# Patient Record
Sex: MALE | Race: ASIAN | NOT HISPANIC OR LATINO
[De-identification: names, ages, dates, MRNs, and addresses within clinical notes are randomized per-mention and may not be internally consistent; named-entity substitution may affect disease eponyms.]

---

## 2022-07-26 PROBLEM — Z00.00 ENCOUNTER FOR PREVENTIVE HEALTH EXAMINATION: Status: ACTIVE | Noted: 2022-07-26

## 2022-08-11 DIAGNOSIS — M76.62 ACHILLES TENDINITIS, LEFT LEG: ICD-10-CM

## 2022-08-11 DIAGNOSIS — R26.2 DIFFICULTY IN WALKING, NOT ELSEWHERE CLASSIFIED: ICD-10-CM

## 2022-08-11 DIAGNOSIS — Z86.39 PERSONAL HISTORY OF OTHER ENDOCRINE, NUTRITIONAL AND METABOLIC DISEASE: ICD-10-CM

## 2022-08-11 DIAGNOSIS — Z78.9 OTHER SPECIFIED HEALTH STATUS: ICD-10-CM

## 2022-08-11 DIAGNOSIS — M77.52 OTHER ENTHESOPATHY OF LT FOOT AND ANKLE: ICD-10-CM

## 2022-08-11 RX ORDER — LEVOTHYROXINE SODIUM 0.05 MG/1
50 TABLET ORAL
Refills: 0 | Status: ACTIVE | COMMUNITY

## 2022-08-11 RX ORDER — CLOTRIMAZOLE 10 MG/G
1 CREAM TOPICAL
Refills: 0 | Status: ACTIVE | COMMUNITY

## 2022-08-16 ENCOUNTER — APPOINTMENT (OUTPATIENT)
Dept: PODIATRY | Facility: CLINIC | Age: 87
End: 2022-08-16

## 2022-08-16 VITALS — HEIGHT: 66 IN | BODY MASS INDEX: 23.46 KG/M2 | WEIGHT: 146 LBS

## 2022-08-16 PROCEDURE — 99212 OFFICE O/P EST SF 10 MIN: CPT

## 2022-08-18 NOTE — HISTORY OF PRESENT ILLNESS
[Sneakers] : jessica [FreeTextEntry1] : Patient presents today for retrocalcaneal bursitis, left.  He is 90% improved.  Inflammation and pain are resolved.  The insertional pain is resolved.  Patient started physical therapy yesterday and noted significant improvement.\par

## 2022-08-18 NOTE — PHYSICAL EXAM
[Ankle Swelling (On Exam)] : present [Ankle Swelling Bilaterally] : bilaterally  [Varicose Veins Of Lower Extremities] : bilaterally [Ankle Swelling On The Left] : moderate [] : not present [1+] : left foot posterior tibialis 1+ [2+] : left foot dorsalis pedis 2+ [FreeTextEntry1] : Achilles tendon reflex: 3/5, bilateral.

## 2022-08-18 NOTE — PROCEDURE
[FreeTextEntry1] : Impression: Insertional Achilles tendonitis, left, resolving\par \par Treatment: Continue with physical therapy.  Continue eccentric muscle strengthening.  Patient is discharged unless there a problem.\par Any questions or problems, patient is to contact the office.\par

## 2022-10-31 ENCOUNTER — APPOINTMENT (OUTPATIENT)
Dept: OTOLARYNGOLOGY | Facility: CLINIC | Age: 87
End: 2022-10-31

## 2022-10-31 VITALS
DIASTOLIC BLOOD PRESSURE: 92 MMHG | HEART RATE: 86 BPM | SYSTOLIC BLOOD PRESSURE: 146 MMHG | HEIGHT: 65 IN | WEIGHT: 148 LBS | BODY MASS INDEX: 24.66 KG/M2

## 2022-10-31 DIAGNOSIS — H90.5 UNSPECIFIED SENSORINEURAL HEARING LOSS: ICD-10-CM

## 2022-10-31 PROCEDURE — 92567 TYMPANOMETRY: CPT

## 2022-10-31 PROCEDURE — 99203 OFFICE O/P NEW LOW 30 MIN: CPT

## 2022-10-31 PROCEDURE — 92557 COMPREHENSIVE HEARING TEST: CPT

## 2022-10-31 RX ORDER — TRIAMCINOLONE ACETONIDE 1 MG/G
0.1 OINTMENT TOPICAL
Qty: 80 | Refills: 0 | Status: ACTIVE | COMMUNITY
Start: 2022-10-17

## 2022-10-31 RX ORDER — MUPIROCIN 20 MG/G
2 OINTMENT TOPICAL
Qty: 22 | Refills: 0 | Status: ACTIVE | COMMUNITY
Start: 2022-10-17

## 2022-10-31 RX ORDER — MELOXICAM 15 MG/1
15 TABLET ORAL
Refills: 0 | Status: DISCONTINUED | COMMUNITY
End: 2022-10-31

## 2022-10-31 RX ORDER — HYDROCORTISONE 25 MG/G
2.5 CREAM TOPICAL
Qty: 28 | Refills: 0 | Status: ACTIVE | COMMUNITY
Start: 2022-06-16

## 2022-10-31 RX ORDER — COVID-19 ANTIGEN TEST
KIT MISCELLANEOUS
Qty: 8 | Refills: 0 | Status: DISCONTINUED | COMMUNITY
Start: 2022-06-16

## 2022-11-20 PROBLEM — H90.5 HEARING DISORDER, COCHLEAR: Status: ACTIVE | Noted: 2022-11-20

## 2022-11-20 NOTE — HISTORY OF PRESENT ILLNESS
[de-identified] : 85 yo M with history of gradual hearing loss for past three years - started wearing hearing aids bilaterally for over a year - wears regularly. Last hearing test a couple of months ago.  No tinnitus, otalgia, otorrhea, dizziness or headaches. No history of head trauma or exposure to loud noises. No family history of hearing loss  - feels benefit with Hearing Aids - miracle ear cic

## 2023-05-12 ENCOUNTER — APPOINTMENT (OUTPATIENT)
Dept: OPHTHALMOLOGY | Facility: CLINIC | Age: 88
End: 2023-05-12
Payer: MEDICARE

## 2023-05-12 ENCOUNTER — NON-APPOINTMENT (OUTPATIENT)
Age: 88
End: 2023-05-12

## 2023-05-12 PROCEDURE — 92004 COMPRE OPH EXAM NEW PT 1/>: CPT

## 2023-05-12 PROCEDURE — 92136 OPHTHALMIC BIOMETRY: CPT

## 2023-05-31 VITALS
RESPIRATION RATE: 15 BRPM | TEMPERATURE: 97 F | HEIGHT: 65 IN | SYSTOLIC BLOOD PRESSURE: 125 MMHG | DIASTOLIC BLOOD PRESSURE: 78 MMHG | OXYGEN SATURATION: 95 % | HEART RATE: 45 BPM | WEIGHT: 147.93 LBS

## 2023-05-31 RX ORDER — CHLORHEXIDINE GLUCONATE 213 G/1000ML
1 SOLUTION TOPICAL ONCE
Refills: 0 | Status: DISCONTINUED | OUTPATIENT
Start: 2023-06-05 | End: 2023-06-05

## 2023-05-31 NOTE — H&P ADULT - ASSESSMENT
This is a 88 yo M, PMH of HLD, Hypothyroidism, BPH who presented to outpatient cardiologist Dr. Yanes with complaints of dyspnea on exertion of < 2 blocks, sometimes even at rest,  worsening over the past few weeks, who, in light of risk factors, CCS anginal equivalent class IV sx and abnormal NST, patient is referred for cardiac catheterization with possible intervention if clinically indicated.      EK23 EKG NSR 70bpm; TW flattening AVL and V2		  ASA  	III  Mallampati class: II *however note poor dentition  Anginal Class: IV    -No Known Drug Allergies  shellfish (Swelling) 40 years ago-  no issues eating seafood currently.     -H/H = 15.8/52.9  . Pt denies BRBPR, hematuria, hematochezia, melena. Pt endorses compliance w/ home Asa81, stating last dose was today 6am. Pt loaded w/ Plavix 600mg x1  -BUN/Cr = 20/1.36  . EF 55-60% 3/27/23. Euvolemic on exam. IV NS @ 250cc bolus followed by 75cc/hr x 2hrs started pre procedure  - Creatinine 1.36 today is consistent with outpatient Creatinine 1.34    Sedation Plan:   Moderate  Patient Is Suitable Candidate For Sedation?     Yes    Risks & benefits of procedure and alternative therapy have been explained to the patient including but not limited to: allergic reaction, bleeding with possible need for blood transfusion, infection, renal and vascular compromise, limb damage, arrhythmia, stroke, vessel dissection/perforation, myocardial infarction, and emergent CABG. Informed consent obtained at bedside and included in chart. This is a 86 yo M, PMH of HLD, Hypothyroidism, BPH who presented to outpatient cardiologist Dr. Yanes with complaints of dyspnea on exertion of < 2 blocks, sometimes even at rest,  worsening over the past few weeks, who, in light of risk factors, CCS anginal equivalent class IV sx and abnormal NST, patient is referred for cardiac catheterization with possible intervention if clinically indicated.      EK23 EKG NSR 70bpm; TW flattening AVL and V2		  ASA  	III  Mallampati class: II *however note poor dentition  Anginal Class: IV    -No Known Drug Allergies  shellfish (Swelling) 40 years ago-  no issues eating seafood currently.     -H/H = 15.8/52.9  . Pt denies BRBPR, hematuria, hematochezia, melena. Pt endorses compliance w/ home Asa81, stating last dose was today 6am. Pt loaded w/ Plavix 600mg x1  -BUN/Cr = 20/1.36  . EF 55-60% 3/27/23. Euvolemic on exam. IV NS @ 250cc bolus followed by 75cc/hr x 2hrs started pre procedure  - Creatinine 1.36 today is consistent with outpatient Creatinine 1.34    Sedation Plan:   Moderate  Patient Is Suitable Candidate For Sedation?     Yes    Risks & benefits of procedure and alternative therapy have been explained to the patient including but not limited to: allergic reaction, bleeding with possible need for blood transfusion, infection, renal and vascular compromise, limb damage, arrhythmia, stroke, vessel dissection/perforation, myocardial infarction, and emergent CABG. Informed consent obtained at bedside and included in chart.

## 2023-05-31 NOTE — H&P ADULT - NSCORESITESY/N_GEN_A_CORE_RD
Spoke with father of patient. Verified patient identifiers.  Relayed negative result. Father states patient improved. No questions or concerns verbalized at this time.  
No

## 2023-05-31 NOTE — H&P ADULT - NSICDXPASTMEDICALHX_GEN_ALL_CORE_FT
PAST MEDICAL HISTORY:  History of BPH     HLD (hyperlipidemia)     HTN (hypertension)     Hypothyroid      PAST MEDICAL HISTORY:  History of BPH     HLD (hyperlipidemia)     Hypothyroid

## 2023-05-31 NOTE — H&P ADULT - HISTORY OF PRESENT ILLNESS
Cardiologist: Dr. Yanes  Escort:  Pharmacy: [place], [phone]    *Verify meds*  *Clarify "seafood allergy"    88 yo M, with a PMH of HTN, HLD, Hypothyroidism, BPH who presented to outpatient cardiologist Dr. Yanes with complaints of a substernal intermittent non-radiating chest tightness on exertion of < 2 blocks, relieved w/ rest, worsening over the past few weeks. Additionally endorsing dizziness and b/l leg heaviness. Denies  SOB, diaphoresis, palpitations, orthopnea/PND, abdominal pain, N/V/D, urinary sx, BRBPR, hematuria, melena, LE swelling. TTE 3/27/23: LVEF normal 55-60%, no WMA, no valvular disease. NST 3/27/23: small-medium sized, moderate to severe, predominant reversible mid to basal inferolateral perfusion defect. In light of patient's risk factors, CCS angina class III sx and abnormal NST, patient is referred for cardiac catheterization with possible intervention if clinically indicated.       Cardiologist: Dr. Yanes  Escort:  Pharmacy: [place], [phone]    *Verify meds*  *Clarify "seafood allergy"    86 yo M, with a PMH of HTN, HLD, Hypothyroidism, BPH who presented to outpatient cardiologist Dr. Yanes with complaints of a substernal intermittent non-radiating chest tightness on exertion of < 2 blocks, relieved w/ rest, worsening over the past few weeks. Additionally endorsing dizziness and b/l leg heaviness. Denies  SOB, diaphoresis, palpitations, orthopnea/PND, abdominal pain, N/V/D, urinary sx, BRBPR, hematuria, melena, LE swelling. TTE 3/27/23: LVEF normal 55-60%, no WMA, no valvular disease. NST 3/27/23: small-medium sized, moderate to severe, predominant reversible mid to basal inferolateral perfusion defect. In light of patient's risk factors, CCS angina class III sx and abnormal NST, patient is referred for cardiac catheterization with possible intervention if clinically indicated.       Cardiologist: Dr. Yanes  Escort:  Pharmacy: [place], [phone]    *Verify meds*  *Clarify "seafood allergy"    88 yo M, with a PMH of HTN, HLD, Hypothyroidism, BPH who presented to outpatient cardiologist Dr. Yanes with complaints of a substernal intermittent non-radiating chest tightness on exertion of < 2 blocks, relieved w/ rest, worsening over the past few weeks. Additionally endorsing dizziness and b/l leg heaviness. Denies  SOB, diaphoresis, palpitations, orthopnea/PND, abdominal pain, N/V/D, urinary sx, BRBPR, hematuria, melena, LE swelling. TTE 3/27/23: LVEF normal 55-60%, no WMA, no valvular disease. NST 3/27/23: small-medium sized, moderate to severe, predominant reversible mid to basal inferolateral perfusion defect. In light of patient's risk factors, CCS angina class III sx and abnormal NST, patient is referred for cardiac catheterization with possible intervention if clinically indicated.       Cardiologist: Dr. Yanes  Escort: family at bedside  Pharmacy: [place], [phone]    88 yo M, with a PMH of HLD, Hypothyroidism, BPH who presented to outpatient cardiologist Dr. Yanes with complaints of dyspnea on exertion of < 2 blocks, sometimes even at rest, but usually relieved w/ rest, worsening over the past few weeks. Pt denies  SOB, dizziness, diaphoresis, palpitations, orthopnea/PND, abdominal pain, N/V/D, fever/chills, urinary sx, BRBPR, hematuria, melena, LE swelling. TTE 3/27/23: LVEF normal 55-60%, no WMA, mild AR/MR/TR/TN. NST 3/27/23: small-medium sized, moderate to severe, predominant reversible mid to basal inferolateral perfusion defect. In light of patient's risk factors, CCS angina class III sx and abnormal NST, patient is referred for cardiac catheterization with possible intervention if clinically indicated.       Cardiologist: Dr. Yanes  Escort: family at bedside  Pharmacy: [place], [phone]    86 yo M, with a PMH of HLD, Hypothyroidism, BPH who presented to outpatient cardiologist Dr. Yanes with complaints of dyspnea on exertion of < 2 blocks, sometimes even at rest, but usually relieved w/ rest, worsening over the past few weeks. Pt denies  SOB, dizziness, diaphoresis, palpitations, orthopnea/PND, abdominal pain, N/V/D, fever/chills, urinary sx, BRBPR, hematuria, melena, LE swelling. TTE 3/27/23: LVEF normal 55-60%, no WMA, mild AR/MR/TR/PA. NST 3/27/23: small-medium sized, moderate to severe, predominant reversible mid to basal inferolateral perfusion defect. In light of patient's risk factors, CCS angina class III sx and abnormal NST, patient is referred for cardiac catheterization with possible intervention if clinically indicated.       Cardiologist: Dr. Yanes  Escort: family at bedside  Pharmacy: [place], [phone]    88 yo M, with a PMH of HLD, Hypothyroidism, BPH who presented to outpatient cardiologist Dr. Yanes with complaints of dyspnea on exertion of < 2 blocks, sometimes even at rest, but usually relieved w/ rest, worsening over the past few weeks. Pt denies  SOB, dizziness, diaphoresis, palpitations, orthopnea/PND, abdominal pain, N/V/D, fever/chills, urinary sx, BRBPR, hematuria, melena, LE swelling. TTE 3/27/23: LVEF normal 55-60%, no WMA, mild AR/MR/TR/ID. NST 3/27/23: small-medium sized, moderate to severe, predominant reversible mid to basal inferolateral perfusion defect. In light of patient's risk factors, CCS angina class III sx and abnormal NST, patient is referred for cardiac catheterization with possible intervention if clinically indicated.       Cardiologist: Dr. Yanes  Escort: family at bedside  Pharmacy: [place], [phone]    88 yo M, with a PMH of HLD, Hypothyroidism, BPH who presented to outpatient cardiologist Dr. Yanes with complaints of dyspnea on exertion of < 2 blocks, sometimes even at rest, worsening over the past few weeks. Pt denies chest pain, dizziness, diaphoresis, palpitations, orthopnea/PND, abdominal pain, N/V/D, fever/chills, urinary sx, BRBPR, hematuria, melena, LE swelling. Pt underwent TTE 3/27/23: LVEF normal 55-60%, no WMA, mild AR/MR/TR/MA. Pt underwent NST 3/27/23: small-medium sized, moderate to severe, predominant reversible mid to basal inferolateral perfusion defect. In light of patient's risk factors, CCS anginal equivalent class IV sx and abnormal NST, patient is referred for cardiac catheterization with possible intervention if clinically indicated.       Cardiologist: Dr. Yanes  Escort: family at bedside  Pharmacy: [place], [phone]    88 yo M, with a PMH of HLD, Hypothyroidism, BPH who presented to outpatient cardiologist Dr. Yanes with complaints of dyspnea on exertion of < 2 blocks, sometimes even at rest, worsening over the past few weeks. Pt denies chest pain, dizziness, diaphoresis, palpitations, orthopnea/PND, abdominal pain, N/V/D, fever/chills, urinary sx, BRBPR, hematuria, melena, LE swelling. Pt underwent TTE 3/27/23: LVEF normal 55-60%, no WMA, mild AR/MR/TR/WI. Pt underwent NST 3/27/23: small-medium sized, moderate to severe, predominant reversible mid to basal inferolateral perfusion defect. In light of patient's risk factors, CCS anginal equivalent class IV sx and abnormal NST, patient is referred for cardiac catheterization with possible intervention if clinically indicated.       Cardiologist: Dr. Yanes  Escort: family at bedside  Pharmacy: [place], [phone]    86 yo M, with a PMH of HLD, Hypothyroidism, BPH who presented to outpatient cardiologist Dr. Yanes with complaints of dyspnea on exertion of < 2 blocks, sometimes even at rest, worsening over the past few weeks. Pt denies chest pain, dizziness, diaphoresis, palpitations, orthopnea/PND, abdominal pain, N/V/D, fever/chills, urinary sx, BRBPR, hematuria, melena, LE swelling. Pt underwent TTE 3/27/23: LVEF normal 55-60%, no WMA, mild AR/MR/TR/AZ. Pt underwent NST 3/27/23: small-medium sized, moderate to severe, predominant reversible mid to basal inferolateral perfusion defect. In light of patient's risk factors, CCS anginal equivalent class IV sx and abnormal NST, patient is referred for cardiac catheterization with possible intervention if clinically indicated.

## 2023-05-31 NOTE — H&P ADULT - NSHPLABSRESULTS_GEN_ALL_CORE
15.8   7.99  )-----------( 190      ( 05 Jun 2023 14:07 )             52.9       06-05    140  |  102  |  20  ----------------------------<  124<H>  4.6   |  28  |  1.36<H>    Ca    9.5      05 Jun 2023 14:07  Mg     2.2     06-05    TPro  7.8  /  Alb  4.1  /  TBili  0.5  /  DBili  x   /  AST  26  /  ALT  33  /  AlkPhos  72  06-05      PT/INR - ( 05 Jun 2023 14:07 )   PT: 11.8 sec;   INR: 0.99          PTT - ( 05 Jun 2023 14:07 )  PTT:39.4 sec    CARDIAC MARKERS ( 05 Jun 2023 14:07 )  x     / x     / 103 U/L / x     / 2.8 ng/mL

## 2023-06-02 ENCOUNTER — NON-APPOINTMENT (OUTPATIENT)
Age: 88
End: 2023-06-02

## 2023-06-02 ENCOUNTER — APPOINTMENT (OUTPATIENT)
Dept: OPHTHALMOLOGY | Facility: CLINIC | Age: 88
End: 2023-06-02
Payer: MEDICARE

## 2023-06-02 PROCEDURE — 92012 INTRM OPH EXAM EST PATIENT: CPT

## 2023-06-05 ENCOUNTER — OUTPATIENT (OUTPATIENT)
Dept: OUTPATIENT SERVICES | Facility: HOSPITAL | Age: 88
LOS: 1 days | Discharge: HOME | End: 2023-06-05
Payer: MEDICARE

## 2023-06-05 DIAGNOSIS — Z98.1 ARTHRODESIS STATUS: Chronic | ICD-10-CM

## 2023-06-05 LAB
ALBUMIN SERPL ELPH-MCNC: 4.1 G/DL — SIGNIFICANT CHANGE UP (ref 3.3–5)
ALP SERPL-CCNC: 72 U/L — SIGNIFICANT CHANGE UP (ref 40–120)
ALT FLD-CCNC: 33 U/L — SIGNIFICANT CHANGE UP (ref 10–45)
ANION GAP SERPL CALC-SCNC: 10 MMOL/L — SIGNIFICANT CHANGE UP (ref 5–17)
ANION GAP SERPL CALC-SCNC: 8 MMOL/L — SIGNIFICANT CHANGE UP (ref 5–17)
APTT BLD: 39.4 SEC — HIGH (ref 27.5–35.5)
AST SERPL-CCNC: 26 U/L — SIGNIFICANT CHANGE UP (ref 10–40)
BASOPHILS # BLD AUTO: 0.07 K/UL — SIGNIFICANT CHANGE UP (ref 0–0.2)
BASOPHILS NFR BLD AUTO: 0.9 % — SIGNIFICANT CHANGE UP (ref 0–2)
BILIRUB SERPL-MCNC: 0.5 MG/DL — SIGNIFICANT CHANGE UP (ref 0.2–1.2)
BUN SERPL-MCNC: 19 MG/DL — SIGNIFICANT CHANGE UP (ref 7–23)
BUN SERPL-MCNC: 20 MG/DL — SIGNIFICANT CHANGE UP (ref 7–23)
CALCIUM SERPL-MCNC: 8.2 MG/DL — LOW (ref 8.4–10.5)
CALCIUM SERPL-MCNC: 9.5 MG/DL — SIGNIFICANT CHANGE UP (ref 8.4–10.5)
CHLORIDE SERPL-SCNC: 102 MMOL/L — SIGNIFICANT CHANGE UP (ref 96–108)
CHLORIDE SERPL-SCNC: 103 MMOL/L — SIGNIFICANT CHANGE UP (ref 96–108)
CHOLEST SERPL-MCNC: 104 MG/DL — SIGNIFICANT CHANGE UP
CK MB CFR SERPL CALC: 2.8 NG/ML — SIGNIFICANT CHANGE UP (ref 0–6.7)
CK SERPL-CCNC: 103 U/L — SIGNIFICANT CHANGE UP (ref 30–200)
CO2 SERPL-SCNC: 26 MMOL/L — SIGNIFICANT CHANGE UP (ref 22–31)
CO2 SERPL-SCNC: 28 MMOL/L — SIGNIFICANT CHANGE UP (ref 22–31)
CREAT SERPL-MCNC: 1.22 MG/DL — SIGNIFICANT CHANGE UP (ref 0.5–1.3)
CREAT SERPL-MCNC: 1.36 MG/DL — HIGH (ref 0.5–1.3)
EGFR: 51 ML/MIN/1.73M2 — LOW
EGFR: 58 ML/MIN/1.73M2 — LOW
EOSINOPHIL # BLD AUTO: 0.28 K/UL — SIGNIFICANT CHANGE UP (ref 0–0.5)
EOSINOPHIL NFR BLD AUTO: 3.5 % — SIGNIFICANT CHANGE UP (ref 0–6)
GIANT PLATELETS BLD QL SMEAR: PRESENT — SIGNIFICANT CHANGE UP
GLUCOSE BLDC GLUCOMTR-MCNC: 93 MG/DL — SIGNIFICANT CHANGE UP (ref 70–99)
GLUCOSE SERPL-MCNC: 124 MG/DL — HIGH (ref 70–99)
GLUCOSE SERPL-MCNC: 154 MG/DL — HIGH (ref 70–99)
HCT VFR BLD CALC: 45.6 % — SIGNIFICANT CHANGE UP (ref 39–50)
HCT VFR BLD CALC: 52.9 % — HIGH (ref 39–50)
HDLC SERPL-MCNC: 53 MG/DL — SIGNIFICANT CHANGE UP
HGB BLD-MCNC: 13.8 G/DL — SIGNIFICANT CHANGE UP (ref 13–17)
HGB BLD-MCNC: 15.8 G/DL — SIGNIFICANT CHANGE UP (ref 13–17)
INR BLD: 0.99 — SIGNIFICANT CHANGE UP (ref 0.88–1.16)
LIPID PNL WITH DIRECT LDL SERPL: 30 MG/DL — SIGNIFICANT CHANGE UP
LYMPHOCYTES # BLD AUTO: 3.54 K/UL — HIGH (ref 1–3.3)
LYMPHOCYTES # BLD AUTO: 44.3 % — HIGH (ref 13–44)
MAGNESIUM SERPL-MCNC: 2 MG/DL — SIGNIFICANT CHANGE UP (ref 1.6–2.6)
MAGNESIUM SERPL-MCNC: 2.2 MG/DL — SIGNIFICANT CHANGE UP (ref 1.6–2.6)
MANUAL SMEAR VERIFICATION: SIGNIFICANT CHANGE UP
MCHC RBC-ENTMCNC: 20.6 PG — LOW (ref 27–34)
MCHC RBC-ENTMCNC: 20.7 PG — LOW (ref 27–34)
MCHC RBC-ENTMCNC: 29.9 GM/DL — LOW (ref 32–36)
MCHC RBC-ENTMCNC: 30.3 GM/DL — LOW (ref 32–36)
MCV RBC AUTO: 68.2 FL — LOW (ref 80–100)
MCV RBC AUTO: 69.2 FL — LOW (ref 80–100)
MICROCYTES BLD QL: SLIGHT — SIGNIFICANT CHANGE UP
MONOCYTES # BLD AUTO: 0.49 K/UL — SIGNIFICANT CHANGE UP (ref 0–0.9)
MONOCYTES NFR BLD AUTO: 6.1 % — SIGNIFICANT CHANGE UP (ref 2–14)
NEUTROPHILS # BLD AUTO: 3.61 K/UL — SIGNIFICANT CHANGE UP (ref 1.8–7.4)
NEUTROPHILS NFR BLD AUTO: 45.2 % — SIGNIFICANT CHANGE UP (ref 43–77)
NON HDL CHOLESTEROL: 51 MG/DL — SIGNIFICANT CHANGE UP
NRBC # BLD: 0 /100 WBCS — SIGNIFICANT CHANGE UP (ref 0–0)
OVALOCYTES BLD QL SMEAR: SLIGHT — SIGNIFICANT CHANGE UP
PLAT MORPH BLD: ABNORMAL
PLATELET # BLD AUTO: 162 K/UL — SIGNIFICANT CHANGE UP (ref 150–400)
PLATELET # BLD AUTO: 190 K/UL — SIGNIFICANT CHANGE UP (ref 150–400)
POIKILOCYTOSIS BLD QL AUTO: SLIGHT — SIGNIFICANT CHANGE UP
POLYCHROMASIA BLD QL SMEAR: SLIGHT — SIGNIFICANT CHANGE UP
POTASSIUM SERPL-MCNC: 4.1 MMOL/L — SIGNIFICANT CHANGE UP (ref 3.5–5.3)
POTASSIUM SERPL-MCNC: 4.6 MMOL/L — SIGNIFICANT CHANGE UP (ref 3.5–5.3)
POTASSIUM SERPL-SCNC: 4.1 MMOL/L — SIGNIFICANT CHANGE UP (ref 3.5–5.3)
POTASSIUM SERPL-SCNC: 4.6 MMOL/L — SIGNIFICANT CHANGE UP (ref 3.5–5.3)
PROT SERPL-MCNC: 7.8 G/DL — SIGNIFICANT CHANGE UP (ref 6–8.3)
PROTHROM AB SERPL-ACNC: 11.8 SEC — SIGNIFICANT CHANGE UP (ref 10.5–13.4)
RBC # BLD: 6.69 M/UL — HIGH (ref 4.2–5.8)
RBC # BLD: 7.64 M/UL — HIGH (ref 4.2–5.8)
RBC # FLD: 17.1 % — HIGH (ref 10.3–14.5)
RBC # FLD: 17.8 % — HIGH (ref 10.3–14.5)
RBC BLD AUTO: ABNORMAL
SODIUM SERPL-SCNC: 137 MMOL/L — SIGNIFICANT CHANGE UP (ref 135–145)
SODIUM SERPL-SCNC: 140 MMOL/L — SIGNIFICANT CHANGE UP (ref 135–145)
TRIGL SERPL-MCNC: 106 MG/DL — SIGNIFICANT CHANGE UP
WBC # BLD: 7.96 K/UL — SIGNIFICANT CHANGE UP (ref 3.8–10.5)
WBC # BLD: 7.99 K/UL — SIGNIFICANT CHANGE UP (ref 3.8–10.5)
WBC # FLD AUTO: 7.96 K/UL — SIGNIFICANT CHANGE UP (ref 3.8–10.5)
WBC # FLD AUTO: 7.99 K/UL — SIGNIFICANT CHANGE UP (ref 3.8–10.5)

## 2023-06-05 PROCEDURE — 93458 L HRT ARTERY/VENTRICLE ANGIO: CPT | Mod: 26,59

## 2023-06-05 PROCEDURE — C1894: CPT

## 2023-06-05 PROCEDURE — 82550 ASSAY OF CK (CPK): CPT

## 2023-06-05 PROCEDURE — 82962 GLUCOSE BLOOD TEST: CPT

## 2023-06-05 PROCEDURE — 99152 MOD SED SAME PHYS/QHP 5/>YRS: CPT

## 2023-06-05 PROCEDURE — 82553 CREATINE MB FRACTION: CPT

## 2023-06-05 PROCEDURE — C1887: CPT

## 2023-06-05 PROCEDURE — C1725: CPT

## 2023-06-05 PROCEDURE — 85027 COMPLETE CBC AUTOMATED: CPT

## 2023-06-05 PROCEDURE — 85610 PROTHROMBIN TIME: CPT

## 2023-06-05 PROCEDURE — 85730 THROMBOPLASTIN TIME PARTIAL: CPT

## 2023-06-05 PROCEDURE — 85347 COAGULATION TIME ACTIVATED: CPT

## 2023-06-05 PROCEDURE — 85025 COMPLETE CBC W/AUTO DIFF WBC: CPT

## 2023-06-05 PROCEDURE — 92978 ENDOLUMINL IVUS OCT C 1ST: CPT | Mod: LD

## 2023-06-05 PROCEDURE — 93005 ELECTROCARDIOGRAM TRACING: CPT

## 2023-06-05 PROCEDURE — 80061 LIPID PANEL: CPT

## 2023-06-05 PROCEDURE — C9600: CPT | Mod: LD

## 2023-06-05 PROCEDURE — 80053 COMPREHEN METABOLIC PANEL: CPT

## 2023-06-05 PROCEDURE — C1874: CPT

## 2023-06-05 PROCEDURE — 92978 ENDOLUMINL IVUS OCT C 1ST: CPT | Mod: 26,LD

## 2023-06-05 PROCEDURE — 93010 ELECTROCARDIOGRAM REPORT: CPT

## 2023-06-05 PROCEDURE — 93458 L HRT ARTERY/VENTRICLE ANGIO: CPT | Mod: 59

## 2023-06-05 PROCEDURE — C1753: CPT

## 2023-06-05 PROCEDURE — 92928 PRQ TCAT PLMT NTRAC ST 1 LES: CPT | Mod: LD

## 2023-06-05 PROCEDURE — 80048 BASIC METABOLIC PNL TOTAL CA: CPT

## 2023-06-05 PROCEDURE — 83735 ASSAY OF MAGNESIUM: CPT

## 2023-06-05 PROCEDURE — C1769: CPT

## 2023-06-05 RX ORDER — SODIUM CHLORIDE 9 MG/ML
250 INJECTION INTRAMUSCULAR; INTRAVENOUS; SUBCUTANEOUS ONCE
Refills: 0 | Status: COMPLETED | OUTPATIENT
Start: 2023-06-05 | End: 2023-06-05

## 2023-06-05 RX ORDER — SODIUM CHLORIDE 9 MG/ML
1000 INJECTION INTRAMUSCULAR; INTRAVENOUS; SUBCUTANEOUS
Refills: 0 | Status: DISCONTINUED | OUTPATIENT
Start: 2023-06-05 | End: 2023-06-20

## 2023-06-05 RX ORDER — CLOPIDOGREL BISULFATE 75 MG/1
1 TABLET, FILM COATED ORAL
Qty: 30 | Refills: 11
Start: 2023-06-05 | End: 2024-05-29

## 2023-06-05 RX ORDER — CLOPIDOGREL BISULFATE 75 MG/1
600 TABLET, FILM COATED ORAL ONCE
Refills: 0 | Status: COMPLETED | OUTPATIENT
Start: 2023-06-05 | End: 2023-06-05

## 2023-06-05 RX ORDER — ASPIRIN/CALCIUM CARB/MAGNESIUM 324 MG
81 TABLET ORAL ONCE
Refills: 0 | Status: DISCONTINUED | OUTPATIENT
Start: 2023-06-05 | End: 2023-06-05

## 2023-06-05 RX ORDER — ASPIRIN/CALCIUM CARB/MAGNESIUM 324 MG
1 TABLET ORAL
Qty: 30 | Refills: 11
Start: 2023-06-05 | End: 2024-05-29

## 2023-06-05 RX ORDER — SODIUM CHLORIDE 9 MG/ML
500 INJECTION INTRAMUSCULAR; INTRAVENOUS; SUBCUTANEOUS
Refills: 0 | Status: DISCONTINUED | OUTPATIENT
Start: 2023-06-05 | End: 2023-06-05

## 2023-06-05 RX ADMIN — SODIUM CHLORIDE 75 MILLILITER(S): 9 INJECTION INTRAMUSCULAR; INTRAVENOUS; SUBCUTANEOUS at 15:40

## 2023-06-05 RX ADMIN — SODIUM CHLORIDE 500 MILLILITER(S): 9 INJECTION INTRAMUSCULAR; INTRAVENOUS; SUBCUTANEOUS at 15:40

## 2023-06-05 RX ADMIN — SODIUM CHLORIDE 200 MILLILITER(S): 9 INJECTION INTRAMUSCULAR; INTRAVENOUS; SUBCUTANEOUS at 19:03

## 2023-06-05 RX ADMIN — CLOPIDOGREL BISULFATE 600 MILLIGRAM(S): 75 TABLET, FILM COATED ORAL at 15:39

## 2023-06-05 NOTE — PROGRESS NOTE ADULT - SUBJECTIVE AND OBJECTIVE BOX
Interventional Cardiology PA Post PCI SDA Discharge Note      Patient without complaints. Ambulated and voided without difficulties    Ext: Right/Left Radial: no hematoma, no bleeding, dressing; C/D/I        Pulses: intact RAD/DP/PT to baseline     A/P: 87M w/ PMH of HLD, Hypothyroidism, BPH, who presents to [facility] for recommended cardiac cath w/ possible intervention, in light of risk factors, CCS anginal equivalent class IV sx and abnormal NST. Pt now s/p cardiac cath 6/5/23: JOSÉ/scoreflex to mLAD 95%; pRCA 50%, pLCx 50%, dLCx ectatic w/ slow flow, OM2  unable to cross, access R radial.    1. Follow-up with PMD/Cardiologist Joaquín in 72 hours.  2. Post procedure labs/EKG reviewed and stable.    3. Pt given instructions on importance of taking antiplatelet medication.    4. Stable for discharge as per attending Dr. Jarvis after bed rest, pt voids, wrist stable and 30 minutes of ambulation.  5. Prescriptions for Aspirin and Plavix e-prescribed and submitted to patient's pharmacy.  6. Patient will continue Crestor 20mg QD, Toprol 25mg QD, Levothyroxine 50mcg QD and FLomax 0.4mg QD. Interventional Cardiology PA Post PCI SDA Discharge Note      Patient without complaints. Ambulated and voided without difficulties    Ext: Right/Left Radial: no hematoma, no bleeding, dressing; C/D/I        Pulses: intact RAD/DP/PT to baseline     A/P: 87M w/ PMH of HLD, Hypothyroidism, BPH, who presents to [facility] for recommended cardiac cath w/ possible intervention, in light of risk factors, CCS anginal equivalent class IV sx and abnormal NST. Pt now s/p cardiac cath 6/5/23: JOSÉ/scoreflex to mLAD 95%; pRCA 50%, pLCx 50%, dLCx ectatic w/ slow flow, OM2  unable to cross, access R radial.    1. Follow-up with PMD/Cardiologist Joaquín in 72 hours.  2. Post procedure labs/EKG reviewed and stable.    3. Pt given instructions on importance of taking antiplatelet medication.    4. Stable for discharge as per attending Dr. Jarvis after bed rest, pt voids, wrist stable and 30 minutes of ambulation.  5. Prescriptions for Aspirin and Plavix e-prescribed and submitted to patient's pharmacy.  6. Patient will continue Crestor 20mg QD, Toprol 25mg QD, Levothyroxine 50mcg QD and FLomax 0.4mg QD. Interventional Cardiology PA Post PCI SDA Discharge Note      Patient without complaints. Ambulated and voided without difficulties    Ext: Right/Left Radial: no hematoma, no bleeding, dressing; C/D/I        Pulses: intact RAD/DP/PT to baseline     A/P: 87M w/ PMH of HLD, Hypothyroidism, BPH, who presents to [facility] for recommended cardiac cath w/ possible intervention, in light of risk factors, CCS anginal equivalent class IV sx and abnormal NST. Pt now s/p cardiac cath 6/5/23: JOSÉ/scoreflex to mLAD 95%; pRCA 50%, pLCx 50%, dLCx ectatic w/ slow flow, OM2  unable to cross, access R radial.    1. Follow-up with PMD/Cardiologist Joaquín in 72 hours.  2. Post procedure labs/EKG reviewed and stable.    3. Pt given instructions on importance of taking antiplatelet medication.    4. Stable for discharge as per attending Dr. Jarvis after bed rest, pt voids, wrist stable and 30 minutes of ambulation.  5. Prescriptions for Aspirin and Plavix e-prescribed and submitted to patient's pharmacy.  6. Patient will continue Crestor 20mg QD, Toprol 25mg QD, Levothyroxine 50mcg QD and FLomax 0.4mg QD. Interventional Cardiology PA Post PCI SDA Discharge Note      Patient without complaints. Ambulated and voided without difficulties    Ext: Right/Left Radial: no hematoma, no bleeding, dressing; C/D/I        Pulses: intact RAD/DP/PT to baseline     A/P: 87M w/ PMH of HLD, Hypothyroidism, BPH, who presents to [facility] for recommended cardiac cath w/ possible intervention, in light of risk factors, CCS anginal equivalent class IV sx and abnormal NST. Pt now s/p cardiac cath 6/5/23: IVUS guided JOSÉ/scoreflex to mLAD 95%, OM2  unable to cross, pRCA 50%, pLCx 50%, dLCx ectatic w/ slow [place], EDP 5, access R radial.    1. Follow-up with PMD/Cardiologist Joaquín in 72 hours.  2. Post procedure labs/EKG reviewed and stable.    3. Pt given instructions on importance of taking antiplatelet medication.    4. Stable for discharge as per attending Dr. Jarvis after bed rest, pt voids, wrist stable and 30 minutes of ambulation.  5. Prescriptions for Aspirin and Plavix e-prescribed and submitted to patient's pharmacy.  6. Patient will continue Crestor 20mg QD, Toprol 25mg QD, Levothyroxine 50mcg QD and FLomax 0.4mg QD.

## 2023-06-07 LAB
ISTAT ACTK (ACTIVATED CLOTTING TIME KAOLIN): 287 SEC — HIGH (ref 74–137)
ISTAT ACTK (ACTIVATED CLOTTING TIME KAOLIN): 354 SEC — HIGH (ref 74–137)

## 2023-06-13 DIAGNOSIS — I25.84 CORONARY ATHEROSCLEROSIS DUE TO CALCIFIED CORONARY LESION: ICD-10-CM

## 2023-06-13 DIAGNOSIS — R94.39 ABNORMAL RESULT OF OTHER CARDIOVASCULAR FUNCTION STUDY: ICD-10-CM

## 2023-06-13 DIAGNOSIS — I25.110 ATHEROSCLEROTIC HEART DISEASE OF NATIVE CORONARY ARTERY WITH UNSTABLE ANGINA PECTORIS: ICD-10-CM

## 2023-06-13 DIAGNOSIS — I25.82 CHRONIC TOTAL OCCLUSION OF CORONARY ARTERY: ICD-10-CM

## 2023-06-16 ENCOUNTER — NON-APPOINTMENT (OUTPATIENT)
Age: 88
End: 2023-06-16

## 2023-06-16 ENCOUNTER — APPOINTMENT (OUTPATIENT)
Dept: OPHTHALMOLOGY | Facility: CLINIC | Age: 88
End: 2023-06-16
Payer: MEDICARE

## 2023-06-16 PROCEDURE — 92012 INTRM OPH EXAM EST PATIENT: CPT

## 2023-06-27 ENCOUNTER — APPOINTMENT (OUTPATIENT)
Dept: OPHTHALMOLOGY | Facility: AMBULATORY SURGERY CENTER | Age: 88
End: 2023-06-27

## 2023-06-28 ENCOUNTER — APPOINTMENT (OUTPATIENT)
Dept: OPHTHALMOLOGY | Facility: CLINIC | Age: 88
End: 2023-06-28

## 2023-07-14 ENCOUNTER — NON-APPOINTMENT (OUTPATIENT)
Age: 88
End: 2023-07-14

## 2023-07-14 ENCOUNTER — APPOINTMENT (OUTPATIENT)
Dept: OPHTHALMOLOGY | Facility: CLINIC | Age: 88
End: 2023-07-14
Payer: MEDICARE

## 2023-07-14 PROCEDURE — 92012 INTRM OPH EXAM EST PATIENT: CPT

## 2023-08-11 ENCOUNTER — NON-APPOINTMENT (OUTPATIENT)
Age: 88
End: 2023-08-11

## 2023-08-11 ENCOUNTER — APPOINTMENT (OUTPATIENT)
Dept: OPHTHALMOLOGY | Facility: CLINIC | Age: 88
End: 2023-08-11
Payer: MEDICARE

## 2023-08-11 PROCEDURE — 92012 INTRM OPH EXAM EST PATIENT: CPT

## 2023-09-13 ENCOUNTER — INPATIENT (INPATIENT)
Facility: HOSPITAL | Age: 88
LOS: 1 days | Discharge: HOME | DRG: 552 | End: 2023-09-15
Attending: PEDIATRICS | Admitting: STUDENT IN AN ORGANIZED HEALTH CARE EDUCATION/TRAINING PROGRAM
Payer: MEDICARE

## 2023-09-13 VITALS
RESPIRATION RATE: 20 BRPM | SYSTOLIC BLOOD PRESSURE: 155 MMHG | OXYGEN SATURATION: 96 % | HEIGHT: 65 IN | HEART RATE: 63 BPM | DIASTOLIC BLOOD PRESSURE: 85 MMHG | TEMPERATURE: 98 F | WEIGHT: 149.91 LBS

## 2023-09-13 DIAGNOSIS — E78.5 HYPERLIPIDEMIA, UNSPECIFIED: ICD-10-CM

## 2023-09-13 DIAGNOSIS — Z29.9 ENCOUNTER FOR PROPHYLACTIC MEASURES, UNSPECIFIED: ICD-10-CM

## 2023-09-13 DIAGNOSIS — Z98.1 ARTHRODESIS STATUS: Chronic | ICD-10-CM

## 2023-09-13 DIAGNOSIS — I25.10 ATHEROSCLEROTIC HEART DISEASE OF NATIVE CORONARY ARTERY WITHOUT ANGINA PECTORIS: ICD-10-CM

## 2023-09-13 DIAGNOSIS — E03.9 HYPOTHYROIDISM, UNSPECIFIED: ICD-10-CM

## 2023-09-13 DIAGNOSIS — M48.061 SPINAL STENOSIS, LUMBAR REGION WITHOUT NEUROGENIC CLAUDICATION: ICD-10-CM

## 2023-09-13 DIAGNOSIS — N40.0 BENIGN PROSTATIC HYPERPLASIA WITHOUT LOWER URINARY TRACT SYMPTOMS: ICD-10-CM

## 2023-09-13 DIAGNOSIS — I10 ESSENTIAL (PRIMARY) HYPERTENSION: ICD-10-CM

## 2023-09-13 LAB
ALBUMIN SERPL ELPH-MCNC: 3.8 G/DL — SIGNIFICANT CHANGE UP (ref 3.3–5)
ALP SERPL-CCNC: 62 U/L — SIGNIFICANT CHANGE UP (ref 40–120)
ALT FLD-CCNC: 17 U/L — SIGNIFICANT CHANGE UP (ref 10–45)
ANION GAP SERPL CALC-SCNC: 9 MMOL/L — SIGNIFICANT CHANGE UP (ref 5–17)
APTT BLD: 33.2 SEC — SIGNIFICANT CHANGE UP (ref 24.5–35.6)
AST SERPL-CCNC: 21 U/L — SIGNIFICANT CHANGE UP (ref 10–40)
BASOPHILS # BLD AUTO: 0.16 K/UL — SIGNIFICANT CHANGE UP (ref 0–0.2)
BASOPHILS NFR BLD AUTO: 1.8 % — SIGNIFICANT CHANGE UP (ref 0–2)
BILIRUB SERPL-MCNC: 0.7 MG/DL — SIGNIFICANT CHANGE UP (ref 0.2–1.2)
BUN SERPL-MCNC: 17 MG/DL — SIGNIFICANT CHANGE UP (ref 7–23)
CALCIUM SERPL-MCNC: 8.9 MG/DL — SIGNIFICANT CHANGE UP (ref 8.4–10.5)
CHLORIDE SERPL-SCNC: 101 MMOL/L — SIGNIFICANT CHANGE UP (ref 96–108)
CO2 SERPL-SCNC: 27 MMOL/L — SIGNIFICANT CHANGE UP (ref 22–31)
CREAT SERPL-MCNC: 1.23 MG/DL — SIGNIFICANT CHANGE UP (ref 0.5–1.3)
DACRYOCYTES BLD QL SMEAR: SLIGHT — SIGNIFICANT CHANGE UP
EGFR: 58 ML/MIN/1.73M2 — LOW
EOSINOPHIL # BLD AUTO: 0.15 K/UL — SIGNIFICANT CHANGE UP (ref 0–0.5)
EOSINOPHIL NFR BLD AUTO: 1.7 % — SIGNIFICANT CHANGE UP (ref 0–6)
GIANT PLATELETS BLD QL SMEAR: PRESENT — SIGNIFICANT CHANGE UP
GLUCOSE SERPL-MCNC: 118 MG/DL — HIGH (ref 70–99)
HCT VFR BLD CALC: 48.5 % — SIGNIFICANT CHANGE UP (ref 39–50)
HGB BLD-MCNC: 15 G/DL — SIGNIFICANT CHANGE UP (ref 13–17)
INR BLD: 0.98 — SIGNIFICANT CHANGE UP (ref 0.85–1.18)
LYMPHOCYTES # BLD AUTO: 2.58 K/UL — SIGNIFICANT CHANGE UP (ref 1–3.3)
LYMPHOCYTES # BLD AUTO: 29.2 % — SIGNIFICANT CHANGE UP (ref 13–44)
MANUAL SMEAR VERIFICATION: SIGNIFICANT CHANGE UP
MCHC RBC-ENTMCNC: 20.8 PG — LOW (ref 27–34)
MCHC RBC-ENTMCNC: 30.9 GM/DL — LOW (ref 32–36)
MCV RBC AUTO: 67.4 FL — LOW (ref 80–100)
MONOCYTES # BLD AUTO: 0.63 K/UL — SIGNIFICANT CHANGE UP (ref 0–0.9)
MONOCYTES NFR BLD AUTO: 7.1 % — SIGNIFICANT CHANGE UP (ref 2–14)
NEUTROPHILS # BLD AUTO: 5.23 K/UL — SIGNIFICANT CHANGE UP (ref 1.8–7.4)
NEUTROPHILS NFR BLD AUTO: 59.3 % — SIGNIFICANT CHANGE UP (ref 43–77)
OVALOCYTES BLD QL SMEAR: SLIGHT — SIGNIFICANT CHANGE UP
PLAT MORPH BLD: ABNORMAL
PLATELET # BLD AUTO: 183 K/UL — SIGNIFICANT CHANGE UP (ref 150–400)
POIKILOCYTOSIS BLD QL AUTO: SLIGHT — SIGNIFICANT CHANGE UP
POTASSIUM SERPL-MCNC: 4.4 MMOL/L — SIGNIFICANT CHANGE UP (ref 3.5–5.3)
POTASSIUM SERPL-SCNC: 4.4 MMOL/L — SIGNIFICANT CHANGE UP (ref 3.5–5.3)
PROT SERPL-MCNC: 7.1 G/DL — SIGNIFICANT CHANGE UP (ref 6–8.3)
PROTHROM AB SERPL-ACNC: 11.2 SEC — SIGNIFICANT CHANGE UP (ref 9.5–13)
RBC # BLD: 7.2 M/UL — HIGH (ref 4.2–5.8)
RBC # FLD: 18.1 % — HIGH (ref 10.3–14.5)
RBC BLD AUTO: ABNORMAL
SMUDGE CELLS # BLD: PRESENT — SIGNIFICANT CHANGE UP
SODIUM SERPL-SCNC: 137 MMOL/L — SIGNIFICANT CHANGE UP (ref 135–145)
TSH SERPL-MCNC: 7.05 UIU/ML — HIGH (ref 0.27–4.2)
VARIANT LYMPHS # BLD: 0.9 % — SIGNIFICANT CHANGE UP (ref 0–6)
WBC # BLD: 8.82 K/UL — SIGNIFICANT CHANGE UP (ref 3.8–10.5)
WBC # FLD AUTO: 8.82 K/UL — SIGNIFICANT CHANGE UP (ref 3.8–10.5)

## 2023-09-13 PROCEDURE — 93010 ELECTROCARDIOGRAM REPORT: CPT

## 2023-09-13 PROCEDURE — 72131 CT LUMBAR SPINE W/O DYE: CPT | Mod: 26,MA

## 2023-09-13 PROCEDURE — 99285 EMERGENCY DEPT VISIT HI MDM: CPT

## 2023-09-13 PROCEDURE — 72148 MRI LUMBAR SPINE W/O DYE: CPT | Mod: 26

## 2023-09-13 PROCEDURE — 99223 1ST HOSP IP/OBS HIGH 75: CPT | Mod: GC

## 2023-09-13 PROCEDURE — 72192 CT PELVIS W/O DYE: CPT | Mod: 26,MA

## 2023-09-13 RX ORDER — CLOPIDOGREL BISULFATE 75 MG/1
75 TABLET, FILM COATED ORAL ONCE
Refills: 0 | Status: COMPLETED | OUTPATIENT
Start: 2023-09-13 | End: 2023-09-13

## 2023-09-13 RX ORDER — OXYCODONE HYDROCHLORIDE 5 MG/1
5 TABLET ORAL ONCE
Refills: 0 | Status: DISCONTINUED | OUTPATIENT
Start: 2023-09-13 | End: 2023-09-13

## 2023-09-13 RX ORDER — OXYCODONE HYDROCHLORIDE 5 MG/1
5 TABLET ORAL EVERY 6 HOURS
Refills: 0 | Status: DISCONTINUED | OUTPATIENT
Start: 2023-09-13 | End: 2023-09-15

## 2023-09-13 RX ORDER — TAMSULOSIN HYDROCHLORIDE 0.4 MG/1
0.4 CAPSULE ORAL AT BEDTIME
Refills: 0 | Status: DISCONTINUED | OUTPATIENT
Start: 2023-09-13 | End: 2023-09-15

## 2023-09-13 RX ORDER — INFLUENZA VIRUS VACCINE 15; 15; 15; 15 UG/.5ML; UG/.5ML; UG/.5ML; UG/.5ML
0.7 SUSPENSION INTRAMUSCULAR ONCE
Refills: 0 | Status: DISCONTINUED | OUTPATIENT
Start: 2023-09-13 | End: 2023-09-15

## 2023-09-13 RX ORDER — ACETAMINOPHEN 500 MG
650 TABLET ORAL EVERY 6 HOURS
Refills: 0 | Status: DISCONTINUED | OUTPATIENT
Start: 2023-09-13 | End: 2023-09-14

## 2023-09-13 RX ORDER — ACETAMINOPHEN 500 MG
1000 TABLET ORAL ONCE
Refills: 0 | Status: COMPLETED | OUTPATIENT
Start: 2023-09-13 | End: 2023-09-13

## 2023-09-13 RX ORDER — ATORVASTATIN CALCIUM 80 MG/1
80 TABLET, FILM COATED ORAL AT BEDTIME
Refills: 0 | Status: DISCONTINUED | OUTPATIENT
Start: 2023-09-13 | End: 2023-09-15

## 2023-09-13 RX ORDER — ASPIRIN/CALCIUM CARB/MAGNESIUM 324 MG
81 TABLET ORAL DAILY
Refills: 0 | Status: DISCONTINUED | OUTPATIENT
Start: 2023-09-14 | End: 2023-09-15

## 2023-09-13 RX ORDER — CYCLOBENZAPRINE HYDROCHLORIDE 10 MG/1
5 TABLET, FILM COATED ORAL THREE TIMES A DAY
Refills: 0 | Status: DISCONTINUED | OUTPATIENT
Start: 2023-09-13 | End: 2023-09-14

## 2023-09-13 RX ORDER — LEVOTHYROXINE SODIUM 125 MCG
50 TABLET ORAL DAILY
Refills: 0 | Status: DISCONTINUED | OUTPATIENT
Start: 2023-09-13 | End: 2023-09-15

## 2023-09-13 RX ORDER — CLOPIDOGREL BISULFATE 75 MG/1
75 TABLET, FILM COATED ORAL DAILY
Refills: 0 | Status: DISCONTINUED | OUTPATIENT
Start: 2023-09-14 | End: 2023-09-15

## 2023-09-13 RX ADMIN — OXYCODONE HYDROCHLORIDE 5 MILLIGRAM(S): 5 TABLET ORAL at 20:22

## 2023-09-13 RX ADMIN — OXYCODONE HYDROCHLORIDE 5 MILLIGRAM(S): 5 TABLET ORAL at 18:15

## 2023-09-13 RX ADMIN — Medication 1000 MILLIGRAM(S): at 15:55

## 2023-09-13 RX ADMIN — Medication 400 MILLIGRAM(S): at 15:55

## 2023-09-13 RX ADMIN — CLOPIDOGREL BISULFATE 75 MILLIGRAM(S): 75 TABLET, FILM COATED ORAL at 15:55

## 2023-09-13 RX ADMIN — Medication 1000 MILLIGRAM(S): at 16:25

## 2023-09-13 RX ADMIN — Medication 50 MICROGRAM(S): at 15:55

## 2023-09-13 NOTE — H&P ADULT - PROBLEM SELECTOR PLAN 1
Pt presenting with L posterior leg pain x 10 days.   MR lumbar spine; moderate to severe L2-L3, severe spinal canal stenosis at L3-4 and L4-5. L5-S1 left subarticular disc extrusion with mass effect and descending left S1 nerve root. Severe bilateral neural foraminal narrowing L4-5 and moderate to severe right neural foraminal narrowing at L3-4.  Distant hx of ulcers  s/p ofirmev x1, oxycodone     - continue on standing tylenol 650 q6  - start on ibuprofen 400 qd Pt presenting with L posterior leg pain x 10 days denies trauma   MR lumbar spine; moderate to severe L2-L3, severe spinal canal stenosis at L3-4 and L4-5. L5-S1 left subarticular disc extrusion with mass effect and descending left S1 nerve root. Severe bilateral neural foraminal narrowing L4-5 and moderate to severe right neural foraminal narrowing at L3-4.  >10 year history of ulcers likely secondary to H pylori  s/p ofirmev x1, oxycodone   = Currently neurologically intact, would pursue conservative therapy     - pain control: start on standing tylenol 650 q6 and ibuprofen 400 q6    - oxycodone 5 IR PRN for severe pain   - P/T consult  - outpatient f/u with ortho Pt presenting with L posterior leg pain x 10 days denies trauma   MR lumbar spine; moderate to severe L2-L3, severe spinal canal stenosis at L3-4 and L4-5. L5-S1 left subarticular disc extrusion with mass effect and descending left S1 nerve root. Severe bilateral neural foraminal narrowing L4-5 and moderate to severe right neural foraminal narrowing at L3-4.  >10 year history of ulcers likely secondary to H pylori  s/p ofirmev x1, oxycodone   = Currently neurologically intact, would pursue conservative therapy     - pain control: start on standing ibuprofen 400 q6    - oxycodone 5 IR PRN for severe pain   - flexeril 5 q8 PRN for muscle spasm  - P/T consult  - outpatient f/u with ortho Pt presenting with L posterior leg pain x 10 days denies trauma   MR lumbar spine; moderate to severe L2-L3, severe spinal canal stenosis at L3-4 and L4-5. L5-S1 left subarticular disc extrusion with mass effect and descending left S1 nerve root. Severe bilateral neural foraminal narrowing L4-5 and moderate to severe right neural foraminal narrowing at L3-4.  >10 year history of ulcers likely secondary to H pylori  s/p ofirmev x1, oxycodone   = Currently neurologically intact, would pursue conservative therapy     - pain control: tylenol 650 q6 PRN for mild pain  - oxycodone 5 IR PRN for severe pain   - flexeril 5 q8 PRN for muscle spasm  - f/u spine consult recs   - P/T consult  - outpatient f/u with ortho Pt presenting with L posterior leg pain x 10 days denies trauma   MR lumbar spine; moderate to severe L2-L3, severe spinal canal stenosis at L3-4 and L4-5. L5-S1 left subarticular disc extrusion with mass effect and descending left S1 nerve root. Severe bilateral neural foraminal narrowing L4-5 and moderate to severe right neural foraminal narrowing at L3-4.  >10 year history of ulcers likely secondary to H pylori  s/p ofirmev x1, oxycodone. Currently neurologically intact, proceed with P/T consult,     - pain control: tylenol 650 q6 PRN for mild pain  - oxycodone 5 IR PRN for severe pain   - flexeril 5 q8 PRN for muscle spasm  - f/u spine consult recs   - P/T consult  - outpatient f/u with ortho

## 2023-09-13 NOTE — H&P ADULT - NSHPLABSRESULTS_GEN_ALL_CORE
.  LABS:                         15.0   8.82  )-----------( 183      ( 13 Sep 2023 15:02 )             48.5     09-13    137  |  101  |  17  ----------------------------<  118<H>  4.4   |  27  |  1.23    Ca    8.9      13 Sep 2023 15:02    TPro  7.1  /  Alb  3.8  /  TBili  0.7  /  DBili  x   /  AST  21  /  ALT  17  /  AlkPhos  62  09-13    PT/INR - ( 13 Sep 2023 15:02 )   PT: 11.2 sec;   INR: 0.98          PTT - ( 13 Sep 2023 15:02 )  PTT:33.2 sec  Urinalysis Basic - ( 13 Sep 2023 15:02 )    Color: x / Appearance: x / SG: x / pH: x  Gluc: 118 mg/dL / Ketone: x  / Bili: x / Urobili: x   Blood: x / Protein: x / Nitrite: x   Leuk Esterase: x / RBC: x / WBC x   Sq Epi: x / Non Sq Epi: x / Bacteria: x                RADIOLOGY, EKG & ADDITIONAL TESTS: Reviewed.

## 2023-09-13 NOTE — ED ADULT NURSE NOTE - NSFALLHARMRISKINTERV_ED_ALL_ED
Assistance OOB with selected safe patient handling equipment if applicable/Assistance with ambulation/Communicate risk of Fall with Harm to all staff, patient, and family/Monitor gait and stability/Provide patient with walking aids/Provide visual cue: red socks, yellow wristband, yellow gown, etc/Reinforce activity limits and safety measures with patient and family/Bed in lowest position, wheels locked, appropriate side rails in place/Call bell, personal items and telephone in reach/Instruct patient to call for assistance before getting out of bed/chair/stretcher/Non-slip footwear applied when patient is off stretcher/[place] to call system/Physically safe environment - no spills, clutter or unnecessary equipment/Purposeful Proactive Rounding/Room/bathroom lighting operational, light cord in reach Assistance OOB with selected safe patient handling equipment if applicable/Assistance with ambulation/Communicate risk of Fall with Harm to all staff, patient, and family/Monitor gait and stability/Provide patient with walking aids/Provide visual cue: red socks, yellow wristband, yellow gown, etc/Reinforce activity limits and safety measures with patient and family/Bed in lowest position, wheels locked, appropriate side rails in place/Call bell, personal items and telephone in reach/Instruct patient to call for assistance before getting out of bed/chair/stretcher/Non-slip footwear applied when patient is off stretcher/[place] to call system/Physically safe environment - no spills, clutter or unnecessary equipment/Purposeful Proactive Rounding/Room/bathroom lighting operational, light cord in reach Assistance OOB with selected safe patient handling equipment if applicable/Assistance with ambulation/Communicate risk of Fall with Harm to all staff, patient, and family/Monitor gait and stability/Provide patient with walking aids/Provide visual cue: red socks, yellow wristband, yellow gown, etc/Reinforce activity limits and safety measures with patient and family/Bed in lowest position, wheels locked, appropriate side rails in place/Call bell, personal items and telephone in reach/Instruct patient to call for assistance before getting out of bed/chair/stretcher/Non-slip footwear applied when patient is off stretcher/[place] to call system/Physically safe environment - no spills, clutter or unnecessary equipment/Purposeful Proactive Rounding/Room/bathroom lighting operational, light cord in reach

## 2023-09-13 NOTE — H&P ADULT - HISTORY OF PRESENT ILLNESS
Patient is a 87y old  Male who presents with a chief complaint of   HPI:      Patient is a  who presented to the hospital with     PAST MEDICAL & SURGICAL HISTORY:  HLD (hyperlipidemia)      Hypothyroid      History of BPH      S/P laminectomy with spinal fusion          PAST MEDICAL & SURGICAL HISTORY:  HTN (hypertension)    HLD (hyperlipidemia)    Hypothyroid    History of BPH    S/P laminectomy with spinal fusion          ED vitals: T   HR   RR  BP  SpO2  Labs signficant:  Imaging/EKG:   Interventions:    Allergies    shellfish (Swelling)  No Known Drug Allergies    Intolerances      Home Medications:  levothyroxine 50 mcg (0.05 mg) oral tablet: 1 tab(s) orally once a day (05 Jun 2023 15:40)  metoprolol succinate 25 mg oral capsule, extended release: 1 orally once a day (05 Jun 2023 15:47)  rosuvastatin 20 mg oral tablet: 1 orally once a day (at bedtime) (05 Jun 2023 15:47)  tamsulosin 0.4 mg oral capsule: 1 orally once a day (05 Jun 2023 15:47)  tobramycin-dexamethasone 0.3%-0.05% ophthalmic suspension: 1 in each eye once a day (05 Jun 2023 15:47)      SOCIAL HX:     Smoking          ETOH/Illicit drugs          Occupation    PAST MEDICAL & SURGICAL HISTORY:  HLD (hyperlipidemia)      Hypothyroid      History of BPH      S/P laminectomy with spinal fusion          FAMILY HISTORY:  :   Patient is a 87M w/ PMH CAD s/p JOSÉ stent (6/2023), HTN, HLD, hypothyroidism who presents with progressive L leg pain x 10 days.  He reports a "twisting burning sensation" in his L posterior thigh 10/10 at its worst, which remits to 7/10 and radiates down his leg causing intermittent numbness.  He has tried acetaminophen 1500 which did not help, advil 200 and gabapentin 100 qd prescribed by his PCP which all did not help his pain. Reports the only time he gets relief is when he lays down flat and the pain remits to a 7/10. He denies trauma, heavy lifting, strain to the area and previous back injuries/surgeries. He reports he had a similar "twisting" pain 8 year ago in his L arm prior to his L cervical laminectomy. He usually is completely independent of his ADLs, lives with his wife and does not use any assist devices. He required using a cane.     ED vitals: T   HR   RR  BP  SpO2  Labs signficant:  Imaging/EKG:   Interventions: ofirmev 1g, oxycodone 5 IR, plavix 75, levothyroxine 50    Allergies    shellfish (Swelling)  No Known Drug Allergies    Intolerances      Home Medications:  levothyroxine 50 mcg (0.05 mg) oral tablet: 1 tab(s) orally once a day (05 Jun 2023 15:40)  metoprolol succinate 25 mg oral capsule, extended release: 1 orally once a day (05 Jun 2023 15:47)  rosuvastatin 20 mg oral tablet: 1 orally once a day (at bedtime) (05 Jun 2023 15:47)  tamsulosin 0.4 mg oral capsule: 1 orally once a day (05 Jun 2023 15:47)  tobramycin-dexamethasone 0.3%-0.05% ophthalmic suspension: 1 in each eye once a day (05 Jun 2023 15:47)      SOCIAL HX:     Denies smoking, drinking, illicit drug use    PAST MEDICAL & SURGICAL HISTORY:  HLD (hyperlipidemia)      Hypothyroid      History of BPH      S/P laminectomy with spinal fusion          FAMILY HISTORY:  :   Patient is a 87M w/ PMH CAD s/p JOSÉ stent (6/2023), HTN, HLD, hypothyroidism who presents with progressive L leg pain x 10 days.  He reports a "twisting burning sensation" in his L posterior thigh 10/10 at its worst, which remits to 7/10 and radiates down his leg causing intermittent numbness.  He has tried acetaminophen 1500 which did not help, advil 200 and gabapentin 100 qd prescribed by his PCP which all did not help his pain. Reports the only time he gets relief is when he lays down flat and the pain remits to a 7/10. He denies trauma, heavy lifting, strain to the area and previous back injuries/surgeries. He reports he had a similar "twisting" pain 8 year ago in his L arm prior to his L cervical laminectomy. He usually is completely independent of his ADLs, lives with his wife and does not use any assist devices. He required using a cane.     ED   T 97.5 /76 HR 58 RR 20 O2 sat 96%  Labs Cr 1.23  Imaging CT L spine multilevel moderate to severe canal stenosis and bialateral neural foraminal stenosis impinging exiting nerve roots CT pelvis L4-L5 moderate to severe spinal canal stenosis   Interventions: ofirmev 1g, oxycodone 5 IR, plavix 75, levothyroxine 50    Allergies    shellfish (Swelling)  No Known Drug Allergies    Intolerances      Home Medications:  levothyroxine 50 mcg (0.05 mg) oral tablet: 1 tab(s) orally once a day (05 Jun 2023 15:40)  metoprolol succinate 25 mg oral capsule, extended release: 1 orally once a day (05 Jun 2023 15:47)  rosuvastatin 20 mg oral tablet: 1 orally once a day (at bedtime) (05 Jun 2023 15:47)  tamsulosin 0.4 mg oral capsule: 1 orally once a day (05 Jun 2023 15:47)  tobramycin-dexamethasone 0.3%-0.05% ophthalmic suspension: 1 in each eye once a day (05 Jun 2023 15:47)      SOCIAL HX:     Denies smoking, drinking, illicit drug use    PAST MEDICAL & SURGICAL HISTORY:  HLD (hyperlipidemia)      Hypothyroid      History of BPH      S/P laminectomy with spinal fusion          FAMILY HISTORY:  :   Patient is a 87M w/ PMH CAD s/p JOSÉ stent (6/2023),cervical spondylosis s/p laminectomy, gastric ulcers, hypothyroidism, HTN, HLD who presents with progressive L leg pain x 10 days.  He reports a "twisting burning sensation" in his L posterior thigh 10/10 at its worst, which remits to 7/10 and radiates down his leg causing intermittent numbness. He reports the pain is mostly constant with and remits only when he lays flat. He has tried acetaminophen 1500 which did not help, advil 200 and gabapentin 100 qd prescribed by his PCP which all did not help his pain. The pain has progressed gradually leading him to start using a cane to help with his walking and today when coming to the ED he collapsed due to pain. He denies dizziness, LOC,  trauma, heavy lifting, strain to the area and previous back injuries/surgeries. He reports he had a similar "twisting" pain 8 year ago in his L arm prior to his L cervical laminectomy. He usually is completely independent of his ADLs, lives with his wife and does not use any assist devices aside from recently. He denies fever, chills, weight loss/gain. He reports a distant history of gastric ulcers >10 years ago for which he "completed an antibiotic course"    ED   T 97.5 /76 HR 58 RR 20 O2 sat 96%  Labs Cr 1.23 (baseline 1.2)   EKG NSR 60  Imaging CT L spine multilevel moderate to severe canal stenosis and bialateral neural foraminal stenosis impinging exiting nerve roots CT pelvis L4-L5 moderate to severe spinal canal stenosis   Interventions: ofirmev 1g, oxycodone 5 IR, plavix 75, levothyroxine 50    Allergies    shellfish (Swelling)  No Known Drug Allergies    Intolerances    SOCIAL HX:     Denies smoking, drinking, illicit drug use    PAST MEDICAL & SURGICAL HISTORY:  HLD (hyperlipidemia)      Hypothyroid      History of BPH      S/P laminectomy with spinal fusion      FAMILY HISTORY:  :   Patient is a 87M w/ PMH CAD s/p JOSÉ stent (6/2023),cervical spondylosis s/p laminectomy, gastric ulcers, hypothyroidism, HTN, HLD who presents with progressive L leg pain x 10 days.  He reports a "twisting burning sensation" in his L posterior thigh 10/10 at its worst, which remits to 7/10 and radiates down his leg causing intermittent numbness. He also has episodes where he flexes his leg because of the pain. He reports the pain is mostly constant with and remits only when he lays flat. He has tried acetaminophen 1500 which did not help, advil 200 and gabapentin 100 qd prescribed by his PCP which all did not help his pain. The pain has progressed gradually leading him to start using a cane to help with his walking and today when coming to the ED he collapsed due to pain. He denies dizziness, LOC,  trauma, heavy lifting, strain to the area and previous back injuries/surgeries. He reports he had a similar "twisting" pain 8 year ago in his L arm prior to his L cervical laminectomy. He usually is completely independent of his ADLs, lives with his wife and does not use any assist devices aside from recently. He denies fever, chills, weight loss/gain. He reports a distant history of gastric ulcers >10 years ago for which he "completed an antibiotic course"    ED   T 97.5 /76 HR 58 RR 20 O2 sat 96%  Labs Cr 1.23 (baseline 1.2)   EKG NSR 60  Imaging CT L spine multilevel moderate to severe canal stenosis and bialateral neural foraminal stenosis impinging exiting nerve roots CT pelvis L4-L5 moderate to severe spinal canal stenosis   Interventions: ofirmev 1g, oxycodone 5 IR, plavix 75, levothyroxine 50    Allergies    shellfish (Swelling)  No Known Drug Allergies    Intolerances    SOCIAL HX:     Denies smoking, drinking, illicit drug use    PAST MEDICAL & SURGICAL HISTORY:  HLD (hyperlipidemia)      Hypothyroid      History of BPH      S/P laminectomy with spinal fusion      FAMILY HISTORY:  :   Patient is a 87M w/ PMH CAD s/p JOSÉ stent (6/2023),cervical spondylosis s/p laminectomy, gastric ulcers, hypothyroidism, HTN, HLD who presents with progressive L leg pain x 10 days.  He reports a "twisting burning sensation" in his L posterior thigh 10/10 at its worst, which remits to 7/10 and radiates down his leg causing intermittent numbness. He denies trauma, heavy lifting, strain to the area and previous back injuries/surgeries. He also has episodes where he flexes his leg because of the pain. He reports the pain is mostly constant and only remits when he lays flat. He has tried acetaminophen 1500 which did not help, advil 200 and gabapentin 100 qd prescribed by his PCP which all did not help his pain. The pain has progressed gradually leading him to start using a cane to help with his walking and today when coming to the ED he collapsed due to pain. He denies dizziness, LOC, bowel, urinary incontinence, . He reports he had a similar "twisting" pain 8 year ago in his L arm prior to his L cervical laminectomy. He usually is completely independent of his ADLs, lives with his wife and does not use any assist devices aside from recently. He denies fever, chills, weight loss/gain. He reports a distant history of gastric ulcers >10 years ago for which he "completed an antibiotic course"    ED   T 97.5 /76 HR 58 RR 20 O2 sat 96%  Labs Cr 1.23 (baseline 1.2)   EKG NSR 60  Imaging CT L spine multilevel moderate to severe canal stenosis and bialateral neural foraminal stenosis impinging exiting nerve roots CT pelvis L4-L5 moderate to severe spinal canal stenosis   Interventions: ofirmev 1g, oxycodone 5 IR, plavix 75, levothyroxine 50    Allergies    shellfish (Swelling)  No Known Drug Allergies    Intolerances    SOCIAL HX:     Denies smoking, drinking, illicit drug use    PAST MEDICAL & SURGICAL HISTORY:  HLD (hyperlipidemia)      Hypothyroid      History of BPH      S/P laminectomy with spinal fusion      FAMILY HISTORY:  :

## 2023-09-13 NOTE — ED PROVIDER NOTE - OBJECTIVE STATEMENT
87-year-old with history of cervical spine surgery in 2000 [id number] for some cervical spondylosis, coronary artery disease with 2 stents, hypothyroidism, on Plavix Synthroid aspirin and a statin,  Patient developed new leg pain radiating to foot from buttock 10 days ago, pain has been worse with attempt to stand, feels better laying flat although still feels it, no numbness, no bowel or bladder dysfunction, with the exception of difficulty standing to urinate due to pain, no trauma, no falls, minimal low back pain, was placed on gabapentin 100 mg which he took twice with no pain relief, has not taken Tylenol or ibuprofen and is generally resistant to trying new medications.  Patient with difficulty ambulating due to pain. 87-year-old with history of cervical spine surgery in 2000 [id number] for some cervical spondylosis, coronary artery disease with 2 stents, hypothyroidism, on Plavix Synthroid aspirin and a statin,  Patient developed new leg pain radiating to foot from buttock 10 days ago, pain has been worse with attempt to stand, feels better laying flat although still feels it, no numbness, no bowel or bladder dysfunction, with the exception of difficulty standing to urinate due to pain, no trauma, no falls, minimal low back pain, was placed on gabapentin 100 mg which he took twice with no pain relief, has not taken Tylenol or ibuprofen and is generally resistant to trying new medications.  Patient with difficulty ambulating due to pain. 87-year-old with history of cervical spine surgery in 2000 [id number] for some cervical spondylosis, coronary artery disease with 2 stents, hypothyroidism, on Plavix Synthroid aspirin and a statin,  Patient developed new leg pain radiating to foot from buttock 10 days ago, pain has been worse with attempt to stand, feels better laying flat although still feels it, no numbness, no bowel or bladder dysfunction, with the exception of difficulty standing to urinate due to pain, no trauma, no falls, minimal low back pain, was placed on gabapentin 100 mg which he took twice with no pain relief, has not taken Tylenol or ibuprofen and is generally resistant to trying new medications.  Patient with difficulty ambulating due to pain. 87-year-old with history of cervical spine surgery in 2000 [id number] for some cervical spondylosis, coronary artery disease with 2 stents, hypothyroidism, on Plavix Synthroid aspirin and a statin,  Patient developed new leg pain radiating to foot from buttock 10 days ago, pain has been worse with attempt to stand, feels better laying flat although still feels it, no numbness, no bowel or bladder dysfunction, with the exception of difficulty standing to urinate due to pain, no trauma, no falls, minimal low back pain, was placed on gabapentin 100 mg which he took twice with no pain relief, has not taken Tylenol or ibuprofen and is generally resistant to trying new medications.  Patient with difficulty ambulating due to pain, prior to onset of pain was walking on his own without difficulty 87-year-old with history of cervical spine surgery in 2000 [id number] for some cervical spondylosis, coronary artery disease with 2 stents, hypothyroidism, on Plavix Synthroid aspirin and a statin,  Patient developed new leg pain radiating to foot from buttock 10 days ago, pain has been worse with attempt to stand, feels better laying flat although still feels it, no numbness, no bowel or bladder dysfunction, with the exception of difficulty standing to urinate due to pain, no trauma, no falls, minimal low back pain, was placed on gabapentin 100 mg which he took twice with no pain relief, has not taken Tylenol or ibuprofen and is generally resistant to trying new medications.  Patient with difficulty ambulating due to pain, prior to onset of pain was walking on his own without difficulty 87-year-old with history of cervical spine surgery in 2000 [id number] for some cervical spondylosis, coronary artery disease with 2 stents, hypothyroidism, on Plavix Synthroid aspirin and a statin,  Patient developed new leg pain radiating to foot from buttock 10 days ago, pain has been worse with attempt to stand, feels better laying flat although still feels it, no numbness, no bowel or bladder dysfunction, with the exception of difficulty standing to urinate due to pain, no trauma, no falls, minimal low back pain, was placed on gabapentin 100 mg which he took twice with no pain relief, has not taken Tylenol or ibuprofen and is generally resistant to trying new medications.  Patient with difficulty ambulating due to pain, prior to onset of pain was walking on his own without difficulty

## 2023-09-13 NOTE — H&P ADULT - ATTENDING COMMENTS
#lumbar radiculopathy: p/w worsening back pain/sciatica. MS 5/5 b.l LE. No bowel/.bladder incontinence. MRI L spine w/o cord compression, + severe lumbar stenosis w/ disc protusion on exiting nerve roots L5-S1. Spine consulted, c/w pain control, fall precautions. PT eval.

## 2023-09-13 NOTE — ED PROVIDER NOTE - MUSCULOSKELETAL, MLM
no C T or L spine tenderness , Spine appears normal, range of motion is not limited, + straight leg raise L side, no deformity, no edema, tender at greater trochanter on L hip .

## 2023-09-13 NOTE — ED ADULT NURSE REASSESSMENT NOTE - NS ED NURSE REASSESS COMMENT FT1
Patient care and endorsement received from previous RN.  Patient c/o of left leg extremity pins and needles X 10 days from buttocks to leg to foot.  PMHx cervical spine surgery, CAD, hypothyroidism, on plavix and ASA.  Patient also c/o of pain on standing and ambulting.  PIV #18 to right arm.  Patient currently in MRI.

## 2023-09-13 NOTE — H&P ADULT - ASSESSMENT
87M w/ PMH CAD s/p JOSÉ stent (6/2023), HTN, HLD, hypothyroidism who presents with progressive L leg pain x 10 days found to have spinal stenosis admitted for pain control and physical therapy.

## 2023-09-13 NOTE — ED ADULT NURSE NOTE - OBJECTIVE STATEMENT
pt received into spot 3 A&Ox4 ambulatory with cane appears comfortable arrives via walk in triage for eval of left upper buttock and left leg pain x 10 days. Denies hx sciatica. Reports intermittent pins and needle/ numbness sensation to LLE x 10 days as well. Denies loss of bowel or bladder continence though reports chronic issues urinating. 5/5 equal strength to b/l LE's sensation intact. No drift to legs or arms on exam. no facial droop or slurred speech noted. Has b/l equal hand grasps on exam. Denies CP SOB abd pain n/v/d constipation fever chills cough. Respirations unlabored abd nondistended no visible injuries noted. Reports a semi recent mechanical fall on the bus did not seek medical attention at that time. Went to [facility] had imaging and told to take Gabapentin and tylenols. took Gabapentin the last 2 days without relief, did not take today, and did not take Tylenol at all. placed in gown pending ed md tabor

## 2023-09-13 NOTE — ED ADULT NURSE NOTE - NSHOSCREENINGQ1_ED_ALL_ED
Outreach TC to patient for Cm assessment  Patient is currently in Carolinas ContinueCARE Hospital at Pineville 
No

## 2023-09-13 NOTE — PATIENT PROFILE ADULT - FALL HARM RISK - HARM RISK INTERVENTIONS
Assistance with ambulation/Assistance OOB with selected safe patient handling equipment/Communicate Risk of Fall with Harm to all staff/Discuss with provider need for PT consult/Monitor gait and stability/Reinforce activity limits and safety measures with patient and family/Tailored Fall Risk Interventions/Visual Cue: Yellow wristband and red socks/Bed in lowest position, wheels locked, appropriate side rails in place/Call bell, personal items and telephone in reach/Instruct patient to call for assistance before getting out of bed or chair/Non-slip footwear when patient is out of bed/[place] to call system/Physically safe environment - no spills, clutter or unnecessary equipment/Purposeful Proactive Rounding/Room/bathroom lighting operational, light cord in reach Assistance with ambulation/Assistance OOB with selected safe patient handling equipment/Communicate Risk of Fall with Harm to all staff/Discuss with provider need for PT consult/Monitor gait and stability/Reinforce activity limits and safety measures with patient and family/Tailored Fall Risk Interventions/Visual Cue: Yellow wristband and red socks/Bed in lowest position, wheels locked, appropriate side rails in place/Call bell, personal items and telephone in reach/Instruct patient to call for assistance before getting out of bed or chair/Non-slip footwear when patient is out of bed/[place] to call system/Physically safe environment - no spills, clutter or unnecessary equipment/Purposeful Proactive Rounding/Room/bathroom lighting operational, light cord in reach Assistance with ambulation/Assistance OOB with selected safe patient handling equipment/Communicate Risk of Fall with Harm to all staff/Discuss with provider need for PT consult/Monitor gait and stability/Reinforce activity limits and safety measures with patient and family/Tailored Fall Risk Interventions/Visual Cue: Yellow wristband and red socks/Bed in lowest position, wheels locked, appropriate side rails in place/Call bell, personal items and telephone in reach/Instruct patient to call for assistance before getting out of bed or chair/Non-slip footwear when patient is out of bed/[place] to call system/Physically safe environment - no spills, clutter or unnecessary equipment/Purposeful Proactive Rounding/Room/bathroom lighting operational, light cord in reach

## 2023-09-13 NOTE — ED PROVIDER NOTE - GASTROINTESTINAL, MLM
Abdomen soft, non-tender, no guarding. rectal , brown stool, nl rectal tone, no perineal numbness / no saddle anesthesia.

## 2023-09-13 NOTE — H&P ADULT - NSHPPHYSICALEXAM_GEN_ALL_CORE
VITAL SIGNS:  T(C): 36.4 (09-13-23 @ 21:56), Max: 36.5 (09-13-23 @ 20:49)  T(F): 97.6 (09-13-23 @ 21:56), Max: 97.7 (09-13-23 @ 20:49)  HR: 75 (09-13-23 @ 21:56) (58 - 75)  BP: 160/84 (09-13-23 @ 21:56) (124/76 - 160/84)  BP(mean): --  RR: 18 (09-13-23 @ 21:56) (18 - 20)  SpO2: 96% (09-13-23 @ 21:56) (96% - 97%)  Wt(kg): --    PHYSICAL EXAM:    Constitutional: Resting comfortably in bed; NAD  Head: NC/AT  Eyes: PERRL, EOMI, clear conjunctiva  ENT: no nasal discharge; uvula midline, no oropharyngeal erythema or exudates; MMM  Neck: supple; no JVD or thyromegaly  Respiratory: CTA B/L; no W/R/R, no retractions  Cardiac: +S1/S2; RRR; no M/R/G;  Gastrointestinal: soft, NT/ND; no rebound or guarding; +BSx4  Extremities: WWP, no clubbing or cyanosis; no peripheral edema  Musculoskeletal: NROM x4; no joint swelling, tenderness or erythema  Vascular: 2+ radial, femoral, DP/PT pulses B/L  Dermatologic: skin warm, dry and intact; no rashes, wounds, or scars  Neurologic: AAOx3; CNII-XII grossly intact; no focal deficits  Psychiatric: affect and characteristics of appearance, verbalizations, behaviors are appropriate VITAL SIGNS:  T(C): 36.4 (09-13-23 @ 21:56), Max: 36.5 (09-13-23 @ 20:49)  T(F): 97.6 (09-13-23 @ 21:56), Max: 97.7 (09-13-23 @ 20:49)  HR: 75 (09-13-23 @ 21:56) (58 - 75)  BP: 160/84 (09-13-23 @ 21:56) (124/76 - 160/84)  BP(mean): --  RR: 18 (09-13-23 @ 21:56) (18 - 20)  SpO2: 96% (09-13-23 @ 21:56) (96% - 97%)  Wt(kg): --    PHYSICAL EXAM:    Constitutional: Resting comfortably in bed; NAD, euvolemic   Head: NC/AT  Eyes: PERRL, EOMI, clear conjunctiva  ENT: no nasal discharge; uvula midline, no oropharyngeal erythema or exudates; MMM  Neck: supple; no JVD or thyromegaly  Respiratory: CTA B/L; no W/R/R, no retractions  Cardiac: +S1/S2; RRR; no M/R/G;  Gastrointestinal: soft, NT/ND; no rebound or guarding; +BSx4  Extremities: WWP, no clubbing or cyanosis; no peripheral edema, 5/5 strength and sensation intact in all extremities   Musculoskeletal: NROM x4; no joint swelling, tenderness or erythema  Vascular: 2+ radial, femoral, DP/PT pulses B/L  Dermatologic: skin warm, dry and intact; no rashes, wounds, or scars  Neurologic: AAOx3; CNII-XII grossly intact; no focal deficits  Psychiatric: affect and characteristics of appearance, verbalizations, behaviors are appropriate

## 2023-09-13 NOTE — H&P ADULT - PROBLEM SELECTOR PLAN 3
Home meds rosuvastatin 20    - c/w atorvastatin Recent JOSÉ stent to mLAD placed 6/2023   Follows with cardioloigist Dr Yanes    - c/w ASA 81, Plavix qd

## 2023-09-13 NOTE — ED PROVIDER NOTE - NEUROLOGICAL, MLM
Alert and oriented, no focal deficits, no motor or sensory deficits. no sensation or motor deficits.

## 2023-09-13 NOTE — ED ADULT TRIAGE NOTE - CHIEF COMPLAINT QUOTE
Pt presents to ED came in via wheelchair for worsening L back pain going down the ankle for 10 days. Pt was seen by PMD and was prescribed gabapentin without relief. Pt has hx of CAD had stents 06/23, cervical surgery 2015. Pt A&Ox4. Pt is conversive in full sentences. Pt jayde to walk at baseline however due to pain now has been wheelchair bound.

## 2023-09-13 NOTE — H&P ADULT - PROBLEM SELECTOR PLAN 5
Recent JOSÉ stent placed 6/2023     - c/w ASA 81, Plavix qd Home meds flomax 0.4mg    - c/w home meds

## 2023-09-13 NOTE — H&P ADULT - PROBLEM SELECTOR PLAN 7
F:   E: Replete PRN  N:   Lines:     DVT pphx:  GI pphx:    Bowel regimen:     CODE STATUS:   DISPO: CCU F: PO  E: Replete PRN  N: Regular   DVT pphx: lovenox 40    CODE STATUS: Full  DISPO: [facility] F: PO  E: Replete PRN  N: Regular   DVT pphx: lovenox 40    CODE STATUS: Full  DISPO: [facility] F: PO  E: Replete PRN  N: Regular   DVT pphx: lovenox 40    CODE STATUS: Full  DISPO: [facility]

## 2023-09-13 NOTE — ED PROVIDER NOTE - PRO INTERPRETER NEED 2
Called patient about 12 month follow up. Left message for patient to call [phone].      English

## 2023-09-13 NOTE — H&P ADULT - PROBLEM SELECTOR PLAN 2
Takes synthroid 0.5 qd    - f/u TSH  - c/w synthroid 0.5 Home meds toprol 25 qd    - c/w toprol 25 qd

## 2023-09-13 NOTE — ED ADULT NURSE REASSESSMENT NOTE - NS ED NURSE REASSESS COMMENT FT1
pt resting on stretcher appears comfortable unlabored respirations noted CT done and resulted pending MD review and dispo

## 2023-09-14 LAB
ANION GAP SERPL CALC-SCNC: 11 MMOL/L — SIGNIFICANT CHANGE UP (ref 5–17)
BASOPHILS # BLD AUTO: 0.11 K/UL — SIGNIFICANT CHANGE UP (ref 0–0.2)
BASOPHILS NFR BLD AUTO: 1.4 % — SIGNIFICANT CHANGE UP (ref 0–2)
BLD GP AB SCN SERPL QL: NEGATIVE — SIGNIFICANT CHANGE UP
BUN SERPL-MCNC: 17 MG/DL — SIGNIFICANT CHANGE UP (ref 7–23)
CALCIUM SERPL-MCNC: 9.1 MG/DL — SIGNIFICANT CHANGE UP (ref 8.4–10.5)
CHLORIDE SERPL-SCNC: 101 MMOL/L — SIGNIFICANT CHANGE UP (ref 96–108)
CO2 SERPL-SCNC: 23 MMOL/L — SIGNIFICANT CHANGE UP (ref 22–31)
CREAT SERPL-MCNC: 1.05 MG/DL — SIGNIFICANT CHANGE UP (ref 0.5–1.3)
EGFR: 70 ML/MIN/1.73M2 — SIGNIFICANT CHANGE UP
EOSINOPHIL # BLD AUTO: 0.6 K/UL — HIGH (ref 0–0.5)
EOSINOPHIL NFR BLD AUTO: 7.9 % — HIGH (ref 0–6)
GLUCOSE SERPL-MCNC: 112 MG/DL — HIGH (ref 70–99)
HCT VFR BLD CALC: 50 % — SIGNIFICANT CHANGE UP (ref 39–50)
HGB BLD-MCNC: 15.4 G/DL — SIGNIFICANT CHANGE UP (ref 13–17)
IMM GRANULOCYTES NFR BLD AUTO: 0.3 % — SIGNIFICANT CHANGE UP (ref 0–0.9)
LYMPHOCYTES # BLD AUTO: 2.36 K/UL — SIGNIFICANT CHANGE UP (ref 1–3.3)
LYMPHOCYTES # BLD AUTO: 31 % — SIGNIFICANT CHANGE UP (ref 13–44)
MAGNESIUM SERPL-MCNC: 2.4 MG/DL — SIGNIFICANT CHANGE UP (ref 1.6–2.6)
MCHC RBC-ENTMCNC: 20.9 PG — LOW (ref 27–34)
MCHC RBC-ENTMCNC: 30.8 GM/DL — LOW (ref 32–36)
MCV RBC AUTO: 67.8 FL — LOW (ref 80–100)
MONOCYTES # BLD AUTO: 0.79 K/UL — SIGNIFICANT CHANGE UP (ref 0–0.9)
MONOCYTES NFR BLD AUTO: 10.4 % — SIGNIFICANT CHANGE UP (ref 2–14)
NEUTROPHILS # BLD AUTO: 3.74 K/UL — SIGNIFICANT CHANGE UP (ref 1.8–7.4)
NEUTROPHILS NFR BLD AUTO: 49 % — SIGNIFICANT CHANGE UP (ref 43–77)
NRBC # BLD: 0 /100 WBCS — SIGNIFICANT CHANGE UP (ref 0–0)
PHOSPHATE SERPL-MCNC: 3.6 MG/DL — SIGNIFICANT CHANGE UP (ref 2.5–4.5)
PLATELET # BLD AUTO: 187 K/UL — SIGNIFICANT CHANGE UP (ref 150–400)
POTASSIUM SERPL-MCNC: 4.3 MMOL/L — SIGNIFICANT CHANGE UP (ref 3.5–5.3)
POTASSIUM SERPL-SCNC: 4.3 MMOL/L — SIGNIFICANT CHANGE UP (ref 3.5–5.3)
RBC # BLD: 7.37 M/UL — HIGH (ref 4.2–5.8)
RBC # FLD: 18.6 % — HIGH (ref 10.3–14.5)
RH IG SCN BLD-IMP: POSITIVE — SIGNIFICANT CHANGE UP
SODIUM SERPL-SCNC: 135 MMOL/L — SIGNIFICANT CHANGE UP (ref 135–145)
T4 FREE SERPL-MCNC: 1.42 NG/DL — SIGNIFICANT CHANGE UP (ref 0.93–1.7)
WBC # BLD: 7.62 K/UL — SIGNIFICANT CHANGE UP (ref 3.8–10.5)
WBC # FLD AUTO: 7.62 K/UL — SIGNIFICANT CHANGE UP (ref 3.8–10.5)

## 2023-09-14 PROCEDURE — 99233 SBSQ HOSP IP/OBS HIGH 50: CPT

## 2023-09-14 RX ORDER — LIDOCAINE 4 G/100G
1 CREAM TOPICAL DAILY
Refills: 0 | Status: DISCONTINUED | OUTPATIENT
Start: 2023-09-14 | End: 2023-09-15

## 2023-09-14 RX ORDER — POLYETHYLENE GLYCOL 3350 17 G/17G
17 POWDER, FOR SOLUTION ORAL EVERY 12 HOURS
Refills: 0 | Status: DISCONTINUED | OUTPATIENT
Start: 2023-09-14 | End: 2023-09-15

## 2023-09-14 RX ORDER — GABAPENTIN 400 MG/1
100 CAPSULE ORAL AT BEDTIME
Refills: 0 | Status: DISCONTINUED | OUTPATIENT
Start: 2023-09-14 | End: 2023-09-15

## 2023-09-14 RX ORDER — PANTOPRAZOLE SODIUM 20 MG/1
40 TABLET, DELAYED RELEASE ORAL EVERY 12 HOURS
Refills: 0 | Status: DISCONTINUED | OUTPATIENT
Start: 2023-09-14 | End: 2023-09-15

## 2023-09-14 RX ORDER — METOPROLOL TARTRATE 50 MG
25 TABLET ORAL DAILY
Refills: 0 | Status: DISCONTINUED | OUTPATIENT
Start: 2023-09-15 | End: 2023-09-15

## 2023-09-14 RX ORDER — ENOXAPARIN SODIUM 100 MG/ML
40 INJECTION SUBCUTANEOUS EVERY 24 HOURS
Refills: 0 | Status: DISCONTINUED | OUTPATIENT
Start: 2023-09-14 | End: 2023-09-15

## 2023-09-14 RX ORDER — ACETAMINOPHEN 500 MG
650 TABLET ORAL EVERY 6 HOURS
Refills: 0 | Status: DISCONTINUED | OUTPATIENT
Start: 2023-09-14 | End: 2023-09-15

## 2023-09-14 RX ORDER — CYCLOBENZAPRINE HYDROCHLORIDE 10 MG/1
5 TABLET, FILM COATED ORAL EVERY 8 HOURS
Refills: 0 | Status: DISCONTINUED | OUTPATIENT
Start: 2023-09-14 | End: 2023-09-15

## 2023-09-14 RX ORDER — METOPROLOL TARTRATE 50 MG
12.5 TABLET ORAL ONCE
Refills: 0 | Status: COMPLETED | OUTPATIENT
Start: 2023-09-14 | End: 2023-09-14

## 2023-09-14 RX ADMIN — LIDOCAINE 1 PATCH: 4 CREAM TOPICAL at 11:59

## 2023-09-14 RX ADMIN — ATORVASTATIN CALCIUM 80 MILLIGRAM(S): 80 TABLET, FILM COATED ORAL at 21:56

## 2023-09-14 RX ADMIN — PANTOPRAZOLE SODIUM 40 MILLIGRAM(S): 20 TABLET, DELAYED RELEASE ORAL at 11:55

## 2023-09-14 RX ADMIN — CYCLOBENZAPRINE HYDROCHLORIDE 5 MILLIGRAM(S): 10 TABLET, FILM COATED ORAL at 21:56

## 2023-09-14 RX ADMIN — PANTOPRAZOLE SODIUM 40 MILLIGRAM(S): 20 TABLET, DELAYED RELEASE ORAL at 17:02

## 2023-09-14 RX ADMIN — LIDOCAINE 1 PATCH: 4 CREAM TOPICAL at 19:37

## 2023-09-14 RX ADMIN — CLOPIDOGREL BISULFATE 75 MILLIGRAM(S): 75 TABLET, FILM COATED ORAL at 10:23

## 2023-09-14 RX ADMIN — Medication 24 MILLIGRAM(S): at 11:55

## 2023-09-14 RX ADMIN — Medication 12.5 MILLIGRAM(S): at 16:45

## 2023-09-14 RX ADMIN — GABAPENTIN 100 MILLIGRAM(S): 400 CAPSULE ORAL at 21:56

## 2023-09-14 RX ADMIN — OXYCODONE HYDROCHLORIDE 5 MILLIGRAM(S): 5 TABLET ORAL at 00:28

## 2023-09-14 RX ADMIN — CYCLOBENZAPRINE HYDROCHLORIDE 5 MILLIGRAM(S): 10 TABLET, FILM COATED ORAL at 00:28

## 2023-09-14 RX ADMIN — POLYETHYLENE GLYCOL 3350 17 GRAM(S): 17 POWDER, FOR SOLUTION ORAL at 11:55

## 2023-09-14 RX ADMIN — OXYCODONE HYDROCHLORIDE 5 MILLIGRAM(S): 5 TABLET ORAL at 01:00

## 2023-09-14 RX ADMIN — Medication 50 MICROGRAM(S): at 07:04

## 2023-09-14 RX ADMIN — Medication 81 MILLIGRAM(S): at 10:23

## 2023-09-14 NOTE — CONSULT NOTE ADULT - SUBJECTIVE AND OBJECTIVE BOX
Orthopaedic Surgery Consult Note    For Surgeon: Malik      HPI:  Patient is a 87M w/ PMH CAD s/p JOSÉ stent (6/2023),cervical spondylosis s/p laminectomy, gastric ulcers, hypothyroidism, HTN, HLD who presents with progressive L leg pain x 10 days.  He reports a "twisting burning sensation" in his L posterior thigh 10/10 at its worst, which remits to 7/10 and radiates down his leg causing intermittent numbness. He denies trauma, heavy lifting, strain to the area and previous back injuries/surgeries. He also has episodes where he flexes his leg because of the pain. He reports the pain is mostly constant and only remits when he lays flat. He has tried acetaminophen 1500 which did not help, advil 200 and gabapentin 100 qd prescribed by his PCP which all did not help his pain. The pain has progressed gradually leading him to start using a cane to help with his walking and today when coming to the ED he collapsed due to pain. He denies dizziness, LOC, bowel, urinary incontinence, . He reports he had a similar "twisting" pain 8 year ago in his L arm prior to his L cervical laminectomy. He usually is completely independent of his ADLs, lives with his wife and does not use any assist devices aside from recently. He denies fever, chills, weight loss/gain. He reports a distant history of gastric ulcers >10 years ago for which he "completed an antibiotic course"    ED   T 97.5 /76 HR 58 RR 20 O2 sat 96%  Labs Cr 1.23 (baseline 1.2)   EKG NSR 60  Imaging CT L spine multilevel moderate to severe canal stenosis and bialateral neural foraminal stenosis impinging exiting nerve roots CT pelvis L4-L5 moderate to severe spinal canal stenosis   Interventions: ofirmev 1g, oxycodone 5 IR, plavix 75, levothyroxine 50    Ortho addendum:  10 day LLE numbness/pain/weakness, now using cane, difficult ambulating. Denies bowel.bladder incontinence, denies saddle anesthesia. Denies recent travel, fevers, chills    Allergies    shellfish (Swelling)  No Known Drug Allergies    Intolerances    SOCIAL HX:     Denies smoking, drinking, illicit drug use    PAST MEDICAL & SURGICAL HISTORY:  HLD (hyperlipidemia)      Hypothyroid      History of BPH      S/P laminectomy with spinal fusion      FAMILY HISTORY:  :   (13 Sep 2023 20:23)      Allergies    shellfish (Swelling; Hives)  No Known Drug Allergies    Intolerances        MEDICATIONS  (STANDING):  aspirin  chewable 81 milliGRAM(s) Oral daily  atorvastatin 80 milliGRAM(s) Oral at bedtime  clopidogrel Tablet 75 milliGRAM(s) Oral daily  influenza  Vaccine (HIGH DOSE) 0.7 milliLiter(s) IntraMuscular once  levothyroxine 50 MICROGram(s) Oral daily  tamsulosin 0.4 milliGRAM(s) Oral at bedtime    MEDICATIONS  (PRN):  acetaminophen     Tablet .. 650 milliGRAM(s) Oral every 6 hours PRN Mild Pain (1 - 3)  cyclobenzaprine 5 milliGRAM(s) Oral three times a day PRN Muscle Spasm  oxyCODONE    IR 5 milliGRAM(s) Oral every 6 hours PRN Severe Pain (7 - 10)      PAST MEDICAL & SURGICAL HISTORY:  HLD (hyperlipidemia)      Hypothyroid      History of BPH            Social History:  Lives with wife at home independent ADLs    Denies smoking, ecigarette, alcohol, illicit drug use (13 Sep 2023 20:23)      FAMILY HISTORY:        Vital Signs Last 24 Hrs  T(C): 36.4 (13 Sep 2023 21:56), Max: 36.5 (13 Sep 2023 20:49)  T(F): 97.6 (13 Sep 2023 21:56), Max: 97.7 (13 Sep 2023 20:49)  HR: 75 (13 Sep 2023 21:56) (58 - 75)  BP: 160/84 (13 Sep 2023 21:56) (124/76 - 160/84)  BP(mean): --  RR: 18 (13 Sep 2023 21:56) (18 - 20)  SpO2: 96% (13 Sep 2023 21:56) (96% - 97%)    Parameters below as of 13 Sep 2023 21:56  Patient On (Oxygen Delivery Method): room air        PE:  General: Well developed, well nourished, in no acute distress, comfortable  Neuro: Alert, oriented x 3  Psych: Normal mood & affect   Skin: Warm, dry, extremities well perfused, no obvious rash  MSK:   +SLR L side  rectal tone intact  no TTP SP lumbar spine    RLE   Sensation: SILT L2  Motor: L3-L5 no sensation  L2 (hip flexion)  3/5   L3 (knee extension)  5/5   L4 (ankle dorsiflexion)  5/5   L5 (long toe extension) 5/5   S1 (ankle plantar flexion) 5/5    LLE  Sensation: SILT L2-S1 reduced diffusely compared to R  Motor:   L2 (hip flexion)  3/5   L3 (knee extension)  5/5   L4 (ankle dorsiflexion)  5/5   L5 (long toe extension) 5/5   S1 (ankle plantar flexion) 5/5  Reflexes: Normal and symmetric  Negative clonus. Down-going Babinski                            15.0   8.82  )-----------( 183      ( 13 Sep 2023 15:02 )             48.5     09-13    137  |  101  |  17  ----------------------------<  118<H>  4.4   |  27  |  1.23    Ca    8.9      13 Sep 2023 15:02    TPro  7.1  /  Alb  3.8  /  TBili  0.7  /  DBili  x   /  AST  21  /  ALT  17  /  AlkPhos  62  09-13      Imaging: (wet read)  CT:  Spondy l3/l4, l4/l5  grade 1  Severe disc space narrowing l3/l4, l4/l5  Facet space widening l3/l4 R side w/ hypertrophy  Sclerosis endplates l4/l5, l5/s1 middle columns    MR:  Loss of lordotic alignment (muscle spasm vs degenerative changes)  Severe narrowing bilateral foramina l3/l4  Severe narrowing bilateral foramina l4/l5 L>R  Thecal sac compression mod l3-l5, most severe l3/l4 w/ ligaments flavvum hypertrophy and mottle changes inferior endplate l4 and L3. Central disc protrusion   Loose body disc fragment l5/s1 subarticular paracentral        A/P: 87yMale prior cervical laminectomy (6/23) presents w/ 10 days new onset LLE radiculopathy w/ moderate-severe thecal sac compression l3-l5 noted on imaging  -Nonop trial: pain meds, PT eval, wbat, +/- steroids  - Patient history, exam, imaging and plan discussed with Dr. Gan    Ortho Pager [phone]   Orthopaedic Surgery Consult Note    For Surgeon: Malik      HPI:  Patient is a 87M w/ PMH CAD s/p JOSÉ stent (6/2023),cervical spondylosis s/p laminectomy, gastric ulcers, hypothyroidism, HTN, HLD who presents with progressive L leg pain x 10 days.  He reports a "twisting burning sensation" in his L posterior thigh 10/10 at its worst, which remits to 7/10 and radiates down his leg causing intermittent numbness. He denies trauma, heavy lifting, strain to the area and previous back injuries/surgeries. He also has episodes where he flexes his leg because of the pain. He reports the pain is mostly constant and only remits when he lays flat. He has tried acetaminophen 1500 which did not help, advil 200 and gabapentin 100 qd prescribed by his PCP which all did not help his pain. The pain has progressed gradually leading him to start using a cane to help with his walking and today when coming to the ED he collapsed due to pain. He denies dizziness, LOC, bowel, urinary incontinence, . He reports he had a similar "twisting" pain 8 year ago in his L arm prior to his L cervical laminectomy. He usually is completely independent of his ADLs, lives with his wife and does not use any assist devices aside from recently. He denies fever, chills, weight loss/gain. He reports a distant history of gastric ulcers >10 years ago for which he "completed an antibiotic course"    ED   T 97.5 /76 HR 58 RR 20 O2 sat 96%  Labs Cr 1.23 (baseline 1.2)   EKG NSR 60  Imaging CT L spine multilevel moderate to severe canal stenosis and bialateral neural foraminal stenosis impinging exiting nerve roots CT pelvis L4-L5 moderate to severe spinal canal stenosis   Interventions: ofirmev 1g, oxycodone 5 IR, plavix 75, levothyroxine 50    Ortho addendum:  10 day LLE numbness/pain/weakness, now using cane, difficult ambulating. Denies bowel.bladder incontinence, denies saddle anesthesia. Denies recent travel, fevers, chills    Allergies    shellfish (Swelling)  No Known Drug Allergies    Intolerances    SOCIAL HX:     Denies smoking, drinking, illicit drug use    PAST MEDICAL & SURGICAL HISTORY:  HLD (hyperlipidemia)      Hypothyroid      History of BPH      S/P laminectomy with spinal fusion      FAMILY HISTORY:  :   (13 Sep 2023 20:23)      Allergies    shellfish (Swelling; Hives)  No Known Drug Allergies    Intolerances        MEDICATIONS  (STANDING):  aspirin  chewable 81 milliGRAM(s) Oral daily  atorvastatin 80 milliGRAM(s) Oral at bedtime  clopidogrel Tablet 75 milliGRAM(s) Oral daily  influenza  Vaccine (HIGH DOSE) 0.7 milliLiter(s) IntraMuscular once  levothyroxine 50 MICROGram(s) Oral daily  tamsulosin 0.4 milliGRAM(s) Oral at bedtime    MEDICATIONS  (PRN):  acetaminophen     Tablet .. 650 milliGRAM(s) Oral every 6 hours PRN Mild Pain (1 - 3)  cyclobenzaprine 5 milliGRAM(s) Oral three times a day PRN Muscle Spasm  oxyCODONE    IR 5 milliGRAM(s) Oral every 6 hours PRN Severe Pain (7 - 10)      PAST MEDICAL & SURGICAL HISTORY:  HLD (hyperlipidemia)      Hypothyroid      History of BPH            Social History:  Lives with wife at home independent ADLs    Denies smoking, ecigarette, alcohol, illicit drug use (13 Sep 2023 20:23)      FAMILY HISTORY:        Vital Signs Last 24 Hrs  T(C): 36.4 (13 Sep 2023 21:56), Max: 36.5 (13 Sep 2023 20:49)  T(F): 97.6 (13 Sep 2023 21:56), Max: 97.7 (13 Sep 2023 20:49)  HR: 75 (13 Sep 2023 21:56) (58 - 75)  BP: 160/84 (13 Sep 2023 21:56) (124/76 - 160/84)  BP(mean): --  RR: 18 (13 Sep 2023 21:56) (18 - 20)  SpO2: 96% (13 Sep 2023 21:56) (96% - 97%)    Parameters below as of 13 Sep 2023 21:56  Patient On (Oxygen Delivery Method): room air        PE:  General: Well developed, well nourished, in no acute distress, comfortable  Neuro: Alert, oriented x 3  Psych: Normal mood & affect   Skin: Warm, dry, extremities well perfused, no obvious rash  MSK:   +SLR L side  rectal tone intact  no TTP SP lumbar spine    RLE   Sensation: SILT L2  Motor: L3-L5 no sensation  L2 (hip flexion)  3/5   L3 (knee extension)  5/5   L4 (ankle dorsiflexion)  5/5   L5 (long toe extension) 5/5   S1 (ankle plantar flexion) 5/5    LLE  Sensation: SILT L2-S1 reduced diffusely compared to R  Motor:   L2 (hip flexion)  3/5   L3 (knee extension)  5/5   L4 (ankle dorsiflexion)  5/5   L5 (long toe extension) 5/5   S1 (ankle plantar flexion) 5/5  Reflexes: Normal and symmetric  Negative clonus. Down-going Babinski                            15.0   8.82  )-----------( 183      ( 13 Sep 2023 15:02 )             48.5     09-13    137  |  101  |  17  ----------------------------<  118<H>  4.4   |  27  |  1.23    Ca    8.9      13 Sep 2023 15:02    TPro  7.1  /  Alb  3.8  /  TBili  0.7  /  DBili  x   /  AST  21  /  ALT  17  /  AlkPhos  62  09-13      Imaging: (wet read)  CT:  Spondy l3/l4, l4/l5  grade 1  Severe disc space narrowing l3/l4, l4/l5  Facet space widening l3/l4 R side w/ hypertrophy  Sclerosis endplates l4/l5, l5/s1 middle columns    MR:  Loss of lordotic alignment (muscle spasm vs degenerative changes)  Severe narrowing bilateral foramina l3/l4  Severe narrowing bilateral foramina l4/l5 L>R  Thecal sac compression mod l3-l5, most severe l3/l4 w/ ligaments flavvum hypertrophy and mottle changes inferior endplate l4 and L3. Central disc protrusion   Loose body disc fragment l5/s1 subarticular paracentral        A/P: 87yMale prior cervical laminectomy (6/23) presents w/ 10 days new onset LLE radiculopathy w/ moderate-severe thecal sac compression l3-l5 noted on imaging  -Nonop trial: pain meds, PT eval, wbat, +/- steroids  - Patient history, exam, imaging and plan discussed with Dr. Gan    Ortho Pager [phone]   Orthopaedic Surgery Consult Note    For Surgeon: Malik      HPI:  Patient is a 87M w/ PMH CAD s/p JOSÉ stent (6/2023),cervical spondylosis s/p laminectomy, gastric ulcers, hypothyroidism, HTN, HLD who presents with progressive L leg pain x 10 days.  He reports a "twisting burning sensation" in his L posterior thigh 10/10 at its worst, which remits to 7/10 and radiates down his leg causing intermittent numbness. He denies trauma, heavy lifting, strain to the area and previous back injuries/surgeries. He also has episodes where he flexes his leg because of the pain. He reports the pain is mostly constant and only remits when he lays flat. He has tried acetaminophen 1500 which did not help, advil 200 and gabapentin 100 qd prescribed by his PCP which all did not help his pain. The pain has progressed gradually leading him to start using a cane to help with his walking and today when coming to the ED he collapsed due to pain. He denies dizziness, LOC, bowel, urinary incontinence, . He reports he had a similar "twisting" pain 8 year ago in his L arm prior to his L cervical laminectomy. He usually is completely independent of his ADLs, lives with his wife and does not use any assist devices aside from recently. He denies fever, chills, weight loss/gain. He reports a distant history of gastric ulcers >10 years ago for which he "completed an antibiotic course"    ED   T 97.5 /76 HR 58 RR 20 O2 sat 96%  Labs Cr 1.23 (baseline 1.2)   EKG NSR 60  Imaging CT L spine multilevel moderate to severe canal stenosis and bialateral neural foraminal stenosis impinging exiting nerve roots CT pelvis L4-L5 moderate to severe spinal canal stenosis   Interventions: ofirmev 1g, oxycodone 5 IR, plavix 75, levothyroxine 50    Ortho addendum:  10 day LLE numbness/pain/weakness, now using cane, difficult ambulating. Denies bowel.bladder incontinence, denies saddle anesthesia. Denies recent travel, fevers, chills    Allergies    shellfish (Swelling)  No Known Drug Allergies    Intolerances    SOCIAL HX:     Denies smoking, drinking, illicit drug use    PAST MEDICAL & SURGICAL HISTORY:  HLD (hyperlipidemia)      Hypothyroid      History of BPH      S/P laminectomy with spinal fusion      FAMILY HISTORY:  :   (13 Sep 2023 20:23)      Allergies    shellfish (Swelling; Hives)  No Known Drug Allergies    Intolerances        MEDICATIONS  (STANDING):  aspirin  chewable 81 milliGRAM(s) Oral daily  atorvastatin 80 milliGRAM(s) Oral at bedtime  clopidogrel Tablet 75 milliGRAM(s) Oral daily  influenza  Vaccine (HIGH DOSE) 0.7 milliLiter(s) IntraMuscular once  levothyroxine 50 MICROGram(s) Oral daily  tamsulosin 0.4 milliGRAM(s) Oral at bedtime    MEDICATIONS  (PRN):  acetaminophen     Tablet .. 650 milliGRAM(s) Oral every 6 hours PRN Mild Pain (1 - 3)  cyclobenzaprine 5 milliGRAM(s) Oral three times a day PRN Muscle Spasm  oxyCODONE    IR 5 milliGRAM(s) Oral every 6 hours PRN Severe Pain (7 - 10)      PAST MEDICAL & SURGICAL HISTORY:  HLD (hyperlipidemia)      Hypothyroid      History of BPH            Social History:  Lives with wife at home independent ADLs    Denies smoking, ecigarette, alcohol, illicit drug use (13 Sep 2023 20:23)      FAMILY HISTORY:        Vital Signs Last 24 Hrs  T(C): 36.4 (13 Sep 2023 21:56), Max: 36.5 (13 Sep 2023 20:49)  T(F): 97.6 (13 Sep 2023 21:56), Max: 97.7 (13 Sep 2023 20:49)  HR: 75 (13 Sep 2023 21:56) (58 - 75)  BP: 160/84 (13 Sep 2023 21:56) (124/76 - 160/84)  BP(mean): --  RR: 18 (13 Sep 2023 21:56) (18 - 20)  SpO2: 96% (13 Sep 2023 21:56) (96% - 97%)    Parameters below as of 13 Sep 2023 21:56  Patient On (Oxygen Delivery Method): room air        PE:  General: Well developed, well nourished, in no acute distress, comfortable  Neuro: Alert, oriented x 3  Psych: Normal mood & affect   Skin: Warm, dry, extremities well perfused, no obvious rash  MSK:   +SLR L side  rectal tone intact  no TTP SP lumbar spine    RLE   Sensation: SILT L2  Motor: L3-L5 no sensation  L2 (hip flexion)  3/5   L3 (knee extension)  5/5   L4 (ankle dorsiflexion)  5/5   L5 (long toe extension) 5/5   S1 (ankle plantar flexion) 5/5    LLE  Sensation: SILT L2-S1 reduced diffusely compared to R  Motor:   L2 (hip flexion)  3/5   L3 (knee extension)  5/5   L4 (ankle dorsiflexion)  5/5   L5 (long toe extension) 5/5   S1 (ankle plantar flexion) 5/5  Reflexes: Normal and symmetric  Negative clonus. Down-going Babinski                            15.0   8.82  )-----------( 183      ( 13 Sep 2023 15:02 )             48.5     09-13    137  |  101  |  17  ----------------------------<  118<H>  4.4   |  27  |  1.23    Ca    8.9      13 Sep 2023 15:02    TPro  7.1  /  Alb  3.8  /  TBili  0.7  /  DBili  x   /  AST  21  /  ALT  17  /  AlkPhos  62  09-13      Imaging: (wet read)  CT:  Spondy l3/l4, l4/l5  grade 1  Severe disc space narrowing l3/l4, l4/l5  Facet space widening l3/l4 R side w/ hypertrophy  Sclerosis endplates l4/l5, l5/s1 middle columns    MR:  Loss of lordotic alignment (muscle spasm vs degenerative changes)  Severe narrowing bilateral foramina l3/l4  Severe narrowing bilateral foramina l4/l5 L>R  Thecal sac compression mod l3-l5, most severe l3/l4 w/ ligaments flavvum hypertrophy and mottle changes inferior endplate l4 and L3. Central disc protrusion   Loose body disc fragment l5/s1 subarticular paracentral        A/P: 87yMale prior cervical laminectomy (6/23) presents w/ 10 days new onset LLE radiculopathy w/ moderate-severe thecal sac compression l3-l5 noted on imaging  -Nonop trial: pain meds, PT eval, wbat, +/- steroids  - Patient history, exam, imaging and plan discussed with Dr. Gan    Ortho Pager [phone]   Orthopaedic Surgery Consult Note    For Surgeon: Malik      HPI:  Patient is a 87M w/ PMH CAD s/p JOSÉ stent (6/2023),cervical spondylosis s/p laminectomy, gastric ulcers, hypothyroidism, HTN, HLD who presents with progressive L leg pain x 10 days.  He reports a "twisting burning sensation" in his L posterior thigh 10/10 at its worst, which remits to 7/10 and radiates down his leg causing intermittent numbness. He denies trauma, heavy lifting, strain to the area and previous back injuries/surgeries. He also has episodes where he flexes his leg because of the pain. He reports the pain is mostly constant and only remits when he lays flat. He has tried acetaminophen 1500 which did not help, advil 200 and gabapentin 100 qd prescribed by his PCP which all did not help his pain. The pain has progressed gradually leading him to start using a cane to help with his walking and today when coming to the ED he collapsed due to pain. He denies dizziness, LOC, bowel, urinary incontinence, . He reports he had a similar "twisting" pain 8 year ago in his L arm prior to his L cervical laminectomy. He usually is completely independent of his ADLs, lives with his wife and does not use any assist devices aside from recently. He denies fever, chills, weight loss/gain. He reports a distant history of gastric ulcers >10 years ago for which he "completed an antibiotic course"    ED   T 97.5 /76 HR 58 RR 20 O2 sat 96%  Labs Cr 1.23 (baseline 1.2)   EKG NSR 60  Imaging CT L spine multilevel moderate to severe canal stenosis and bialateral neural foraminal stenosis impinging exiting nerve roots CT pelvis L4-L5 moderate to severe spinal canal stenosis   Interventions: ofirmev 1g, oxycodone 5 IR, plavix 75, levothyroxine 50    Ortho addendum:  10 day LLE numbness/pain/weakness, now using cane, difficult ambulating. Denies bowel.bladder incontinence, denies saddle anesthesia. Denies recent travel, fevers, chills    Allergies    shellfish (Swelling)  No Known Drug Allergies    Intolerances    SOCIAL HX:     Denies smoking, drinking, illicit drug use    PAST MEDICAL & SURGICAL HISTORY:  HLD (hyperlipidemia)      Hypothyroid      History of BPH      S/P laminectomy with spinal fusion      FAMILY HISTORY:  :   (13 Sep 2023 20:23)      Allergies    shellfish (Swelling; Hives)  No Known Drug Allergies    Intolerances        MEDICATIONS  (STANDING):  aspirin  chewable 81 milliGRAM(s) Oral daily  atorvastatin 80 milliGRAM(s) Oral at bedtime  clopidogrel Tablet 75 milliGRAM(s) Oral daily  influenza  Vaccine (HIGH DOSE) 0.7 milliLiter(s) IntraMuscular once  levothyroxine 50 MICROGram(s) Oral daily  tamsulosin 0.4 milliGRAM(s) Oral at bedtime    MEDICATIONS  (PRN):  acetaminophen     Tablet .. 650 milliGRAM(s) Oral every 6 hours PRN Mild Pain (1 - 3)  cyclobenzaprine 5 milliGRAM(s) Oral three times a day PRN Muscle Spasm  oxyCODONE    IR 5 milliGRAM(s) Oral every 6 hours PRN Severe Pain (7 - 10)      PAST MEDICAL & SURGICAL HISTORY:  HLD (hyperlipidemia)      Hypothyroid      History of BPH            Social History:  Lives with wife at home independent ADLs    Denies smoking, ecigarette, alcohol, illicit drug use (13 Sep 2023 20:23)      FAMILY HISTORY:        Vital Signs Last 24 Hrs  T(C): 36.4 (13 Sep 2023 21:56), Max: 36.5 (13 Sep 2023 20:49)  T(F): 97.6 (13 Sep 2023 21:56), Max: 97.7 (13 Sep 2023 20:49)  HR: 75 (13 Sep 2023 21:56) (58 - 75)  BP: 160/84 (13 Sep 2023 21:56) (124/76 - 160/84)  BP(mean): --  RR: 18 (13 Sep 2023 21:56) (18 - 20)  SpO2: 96% (13 Sep 2023 21:56) (96% - 97%)    Parameters below as of 13 Sep 2023 21:56  Patient On (Oxygen Delivery Method): room air        PE:  General: Well developed, well nourished, in no acute distress, comfortable  Neuro: Alert, oriented x 3  Psych: Normal mood & affect   Skin: Warm, dry, extremities well perfused, no obvious rash  MSK:   +SLR L side  rectal tone intact  no TTP SP lumbar spine    RLE   Sensation: SILT L2  Motor: L3-L5 no sensation  L2 (hip flexion)  3/5   L3 (knee extension)  5/5   L4 (ankle dorsiflexion)  5/5   L5 (long toe extension) 5/5   S1 (ankle plantar flexion) 5/5    LLE  Sensation: SILT L2-S1   Motor:   L2 (hip flexion)  3/5   L3 (knee extension)  5/5   L4 (ankle dorsiflexion)  5/5   L5 (long toe extension) 5/5   S1 (ankle plantar flexion) 5/5  Reflexes: Normal and symmetric  Negative clonus. Down-going Babinski                            15.0   8.82  )-----------( 183      ( 13 Sep 2023 15:02 )             48.5     09-13    137  |  101  |  17  ----------------------------<  118<H>  4.4   |  27  |  1.23    Ca    8.9      13 Sep 2023 15:02    TPro  7.1  /  Alb  3.8  /  TBili  0.7  /  DBili  x   /  AST  21  /  ALT  17  /  AlkPhos  62  09-13      Imaging: (wet read)  CT:  Spondy l3/l4, l4/l5  grade 1  Severe disc space narrowing l3/l4, l4/l5  Facet space widening l3/l4 R side w/ hypertrophy  Sclerosis endplates l4/l5, l5/s1 middle columns    MR:  Loss of lordotic alignment (muscle spasm vs degenerative changes)  Severe narrowing bilateral foramina l3/l4  Severe narrowing bilateral foramina l4/l5 L>R  Thecal sac compression mod l3-l5, most severe l3/l4 w/ ligaments flavvum hypertrophy and mottle changes inferior endplate l4 and L3. Central disc protrusion   Loose body disc fragment l5/s1 subarticular paracentral        A/P: 87yMale prior cervical laminectomy (6/23) presents w/ 10 days new onset LLE radiculopathy w/ moderate-severe thecal sac compression l3-l5 noted on imaging  -Nonop trial: pain meds, PT eval, wbat, +/- steroids  - Patient history, exam, imaging and plan discussed with Dr. Gan    Ortho Pager [phone]   Orthopaedic Surgery Consult Note    For Surgeon: Malik      HPI:  Patient is a 87M w/ PMH CAD s/p JOSÉ stent (6/2023),cervical spondylosis s/p laminectomy, gastric ulcers, hypothyroidism, HTN, HLD who presents with progressive L leg pain x 10 days.  He reports a "twisting burning sensation" in his L posterior thigh 10/10 at its worst, which remits to 7/10 and radiates down his leg causing intermittent numbness. He denies trauma, heavy lifting, strain to the area and previous back injuries/surgeries. He also has episodes where he flexes his leg because of the pain. He reports the pain is mostly constant and only remits when he lays flat. He has tried acetaminophen 1500 which did not help, advil 200 and gabapentin 100 qd prescribed by his PCP which all did not help his pain. The pain has progressed gradually leading him to start using a cane to help with his walking and today when coming to the ED he collapsed due to pain. He denies dizziness, LOC, bowel, urinary incontinence, . He reports he had a similar "twisting" pain 8 year ago in his L arm prior to his L cervical laminectomy. He usually is completely independent of his ADLs, lives with his wife and does not use any assist devices aside from recently. He denies fever, chills, weight loss/gain. He reports a distant history of gastric ulcers >10 years ago for which he "completed an antibiotic course"    ED   T 97.5 /76 HR 58 RR 20 O2 sat 96%  Labs Cr 1.23 (baseline 1.2)   EKG NSR 60  Imaging CT L spine multilevel moderate to severe canal stenosis and bialateral neural foraminal stenosis impinging exiting nerve roots CT pelvis L4-L5 moderate to severe spinal canal stenosis   Interventions: ofirmev 1g, oxycodone 5 IR, plavix 75, levothyroxine 50    Ortho addendum:  10 day LLE numbness/pain/weakness, now using cane, difficult ambulating. Denies bowel.bladder incontinence, denies saddle anesthesia. Denies recent travel, fevers, chills    Allergies    shellfish (Swelling)  No Known Drug Allergies    Intolerances    SOCIAL HX:     Denies smoking, drinking, illicit drug use    PAST MEDICAL & SURGICAL HISTORY:  HLD (hyperlipidemia)      Hypothyroid      History of BPH      S/P laminectomy with spinal fusion      FAMILY HISTORY:  :   (13 Sep 2023 20:23)      Allergies    shellfish (Swelling; Hives)  No Known Drug Allergies    Intolerances        MEDICATIONS  (STANDING):  aspirin  chewable 81 milliGRAM(s) Oral daily  atorvastatin 80 milliGRAM(s) Oral at bedtime  clopidogrel Tablet 75 milliGRAM(s) Oral daily  influenza  Vaccine (HIGH DOSE) 0.7 milliLiter(s) IntraMuscular once  levothyroxine 50 MICROGram(s) Oral daily  tamsulosin 0.4 milliGRAM(s) Oral at bedtime    MEDICATIONS  (PRN):  acetaminophen     Tablet .. 650 milliGRAM(s) Oral every 6 hours PRN Mild Pain (1 - 3)  cyclobenzaprine 5 milliGRAM(s) Oral three times a day PRN Muscle Spasm  oxyCODONE    IR 5 milliGRAM(s) Oral every 6 hours PRN Severe Pain (7 - 10)      PAST MEDICAL & SURGICAL HISTORY:  HLD (hyperlipidemia)      Hypothyroid      History of BPH            Social History:  Lives with wife at home independent ADLs    Denies smoking, ecigarette, alcohol, illicit drug use (13 Sep 2023 20:23)      FAMILY HISTORY:        Vital Signs Last 24 Hrs  T(C): 36.4 (13 Sep 2023 21:56), Max: 36.5 (13 Sep 2023 20:49)  T(F): 97.6 (13 Sep 2023 21:56), Max: 97.7 (13 Sep 2023 20:49)  HR: 75 (13 Sep 2023 21:56) (58 - 75)  BP: 160/84 (13 Sep 2023 21:56) (124/76 - 160/84)  BP(mean): --  RR: 18 (13 Sep 2023 21:56) (18 - 20)  SpO2: 96% (13 Sep 2023 21:56) (96% - 97%)    Parameters below as of 13 Sep 2023 21:56  Patient On (Oxygen Delivery Method): room air        PE:  General: Well developed, well nourished, in no acute distress, comfortable  Neuro: Alert, oriented x 3  Psych: Normal mood & affect   Skin: Warm, dry, extremities well perfused, no obvious rash  MSK:   +SLR L side  rectal tone intact  no TTP SP lumbar spine    RLE   Sensation: SILT L2  Motor: L3-L5 no sensation  L2 (hip flexion)  3/5   L3 (knee extension)  5/5   L4 (ankle dorsiflexion)  5/5   L5 (long toe extension) 5/5   S1 (ankle plantar flexion) 5/5    LLE  Sensation: SILT L2-S1   Motor:   L2 (hip flexion)  3/5   L3 (knee extension)  5/5   L4 (ankle dorsiflexion)  5/5   L5 (long toe extension) 5/5   S1 (ankle plantar flexion) 5/5  Reflexes: Normal and symmetric  Negative clonus. Down-going Babinski                            15.0   8.82  )-----------( 183      ( 13 Sep 2023 15:02 )             48.5     09-13    137  |  101  |  17  ----------------------------<  118<H>  4.4   |  27  |  1.23    Ca    8.9      13 Sep 2023 15:02    TPro  7.1  /  Alb  3.8  /  TBili  0.7  /  DBili  x   /  AST  21  /  ALT  17  /  AlkPhos  62  09-13      Imaging: (wet read)  CT:  Spondy l3/l4, l4/l5  grade 1  Severe disc space narrowing l3/l4, l4/l5  Facet space widening l3/l4 R side w/ hypertrophy  Sclerosis endplates l4/l5, l5/s1 middle columns    MR:  Loss of lordotic alignment (muscle spasm vs degenerative changes)  Severe narrowing bilateral foramina l3/l4  Severe narrowing bilateral foramina l4/l5 L>R  Thecal sac compression mod l3-l5, most severe l3/l4 w/ ligaments flavvum hypertrophy and mottle changes inferior endplate l4 and L3. Central disc protrusion   Loose body disc fragment l5/s1 subarticular paracentral        A/P: 87yMale prior cervical laminectomy (6/23) presents w/ 10 days new onset LLE radiculopathy w/ moderate-severe thecal sac compression l3-l5 noted on imaging  -Nonop trial: pain meds, PT eval, wbat, +/- steroids  - Patient history, exam, imaging and plan discussed with Dr. Gan    Ortho Pager [phone]   Orthopaedic Surgery Consult Note    For Surgeon: Malik      HPI:  Patient is a 87M w/ PMH CAD s/p JOSÉ stent (6/2023),cervical spondylosis s/p laminectomy, gastric ulcers, hypothyroidism, HTN, HLD who presents with progressive L leg pain x 10 days.  He reports a "twisting burning sensation" in his L posterior thigh 10/10 at its worst, which remits to 7/10 and radiates down his leg causing intermittent numbness. He denies trauma, heavy lifting, strain to the area and previous back injuries/surgeries. He also has episodes where he flexes his leg because of the pain. He reports the pain is mostly constant and only remits when he lays flat. He has tried acetaminophen 1500 which did not help, advil 200 and gabapentin 100 qd prescribed by his PCP which all did not help his pain. The pain has progressed gradually leading him to start using a cane to help with his walking and today when coming to the ED he collapsed due to pain. He denies dizziness, LOC, bowel, urinary incontinence, . He reports he had a similar "twisting" pain 8 year ago in his L arm prior to his L cervical laminectomy. He usually is completely independent of his ADLs, lives with his wife and does not use any assist devices aside from recently. He denies fever, chills, weight loss/gain. He reports a distant history of gastric ulcers >10 years ago for which he "completed an antibiotic course"    ED   T 97.5 /76 HR 58 RR 20 O2 sat 96%  Labs Cr 1.23 (baseline 1.2)   EKG NSR 60  Imaging CT L spine multilevel moderate to severe canal stenosis and bialateral neural foraminal stenosis impinging exiting nerve roots CT pelvis L4-L5 moderate to severe spinal canal stenosis   Interventions: ofirmev 1g, oxycodone 5 IR, plavix 75, levothyroxine 50    Ortho addendum:  10 day LLE numbness/pain/weakness, now using cane, difficult ambulating. Denies bowel.bladder incontinence, denies saddle anesthesia. Denies recent travel, fevers, chills    Allergies    shellfish (Swelling)  No Known Drug Allergies    Intolerances    SOCIAL HX:     Denies smoking, drinking, illicit drug use    PAST MEDICAL & SURGICAL HISTORY:  HLD (hyperlipidemia)      Hypothyroid      History of BPH      S/P laminectomy with spinal fusion      FAMILY HISTORY:  :   (13 Sep 2023 20:23)      Allergies    shellfish (Swelling; Hives)  No Known Drug Allergies    Intolerances        MEDICATIONS  (STANDING):  aspirin  chewable 81 milliGRAM(s) Oral daily  atorvastatin 80 milliGRAM(s) Oral at bedtime  clopidogrel Tablet 75 milliGRAM(s) Oral daily  influenza  Vaccine (HIGH DOSE) 0.7 milliLiter(s) IntraMuscular once  levothyroxine 50 MICROGram(s) Oral daily  tamsulosin 0.4 milliGRAM(s) Oral at bedtime    MEDICATIONS  (PRN):  acetaminophen     Tablet .. 650 milliGRAM(s) Oral every 6 hours PRN Mild Pain (1 - 3)  cyclobenzaprine 5 milliGRAM(s) Oral three times a day PRN Muscle Spasm  oxyCODONE    IR 5 milliGRAM(s) Oral every 6 hours PRN Severe Pain (7 - 10)      PAST MEDICAL & SURGICAL HISTORY:  HLD (hyperlipidemia)      Hypothyroid      History of BPH            Social History:  Lives with wife at home independent ADLs    Denies smoking, ecigarette, alcohol, illicit drug use (13 Sep 2023 20:23)      FAMILY HISTORY:        Vital Signs Last 24 Hrs  T(C): 36.4 (13 Sep 2023 21:56), Max: 36.5 (13 Sep 2023 20:49)  T(F): 97.6 (13 Sep 2023 21:56), Max: 97.7 (13 Sep 2023 20:49)  HR: 75 (13 Sep 2023 21:56) (58 - 75)  BP: 160/84 (13 Sep 2023 21:56) (124/76 - 160/84)  BP(mean): --  RR: 18 (13 Sep 2023 21:56) (18 - 20)  SpO2: 96% (13 Sep 2023 21:56) (96% - 97%)    Parameters below as of 13 Sep 2023 21:56  Patient On (Oxygen Delivery Method): room air        PE:  General: Well developed, well nourished, in no acute distress, comfortable  Neuro: Alert, oriented x 3  Psych: Normal mood & affect   Skin: Warm, dry, extremities well perfused, no obvious rash  MSK:   +SLR L side  rectal tone intact  no TTP SP lumbar spine    RLE   Sensation: SILT L2  Motor: L3-L5 no sensation  L2 (hip flexion)  3/5   L3 (knee extension)  5/5   L4 (ankle dorsiflexion)  5/5   L5 (long toe extension) 5/5   S1 (ankle plantar flexion) 5/5    LLE  Sensation: SILT L2-S1   Motor:   L2 (hip flexion)  3/5   L3 (knee extension)  5/5   L4 (ankle dorsiflexion)  5/5   L5 (long toe extension) 5/5   S1 (ankle plantar flexion) 5/5  Reflexes: Normal and symmetric  Negative clonus. Down-going Babinski                            15.0   8.82  )-----------( 183      ( 13 Sep 2023 15:02 )             48.5     09-13    137  |  101  |  17  ----------------------------<  118<H>  4.4   |  27  |  1.23    Ca    8.9      13 Sep 2023 15:02    TPro  7.1  /  Alb  3.8  /  TBili  0.7  /  DBili  x   /  AST  21  /  ALT  17  /  AlkPhos  62  09-13      Imaging: (wet read)  CT:  Spondy l3/l4, l4/l5  grade 1  Severe disc space narrowing l3/l4, l4/l5  Facet space widening l3/l4 R side w/ hypertrophy  Sclerosis endplates l4/l5, l5/s1 middle columns    MR:  Loss of lordotic alignment (muscle spasm vs degenerative changes)  Severe narrowing bilateral foramina l3/l4  Severe narrowing bilateral foramina l4/l5 L>R  Thecal sac compression mod l3-l5, most severe l3/l4 w/ ligaments flavvum hypertrophy and mottle changes inferior endplate l4 and L3. Central disc protrusion   Loose body disc fragment l5/s1 subarticular paracentral        A/P: 87yMale prior cervical laminectomy (6/23) presents w/ 10 days new onset LLE radiculopathy w/ moderate-severe thecal sac compression l3-l5 noted on imaging  -Nonop trial: pain meds, PT eval, wbat, +/- steroids  - Patient history, exam, imaging and plan discussed with Dr. Gan    Ortho Pager [phone]

## 2023-09-14 NOTE — PROGRESS NOTE ADULT - PROBLEM SELECTOR PLAN 1
Pt presenting with L posterior leg pain x 10 days denies trauma   MR lumbar spine; moderate to severe L2-L3, severe spinal canal stenosis at L3-4 and L4-5. L5-S1 left subarticular disc extrusion with mass effect and descending left S1 nerve root. Severe bilateral neural foraminal narrowing L4-5 and moderate to severe right neural foraminal narrowing at L3-4.  >10 year history of ulcers likely secondary to H pylori  s/p ofirmev x1, oxycodone. Currently neurologically intact, proceed with P/T consult,     - pain control: tylenol 650 q6 PRN for mild pain  - oxycodone 5 IR PRN for severe pain   - flexeril 5 q8 PRN for muscle spasm  - f/u spine consult recs   - P/T consult  - outpatient f/u with ortho Pt presenting with L posterior leg pain x 10 days denies trauma MR lumbar spine; moderate to severe L2-L3, severe spinal canal stenosis at L3-4 and L4-5. L5-S1 left subarticular disc extrusion with mass effect and descending left S1 nerve root. Severe bilateral neural foraminal narrowing L4-5 and moderate to severe right neural foraminal narrowing at L3-4. 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 Currently neurologically intact  - c/w Steroid taper dose pack  - c/w lidocaine patch 12 hr on 12hr off   - pain control: Tylenol 650 q6 PRN for mild pain  - oxycodone 5 IR PRN for severe pain (Miralax BID)   - flexeril 5 q8 PRN for muscle spasm  - f/u P/T recs  - outpatient f/u with ortho Pt presenting with L posterior leg pain x 10 days denies trauma MR lumbar spine; moderate to severe L2-L3, severe spinal canal stenosis at L3-4 and L4-5. L5-S1 left subarticular disc extrusion with mass effect and descending left S1 nerve root. Severe bilateral neural foraminal narrowing L4-5 and moderate to severe right neural foraminal narrowing at L3-4. 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 Currently neurologically intact  - c/w Steroid taper dose pack  - c/w lidocaine patch 12 hr on 12hr off   - pain control: Tylenol 650 q6 for mild pain  - oxycodone 5 IR PRN for severe pain (Miralax BID)   - flexeril 5 q8 for muscle spasm  - gabapentin 100mg at bedtime   - f/u P/T recs  - outpatient f/u with ortho

## 2023-09-14 NOTE — PROGRESS NOTE ADULT - PROBLEM SELECTOR PLAN 7
F: PO  E: Replete PRN  N: Regular   DVT pphx: lovenox 40    CODE STATUS: Full  DISPO: [facility] F: PO  E: Replete PRN  N: Regular   DVT pphx: lovenox 40    CODE STATUS: Full  DISPO: [facility] F: PO  E: Replete PRN  N: Regular   DVT pphx: lovenox 40    CODE STATUS: Full  DISPO: [facility] F: PO  E: Replete PRN  N: Regular   DVT pphx: lovenox 40, protonix 40mg BID     CODE STATUS: Full  DISPO: [email or web address]

## 2023-09-14 NOTE — PROGRESS NOTE ADULT - PROBLEM SELECTOR PLAN 2
Home meds toprol 25 qd    - c/w toprol 25 qd Home meds toprol 25 qd, Lopressor on 9/15.     - c/w toprol 25 qd Home meds toprol 25 qd  - c/w toprol 25 qd

## 2023-09-14 NOTE — CONSULT NOTE ADULT - ASSESSMENT
{\rtf1\lslbkd33421\ansi\gockwbq5676\ftnbj\uc1\deff0  {\fonttbl{\f0 \fnil Segoe UI;}{\f1 \fnil \fcharset0 Segoe UI;}{\f2 \fnil Times New Dl;}}  {\colortbl ;\dmx637\hgkvm887\rszy462 ;\red0\green0\blue0 ;\red0\green0\ryap272 ;\red0\green0\blue0 ;}  {\stylesheet{\f0\fs20 Normal;}{\cs1 Default Paragraph Font;}{\cs2\f0\fs16 Line Number;}{\cs3\f2\fs24\ul\cf3 Hyperlink;}}  {\*\revtbl{Unknown;}}  \ocwklq89743\byegld08994\boddp1364\dddti8304\uzwjq8113\iebwe2108\trclohh553\mwhpyou934\nogrowautofit\slmnja204\formshade\nofeaturethrottle1\dntblnsbdb\fet4\aendnotes\aftnnrlc\pgbrdrhead\pgbrdrfoot  \sectd\pwhqoa39666\rxzonr54542\guttersxn0\emkofenq6457\tegarxcs1462\mimianku0048\xyobhkuh9253\siuhlic985\loahzdq002\sbkpage\pgncont\pgndec  \plain\plain\f0\fs24\ql\plain\f0\fs24\plain\f0\fs20\mkhs6197\hich\f0\dbch\f0\loch\f0\fs20\par  I M\par  \par  87 y o M w/ PMH CAD s/p JOSÉ stent (6/2023), HTN, HLD, hypothyroidism who presents with progressive L leg pain x 10 days found to have spinal stenosis admitted for pain control and physical therapy.\par  \par  \plain\f1\fs20\kfvt4667\hich\f1\dbch\f1\loch\f1\cf2\fs20\ul{\field{\*\fldinst HYPERLINK [id number] }{\fldrslt Problem/Plan - 1:}}\plain\f0\fs20\nsur1507\hich\f0\dbch\f0\loch\f0\fs20\ql\par  \'b7  {\*\bkmkstart cf25268030225}{\*\bkmkend er49393832044}Problem: {\*\bkmkstart ja25273916388}{\*\bkmkend jd55755135703}Lumbar spinal stenosis. \par  \'b7  {\*\bkmkstart ro40762620936}{\*\bkmkend wy80553135454}Plan: {\*\bkmkstart km64010194638}{\*\bkmkend rk77282288039}Pt presenting with L posterior leg pain x 10 days denies trauma \par  MR lumbar spine; moderate to severe L2-L3, severe spinal canal stenosis at L3-4 and L4-5. L5-S1 left subarticular disc extrusion with mass effect and descending left S1 nerve root. Severe bilateral neural foraminal narrowing L4-5 and moderate to severe   right neural foraminal narrowing at L3-4.\par  >10 year history of ulcers likely secondary to H pylori\par  s/p ofirmev x1, oxycodone. Currently neurologically intact, proceed with P/T consult, \par  \par  - pain control: tylenol 650 q6 PRN for mild pain\par  - oxycodone 5 IR PRN for severe pain \par  - flexeril 5 q8 PRN for muscle spasm\par  - f/u spine consult recs \par  - P/T consult\par  - outpatient f/u with ortho.\plain\f1\fs20\uyal7241\hich\f1\dbch\f1\loch\f1\cf2\fs20\strike\plain\f0\fs20\iedn5462\hich\f0\dbch\f0\loch\f0\fs20\par  \par  \plain\f1\fs20\rzeh3745\hich\f1\dbch\f1\loch\f1\cf2\fs20\ul{\field{\*\fldinst HYPERLINK [id number] }{\fldrslt Problem/Plan - 2:}}\plain\f0\fs20\anwh7348\hich\f0\dbch\f0\loch\f0\fs20\ql\par  \'b7  {\*\bkmkstart xm14503353006}{\*\bkmkend iq83255314532}Problem: {\*\bkmkstart nt32976979639}{\*\bkmkend lm36126518500}Hypertension. \par  \'b7  {\*\bkmkstart xr69303932301}{\*\bkmkend kp27953232169}Plan: {\*\bkmkstart sf16202920551}{\*\bkmkend co36867310282}Home meds toprol 25 qd\par  \par  - c/w toprol 25 qd.\par  \par  \plain\f1\fs20\mepo8762\hich\f1\dbch\f1\loch\f1\cf2\fs20\ul{\field{\*\fldinst HYPERLINK [id number] }{\fldrslt Problem/Plan - 3:}}\plain\f0\fs20\zrwl7075\hich\f0\dbch\f0\loch\f0\fs20\ql\par  \'b7  {\*\bkmkstart cj32230604894}{\*\bkmkend uo00020953464}Problem: {\*\bkmkstart wb93658710720}{\*\bkmkend ex64924928911}CAD S/P percutaneous coronary angioplasty. \par  \'b7  {\*\bkmkstart xr93754542009}{\*\bkmkend lu89178990969}Plan: {\*\bkmkstart aw73892072649}{\*\bkmkend pm00055820479}Recent JOSÉ stent to mLAD placed 6/2023 \par  Follows with cardioloigist Dr Yanes\par  \par  - c/w ASA 81, Plavix qd.\par  \par  \plain\f1\fs20\vfik1449\hich\f1\dbch\f1\loch\f1\cf2\fs20\ul{\field{\*\fldinst HYPERLINK [id number] }{\fldrslt Problem/Plan - 4:}}\plain\f0\fs20\nvyi3464\hich\f0\dbch\f0\loch\f0\fs20\ql\par  \'b7  {\*\bkmkstart sz53448473048}{\*\bkmkend hc23779918196}Problem: {\*\bkmkstart wr23792606094}{\*\bkmkend ew30060187289}Hypothyroid. \par  \'b7  {\*\bkmkstart oa55968660208}{\*\bkmkend fm66061554145}Plan: {\*\bkmkstart uf92658541294}{\*\bkmkend uh87536947905}Takes synthroid 0.5 qd\par  \par  - f/u TSH\par  - c/w synthroid 0.5.\par  \par  \plain\f1\fs20\akxu7819\hich\f1\dbch\f1\loch\f1\cf2\fs20\ul{\field{\*\fldinst HYPERLINK [id number] }{\fldrslt Problem/Plan - 5:}}\plain\f0\fs20\niuq4085\hich\f0\dbch\f0\loch\f0\fs20\ql\par  \'b7  {\*\bkmkstart tx31651003662}{\*\bkmkend ga27091574328}Problem: {\*\bkmkstart dm32572294466}{\*\bkmkend rd96197291987}BPH (benign prostatic hyperplasia). \par  \'b7  {\*\bkmkstart zw77203768270}{\*\bkmkend aa58465237757}Plan: {\*\bkmkstart vk18706325108}{\*\bkmkend re40004621027}Home meds flomax 0.4mg\par  \par  - c/w home meds.\par  \par  \plain\f1\fs20\cuyw1164\hich\f1\dbch\f1\loch\f1\cf2\fs20\ul{\field{\*\fldinst HYPERLINK [id number] }{\fldrslt Problem/Plan - 6:}}\plain\f0\fs20\ygta0878\hich\f0\dbch\f0\loch\f0\fs20\ql\par  \'b7  {\*\bkmkstart df63464905414}{\*\bkmkend ca38770588988}Problem: {\*\bkmkstart df80247269757}{\*\bkmkend we81439678800}HLD (hyperlipidemia). \par  \'b7  {\*\bkmkstart hi92998732905}{\*\bkmkend lv27787610055}Plan: {\*\bkmkstart ka69298589030}{\*\bkmkend pl38449547491}Home meds rosuvastatin 20\par  \par  - c/w atorvastatin.\par  \par  \plain\f1\fs20\pgmu0808\hich\f1\dbch\f1\loch\f1\cf2\fs20\ul{\field{\*\fldinst HYPERLINK [id number] }{\fldrslt Problem/Plan - 7:}}\plain\f0\fs20\lura1904\hich\f0\dbch\f0\loch\f0\fs20\ql\par  \'b7  {\*\bkmkstart gf93613941175}{\*\bkmkend up83681159272}Problem: {\*\bkmkstart cc19355199611}{\*\bkmkend fr60568874064}Need for prophylactic measure. \par  \'b7  {\*\bkmkstart my74968259795}{\*\bkmkend uh00511112934}Plan: {\*\bkmkstart bs67934866089}{\*\bkmkend cn71694952489}F: PO\par  E: Replete PRN\par  N: Regular \par  DVT pphx: lovenox 40\par  \par  CODE STATUS: Full\par  DISPO: RMF.\par  \par  }   {\rtf1\xcfjwz66766\ansi\wwhdgmt6656\ftnbj\uc1\deff0  {\fonttbl{\f0 \fnil Segoe UI;}{\f1 \fnil \fcharset0 Segoe UI;}{\f2 \fnil Times New Dl;}}  {\colortbl ;\otq781\gdfoc340\akqv972 ;\red0\green0\blue0 ;\red0\green0\iqzj088 ;\red0\green0\blue0 ;}  {\stylesheet{\f0\fs20 Normal;}{\cs1 Default Paragraph Font;}{\cs2\f0\fs16 Line Number;}{\cs3\f2\fs24\ul\cf3 Hyperlink;}}  {\*\revtbl{Unknown;}}  \jnkqav87327\lsprqc18864\azqrr9642\ndqpp1170\gqegc6371\doioc4930\hpnashx341\vzeorrx040\nogrowautofit\mtsuwf585\formshade\nofeaturethrottle1\dntblnsbdb\fet4\aendnotes\aftnnrlc\pgbrdrhead\pgbrdrfoot  \sectd\palclf43574\icyfus36037\guttersxn0\yeipxwtt5989\ypswdokn5644\hfysxcon9654\eykjwbqd5838\bktuxll584\rxilkzw785\sbkpage\pgncont\pgndec  \plain\plain\f0\fs24\ql\plain\f0\fs24\plain\f0\fs20\fcby9398\hich\f0\dbch\f0\loch\f0\fs20\par  I M\par  \par  87 y o M w/ PMH CAD s/p JOSÉ stent (6/2023), HTN, HLD, hypothyroidism who presents with progressive L leg pain x 10 days found to have spinal stenosis admitted for pain control and physical therapy.\par  \par  \plain\f1\fs20\kehn4106\hich\f1\dbch\f1\loch\f1\cf2\fs20\ul{\field{\*\fldinst HYPERLINK [id number] }{\fldrslt Problem/Plan - 1:}}\plain\f0\fs20\jgei4659\hich\f0\dbch\f0\loch\f0\fs20\ql\par  \'b7  {\*\bkmkstart px20798532068}{\*\bkmkend lb97271966614}Problem: {\*\bkmkstart ya52726012512}{\*\bkmkend cy77491651811}Lumbar spinal stenosis. \par  \'b7  {\*\bkmkstart nk64325438651}{\*\bkmkend cd65514760590}Plan: {\*\bkmkstart as65519726444}{\*\bkmkend zd09423081348}Pt presenting with L posterior leg pain x 10 days denies trauma \par  MR lumbar spine; moderate to severe L2-L3, severe spinal canal stenosis at L3-4 and L4-5. L5-S1 left subarticular disc extrusion with mass effect and descending left S1 nerve root. Severe bilateral neural foraminal narrowing L4-5 and moderate to severe   right neural foraminal narrowing at L3-4.\par  >10 year history of ulcers likely secondary to H pylori\par  s/p ofirmev x1, oxycodone. Currently neurologically intact, proceed with P/T consult, \par  \par  - pain control: tylenol 650 q6 PRN for mild pain\par  - oxycodone 5 IR PRN for severe pain \par  - flexeril 5 q8 PRN for muscle spasm\par  - f/u spine consult recs \par  - P/T consult\par  - outpatient f/u with ortho.\plain\f1\fs20\ugvw3001\hich\f1\dbch\f1\loch\f1\cf2\fs20\strike\plain\f0\fs20\buta0503\hich\f0\dbch\f0\loch\f0\fs20\par  \par  \plain\f1\fs20\amyq2172\hich\f1\dbch\f1\loch\f1\cf2\fs20\ul{\field{\*\fldinst HYPERLINK [id number] }{\fldrslt Problem/Plan - 2:}}\plain\f0\fs20\zaas4603\hich\f0\dbch\f0\loch\f0\fs20\ql\par  \'b7  {\*\bkmkstart sy24002948887}{\*\bkmkend we47310871209}Problem: {\*\bkmkstart av42690766553}{\*\bkmkend uq46029955658}Hypertension. \par  \'b7  {\*\bkmkstart gg40026608689}{\*\bkmkend qo49440533116}Plan: {\*\bkmkstart tg10413236175}{\*\bkmkend bp25871992994}Home meds toprol 25 qd\par  \par  - c/w toprol 25 qd.\par  \par  \plain\f1\fs20\fmxi5115\hich\f1\dbch\f1\loch\f1\cf2\fs20\ul{\field{\*\fldinst HYPERLINK [id number],69710439777 }{\fldrslt Problem/Plan - 3:}}\plain\f0\fs20\uodn0142\hich\f0\dbch\f0\loch\f0\fs20\ql\par  \'b7  {\*\bkmkstart ns48698612730}{\*\bkmkend ay47649655050}Problem: {\*\bkmkstart br46147405764}{\*\bkmkend vc57343012736}CAD S/P percutaneous coronary angioplasty. \par  \'b7  {\*\bkmkstart cw23761424407}{\*\bkmkend eu00048059697}Plan: {\*\bkmkstart jw45641613731}{\*\bkmkend tf86617943216}Recent JOSÉ stent to mLAD placed 6/2023 \par  Follows with cardioloigist Dr Yanes\par  \par  - c/w ASA 81, Plavix qd.\par  \par  \plain\f1\fs20\qzqa1628\hich\f1\dbch\f1\loch\f1\cf2\fs20\ul{\field{\*\fldinst HYPERLINK [id number],13920738871,[id number] }{\fldrslt Problem/Plan - 4:}}\plain\f0\fs20\pxux8251\hich\f0\dbch\f0\loch\f0\fs20\ql\par  \'b7  {\*\bkmkstart kw78970958017}{\*\bkmkend sb48571417255}Problem: {\*\bkmkstart vd77421455416}{\*\bkmkend je09890710447}Hypothyroid. \par  \'b7  {\*\bkmkstart md26622548410}{\*\bkmkend zh78206049652}Plan: {\*\bkmkstart rq39649867575}{\*\bkmkend vf31891189859}Takes synthroid 0.5 qd\par  \par  - f/u TSH\par  - c/w synthroid 0.5.\par  \par  \plain\f1\fs20\vcnn6746\hich\f1\dbch\f1\loch\f1\cf2\fs20\ul{\field{\*\fldinst HYPERLINK [id number] }{\fldrslt Problem/Plan - 5:}}\plain\f0\fs20\axzj9311\hich\f0\dbch\f0\loch\f0\fs20\ql\par  \'b7  {\*\bkmkstart ki16123167165}{\*\bkmkend xr68855528425}Problem: {\*\bkmkstart ut67006333730}{\*\bkmkend hx68879963687}BPH (benign prostatic hyperplasia). \par  \'b7  {\*\bkmkstart gf04815706460}{\*\bkmkend rv45857894680}Plan: {\*\bkmkstart yu72564167480}{\*\bkmkend we91137849223}Home meds flomax 0.4mg\par  \par  - c/w home meds.\par  \par  \plain\f1\fs20\yhec1487\hich\f1\dbch\f1\loch\f1\cf2\fs20\ul{\field{\*\fldinst HYPERLINK [id number] }{\fldrslt Problem/Plan - 6:}}\plain\f0\fs20\ckjw7559\hich\f0\dbch\f0\loch\f0\fs20\ql\par  \'b7  {\*\bkmkstart cj18211702497}{\*\bkmkend ve73158594503}Problem: {\*\bkmkstart hi51659829268}{\*\bkmkend hf18878412634}HLD (hyperlipidemia). \par  \'b7  {\*\bkmkstart fe39696182890}{\*\bkmkend ud59678602760}Plan: {\*\bkmkstart po69644111423}{\*\bkmkend vj04076830190}Home meds rosuvastatin 20\par  \par  - c/w atorvastatin.\par  \par  \plain\f1\fs20\oepf1398\hich\f1\dbch\f1\loch\f1\cf2\fs20\ul{\field{\*\fldinst HYPERLINK [id number],[id number] }{\fldrslt Problem/Plan - 7:}}\plain\f0\fs20\iois5693\hich\f0\dbch\f0\loch\f0\fs20\ql\par  \'b7  {\*\bkmkstart qt93645090336}{\*\bkmkend dp46990490125}Problem: {\*\bkmkstart kf50657516228}{\*\bkmkend ca32487873279}Need for prophylactic measure. \par  \'b7  {\*\bkmkstart kq60754665165}{\*\bkmkend mw29326098706}Plan: {\*\bkmkstart ud28316576361}{\*\bkmkend mf09238965845}F: PO\par  E: Replete PRN\par  N: Regular \par  DVT pphx: lovenox 40\par  \par  CODE STATUS: Full\par  DISPO: RMF.\par  \par  }   {\rtf1\vxmunb73493\ansi\wogkefe7051\ftnbj\uc1\deff0  {\fonttbl{\f0 \fnil Segoe UI;}{\f1 \fnil \fcharset0 Segoe UI;}{\f2 \fnil Times New Dl;}}  {\colortbl ;\tyb628\swghc162\nknp356 ;\red0\green0\blue0 ;\red0\green0\lbfx818 ;\red0\green0\blue0 ;}  {\stylesheet{\f0\fs20 Normal;}{\cs1 Default Paragraph Font;}{\cs2\f0\fs16 Line Number;}{\cs3\f2\fs24\ul\cf3 Hyperlink;}}  {\*\revtbl{Unknown;}}  \hbuhhc51060\ccfdcs10752\celtb6108\adwlk9660\pjoxp9284\ievlk6418\ocwfwob300\lgbquin612\nogrowautofit\ummzrr688\formshade\nofeaturethrottle1\dntblnsbdb\fet4\aendnotes\aftnnrlc\pgbrdrhead\pgbrdrfoot  \sectd\tpzdfq03782\lmoefu24913\guttersxn0\mfaciumi4109\prvgbzsk8012\jarhcqdq4760\faaxmjjj4257\bsudtjo089\hevuems513\sbkpage\pgncont\pgndec  \plain\plain\f0\fs24\ql\plain\f0\fs24\plain\f0\fs20\jskj4224\hich\f0\dbch\f0\loch\f0\fs20\par  I M\par  \par  87 y o M w/ PMH CAD s/p JOSÉ stent (6/2023), HTN, HLD, hypothyroidism who presents with progressive L leg pain x 10 days found to have spinal stenosis admitted for pain control and physical therapy.\par  \par  \plain\f1\fs20\khax3725\hich\f1\dbch\f1\loch\f1\cf2\fs20\ul{\field{\*\fldinst HYPERLINK [id number] }{\fldrslt Problem/Plan - 1:}}\plain\f0\fs20\nqfb5853\hich\f0\dbch\f0\loch\f0\fs20\ql\par  \'b7  {\*\bkmkstart gx70873159821}{\*\bkmkend tj51892332879}Problem: {\*\bkmkstart ox99801669378}{\*\bkmkend ie85179667498}Lumbar spinal stenosis. \par  \'b7  {\*\bkmkstart uh12674480577}{\*\bkmkend cg74498117381}Plan: {\*\bkmkstart dd96129669902}{\*\bkmkend he40713988453}Pt presenting with L posterior leg pain x 10 days denies trauma \par  MR lumbar spine; moderate to severe L2-L3, severe spinal canal stenosis at L3-4 and L4-5. L5-S1 left subarticular disc extrusion with mass effect and descending left S1 nerve root. Severe bilateral neural foraminal narrowing L4-5 and moderate to severe   right neural foraminal narrowing at L3-4.\par  >10 year history of ulcers likely secondary to H pylori\par  s/p ofirmev x1, oxycodone. Currently neurologically intact, proceed with P/T consult, \par  \par  - pain control: tylenol 650 q6 PRN for mild pain\par  - oxycodone 5 IR PRN for severe pain \par  - flexeril 5 q8 PRN for muscle spasm\par  - f/u spine consult recs \par  - P/T consult\par  - outpatient f/u with ortho.\plain\f1\fs20\ysak3578\hich\f1\dbch\f1\loch\f1\cf2\fs20\strike\plain\f0\fs20\ofoe5568\hich\f0\dbch\f0\loch\f0\fs20\par  \par  \plain\f1\fs20\ggum1106\hich\f1\dbch\f1\loch\f1\cf2\fs20\ul{\field{\*\fldinst HYPERLINK [id number] }{\fldrslt Problem/Plan - 2:}}\plain\f0\fs20\kxep1889\hich\f0\dbch\f0\loch\f0\fs20\ql\par  \'b7  {\*\bkmkstart ve31708795869}{\*\bkmkend az03859421032}Problem: {\*\bkmkstart ui99637284349}{\*\bkmkend ib72011276278}Hypertension. \par  \'b7  {\*\bkmkstart zp65452732011}{\*\bkmkend hs17554380728}Plan: {\*\bkmkstart uj47725803107}{\*\bkmkend xy57773728244}Home meds toprol 25 qd\par  \par  - c/w toprol 25 qd.\par  \par  \plain\f1\fs20\ffsp8380\hich\f1\dbch\f1\loch\f1\cf2\fs20\ul{\field{\*\fldinst HYPERLINK [id number],[id number] }{\fldrslt Problem/Plan - 3:}}\plain\f0\fs20\tzhm7784\hich\f0\dbch\f0\loch\f0\fs20\ql\par  \'b7  {\*\bkmkstart ph74141910424}{\*\bkmkend bx74204882198}Problem: {\*\bkmkstart lf89902503737}{\*\bkmkend kz41557151850}CAD S/P percutaneous coronary angioplasty. \par  \'b7  {\*\bkmkstart lq97347042531}{\*\bkmkend iw52035276793}Plan: {\*\bkmkstart ij73430473053}{\*\bkmkend hl72663573384}Recent JOSÉ stent to mLAD placed 6/2023 \par  Follows with cardioloigist Dr Yanes\par  \par  - c/w ASA 81, Plavix qd.\par  \par  \plain\f1\fs20\fsgd7599\hich\f1\dbch\f1\loch\f1\cf2\fs20\ul{\field{\*\fldinst HYPERLINK [id number],68068001798,34876391260 }{\fldrslt Problem/Plan - 4:}}\plain\f0\fs20\mwby4039\hich\f0\dbch\f0\loch\f0\fs20\ql\par  \'b7  {\*\bkmkstart uk07831176050}{\*\bkmkend ln12578712906}Problem: {\*\bkmkstart gr58984655251}{\*\bkmkend qe64530798300}Hypothyroid. \par  \'b7  {\*\bkmkstart oi78957120147}{\*\bkmkend rs83373658277}Plan: {\*\bkmkstart uo89243837431}{\*\bkmkend jx70987959263}Takes synthroid 0.5 qd\par  \par  - f/u TSH\par  - c/w synthroid 0.5.\par  \par  \plain\f1\fs20\pvfj1030\hich\f1\dbch\f1\loch\f1\cf2\fs20\ul{\field{\*\fldinst HYPERLINK [id number] }{\fldrslt Problem/Plan - 5:}}\plain\f0\fs20\ibad3716\hich\f0\dbch\f0\loch\f0\fs20\ql\par  \'b7  {\*\bkmkstart lg95593662317}{\*\bkmkend hu63395599865}Problem: {\*\bkmkstart up01662435049}{\*\bkmkend cx93242511676}BPH (benign prostatic hyperplasia). \par  \'b7  {\*\bkmkstart ju62591664817}{\*\bkmkend fl95253019896}Plan: {\*\bkmkstart af62285804711}{\*\bkmkend ey61939130306}Home meds flomax 0.4mg\par  \par  - c/w home meds.\par  \par  \plain\f1\fs20\qxtg2660\hich\f1\dbch\f1\loch\f1\cf2\fs20\ul{\field{\*\fldinst HYPERLINK [id number] }{\fldrslt Problem/Plan - 6:}}\plain\f0\fs20\zzeh8866\hich\f0\dbch\f0\loch\f0\fs20\ql\par  \'b7  {\*\bkmkstart nq28647479215}{\*\bkmkend vq76959714770}Problem: {\*\bkmkstart qd60077905559}{\*\bkmkend vv68026368042}HLD (hyperlipidemia). \par  \'b7  {\*\bkmkstart jb29383301060}{\*\bkmkend mk54577597414}Plan: {\*\bkmkstart gd60226908822}{\*\bkmkend cl88197865428}Home meds rosuvastatin 20\par  \par  - c/w atorvastatin.\par  \par  \plain\f1\fs20\vqin8373\hich\f1\dbch\f1\loch\f1\cf2\fs20\ul{\field{\*\fldinst HYPERLINK [id number],[id number] }{\fldrslt Problem/Plan - 7:}}\plain\f0\fs20\saor1046\hich\f0\dbch\f0\loch\f0\fs20\ql\par  \'b7  {\*\bkmkstart rz77074508721}{\*\bkmkend ft09221120123}Problem: {\*\bkmkstart yt85098543690}{\*\bkmkend gq87702088463}Need for prophylactic measure. \par  \'b7  {\*\bkmkstart uo70712616673}{\*\bkmkend uu24037680811}Plan: {\*\bkmkstart er20277268755}{\*\bkmkend [id number]}F: PO\par  E: Replete PRN\par  N: Regular \par  DVT pphx: lovenox 40\par  \par  CODE STATUS: Full\par  DISPO: RMF.\par  \par  }

## 2023-09-14 NOTE — PROGRESS NOTE ADULT - SUBJECTIVE AND OBJECTIVE BOX
**INCOMPLETE NOTE    OVERNIGHT EVENTS:    SUBJECTIVE:  Patient seen and examined at bedside.    Vital Signs Last 12 Hrs  T(F): 97.8 (09-14-23 @ 05:42), Max: 97.8 (09-14-23 @ 05:42)  HR: 68 (09-14-23 @ 05:42) (58 - 75)  BP: 113/83 (09-14-23 @ 05:42) (113/83 - 160/84)  BP(mean): --  RR: 18 (09-14-23 @ 05:42) (18 - 18)  SpO2: 93% (09-14-23 @ 05:42) (93% - 97%)  I&O's Summary      PHYSICAL EXAM:  Constitutional: NAD, comfortable in bed.  HEENT: NC/AT, PERRLA, EOMI, no conjunctival pallor or scleral icterus, MMM  Neck: Supple, no JVD  Respiratory: CTA B/L. No w/r/r.   Cardiovascular: RRR, normal S1 and S2, no m/r/g.   Gastrointestinal: +BS, soft NTND, no guarding or rebound tenderness, no palpable masses   Extremities: wwp; no cyanosis, clubbing or edema.   Vascular: Pulses equal and strong throughout.   Neurological: AAOx3, no CN deficits, strength and sensation intact throughout.   Skin: No gross skin abnormalities or rashes        LABS:                        15.0   8.82  )-----------( 183      ( 13 Sep 2023 15:02 )             48.5     09-13    137  |  101  |  17  ----------------------------<  118<H>  4.4   |  27  |  1.23    Ca    8.9      13 Sep 2023 15:02    TPro  7.1  /  Alb  3.8  /  TBili  0.7  /  DBili  x   /  AST  21  /  ALT  17  /  AlkPhos  62  09-13    PT/INR - ( 13 Sep 2023 15:02 )   PT: 11.2 sec;   INR: 0.98          PTT - ( 13 Sep 2023 15:02 )  PTT:33.2 sec  Urinalysis Basic - ( 13 Sep 2023 15:02 )    Color: x / Appearance: x / SG: x / pH: x  Gluc: 118 mg/dL / Ketone: x  / Bili: x / Urobili: x   Blood: x / Protein: x / Nitrite: x   Leuk Esterase: x / RBC: x / WBC x   Sq Epi: x / Non Sq Epi: x / Bacteria: x          RADIOLOGY & ADDITIONAL TESTS:    MEDICATIONS  (STANDING):  aspirin  chewable 81 milliGRAM(s) Oral daily  atorvastatin 80 milliGRAM(s) Oral at bedtime  clopidogrel Tablet 75 milliGRAM(s) Oral daily  influenza  Vaccine (HIGH DOSE) 0.7 milliLiter(s) IntraMuscular once  levothyroxine 50 MICROGram(s) Oral daily  tamsulosin 0.4 milliGRAM(s) Oral at bedtime    MEDICATIONS  (PRN):  acetaminophen     Tablet .. 650 milliGRAM(s) Oral every 6 hours PRN Mild Pain (1 - 3)  cyclobenzaprine 5 milliGRAM(s) Oral three times a day PRN Muscle Spasm  oxyCODONE    IR 5 milliGRAM(s) Oral every 6 hours PRN Severe Pain (7 - 10)     OVERNIGHT EVENTS: MARK    SUBJECTIVE:  Patient seen and examined at bedside, pt states that he is having a twisting sharp pain in his left leg that radiates down his leg, pt denies cp, sob, urinary incontinence .    Vital Signs Last 12 Hrs  T(F): 97.8 (09-14-23 @ 05:42), Max: 97.8 (09-14-23 @ 05:42)  HR: 68 (09-14-23 @ 05:42) (58 - 75)  BP: 113/83 (09-14-23 @ 05:42) (113/83 - 160/84)  BP(mean): --  RR: 18 (09-14-23 @ 05:42) (18 - 18)  SpO2: 93% (09-14-23 @ 05:42) (93% - 97%)  I&O's Summary      PHYSICAL EXAM:  Constitutional: NAD, comfortable in bed.  HEENT: NC/AT, no scleral icterus, MMM  Neck: Supple, no JVD  Respiratory: CTA B/L. No w/r/r.   Cardiovascular: RRR, normal S1 and S2, no m/r/g.   Gastrointestinal: +BS, soft NTND, no guarding or rebound tenderness, no palpable masses   Extremities: wwp; no edema.   Vascular: Pulses equal and strong throughout.   Neurological: AAOx3, no CN deficits, strength and sensation intact throughout.           LABS:                        15.0   8.82  )-----------( 183      ( 13 Sep 2023 15:02 )             48.5     09-13    137  |  101  |  17  ----------------------------<  118<H>  4.4   |  27  |  1.23    Ca    8.9      13 Sep 2023 15:02    TPro  7.1  /  Alb  3.8  /  TBili  0.7  /  DBili  x   /  AST  21  /  ALT  17  /  AlkPhos  62  09-13    PT/INR - ( 13 Sep 2023 15:02 )   PT: 11.2 sec;   INR: 0.98          PTT - ( 13 Sep 2023 15:02 )  PTT:33.2 sec  Urinalysis Basic - ( 13 Sep 2023 15:02 )    Color: x / Appearance: x / SG: x / pH: x  Gluc: 118 mg/dL / Ketone: x  / Bili: x / Urobili: x   Blood: x / Protein: x / Nitrite: x   Leuk Esterase: x / RBC: x / WBC x   Sq Epi: x / Non Sq Epi: x / Bacteria: x          RADIOLOGY & ADDITIONAL TESTS:    MEDICATIONS  (STANDING):  aspirin  chewable 81 milliGRAM(s) Oral daily  atorvastatin 80 milliGRAM(s) Oral at bedtime  clopidogrel Tablet 75 milliGRAM(s) Oral daily  influenza  Vaccine (HIGH DOSE) 0.7 milliLiter(s) IntraMuscular once  levothyroxine 50 MICROGram(s) Oral daily  tamsulosin 0.4 milliGRAM(s) Oral at bedtime    MEDICATIONS  (PRN):  acetaminophen     Tablet .. 650 milliGRAM(s) Oral every 6 hours PRN Mild Pain (1 - 3)  cyclobenzaprine 5 milliGRAM(s) Oral three times a day PRN Muscle Spasm  oxyCODONE    IR 5 milliGRAM(s) Oral every 6 hours PRN Severe Pain (7 - 10)

## 2023-09-14 NOTE — PHYSICAL THERAPY INITIAL EVALUATION ADULT - NEUROVASCULAR ASSESSMENT LLE
**reports impaired sensation on posterior aspect of thigh (over hamstring), from hip to ankle./warm/no tingling/no discoloration

## 2023-09-14 NOTE — CONSULT NOTE ADULT - SUBJECTIVE AND OBJECTIVE BOX
Patient is a 87y old  Male who presents with a chief complaint of     HPI:  Patient is a 87M w/ PMH CAD s/p JOSÉ stent (6/2023),cervical spondylosis s/p laminectomy, gastric ulcers, hypothyroidism, HTN, HLD who presents with progressive L leg pain x 10 days.  He reports a "twisting burning sensation" in his L posterior thigh 10/10 at its worst, which remits to 7/10 and radiates down his leg causing intermittent numbness. He denies trauma, heavy lifting, strain to the area and previous back injuries/surgeries. He also has episodes where he flexes his leg because of the pain. He reports the pain is mostly constant and only remits when he lays flat. He has tried acetaminophen 1500 which did not help, advil 200 and gabapentin 100 qd prescribed by his PCP which all did not help his pain. The pain has progressed gradually leading him to start using a cane to help with his walking and today when coming to the ED he collapsed due to pain. He denies dizziness, LOC, bowel, urinary incontinence, . He reports he had a similar "twisting" pain 8 year ago in his L arm prior to his L cervical laminectomy. He usually is completely independent of his ADLs, lives with his wife and does not use any assist devices aside from recently. He denies fever, chills, weight loss/gain. He reports a distant history of gastric ulcers >10 years ago for which he "completed an antibiotic course"    ED   T 97.5 /76 HR 58 RR 20 O2 sat 96%  Labs Cr 1.23 (baseline 1.2)   EKG NSR 60  Imaging CT L spine multilevel moderate to severe canal stenosis and bialateral neural foraminal stenosis impinging exiting nerve roots CT pelvis L4-L5 moderate to severe spinal canal stenosis   Interventions: ofirmev 1g, oxycodone 5 IR, plavix 75, levothyroxine 50    Allergies    shellfish (Swelling)  No Known Drug Allergies    Intolerances    SOCIAL HX:     Denies smoking, drinking, illicit drug use    PAST MEDICAL & SURGICAL HISTORY:  HLD (hyperlipidemia)      Hypothyroid      History of BPH      S/P laminectomy with spinal fusion      FAMILY HISTORY:  :   (13 Sep 2023 20:23)    PAST MEDICAL & SURGICAL HISTORY:  HLD (hyperlipidemia)      Hypothyroid      History of BPH      S/P laminectomy with spinal fusion        MEDICATIONS  (STANDING):  aspirin  chewable 81 milliGRAM(s) Oral daily  atorvastatin 80 milliGRAM(s) Oral at bedtime  clopidogrel Tablet 75 milliGRAM(s) Oral daily  influenza  Vaccine (HIGH DOSE) 0.7 milliLiter(s) IntraMuscular once  levothyroxine 50 MICROGram(s) Oral daily  tamsulosin 0.4 milliGRAM(s) Oral at bedtime    MEDICATIONS  (PRN):  acetaminophen     Tablet .. 650 milliGRAM(s) Oral every 6 hours PRN Mild Pain (1 - 3)  cyclobenzaprine 5 milliGRAM(s) Oral three times a day PRN Muscle Spasm  oxyCODONE    IR 5 milliGRAM(s) Oral every 6 hours PRN Severe Pain (7 - 10)            FAMILY HISTORY:      CBC Full  -  ( 13 Sep 2023 15:02 )  WBC Count : 8.82 K/uL  RBC Count : 7.20 M/uL  Hemoglobin : 15.0 g/dL  Hematocrit : 48.5 %  Platelet Count - Automated : 183 K/uL  Mean Cell Volume : 67.4 fl  Mean Cell Hemoglobin : 20.8 pg  Mean Cell Hemoglobin Concentration : 30.9 gm/dL  Auto Neutrophil # : 5.23 K/uL  Auto Lymphocyte # : 2.58 K/uL  Auto Monocyte # : 0.63 K/uL  Auto Eosinophil # : 0.15 K/uL  Auto Basophil # : 0.16 K/uL  Auto Neutrophil % : 59.3 %  Auto Lymphocyte % : 29.2 %  Auto Monocyte % : 7.1 %  Auto Eosinophil % : 1.7 %  Auto Basophil % : 1.8 %      09-13    137  |  101  |  17  ----------------------------<  118<H>  4.4   |  27  |  1.23    Ca    8.9      13 Sep 2023 15:02    TPro  7.1  /  Alb  3.8  /  TBili  0.7  /  DBili  x   /  AST  21  /  ALT  17  /  AlkPhos  62  09-13      Urinalysis Basic - ( 13 Sep 2023 15:02 )    Color: x / Appearance: x / SG: x / pH: x  Gluc: 118 mg/dL / Ketone: x  / Bili: x / Urobili: x   Blood: x / Protein: x / Nitrite: x   Leuk Esterase: x / RBC: x / WBC x   Sq Epi: x / Non Sq Epi: x / Bacteria: x        Radiology :     < from: CT Lumbar Spine No Cont (09.13.23 @ 16:32) >  ACC: [id number] EXAM:  CT LUMBAR SPINE   ORDERED BY: ARGENIS VALENTINE     PROCEDURE DATE:  09/13/2023          INTERPRETATION:  CT LUMBAR SPINE    INDICATIONS: back pain    back pain / leg pain, 87-year-old with history   of cervical spine surgery in 2000 [id number] for some cervical spondylosis,   coronary artery disease with 2 stents, hypothyroidism, on Plavix   Synthroid aspirin and a statin, Patient developed new leg pain radiating   to foot from buttock 10 days ago, pain has been worse with attempt to   stand, feels better laying flat although still feels it, no numbness, no   bowel or bladder dysfunction, with the exception of difficulty standing   to urinate due to pain, no trauma, no falls, minimal low back pain, was   placed on [id number] mg which he took twice with no pain relief, has   not taken Tylenol or ibuprofen and is generally resistant to trying new   medications. Patient with difficulty ambulating due to pain, prior to   onset of pain was walking on his own without difficulty    TECHNIQUE:  Serial axial images were obtained using multislice helical   technique. Sagittal and coronal reformatted images were performed.    COMPARISON EXAMINATION: None available at this time.    FINDINGS:    Multilevel degenerative osteoarthritis is present. Findings include   marginal osteophytes anteriorly, facet joint hypertrophy and    osteophytes. Multilevel degenerative disc disease is present. Findings   include loss of disc space height and endplate sclerosis.    ALIGNMENT: Straightening of the normal lordotic curvature. Degenerative   retrolisthesis at L3-4 and L4-5.    L1-L2:  Normal disc height. Broad-based disc bulge with mild canal   stenosis. Mild bilateral neural foraminal stenosis. Mild bilateral facet   hypertrophy.    L2-L3:  Moderate disc space narrowing. Broad-based disc bulge with   moderate to severe AP and transverse canal stenosis. Moderate bilateral   neural foraminal stenosis. Mild bilateral facet hypertrophy.    L3-L4:  Severe degenerative disc spacenarrowing with sclerosis and   osteophytosis and vacuum disc phenomenon. Degenerative retrolisthesis.   Broad-based disc osteophyte complex with severe canal stenosis. Mild   bilateral facet hypertrophy. Moderate to severe bilateral neural   foraminal stenosis.    L4-L5:  Severe degenerative disc space narrowing with sclerosis and   osteophytosis and vacuum disc phenomenon. Degenerative retrolisthesis.   Broad-based disc osteophyte complex with severe canal stenosis. Mild   bilateral facet hypertrophy. Moderate to severe bilateral neural   foraminal stenosis.    L5-S1:  Moderate loss of mostly posterior disc height. Vacuum disc   phenomenon. Broad-based disc bulge. Mild canal stenosis. Moderate to   severe bilateral neural foraminal stenosis.Mild bilateral facet   hypertrophy. Developing Schmorl's node in the left superior endplate of   S1.    MISCELLANEOUS:  None.    IMPRESSION:    No acute fracture.    Multilevel moderate to severe canal stenosis and bilateral neural   foraminal stenosis likely impinging exiting nerve roots.    MRI may provide helpful additional evaluation clinically indicated.           Review of Systems : per HPI         Vital Signs Last 24 Hrs  T(C): 36.6 (14 Sep 2023 05:42), Max: 36.6 (14 Sep 2023 05:42)  T(F): 97.8 (14 Sep 2023 05:42), Max: 97.8 (14 Sep 2023 05:42)  HR: 68 (14 Sep 2023 05:42) (58 - 75)  BP: 113/83 (14 Sep 2023 05:42) (113/83 - 160/84)  BP(mean): --  RR: 18 (14 Sep 2023 05:42) (18 - 20)  SpO2: 93% (14 Sep 2023 05:42) (93% - 97%)    Parameters below as of 14 Sep 2023 05:42  Patient On (Oxygen Delivery Method): room air            Physical Exam :  87 y o man lying comfortably in semi Roberts's position , awake , alert , no acute complaints      Head : normocephalic , atraumatic    Eyes : PERRLA , EOMI , no nystagmus , sclera anicteric    ENT / FACE : neg nasal discharge , uvula midline , no oropharyngeal erythema / exudate    Neck : supple , negative JVD , negative carotid bruits , no thyromegaly    Chest : CTA bilaterally , neg wheeze / rhonchi / rales / crackles / egophany    Cardiovascular : regular rate and rhythm , neg murmurs / rubs / gallops    Abdomen : soft , non distended , non tender to palpation in all 4 quadrants ,  normal bowel sounds     Extremities : WWP , neg cyanosis /clubbing / edema      Musculoskeletal :   natalya L3-5 ttp, + L SLR to thigh       Skin :        :     Neurologic Exam :     Alert and oriented  x 3     Motor Exam:          Right UE:                5/5 :     5/5 :   wrist extensors/ flexors  5/5 :   biceps  5/5 :   triceps  5/5 :   deltoid     Left UE:                  5/5 :     5/5 :   wrist extensors/ flexors  5/5 :   biceps  5/5 :   triceps  5/5 :   deltoid         Right LE:     5/5 : dorsiflexors   5/5 : plantar flexors  5/5 : quadriceps  5/5 : hamstrings  5/5 : hip flexors      Left LE:     5/5 : dorsiflexors   5/5 : plantar flexors  5/5 : quadriceps  5/5 : hamstrings  5/5 : hip flexors      Sensation :         intact to light touch x 4 extremities                                   DTR :     biceps/brachioradialis : equal                      patella/ankle : equal      neg clonus       Gait :  not tested            PM&R Impression :     admitted for c/o LBP radiating to the L thigh    - L spinal stenosis with L lumbar radiculopathy     - no focal neurologic deficits         Recommendations / Plan :       1) Physical / Occupational therapy focusing on therapeutic exercises , equipment evaluation , bed mobility/transfer out of bed evaluation , progressive ambulation with assistive devices prn .    2) Current disposition plan recommendation  :    pending functional progress          Patient is a 87y old  Male who presents with a chief complaint of     HPI:  Patient is a 87M w/ PMH CAD s/p JOSÉ stent (6/2023),cervical spondylosis s/p laminectomy, gastric ulcers, hypothyroidism, HTN, HLD who presents with progressive L leg pain x 10 days.  He reports a "twisting burning sensation" in his L posterior thigh 10/10 at its worst, which remits to 7/10 and radiates down his leg causing intermittent numbness. He denies trauma, heavy lifting, strain to the area and previous back injuries/surgeries. He also has episodes where he flexes his leg because of the pain. He reports the pain is mostly constant and only remits when he lays flat. He has tried acetaminophen 1500 which did not help, advil 200 and gabapentin 100 qd prescribed by his PCP which all did not help his pain. The pain has progressed gradually leading him to start using a cane to help with his walking and today when coming to the ED he collapsed due to pain. He denies dizziness, LOC, bowel, urinary incontinence, . He reports he had a similar "twisting" pain 8 year ago in his L arm prior to his L cervical laminectomy. He usually is completely independent of his ADLs, lives with his wife and does not use any assist devices aside from recently. He denies fever, chills, weight loss/gain. He reports a distant history of gastric ulcers >10 years ago for which he "completed an antibiotic course"    ED   T 97.5 /76 HR 58 RR 20 O2 sat 96%  Labs Cr 1.23 (baseline 1.2)   EKG NSR 60  Imaging CT L spine multilevel moderate to severe canal stenosis and bialateral neural foraminal stenosis impinging exiting nerve roots CT pelvis L4-L5 moderate to severe spinal canal stenosis   Interventions: ofirmev 1g, oxycodone 5 IR, plavix 75, levothyroxine 50    Allergies    shellfish (Swelling)  No Known Drug Allergies    Intolerances    SOCIAL HX:     Denies smoking, drinking, illicit drug use    PAST MEDICAL & SURGICAL HISTORY:  HLD (hyperlipidemia)      Hypothyroid      History of BPH      S/P laminectomy with spinal fusion      FAMILY HISTORY:  :   (13 Sep 2023 20:23)    PAST MEDICAL & SURGICAL HISTORY:  HLD (hyperlipidemia)      Hypothyroid      History of BPH      S/P laminectomy with spinal fusion        MEDICATIONS  (STANDING):  aspirin  chewable 81 milliGRAM(s) Oral daily  atorvastatin 80 milliGRAM(s) Oral at bedtime  clopidogrel Tablet 75 milliGRAM(s) Oral daily  influenza  Vaccine (HIGH DOSE) 0.7 milliLiter(s) IntraMuscular once  levothyroxine 50 MICROGram(s) Oral daily  tamsulosin 0.4 milliGRAM(s) Oral at bedtime    MEDICATIONS  (PRN):  acetaminophen     Tablet .. 650 milliGRAM(s) Oral every 6 hours PRN Mild Pain (1 - 3)  cyclobenzaprine 5 milliGRAM(s) Oral three times a day PRN Muscle Spasm  oxyCODONE    IR 5 milliGRAM(s) Oral every 6 hours PRN Severe Pain (7 - 10)            FAMILY HISTORY:      CBC Full  -  ( 13 Sep 2023 15:02 )  WBC Count : 8.82 K/uL  RBC Count : 7.20 M/uL  Hemoglobin : 15.0 g/dL  Hematocrit : 48.5 %  Platelet Count - Automated : 183 K/uL  Mean Cell Volume : 67.4 fl  Mean Cell Hemoglobin : 20.8 pg  Mean Cell Hemoglobin Concentration : 30.9 gm/dL  Auto Neutrophil # : 5.23 K/uL  Auto Lymphocyte # : 2.58 K/uL  Auto Monocyte # : 0.63 K/uL  Auto Eosinophil # : 0.15 K/uL  Auto Basophil # : 0.16 K/uL  Auto Neutrophil % : 59.3 %  Auto Lymphocyte % : 29.2 %  Auto Monocyte % : 7.1 %  Auto Eosinophil % : 1.7 %  Auto Basophil % : 1.8 %      09-13    137  |  101  |  17  ----------------------------<  118<H>  4.4   |  27  |  1.23    Ca    8.9      13 Sep 2023 15:02    TPro  7.1  /  Alb  3.8  /  TBili  0.7  /  DBili  x   /  AST  21  /  ALT  17  /  AlkPhos  62  09-13      Urinalysis Basic - ( 13 Sep 2023 15:02 )    Color: x / Appearance: x / SG: x / pH: x  Gluc: 118 mg/dL / Ketone: x  / Bili: x / Urobili: x   Blood: x / Protein: x / Nitrite: x   Leuk Esterase: x / RBC: x / WBC x   Sq Epi: x / Non Sq Epi: x / Bacteria: x        Radiology :     < from: CT Lumbar Spine No Cont (09.13.23 @ 16:32) >  ACC: [id number] EXAM:  CT LUMBAR SPINE   ORDERED BY: ARGENIS VALENTINE     PROCEDURE DATE:  09/13/2023          INTERPRETATION:  CT LUMBAR SPINE    INDICATIONS: back pain    back pain / leg pain, 87-year-old with history   of cervical spine surgery in 2000 [id number] for some cervical spondylosis,   coronary artery disease with 2 stents, hypothyroidism, on Plavix   Synthroid aspirin and a statin, Patient developed new leg pain radiating   to foot from buttock 10 days ago, pain has been worse with attempt to   stand, feels better laying flat although still feels it, no numbness, no   bowel or bladder dysfunction, with the exception of difficulty standing   to urinate due to pain, no trauma, no falls, minimal low back pain, was   placed on [id number] mg which he took twice with no pain relief, has   not taken Tylenol or ibuprofen and is generally resistant to trying new   medications. Patient with difficulty ambulating due to pain, prior to   onset of pain was walking on his own without difficulty    TECHNIQUE:  Serial axial images were obtained using multislice helical   technique. Sagittal and coronal reformatted images were performed.    COMPARISON EXAMINATION: None available at this time.    FINDINGS:    Multilevel degenerative osteoarthritis is present. Findings include   marginal osteophytes anteriorly, facet joint hypertrophy and    osteophytes. Multilevel degenerative disc disease is present. Findings   include loss of disc space height and endplate sclerosis.    ALIGNMENT: Straightening of the normal lordotic curvature. Degenerative   retrolisthesis at L3-4 and L4-5.    L1-L2:  Normal disc height. Broad-based disc bulge with mild canal   stenosis. Mild bilateral neural foraminal stenosis. Mild bilateral facet   hypertrophy.    L2-L3:  Moderate disc space narrowing. Broad-based disc bulge with   moderate to severe AP and transverse canal stenosis. Moderate bilateral   neural foraminal stenosis. Mild bilateral facet hypertrophy.    L3-L4:  Severe degenerative disc spacenarrowing with sclerosis and   osteophytosis and vacuum disc phenomenon. Degenerative retrolisthesis.   Broad-based disc osteophyte complex with severe canal stenosis. Mild   bilateral facet hypertrophy. Moderate to severe bilateral neural   foraminal stenosis.    L4-L5:  Severe degenerative disc space narrowing with sclerosis and   osteophytosis and vacuum disc phenomenon. Degenerative retrolisthesis.   Broad-based disc osteophyte complex with severe canal stenosis. Mild   bilateral facet hypertrophy. Moderate to severe bilateral neural   foraminal stenosis.    L5-S1:  Moderate loss of mostly posterior disc height. Vacuum disc   phenomenon. Broad-based disc bulge. Mild canal stenosis. Moderate to   severe bilateral neural foraminal stenosis.Mild bilateral facet   hypertrophy. Developing Schmorl's node in the left superior endplate of   S1.    MISCELLANEOUS:  None.    IMPRESSION:    No acute fracture.    Multilevel moderate to severe canal stenosis and bilateral neural   foraminal stenosis likely impinging exiting nerve roots.    MRI may provide helpful additional evaluation clinically indicated.           Review of Systems : per HPI         Vital Signs Last 24 Hrs  T(C): 36.6 (14 Sep 2023 05:42), Max: 36.6 (14 Sep 2023 05:42)  T(F): 97.8 (14 Sep 2023 05:42), Max: 97.8 (14 Sep 2023 05:42)  HR: 68 (14 Sep 2023 05:42) (58 - 75)  BP: 113/83 (14 Sep 2023 05:42) (113/83 - 160/84)  BP(mean): --  RR: 18 (14 Sep 2023 05:42) (18 - 20)  SpO2: 93% (14 Sep 2023 05:42) (93% - 97%)    Parameters below as of 14 Sep 2023 05:42  Patient On (Oxygen Delivery Method): room air            Physical Exam :  87 y o man lying comfortably in semi Roberts's position , awake , alert , no acute complaints      Head : normocephalic , atraumatic    Eyes : PERRLA , EOMI , no nystagmus , sclera anicteric    ENT / FACE : neg nasal discharge , uvula midline , no oropharyngeal erythema / exudate    Neck : supple , negative JVD , negative carotid bruits , no thyromegaly    Chest : CTA bilaterally , neg wheeze / rhonchi / rales / crackles / egophany    Cardiovascular : regular rate and rhythm , neg murmurs / rubs / gallops    Abdomen : soft , non distended , non tender to palpation in all 4 quadrants ,  normal bowel sounds     Extremities : WWP , neg cyanosis /clubbing / edema      Musculoskeletal :   natalya L3-5 ttp, + L SLR to thigh       Skin :        :     Neurologic Exam :     Alert and oriented  x 3     Motor Exam:          Right UE:                5/5 :     5/5 :   wrist extensors/ flexors  5/5 :   biceps  5/5 :   triceps  5/5 :   deltoid     Left UE:                  5/5 :     5/5 :   wrist extensors/ flexors  5/5 :   biceps  5/5 :   triceps  5/5 :   deltoid         Right LE:     5/5 : dorsiflexors   5/5 : plantar flexors  5/5 : quadriceps  5/5 : hamstrings  5/5 : hip flexors      Left LE:     5/5 : dorsiflexors   5/5 : plantar flexors  5/5 : quadriceps  5/5 : hamstrings  5/5 : hip flexors      Sensation :         intact to light touch x 4 extremities                                   DTR :     biceps/brachioradialis : equal                      patella/ankle : equal      neg clonus       Gait :  not tested            PM&R Impression :     admitted for c/o LBP radiating to the L thigh    - L spinal stenosis with L lumbar radiculopathy     - no focal neurologic deficits         Recommendations / Plan :       1) Physical / Occupational therapy focusing on therapeutic exercises , equipment evaluation , bed mobility/transfer out of bed evaluation , progressive ambulation with assistive devices prn .    2) Current disposition plan recommendation  :    pending functional progress          Patient is a 87y old  Male who presents with a chief complaint of     HPI:  Patient is a 87M w/ PMH CAD s/p JOSÉ stent (6/2023),cervical spondylosis s/p laminectomy, gastric ulcers, hypothyroidism, HTN, HLD who presents with progressive L leg pain x 10 days.  He reports a "twisting burning sensation" in his L posterior thigh 10/10 at its worst, which remits to 7/10 and radiates down his leg causing intermittent numbness. He denies trauma, heavy lifting, strain to the area and previous back injuries/surgeries. He also has episodes where he flexes his leg because of the pain. He reports the pain is mostly constant and only remits when he lays flat. He has tried acetaminophen 1500 which did not help, advil 200 and gabapentin 100 qd prescribed by his PCP which all did not help his pain. The pain has progressed gradually leading him to start using a cane to help with his walking and today when coming to the ED he collapsed due to pain. He denies dizziness, LOC, bowel, urinary incontinence, . He reports he had a similar "twisting" pain 8 year ago in his L arm prior to his L cervical laminectomy. He usually is completely independent of his ADLs, lives with his wife and does not use any assist devices aside from recently. He denies fever, chills, weight loss/gain. He reports a distant history of gastric ulcers >10 years ago for which he "completed an antibiotic course"    ED   T 97.5 /76 HR 58 RR 20 O2 sat 96%  Labs Cr 1.23 (baseline 1.2)   EKG NSR 60  Imaging CT L spine multilevel moderate to severe canal stenosis and bialateral neural foraminal stenosis impinging exiting nerve roots CT pelvis L4-L5 moderate to severe spinal canal stenosis   Interventions: ofirmev 1g, oxycodone 5 IR, plavix 75, levothyroxine 50    Allergies    shellfish (Swelling)  No Known Drug Allergies    Intolerances    SOCIAL HX:     Denies smoking, drinking, illicit drug use    PAST MEDICAL & SURGICAL HISTORY:  HLD (hyperlipidemia)      Hypothyroid      History of BPH      S/P laminectomy with spinal fusion      FAMILY HISTORY:  :   (13 Sep 2023 20:23)    PAST MEDICAL & SURGICAL HISTORY:  HLD (hyperlipidemia)      Hypothyroid      History of BPH      S/P laminectomy with spinal fusion        MEDICATIONS  (STANDING):  aspirin  chewable 81 milliGRAM(s) Oral daily  atorvastatin 80 milliGRAM(s) Oral at bedtime  clopidogrel Tablet 75 milliGRAM(s) Oral daily  influenza  Vaccine (HIGH DOSE) 0.7 milliLiter(s) IntraMuscular once  levothyroxine 50 MICROGram(s) Oral daily  tamsulosin 0.4 milliGRAM(s) Oral at bedtime    MEDICATIONS  (PRN):  acetaminophen     Tablet .. 650 milliGRAM(s) Oral every 6 hours PRN Mild Pain (1 - 3)  cyclobenzaprine 5 milliGRAM(s) Oral three times a day PRN Muscle Spasm  oxyCODONE    IR 5 milliGRAM(s) Oral every 6 hours PRN Severe Pain (7 - 10)            FAMILY HISTORY:      CBC Full  -  ( 13 Sep 2023 15:02 )  WBC Count : 8.82 K/uL  RBC Count : 7.20 M/uL  Hemoglobin : 15.0 g/dL  Hematocrit : 48.5 %  Platelet Count - Automated : 183 K/uL  Mean Cell Volume : 67.4 fl  Mean Cell Hemoglobin : 20.8 pg  Mean Cell Hemoglobin Concentration : 30.9 gm/dL  Auto Neutrophil # : 5.23 K/uL  Auto Lymphocyte # : 2.58 K/uL  Auto Monocyte # : 0.63 K/uL  Auto Eosinophil # : 0.15 K/uL  Auto Basophil # : 0.16 K/uL  Auto Neutrophil % : 59.3 %  Auto Lymphocyte % : 29.2 %  Auto Monocyte % : 7.1 %  Auto Eosinophil % : 1.7 %  Auto Basophil % : 1.8 %      09-13    137  |  101  |  17  ----------------------------<  118<H>  4.4   |  27  |  1.23    Ca    8.9      13 Sep 2023 15:02    TPro  7.1  /  Alb  3.8  /  TBili  0.7  /  DBili  x   /  AST  21  /  ALT  17  /  AlkPhos  62  09-13      Urinalysis Basic - ( 13 Sep 2023 15:02 )    Color: x / Appearance: x / SG: x / pH: x  Gluc: 118 mg/dL / Ketone: x  / Bili: x / Urobili: x   Blood: x / Protein: x / Nitrite: x   Leuk Esterase: x / RBC: x / WBC x   Sq Epi: x / Non Sq Epi: x / Bacteria: x        Radiology :     < from: CT Lumbar Spine No Cont (09.13.23 @ 16:32) >  ACC: [id number] EXAM:  CT LUMBAR SPINE   ORDERED BY: ARGENIS VALENTINE     PROCEDURE DATE:  09/13/2023          INTERPRETATION:  CT LUMBAR SPINE    INDICATIONS: back pain    back pain / leg pain, 87-year-old with history   of cervical spine surgery in 2000 [id number] for some cervical spondylosis,   coronary artery disease with 2 stents, hypothyroidism, on Plavix   Synthroid aspirin and a statin, Patient developed new leg pain radiating   to foot from buttock 10 days ago, pain has been worse with attempt to   stand, feels better laying flat although still feels it, no numbness, no   bowel or bladder dysfunction, with the exception of difficulty standing   to urinate due to pain, no trauma, no falls, minimal low back pain, was   placed on [id number] mg which he took twice with no pain relief, has   not taken Tylenol or ibuprofen and is generally resistant to trying new   medications. Patient with difficulty ambulating due to pain, prior to   onset of pain was walking on his own without difficulty    TECHNIQUE:  Serial axial images were obtained using multislice helical   technique. Sagittal and coronal reformatted images were performed.    COMPARISON EXAMINATION: None available at this time.    FINDINGS:    Multilevel degenerative osteoarthritis is present. Findings include   marginal osteophytes anteriorly, facet joint hypertrophy and    osteophytes. Multilevel degenerative disc disease is present. Findings   include loss of disc space height and endplate sclerosis.    ALIGNMENT: Straightening of the normal lordotic curvature. Degenerative   retrolisthesis at L3-4 and L4-5.    L1-L2:  Normal disc height. Broad-based disc bulge with mild canal   stenosis. Mild bilateral neural foraminal stenosis. Mild bilateral facet   hypertrophy.    L2-L3:  Moderate disc space narrowing. Broad-based disc bulge with   moderate to severe AP and transverse canal stenosis. Moderate bilateral   neural foraminal stenosis. Mild bilateral facet hypertrophy.    L3-L4:  Severe degenerative disc spacenarrowing with sclerosis and   osteophytosis and vacuum disc phenomenon. Degenerative retrolisthesis.   Broad-based disc osteophyte complex with severe canal stenosis. Mild   bilateral facet hypertrophy. Moderate to severe bilateral neural   foraminal stenosis.    L4-L5:  Severe degenerative disc space narrowing with sclerosis and   osteophytosis and vacuum disc phenomenon. Degenerative retrolisthesis.   Broad-based disc osteophyte complex with severe canal stenosis. Mild   bilateral facet hypertrophy. Moderate to severe bilateral neural   foraminal stenosis.    L5-S1:  Moderate loss of mostly posterior disc height. Vacuum disc   phenomenon. Broad-based disc bulge. Mild canal stenosis. Moderate to   severe bilateral neural foraminal stenosis.Mild bilateral facet   hypertrophy. Developing Schmorl's node in the left superior endplate of   S1.    MISCELLANEOUS:  None.    IMPRESSION:    No acute fracture.    Multilevel moderate to severe canal stenosis and bilateral neural   foraminal stenosis likely impinging exiting nerve roots.    MRI may provide helpful additional evaluation clinically indicated.           Review of Systems : per HPI         Vital Signs Last 24 Hrs  T(C): 36.6 (14 Sep 2023 05:42), Max: 36.6 (14 Sep 2023 05:42)  T(F): 97.8 (14 Sep 2023 05:42), Max: 97.8 (14 Sep 2023 05:42)  HR: 68 (14 Sep 2023 05:42) (58 - 75)  BP: 113/83 (14 Sep 2023 05:42) (113/83 - 160/84)  BP(mean): --  RR: 18 (14 Sep 2023 05:42) (18 - 20)  SpO2: 93% (14 Sep 2023 05:42) (93% - 97%)    Parameters below as of 14 Sep 2023 05:42  Patient On (Oxygen Delivery Method): room air            Physical Exam :  87 y o man lying comfortably in semi Roberts's position , awake , alert , no acute complaints      Head : normocephalic , atraumatic    Eyes : PERRLA , EOMI , no nystagmus , sclera anicteric    ENT / FACE : neg nasal discharge , uvula midline , no oropharyngeal erythema / exudate    Neck : supple , negative JVD , negative carotid bruits , no thyromegaly    Chest : CTA bilaterally , neg wheeze / rhonchi / rales / crackles / egophany    Cardiovascular : regular rate and rhythm , neg murmurs / rubs / gallops    Abdomen : soft , non distended , non tender to palpation in all 4 quadrants ,  normal bowel sounds     Extremities : WWP , neg cyanosis /clubbing / edema      Musculoskeletal :   natalya L3-5 ttp, + L SLR to thigh       Skin :        :     Neurologic Exam :     Alert and oriented  x 3     Motor Exam:          Right UE:                5/5 :     5/5 :   wrist extensors/ flexors  5/5 :   biceps  5/5 :   triceps  5/5 :   deltoid     Left UE:                  5/5 :     5/5 :   wrist extensors/ flexors  5/5 :   biceps  5/5 :   triceps  5/5 :   deltoid         Right LE:     5/5 : dorsiflexors   5/5 : plantar flexors  5/5 : quadriceps  5/5 : hamstrings  5/5 : hip flexors      Left LE:     5/5 : dorsiflexors   5/5 : plantar flexors  5/5 : quadriceps  5/5 : hamstrings  5/5 : hip flexors      Sensation :         intact to light touch x 4 extremities                                   DTR :     biceps/brachioradialis : equal                      patella/ankle : equal      neg clonus       Gait :  not tested            PM&R Impression :     admitted for c/o LBP radiating to the L thigh    - L spinal stenosis with L lumbar radiculopathy     - no focal neurologic deficits         Recommendations / Plan :       1) Physical / Occupational therapy focusing on therapeutic exercises , equipment evaluation , bed mobility/transfer out of bed evaluation , progressive ambulation with assistive devices prn .    2) Current disposition plan recommendation  :    pending functional progress

## 2023-09-14 NOTE — PHYSICAL THERAPY INITIAL EVALUATION ADULT - GAIT DEVIATIONS NOTED, PT EVAL
decreased bennie/increased time in double stance/decreased velocity of limb motion/decreased step length/decreased stride length/decreased weight-shifting ability

## 2023-09-14 NOTE — PHYSICAL THERAPY INITIAL EVALUATION ADULT - GENERAL OBSERVATIONS, REHAB EVAL
PT IE completed. Patient received supine in bed +heplock IV, +Wife present at bedside, patient NAD, willing to work with PT.

## 2023-09-14 NOTE — PHYSICAL THERAPY INITIAL EVALUATION ADULT - ADDITIONAL COMMENTS
"Doing well.   Just returned from her BPP and growth US.  Reports pelvic pressure and occasional contractions.   Admits to not monitoring her blood glucose levels for the past 3 days, but has been normal level prior to that on glyburide.   Desires membranes to be swept.   Denies VB, LOF. +FM  /72 (BP Location: Right arm, Patient Position: Chair, Cuff Size: Adult Large)  Pulse 93  Ht 5' 1.5\" (1.562 m)  Wt 210 lb (95.3 kg)  LMP  (LMP Unknown)  Breastfeeding? No  BMI 39.04 kg/m2  General Appearance: NAD  Abdomen: Gravid, NT   Refer to flow sheet above.   A/P: 30 year old  at 37w3d  -- reviewed growth US; EFW 78%ile with 11 cm YASMEEN; BPP 8/8  -- membranes swept; Discussed with Mora Reardon, the following; indications; the agents and methods of labor augmentation, including risks, benefits, and alternative approaches; and the possible need for  birth. EFW is LGA. The Labor Induction:what you need to know information sheet was made available to her. Questions and concerns were addressed and patient agrees to above if necessary during the course of her labor.  -- GDMA2: continue weekly BPPs; will induce labor at 39 weeks  -- labor precautions reviewed  RTC in 1 week    Star Mancilla MD  [facility]            "
Active community ambulator who lives with his wife in private house, +10 LUIS E, +10 steps to bedroom. Independent with all ADLs prior and denies use of AD when ambulating. However, patient has been using SC for past few days 2/2 increasing back/leg pain.

## 2023-09-14 NOTE — PROGRESS NOTE ADULT - ATTENDING COMMENTS
87M w/ PMH CAD s/p JOSÉ stent (6/2023), HTN, HLD, hypothyroidism- hx cerviacl laminectomy 7 yrs ago, presented with Left leg radicular pain - found to have Multilevel lumbar spondylosis severe bilateral neural forminal narrowing  L4-5 and moderate to severe stenosis spinal canal stenosis at L2-3.    pt seen and examined with wife at bedside,  awake, alert, relating story well  RRR, good air entry bilaterally  abdomen -bs present, soft, nt, nd  ext- no edema, no tenderness,   neuro - aao x 3 non focal.   did not check leg raising ( MRI /CT spine reviewed)_    -acute left leg pain from left lumbar radiculopathy -   L5-S1 left subarticular disc extrusion with mass effect and descending -left S1 nerve root.- likely causing pain  - evaluted by ortho/spine- no surgery ( pt has recent Stent -now on DAPT )- no neuro deficit, shooting pain from left buttock to left leg- no weakness, no urinary/bladder symptoms.  medical management with Physical therapy as tolerated.  - dw daughter , hx of peptic ulcer 10 yrs ago -was hesistant to use steroids initially -now agreeable with Protonix 40 mg po bid  - medrol dose pack - to reduce inflammation ( to start today after lunch - try to avoid giving too late in the day, to give with meals) and add protonix 40 mg po bid to give one dose this morning dw resident at 10 am.   - continue with oxycodone 5 mg po prn q 6 hourly  - flexeril 500 mg po q 8 hourly prn  - tylenol 650 mg po q 6 hourly prn  - lidocaine 4 % patch 12 hours on 12 hours off to apply to left lower back area       -CAD sp PCI two months ago-  cw DAPT   cardiac status stable.   HTN- cw home meds    pt was very functional at baseline, now with acute pain, PT to access mobility and for education  dw patient, wife and daughter Olga ( works at [employer] )  SW to assist with needs     would need follow up with ortho/spine as outpatient.  to follow up with cardiology DR. Jose Yanes  pmd is Dr. Shook in [place].  poc dw medical team, patient wife and daughter, questions answered.    Dr. Sam Zuñiga covering 9/15-9/17/23 87M w/ PMH CAD s/p JOSÉ stent (6/2023), HTN, HLD, hypothyroidism- hx cerviacl laminectomy 7 yrs ago, presented with Left leg radicular pain - found to have Multilevel lumbar spondylosis severe bilateral neural forminal narrowing  L4-5 and moderate to severe stenosis spinal canal stenosis at L2-3.    pt seen and examined with wife at bedside,  awake, alert, relating story well  RRR, good air entry bilaterally  abdomen -bs present, soft, nt, nd  ext- no edema, no tenderness,   neuro - aao x 3 non focal.   did not check leg raising ( MRI /CT spine reviewed)_    -acute left leg pain from left lumbar radiculopathy -   L5-S1 left subarticular disc extrusion with mass effect and descending -left S1 nerve root.- likely causing pain  - evaluted by ortho/spine- no surgery ( pt has recent Stent -now on DAPT )- no neuro deficit, shooting pain from left buttock to left leg- no weakness, no urinary/bladder symptoms.  medical management with Physical therapy as tolerated.  - dw daughter , hx of peptic ulcer 10 yrs ago -was hesistant to use steroids initially -now agreeable with Protonix 40 mg po bid  - medrol dose pack - to reduce inflammation ( to start today after lunch - try to avoid giving too late in the day, to give with meals) and add protonix 40 mg po bid to give one dose this morning dw resident at 10 am.   - continue with oxycodone 5 mg po prn q 6 hourly  - flexeril 500 mg po q 8 hourly prn  - tylenol 650 mg po q 6 hourly prn  - lidocaine 4 % patch 12 hours on 12 hours off to apply to left lower back area       -CAD sp PCI two months ago-  cw DAPT   cardiac status stable.   HTN- cw home meds    pt was very functional at baseline, now with acute pain, PT to access mobility and for education  dw patient, wife and daughter Olga ( works at [employer] )  SW to assist with needs     would need follow up with ortho/spine as outpatient.  to follow up with cardiology DR. Jose aYnes  pmd is Dr. Shook in [place].  poc dw medical team, patient wife and daughter, questions answered.    Dr. Sam Zuñiga covering 9/15-9/17/23 87M w/ PMH CAD s/p JOSÉ stent (6/2023), HTN, HLD, hypothyroidism- hx cerviacl laminectomy 7 yrs ago, presented with Left leg radicular pain - found to have Multilevel lumbar spondylosis severe bilateral neural forminal narrowing  L4-5 and moderate to severe stenosis spinal canal stenosis at L2-3.    pt seen and examined with wife at bedside,  awake, alert, relating story well  RRR, good air entry bilaterally  abdomen -bs present, soft, nt, nd  ext- no edema, no tenderness,   neuro - aao x 3 non focal.   did not check leg raising ( MRI /CT spine reviewed)_    -acute left leg pain from left lumbar radiculopathy -   L5-S1 left subarticular disc extrusion with mass effect and descending -left S1 nerve root.- likely causing pain  - evaluted by ortho/spine- no surgery ( pt has recent Stent -now on DAPT )- no neuro deficit, shooting pain from left buttock to left leg- no weakness, no urinary/bladder symptoms.  medical management with Physical therapy as tolerated.  - dw daughter , hx of peptic ulcer 10 yrs ago -was hesistant to use steroids initially -now agreeable with Protonix 40 mg po bid  - medrol dose pack - to reduce inflammation ( to start today after lunch - try to avoid giving too late in the day, to give with meals) and add protonix 40 mg po bid to give one dose this morning dw resident at 10 am.   - continue with oxycodone 5 mg po prn q 6 hourly  - flexeril 500 mg po q 8 hourly prn  - tylenol 650 mg po q 6 hourly prn  - lidocaine 4 % patch 12 hours on 12 hours off to apply to left lower back area       -CAD sp PCI two months ago-  cw DAPT   cardiac status stable.   HTN- cw home meds    pt was very functional at baseline, now with acute pain, PT to access mobility and for education  dw patient, wife and daughter Olga ( works at [employer] )  SW to assist with needs     would need follow up with ortho/spine as outpatient.  to follow up with cardiology DR. Jose Yanes  pmd is Dr. Shook in [place].  poc dw medical team, patient wife and daughter, questions answered.    Dr. Sam Zuñiga covering 9/15-9/17/23 87M w/ PMH CAD s/p JOSÉ stent (6/2023), HTN, HLD, hypothyroidism- hx cerviacl laminectomy 7 yrs ago, presented with Left leg radicular pain - found to have Multilevel lumbar spondylosis severe bilateral neural forminal narrowing  L4-5 and moderate to severe stenosis spinal canal stenosis at L2-3.    pt seen and examined with wife at bedside,  awake, alert, relating story well  RRR, good air entry bilaterally  abdomen -bs present, soft, nt, nd  ext- no edema, no tenderness,   neuro - aao x 3 non focal.   did not check leg raising ( MRI /CT spine reviewed)_    -acute left leg pain from left lumbar radiculopathy -   L5-S1 left subarticular disc extrusion with mass effect and descending -left S1 nerve root.- likely causing pain  - evaluted by ortho/spine- no surgery ( pt has recent Stent -now on DAPT )- no neuro deficit, shooting pain from left buttock to left leg- no weakness, no urinary/bladder symptoms.  medical management with Physical therapy as tolerated.  - dw daughter , hx of peptic ulcer 10 yrs ago -was hesistant to use steroids initially -now agreeable with Protonix 40 mg po bid  - medrol dose pack - to reduce inflammation ( to start today after lunch - try to avoid giving too late in the day, to give with meals) and add protonix 40 mg po bid to give one dose this morning dw resident at 10 am.   - continue with oxycodone 5 mg po prn q 6 hourly  - flexeril 500 mg po q 8 hourly prn  - tylenol 650 mg po q 6 hourly prn  - lidocaine 4 % patch 12 hours on 12 hours off to apply to left lower back area   - miralax po bid.       -CAD sp PCI two months ago-  cw DAPT   cardiac status stable.   HTN- cw home meds    pt was very functional at baseline, now with acute pain, PT to access mobility and for education  dw patient, wife and daughter Olga ( works at [employer] )  SW to assist with needs     would need follow up with ortho/spine as outpatient.  to follow up with cardiology DR. Jose Yanes  pmd is Dr. Shook in [place].  poc dw medical team, patient wife and daughter, questions answered.    Dr. Sam Zuñiga covering 9/15-9/17/23 87M w/ PMH CAD s/p JOSÉ stent (6/2023), HTN, HLD, hypothyroidism- hx cerviacl laminectomy 7 yrs ago, presented with Left leg radicular pain - found to have Multilevel lumbar spondylosis severe bilateral neural forminal narrowing  L4-5 and moderate to severe stenosis spinal canal stenosis at L2-3.    pt seen and examined with wife at bedside,  awake, alert, relating story well  RRR, good air entry bilaterally  abdomen -bs present, soft, nt, nd  ext- no edema, no tenderness,   neuro - aao x 3 non focal.   did not check leg raising ( MRI /CT spine reviewed)_    -acute left leg pain from left lumbar radiculopathy -   L5-S1 left subarticular disc extrusion with mass effect and descending -left S1 nerve root.- likely causing pain  - evaluted by ortho/spine- no surgery ( pt has recent Stent -now on DAPT )- no neuro deficit, shooting pain from left buttock to left leg- no weakness, no urinary/bladder symptoms.  medical management with Physical therapy as tolerated.  - dw daughter , hx of peptic ulcer 10 yrs ago -was hesistant to use steroids initially -now agreeable with Protonix 40 mg po bid  - medrol dose pack - to reduce inflammation ( to start today after lunch - try to avoid giving too late in the day, to give with meals) and add protonix 40 mg po bid to give one dose this morning dw resident at 10 am.   - continue with oxycodone 5 mg po prn q 6 hourly  - flexeril 500 mg po q 8 hourly prn  - tylenol 650 mg po q 6 hourly prn  - lidocaine 4 % patch 12 hours on 12 hours off to apply to left lower back area   - miralax po bid.       -CAD sp PCI two months ago-  cw DAPT   cardiac status stable.   HTN- cw home meds    pt was very functional at baseline, now with acute pain, PT to access mobility and for education  dw patient, wife and daughter Olga ( works at [employer] )  SW to assist with needs     would need follow up with ortho/spine as outpatient.  to follow up with cardiology DR. Jose Yanes  pmd is Dr. Shook in [place].  poc dw medical team, patient wife and daughter, questions answered.    Dr. Sam Zuñiga covering 9/15-9/17/23 87M w/ PMH CAD s/p JOSÉ stent (6/2023), HTN, HLD, hypothyroidism- hx cerviacl laminectomy 7 yrs ago, presented with Left leg radicular pain - found to have Multilevel lumbar spondylosis severe bilateral neural forminal narrowing  L4-5 and moderate to severe stenosis spinal canal stenosis at L2-3.    pt seen and examined with wife at bedside,  awake, alert, relating story well  RRR, good air entry bilaterally  abdomen -bs present, soft, nt, nd  ext- no edema, no tenderness,   neuro - aao x 3 non focal.   did not check leg raising ( MRI /CT spine reviewed)_    -acute left leg pain from left lumbar radiculopathy -   L5-S1 left subarticular disc extrusion with mass effect and descending -left S1 nerve root.- likely causing pain  - evaluted by ortho/spine- no surgery ( pt has recent Stent -now on DAPT )- no neuro deficit, shooting pain from left buttock to left leg- no weakness, no urinary/bladder symptoms.  medical management with Physical therapy as tolerated.  - dw daughter , hx of peptic ulcer 10 yrs ago -was hesistant to use steroids initially -now agreeable with Protonix 40 mg po bid  - medrol dose pack - to reduce inflammation ( to start today after lunch - try to avoid giving too late in the day, to give with meals) and add protonix 40 mg po bid to give one dose this morning dw resident at 10 am.   - continue with oxycodone 5 mg po prn q 6 hourly  - flexeril 500 mg po q 8 hourly prn  - tylenol 650 mg po q 6 hourly prn  - lidocaine 4 % patch 12 hours on 12 hours off to apply to left lower back area   - miralax po bid.       -CAD sp PCI two months ago-  cw DAPT   cardiac status stable.   HTN- cw home meds    pt was very functional at baseline, now with acute pain, PT to access mobility and for education  dw patient, wife and daughter Olga ( works at [employer] )  SW to assist with needs     would need follow up with ortho/spine as outpatient.  to follow up with cardiology DR. Jose Yanes  pmd is Dr. Shook in [place].  poc dw medical team, patient wife and daughter, questions answered.    Dr. Sam Zuñiga covering 9/15-9/17/23 87M w/ PMH CAD s/p JOSÉ stent (6/2023), HTN, HLD, hypothyroidism- hx cerviacl laminectomy 7 yrs ago, presented with Left leg radicular pain - found to have Multilevel lumbar spondylosis severe bilateral neural forminal narrowing  L4-5 and moderate to severe stenosis spinal canal stenosis at L2-3.    pt seen and examined with wife at bedside,  awake, alert, relating story well  RRR, good air entry bilaterally  abdomen -bs present, soft, nt, nd  ext- no edema, no tenderness,   neuro - aao x 3 non focal.   did not check leg raising ( MRI /CT spine reviewed)_    -acute left leg pain from left lumbar radiculopathy -   L5-S1 left subarticular disc extrusion with mass effect and descending -left S1 nerve root.- likely causing pain  - evaluted by ortho/spine- no surgery ( pt has recent Stent -now on DAPT )- no neuro deficit, shooting pain from left buttock to left leg- no weakness, no urinary/bladder symptoms.  medical management with Physical therapy as tolerated.  - dw daughter , hx of peptic ulcer 10 yrs ago -was hesistant to use steroids initially -now agreeable with Protonix 40 mg po bid  - medrol dose pack - to reduce inflammation ( to start today after lunch - try to avoid giving too late in the day, to give with meals) and add protonix 40 mg po bid to give one dose this morning dw resident at 10 am.   - continue with oxycodone 5 mg po prn q 6 hourly  - flexeril 500 mg po q 8 hourly standing.  - tylenol 650 mg po q 6 hourly standing.  - add gabapentin 100 mg po q hs for neuropathic pain.   - lidocaine 4 % patch 12 hours on 12 hours off to apply to left lower back area   - miralax po bid.       -CAD sp PCI two months ago-  cw DAPT   cardiac status stable.   HTN- cw home meds    pt was very functional at baseline, now with acute pain, PT to access mobility and for education  dw patient, wife and daughter Olga ( works at [employer] )  SW to assist with needs     would need follow up with ortho/spine as outpatient.  to follow up with cardiology DR. Jose Yanes  pmd is Dr. Shook in [place].  poc dw medical team, patient wife and daughter, questions answered.    Dr. Sam Zuñiga covering 9/15-9/17/23 87M w/ PMH CAD s/p JOSÉ stent (6/2023), HTN, HLD, hypothyroidism- hx cerviacl laminectomy 7 yrs ago, presented with Left leg radicular pain - found to have Multilevel lumbar spondylosis severe bilateral neural forminal narrowing  L4-5 and moderate to severe stenosis spinal canal stenosis at L2-3.    pt seen and examined with wife at bedside,  awake, alert, relating story well  RRR, good air entry bilaterally  abdomen -bs present, soft, nt, nd  ext- no edema, no tenderness,   neuro - aao x 3 non focal.   did not check leg raising ( MRI /CT spine reviewed)_    -acute left leg pain from left lumbar radiculopathy -   L5-S1 left subarticular disc extrusion with mass effect and descending -left S1 nerve root.- likely causing pain  - evaluted by ortho/spine- no surgery ( pt has recent Stent -now on DAPT )- no neuro deficit, shooting pain from left buttock to left leg- no weakness, no urinary/bladder symptoms.  medical management with Physical therapy as tolerated.  - dw daughter , hx of peptic ulcer 10 yrs ago -was hesistant to use steroids initially -now agreeable with Protonix 40 mg po bid  - medrol dose pack - to reduce inflammation ( to start today after lunch - try to avoid giving too late in the day, to give with meals) and add protonix 40 mg po bid to give one dose this morning dw resident at 10 am.   - continue with oxycodone 5 mg po prn q 6 hourly  - flexeril 500 mg po q 8 hourly standing.  - tylenol 650 mg po q 6 hourly standing.  - add gabapentin 100 mg po q hs for neuropathic pain.   - lidocaine 4 % patch 12 hours on 12 hours off to apply to left lower back area   - miralax po bid.       -CAD sp PCI two months ago-  cw DAPT   cardiac status stable.   HTN- cw home meds    pt was very functional at baseline, now with acute pain, PT to access mobility and for education  dw patient, wife and daughter Olga ( works at [employer] )  SW to assist with needs     would need follow up with ortho/spine as outpatient.  to follow up with cardiology DR. Jose Yanes  pmd is Dr. Shook in [place].  poc dw medical team, patient wife and daughter, questions answered.    Dr. Sam Zuñiga covering 9/15-9/17/23 87M w/ PMH CAD s/p JOSÉ stent (6/2023), HTN, HLD, hypothyroidism- hx cerviacl laminectomy 7 yrs ago, presented with Left leg radicular pain - found to have Multilevel lumbar spondylosis severe bilateral neural forminal narrowing  L4-5 and moderate to severe stenosis spinal canal stenosis at L2-3.    pt seen and examined with wife at bedside,  awake, alert, relating story well  RRR, good air entry bilaterally  abdomen -bs present, soft, nt, nd  ext- no edema, no tenderness,   neuro - aao x 3 non focal.   did not check leg raising ( MRI /CT spine reviewed)_    -acute left leg pain from left lumbar radiculopathy -   L5-S1 left subarticular disc extrusion with mass effect and descending -left S1 nerve root.- likely causing pain  - evaluted by ortho/spine- no surgery ( pt has recent Stent -now on DAPT )- no neuro deficit, shooting pain from left buttock to left leg- no weakness, no urinary/bladder symptoms.  medical management with Physical therapy as tolerated.  - dw daughter , hx of peptic ulcer 10 yrs ago -was hesistant to use steroids initially -now agreeable with Protonix 40 mg po bid  - medrol dose pack - to reduce inflammation ( to start today after lunch - try to avoid giving too late in the day, to give with meals) and add protonix 40 mg po bid to give one dose this morning dw resident at 10 am.   - continue with oxycodone 5 mg po prn q 6 hourly  - flexeril 500 mg po q 8 hourly standing.  - tylenol 650 mg po q 6 hourly standing.  - add gabapentin 100 mg po q hs for neuropathic pain.   - lidocaine 4 % patch 12 hours on 12 hours off to apply to left lower back area   - miralax po bid.       -CAD sp PCI two months ago-  cw DAPT   cardiac status stable.   HTN- cw home meds    pt was very functional at baseline, now with acute pain, PT to access mobility and for education  dw patient, wife and daughter Olga ( works at [employer] )  SW to assist with needs     would need follow up with ortho/spine as outpatient.  to follow up with cardiology DR. Jose Yanes  pmd is Dr. Shook in [place].  poc dw medical team, patient wife and daughter, questions answered.    Dr. Sam Zuñiga covering 9/15-9/17/23

## 2023-09-15 ENCOUNTER — TRANSCRIPTION ENCOUNTER (OUTPATIENT)
Age: 88
End: 2023-09-15

## 2023-09-15 VITALS
RESPIRATION RATE: 18 BRPM | DIASTOLIC BLOOD PRESSURE: 73 MMHG | SYSTOLIC BLOOD PRESSURE: 112 MMHG | TEMPERATURE: 98 F | OXYGEN SATURATION: 93 % | HEART RATE: 86 BPM

## 2023-09-15 LAB
ALBUMIN SERPL ELPH-MCNC: 4.1 G/DL — SIGNIFICANT CHANGE UP (ref 3.3–5)
ALP SERPL-CCNC: 72 U/L — SIGNIFICANT CHANGE UP (ref 40–120)
ALT FLD-CCNC: 21 U/L — SIGNIFICANT CHANGE UP (ref 10–45)
ANION GAP SERPL CALC-SCNC: 13 MMOL/L — SIGNIFICANT CHANGE UP (ref 5–17)
ANISOCYTOSIS BLD QL: SLIGHT — SIGNIFICANT CHANGE UP
AST SERPL-CCNC: 22 U/L — SIGNIFICANT CHANGE UP (ref 10–40)
BASOPHILS # BLD AUTO: 0 K/UL — SIGNIFICANT CHANGE UP (ref 0–0.2)
BASOPHILS NFR BLD AUTO: 0 % — SIGNIFICANT CHANGE UP (ref 0–2)
BILIRUB SERPL-MCNC: 0.8 MG/DL — SIGNIFICANT CHANGE UP (ref 0.2–1.2)
BUN SERPL-MCNC: 27 MG/DL — HIGH (ref 7–23)
BURR CELLS BLD QL SMEAR: PRESENT — SIGNIFICANT CHANGE UP
CALCIUM SERPL-MCNC: 9.3 MG/DL — SIGNIFICANT CHANGE UP (ref 8.4–10.5)
CHLORIDE SERPL-SCNC: 100 MMOL/L — SIGNIFICANT CHANGE UP (ref 96–108)
CO2 SERPL-SCNC: 22 MMOL/L — SIGNIFICANT CHANGE UP (ref 22–31)
CREAT SERPL-MCNC: 1.12 MG/DL — SIGNIFICANT CHANGE UP (ref 0.5–1.3)
EGFR: 64 ML/MIN/1.73M2 — SIGNIFICANT CHANGE UP
EOSINOPHIL # BLD AUTO: 0 K/UL — SIGNIFICANT CHANGE UP (ref 0–0.5)
EOSINOPHIL NFR BLD AUTO: 0 % — SIGNIFICANT CHANGE UP (ref 0–6)
GIANT PLATELETS BLD QL SMEAR: PRESENT — SIGNIFICANT CHANGE UP
GLUCOSE SERPL-MCNC: 184 MG/DL — HIGH (ref 70–99)
HCT VFR BLD CALC: 52.1 % — HIGH (ref 39–50)
HGB BLD-MCNC: 16 G/DL — SIGNIFICANT CHANGE UP (ref 13–17)
HYPOCHROMIA BLD QL: SLIGHT — SIGNIFICANT CHANGE UP
LYMPHOCYTES # BLD AUTO: 12.5 % — LOW (ref 13–44)
LYMPHOCYTES # BLD AUTO: 2.24 K/UL — SIGNIFICANT CHANGE UP (ref 1–3.3)
MACROCYTES BLD QL: SLIGHT — SIGNIFICANT CHANGE UP
MAGNESIUM SERPL-MCNC: 2.4 MG/DL — SIGNIFICANT CHANGE UP (ref 1.6–2.6)
MANUAL SMEAR VERIFICATION: SIGNIFICANT CHANGE UP
MCHC RBC-ENTMCNC: 20.8 PG — LOW (ref 27–34)
MCHC RBC-ENTMCNC: 30.7 GM/DL — LOW (ref 32–36)
MCV RBC AUTO: 67.8 FL — LOW (ref 80–100)
MONOCYTES # BLD AUTO: 0 K/UL — SIGNIFICANT CHANGE UP (ref 0–0.9)
MONOCYTES NFR BLD AUTO: 0 % — LOW (ref 2–14)
NEUTROPHILS # BLD AUTO: 15.69 K/UL — HIGH (ref 1.8–7.4)
NEUTROPHILS NFR BLD AUTO: 87.5 % — HIGH (ref 43–77)
OVALOCYTES BLD QL SMEAR: SLIGHT — SIGNIFICANT CHANGE UP
PHOSPHATE SERPL-MCNC: 3.6 MG/DL — SIGNIFICANT CHANGE UP (ref 2.5–4.5)
PLAT MORPH BLD: ABNORMAL
PLATELET # BLD AUTO: 210 K/UL — SIGNIFICANT CHANGE UP (ref 150–400)
POIKILOCYTOSIS BLD QL AUTO: SIGNIFICANT CHANGE UP
POLYCHROMASIA BLD QL SMEAR: SLIGHT — SIGNIFICANT CHANGE UP
POTASSIUM SERPL-MCNC: 4.4 MMOL/L — SIGNIFICANT CHANGE UP (ref 3.5–5.3)
POTASSIUM SERPL-SCNC: 4.4 MMOL/L — SIGNIFICANT CHANGE UP (ref 3.5–5.3)
PROT SERPL-MCNC: 7.8 G/DL — SIGNIFICANT CHANGE UP (ref 6–8.3)
RBC # BLD: 7.69 M/UL — HIGH (ref 4.2–5.8)
RBC # FLD: 18.7 % — HIGH (ref 10.3–14.5)
RBC BLD AUTO: ABNORMAL
SCHISTOCYTES BLD QL AUTO: SLIGHT — SIGNIFICANT CHANGE UP
SODIUM SERPL-SCNC: 135 MMOL/L — SIGNIFICANT CHANGE UP (ref 135–145)
WBC # BLD: 17.93 K/UL — HIGH (ref 3.8–10.5)
WBC # FLD AUTO: 17.93 K/UL — HIGH (ref 3.8–10.5)

## 2023-09-15 PROCEDURE — 85025 COMPLETE CBC W/AUTO DIFF WBC: CPT

## 2023-09-15 PROCEDURE — 84439 ASSAY OF FREE THYROXINE: CPT

## 2023-09-15 PROCEDURE — 72192 CT PELVIS W/O DYE: CPT | Mod: MA

## 2023-09-15 PROCEDURE — 86900 BLOOD TYPING SEROLOGIC ABO: CPT

## 2023-09-15 PROCEDURE — 85610 PROTHROMBIN TIME: CPT

## 2023-09-15 PROCEDURE — 85730 THROMBOPLASTIN TIME PARTIAL: CPT

## 2023-09-15 PROCEDURE — 80048 BASIC METABOLIC PNL TOTAL CA: CPT

## 2023-09-15 PROCEDURE — 86850 RBC ANTIBODY SCREEN: CPT

## 2023-09-15 PROCEDURE — 97161 PT EVAL LOW COMPLEX 20 MIN: CPT

## 2023-09-15 PROCEDURE — 86901 BLOOD TYPING SEROLOGIC RH(D): CPT

## 2023-09-15 PROCEDURE — 80053 COMPREHEN METABOLIC PANEL: CPT

## 2023-09-15 PROCEDURE — 99233 SBSQ HOSP IP/OBS HIGH 50: CPT | Mod: GC

## 2023-09-15 PROCEDURE — 84443 ASSAY THYROID STIM HORMONE: CPT

## 2023-09-15 PROCEDURE — 72148 MRI LUMBAR SPINE W/O DYE: CPT | Mod: MA

## 2023-09-15 PROCEDURE — 83735 ASSAY OF MAGNESIUM: CPT

## 2023-09-15 PROCEDURE — 97116 GAIT TRAINING THERAPY: CPT

## 2023-09-15 PROCEDURE — 96374 THER/PROPH/DIAG INJ IV PUSH: CPT

## 2023-09-15 PROCEDURE — 93005 ELECTROCARDIOGRAM TRACING: CPT

## 2023-09-15 PROCEDURE — 36415 COLL VENOUS BLD VENIPUNCTURE: CPT

## 2023-09-15 PROCEDURE — 99285 EMERGENCY DEPT VISIT HI MDM: CPT

## 2023-09-15 PROCEDURE — 84100 ASSAY OF PHOSPHORUS: CPT

## 2023-09-15 PROCEDURE — 72131 CT LUMBAR SPINE W/O DYE: CPT | Mod: MA

## 2023-09-15 RX ORDER — CYCLOBENZAPRINE HYDROCHLORIDE 10 MG/1
1 TABLET, FILM COATED ORAL
Qty: 9 | Refills: 0
Start: 2023-09-15 | End: 2023-09-17

## 2023-09-15 RX ORDER — GABAPENTIN 400 MG/1
1 CAPSULE ORAL
Qty: 0 | Refills: 0 | DISCHARGE
Start: 2023-09-15

## 2023-09-15 RX ORDER — OXYCODONE HYDROCHLORIDE 5 MG/1
1 TABLET ORAL
Qty: 20 | Refills: 0
Start: 2023-09-15 | End: 2023-09-19

## 2023-09-15 RX ORDER — PANTOPRAZOLE SODIUM 20 MG/1
1 TABLET, DELAYED RELEASE ORAL
Qty: 30 | Refills: 2
Start: 2023-09-15 | End: 2023-12-13

## 2023-09-15 RX ORDER — CYCLOBENZAPRINE HYDROCHLORIDE 10 MG/1
1 TABLET, FILM COATED ORAL
Qty: 0 | Refills: 0 | DISCHARGE
Start: 2023-09-15

## 2023-09-15 RX ORDER — GABAPENTIN 400 MG/1
1 CAPSULE ORAL
Qty: 14 | Refills: 0
Start: 2023-09-15 | End: 2023-09-28

## 2023-09-15 RX ORDER — PANTOPRAZOLE SODIUM 20 MG/1
1 TABLET, DELAYED RELEASE ORAL
Qty: 30 | Refills: 0
Start: 2023-09-15 | End: 2023-10-14

## 2023-09-15 RX ORDER — PANTOPRAZOLE SODIUM 20 MG/1
1 TABLET, DELAYED RELEASE ORAL
Refills: 0
Start: 2023-09-15

## 2023-09-15 RX ORDER — CYCLOBENZAPRINE HYDROCHLORIDE 10 MG/1
1 TABLET, FILM COATED ORAL
Qty: 9 | Refills: 0 | DISCHARGE
Start: 2023-09-15 | End: 2023-09-17

## 2023-09-15 RX ADMIN — Medication 50 MICROGRAM(S): at 06:28

## 2023-09-15 RX ADMIN — OXYCODONE HYDROCHLORIDE 5 MILLIGRAM(S): 5 TABLET ORAL at 09:58

## 2023-09-15 RX ADMIN — OXYCODONE HYDROCHLORIDE 5 MILLIGRAM(S): 5 TABLET ORAL at 16:26

## 2023-09-15 RX ADMIN — Medication 650 MILLIGRAM(S): at 12:47

## 2023-09-15 RX ADMIN — CYCLOBENZAPRINE HYDROCHLORIDE 5 MILLIGRAM(S): 10 TABLET, FILM COATED ORAL at 14:21

## 2023-09-15 RX ADMIN — OXYCODONE HYDROCHLORIDE 5 MILLIGRAM(S): 5 TABLET ORAL at 09:28

## 2023-09-15 RX ADMIN — Medication 650 MILLIGRAM(S): at 06:27

## 2023-09-15 RX ADMIN — Medication 650 MILLIGRAM(S): at 12:17

## 2023-09-15 RX ADMIN — Medication 4 MILLIGRAM(S): at 06:28

## 2023-09-15 RX ADMIN — Medication 81 MILLIGRAM(S): at 12:18

## 2023-09-15 RX ADMIN — LIDOCAINE 1 PATCH: 4 CREAM TOPICAL at 12:18

## 2023-09-15 RX ADMIN — Medication 4 MILLIGRAM(S): at 15:56

## 2023-09-15 RX ADMIN — PANTOPRAZOLE SODIUM 40 MILLIGRAM(S): 20 TABLET, DELAYED RELEASE ORAL at 06:28

## 2023-09-15 RX ADMIN — POLYETHYLENE GLYCOL 3350 17 GRAM(S): 17 POWDER, FOR SOLUTION ORAL at 06:27

## 2023-09-15 RX ADMIN — CLOPIDOGREL BISULFATE 75 MILLIGRAM(S): 75 TABLET, FILM COATED ORAL at 12:18

## 2023-09-15 RX ADMIN — CYCLOBENZAPRINE HYDROCHLORIDE 5 MILLIGRAM(S): 10 TABLET, FILM COATED ORAL at 06:27

## 2023-09-15 RX ADMIN — Medication 25 MILLIGRAM(S): at 06:28

## 2023-09-15 RX ADMIN — OXYCODONE HYDROCHLORIDE 5 MILLIGRAM(S): 5 TABLET ORAL at 15:56

## 2023-09-15 NOTE — PROGRESS NOTE ADULT - SUBJECTIVE AND OBJECTIVE BOX
OVERNIGHT EVENTS:    SUBJECTIVE:  Patient seen and examined at bedside.    Vital Signs Last 12 Hrs  T(F): 97.4 (09-15-23 @ 05:49), Max: 97.6 (09-14-23 @ 20:38)  HR: 61 (09-15-23 @ 05:49) (61 - 73)  BP: 118/79 (09-15-23 @ 05:49) (118/79 - 144/82)  BP(mean): --  RR: 18 (09-15-23 @ 05:49) (17 - 18)  SpO2: 95% (09-15-23 @ 05:49) (93% - 95%)  I&O's Summary      PHYSICAL EXAM:  Constitutional: NAD, comfortable in bed.  HEENT: NC/AT, PERRLA, EOMI, no conjunctival pallor or scleral icterus, MMM  Neck: Supple, no JVD  Respiratory: CTA B/L. No w/r/r.   Cardiovascular: RRR, normal S1 and S2, no m/r/g.   Gastrointestinal: +BS, soft NTND, no guarding or rebound tenderness, no palpable masses   Extremities: wwp; no cyanosis, clubbing or edema.   Vascular: Pulses equal and strong throughout.   Neurological: AAOx3, no CN deficits, strength and sensation intact throughout.   Skin: No gross skin abnormalities or rashes        LABS:                        15.4   7.62  )-----------( 187      ( 14 Sep 2023 08:47 )             50.0     09-14    135  |  101  |  17  ----------------------------<  112<H>  4.3   |  23  |  1.05    Ca    9.1      14 Sep 2023 08:47  Phos  3.6     09-14  Mg     2.4     09-14    TPro  7.1  /  Alb  3.8  /  TBili  0.7  /  DBili  x   /  AST  21  /  ALT  17  /  AlkPhos  62  09-13    PT/INR - ( 13 Sep 2023 15:02 )   PT: 11.2 sec;   INR: 0.98          PTT - ( 13 Sep 2023 15:02 )  PTT:33.2 sec  Urinalysis Basic - ( 14 Sep 2023 08:47 )    Color: x / Appearance: x / SG: x / pH: x  Gluc: 112 mg/dL / Ketone: x  / Bili: x / Urobili: x   Blood: x / Protein: x / Nitrite: x   Leuk Esterase: x / RBC: x / WBC x   Sq Epi: x / Non Sq Epi: x / Bacteria: x          RADIOLOGY & ADDITIONAL TESTS:    MEDICATIONS  (STANDING):  acetaminophen     Tablet .. 650 milliGRAM(s) Oral every 6 hours  aspirin  chewable 81 milliGRAM(s) Oral daily  atorvastatin 80 milliGRAM(s) Oral at bedtime  clopidogrel Tablet 75 milliGRAM(s) Oral daily  cyclobenzaprine 5 milliGRAM(s) Oral every 8 hours  enoxaparin Injectable 40 milliGRAM(s) SubCutaneous every 24 hours  gabapentin 100 milliGRAM(s) Oral at bedtime  influenza  Vaccine (HIGH DOSE) 0.7 milliLiter(s) IntraMuscular once  levothyroxine 50 MICROGram(s) Oral daily  lidocaine   4% Patch 1 Patch Transdermal daily  methylPREDNISolone   Oral   methylPREDNISolone 4 milliGRAM(s) Oral before breakfast  methylPREDNISolone 8 milliGRAM(s) Oral at bedtime  methylPREDNISolone 4 milliGRAM(s) Oral after dinner  methylPREDNISolone 4 milliGRAM(s) Oral after lunch  metoprolol succinate ER 25 milliGRAM(s) Oral daily  pantoprazole    Tablet 40 milliGRAM(s) Oral every 12 hours  polyethylene glycol 3350 17 Gram(s) Oral every 12 hours  tamsulosin 0.4 milliGRAM(s) Oral at bedtime    MEDICATIONS  (PRN):  oxyCODONE    IR 5 milliGRAM(s) Oral every 6 hours PRN Severe Pain (7 - 10)   SUBJECTIVE:  Patient seen and examined at bedside, pt reports that the pain is not better, he states that he did not take the oxycodone yet because his afraid he is taking too many medication. Discussion of the risk benefits of these medication was had and pt will try today and plans to work with PT.      Vital Signs Last 12 Hrs  T(F): 97.4 (09-15-23 @ 05:49), Max: 97.6 (09-14-23 @ 20:38)  HR: 61 (09-15-23 @ 05:49) (61 - 73)  BP: 118/79 (09-15-23 @ 05:49) (118/79 - 144/82)  BP(mean): --  RR: 18 (09-15-23 @ 05:49) (17 - 18)  SpO2: 95% (09-15-23 @ 05:49) (93% - 95%)  I&O's Summary      PHYSICAL EXAM:  PHYSICAL EXAM:  Constitutional: NAD, comfortable in bed.  HEENT: NC/AT, no scleral icterus, MMM  Neck: Supple, no JVD  Respiratory: CTA B/L. No w/r/r.   Cardiovascular: RRR, normal S1 and S2, no m/r/g.   Gastrointestinal: +BS, soft NTND, no guarding or rebound tenderness, no palpable masses   Extremities: wwp; no edema.   Vascular: Pulses equal and strong throughout.   Neurological: AAOx3        LABS:                        15.4   7.62  )-----------( 187      ( 14 Sep 2023 08:47 )             50.0     09-14    135  |  101  |  17  ----------------------------<  112<H>  4.3   |  23  |  1.05    Ca    9.1      14 Sep 2023 08:47  Phos  3.6     09-14  Mg     2.4     09-14    TPro  7.1  /  Alb  3.8  /  TBili  0.7  /  DBili  x   /  AST  21  /  ALT  17  /  AlkPhos  62  09-13    PT/INR - ( 13 Sep 2023 15:02 )   PT: 11.2 sec;   INR: 0.98          PTT - ( 13 Sep 2023 15:02 )  PTT:33.2 sec  Urinalysis Basic - ( 14 Sep 2023 08:47 )    Color: x / Appearance: x / SG: x / pH: x  Gluc: 112 mg/dL / Ketone: x  / Bili: x / Urobili: x   Blood: x / Protein: x / Nitrite: x   Leuk Esterase: x / RBC: x / WBC x   Sq Epi: x / Non Sq Epi: x / Bacteria: x          RADIOLOGY & ADDITIONAL TESTS:    MEDICATIONS  (STANDING):  acetaminophen     Tablet .. 650 milliGRAM(s) Oral every 6 hours  aspirin  chewable 81 milliGRAM(s) Oral daily  atorvastatin 80 milliGRAM(s) Oral at bedtime  clopidogrel Tablet 75 milliGRAM(s) Oral daily  cyclobenzaprine 5 milliGRAM(s) Oral every 8 hours  enoxaparin Injectable 40 milliGRAM(s) SubCutaneous every 24 hours  gabapentin 100 milliGRAM(s) Oral at bedtime  influenza  Vaccine (HIGH DOSE) 0.7 milliLiter(s) IntraMuscular once  levothyroxine 50 MICROGram(s) Oral daily  lidocaine   4% Patch 1 Patch Transdermal daily  methylPREDNISolone   Oral   methylPREDNISolone 4 milliGRAM(s) Oral before breakfast  methylPREDNISolone 8 milliGRAM(s) Oral at bedtime  methylPREDNISolone 4 milliGRAM(s) Oral after dinner  methylPREDNISolone 4 milliGRAM(s) Oral after lunch  metoprolol succinate ER 25 milliGRAM(s) Oral daily  pantoprazole    Tablet 40 milliGRAM(s) Oral every 12 hours  polyethylene glycol 3350 17 Gram(s) Oral every 12 hours  tamsulosin 0.4 milliGRAM(s) Oral at bedtime    MEDICATIONS  (PRN):  oxyCODONE    IR 5 milliGRAM(s) Oral every 6 hours PRN Severe Pain (7 - 10)

## 2023-09-15 NOTE — DISCHARGE NOTE PROVIDER - HOSPITAL COURSE
HON SCOTT is a 87y Male with a past medical history of  CAD s/p JOSÉ stent (6/2023), HTN, HLD, hypothyroidism.     Presented with _____, found to have _____    Problem List/Main Diagnoses (system-based):   #     #     #    Patient was discharged to: (home/[facility]/acute rehab/hospice, etc. and w/ home health/home PT/RN/home O2)    New medications:   Changes to old medications:  Medications that were stopped:    Items to follow up as outpatient:    Physical exam at the time of discharge:       LABS & STUDIES:   HON SCOTT is a 87y Male with a past medical history of  CAD s/p JOSÉ stent (6/2023), HTN, HLD, hypothyroidism.     Presented with progressive L leg pain x 10 days found to have spinal stenosis.    Problem List  #Lumbar spinal stenosis.   Pt presented with L posterior leg pain for the past 10 days pt denies trauma. MR lumbar spine showed moderate to severe L2-L3, severe spinal canal stenosis at L3-4 and L4-5. L5-S1 left subarticular disc extrusion with mass effect and descending left S1 nerve root. Severe bilateral neural foraminal narrowing L4-5 and moderate to severe right neural foraminal narrowing at L3-4. pt with. Currently neurologically intact. Ortho/Spine consulted and pt is not candidate for surgery at this time and recommends medical therapy. Pt was started on steroid taper dose pack, lidocaine patch, flexeril, gabapentin an oxycodone. Pt now able to tolerate pain and work with PT.   - c/w Steroid taper dose pack  - oxycodone 5 IR PRN for severe pain (Miralax BID)   - flexeril 5 q8 for muscle spasm  - gabapentin 100mg at bedtime   - f/u with home physical therapy    #  Hypertension.   ·Pt on home HTN meds, toprol 25 qd  - c/w toprol 25 qd.    #  CAD S/P percutaneous coronary angioplasty.   pt with recent JOSÉ stent to mLAD placed 6/2023. Pt follows with cardioloigist Dr Yanes  - c/w ASA 81, Plavix qd.    #  Hypothyroid.   Pt with hypothyroidism, on home meds.   - c/w synthroid 0.5 qd      #  BPH (benign prostatic hyperplasia).   Pt on home meds flomax 0.4mg  - c/w home meds.    #  HLD (hyperlipidemia).   Home meds rosuvastatin 20  - c/w atorvastatin.      Patient was discharged to: home    New medications:   Changes to old medications: No  Medications that were stopped: NO    Items to follow up as outpatient:   - f/u with PCP  - c/w home PT     Physical exam at the time of discharge:   PHYSICAL EXAM:  PHYSICAL EXAM:  Constitutional: NAD, comfortable in bed.  HEENT: NC/AT, no scleral icterus, MMM  Neck: Supple, no JVD  Respiratory: CTA B/L. No w/r/r.   Cardiovascular: RRR, normal S1 and S2, no m/r/g.   Gastrointestinal: +BS, soft NTND, no guarding or rebound tenderness, no palpable masses   Extremities: wwp; no edema.   Vascular: Pulses equal and strong throughout.   Neurological: AAOx3    LABS & STUDIES:

## 2023-09-15 NOTE — PROGRESS NOTE ADULT - ASSESSMENT
87M w/ PMH CAD s/p JOSÉ stent (6/2023), HTN, HLD, hypothyroidism who presents with progressive L leg pain x 10 days found to have spinal stenosis admitted for pain control and physical therapy.
87M w/ PMH CAD s/p JOSÉ stent (6/2023), HTN, HLD, hypothyroidism who presents with progressive L leg pain x 10 days found to have spinal stenosis admitted for pain control and physical therapy.

## 2023-09-15 NOTE — DISCHARGE NOTE NURSING/CASE MANAGEMENT/SOCIAL WORK - NSDCPEFALRISK_GEN_ALL_CORE
For information on Fall & Injury Prevention, visit: [email or web address]-[email or web address]-mobility OR  [email or web address]/[email or web address]-injury OR  https://www.cdc.gov/steadi/patient.html For information on Fall & Injury Prevention, visit: [email or web address]://www.[facility]/news/fall-prevention-protects-[email or web address]-maintains-health-[email or web address]-mobility OR  https://www.[facility]/news/fall-prevention-tips-to-avoid-injury OR  https://www.cdc.gov/steadi/patient.html For information on Fall & Injury Prevention, visit: [email or web address]://www.[facility]/news/fall-prevention-protects-and-maintains-health-and-mobility OR  https://www.[facility]/news/fall-prevention-tips-to-avoid-injury OR  https://www.cdc.gov/steadi/patient.html

## 2023-09-15 NOTE — DISCHARGE NOTE NURSING/CASE MANAGEMENT/SOCIAL WORK - PATIENT PORTAL LINK FT
You can access the FollowMyHealth Patient Portal offered by [facility] by registering at the following website: [email or web address]. By joining [employer]’s FollowMyHealth portal, you will also be able to view your health information using other applications (apps) compatible with our system. You can access the FollowMyHealth Patient Portal offered by [facility] by registering at the following website: [email or web address]. By joining [employer]’s FollowMyHealth portal, you will also be able to view your health information using other applications (apps) compatible with our system. You can access the FollowMyHealth Patient Portal offered by [facility] by registering at the following website: [email or web address]. By joining [employer]’s FollowMyHealth portal, you will also be able to view your health information using other applications (apps) compatible with our system.

## 2023-09-15 NOTE — DISCHARGE NOTE PROVIDER - NSDCFUADDAPPT_GEN_ALL_CORE_FT
Please make an appointment with your PCP Dr Shook within 1 week of leaving the hospital.    Continue to work with home physical therapy.

## 2023-09-15 NOTE — DISCHARGE NOTE PROVIDER - NSDCMRMEDTOKEN_GEN_ALL_CORE_FT
aspirin 81 mg oral tablet: 1 tab(s) orally once a day  levothyroxine 50 mcg (0.05 mg) oral tablet: 1 tab(s) orally once a day  metoprolol succinate 25 mg oral capsule, extended release: 1 orally once a day  Plavix 75 mg oral tablet: 1 tab(s) orally once a day  rosuvastatin 20 mg oral tablet: 1 orally once a day (at bedtime)  tamsulosin 0.4 mg oral capsule: 1 orally once a day   aspirin 81 mg oral tablet: 1 tab(s) orally once a day  cyclobenzaprine 5 mg oral tablet: 1 tab(s) orally every 8 hours  gabapentin 100 mg oral capsule: 1 cap(s) orally once a day (at bedtime)  levothyroxine 50 mcg (0.05 mg) oral tablet: 1 tab(s) orally once a day  metoprolol succinate 25 mg oral capsule, extended release: 1 orally once a day  Plavix 75 mg oral tablet: 1 tab(s) orally once a day  rosuvastatin 20 mg oral tablet: 1 orally once a day (at bedtime)  tamsulosin 0.4 mg oral capsule: 1 orally once a day   aspirin 81 mg oral tablet: 1 tab(s) orally once a day  cyclobenzaprine 5 mg oral tablet: 1 tab(s) orally every 8 hours  cyclobenzaprine 5 mg oral tablet: 1 tab(s) orally every 8 hours  gabapentin 100 mg oral capsule: 1 cap(s) orally once a day (at bedtime)  gabapentin 100 mg oral capsule: 1 cap(s) orally once a day (at bedtime)  levothyroxine 50 mcg (0.05 mg) oral tablet: 1 tab(s) orally once a day  methylPREDNISolone 4 mg oral tablet: 1 tab(s) orally every 4 hours take 3 pills on 9/15  take 4 pills on 9/16   take 3 pills on 9/17   take 2 pills on 9/18   take 1 pill on 9/19     Please allow 4 hours between taking each pill.  metoprolol succinate 25 mg oral capsule, extended release: 1 orally once a day  Plavix 75 mg oral tablet: 1 tab(s) orally once a day  rosuvastatin 20 mg oral tablet: 1 orally once a day (at bedtime)  tamsulosin 0.4 mg oral capsule: 1 orally once a day

## 2023-09-15 NOTE — DISCHARGE NOTE PROVIDER - CARE PROVIDER_API CALL
GARY REY DO, LISA  [address] [address]  Phone: [phone]  Fax: [phone]  Follow Up Time:    GARY REY DO, LISA  [address] [address]  Phone: [phone]  Fax: ()-  Follow Up Time:    GARY REY DO, LISA  [address], [place] [address]  Phone: [phone]  Fax: ()-  Follow Up Time:

## 2023-09-15 NOTE — PROGRESS NOTE ADULT - PROBLEM SELECTOR PLAN 4
Takes synthroid 0.5 qd    - f/u TSH  - c/w synthroid 0.5
Takes synthroid 0.5 qd    - f/u TSH  - c/w synthroid 0.5

## 2023-09-15 NOTE — DISCHARGE NOTE PROVIDER - NSDCCPCAREPLAN_GEN_ALL_CORE_FT
PRINCIPAL DISCHARGE DIAGNOSIS  Diagnosis: Lumbar spinal stenosis  Assessment and Plan of Treatment: You came to the hospital for pain in your leg. Imaging was done that showed you have spinal stenosis Spinal stenosis happens when the space inside the backbone is too small. This can put pressure on the spinal cord and nerves that travel through the spine. Spinal stenosis occurs most often in the lower back and the neck. The Orthopedic spine doctors recommended medical management and physical therapy. When you leave the hospital medication will be sent to your pharmacy and you should continue to work with physical therapy. If your pain is not controlled or if it gets worse, please call your doctor or come back to the hospital.

## 2023-09-15 NOTE — PROGRESS NOTE ADULT - ATTENDING COMMENTS
87M w/ PMH CAD s/p JOSÉ stent (6/2023), HTN, HLD, hypothyroidism- hx cervical laminectomy 7 yrs ago, presented with Left leg radicular pain - found to have Multilevel lumbar spondylosis severe bilateral neural forminal narrowing  L4-5 and moderate to severe stenosis spinal canal stenosis at L2-3.admitted for pain control and gait abnormality    CC: Pt reports LBP 6/10    -acute left leg pain from left lumbar radiculopathy -   L5-S1 left subarticular disc extrusion with mass effect and descending -left S1 nerve root.- likely causing pain  - evaluted by ortho/spine- no surgery ( pt has recent Stent -now on DAPT )- no neuro deficit, shooting pain from left buttock to left leg- no weakness, no urinary/bladder symptoms.  medical management with Physical therapy as tolerated.  - PM&R consult and PT eval appreciated   - dw daughter , hx of peptic ulcer 10 yrs ago -was hesitant to use steroids initially -now agreeable with Protonix 40 mg po bid  - medrol dose pack - to reduce inflammation ( to start today after lunch - try to avoid giving too late in the day, to give with meals) and add protonix 40 mg po bid to give one dose this morning dw resident at 10 am.   - continue with oxycodone 5 mg po prn q 6 hourly prn pain   - flexeril 500 mg po q 8 hourly standing.  - tylenol 650 mg po q 6 hourly standing.  - added gabapentin 100 mg po q hs for neuropathic pain. may titrate dose up to 300mg qHS and gradually BID then TID   - lidocaine 4 % patch 12 hours on 12 hours off to apply to left lower back area   - miralax po bid.       -CAD sp PCI two months ago-  cw DAPT   cardiac status stable.   HTN- cw home meds    pt was very functional at baseline, now with acute pain, PT to access mobility and for education  dw patient, wife and daughter Olga ( works at [employer] )  SW to assist with needs     would need follow up with ortho/spine as outpatient.  to follow up with cardiology DR. Cosmo Yanes  [facility]  poc dw medical team, patient wife and daughter, questions answered.    Dr. Sam Zuñiga covering 9/15-9/17/23. 87M w/ PMH CAD s/p JOSÉ stent (6/2023), HTN, HLD, hypothyroidism- hx cervical laminectomy 7 yrs ago, presented with Left leg radicular pain - found to have Multilevel lumbar spondylosis severe bilateral neural forminal narrowing  L4-5 and moderate to severe stenosis spinal canal stenosis at L2-3.admitted for pain control and gait abnormality    CC: Pt reports LBP 6/10    -acute left leg pain from left lumbar radiculopathy -   L5-S1 left subarticular disc extrusion with mass effect and descending -left S1 nerve root.- likely causing pain  - evaluted by ortho/spine- no surgery ( pt has recent Stent -now on DAPT )- no neuro deficit, shooting pain from left buttock to left leg- no weakness, no urinary/bladder symptoms.  medical management with Physical therapy as tolerated.  - PM&R consult and PT eval appreciated   - dw daughter , hx of peptic ulcer 10 yrs ago -was hesitant to use steroids initially -now agreeable with Protonix 40 mg po bid  - medrol dose pack - to reduce inflammation ( to start today after lunch - try to avoid giving too late in the day, to give with meals) and add protonix 40 mg po bid to give one dose this morning dw resident at 10 am.   - continue with oxycodone 5 mg po prn q 6 hourly prn pain   - flexeril 500 mg po q 8 hourly standing.  - tylenol 650 mg po q 6 hourly standing.  - added gabapentin 100 mg po q hs for neuropathic pain. may titrate dose up to 300mg qHS and gradually BID then TID   - lidocaine 4 % patch 12 hours on 12 hours off to apply to left lower back area   - miralax po bid.       -CAD sp PCI two months ago-  cw DAPT   cardiac status stable.   HTN- cw home meds    pt was very functional at baseline, now with acute pain, PT to access mobility and for education  dw patient, wife and daughter Olga ( works at [employer] )  SW to assist with needs     would need follow up with ortho/spine as outpatient.  to follow up with cardiology DR. Cosmo Yanes  [facility]  poc dw medical team, patient wife and daughter, questions answered.    Dr. Sam Zuñiga covering 9/15-9/17/23. 87M w/ PMH CAD s/p JOSÉ stent (6/2023), HTN, HLD, hypothyroidism- hx cervical laminectomy 7 yrs ago, presented with Left leg radicular pain - found to have Multilevel lumbar spondylosis severe bilateral neural forminal narrowing  L4-5 and moderate to severe stenosis spinal canal stenosis at L2-3.admitted for pain control and gait abnormality    CC: Pt reports LBP 6/10    -acute left leg pain from left lumbar radiculopathy -   L5-S1 left subarticular disc extrusion with mass effect and descending -left S1 nerve root.- likely causing pain  - evaluted by ortho/spine- no surgery ( pt has recent Stent -now on DAPT )- no neuro deficit, shooting pain from left buttock to left leg- no weakness, no urinary/bladder symptoms.  medical management with Physical therapy as tolerated.  - PM&R consult and PT eval appreciated   - dw daughter , hx of peptic ulcer 10 yrs ago -was hesitant to use steroids initially -now agreeable with Protonix 40 mg po bid  - medrol dose pack - to reduce inflammation ( to start today after lunch - try to avoid giving too late in the day, to give with meals) and add protonix 40 mg po bid to give one dose this morning dw resident at 10 am.   - continue with oxycodone 5 mg po prn q 6 hourly prn pain   - flexeril 500 mg po q 8 hourly standing.  - tylenol 650 mg po q 6 hourly standing.  - added gabapentin 100 mg po q hs for neuropathic pain. may titrate dose up to 300mg qHS and gradually BID then TID   - lidocaine 4 % patch 12 hours on 12 hours off to apply to left lower back area   - miralax po bid.       -CAD sp PCI two months ago-  cw DAPT   cardiac status stable.   HTN- cw home meds    pt was very functional at baseline, now with acute pain, PT to access mobility and for education  dw patient, wife and daughter Olga ( works at [employer] )  SW to assist with needs     would need follow up with ortho/spine as outpatient.  to follow up with cardiology DR. Cosmo Yanes  [facility]  poc dw medical team, patient wife and daughter, questions answered.    Dr. Sam Zuñiga covering 9/15-9/17/23.

## 2023-09-15 NOTE — PROGRESS NOTE ADULT - PROBLEM SELECTOR PLAN 3
Recent JOSÉ stent to mLAD placed 6/2023   Follows with cardioloigist Dr Yanes    - c/w ASA 81, Plavix qd
Recent JOSÉ stent to mLAD placed 6/2023   Follows with cardioloigist Dr Yanes    - c/w ASA 81, Plavix qd

## 2023-09-15 NOTE — PROGRESS NOTE ADULT - PROBLEM SELECTOR PLAN 1
Pt presenting with L posterior leg pain x 10 days denies trauma MR lumbar spine; moderate to severe L2-L3, severe spinal canal stenosis at L3-4 and L4-5. L5-S1 left subarticular disc extrusion with mass effect and descending left S1 nerve root. Severe bilateral neural foraminal narrowing L4-5 and moderate to severe right neural foraminal narrowing at L3-4. 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 Currently neurologically intact  - c/w Steroid taper dose pack  - c/w lidocaine patch 12 hr on 12hr off   - pain control: Tylenol 650 q6 for mild pain  - oxycodone 5 IR PRN for severe pain (Miralax BID)   - flexeril 5 q8 for muscle spasm  - gabapentin 100mg at bedtime   - f/u P/T recs  - outpatient f/u with ortho

## 2023-09-15 NOTE — DISCHARGE NOTE PROVIDER - PROVIDER TOKENS
PROVIDER:[TOKEN:[[id number]]] PROVIDER:[TOKEN:[[id number]]] PROVIDER:[TOKEN:[[id number]]]

## 2023-09-15 NOTE — PROGRESS NOTE ADULT - PROBLEM SELECTOR PLAN 7
F: PO  E: Replete PRN  N: Regular   DVT pphx: lovenox 40, protonix 40mg BID     CODE STATUS: Full  DISPO: [id number]

## 2023-09-20 DIAGNOSIS — Z87.11 PERSONAL HISTORY OF PEPTIC ULCER DISEASE: ICD-10-CM

## 2023-09-20 DIAGNOSIS — I10 ESSENTIAL (PRIMARY) HYPERTENSION: ICD-10-CM

## 2023-09-20 DIAGNOSIS — Z79.899 OTHER LONG TERM (CURRENT) DRUG THERAPY: ICD-10-CM

## 2023-09-20 DIAGNOSIS — M54.16 RADICULOPATHY, LUMBAR REGION: ICD-10-CM

## 2023-09-20 DIAGNOSIS — M48.061 SPINAL STENOSIS, LUMBAR REGION WITHOUT NEUROGENIC CLAUDICATION: ICD-10-CM

## 2023-09-20 DIAGNOSIS — I25.10 ATHEROSCLEROTIC HEART DISEASE OF NATIVE CORONARY ARTERY WITHOUT ANGINA PECTORIS: ICD-10-CM

## 2023-09-20 DIAGNOSIS — Z79.82 LONG TERM (CURRENT) USE OF ASPIRIN: ICD-10-CM

## 2023-09-20 DIAGNOSIS — Z91.013 ALLERGY TO SEAFOOD: ICD-10-CM

## 2023-09-20 DIAGNOSIS — Z79.02 LONG TERM (CURRENT) USE OF ANTITHROMBOTICS/ANTIPLATELETS: ICD-10-CM

## 2023-09-20 DIAGNOSIS — Z95.5 PRESENCE OF CORONARY ANGIOPLASTY IMPLANT AND GRAFT: ICD-10-CM

## 2023-09-20 DIAGNOSIS — N40.0 BENIGN PROSTATIC HYPERPLASIA WITHOUT LOWER URINARY TRACT SYMPTOMS: ICD-10-CM

## 2023-09-20 DIAGNOSIS — M47.16 OTHER SPONDYLOSIS WITH MYELOPATHY, LUMBAR REGION: ICD-10-CM

## 2023-09-20 DIAGNOSIS — M51.27 OTHER INTERVERTEBRAL DISC DISPLACEMENT, LUMBOSACRAL REGION: ICD-10-CM

## 2023-09-20 DIAGNOSIS — Z98.1 ARTHRODESIS STATUS: ICD-10-CM

## 2023-09-20 DIAGNOSIS — Z79.890 HORMONE REPLACEMENT THERAPY: ICD-10-CM

## 2023-09-20 DIAGNOSIS — E03.9 HYPOTHYROIDISM, UNSPECIFIED: ICD-10-CM

## 2023-09-20 DIAGNOSIS — E78.5 HYPERLIPIDEMIA, UNSPECIFIED: ICD-10-CM

## 2023-09-20 DIAGNOSIS — M48.062 SPINAL STENOSIS, LUMBAR REGION WITH NEUROGENIC CLAUDICATION: ICD-10-CM

## 2023-10-06 ENCOUNTER — INPATIENT (INPATIENT)
Facility: HOSPITAL | Age: 88
LOS: 7 days | Discharge: HOME HEALTH | DRG: 444 | End: 2023-10-14
Attending: INTERNAL MEDICINE | Admitting: INTERNAL MEDICINE
Payer: MEDICARE

## 2023-10-06 VITALS
RESPIRATION RATE: 20 BRPM | OXYGEN SATURATION: 92 % | DIASTOLIC BLOOD PRESSURE: 95 MMHG | HEIGHT: 65 IN | TEMPERATURE: 98 F | HEART RATE: 89 BPM | WEIGHT: 139.99 LBS | SYSTOLIC BLOOD PRESSURE: 152 MMHG

## 2023-10-06 DIAGNOSIS — Z87.438 PERSONAL HISTORY OF OTHER DISEASES OF MALE GENITAL ORGANS: ICD-10-CM

## 2023-10-06 DIAGNOSIS — E87.1 HYPO-OSMOLALITY AND HYPONATREMIA: ICD-10-CM

## 2023-10-06 DIAGNOSIS — R06.00 DYSPNEA, UNSPECIFIED: ICD-10-CM

## 2023-10-06 DIAGNOSIS — E03.9 HYPOTHYROIDISM, UNSPECIFIED: ICD-10-CM

## 2023-10-06 DIAGNOSIS — R10.9 UNSPECIFIED ABDOMINAL PAIN: ICD-10-CM

## 2023-10-06 DIAGNOSIS — D72.829 ELEVATED WHITE BLOOD CELL COUNT, UNSPECIFIED: ICD-10-CM

## 2023-10-06 DIAGNOSIS — Z98.1 ARTHRODESIS STATUS: Chronic | ICD-10-CM

## 2023-10-06 DIAGNOSIS — K59.00 CONSTIPATION, UNSPECIFIED: ICD-10-CM

## 2023-10-06 DIAGNOSIS — I10 ESSENTIAL (PRIMARY) HYPERTENSION: ICD-10-CM

## 2023-10-06 DIAGNOSIS — E78.5 HYPERLIPIDEMIA, UNSPECIFIED: ICD-10-CM

## 2023-10-06 DIAGNOSIS — R07.9 CHEST PAIN, UNSPECIFIED: ICD-10-CM

## 2023-10-06 LAB
ALBUMIN SERPL ELPH-MCNC: 3.3 G/DL — SIGNIFICANT CHANGE UP (ref 3.3–5)
ALP SERPL-CCNC: 89 U/L — SIGNIFICANT CHANGE UP (ref 40–120)
ALT FLD-CCNC: 15 U/L — SIGNIFICANT CHANGE UP (ref 10–45)
ANION GAP SERPL CALC-SCNC: 10 MMOL/L — SIGNIFICANT CHANGE UP (ref 5–17)
ANISOCYTOSIS BLD QL: SLIGHT — SIGNIFICANT CHANGE UP
APPEARANCE UR: CLEAR — SIGNIFICANT CHANGE UP
APTT BLD: 35.8 SEC — HIGH (ref 24.5–35.6)
AST SERPL-CCNC: 17 U/L — SIGNIFICANT CHANGE UP (ref 10–40)
BACTERIA # UR AUTO: PRESENT /HPF
BASOPHILS # BLD AUTO: 0.15 K/UL — SIGNIFICANT CHANGE UP (ref 0–0.2)
BASOPHILS NFR BLD AUTO: 0.9 % — SIGNIFICANT CHANGE UP (ref 0–2)
BILIRUB SERPL-MCNC: 0.6 MG/DL — SIGNIFICANT CHANGE UP (ref 0.2–1.2)
BILIRUB UR-MCNC: NEGATIVE — SIGNIFICANT CHANGE UP
BUN SERPL-MCNC: 14 MG/DL — SIGNIFICANT CHANGE UP (ref 7–23)
BURR CELLS BLD QL SMEAR: SIGNIFICANT CHANGE UP
CALCIUM SERPL-MCNC: 9.1 MG/DL — SIGNIFICANT CHANGE UP (ref 8.4–10.5)
CHLORIDE SERPL-SCNC: 93 MMOL/L — LOW (ref 96–108)
CK MB CFR SERPL CALC: 1.4 NG/ML — SIGNIFICANT CHANGE UP (ref 0–6.7)
CK SERPL-CCNC: 95 U/L — SIGNIFICANT CHANGE UP (ref 30–200)
CO2 SERPL-SCNC: 23 MMOL/L — SIGNIFICANT CHANGE UP (ref 22–31)
COLOR SPEC: YELLOW — SIGNIFICANT CHANGE UP
CREAT ?TM UR-MCNC: 68 MG/DL — SIGNIFICANT CHANGE UP
CREAT SERPL-MCNC: 1.1 MG/DL — SIGNIFICANT CHANGE UP (ref 0.5–1.3)
CRP SERPL-MCNC: 214.9 MG/L — HIGH (ref 0–4)
DACRYOCYTES BLD QL SMEAR: SLIGHT — SIGNIFICANT CHANGE UP
DIFF PNL FLD: ABNORMAL
EGFR: 66 ML/MIN/1.73M2 — SIGNIFICANT CHANGE UP
ELLIPTOCYTES BLD QL SMEAR: SLIGHT — SIGNIFICANT CHANGE UP
EOSINOPHIL # BLD AUTO: 0.42 K/UL — SIGNIFICANT CHANGE UP (ref 0–0.5)
EOSINOPHIL NFR BLD AUTO: 2.6 % — SIGNIFICANT CHANGE UP (ref 0–6)
EPI CELLS # UR: SIGNIFICANT CHANGE UP /HPF (ref 0–5)
ERYTHROCYTE [SEDIMENTATION RATE] IN BLOOD: 28 MM/HR — HIGH
GIANT PLATELETS BLD QL SMEAR: PRESENT — SIGNIFICANT CHANGE UP
GLUCOSE SERPL-MCNC: 143 MG/DL — HIGH (ref 70–99)
GLUCOSE UR QL: NEGATIVE — SIGNIFICANT CHANGE UP
HCT VFR BLD CALC: 46.2 % — SIGNIFICANT CHANGE UP (ref 39–50)
HGB BLD-MCNC: 14.9 G/DL — SIGNIFICANT CHANGE UP (ref 13–17)
INR BLD: 1.13 — SIGNIFICANT CHANGE UP (ref 0.85–1.18)
KETONES UR-MCNC: NEGATIVE — SIGNIFICANT CHANGE UP
LACTATE SERPL-SCNC: 1.2 MMOL/L — SIGNIFICANT CHANGE UP (ref 0.5–2)
LEUKOCYTE ESTERASE UR-ACNC: ABNORMAL
LIDOCAIN IGE QN: 16 U/L — SIGNIFICANT CHANGE UP (ref 7–60)
LYMPHOCYTES # BLD AUTO: 1.12 K/UL — SIGNIFICANT CHANGE UP (ref 1–3.3)
LYMPHOCYTES # BLD AUTO: 6.9 % — LOW (ref 13–44)
MAGNESIUM SERPL-MCNC: 2.3 MG/DL — SIGNIFICANT CHANGE UP (ref 1.6–2.6)
MANUAL SMEAR VERIFICATION: SIGNIFICANT CHANGE UP
MCHC RBC-ENTMCNC: 20.9 PG — LOW (ref 27–34)
MCHC RBC-ENTMCNC: 32.3 GM/DL — SIGNIFICANT CHANGE UP (ref 32–36)
MCV RBC AUTO: 64.8 FL — LOW (ref 80–100)
MICROCYTES BLD QL: SLIGHT — SIGNIFICANT CHANGE UP
MONOCYTES # BLD AUTO: 0.57 K/UL — SIGNIFICANT CHANGE UP (ref 0–0.9)
MONOCYTES NFR BLD AUTO: 3.5 % — SIGNIFICANT CHANGE UP (ref 2–14)
NEUTROPHILS # BLD AUTO: 12.86 K/UL — HIGH (ref 1.8–7.4)
NEUTROPHILS NFR BLD AUTO: 79.1 % — HIGH (ref 43–77)
NITRITE UR-MCNC: NEGATIVE — SIGNIFICANT CHANGE UP
NT-PROBNP SERPL-SCNC: 1279 PG/ML — HIGH (ref 0–300)
OSMOLALITY UR: 357 MOSM/KG — SIGNIFICANT CHANGE UP (ref 300–900)
OVALOCYTES BLD QL SMEAR: SLIGHT — SIGNIFICANT CHANGE UP
PH UR: 6.5 — SIGNIFICANT CHANGE UP (ref 5–8)
PLAT MORPH BLD: ABNORMAL
PLATELET # BLD AUTO: 193 K/UL — SIGNIFICANT CHANGE UP (ref 150–400)
POIKILOCYTOSIS BLD QL AUTO: SIGNIFICANT CHANGE UP
POTASSIUM SERPL-MCNC: 4.3 MMOL/L — SIGNIFICANT CHANGE UP (ref 3.5–5.3)
POTASSIUM SERPL-SCNC: 4.3 MMOL/L — SIGNIFICANT CHANGE UP (ref 3.5–5.3)
PROCALCITONIN SERPL-MCNC: 0.25 NG/ML — HIGH (ref 0.02–0.1)
PROT SERPL-MCNC: 7.7 G/DL — SIGNIFICANT CHANGE UP (ref 6–8.3)
PROT UR-MCNC: ABNORMAL MG/DL
PROTHROM AB SERPL-ACNC: 12.8 SEC — SIGNIFICANT CHANGE UP (ref 9.5–13)
RAPID RVP RESULT: SIGNIFICANT CHANGE UP
RBC # BLD: 7.13 M/UL — HIGH (ref 4.2–5.8)
RBC # FLD: 17.5 % — HIGH (ref 10.3–14.5)
RBC BLD AUTO: ABNORMAL
RBC CASTS # UR COMP ASSIST: < 5 /HPF — SIGNIFICANT CHANGE UP
SARS-COV-2 RNA SPEC QL NAA+PROBE: SIGNIFICANT CHANGE UP
SODIUM SERPL-SCNC: 126 MMOL/L — LOW (ref 135–145)
SODIUM UR-SCNC: 70 MMOL/L — SIGNIFICANT CHANGE UP
SP GR SPEC: <=1.005 — SIGNIFICANT CHANGE UP (ref 1–1.03)
TROPONIN T, HIGH SENSITIVITY RESULT: 17 NG/L — SIGNIFICANT CHANGE UP (ref 0–51)
TROPONIN T, HIGH SENSITIVITY RESULT: 18 NG/L — SIGNIFICANT CHANGE UP (ref 0–51)
UROBILINOGEN FLD QL: 0.2 E.U./DL — SIGNIFICANT CHANGE UP
VARIANT LYMPHS # BLD: 7 % — HIGH (ref 0–6)
WBC # BLD: 16.26 K/UL — HIGH (ref 3.8–10.5)
WBC # FLD AUTO: 16.26 K/UL — HIGH (ref 3.8–10.5)
WBC UR QL: > 10 /HPF

## 2023-10-06 PROCEDURE — 99223 1ST HOSP IP/OBS HIGH 75: CPT

## 2023-10-06 PROCEDURE — 74019 RADEX ABDOMEN 2 VIEWS: CPT | Mod: 26

## 2023-10-06 PROCEDURE — 71045 X-RAY EXAM CHEST 1 VIEW: CPT | Mod: 26

## 2023-10-06 PROCEDURE — 99285 EMERGENCY DEPT VISIT HI MDM: CPT | Mod: FS

## 2023-10-06 RX ORDER — GABAPENTIN 400 MG/1
100 CAPSULE ORAL ONCE
Refills: 0 | Status: COMPLETED | OUTPATIENT
Start: 2023-10-06 | End: 2023-10-06

## 2023-10-06 RX ORDER — METOPROLOL TARTRATE 50 MG
12.5 TABLET ORAL DAILY
Refills: 0 | Status: DISCONTINUED | OUTPATIENT
Start: 2023-10-07 | End: 2023-10-14

## 2023-10-06 RX ORDER — ATORVASTATIN CALCIUM 80 MG/1
40 TABLET, FILM COATED ORAL AT BEDTIME
Refills: 0 | Status: DISCONTINUED | OUTPATIENT
Start: 2023-10-06 | End: 2023-10-14

## 2023-10-06 RX ORDER — CEFTRIAXONE 500 MG/1
1000 INJECTION, POWDER, FOR SOLUTION INTRAMUSCULAR; INTRAVENOUS EVERY 24 HOURS
Refills: 0 | Status: DISCONTINUED | OUTPATIENT
Start: 2023-10-06 | End: 2023-10-08

## 2023-10-06 RX ORDER — CLOPIDOGREL BISULFATE 75 MG/1
75 TABLET, FILM COATED ORAL DAILY
Refills: 0 | Status: DISCONTINUED | OUTPATIENT
Start: 2023-10-07 | End: 2023-10-09

## 2023-10-06 RX ORDER — AZITHROMYCIN 500 MG/1
500 TABLET, FILM COATED ORAL EVERY 24 HOURS
Refills: 0 | Status: DISCONTINUED | OUTPATIENT
Start: 2023-10-07 | End: 2023-10-08

## 2023-10-06 RX ORDER — LEVOTHYROXINE SODIUM 125 MCG
50 TABLET ORAL DAILY
Refills: 0 | Status: DISCONTINUED | OUTPATIENT
Start: 2023-10-07 | End: 2023-10-09

## 2023-10-06 RX ORDER — PANTOPRAZOLE SODIUM 20 MG/1
40 TABLET, DELAYED RELEASE ORAL AT BEDTIME
Refills: 0 | Status: DISCONTINUED | OUTPATIENT
Start: 2023-10-06 | End: 2023-10-14

## 2023-10-06 RX ORDER — FUROSEMIDE 40 MG
20 TABLET ORAL ONCE
Refills: 0 | Status: COMPLETED | OUTPATIENT
Start: 2023-10-06 | End: 2023-10-06

## 2023-10-06 RX ORDER — ASPIRIN/CALCIUM CARB/MAGNESIUM 324 MG
81 TABLET ORAL DAILY
Refills: 0 | Status: DISCONTINUED | OUTPATIENT
Start: 2023-10-07 | End: 2023-10-14

## 2023-10-06 RX ORDER — OXYCODONE HYDROCHLORIDE 5 MG/1
5 TABLET ORAL EVERY 6 HOURS
Refills: 0 | Status: DISCONTINUED | OUTPATIENT
Start: 2023-10-06 | End: 2023-10-06

## 2023-10-06 RX ORDER — CYCLOBENZAPRINE HYDROCHLORIDE 10 MG/1
5 TABLET, FILM COATED ORAL
Refills: 0 | Status: DISCONTINUED | OUTPATIENT
Start: 2023-10-06 | End: 2023-10-14

## 2023-10-06 RX ORDER — AZITHROMYCIN 500 MG/1
500 TABLET, FILM COATED ORAL ONCE
Refills: 0 | Status: COMPLETED | OUTPATIENT
Start: 2023-10-06 | End: 2023-10-06

## 2023-10-06 RX ORDER — GABAPENTIN 400 MG/1
100 CAPSULE ORAL AT BEDTIME
Refills: 0 | Status: DISCONTINUED | OUTPATIENT
Start: 2023-10-07 | End: 2023-10-14

## 2023-10-06 RX ADMIN — ATORVASTATIN CALCIUM 40 MILLIGRAM(S): 80 TABLET, FILM COATED ORAL at 23:22

## 2023-10-06 RX ADMIN — AZITHROMYCIN 255 MILLIGRAM(S): 500 TABLET, FILM COATED ORAL at 19:03

## 2023-10-06 RX ADMIN — PANTOPRAZOLE SODIUM 40 MILLIGRAM(S): 20 TABLET, DELAYED RELEASE ORAL at 23:22

## 2023-10-06 RX ADMIN — GABAPENTIN 100 MILLIGRAM(S): 400 CAPSULE ORAL at 23:22

## 2023-10-06 RX ADMIN — Medication 10 MILLIGRAM(S): at 23:31

## 2023-10-06 RX ADMIN — Medication 20 MILLIGRAM(S): at 18:11

## 2023-10-06 RX ADMIN — CEFTRIAXONE 100 MILLIGRAM(S): 500 INJECTION, POWDER, FOR SOLUTION INTRAMUSCULAR; INTRAVENOUS at 20:16

## 2023-10-06 NOTE — H&P ADULT - HISTORY OF PRESENT ILLNESS
Patient is a 87M, (shellfish rxn w/ Swelling 40 years ago- no issues eating seafood currently),  w/ PMHx CAD s/p JOSÉ to mLAD (6/2023), HTN, HLD, cervical spondylosis s/p laminectomy, gastric ulcers, hypothyroidism,  who presents to [facility] ED 10/6/23 for SOB, chest pressure x 3 days, BELLO, orthopnea. Also reports a fever of 101.8 yesterday but resolved. Reported constipation at home with response after suppository at home. Of note, pt was admitted to [facility] in Sept 2023 for L leg discomfort. MR imaging with finding of moderate-severe spinal stenosis and was considered candidate for medical therapy instead of surgery. Patient is now admitted to cardiac/tele for suspicion of CHF and further ischemic evaluation pending optimization including Leukocytosis workup.     -Cardiac cath 6/5/23: IVUS guided JOÉS/scoreflex to mLAD 95%, OM2  unable to cross, pRCA 50%, pLCx 50%, dLCx ectatic w/ slow [place], EDP 5, access R radial.  -TTE 3/27/23: LVEF normal 55-60%, no WMA, mild AR/MR/TR/NJ.    -VS: /95, HR 89bpm, RR 20, T 97.7, O2 92%.  -Meds in ED: Lasix 20mg IV x 1,   -Labs in ED: WBC 16.26, Na 126, proBNP 1279, Trop T 18.  -EKG: NSR, TWI V2  -RVP: pending  -CXR: per ED MD wet read ?pleural effusion, ?patchy opacities.      Patient is a 87M, (shellfish rxn w/ Swelling 40 years ago- no issues eating seafood currently),  w/ PMHx CAD s/p JOSÉ to mLAD (6/2023), HTN, HLD, cervical spondylosis s/p laminectomy, gastric ulcers, hypothyroidism,  who presents to [facility] ED 10/6/23 for SOB, chest pressure x 3 days, BELLO, orthopnea. Also reports a fever of 101.8 yesterday but resolved. Reported constipation at home with response after suppository at home. Of note, pt was admitted to [facility] in Sept 2023 for L leg discomfort. MR imaging with finding of moderate-severe spinal stenosis and was considered candidate for medical therapy instead of surgery. Patient is now admitted to cardiac/tele for suspicion of CHF and further ischemic evaluation pending optimization including Leukocytosis workup.     -Cardiac cath 6/5/23: IVUS guided JOSÉ/scoreflex to mLAD 95%, OM2  unable to cross, pRCA 50%, pLCx 50%, dLCx ectatic w/ slow [place], EDP 5, access R radial.  -TTE 3/27/23: LVEF normal 55-60%, no WMA, mild AR/MR/TR/LA.    -VS: /95, HR 89bpm, RR 20, T 97.7, O2 92%.  -Meds in ED: Lasix 20mg IV x 1,   -Labs in ED: WBC 16.26, Na 126, proBNP 1279, Trop T 18.  -EKG: NSR, TWI V2  -RVP: pending  -CXR: per ED MD wet read ?pleural effusion, ?patchy opacities.      Patient is a 87M, (shellfish rxn w/ Swelling 40 years ago- no issues eating seafood currently),  w/ PMHx CAD s/p JOSÉ to mLAD (6/2023), HTN, HLD, cervical spondylosis s/p laminectomy, gastric ulcers, hypothyroidism,  who presents to [facility] ED 10/6/23 for SOB, chest pressure x 3 days, BELLO, orthopnea. Also reports a fever of 101.8 yesterday but resolved. Reported constipation at home with response after suppository at home. Of note, pt was admitted to [facility] in Sept 2023 for L leg discomfort. MR imaging with finding of moderate-severe spinal stenosis and was considered candidate for medical therapy instead of surgery. Patient is now admitted to cardiac/tele for suspicion of CHF and further ischemic evaluation pending optimization including Leukocytosis workup.     -Cardiac cath 6/5/23: IVUS guided JOSÉ/scoreflex to mLAD 95%, OM2  unable to cross, pRCA 50%, pLCx 50%, dLCx ectatic w/ slow [place], EDP 5, access R radial.  -TTE 3/27/23: LVEF normal 55-60%, no WMA, mild AR/MR/TR/NJ.    -VS: /95, HR 89bpm, RR 20, T 97.7, O2 92%.  -Meds in ED: Lasix 20mg IV x 1,   -Labs in ED: WBC 16.26, Na 126, proBNP 1279, Trop T 18.  -EKG: NSR, TWI V2  -RVP: pending  -CXR: per ED MD wet read ?pleural effusion, ?patchy opacities.      Patient is a 87M, (shellfish rxn w/ Lip Swelling ~ 20 yrs ago),  w/ PMHx CAD s/p JOSÉ to mLAD (6/2023), HTN, HLD, cervical spondylosis s/p laminectomy, gastric ulcers, hypothyroidism,  who presents to [facility] ED 10/6/23 for BELLO with minimal exertion, chest pressure, and orthopnea x 3 days. Also reports a fever of 101.8 yesterday but resolved with Tylenol with no recurrence since. Reported constipation at home with response after suppository at home on Tuesday (10/3), however, no BM since then. Does report pinpoint RUQ abdominal discomfort worse with palpation. 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 Denies any palpitations, dizziness, syncope, diaphoresis, LE edema, N/V/D, cough, congestion, fever, chills or recent sick contact.    Of note, pt was admitted to [facility] in Sept 2023 for L leg discomfort. MR imaging with finding of moderate-severe spinal stenosis and was considered candidate for medical therapy instead of surgery. Patient is now admitted to cardiac/tele for suspicion of CHF and further ischemic evaluation pending optimization including Leukocytosis workup.     -Cardiac cath 6/5/23: IVUS guided JOSÉ/scoreflex to mLAD 95%, OM2  unable to cross, pRCA 50%, pLCx 50%, dLCx ectatic w/ slow [place], EDP 5, access R radial.  -TTE 3/27/23: LVEF normal 55-60%, no WMA, mild AR/MR/TR/CO.    -VS: /95, HR 89bpm, RR 20, T 97.7, O2 92%.  -Meds in ED: Lasix 20mg IV x 1,   -Labs in ED: WBC 16.26, Na 126, proBNP 1279, Trop T 18.  -EKG: NSR, TWI V2  -RVP: pending  -CXR: per ED MD wet read ?pleural effusion, ?patchy opacities.      Patient is a 87M, (shellfish rxn w/ Lip Swelling ~ 20 yrs ago),  w/ PMHx CAD s/p JOSÉ to mLAD (6/2023), HTN, HLD, cervical spondylosis s/p laminectomy, gastric ulcers, hypothyroidism,  who presents to [facility] ED 10/6/23 for BELLO with minimal exertion, chest pressure, and orthopnea x 3 days. Also reports a fever of 101.8 yesterday but resolved with Tylenol with no recurrence since. Reported constipation at home with response after suppository at home on Tuesday (10/3), however, no BM since then. Does report pinpoint RUQ abdominal discomfort worse with palpation. 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 Denies any palpitations, dizziness, syncope, diaphoresis, LE edema, N/V/D, cough, congestion, fever, chills or recent sick contact.    Of note, pt was admitted to [facility] in Sept 2023 for L leg discomfort. MR imaging with finding of moderate-severe spinal stenosis and was considered candidate for medical therapy instead of surgery. Patient is now admitted to cardiac/tele for suspicion of CHF and further ischemic evaluation pending optimization including Leukocytosis workup.     -Cardiac cath 6/5/23: IVUS guided JOSÉ/scoreflex to mLAD 95%, OM2  unable to cross, pRCA 50%, pLCx 50%, dLCx ectatic w/ slow [place], EDP 5, access R radial.  -TTE 3/27/23: LVEF normal 55-60%, no WMA, mild AR/MR/TR/ID.    -VS: /95, HR 89bpm, RR 20, T 97.7, O2 92%.  -Meds in ED: Lasix 20mg IV x 1,   -Labs in ED: WBC 16.26, Na 126, proBNP 1279, Trop T 18.  -EKG: NSR, TWI V2  -RVP: pending  -CXR: per ED MD wet read ?pleural effusion, ?patchy opacities.      Patient is a 87M, (shellfish rxn w/ Lip Swelling ~ 20 yrs ago),  w/ PMHx CAD s/p JOSÉ to mLAD (6/2023), HTN, HLD, cervical spondylosis s/p laminectomy, gastric ulcers, hypothyroidism,  who presents to [facility] ED 10/6/23 for BELLO with minimal exertion, chest pressure, and orthopnea x 3 days. Also reports a fever of 101.8 yesterday but resolved with Tylenol with no recurrence since. Reported constipation at home with response after suppository at home on Tuesday (10/3), however, no BM since then. Does report pinpoint RUQ abdominal discomfort worse with palpation. 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 Denies any palpitations, dizziness, syncope, diaphoresis, LE edema, N/V/D, cough, congestion, fever, chills or recent sick contact.    Of note, pt was admitted to [facility] in Sept 2023 for L leg discomfort. MR imaging with finding of moderate-severe spinal stenosis and was considered candidate for medical therapy instead of surgery. Patient is now admitted to cardiac/tele for suspicion of CHF and further ischemic evaluation pending optimization including Leukocytosis workup.     -Cardiac cath 6/5/23: IVUS guided JOSÉ/scoreflex to mLAD 95%, OM2  unable to cross, pRCA 50%, pLCx 50%, dLCx ectatic w/ slow [place], EDP 5, access R radial.  -TTE 3/27/23: LVEF normal 55-60%, no WMA, mild AR/MR/TR/FL.    -VS: /95, HR 89bpm, RR 20, T 97.7, O2 92%.  -Meds in ED: Lasix 20mg IV x 1,   -Labs in ED: WBC 16.26, Na 126, proBNP 1279, Trop T 18.  -EKG: NSR, TWI V2  -RVP: pending  -CXR: per ED MD wet read ?pleural effusion, ?patchy opacities.      Patient is a 87M, (shellfish rxn w/ Lip Swelling ~ 20 yrs ago),  w/ PMHx CAD s/p JOSÉ to mLAD (6/2023), HTN, HLD, cervical spondylosis s/p laminectomy, gastric ulcers, hypothyroidism,  who presents to [facility] ED 10/6/23 for BELLO with minimal exertion, chest pressure, and orthopnea x 3 days. Also reports a fever of 101.8 yesterday but resolved with Tylenol with no recurrence since. Reported constipation at home with response after suppository at home on Tuesday (10/3), however, no BM since then. Does report pinpoint RUQ abdominal discomfort worse with palpation. 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 Denies any palpitations, dizziness, syncope, diaphoresis, LE edema, N/V/D, cough, congestion, fever, chills or recent sick contact.    Of note, pt was admitted to [facility] in Sept 2023 for L leg discomfort. MR imaging with finding of moderate-severe spinal stenosis and was considered candidate for medical therapy instead of surgery. Patient is now admitted to cardiac/tele for suspicion of CHF and further ischemic evaluation pending optimization including Leukocytosis workup.     -Cardiac cath 6/5/23: IVUS guided JOSÉ/scoreflex to mLAD 95%, OM2  unable to cross, pRCA 50%, pLCx 50%, dLCx ectatic w/ slow [place], EDP 5, access R radial.  -TTE 3/27/23: LVEF normal 55-60%, no WMA, mild AR/MR/TR/LA.    -VS: /95, HR 89bpm, RR 20, T 97.7, O2 92%.  -Meds in ED: Lasix 20mg IV x 1, Ceftriaxone 1000mg IVPB X 1, Azithromycin 500mg IVP x 1.   -Labs in ED: WBC 16.26, Na 126, proBNP 1279, Trop T 18, Lactate 1.2  -EKG: NSR, TWI [id number]  -RVP: pending  -CXR: per ED MD wet read ?pleural effusion, ?patchy opacities.      Patient is a 87M, (shellfish rxn w/ Lip Swelling ~ 20 yrs ago),  w/ PMHx CAD s/p JOSÉ to mLAD (6/2023), HTN, HLD, cervical spondylosis s/p laminectomy, gastric ulcers, hypothyroidism,  who presents to [facility] ED 10/6/23 for BELLO with minimal exertion, chest pressure, and orthopnea x 3 days. Also reports a fever of 101.8 yesterday but resolved with Tylenol with no recurrence since. Reported constipation at home with response after suppository at home on Tuesday (10/3), however, no BM since then. Does report pinpoint RUQ abdominal discomfort worse with palpation. 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 Denies any palpitations, dizziness, syncope, diaphoresis, LE edema, N/V/D, cough, congestion, fever, chills or recent sick contact.    Of note, pt was admitted to [facility] in Sept 2023 for L leg discomfort. MR imaging with finding of moderate-severe spinal stenosis and was considered candidate for medical therapy instead of surgery. Patient is now admitted to cardiac/tele for suspicion of CHF and further ischemic evaluation pending optimization including Leukocytosis workup.     -Cardiac cath 6/5/23: IVUS guided JOSÉ/scoreflex to mLAD 95%, OM2  unable to cross, pRCA 50%, pLCx 50%, dLCx ectatic w/ slow [place], EDP 5, access R radial.  -TTE 3/27/23: LVEF normal 55-60%, no WMA, mild AR/MR/TR/IL.    -VS: /95, HR 89bpm, RR 20, T 97.7, O2 92%.  -Meds in ED: Lasix 20mg IV x 1, Ceftriaxone 1000mg IVPB X 1, Azithromycin 500mg IVP x 1.   -Labs in ED: WBC 16.26, Na 126, proBNP 1279, Trop T 18, Lactate 1.2  -EKG: NSR, TWI [id number]  -RVP: pending  -CXR: per ED MD wet read ?pleural effusion, ?patchy opacities.      Patient is a 87M, (shellfish rxn w/ Lip Swelling ~ 20 yrs ago),  w/ PMHx CAD s/p JOSÉ to mLAD (6/2023), HTN, HLD, cervical spondylosis s/p laminectomy, gastric ulcers, hypothyroidism,  who presents to [facility] ED 10/6/23 for BELLO with minimal exertion, chest pressure, and orthopnea x 3 days. Also reports a fever of 101.8 yesterday but resolved with Tylenol with no recurrence since. Reported constipation at home with response after suppository at home on Tuesday (10/3), however, no BM since then. Does report pinpoint RUQ abdominal discomfort worse with palpation. 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 Denies any palpitations, dizziness, syncope, diaphoresis, LE edema, N/V/D, cough, congestion, fever, chills or recent sick contact.    Of note, pt was admitted to [facility] in Sept 2023 for L leg discomfort. MR imaging with finding of moderate-severe spinal stenosis and was considered candidate for medical therapy instead of surgery. Patient is now admitted to cardiac/tele for suspicion of CHF and further ischemic evaluation pending optimization including Leukocytosis workup.     -Cardiac cath 6/5/23: IVUS guided JOSÉ/scoreflex to mLAD 95%, OM2  unable to cross, pRCA 50%, pLCx 50%, dLCx ectatic w/ slow [place], EDP 5, access R radial.  -TTE 3/27/23: LVEF normal 55-60%, no WMA, mild AR/MR/TR/MO.    -VS: /95, HR 89bpm, RR 20, T 97.7, O2 92%.  -Meds in ED: Lasix 20mg IV x 1, Ceftriaxone 1000mg IVPB X 1, Azithromycin 500mg IVP x 1.   -Labs in ED: WBC 16.26, Na 126, proBNP 1279, Trop T 18, Lactate 1.2  -EKG: NSR, TWI [id number]  -RVP: pending  -CXR: per ED MD wet read ?pleural effusion, ?patchy opacities.      Patient is a 87M, (shellfish rxn w/ Lip Swelling ~ 20 yrs ago),  w/ PMHx CAD s/p JOSÉ to mLAD (6/2023), HTN, HLD, cervical spondylosis s/p laminectomy, gastric ulcers, hypothyroidism,  who presents to [facility] ED 10/6/23 for BELLO with minimal exertion, chest pressure, and orthopnea x 3 days. Also reports a fever of 101.8 yesterday but resolved with Tylenol with no recurrence since. Reported constipation at home with response after suppository at home on Tuesday (10/3), however, no BM since then. Does report pinpoint RUQ abdominal discomfort worse with palpation. 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 His appetite and fluid intake has decreased as well. Denies any palpitations, dizziness, syncope, diaphoresis, LE edema, N/V/D, cough, congestion, fever, chills or recent sick contact.    Of note, pt was admitted to [facility] in Sept 2023 for L leg discomfort. MR imaging with finding of moderate-severe spinal stenosis and was considered candidate for medical therapy instead of surgery. Patient is now admitted to cardiac/tele for suspicion of CHF and further ischemic evaluation pending optimization including Leukocytosis workup.     -Cardiac cath 6/5/23: IVUS guided JOSÉ/scoreflex to mLAD 95%, OM2  unable to cross, pRCA 50%, pLCx 50%, dLCx ectatic w/ slow [place], EDP 5, access R radial.  -TTE 3/27/23: LVEF normal 55-60%, no WMA, mild AR/MR/TR/MI.    -VS: /95, HR 89bpm, RR 20, T 97.7, O2 92%.  -Meds in ED: Lasix 20mg IV x 1, Ceftriaxone 1000mg IVPB X 1, Azithromycin 500mg IVP x 1.   -Labs in ED: WBC 16.26, Na 126, proBNP 1279, Trop T 18, Lactate 1.2  -EKG: NSR, TWI [id number]  -RVP: pending  -CXR: per ED MD wet read ?pleural effusion, ?patchy opacities.      Patient is a 87M, (shellfish rxn w/ Lip Swelling ~ 20 yrs ago),  w/ PMHx CAD s/p JOSÉ to mLAD (6/2023), HTN, HLD, cervical spondylosis s/p laminectomy, gastric ulcers, hypothyroidism,  who presents to [facility] ED 10/6/23 for BELLO with minimal exertion, chest pressure, and orthopnea x 3 days. Also reports a fever of 101.8 yesterday but resolved with Tylenol with no recurrence since. Reported constipation at home with response after suppository at home on Tuesday (10/3), however, no BM since then. Does report pinpoint RUQ abdominal discomfort worse with palpation. 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 His appetite and fluid intake has decreased as well. Denies any palpitations, dizziness, syncope, diaphoresis, LE edema, N/V/D, cough, congestion, fever, chills or recent sick contact.    Of note, pt was admitted to [facility] in Sept 2023 for L leg discomfort. MR imaging with finding of moderate-severe spinal stenosis and was considered candidate for medical therapy instead of surgery. Patient is now admitted to cardiac/tele for suspicion of CHF and further ischemic evaluation pending optimization including Leukocytosis workup.     -Cardiac cath 6/5/23: IVUS guided JOSÉ/scoreflex to mLAD 95%, OM2  unable to cross, pRCA 50%, pLCx 50%, dLCx ectatic w/ slow [place], EDP 5, access R radial.  -TTE 3/27/23: LVEF normal 55-60%, no WMA, mild AR/MR/TR/IL.    -VS: /95, HR 89bpm, RR 20, T 97.7, O2 92%.  -Meds in ED: Lasix 20mg IV x 1, Ceftriaxone 1000mg IVPB X 1, Azithromycin 500mg IVP x 1.   -Labs in ED: WBC 16.26, Na 126, proBNP 1279, Trop T 18, Lactate 1.2  -EKG: NSR, TWI [id number]  -RVP: pending  -CXR: per ED MD wet read ?pleural effusion, ?patchy opacities.      Patient is a 87M, (shellfish rxn w/ Lip Swelling ~ 20 yrs ago),  w/ PMHx CAD s/p JOSÉ to mLAD (6/2023), HTN, HLD, cervical spondylosis s/p laminectomy, gastric ulcers, hypothyroidism,  who presents to [facility] ED 10/6/23 for BELLO with minimal exertion, chest pressure, and orthopnea x 3 days. Also reports a fever of 101.8 yesterday but resolved with Tylenol with no recurrence since. Reported constipation at home with response after suppository at home on Tuesday (10/3), however, no BM since then. Does report pinpoint RUQ abdominal discomfort worse with palpation. 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 His appetite and fluid intake has decreased as well. Denies any palpitations, dizziness, syncope, diaphoresis, LE edema, N/V/D, cough, congestion, fever, chills or recent sick contact.    Of note, pt was admitted to [facility] in Sept 2023 for L leg discomfort. MR imaging with finding of moderate-severe spinal stenosis and was considered candidate for medical therapy instead of surgery. Patient is now admitted to cardiac/tele for suspicion of CHF and further ischemic evaluation pending optimization including Leukocytosis workup.     -Cardiac cath 6/5/23: IVUS guided JOSÉ/scoreflex to mLAD 95%, OM2  unable to cross, pRCA 50%, pLCx 50%, dLCx ectatic w/ slow [place], EDP 5, access R radial.  -TTE 3/27/23: LVEF normal 55-60%, no WMA, mild AR/MR/TR/ID.    -VS: /95, HR 89bpm, RR 20, T 97.7, O2 92%.  -Meds in ED: Lasix 20mg IV x 1, Ceftriaxone 1000mg IVPB X 1, Azithromycin 500mg IVP x 1.   -Labs in ED: WBC 16.26, Na 126, proBNP 1279, Trop T 18, Lactate 1.2  -EKG: NSR, TWI [id number]  -RVP: pending  -CXR: per ED MD wet read ?pleural effusion, ?patchy opacities.

## 2023-10-06 NOTE — H&P ADULT - PROBLEM SELECTOR PLAN 2
Continue home metoprolol 12.5mg qd Reports sharp pinpoint RUQ discomfort worsened with palpation x 1-2 days. Last BM 10/3 w/ suppository at home.   -Has had issues with constipation   -CXR abd for evaluation of constipation  -RUQ abdominal u/s for further pain evaluation  -Consider repeat suppository as indicated Reports sharp pinpoint RUQ discomfort worsened with palpation x 1-2 days. Last BM 10/3 w/ suppository at home. Daughter reports decreased eating and fluid intake at home lately.  -Has had issues with constipation   -CXR abd for evaluation of constipation  -RUQ abdominal u/s for further pain evaluation  -Consider repeat suppository as indicated Reports sharp pinpoint RUQ discomfort worsened with palpation x 1-2 days. Last BM 10/3 w/ suppository at home. Daughter reports decreased eating and fluid intake at home lately.  -Has had issues with constipation. Has been taking Dulcolax x 2 weeks at home.   -CXR abd for evaluation of constipation  -RUQ abdominal u/s for further pain evaluation  -Consider repeat suppository as indicated pending above testing

## 2023-10-06 NOTE — H&P ADULT - NSICDXPASTMEDICALHX_GEN_ALL_CORE_FT
PAST MEDICAL HISTORY:  CAD (coronary artery disease)     History of BPH     HLD (hyperlipidemia)     Hypothyroid

## 2023-10-06 NOTE — ED PROVIDER NOTE - OBJECTIVE STATEMENT
87M w/ PMH of HLD, Hypothyroidism, BPH, CAD s/p PCI to JOSÉ 6/2023 with OM2  unable to cross (on asa and plavix) c/o intermittent midsternal chest pressure x 3 days. Associated with sob and BELLO (with only a few steps). Pt reports SOB is worse when lying flat. Also reports a fever of 101.8 yesterday but resolved. Tested for covid at home which was negative. Denies chills, cough, URI sx, n/v/d, abd pain, dysuria, hematuria, LE swelling. Reports constipation, BM 3 days ago after suppository, passing gas normally, no hx of previous abd surgeries.

## 2023-10-06 NOTE — H&P ADULT - PROBLEM SELECTOR PLAN 8
per Daughter, pt's home Flomax d/c recently as was not beneficial. Pt has been voiding on his own.  -monitor for any urinary retention.      F: none  E: Replace if K < 4, Mag < 2  N: Dash, diet  Dispo: pending clinical course.

## 2023-10-06 NOTE — H&P ADULT - NS ATTEND AMEND GEN_ALL_CORE FT
87M, (shellfish rxn w/ Lip Swelling ~ 20 yrs ago),  w/ PMHx CAD s/p JOSÉ to mLAD (6/2023), HTN, HLD, cervical spondylosis s/p laminectomy, gastric ulcers, hypothyroidism,  who presents to [facility] ED 10/6/23 for BELLO with minimal exertion, chest pressure, and orthopnea x 3 days. Also reports a fever of 101.8 yesterday.    1. CAD  Hx of CAD with now exertional angina. Continue DAPT, lipitor, metoprolol. [facility] on Monday.    2. Fever leukocytosis.  Unclear etiology. On examination, RLQ pain, no evidence of peritonitis on examination but pain worse with coughing. CT abd for further evaluation, medicine co-management. 87M, (shellfish rxn w/ Lip Swelling ~ 20 yrs ago),  w/ PMHx CAD s/p JOSÉ to mLAD (6/2023), HTN, HLD, cervical spondylosis s/p laminectomy, gastric ulcers, hypothyroidism,  who presents to [facility] ED 10/6/23 for BELLO with minimal exertion, chest pressure, and orthopnea x 3 days. Also reports a fever of 101.8 yesterday.    1. CAD  Hx of CAD with now exertional angina. Continue DAPT, lipitor, metoprolol. [facility] on Monday.    2. Fever leukocytosis.  Unclear etiology. On examination, RLQ pain, no evidence of peritonitis on examination but pain worse with coughing. CT abd for further evaluation, medicine co-management. 87M, (shellfish rxn w/ Lip Swelling ~ 20 yrs ago),  w/ PMHx CAD s/p JOSÉ to mLAD (6/2023), HTN, HLD, cervical spondylosis s/p laminectomy, gastric ulcers, hypothyroidism,  who presents to [facility] ED 10/6/23 for BELLO with minimal exertion, chest pressure, and orthopnea x 3 days. Also reports a fever of 101.8 yesterday.    1. CAD  Hx of CAD with now exertional angina. Continue DAPT, lipitor, metoprolol. [facility] on Monday.    2. Fever leukocytosis.  Unclear etiology. On examination, RLQ pain, no evidence of peritonitis on examination but pain worse with coughing. CT abd for further evaluation, medicine co-management.

## 2023-10-06 NOTE — ED ADULT TRIAGE NOTE - CHIEF COMPLAINT QUOTE
86 y/o male brought by family for evaluation of SOB, chest pressure and intermittent fevers for the past week, hx of cardiac stent in June 2023. Pt with recent admission for spinal stenosis intractable pain. 88 y/o male brought by family for evaluation of SOB, chest pressure and intermittent fevers for the past week, hx of cardiac stent in June 2023. Pt with recent admission for spinal stenosis intractable pain.

## 2023-10-06 NOTE — ED ADULT NURSE NOTE - BEFORE THE ILLNESS OR INJURY
Pt had severe nausea with low dose effexor. Discontinued. Discussed possibility of trying remeron 7.5 mg tonight but pt may hold off from antidepressants at the moment. saw psychology intern, Lali Ty, for therapy. Pt also agreed to see Dr. Beyer for outpatient psychiatric follow-up. Got several rounds of zofran today. No

## 2023-10-06 NOTE — H&P ADULT - PROBLEM SELECTOR PLAN 3
continue Lipitor 40mg qd (alternate for home Crestor 20mg qd)  -f/u Lipid panel WBC 16.26 elevated on admission. Reported fever at home yesterday improved w/ Tylenol.   -Afebrile today  -CXR with ?PNA vs CHF- f/u official read.  -s/p abx IV Ceftriaxone and Azithromycin in ED  -Lactate 1.2 wnl  -f/u blood cultures  -f/u RVP  -f/u Procal  -GI PCR ordered  -UA ordered WBC 16.26 elevated on admission. Reported fever at home yesterday improved w/ Tylenol.   -Afebrile today  -CXR with ?PNA vs CHF- f/u official read.  -s/p abx IV Ceftriaxone and Azithromycin in ED  -Lactate 1.2 wnl  -RVP negative  -procal 0.25 elevated  -UA negative  -f/u blood cultures  -GI PCR ordered WBC 16.26 elevated on admission. Reported fever at home yesterday improved w/ Tylenol.   -Afebrile today  -CXR with ?PNA vs CHF- f/u official read.  -s/p abx IV Ceftriaxone x 5 days and Azithromycin x 1 in ED. Continued both abx for now.   -Lactate 1.2 wnl  -RVP negative  -procal 0.25 elevated  -UA positive (however asxs)  -f/u urine culture   -f/u blood cultures  -consider ID consult for ?PNA and UTI in AM WBC 16.26 elevated on admission. Reported fever at home yesterday improved w/ Tylenol.   -Afebrile today  -CXR with ?PNA vs CHF- f/u official read.  -s/p abx IV Ceftriaxone x 5 days and Azithromycin x 1 in ED. Continued both abx for now.   -Lactate 1.2 wnl  -RVP negative  -procal 0.25 elevated  -UA positive (however asxs)  -f/u urine culture   -f/u add-on Lipase lab  -f/u Legionella urine  -f/u ESR, CRP.   -f/u blood cultures  -consider ID consult for ?PNA and UTI in AM

## 2023-10-06 NOTE — H&P ADULT - NSHPLABSRESULTS_GEN_ALL_CORE
14.9   16.26 )-----------( 193      ( 06 Oct 2023 17:40 )             46.2       10-06    126<L>  |  93<L>  |  14  ----------------------------<  143<H>  4.3   |  23  |  1.10    Ca    9.1      06 Oct 2023 17:40    TPro  7.7  /  Alb  3.3  /  TBili  0.6  /  DBili  x   /  AST  17  /  ALT  15  /  AlkPhos  89  10-06      PT/INR - ( 06 Oct 2023 17:40 )   PT: 12.8 sec;   INR: 1.13          PTT - ( 06 Oct 2023 17:40 )  PTT:35.8 sec    CARDIAC MARKERS ( 06 Oct 2023 17:40 )  x     / x     / 95 U/L / x     / 1.4 ng/mL        Urinalysis Basic - ( 06 Oct 2023 17:40 )    Color: x / Appearance: x / SG: x / pH: x  Gluc: 143 mg/dL / Ketone: x  / Bili: x / Urobili: x   Blood: x / Protein: x / Nitrite: x   Leuk Esterase: x / RBC: x / WBC x   Sq Epi: x / Non Sq Epi: x / Bacteria: x        EKG: NSR, TWI V2

## 2023-10-06 NOTE — H&P ADULT - PROBLEM SELECTOR PLAN 1
Presents w/ BELLO w/ minimal exertion, chest pain, orthopnea (2 pillow use)  x 3 days  -Prior hx CAD: JOSÉ mLAD in 6/2023. Compliant w/ DAPT aspirin/Plavix.  -EKG nonischemic.  -Troponin T x 1 negative  -f/u Trop trend  -f/u EKG am  -Echo ordered  -Plan: Initially plan for cath at the time of his ER arrival, however, delayed given his clinical presentation and negative Trop. Now possible Cath Mon (10/9/23) w/ Dr. Jarvis pending clinical course. Consent in chart. Presents w/ BELLO w/ minimal exertion, chest pain, orthopnea (2 pillow use)  x 3 days. proBNP 1279  -Prior hx CAD: JOSÉ mLAD in 6/2023. Compliant w/ DAPT aspirin/Plavix.  -EKG nonischemic.  -Troponin T x 1 negative  -f/u Trop trend  -f/u EKG am  -Echo ordered  -Received Lasix 20mg IV x 1 in ED.   -Appears clinically euvolemic currently, reassess need for lasix in AM.   -Plan: Initially plan for cath at the time of his ER arrival, however, delayed given his clinical presentation and negative Trop. Now possible Cath Mon (10/9/23) w/ Dr. Jarvis pending clinical course. Consent in chart.

## 2023-10-06 NOTE — PATIENT PROFILE ADULT - FALL HARM RISK - FALL HARM RISK
Mercedes Flap Text: The defect edges were debeveled with a #15 scalpel blade.  Given the location of the defect, shape of the defect and the proximity to free margins a Mercedes flap was deemed most appropriate.  Using a sterile surgical marker, an appropriate advancement flap was drawn incorporating the defect and placing the expected incisions within the relaxed skin tension lines where possible. The area thus outlined was incised deep to adipose tissue with a #15 scalpel blade.  The skin margins were undermined to an appropriate distance in all directions utilizing iris scissors. Age/Bone Condition

## 2023-10-06 NOTE — ED PROVIDER NOTE - ATTENDING APP SHARED VISIT CONTRIBUTION OF CARE
87M w/ PMH of HLD, Hypothyroidism, BPH, CAD s/p PCI to JOSÉ 6/2023 with OM2  unable to cross (on asa and plavix) c/o intermittent midsternal chest pressure x 3 days with palacio. will check labs, ekg, cxr, reassess.

## 2023-10-06 NOTE — PATIENT PROFILE ADULT - FALL HARM RISK - HARM RISK INTERVENTIONS
Assistance with ambulation/Assistance OOB with selected safe patient handling equipment/Communicate Risk of Fall with Harm to all staff/Discuss with provider need for PT consult/Monitor gait and stability/Reinforce activity limits and safety measures with patient and family/Tailored Fall Risk Interventions/Visual Cue: Yellow wristband and red socks/Bed in lowest position, wheels locked, appropriate side rails in place/Call bell, personal items and telephone in reach/Instruct patient to call for assistance before getting out of bed or chair/Non-slip footwear when patient is out of bed/[place] to call system/Physically safe environment - no spills, clutter or unnecessary equipment/Purposeful Proactive Rounding/Room/bathroom lighting operational, light cord in reach Assistance with ambulation/Assistance OOB with selected safe patient handling equipment/Communicate Risk of Fall with Harm to all staff/Discuss with provider need for PT consult/Monitor gait and stability/Reinforce activity limits and safety measures with patient and family/Tailored Fall Risk Interventions/Visual Cue: Yellow wristband and red socks/Bed in lowest position, wheels locked, appropriate side rails in place/Call bell, personal items and telephone in reach/Instruct patient to call for assistance before getting out of bed or chair/Non-slip footwear when patient is out of bed/[place] to call system/Physically safe environment - no spills, clutter or unnecessary equipment/Purposeful Proactive Rounding/Room/bathroom lighting operational, light cord in reach Assistance with ambulation/Assistance OOB with selected safe patient handling equipment/Communicate Risk of Fall with Harm to all staff/Discuss with provider need for PT consult/Monitor gait and stability/Reinforce activity limits and safety measures with patient and family/Tailored Fall Risk Interventions/Visual Cue: Yellow wristband and red socks/Bed in lowest position, wheels locked, appropriate side rails in place/Call bell, personal items and telephone in reach/Instruct patient to call for assistance before getting out of bed or chair/Non-slip footwear when patient is out of bed/[place] to call system/Physically safe environment - no spills, clutter or unnecessary equipment/Purposeful Proactive Rounding/Room/bathroom lighting operational, light cord in reach

## 2023-10-06 NOTE — ED PROVIDER NOTE - PHYSICAL EXAMINATION
CONSTITUTIONAL: Well-appearing;  in no apparent distress.   HEAD: Normocephalic; atraumatic.   EYES: PERRL; EOM intact; conjunctiva and sclera clear  ENT: normal nose; no rhinorrhea; normal pharynx with no erythema or lesions.   NECK: Supple; non-tender; no LAD; +JVD  CARDIOVASCULAR: Normal S1, S2; No audible murmurs. Regular rate and rhythm.   RESPIRATORY: Breathing easily; breath sounds clear and equal bilaterally; no wheezes, rhonchi, or rales.  GI: Soft; non-distended; non-tender; no palpable organomegaly.   MSK: FROM at all extremities, normal tone   EXT: No cyanosis or edema; N/V intact  SKIN: Normal for age and race; warm; dry; good turgor; no apparent lesions or rash.   NEURO: A & O x 3; face symmetric; grossly unremarkable.   PSYCHOLOGICAL: The patient’s mood and manner are appropriate.

## 2023-10-06 NOTE — ED ADULT NURSE NOTE - NSFALLHARMRISKINTERV_ED_ALL_ED
Communicate risk of Fall with Harm to all staff, patient, and family/Provide visual cue: red socks, yellow wristband, yellow gown, etc/Reinforce activity limits and safety measures with patient and family/Bed in lowest position, wheels locked, appropriate side rails in place/Call bell, personal items and telephone in reach/Instruct patient to call for assistance before getting out of bed/chair/stretcher/Non-slip footwear applied when patient is off stretcher/[place] to call system/Physically safe environment - no spills, clutter or unnecessary equipment/Purposeful Proactive Rounding/Room/bathroom lighting operational, light cord in reach Communicate risk of Fall with Harm to all staff, patient, and family/Provide visual cue: red socks, yellow wristband, yellow gown, etc/Reinforce activity limits and safety measures with patient and family/Bed in lowest position, wheels locked, appropriate side rails in place/Call bell, personal items and telephone in reach/Instruct patient to call for assistance before getting out of bed/chair/stretcher/Non-slip footwear applied when patient is off stretcher/[place] to call system/Physically safe environment - no spills, clutter or unnecessary equipment/Purposeful Proactive Rounding/Room/bathroom lighting operational, light cord in reach Communicate risk of Fall with Harm to all staff, patient, and family/Provide visual cue: red socks, yellow wristband, yellow gown, etc/Reinforce activity limits and safety measures with patient and family/Bed in lowest position, wheels locked, appropriate side rails in place/Call bell, personal items and telephone in reach/Instruct patient to call for assistance before getting out of bed/chair/stretcher/Non-slip footwear applied when patient is off stretcher/[place] to call system/Physically safe environment - no spills, clutter or unnecessary equipment/Purposeful Proactive Rounding/Room/bathroom lighting operational, light cord in reach

## 2023-10-06 NOTE — H&P ADULT - BP NONINVASIVE MEAN (MM HG)
[NL] : warm, clear [FreeTextEntry5] : slight conjunctival injection bilaterally, no crusting or discharge 114

## 2023-10-06 NOTE — ED PROVIDER NOTE - INTERPRETATION
PPD#1  Doing well. Pain well controlled on oral pain medication. Tolerating regular diet. Voiding well. Ambulating without difficulty. Lochia is scant. Breast feeding.   Vitals:    18 1611 18 1715 18 2035 18 0050   BP: 116/58 117/66 103/54 104/62   Pulse:    80   Resp:  16 16 16   Temp:    97.6  F (36.4  C)   TempSrc:    Oral     General Appearance: NAD  Abdomen: Soft, NT, ND. Fundus firm at U-2  Extremities: NT, trace edema    No results found for: HGB]    A/P: 34 year old  PPD#1 s/p . Hemodynamically stable.   -- routine PP cares  -- outpatient precautions, expectations reviewed  -- anticipate discharge home today or tomorrow    Delia Rehman MD  [facility]              
Patient arrived at 1401 via a wheelchair in active labor.  Attended by .  Patient having difficulty focusing during contractions due to pain and initially refused to be examined or placed on monitor.  At 1405 patient did get into bed due to extreme pelvic pressure and agreed to brief fetal monitoring and vaginal exam.  Found to be 9 cm with bulging bag.  Additional staff prepared for delivery and on call OB notified.  FHR rate 120's with no decelerations noted over 6 interrupted minutes of monitoring.  Lander not placed due to patient request. Patient found hands and knees position to be most comfortable.  SROM occurred at 1413 and delivery at 1414 per Dr. Pabon.    S:Delivery  B:Spontaneous Labor,41w1d    A: Patient delivered , intact at 1414 with Dr. COOPER Pabon in attendance and baby placed on mother's abdomen for delayed cord clamping. Baby dried and stimulated. Baby placed skin to skin. Apgars 9/9.  Placenta removal spontaneous. See Flowsheet for VS and PP checks. Labor care plan goals met, transition now to postpartum care.  R: Expect routine postpartum care. Anticipate first feeding within the hour or whenever infant displays feeding cues. Continue skin to skin. Prior discussion with mother indicates that feeding plan is Breast feeding . Educated mother on importance of exclusive breastfeeding, expected feeding readiness cues and encouraged her to observe for these cues while rooming in. Informed her that breastfeeding assistance would be provided.  
Pt dc'd to home stable.  Verbalized understanding of discontinue instructions.  Enc pt to call with concerns.   
Pt feels good and pain is controlled with ibuprofen and tylenol.  Pt would like to go home this afternoon- after 24 hours.  Pt still needs RHogam, waiting for the lab to finish processing.  
normal

## 2023-10-06 NOTE — H&P ADULT - PROBLEM SELECTOR PLAN 4
Reports pinpoint RUQ discomfory Na 126 on admission  -discussed w/ fellow, possible hypervolemic hyponatremia  -monitor trend Na 126 on admission  -discussed w/ fellow, possible hypervolemic hyponatremia  -no neurological sxs currently.   -monitor trend

## 2023-10-06 NOTE — ED ADULT NURSE NOTE - CHIEF COMPLAINT QUOTE
88 y/o male brought by family for evaluation of SOB, chest pressure and intermittent fevers for the past week, hx of cardiac stent in June 2023. Pt with recent admission for spinal stenosis intractable pain. 86 y/o male brought by family for evaluation of SOB, chest pressure and intermittent fevers for the past week, hx of cardiac stent in June 2023. Pt with recent admission for spinal stenosis intractable pain.

## 2023-10-06 NOTE — H&P ADULT - ASSESSMENT
Patient is a 87M, (shellfish rxn w/ Lip Swelling ~ 20 yrs ago, does not eat shellfish since then),  w/ PMHx CAD s/p JOSÉ to mLAD (6/2023), HTN, HLD, cervical spondylosis s/p laminectomy, gastric ulcers, hypothyroidism,  who presents to [facility] ED 10/6/23 for BELLO with minimal exertion, chest pressure, and orthopnea x 3 days. Also reports a fever of 101.8 yesterday but resolved with Tylenol with no recurrence since. Reported constipation at home with response after suppository at home on Tuesday (10/3), however, no BM since then. Does report pinpoint RUQ abdominal discomfort worse with palpation x 1-2 days. 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 CXR with concern for CHF vs PNA. Negative troponin and EKG nonischemic. Patient is now admitted to cardiac/tele for suspicion of CHF and further ischemic evaluation pending optimization including Leukocytosis workup.      Of note, pt was admitted to [facility] in Sept 2023 for L leg discomfort. MR imaging with finding of moderate-severe spinal stenosis and was considered candidate for medical therapy instead of surgery.    -Cardiac cath 6/5/23: IVUS guided JOSÉ/scoreflex to mLAD 95%, OM2  unable to cross, pRCA 50%, pLCx 50%, dLCx ectatic w/ slow [place], EDP 5, access R radial.  -TTE 3/27/23: LVEF normal 55-60%, no WMA, mild AR/MR/TR/[place].    -VS: /95, HR 89bpm, RR 20, T 97.7, O2 92%.  -Meds in ED: Lasix 20mg IV x 1,   -Labs in ED: WBC 16.26, Na 126, proBNP 1279, Trop T 18.  -EKG: NSR, TWI V2  -RVP: pending  -CXR: per ED MD wet read ?pleural effusion, ?patchy opacities.        Patient is a 87M, (shellfish rxn w/ Lip Swelling ~ 20 yrs ago, does not eat shellfish since then),  w/ PMHx CAD s/p JOSÉ to mLAD (6/2023), HTN, HLD, cervical spondylosis s/p laminectomy, gastric ulcers, hypothyroidism,  who presents to [facility] ED 10/6/23 for BELLO with minimal exertion, chest pressure, and orthopnea x 3 days. Also reports a fever of 101.8 yesterday but resolved with Tylenol with no recurrence since. Reported constipation at home with response after suppository at home on Tuesday (10/3), however, no BM since then. Does report pinpoint RUQ abdominal discomfort worse with palpation x 1-2 days. 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 CXR with concern for CHF vs PNA. Negative troponin and EKG nonischemic. Patient is now admitted to cardiac/tele for suspicion of CHF and further ischemic evaluation pending optimization including Leukocytosis workup.      Of note, pt was admitted to [facility] in Sept 2023 for L leg discomfort. MR imaging with finding of moderate-severe spinal stenosis and was considered candidate for medical therapy instead of surgery.    -Cardiac cath 6/5/23: IVUS guided JOSÉ/scoreflex to mLAD 95%, OM2  unable to cross, pRCA 50%, pLCx 50%, dLCx ectatic w/ slow [place], EDP 5, access R radial.  -TTE 3/27/23: LVEF normal 55-60%, no WMA, mild AR/MR/TR/TN.    -VS: /95, HR 89bpm, RR 20, T 97.7, O2 92%.  -Meds in ED: Lasix 20mg IV x 1,   -Labs in ED: WBC 16.26, Na 126, proBNP 1279, Trop T 18.  -EKG: NSR, TWI V2  -RVP: pending  -CXR: per ED MD wet read ?pleural effusion, ?patchy opacities.        Patient is a 87M, (shellfish rxn w/ Lip Swelling ~ 20 yrs ago, does not eat shellfish since then),  w/ PMHx CAD s/p JOSÉ to mLAD (6/2023), HTN, HLD, cervical spondylosis s/p laminectomy, gastric ulcers, hypothyroidism,  who presents to [facility] ED 10/6/23 for BELLO with minimal exertion, chest pressure, and orthopnea x 3 days. Also reports a fever of 101.8 yesterday but resolved with Tylenol with no recurrence since. Reported constipation at home with response after suppository at home on Tuesday (10/3), however, no BM since then. Does report pinpoint RUQ abdominal discomfort worse with palpation x 1-2 days. 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 CXR with concern for CHF vs PNA. Negative troponin and EKG nonischemic. Patient is now admitted to cardiac/tele for suspicion of CHF and further ischemic evaluation pending optimization including Leukocytosis workup.      Of note, pt was admitted to [facility] in Sept 2023 for L leg discomfort. MR imaging with finding of moderate-severe spinal stenosis and was considered candidate for medical therapy instead of surgery.    -Cardiac cath 6/5/23: IVUS guided JOSÉ/scoreflex to mLAD 95%, OM2  unable to cross, pRCA 50%, pLCx 50%, dLCx ectatic w/ slow [place], EDP 5, access R radial.  -TTE 3/27/23: LVEF normal 55-60%, no WMA, mild AR/MR/TR/[place].    -VS: /95, HR 89bpm, RR 20, T 97.7, O2 92%.  -Meds in ED: Lasix 20mg IV x 1,   -Labs in ED: WBC 16.26, Na 126, proBNP 1279, Trop T 18.  -EKG: NSR, TWI V2  -RVP: pending  -CXR: per ED MD wet read ?pleural effusion, ?patchy opacities.        Patient is a 87M, (shellfish rxn w/ Lip Swelling ~ 20 yrs ago, does not eat shellfish since then),  w/ PMHx CAD s/p JOSÉ to mLAD (6/2023), HTN, HLD, cervical spondylosis s/p laminectomy, gastric ulcers, hypothyroidism,  who presents to [facility] ED 10/6/23 for BELLO with minimal exertion, chest pressure, and orthopnea x 3 days. Also reports a fever of 101.8 yesterday but resolved with Tylenol with no recurrence since. Reported constipation at home with response after suppository at home on Tuesday (10/3), however, no BM since then. Does report pinpoint RUQ abdominal discomfort worse with palpation x 1-2 days. 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 His appetite and fluid intake has decreased as well. CXR with concern for CHF vs PNA. Negative troponin and EKG nonischemic. Patient is now admitted to cardiac/tele for suspicion of CHF and further ischemic evaluation pending optimization including Leukocytosis workup.      Of note, pt was admitted to [facility] in Sept 2023 for L leg discomfort. MR imaging with finding of moderate-severe spinal stenosis and was considered candidate for medical therapy instead of surgery.    -Cardiac cath 6/5/23: IVUS guided JOSÉ/scoreflex to mLAD 95%, OM2  unable to cross, pRCA 50%, pLCx 50%, dLCx ectatic w/ slow [place], EDP 5, access R radial.  -TTE 3/27/23: LVEF normal 55-60%, no WMA, mild AR/MR/TR/PA.    -VS: /95, HR 89bpm, RR 20, T 97.7, O2 92%.  -Meds in ED: Lasix 20mg IV x 1,   -Labs in ED: WBC 16.26, Na 126, proBNP 1279, Trop T 18.  -EKG: NSR, TWI V2  -RVP: pending  -CXR: per ED MD wet read ?pleural effusion, ?patchy opacities.        Patient is a 87M, (shellfish rxn w/ Lip Swelling ~ 20 yrs ago, does not eat shellfish since then),  w/ PMHx CAD s/p JOSÉ to mLAD (6/2023), HTN, HLD, cervical spondylosis s/p laminectomy, gastric ulcers, hypothyroidism,  who presents to [facility] ED 10/6/23 for BELLO with minimal exertion, chest pressure, and orthopnea x 3 days. Also reports a fever of 101.8 yesterday but resolved with Tylenol with no recurrence since. Reported constipation at home with response after suppository at home on Tuesday (10/3), however, no BM since then. Does report pinpoint RUQ abdominal discomfort worse with palpation x 1-2 days. 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 His appetite and fluid intake has decreased as well. CXR with concern for CHF vs PNA. Negative troponin and EKG nonischemic. Patient is now admitted to cardiac/tele for suspicion of CHF and further ischemic evaluation pending optimization including Leukocytosis workup.      Of note, pt was admitted to [facility] in Sept 2023 for L leg discomfort. MR imaging with finding of moderate-severe spinal stenosis and was considered candidate for medical therapy instead of surgery.    -Cardiac cath 6/5/23: IVUS guided JOSÉ/scoreflex to mLAD 95%, OM2  unable to cross, pRCA 50%, pLCx 50%, dLCx ectatic w/ slow [place], EDP 5, access R radial.  -TTE 3/27/23: LVEF normal 55-60%, no WMA, mild AR/MR/TR/MD.    -VS: /95, HR 89bpm, RR 20, T 97.7, O2 92%.  -Meds in ED: Lasix 20mg IV x 1,   -Labs in ED: WBC 16.26, Na 126, proBNP 1279, Trop T 18.  -EKG: NSR, TWI V2  -RVP: pending  -CXR: per ED MD wet read ?pleural effusion, ?patchy opacities.        Patient is a 87M, (shellfish rxn w/ Lip Swelling ~ 20 yrs ago, does not eat shellfish since then),  w/ PMHx CAD s/p JOSÉ to mLAD (6/2023), HTN, HLD, cervical spondylosis s/p laminectomy, gastric ulcers, hypothyroidism,  who presents to [facility] ED 10/6/23 for BELLO with minimal exertion, chest pressure, and orthopnea x 3 days. Also reports a fever of 101.8 yesterday but resolved with Tylenol with no recurrence since. Reported constipation at home with response after suppository at home on Tuesday (10/3), however, no BM since then. Does report pinpoint RUQ abdominal discomfort worse with palpation x 1-2 days. 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 His appetite and fluid intake has decreased as well. CXR with concern for CHF vs PNA. Negative troponin and EKG nonischemic. Patient is now admitted to cardiac/tele for suspicion of CHF and further ischemic evaluation pending optimization including Leukocytosis workup.      Of note, pt was admitted to [facility] in Sept 2023 for L leg discomfort. MR imaging with finding of moderate-severe spinal stenosis and was considered candidate for medical therapy instead of surgery.    -Cardiac cath 6/5/23: IVUS guided JOSÉ/scoreflex to mLAD 95%, OM2  unable to cross, pRCA 50%, pLCx 50%, dLCx ectatic w/ slow [place], EDP 5, access R radial.  -TTE 3/27/23: LVEF normal 55-60%, no WMA, mild AR/MR/TR/MA.    -VS: /95, HR 89bpm, RR 20, T 97.7, O2 92%.  -Meds in ED: Lasix 20mg IV x 1,   -Labs in ED: WBC 16.26, Na 126, proBNP 1279, Trop T 18.  -EKG: NSR, TWI V2  -RVP: pending  -CXR: per ED MD wet read ?pleural effusion, ?patchy opacities.        Patient is a 87M, (shellfish rxn w/ Lip Swelling ~ 20 yrs ago, does not eat shellfish since then),  w/ PMHx CAD s/p JOSÉ to mLAD (6/2023), HTN, HLD, cervical spondylosis s/p laminectomy, gastric ulcers, hypothyroidism,  who presents to [facility] ED 10/6/23 for BELLO with minimal exertion, chest pressure, and orthopnea x 3 days. Also reports a fever of 101.8 yesterday but resolved with Tylenol with no recurrence since. Reported constipation at home with response after suppository at home on Tuesday (10/3), however, no BM since then. Does report pinpoint RUQ abdominal discomfort worse with palpation x 1-2 days. 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 His appetite and fluid intake has decreased as well. CXR with concern for CHF vs PNA. Negative troponin and EKG nonischemic. Patient is now admitted to cardiac/tele for suspicion of CHF and further ischemic evaluation pending optimization including Leukocytosis workup.      Of note, pt was admitted to [facility] in Sept 2023 for L leg discomfort. MR imaging with finding of moderate-severe spinal stenosis and was considered candidate for medical therapy instead of surgery.    -Cardiac cath 6/5/23: IVUS guided JOSÉ/scoreflex to mLAD 95%, OM2  unable to cross, pRCA 50%, pLCx 50%, dLCx ectatic w/ slow [place], EDP 5, access R radial.  -TTE 3/27/23: LVEF normal 55-60%, no WMA, mild AR/MR/TR/IL.    -VS: /95, HR 89bpm, RR 20, T 97.7, O2 92%.  -Meds in ED: Lasix 20mg IV x 1,   -Labs in ED: WBC 16.26, Na 126, proBNP 1279, Trop T 18, elevated procal 0.25,  -EKG: NSR, TWI V2  -RVP: pending  -CXR: per ED MD wet read ?pleural effusion, ?patchy opacities.        Patient is a 87M, (shellfish rxn w/ Lip Swelling ~ 20 yrs ago, does not eat shellfish since then),  w/ PMHx CAD s/p JOSÉ to mLAD (6/2023), HTN, HLD, cervical spondylosis s/p laminectomy, gastric ulcers, hypothyroidism,  who presents to [facility] ED 10/6/23 for BELLO with minimal exertion, chest pressure, and orthopnea x 3 days. Also reports a fever of 101.8 yesterday but resolved with Tylenol with no recurrence since. Reported constipation at home with response after suppository at home on Tuesday (10/3), however, no BM since then. Does report pinpoint RUQ abdominal discomfort worse with palpation x 1-2 days. 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 His appetite and fluid intake has decreased as well. CXR with concern for CHF vs PNA. Negative troponin and EKG nonischemic. Patient is now admitted to cardiac/tele for suspicion of CHF and further ischemic evaluation pending optimization including Leukocytosis workup.      Of note, pt was admitted to [facility] in Sept 2023 for L leg discomfort. MR imaging with finding of moderate-severe spinal stenosis and was considered candidate for medical therapy instead of surgery.    -Cardiac cath 6/5/23: IVUS guided JOSÉ/scoreflex to mLAD 95%, OM2  unable to cross, pRCA 50%, pLCx 50%, dLCx ectatic w/ slow [place], EDP 5, access R radial.  -TTE 3/27/23: LVEF normal 55-60%, no WMA, mild AR/MR/TR/VA.    -VS: /95, HR 89bpm, RR 20, T 97.7, O2 92%.  -Meds in ED: Lasix 20mg IV x 1,   -Labs in ED: WBC 16.26, Na 126, proBNP 1279, Trop T 18, elevated procal 0.25,  -EKG: NSR, TWI V2  -RVP: pending  -CXR: per ED MD wet read ?pleural effusion, ?patchy opacities.        Patient is a 87M, (shellfish rxn w/ Lip Swelling ~ 20 yrs ago, does not eat shellfish since then),  w/ PMHx CAD s/p JOSÉ to mLAD (6/2023), HTN, HLD, cervical spondylosis s/p laminectomy, gastric ulcers, hypothyroidism,  who presents to [facility] ED 10/6/23 for BELLO with minimal exertion, chest pressure, and orthopnea x 3 days. Also reports a fever of 101.8 yesterday but resolved with Tylenol with no recurrence since. Reported constipation at home with response after suppository at home on Tuesday (10/3), however, no BM since then. Does report pinpoint RUQ abdominal discomfort worse with palpation x 1-2 days. 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 His appetite and fluid intake has decreased as well. CXR with concern for CHF vs PNA. Negative troponin and EKG nonischemic. Patient is now admitted to cardiac/tele for suspicion of CHF and further ischemic evaluation pending optimization including Leukocytosis workup.      Of note, pt was admitted to [facility] in Sept 2023 for L leg discomfort. MR imaging with finding of moderate-severe spinal stenosis and was considered candidate for medical therapy instead of surgery.    -Cardiac cath 6/5/23: IVUS guided JOSÉ/scoreflex to mLAD 95%, OM2  unable to cross, pRCA 50%, pLCx 50%, dLCx ectatic w/ slow [place], EDP 5, access R radial.  -TTE 3/27/23: LVEF normal 55-60%, no WMA, mild AR/MR/TR/DC.    -VS: /95, HR 89bpm, RR 20, T 97.7, O2 92%.  -Meds in ED: Lasix 20mg IV x 1,   -Labs in ED: WBC 16.26, Na 126, proBNP 1279, Trop T 18, elevated procal 0.25,  -EKG: NSR, TWI V2  -RVP: pending  -CXR: per ED MD wet read ?pleural effusion, ?patchy opacities.

## 2023-10-06 NOTE — ED PROVIDER NOTE - PROGRESS NOTE DETAILS
CXR ?pleural effusion, ?patchy opacities  - will give 20IV lasix. wbc elevated, lactate and cultures sent, RVP pending. spoke with Kelly pope fellow still wants admission to [facility] feels there is also a CHF component and may be cathed tomorrow. Advised holding abx at this time CXR ?pleural effusion, ?patchy opacities  - will give 20IV lasix. wbc elevated, lactate and cultures sent, RVP pending. spoke with Kelly pope fellow still wants admission to [facility] feels there is also a CHF component and may be cathed tomorrow. Advised holding abx at this time CXR ?pleural effusion, ?patchy opacities  - will give 20IV lasix. wbc elevated, lactate and cultures sent, RVP pending. spoke with Kelly pope fellow still wants admission to [facility] feels there is also a CHF component and may be cathed tomorrow. Advised holding abx at this time CXR ?pleural effusion, ?patchy opacities  - will give 20IV lasix. wbc elevated, lactate and cultures sent, RVP pending. will cover with dose of abx. spoke with Kelly pope fellow still wants admission to [facility] feels there is also a CHF component and may be cathed tomorrow. CXR ?pleural effusion, ?patchy opacities  - will give 20IV lasix. wbc elevated, lactate and cultures sent, RVP pending. will cover with dose of abx. spoke with Kelly pope fellow still wants admission to [facility] feels there is also a CHF component and may be cathed tomorrow. CXR ?pleural effusion, ?patchy opacities  - will give 20IV lasix. wbc elevated, lactate and cultures sent, RVP pending. will cover with dose of abx. spoke with Kelly pope fellow still wants admission to [facility] feels there is also a CHF component and may be cathed tomorrow.

## 2023-10-06 NOTE — ED ADULT NURSE NOTE - OBJECTIVE STATEMENT
Pt A&Ox4 presents to ED with complaints of shortness of breath x 3 days. Pt endorses chest pain and dyspnea on exertion. Had fever at home yesterday that has resolved. Pt has stent placed in June 2023. SpO2 91% on room air. Placed on 2L nasal canula. SpO2 now 96%. EKG obtained. Placed on continuous cardiac monitor. Denies nausea/vomiting, diarrhea, urinary symptoms.

## 2023-10-06 NOTE — ED PROVIDER NOTE - CLINICAL SUMMARY MEDICAL DECISION MAKING FREE TEXT BOX
87M w/ PMH of HLD, Hypothyroidism, BPH, CAD s/p PCI to JOSÉ 6/2023 with OM2  unable to cross c/o intermittent midsternal chest pressure x 3 days. Associated with sob and BELLO (with only a few steps). Pt reports SOB is worse when lying flat. Also reports a fever of 101.8 yesterday but resolved. Denies chills, cough, URI sx, n/v/d, abd pain, dysuria, hematuria. afebrile. O2 sat 91-92% on RA, 95% on 2L NC. +JVD. Lungs cta b/l. No LE swelling. EKG NSR TWI V2. Labs, cxr r/o acs r/o chf 87M w/ PMH of HLD, Hypothyroidism, BPH, CAD s/p PCI to JOSÉ 6/2023 with OM2  unable to cross c/o intermittent midsternal chest pressure x 3 days. Associated with sob and BELLO (with only a few steps). Pt reports SOB is worse when lying flat. Also reports a fever of 101.8 yesterday but resolved. Denies chills, cough, URI sx, n/v/d, abd pain, dysuria, hematuria. afebrile. O2 sat 91-92% on RA, 95% on 2L NC. +JVD. Lungs cta b/l. No LE swelling. EKG NSR TWI V2. Labs, cxr r/o acs r/o chf r/o pna

## 2023-10-07 DIAGNOSIS — E11.9 TYPE 2 DIABETES MELLITUS WITHOUT COMPLICATIONS: ICD-10-CM

## 2023-10-07 LAB
A1C WITH ESTIMATED AVERAGE GLUCOSE RESULT: 7.7 % — HIGH (ref 4–5.6)
ALBUMIN SERPL ELPH-MCNC: 3.4 G/DL — SIGNIFICANT CHANGE UP (ref 3.3–5)
ALP SERPL-CCNC: 101 U/L — SIGNIFICANT CHANGE UP (ref 40–120)
ALT FLD-CCNC: 16 U/L — SIGNIFICANT CHANGE UP (ref 10–45)
ANION GAP SERPL CALC-SCNC: 11 MMOL/L — SIGNIFICANT CHANGE UP (ref 5–17)
AST SERPL-CCNC: 18 U/L — SIGNIFICANT CHANGE UP (ref 10–40)
BASOPHILS # BLD AUTO: 0.08 K/UL — SIGNIFICANT CHANGE UP (ref 0–0.2)
BASOPHILS NFR BLD AUTO: 0.5 % — SIGNIFICANT CHANGE UP (ref 0–2)
BILIRUB SERPL-MCNC: 0.5 MG/DL — SIGNIFICANT CHANGE UP (ref 0.2–1.2)
BUN SERPL-MCNC: 14 MG/DL — SIGNIFICANT CHANGE UP (ref 7–23)
CALCIUM SERPL-MCNC: 9.2 MG/DL — SIGNIFICANT CHANGE UP (ref 8.4–10.5)
CHLORIDE SERPL-SCNC: 90 MMOL/L — LOW (ref 96–108)
CHOLEST SERPL-MCNC: 97 MG/DL — SIGNIFICANT CHANGE UP
CO2 SERPL-SCNC: 26 MMOL/L — SIGNIFICANT CHANGE UP (ref 22–31)
CREAT SERPL-MCNC: 1.16 MG/DL — SIGNIFICANT CHANGE UP (ref 0.5–1.3)
EGFR: 62 ML/MIN/1.73M2 — SIGNIFICANT CHANGE UP
EOSINOPHIL # BLD AUTO: 0.42 K/UL — SIGNIFICANT CHANGE UP (ref 0–0.5)
EOSINOPHIL NFR BLD AUTO: 2.8 % — SIGNIFICANT CHANGE UP (ref 0–6)
ESTIMATED AVERAGE GLUCOSE: 174 MG/DL — HIGH (ref 68–114)
GLUCOSE BLDC GLUCOMTR-MCNC: 132 MG/DL — HIGH (ref 70–99)
GLUCOSE BLDC GLUCOMTR-MCNC: 148 MG/DL — HIGH (ref 70–99)
GLUCOSE BLDC GLUCOMTR-MCNC: 157 MG/DL — HIGH (ref 70–99)
GLUCOSE SERPL-MCNC: 159 MG/DL — HIGH (ref 70–99)
HCT VFR BLD CALC: 48.8 % — SIGNIFICANT CHANGE UP (ref 39–50)
HDLC SERPL-MCNC: 48 MG/DL — SIGNIFICANT CHANGE UP
HGB BLD-MCNC: 15.8 G/DL — SIGNIFICANT CHANGE UP (ref 13–17)
IMM GRANULOCYTES NFR BLD AUTO: 0.6 % — SIGNIFICANT CHANGE UP (ref 0–0.9)
LACTATE SERPL-SCNC: 1.8 MMOL/L — SIGNIFICANT CHANGE UP (ref 0.5–2)
LIPID PNL WITH DIRECT LDL SERPL: 28 MG/DL — SIGNIFICANT CHANGE UP
LYMPHOCYTES # BLD AUTO: 13.4 % — SIGNIFICANT CHANGE UP (ref 13–44)
LYMPHOCYTES # BLD AUTO: 2.03 K/UL — SIGNIFICANT CHANGE UP (ref 1–3.3)
MAGNESIUM SERPL-MCNC: 2.2 MG/DL — SIGNIFICANT CHANGE UP (ref 1.6–2.6)
MCHC RBC-ENTMCNC: 21 PG — LOW (ref 27–34)
MCHC RBC-ENTMCNC: 32.4 GM/DL — SIGNIFICANT CHANGE UP (ref 32–36)
MCV RBC AUTO: 64.7 FL — LOW (ref 80–100)
MONOCYTES # BLD AUTO: 1.59 K/UL — HIGH (ref 0–0.9)
MONOCYTES NFR BLD AUTO: 10.5 % — SIGNIFICANT CHANGE UP (ref 2–14)
NEUTROPHILS # BLD AUTO: 10.97 K/UL — HIGH (ref 1.8–7.4)
NEUTROPHILS NFR BLD AUTO: 72.2 % — SIGNIFICANT CHANGE UP (ref 43–77)
NON HDL CHOLESTEROL: 49 MG/DL — SIGNIFICANT CHANGE UP
NRBC # BLD: 0 /100 WBCS — SIGNIFICANT CHANGE UP (ref 0–0)
PLATELET # BLD AUTO: 206 K/UL — SIGNIFICANT CHANGE UP (ref 150–400)
POTASSIUM SERPL-MCNC: 3.9 MMOL/L — SIGNIFICANT CHANGE UP (ref 3.5–5.3)
POTASSIUM SERPL-SCNC: 3.9 MMOL/L — SIGNIFICANT CHANGE UP (ref 3.5–5.3)
PROCALCITONIN SERPL-MCNC: 0.24 NG/ML — HIGH (ref 0.02–0.1)
PROT SERPL-MCNC: 7.8 G/DL — SIGNIFICANT CHANGE UP (ref 6–8.3)
RBC # BLD: 7.54 M/UL — HIGH (ref 4.2–5.8)
RBC # FLD: 17.9 % — HIGH (ref 10.3–14.5)
SODIUM SERPL-SCNC: 127 MMOL/L — LOW (ref 135–145)
T4 AB SER-ACNC: 7.18 UG/DL — SIGNIFICANT CHANGE UP (ref 4.5–11.7)
TRIGL SERPL-MCNC: 104 MG/DL — SIGNIFICANT CHANGE UP
TROPONIN T, HIGH SENSITIVITY RESULT: 19 NG/L — SIGNIFICANT CHANGE UP (ref 0–51)
TSH SERPL-MCNC: 10.51 UIU/ML — HIGH (ref 0.27–4.2)
WBC # BLD: 15.18 K/UL — HIGH (ref 3.8–10.5)
WBC # FLD AUTO: 15.18 K/UL — HIGH (ref 3.8–10.5)

## 2023-10-07 PROCEDURE — 74177 CT ABD & PELVIS W/CONTRAST: CPT | Mod: 26

## 2023-10-07 PROCEDURE — 99232 SBSQ HOSP IP/OBS MODERATE 35: CPT

## 2023-10-07 PROCEDURE — 76705 ECHO EXAM OF ABDOMEN: CPT | Mod: 26

## 2023-10-07 PROCEDURE — 99233 SBSQ HOSP IP/OBS HIGH 50: CPT

## 2023-10-07 RX ORDER — DEXTROSE 50 % IN WATER 50 %
25 SYRINGE (ML) INTRAVENOUS ONCE
Refills: 0 | Status: DISCONTINUED | OUTPATIENT
Start: 2023-10-07 | End: 2023-10-14

## 2023-10-07 RX ORDER — SODIUM CHLORIDE 9 MG/ML
1000 INJECTION, SOLUTION INTRAVENOUS
Refills: 0 | Status: DISCONTINUED | OUTPATIENT
Start: 2023-10-07 | End: 2023-10-14

## 2023-10-07 RX ORDER — INSULIN LISPRO 100/ML
VIAL (ML) SUBCUTANEOUS
Refills: 0 | Status: DISCONTINUED | OUTPATIENT
Start: 2023-10-07 | End: 2023-10-14

## 2023-10-07 RX ORDER — POLYETHYLENE GLYCOL 3350 17 G/17G
17 POWDER, FOR SOLUTION ORAL ONCE
Refills: 0 | Status: COMPLETED | OUTPATIENT
Start: 2023-10-07 | End: 2023-10-07

## 2023-10-07 RX ORDER — IOHEXOL 300 MG/ML
30 INJECTION, SOLUTION INTRAVENOUS ONCE
Refills: 0 | Status: COMPLETED | OUTPATIENT
Start: 2023-10-07 | End: 2023-10-07

## 2023-10-07 RX ORDER — DEXTROSE 50 % IN WATER 50 %
12.5 SYRINGE (ML) INTRAVENOUS ONCE
Refills: 0 | Status: DISCONTINUED | OUTPATIENT
Start: 2023-10-07 | End: 2023-10-14

## 2023-10-07 RX ORDER — FUROSEMIDE 40 MG
40 TABLET ORAL DAILY
Refills: 0 | Status: DISCONTINUED | OUTPATIENT
Start: 2023-10-07 | End: 2023-10-09

## 2023-10-07 RX ORDER — SENNA PLUS 8.6 MG/1
2 TABLET ORAL DAILY
Refills: 0 | Status: DISCONTINUED | OUTPATIENT
Start: 2023-10-07 | End: 2023-10-14

## 2023-10-07 RX ORDER — GLUCAGON INJECTION, SOLUTION 0.5 MG/.1ML
1 INJECTION, SOLUTION SUBCUTANEOUS ONCE
Refills: 0 | Status: DISCONTINUED | OUTPATIENT
Start: 2023-10-07 | End: 2023-10-14

## 2023-10-07 RX ORDER — INSULIN LISPRO 100/ML
VIAL (ML) SUBCUTANEOUS AT BEDTIME
Refills: 0 | Status: DISCONTINUED | OUTPATIENT
Start: 2023-10-07 | End: 2023-10-14

## 2023-10-07 RX ORDER — DEXTROSE 50 % IN WATER 50 %
15 SYRINGE (ML) INTRAVENOUS ONCE
Refills: 0 | Status: DISCONTINUED | OUTPATIENT
Start: 2023-10-07 | End: 2023-10-14

## 2023-10-07 RX ADMIN — PANTOPRAZOLE SODIUM 40 MILLIGRAM(S): 20 TABLET, DELAYED RELEASE ORAL at 21:43

## 2023-10-07 RX ADMIN — CLOPIDOGREL BISULFATE 75 MILLIGRAM(S): 75 TABLET, FILM COATED ORAL at 11:11

## 2023-10-07 RX ADMIN — POLYETHYLENE GLYCOL 3350 17 GRAM(S): 17 POWDER, FOR SOLUTION ORAL at 14:03

## 2023-10-07 RX ADMIN — Medication 2: at 16:49

## 2023-10-07 RX ADMIN — GABAPENTIN 100 MILLIGRAM(S): 400 CAPSULE ORAL at 21:43

## 2023-10-07 RX ADMIN — AZITHROMYCIN 255 MILLIGRAM(S): 500 TABLET, FILM COATED ORAL at 18:44

## 2023-10-07 RX ADMIN — SENNA PLUS 2 TABLET(S): 8.6 TABLET ORAL at 14:02

## 2023-10-07 RX ADMIN — Medication 12.5 MILLIGRAM(S): at 09:36

## 2023-10-07 RX ADMIN — Medication 50 MICROGRAM(S): at 06:41

## 2023-10-07 RX ADMIN — CEFTRIAXONE 100 MILLIGRAM(S): 500 INJECTION, POWDER, FOR SOLUTION INTRAMUSCULAR; INTRAVENOUS at 21:43

## 2023-10-07 RX ADMIN — Medication 40 MILLIGRAM(S): at 11:12

## 2023-10-07 RX ADMIN — Medication 81 MILLIGRAM(S): at 11:11

## 2023-10-07 RX ADMIN — ATORVASTATIN CALCIUM 40 MILLIGRAM(S): 80 TABLET, FILM COATED ORAL at 21:43

## 2023-10-07 RX ADMIN — IOHEXOL 30 MILLILITER(S): 300 INJECTION, SOLUTION INTRAVENOUS at 14:03

## 2023-10-07 NOTE — PROGRESS NOTE ADULT - PROBLEM SELECTOR PLAN 4
found to have elevated glucose on BMP  A1c found to be 7.7. no hx of diabetes    - start insulin sliding scale  - monitor finger sticks and switch diet to carb consistent if unable to control  - endocrine consult

## 2023-10-07 NOTE — PROGRESS NOTE ADULT - PROBLEM SELECTOR PLAN 3
WBC 16.26 elevated on admission. Reported fever at home yesterday improved w/ Tylenol. Afebrile on admission  CXR with ?PNA vs CHF- f/u official read. RVP negative. procal 0.25 elevated  -s/p abx IV Ceftriaxone x 5 days and Azithromycin x 1 in ED. Continued both abx for now.   -UA positive (however asxs)  -f/u urine culture   -f/u Legionella urine  -f/u ESR, CRP.   -f/u blood cultures

## 2023-10-07 NOTE — PROGRESS NOTE ADULT - PROBLEM SELECTOR PLAN 5
Na 126 on admission  -discussed w/ fellow, possible hypervolemic hyponatremia  -no neurological sxs currently.   -monitor trend

## 2023-10-07 NOTE — PROGRESS NOTE ADULT - ASSESSMENT
Patient is a 87M, (shellfish rxn w/ Lip Swelling ~ 20 yrs ago, does not eat shellfish since then),  w/ PMHx CAD s/p JOSÉ to mLAD (6/2023), HTN, HLD, cervical spondylosis s/p laminectomy, gastric ulcers, hypothyroidism,  who presents to [facility] ED 10/6/23 for BELLO with minimal exertion, chest pressure, and orthopnea x 3 days. Also reports a fever of 101.8 yesterday but resolved with Tylenol with no recurrence since. Reported constipation at home with response after suppository at home on Tuesday (10/3), however, no BM since then. Does report pinpoint RUQ abdominal discomfort worse with palpation x 1-2 days. 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 His appetite and fluid intake has decreased as well. CXR with concern for CHF vs PNA. Negative troponin and EKG nonischemic. Patient is now admitted to cardiac/tele for suspicion of CHF and further ischemic evaluation pending optimization including Leukocytosis workup.             Patient is a 87M, (shellfish rxn w/ Lip Swelling ~ 20 yrs ago, does not eat shellfish since then),  w/ PMHx CAD s/p JOSÉ to mLAD (6/2023), HTN, HLD, cervical spondylosis s/p laminectomy, gastric ulcers, hypothyroidism,  who presents to [facility] ED 10/6/23 for BELLO with minimal exertion, chest pressure, and orthopnea x 3 days. Also reports a fever of 101.8 yesterday but resolved with Tylenol with no recurrence since. Reported constipation at home with response after suppository at home on Tuesday (10/3), however, no BM since then. Does report pinpoint RUQ abdominal discomfort worse with palpation x 1-2 days. 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 His appetite and fluid intake has decreased as well. CXR with concern for CHF vs PNA. Negative troponin and EKG nonischemic. Patient is now admitted to cardiac/tele for suspicion of CHF and further ischemic evaluation pending optimization including Leukocytosis workup.             Patient is a 87M, (shellfish rxn w/ Lip Swelling ~ 20 yrs ago, does not eat shellfish since then),  w/ PMHx CAD s/p JOSÉ to mLAD (6/2023), HTN, HLD, cervical spondylosis s/p laminectomy, gastric ulcers, hypothyroidism,  who presents to [facility] ED 10/6/23 for BELLO with minimal exertion, chest pressure, and orthopnea x 3 days. Also reports a fever of 101.8 yesterday but resolved with Tylenol with no recurrence since. Reported constipation at home with response after suppository at home on Tuesday (10/3), however, no BM since then. Does report pinpoint RUQ abdominal discomfort worse with palpation x 1-2 days. 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 His appetite and fluid intake has decreased as well. CXR with concern for CHF vs PNA. Negative troponin and EKG nonischemic. Patient is now admitted to cardiac/tele for suspicion of CHF and further ischemic evaluation pending optimization including Leukocytosis workup.

## 2023-10-07 NOTE — PROGRESS NOTE ADULT - ATTENDING COMMENTS
e made amendments to the documentation where necessary. Additional comments: 87M, (shellfish rxn w/ Lip Swelling ~ 20 yrs ago),  w/ PMHx CAD s/p JOSÉ to mLAD (6/2023), HTN, HLD, cervical spondylosis s/p laminectomy, gastric ulcers, hypothyroidism,  who presents to [facility] ED 10/6/23 for BELLO with minimal exertion, chest pressure, and orthopnea x 3 days. Also reports a fever of 101.8 yesterday.    1. CAD  Hx of CAD with now exertional angina. Continue DAPT, lipitor, metoprolol. C on Monday.    2. Fever leukocytosis.  Unclear etiology. On examination, RLQ pain, no evidence of peritonitis on examination but pain worse with coughing. CT abd for further evaluation, medicine co-management.  Continue ABX for ? UTI.    3. Microcytic anemia  Iron studies, electrophoresis. ? gi bleeding e made amendments to the documentation where necessary. Additional comments: 87M, (shellfish rxn w/ Lip Swelling ~ 20 yrs ago),  w/ PMHx CAD s/p JOSÉ to mLAD (6/2023), HTN, HLD, cervical spondylosis s/p laminectomy, gastric ulcers, hypothyroidism,  who presents to [facility] ED 10/6/23 for BELLO with minimal exertion, chest pressure, and orthopnea x 3 days. Also reports a fever of 101.8 yesterday.    1. CAD  Hx of CAD with now exertional angina. Continue DAPT, lipitor, metoprolol. C on Monday.    2. Fever leukocytosis.  Unclear etiology. On examination, RLQ pain, no evidence of peritonitis on examination but pain worse with coughing. CT abd for further evaluation, medicine co-management.  Continue ABX for ? UTI.    3. Microcytic anemia  Iron studies, electrophoresis. ? gi bleeding e made amendments to the documentation where necessary. Additional comments: 87M, (shellfish rxn w/ Lip Swelling ~ 20 yrs ago),  w/ PMHx CAD s/p JOSÉ to mLAD (6/2023), HTN, HLD, cervical spondylosis s/p laminectomy, gastric ulcers, hypothyroidism,  who presents to [facility] ED 10/6/23 for BELLO with minimal exertion, chest pressure, and orthopnea x 3 days. Also reports a fever of 101.8 yesterday.    1. CAD  Hx of CAD with now exertional angina. Continue DAPT, lipitor, metoprolol. C on Monday.    2. Fever leukocytosis.  Unclear etiology. On examination, RLQ pain, no evidence of peritonitis on examination but pain worse with coughing. CT abd for further evaluation, medicine co-management.  Continue ABX for ? UTI.    3. Microcytic anemia  Iron studies, electrophoresis. ? gi bleeding

## 2023-10-07 NOTE — PROGRESS NOTE ADULT - PROBLEM SELECTOR PLAN 2
Reports sharp pinpoint RUQ discomfort worsened with palpation x 1-2 days. Last BM 10/3 w/ suppository at home. Daughter reports decreased eating and fluid intake at home lately.  Has had issues with constipation. Has been taking Dulcolax x 2 weeks at home. CXR abd for evaluation of constipation  - RUQ abdominal u/s for further pain evaluation and CT AP w/ oral and iv contrast iso leukocytosis  - bowel regimen

## 2023-10-07 NOTE — PROGRESS NOTE ADULT - PROBLEM SELECTOR PLAN 1
Presents w/ BELLO w/ minimal exertion, chest pain, orthopnea (2 pillow use)  x 3 days. proBNP 1279. Prior hx CAD: JOSÉ mLAD in 6/2023. Compliant w/ DAPT aspirin/Plavix. Received Lasix 20mg IV x 1 in ED.   EKG nonischemic. Troponin T x 1 negative  -f/u Trop trend  -Echo ordered  -Lasix 40mg IVP x1, reassess lasix need on 10/8 am  -Plan:  possible Cath Mon (10/9/23) w/ Dr. Jarvis pending clinical course. Consent in chart.

## 2023-10-07 NOTE — CONSULT NOTE ADULT - SUBJECTIVE AND OBJECTIVE BOX
HPI:  Patient is a 87M with PMHx CAD s/p JOSÉ to mLAD (6/2023), HTN, HLD, cervical spondylosis s/p laminectomy, gastric ulcers, hypothyroidism,  constipation who presents to [facility] ED 10/6/23 for BELLO with minimal exertion, chest pressure, and orthopnea x 3 days. Also reports a fever of 101.8*F yesterday but resolved with Tylenol with no recurrence since. Reported constipation at home with response after suppository at home on Tuesday (10/3), however, no BM since then. Does report pinpoint RLQ abdominal discomfort worse with palpation. 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 His appetite and fluid intake has decreased as well. Denies any palpitations, dizziness, syncope, diaphoresis, LE edema, N/V/D, cough, congestion, fever, chills or recent sick contact.    Of note, pt was admitted to [facility] in Sept 2023 for L leg discomfort. MR imaging with finding of moderate-severe spinal stenosis and was considered candidate for medical therapy instead of surgery. Patient is now admitted to cardiac/tele for suspicion of CHF and further ischemic evaluation pending optimization including Leukocytosis workup.     -Cardiac cath 6/5/23: IVUS guided JOSÉ/scoreflex to mLAD 95%, OM2  unable to cross, pRCA 50%, pLCx 50%, dLCx ectatic w/ slow [place], EDP 5, access R radial.  -TTE 3/27/23: LVEF normal 55-60%, no WMA, mild AR/MR/TR/WI.    -VS: /95, HR 89bpm, RR 20, T 97.7, O2 92%.  -Meds in ED: Lasix 20mg IV x 1, Ceftriaxone 1000mg IVPB X 1, Azithromycin 500mg IVP x 1.   -Labs in ED: WBC 16.26, Na 126, proBNP 1279, Trop T 18, Lactate 1.2  -EKG: NSR, TWI [id number]  -RVP: pending  -CXR: per ED MD wet read ?pleural effusion, ?patchy opacities.      (06 Oct 2023 18:23)      PAST MEDICAL & SURGICAL HISTORY:  HLD (hyperlipidemia)      Hypothyroid      History of BPH      CAD (coronary artery disease)      S/P laminectomy with spinal fusion        Home Medications:  cyclobenzaprine 5 mg oral tablet: 1 tab(s) orally 2 times a day (06 Oct 2023 19:43)  Dulcolax Laxative 5 mg oral delayed release tablet: 1 tab(s) orally once a day (06 Oct 2023 19:46)  levothyroxine 50 mcg (0.05 mg) oral tablet: 1 tab(s) orally once a day (06 Oct 2023 19:45)  metoprolol succinate 25 mg oral capsule, extended release: 0.5 tab(s) orally once a day (06 Oct 2023 19:44)  rosuvastatin 20 mg oral tablet: 1 orally once a day (at bedtime) (06 Oct 2023 19:45)    Allergies    No Known Drug Allergies  shellfish (Swelling; Hives)    Intolerances      FAMILY HISTORY:    Social History:      REVIEW OF SYSTEMS:  CONSTITUTIONAL: No fever, weight loss  EYES: No eye pain, or discharge  ENMT:  No tinnitus, vertigo  NECK: No pain or stiffness  RESPIRATORY: No cough, No dyspnea  CARDIOVASCULAR: No chest pain, or leg swelling  GASTROINTESTINAL: No abdominal pain. No diarrhea ;No melena or hematochezia.  GENITOURINARY: No dysuria, frequency, or hematuria  NEUROLOGICAL: No numbness, or tremors  SKIN: No itching, burning, rashes, or lesions   ENDOCRINE: No heat or cold intolerance;  MUSCULOSKELETAL: No joint pain or swelling;   PSYCHIATRIC: No mood swings, or difficulty sleeping  HEME/LYMPH: No easy bruising, or bleeding gums  ALLERGY AND IMMUNOLOGIC: No hives or eczema    Diet, DASH/TLC:   Sodium & Cholesterol Restricted (10-06-23 @ 20:44) [Active]      CURRENT MEDICATIONS:   aspirin enteric coated 81 milliGRAM(s) Oral daily  atorvastatin 40 milliGRAM(s) Oral at bedtime  azithromycin  IVPB 500 milliGRAM(s) IV Intermittent every 24 hours  cefTRIAXone   IVPB 1000 milliGRAM(s) IV Intermittent every 24 hours  clopidogrel Tablet 75 milliGRAM(s) Oral daily  cyclobenzaprine 5 milliGRAM(s) Oral two times a day PRN  dextrose 5%. 1000 milliLiter(s) IV Continuous <Continuous>  dextrose 5%. 1000 milliLiter(s) IV Continuous <Continuous>  dextrose 50% Injectable 25 Gram(s) IV Push once  dextrose 50% Injectable 12.5 Gram(s) IV Push once  dextrose 50% Injectable 25 Gram(s) IV Push once  dextrose Oral Gel 15 Gram(s) Oral once PRN  gabapentin 100 milliGRAM(s) Oral at bedtime  glucagon  Injectable 1 milliGRAM(s) IntraMuscular once  insulin lispro (ADMELOG) corrective regimen sliding scale   SubCutaneous three times a day before meals  insulin lispro (ADMELOG) corrective regimen sliding scale   SubCutaneous at bedtime  levothyroxine 50 MICROGram(s) Oral daily  metoprolol succinate ER 12.5 milliGRAM(s) Oral daily  oxyCODONE    IR 5 milliGRAM(s) Oral every 6 hours PRN  pantoprazole    Tablet 40 milliGRAM(s) Oral at bedtime      VITAL SIGNS, INS/OUTS (last 24 hours):  Vital Signs Last 24 Hrs  T(C): 36.5 (07 Oct 2023 09:12), Max: 36.8 (06 Oct 2023 21:59)  T(F): 97.7 (07 Oct 2023 09:12), Max: 98.3 (06 Oct 2023 21:59)  HR: 76 (07 Oct 2023 09:12) (71 - 89)  BP: 140/74 (07 Oct 2023 09:12) (103/72 - 152/95)  BP(mean): 84 (07 Oct 2023 05:50) (79 - 114)  RR: 17 (07 Oct 2023 09:12) (17 - 20)  SpO2: 94% (07 Oct 2023 09:12) (92% - 97%)    Parameters below as of 07 Oct 2023 09:12  Patient On (Oxygen Delivery Method): nasal cannula  O2 Flow (L/min): 2    I&O's Summary    06 Oct 2023 07:01  -  07 Oct 2023 07:00  --------------------------------------------------------  IN: 0 mL / OUT: 300 mL / NET: -300 mL    07 Oct 2023 07:01  -  07 Oct 2023 10:44  --------------------------------------------------------  IN: 180 mL / OUT: 125 mL / NET: 55 mL        PHYSICAL EXAM:  Gen: Reclining in bed at time of exam, appears stated age, on NC2L with SpO2 96%  HEENT: NCAT, MMM, clear OP  Neck: supple, trachea at midline  CV: RRR, +S1/S2  Pulm: adequate respiratory effort, no increase in work of breathing, decreased BS lung base b/l  Abd: mildly distended, normoactive BS, soft NT   Skin: warm and dry,  Ext: WWP, no LE edema  Neuro: AOx3, no gross focal neurological deficits  Psych: affect and behavior appropriate, pleasant at time of interview    BASIC LABS:                        15.8   15.18 )-----------( 206      ( 07 Oct 2023 06:16 )             48.8     10-07    127<L>  |  90<L>  |  14  ----------------------------<  159<H>  3.9   |  26  |  1.16    Ca    9.2      07 Oct 2023 06:16  Mg     2.2     10-07    TPro  7.8  /  Alb  3.4  /  TBili  0.5  /  DBili  x   /  AST  18  /  ALT  16  /  AlkPhos  101  10-07    PT/INR - ( 06 Oct 2023 17:40 )   PT: 12.8 sec;   INR: 1.13          PTT - ( 06 Oct 2023 17:40 )  PTT:35.8 sec  Urinalysis Basic - ( 07 Oct 2023 06:16 )    Color: x / Appearance: x / SG: x / pH: x  Gluc: 159 mg/dL / Ketone: x  / Bili: x / Urobili: x   Blood: x / Protein: x / Nitrite: x   Leuk Esterase: x / RBC: x / WBC x   Sq Epi: x / Non Sq Epi: x / Bacteria: x      CAPILLARY BLOOD GLUCOSE          OTHER LABS:  CARDIAC MARKERS ( 06 Oct 2023 17:40 )  x     / x     / 95 U/L / x     / 1.4 ng/mL        MICRODATA:    Culture - Blood (collected 06 Oct 2023 18:55)  Source: .Blood Blood-Peripheral  Preliminary Report (07 Oct 2023 08:01):    No growth at 12 hours    Culture - Blood (collected 06 Oct 2023 18:20)  Source: .Blood Blood-Peripheral  Preliminary Report (07 Oct 2023 08:01):    No growth at 12 hours    Urinalysis with Rflx Culture (collected 06 Oct 2023 18:11)    Culture - Urine (collected 06 Oct 2023 18:11)  Source: Clean Catch None  Preliminary Report (07 Oct 2023 08:14):    Culture in progress        IMAGING:    EKG:    #Diet - Diet, DASH/TLC:   Sodium & Cholesterol Restricted (10-06-23 @ 20:44) [Active]        #DVT PPx -  HPI:  Patient is a 87M with PMHx CAD s/p JOSÉ to mLAD (6/2023), HTN, HLD, cervical spondylosis s/p laminectomy, gastric ulcers, hypothyroidism,  constipation who presents to [facility] ED 10/6/23 for BELLO with minimal exertion, chest pressure, and orthopnea x 3 days. Also reports a fever of 101.8*F yesterday but resolved with Tylenol with no recurrence since. Reported constipation at home with response after suppository at home on Tuesday (10/3), however, no BM since then. Does report pinpoint RLQ abdominal discomfort worse with palpation. 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 His appetite and fluid intake has decreased as well. Denies any palpitations, dizziness, syncope, diaphoresis, LE edema, N/V/D, cough, congestion, fever, chills or recent sick contact.    Of note, pt was admitted to [facility] in Sept 2023 for L leg discomfort. MR imaging with finding of moderate-severe spinal stenosis and was considered candidate for medical therapy instead of surgery. Patient is now admitted to cardiac/tele for suspicion of CHF and further ischemic evaluation pending optimization including Leukocytosis workup.     -Cardiac cath 6/5/23: IVUS guided JOSÉ/scoreflex to mLAD 95%, OM2  unable to cross, pRCA 50%, pLCx 50%, dLCx ectatic w/ slow [place], EDP 5, access R radial.  -TTE 3/27/23: LVEF normal 55-60%, no WMA, mild AR/MR/TR/SC.    -VS: /95, HR 89bpm, RR 20, T 97.7, O2 92%.  -Meds in ED: Lasix 20mg IV x 1, Ceftriaxone 1000mg IVPB X 1, Azithromycin 500mg IVP x 1.   -Labs in ED: WBC 16.26, Na 126, proBNP 1279, Trop T 18, Lactate 1.2  -EKG: NSR, TWI [id number]  -RVP: pending  -CXR: per ED MD wet read ?pleural effusion, ?patchy opacities.      (06 Oct 2023 18:23)      PAST MEDICAL & SURGICAL HISTORY:  HLD (hyperlipidemia)      Hypothyroid      History of BPH      CAD (coronary artery disease)      S/P laminectomy with spinal fusion        Home Medications:  cyclobenzaprine 5 mg oral tablet: 1 tab(s) orally 2 times a day (06 Oct 2023 19:43)  Dulcolax Laxative 5 mg oral delayed release tablet: 1 tab(s) orally once a day (06 Oct 2023 19:46)  levothyroxine 50 mcg (0.05 mg) oral tablet: 1 tab(s) orally once a day (06 Oct 2023 19:45)  metoprolol succinate 25 mg oral capsule, extended release: 0.5 tab(s) orally once a day (06 Oct 2023 19:44)  rosuvastatin 20 mg oral tablet: 1 orally once a day (at bedtime) (06 Oct 2023 19:45)    Allergies    No Known Drug Allergies  shellfish (Swelling; Hives)    Intolerances      FAMILY HISTORY:    Social History:      REVIEW OF SYSTEMS:  CONSTITUTIONAL: No fever, weight loss  EYES: No eye pain, or discharge  ENMT:  No tinnitus, vertigo  NECK: No pain or stiffness  RESPIRATORY: No cough, No dyspnea  CARDIOVASCULAR: No chest pain, or leg swelling  GASTROINTESTINAL: No abdominal pain. No diarrhea ;No melena or hematochezia.  GENITOURINARY: No dysuria, frequency, or hematuria  NEUROLOGICAL: No numbness, or tremors  SKIN: No itching, burning, rashes, or lesions   ENDOCRINE: No heat or cold intolerance;  MUSCULOSKELETAL: No joint pain or swelling;   PSYCHIATRIC: No mood swings, or difficulty sleeping  HEME/LYMPH: No easy bruising, or bleeding gums  ALLERGY AND IMMUNOLOGIC: No hives or eczema    Diet, DASH/TLC:   Sodium & Cholesterol Restricted (10-06-23 @ 20:44) [Active]      CURRENT MEDICATIONS:   aspirin enteric coated 81 milliGRAM(s) Oral daily  atorvastatin 40 milliGRAM(s) Oral at bedtime  azithromycin  IVPB 500 milliGRAM(s) IV Intermittent every 24 hours  cefTRIAXone   IVPB 1000 milliGRAM(s) IV Intermittent every 24 hours  clopidogrel Tablet 75 milliGRAM(s) Oral daily  cyclobenzaprine 5 milliGRAM(s) Oral two times a day PRN  dextrose 5%. 1000 milliLiter(s) IV Continuous <Continuous>  dextrose 5%. 1000 milliLiter(s) IV Continuous <Continuous>  dextrose 50% Injectable 25 Gram(s) IV Push once  dextrose 50% Injectable 12.5 Gram(s) IV Push once  dextrose 50% Injectable 25 Gram(s) IV Push once  dextrose Oral Gel 15 Gram(s) Oral once PRN  gabapentin 100 milliGRAM(s) Oral at bedtime  glucagon  Injectable 1 milliGRAM(s) IntraMuscular once  insulin lispro (ADMELOG) corrective regimen sliding scale   SubCutaneous three times a day before meals  insulin lispro (ADMELOG) corrective regimen sliding scale   SubCutaneous at bedtime  levothyroxine 50 MICROGram(s) Oral daily  metoprolol succinate ER 12.5 milliGRAM(s) Oral daily  oxyCODONE    IR 5 milliGRAM(s) Oral every 6 hours PRN  pantoprazole    Tablet 40 milliGRAM(s) Oral at bedtime      VITAL SIGNS, INS/OUTS (last 24 hours):  Vital Signs Last 24 Hrs  T(C): 36.5 (07 Oct 2023 09:12), Max: 36.8 (06 Oct 2023 21:59)  T(F): 97.7 (07 Oct 2023 09:12), Max: 98.3 (06 Oct 2023 21:59)  HR: 76 (07 Oct 2023 09:12) (71 - 89)  BP: 140/74 (07 Oct 2023 09:12) (103/72 - 152/95)  BP(mean): 84 (07 Oct 2023 05:50) (79 - 114)  RR: 17 (07 Oct 2023 09:12) (17 - 20)  SpO2: 94% (07 Oct 2023 09:12) (92% - 97%)    Parameters below as of 07 Oct 2023 09:12  Patient On (Oxygen Delivery Method): nasal cannula  O2 Flow (L/min): 2    I&O's Summary    06 Oct 2023 07:01  -  07 Oct 2023 07:00  --------------------------------------------------------  IN: 0 mL / OUT: 300 mL / NET: -300 mL    07 Oct 2023 07:01  -  07 Oct 2023 10:44  --------------------------------------------------------  IN: 180 mL / OUT: 125 mL / NET: 55 mL        PHYSICAL EXAM:  Gen: Reclining in bed at time of exam, appears stated age, on NC2L with SpO2 96%  HEENT: NCAT, MMM, clear OP  Neck: supple, trachea at midline  CV: RRR, +S1/S2  Pulm: adequate respiratory effort, no increase in work of breathing, decreased BS lung base b/l  Abd: mildly distended, normoactive BS, soft NT   Skin: warm and dry,  Ext: WWP, no LE edema  Neuro: AOx3, no gross focal neurological deficits  Psych: affect and behavior appropriate, pleasant at time of interview    BASIC LABS:                        15.8   15.18 )-----------( 206      ( 07 Oct 2023 06:16 )             48.8     10-07    127<L>  |  90<L>  |  14  ----------------------------<  159<H>  3.9   |  26  |  1.16    Ca    9.2      07 Oct 2023 06:16  Mg     2.2     10-07    TPro  7.8  /  Alb  3.4  /  TBili  0.5  /  DBili  x   /  AST  18  /  ALT  16  /  AlkPhos  101  10-07    PT/INR - ( 06 Oct 2023 17:40 )   PT: 12.8 sec;   INR: 1.13          PTT - ( 06 Oct 2023 17:40 )  PTT:35.8 sec  Urinalysis Basic - ( 07 Oct 2023 06:16 )    Color: x / Appearance: x / SG: x / pH: x  Gluc: 159 mg/dL / Ketone: x  / Bili: x / Urobili: x   Blood: x / Protein: x / Nitrite: x   Leuk Esterase: x / RBC: x / WBC x   Sq Epi: x / Non Sq Epi: x / Bacteria: x      CAPILLARY BLOOD GLUCOSE          OTHER LABS:  CARDIAC MARKERS ( 06 Oct 2023 17:40 )  x     / x     / 95 U/L / x     / 1.4 ng/mL        MICRODATA:    Culture - Blood (collected 06 Oct 2023 18:55)  Source: .Blood Blood-Peripheral  Preliminary Report (07 Oct 2023 08:01):    No growth at 12 hours    Culture - Blood (collected 06 Oct 2023 18:20)  Source: .Blood Blood-Peripheral  Preliminary Report (07 Oct 2023 08:01):    No growth at 12 hours    Urinalysis with Rflx Culture (collected 06 Oct 2023 18:11)    Culture - Urine (collected 06 Oct 2023 18:11)  Source: Clean Catch None  Preliminary Report (07 Oct 2023 08:14):    Culture in progress        IMAGING:    EKG:    #Diet - Diet, DASH/TLC:   Sodium & Cholesterol Restricted (10-06-23 @ 20:44) [Active]        #DVT PPx -  HPI:  Patient is a 87M with PMHx CAD s/p JOSÉ to mLAD (6/2023), HTN, HLD, cervical spondylosis s/p laminectomy, gastric ulcers, hypothyroidism,  constipation who presents to [facility] ED 10/6/23 for BELLO with minimal exertion, chest pressure, and orthopnea x 3 days. Also reports a fever of 101.8*F yesterday but resolved with Tylenol with no recurrence since. Reported constipation at home with response after suppository at home on Tuesday (10/3), however, no BM since then. Does report pinpoint RLQ abdominal discomfort worse with palpation. 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 His appetite and fluid intake has decreased as well. Denies any palpitations, dizziness, syncope, diaphoresis, LE edema, N/V/D, cough, congestion, fever, chills or recent sick contact.    Of note, pt was admitted to [facility] in Sept 2023 for L leg discomfort. MR imaging with finding of moderate-severe spinal stenosis and was considered candidate for medical therapy instead of surgery. Patient is now admitted to cardiac/tele for suspicion of CHF and further ischemic evaluation pending optimization including Leukocytosis workup.     -Cardiac cath 6/5/23: IVUS guided JOSÉ/scoreflex to mLAD 95%, OM2  unable to cross, pRCA 50%, pLCx 50%, dLCx ectatic w/ slow [place], EDP 5, access R radial.  -TTE 3/27/23: LVEF normal 55-60%, no WMA, mild AR/MR/TR/CT.    -VS: /95, HR 89bpm, RR 20, T 97.7, O2 92%.  -Meds in ED: Lasix 20mg IV x 1, Ceftriaxone 1000mg IVPB X 1, Azithromycin 500mg IVP x 1.   -Labs in ED: WBC 16.26, Na 126, proBNP 1279, Trop T 18, Lactate 1.2  -EKG: NSR, TWI [id number]  -RVP: pending  -CXR: per ED MD wet read ?pleural effusion, ?patchy opacities.      (06 Oct 2023 18:23)      PAST MEDICAL & SURGICAL HISTORY:  HLD (hyperlipidemia)      Hypothyroid      History of BPH      CAD (coronary artery disease)      S/P laminectomy with spinal fusion        Home Medications:  cyclobenzaprine 5 mg oral tablet: 1 tab(s) orally 2 times a day (06 Oct 2023 19:43)  Dulcolax Laxative 5 mg oral delayed release tablet: 1 tab(s) orally once a day (06 Oct 2023 19:46)  levothyroxine 50 mcg (0.05 mg) oral tablet: 1 tab(s) orally once a day (06 Oct 2023 19:45)  metoprolol succinate 25 mg oral capsule, extended release: 0.5 tab(s) orally once a day (06 Oct 2023 19:44)  rosuvastatin 20 mg oral tablet: 1 orally once a day (at bedtime) (06 Oct 2023 19:45)    Allergies    No Known Drug Allergies  shellfish (Swelling; Hives)    Intolerances      FAMILY HISTORY:    Social History:      REVIEW OF SYSTEMS:  CONSTITUTIONAL: No fever, weight loss  EYES: No eye pain, or discharge  ENMT:  No tinnitus, vertigo  NECK: No pain or stiffness  RESPIRATORY: No cough, No dyspnea  CARDIOVASCULAR: No chest pain, or leg swelling  GASTROINTESTINAL: No abdominal pain. No diarrhea ;No melena or hematochezia.  GENITOURINARY: No dysuria, frequency, or hematuria  NEUROLOGICAL: No numbness, or tremors  SKIN: No itching, burning, rashes, or lesions   ENDOCRINE: No heat or cold intolerance;  MUSCULOSKELETAL: No joint pain or swelling;   PSYCHIATRIC: No mood swings, or difficulty sleeping  HEME/LYMPH: No easy bruising, or bleeding gums  ALLERGY AND IMMUNOLOGIC: No hives or eczema    Diet, DASH/TLC:   Sodium & Cholesterol Restricted (10-06-23 @ 20:44) [Active]      CURRENT MEDICATIONS:   aspirin enteric coated 81 milliGRAM(s) Oral daily  atorvastatin 40 milliGRAM(s) Oral at bedtime  azithromycin  IVPB 500 milliGRAM(s) IV Intermittent every 24 hours  cefTRIAXone   IVPB 1000 milliGRAM(s) IV Intermittent every 24 hours  clopidogrel Tablet 75 milliGRAM(s) Oral daily  cyclobenzaprine 5 milliGRAM(s) Oral two times a day PRN  dextrose 5%. 1000 milliLiter(s) IV Continuous <Continuous>  dextrose 5%. 1000 milliLiter(s) IV Continuous <Continuous>  dextrose 50% Injectable 25 Gram(s) IV Push once  dextrose 50% Injectable 12.5 Gram(s) IV Push once  dextrose 50% Injectable 25 Gram(s) IV Push once  dextrose Oral Gel 15 Gram(s) Oral once PRN  gabapentin 100 milliGRAM(s) Oral at bedtime  glucagon  Injectable 1 milliGRAM(s) IntraMuscular once  insulin lispro (ADMELOG) corrective regimen sliding scale   SubCutaneous three times a day before meals  insulin lispro (ADMELOG) corrective regimen sliding scale   SubCutaneous at bedtime  levothyroxine 50 MICROGram(s) Oral daily  metoprolol succinate ER 12.5 milliGRAM(s) Oral daily  oxyCODONE    IR 5 milliGRAM(s) Oral every 6 hours PRN  pantoprazole    Tablet 40 milliGRAM(s) Oral at bedtime      VITAL SIGNS, INS/OUTS (last 24 hours):  Vital Signs Last 24 Hrs  T(C): 36.5 (07 Oct 2023 09:12), Max: 36.8 (06 Oct 2023 21:59)  T(F): 97.7 (07 Oct 2023 09:12), Max: 98.3 (06 Oct 2023 21:59)  HR: 76 (07 Oct 2023 09:12) (71 - 89)  BP: 140/74 (07 Oct 2023 09:12) (103/72 - 152/95)  BP(mean): 84 (07 Oct 2023 05:50) (79 - 114)  RR: 17 (07 Oct 2023 09:12) (17 - 20)  SpO2: 94% (07 Oct 2023 09:12) (92% - 97%)    Parameters below as of 07 Oct 2023 09:12  Patient On (Oxygen Delivery Method): nasal cannula  O2 Flow (L/min): 2    I&O's Summary    06 Oct 2023 07:01  -  07 Oct 2023 07:00  --------------------------------------------------------  IN: 0 mL / OUT: 300 mL / NET: -300 mL    07 Oct 2023 07:01  -  07 Oct 2023 10:44  --------------------------------------------------------  IN: 180 mL / OUT: 125 mL / NET: 55 mL        PHYSICAL EXAM:  Gen: Reclining in bed at time of exam, appears stated age, on NC2L with SpO2 96%  HEENT: NCAT, MMM, clear OP  Neck: supple, trachea at midline  CV: RRR, +S1/S2  Pulm: adequate respiratory effort, no increase in work of breathing, decreased BS lung base b/l  Abd: mildly distended, normoactive BS, soft NT   Skin: warm and dry,  Ext: WWP, no LE edema  Neuro: AOx3, no gross focal neurological deficits  Psych: affect and behavior appropriate, pleasant at time of interview    BASIC LABS:                        15.8   15.18 )-----------( 206      ( 07 Oct 2023 06:16 )             48.8     10-07    127<L>  |  90<L>  |  14  ----------------------------<  159<H>  3.9   |  26  |  1.16    Ca    9.2      07 Oct 2023 06:16  Mg     2.2     10-07    TPro  7.8  /  Alb  3.4  /  TBili  0.5  /  DBili  x   /  AST  18  /  ALT  16  /  AlkPhos  101  10-07    PT/INR - ( 06 Oct 2023 17:40 )   PT: 12.8 sec;   INR: 1.13          PTT - ( 06 Oct 2023 17:40 )  PTT:35.8 sec  Urinalysis Basic - ( 07 Oct 2023 06:16 )    Color: x / Appearance: x / SG: x / pH: x  Gluc: 159 mg/dL / Ketone: x  / Bili: x / Urobili: x   Blood: x / Protein: x / Nitrite: x   Leuk Esterase: x / RBC: x / WBC x   Sq Epi: x / Non Sq Epi: x / Bacteria: x      CAPILLARY BLOOD GLUCOSE          OTHER LABS:  CARDIAC MARKERS ( 06 Oct 2023 17:40 )  x     / x     / 95 U/L / x     / 1.4 ng/mL        MICRODATA:    Culture - Blood (collected 06 Oct 2023 18:55)  Source: .Blood Blood-Peripheral  Preliminary Report (07 Oct 2023 08:01):    No growth at 12 hours    Culture - Blood (collected 06 Oct 2023 18:20)  Source: .Blood Blood-Peripheral  Preliminary Report (07 Oct 2023 08:01):    No growth at 12 hours    Urinalysis with Rflx Culture (collected 06 Oct 2023 18:11)    Culture - Urine (collected 06 Oct 2023 18:11)  Source: Clean Catch None  Preliminary Report (07 Oct 2023 08:14):    Culture in progress        IMAGING:    EKG:    #Diet - Diet, DASH/TLC:   Sodium & Cholesterol Restricted (10-06-23 @ 20:44) [Active]        #DVT PPx -

## 2023-10-07 NOTE — CONSULT NOTE ADULT - ASSESSMENT
87M with PMHx CAD s/p JOSÉ to mLAD (6/2023), HTN, HLD, cervical spondylosis s/p laminectomy, gastric ulcers, hypothyroidism,  constipation prior [facility] admission for L leg pain, presented to [facility] ED 10/6/23 for BELLO with minimal exertion, chest pressure, and orthopnea x 3 days and fever of 101.8*F yesterday but resolved with Tylenol with no recurrence since. Reported constipation at home with response after suppository at home on Tuesday (10/3), however, no BM since then. Does report pinpoint RLQ abdominal discomfort worse with palpation. 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 His appetite and fluid intake has decreased as well. Pt was admitted to Cardiology service/[facility] for acute hypoxic respiratory failure 2/2 acute HFpEF (BNP 1279) s/p lasix 20mg iv x1 at ED and fever with possible PNA ( although no cough/sputum) and RLQ pain/constipation to rule out appendicitis/diverticulitis    -O2 supplement via NC2L with SpO2 96%   - Ceftriaxone 1gm iv q24hr (10/6-) Day 2/5  - Azithromycin 500mg iv q24hr ( 10/6-) Day 2/3   - trend WBC 16.26K(10/6)-> 15.18K(10/7)  - pro-calcitonin 0.24  - BCx: ngtd x 12hrs   - Obtain CTAP to rule out appendicitis/diverticulitis (  has increased prevalence of Rt sided diverticulitis)  - Bowel regimen for constipation   - Elevated TSH/hypothyrodism: 10.5%, check free T4 please, c/w levothyroxine 50mcg po daily   - CADs/p PCI LAD 6/2023. c/w DAPT: ASA/Clopidogrel, metoprolol succinate 12.5mg daily, Atorvastatin 40mg qHS , possible CAD/PCI on Monday 10/9/23  - LBP//spondylosis: c/w gabapentin 100mg qHS ( can be adjusted),  cyclobenzaprine 5mg po bid, oxycodone IR 5mg po q6hr prn   - consider Lidocaine patch 4% AA, and adjusting gabapentin   - GI ppx: PPI po daily   - DMII: FS/NISS, check A1C%, goal -180  -  87M with PMHx CAD s/p JOSÉ to mLAD (6/2023), HTN, HLD, cervical spondylosis s/p laminectomy, gastric ulcers, hypothyroidism,  constipation prior [facility] admission for L leg pain, presented to [facility] ED 10/6/23 for BELLO with minimal exertion, chest pressure, and orthopnea x 3 days and fever of 101.8*F yesterday but resolved with Tylenol with no recurrence since. Reported constipation at home with response after suppository at home on Tuesday (10/3), however, no BM since then. Does report pinpoint RLQ abdominal discomfort worse with palpation. 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 His appetite and fluid intake has decreased as well. Pt was admitted to Cardiology service/[facility] for acute hypoxic respiratory failure 2/2 acute HFpEF (BNP 1279) s/p lasix 20mg iv x1 at ED and fever with possible PNA ( although no cough/sputum) and RLQ pain/constipation to rule out appendicitis/diverticulitis    -O2 supplement via NC2L with SpO2 96%   - Ceftriaxone 1gm iv q24hr (10/6-) Day 2/5  - Azithromycin 500mg iv q24hr ( 10/6-) Day 2/3   - trend WBC 16.26K(10/6)-> 15.18K(10/7)  - pro-calcitonin 0.24  - BCx: ngtd x 12hrs   - Obtain CTAP to rule out appendicitis/diverticulitis (  has increased prevalence of Rt sided diverticulitis)  - Bowel regimen for constipation   - Elevated TSH/hypothyrodism: 10.5%, check free T4 please, c/w levothyroxine 50mcg po daily   - CADs/p PCI LAD 6/2023. c/w DAPT: ASA/Clopidogrel, metoprolol succinate 12.5mg daily, Atorvastatin 40mg qHS , possible CAD/PCI on Monday 10/9/23  - LBP//spondylosis: c/w gabapentin 100mg qHS ( can be adjusted),  cyclobenzaprine 5mg po bid, oxycodone IR 5mg po q6hr prn   - consider Lidocaine patch 4% AA, and adjusting gabapentin   - GI ppx: PPI po daily   - DMII: FS/NISS, check A1C%, goal -180  -  87M with PMHx CAD s/p JOSÉ to mLAD (6/2023), HTN, HLD, cervical spondylosis s/p laminectomy, gastric ulcers, hypothyroidism,  constipation prior [facility] admission for L leg pain, presented to [facility] ED 10/6/23 for BELLO with minimal exertion, chest pressure, and orthopnea x 3 days and fever of 101.8*F yesterday but resolved with Tylenol with no recurrence since. Reported constipation at home with response after suppository at home on Tuesday (10/3), however, no BM since then. Does report pinpoint RLQ abdominal discomfort worse with palpation. 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 His appetite and fluid intake has decreased as well. Pt was admitted to Cardiology service/[facility] for acute hypoxic respiratory failure 2/2 acute HFpEF (BNP 1279) s/p lasix 20mg iv x1 at ED and fever with possible PNA ( although no cough/sputum) and RLQ pain/constipation to rule out appendicitis/diverticulitis    -O2 supplement via NC2L with SpO2 96%   - Ceftriaxone 1gm iv q24hr (10/6-) Day 2/5  - Azithromycin 500mg iv q24hr ( 10/6-) Day 2/3   - trend WBC 16.26K(10/6)-> 15.18K(10/7)  - pro-calcitonin 0.24  - BCx: ngtd x 12hrs   - Obtain CTAP to rule out appendicitis/diverticulitis (  has increased prevalence of Rt sided diverticulitis)  - Bowel regimen for constipation   - Elevated TSH/hypothyrodism: 10.5%, check free T4 please, c/w levothyroxine 50mcg po daily   - CADs/p PCI LAD 6/2023. c/w DAPT: ASA/Clopidogrel, metoprolol succinate 12.5mg daily, Atorvastatin 40mg qHS , possible CAD/PCI on Monday 10/9/23  - LBP//spondylosis: c/w gabapentin 100mg qHS ( can be adjusted),  cyclobenzaprine 5mg po bid, oxycodone IR 5mg po q6hr prn   - consider Lidocaine patch 4% AA, and adjusting gabapentin   - GI ppx: PPI po daily   - DMII: FS/NISS, check A1C%, goal -180  -

## 2023-10-07 NOTE — PROGRESS NOTE ADULT - SUBJECTIVE AND OBJECTIVE BOX
O/N Events: no acute events     Subjective/ROS: Patient seen and examined at bedside. patient complaining about abdominal pain and     Denies Fever/Chills, HA, CP, SOB, n/v, changes in bowel/urinary habits.  12pt ROS otherwise negative.    VITALS  Vital Signs Last 24 Hrs  T(C): 36.5 (07 Oct 2023 11:09), Max: 36.8 (06 Oct 2023 21:59)  T(F): 97.7 (07 Oct 2023 11:09), Max: 98.3 (06 Oct 2023 21:59)  HR: 73 (07 Oct 2023 11:09) (71 - 89)  BP: 124/75 (07 Oct 2023 11:09) (103/72 - 152/95)  BP(mean): 84 (07 Oct 2023 05:50) (79 - 114)  RR: 17 (07 Oct 2023 11:09) (17 - 20)  SpO2: 93% (07 Oct 2023 11:09) (92% - 97%)    Parameters below as of 07 Oct 2023 11:09  Patient On (Oxygen Delivery Method): nasal cannula  O2 Flow (L/min): 2      CAPILLARY BLOOD GLUCOSE          PHYSICAL EXAM  General: NAD  Head: NC/AT; MMM; PERRL; EOMI;  Neck: Supple; no JVD  Respiratory: bilateral bibasilar rales worse on right  Cardiovascular: Regular rhythm/rate; S1/S2+, no murmurs, rubs gallops   Gastrointestinal: Soft; NTND; bowel sounds normal and present  Extremities: WWP; no edema/cyanosis  Neurological: A&Ox3, CNII-XII grossly intact; no obvious focal deficits    MEDICATIONS  (STANDING):  aspirin enteric coated 81 milliGRAM(s) Oral daily  atorvastatin 40 milliGRAM(s) Oral at bedtime  azithromycin  IVPB 500 milliGRAM(s) IV Intermittent every 24 hours  cefTRIAXone   IVPB 1000 milliGRAM(s) IV Intermittent every 24 hours  clopidogrel Tablet 75 milliGRAM(s) Oral daily  dextrose 5%. 1000 milliLiter(s) (50 mL/Hr) IV Continuous <Continuous>  dextrose 5%. 1000 milliLiter(s) (100 mL/Hr) IV Continuous <Continuous>  dextrose 50% Injectable 25 Gram(s) IV Push once  dextrose 50% Injectable 25 Gram(s) IV Push once  dextrose 50% Injectable 12.5 Gram(s) IV Push once  furosemide   Injectable 40 milliGRAM(s) IV Push daily  gabapentin 100 milliGRAM(s) Oral at bedtime  glucagon  Injectable 1 milliGRAM(s) IntraMuscular once  insulin lispro (ADMELOG) corrective regimen sliding scale   SubCutaneous three times a day before meals  insulin lispro (ADMELOG) corrective regimen sliding scale   SubCutaneous at bedtime  levothyroxine 50 MICROGram(s) Oral daily  metoprolol succinate ER 12.5 milliGRAM(s) Oral daily  pantoprazole    Tablet 40 milliGRAM(s) Oral at bedtime  polyethylene glycol 3350 17 Gram(s) Oral once  senna 2 Tablet(s) Oral daily    MEDICATIONS  (PRN):  cyclobenzaprine 5 milliGRAM(s) Oral two times a day PRN Muscle Spasm  dextrose Oral Gel 15 Gram(s) Oral once PRN Blood Glucose LESS THAN 70 milliGRAM(s)/deciliter  oxyCODONE    IR 5 milliGRAM(s) Oral every 6 hours PRN Severe Pain (7 - 10)      No Known Drug Allergies  shellfish (Swelling; Hives)      LABS                        15.8   15.18 )-----------( 206      ( 07 Oct 2023 06:16 )             48.8     10-07    127<L>  |  90<L>  |  14  ----------------------------<  159<H>  3.9   |  26  |  1.16    Ca    9.2      07 Oct 2023 06:16  Mg     2.2     10-07    TPro  7.8  /  Alb  3.4  /  TBili  0.5  /  DBili  x   /  AST  18  /  ALT  16  /  AlkPhos  101  10-07    PT/INR - ( 06 Oct 2023 17:40 )   PT: 12.8 sec;   INR: 1.13          PTT - ( 06 Oct 2023 17:40 )  PTT:35.8 sec  Urinalysis Basic - ( 07 Oct 2023 06:16 )    Color: x / Appearance: x / SG: x / pH: x  Gluc: 159 mg/dL / Ketone: x  / Bili: x / Urobili: x   Blood: x / Protein: x / Nitrite: x   Leuk Esterase: x / RBC: x / WBC x   Sq Epi: x / Non Sq Epi: x / Bacteria: x      CARDIAC MARKERS ( 06 Oct 2023 17:40 )  x     / x     / 95 U/L / x     / 1.4 ng/mL        Culture - Blood (collected 10-06-23 @ 18:55)  Source: .Blood Blood-Peripheral  Preliminary Report (10-07-23 @ 08:01):    No growth at 12 hours    Culture - Blood (collected 10-06-23 @ 18:20)  Source: .Blood Blood-Peripheral  Preliminary Report (10-07-23 @ 08:01):    No growth at 12 hours    Urinalysis with Rflx Culture (collected 10-06-23 @ 18:11)    Culture - Urine (collected 10-06-23 @ 18:11)  Source: Clean Catch None  Preliminary Report (10-07-23 @ 08:14):    Culture in progress        IMAGING/EKG/ETC

## 2023-10-08 DIAGNOSIS — Z29.9 ENCOUNTER FOR PROPHYLACTIC MEASURES, UNSPECIFIED: ICD-10-CM

## 2023-10-08 DIAGNOSIS — Z01.818 ENCOUNTER FOR OTHER PREPROCEDURAL EXAMINATION: ICD-10-CM

## 2023-10-08 LAB
ANION GAP SERPL CALC-SCNC: 12 MMOL/L — SIGNIFICANT CHANGE UP (ref 5–17)
ANION GAP SERPL CALC-SCNC: 13 MMOL/L — SIGNIFICANT CHANGE UP (ref 5–17)
APPEARANCE UR: CLEAR — SIGNIFICANT CHANGE UP
BASOPHILS # BLD AUTO: 0.07 K/UL — SIGNIFICANT CHANGE UP (ref 0–0.2)
BASOPHILS NFR BLD AUTO: 0.5 % — SIGNIFICANT CHANGE UP (ref 0–2)
BILIRUB UR-MCNC: NEGATIVE — SIGNIFICANT CHANGE UP
BUN SERPL-MCNC: 18 MG/DL — SIGNIFICANT CHANGE UP (ref 7–23)
BUN SERPL-MCNC: 21 MG/DL — SIGNIFICANT CHANGE UP (ref 7–23)
CALCIUM SERPL-MCNC: 9 MG/DL — SIGNIFICANT CHANGE UP (ref 8.4–10.5)
CALCIUM SERPL-MCNC: 9.1 MG/DL — SIGNIFICANT CHANGE UP (ref 8.4–10.5)
CHLORIDE SERPL-SCNC: 89 MMOL/L — LOW (ref 96–108)
CO2 SERPL-SCNC: 23 MMOL/L — SIGNIFICANT CHANGE UP (ref 22–31)
CO2 SERPL-SCNC: 26 MMOL/L — SIGNIFICANT CHANGE UP (ref 22–31)
COLOR SPEC: YELLOW — SIGNIFICANT CHANGE UP
CREAT ?TM UR-MCNC: 23 MG/DL — SIGNIFICANT CHANGE UP
CREAT SERPL-MCNC: 0.97 MG/DL — SIGNIFICANT CHANGE UP (ref 0.5–1.3)
CREAT SERPL-MCNC: 1.13 MG/DL — SIGNIFICANT CHANGE UP (ref 0.5–1.3)
CULTURE RESULTS: SIGNIFICANT CHANGE UP
DIFF PNL FLD: NEGATIVE — SIGNIFICANT CHANGE UP
EGFR: 64 ML/MIN/1.73M2 — SIGNIFICANT CHANGE UP
EGFR: 76 ML/MIN/1.73M2 — SIGNIFICANT CHANGE UP
EOSINOPHIL # BLD AUTO: 0.62 K/UL — HIGH (ref 0–0.5)
EOSINOPHIL NFR BLD AUTO: 4.9 % — SIGNIFICANT CHANGE UP (ref 0–6)
FERRITIN SERPL-MCNC: 622 NG/ML — HIGH (ref 30–400)
GLUCOSE BLDC GLUCOMTR-MCNC: 134 MG/DL — HIGH (ref 70–99)
GLUCOSE BLDC GLUCOMTR-MCNC: 162 MG/DL — HIGH (ref 70–99)
GLUCOSE BLDC GLUCOMTR-MCNC: 165 MG/DL — HIGH (ref 70–99)
GLUCOSE SERPL-MCNC: 156 MG/DL — HIGH (ref 70–99)
GLUCOSE UR QL: NEGATIVE — SIGNIFICANT CHANGE UP
HCT VFR BLD CALC: 48.1 % — SIGNIFICANT CHANGE UP (ref 39–50)
HGB BLD-MCNC: 15.2 G/DL — SIGNIFICANT CHANGE UP (ref 13–17)
IMM GRANULOCYTES NFR BLD AUTO: 0.9 % — SIGNIFICANT CHANGE UP (ref 0–0.9)
IRON SATN MFR SERPL: 13 % — LOW (ref 16–55)
IRON SATN MFR SERPL: 28 UG/DL — LOW (ref 45–165)
KETONES UR-MCNC: NEGATIVE — SIGNIFICANT CHANGE UP
LEUKOCYTE ESTERASE UR-ACNC: NEGATIVE — SIGNIFICANT CHANGE UP
LYMPHOCYTES # BLD AUTO: 17.2 % — SIGNIFICANT CHANGE UP (ref 13–44)
LYMPHOCYTES # BLD AUTO: 2.2 K/UL — SIGNIFICANT CHANGE UP (ref 1–3.3)
MAGNESIUM SERPL-MCNC: 2.2 MG/DL — SIGNIFICANT CHANGE UP (ref 1.6–2.6)
MCHC RBC-ENTMCNC: 20.6 PG — LOW (ref 27–34)
MCHC RBC-ENTMCNC: 31.6 GM/DL — LOW (ref 32–36)
MCV RBC AUTO: 65.3 FL — LOW (ref 80–100)
MONOCYTES # BLD AUTO: 1.18 K/UL — HIGH (ref 0–0.9)
MONOCYTES NFR BLD AUTO: 9.2 % — SIGNIFICANT CHANGE UP (ref 2–14)
NEUTROPHILS # BLD AUTO: 8.58 K/UL — HIGH (ref 1.8–7.4)
NEUTROPHILS NFR BLD AUTO: 67.3 % — SIGNIFICANT CHANGE UP (ref 43–77)
NITRITE UR-MCNC: NEGATIVE — SIGNIFICANT CHANGE UP
NRBC # BLD: 0 /100 WBCS — SIGNIFICANT CHANGE UP (ref 0–0)
OSMOLALITY UR: 274 MOSM/KG — LOW (ref 300–900)
PH UR: 6 — SIGNIFICANT CHANGE UP (ref 5–8)
PHOSPHATE SERPL-MCNC: 3.2 MG/DL — SIGNIFICANT CHANGE UP (ref 2.5–4.5)
PLATELET # BLD AUTO: 250 K/UL — SIGNIFICANT CHANGE UP (ref 150–400)
POTASSIUM SERPL-MCNC: 3.8 MMOL/L — SIGNIFICANT CHANGE UP (ref 3.5–5.3)
POTASSIUM SERPL-MCNC: 4.3 MMOL/L — SIGNIFICANT CHANGE UP (ref 3.5–5.3)
POTASSIUM SERPL-SCNC: 3.8 MMOL/L — SIGNIFICANT CHANGE UP (ref 3.5–5.3)
POTASSIUM SERPL-SCNC: 4.3 MMOL/L — SIGNIFICANT CHANGE UP (ref 3.5–5.3)
PROT UR-MCNC: NEGATIVE MG/DL — SIGNIFICANT CHANGE UP
RBC # BLD: 7.37 M/UL — HIGH (ref 4.2–5.8)
RBC # FLD: 17.7 % — HIGH (ref 10.3–14.5)
SODIUM SERPL-SCNC: 124 MMOL/L — LOW (ref 135–145)
SODIUM SERPL-SCNC: 128 MMOL/L — LOW (ref 135–145)
SODIUM UR-SCNC: 59 MMOL/L — SIGNIFICANT CHANGE UP
SP GR SPEC: <=1.005 — SIGNIFICANT CHANGE UP (ref 1–1.03)
SPECIMEN SOURCE: SIGNIFICANT CHANGE UP
T4 FREE SERPL-MCNC: 1.47 NG/DL — SIGNIFICANT CHANGE UP (ref 0.93–1.7)
TIBC SERPL-MCNC: 208 UG/DL — LOW (ref 220–430)
TRANSFERRIN SERPL-MCNC: 161 MG/DL — LOW (ref 200–360)
TSH SERPL-MCNC: 10.16 UIU/ML — HIGH (ref 0.27–4.2)
UIBC SERPL-MCNC: 180 UG/DL — SIGNIFICANT CHANGE UP (ref 110–370)
UROBILINOGEN FLD QL: 1 E.U./DL — SIGNIFICANT CHANGE UP
UUN UR-MCNC: 271 MG/DL — SIGNIFICANT CHANGE UP
WBC # BLD: 12.76 K/UL — HIGH (ref 3.8–10.5)
WBC # FLD AUTO: 12.76 K/UL — HIGH (ref 3.8–10.5)

## 2023-10-08 PROCEDURE — 99222 1ST HOSP IP/OBS MODERATE 55: CPT | Mod: GC

## 2023-10-08 PROCEDURE — 99233 SBSQ HOSP IP/OBS HIGH 50: CPT

## 2023-10-08 RX ORDER — CEFTRIAXONE 500 MG/1
2000 INJECTION, POWDER, FOR SOLUTION INTRAMUSCULAR; INTRAVENOUS EVERY 24 HOURS
Refills: 0 | Status: DISCONTINUED | OUTPATIENT
Start: 2023-10-08 | End: 2023-10-14

## 2023-10-08 RX ORDER — METRONIDAZOLE 500 MG
500 TABLET ORAL DAILY
Refills: 0 | Status: DISCONTINUED | OUTPATIENT
Start: 2023-10-08 | End: 2023-10-08

## 2023-10-08 RX ORDER — METRONIDAZOLE 500 MG
500 TABLET ORAL EVERY 8 HOURS
Refills: 0 | Status: DISCONTINUED | OUTPATIENT
Start: 2023-10-08 | End: 2023-10-14

## 2023-10-08 RX ADMIN — Medication 2: at 08:19

## 2023-10-08 RX ADMIN — CLOPIDOGREL BISULFATE 75 MILLIGRAM(S): 75 TABLET, FILM COATED ORAL at 11:55

## 2023-10-08 RX ADMIN — Medication 500 MILLIGRAM(S): at 19:06

## 2023-10-08 RX ADMIN — Medication 50 MICROGRAM(S): at 06:30

## 2023-10-08 RX ADMIN — ATORVASTATIN CALCIUM 40 MILLIGRAM(S): 80 TABLET, FILM COATED ORAL at 21:24

## 2023-10-08 RX ADMIN — CEFTRIAXONE 100 MILLIGRAM(S): 500 INJECTION, POWDER, FOR SOLUTION INTRAMUSCULAR; INTRAVENOUS at 19:06

## 2023-10-08 RX ADMIN — GABAPENTIN 100 MILLIGRAM(S): 400 CAPSULE ORAL at 21:25

## 2023-10-08 RX ADMIN — Medication 500 MILLIGRAM(S): at 11:55

## 2023-10-08 RX ADMIN — Medication 2: at 17:27

## 2023-10-08 RX ADMIN — Medication 81 MILLIGRAM(S): at 11:55

## 2023-10-08 RX ADMIN — Medication 40 MILLIGRAM(S): at 06:30

## 2023-10-08 RX ADMIN — Medication 12.5 MILLIGRAM(S): at 06:30

## 2023-10-08 RX ADMIN — Medication 10 MILLIGRAM(S): at 09:36

## 2023-10-08 RX ADMIN — PANTOPRAZOLE SODIUM 40 MILLIGRAM(S): 20 TABLET, DELAYED RELEASE ORAL at 21:24

## 2023-10-08 NOTE — PROGRESS NOTE ADULT - PROBLEM SELECTOR PLAN 8
continue home Synthroid 50mcg qd  - f/u TSH/T4 in am - tsh >10  - endocrine consult continue home Synthroid 50mcg qd  - f/u TSH/T4 in am - tsh >10

## 2023-10-08 NOTE — PROGRESS NOTE ADULT - PROBLEM SELECTOR PLAN 7
- continue Lipitor 40mg qd (alternate for home Crestor 20mg qd)  - f/u Lipid panel - continue Lipitor 40mg qd (alternate for home Crestor 20mg qd)

## 2023-10-08 NOTE — CONSULT NOTE ADULT - SUBJECTIVE AND OBJECTIVE BOX
SURGERY CONSULT  ==============================================================================================================  HPI: 87y Male  HPI:  Patient is a 87M, (shellfish rxn w/ Lip Swelling ~ 20 yrs ago),  w/ PMHx CAD s/p JOSÉ to mLAD (6/2023), HTN, HLD, cervical spondylosis s/p laminectomy, gastric ulcers, hypothyroidism,  who presents to [facility] ED 10/6/23 for BELLO with minimal exertion, chest pressure, and orthopnea x 3 days. Also reports a fever of 101.8 yesterday but resolved with Tylenol with no recurrence since. Reported constipation at home with response after suppository at home on Tuesday (10/3), however, no BM since then. Does report pinpoint RUQ abdominal discomfort worse with palpation. 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 His appetite and fluid intake has decreased as well. Denies any palpitations, dizziness, syncope, diaphoresis, LE edema, N/V/D, cough, congestion, fever, chills or recent sick contact.    Of note, pt was admitted to [facility] in Sept 2023 for L leg discomfort. MR imaging with finding of moderate-severe spinal stenosis and was considered candidate for medical therapy instead of surgery. Patient is now admitted to cardiac/tele for suspicion of CHF and further ischemic evaluation pending optimization including Leukocytosis workup.     -Cardiac cath 6/5/23: IVUS guided JOSÉ/scoreflex to mLAD 95%, OM2  unable to cross, pRCA 50%, pLCx 50%, dLCx ectatic w/ slow [place], EDP 5, access R radial.  -TTE 3/27/23: LVEF normal 55-60%, no WMA, mild AR/MR/TR/AZ.    -VS: /95, HR 89bpm, RR 20, T 97.7, O2 92%.  -Meds in ED: Lasix 20mg IV x 1, Ceftriaxone 1000mg IVPB X 1, Azithromycin 500mg IVP x 1.   -Labs in ED: WBC 16.26, Na 126, proBNP 1279, Trop T 18, Lactate 1.2  -EKG: NSR, TWI [id number]  -RVP: pending  -CXR: per ED MD wet read ?pleural effusion, ?patchy opacities.      (06 Oct 2023 18:23)    Surgery Addendum:    87M w/ PMHx CAD s/p JOSÉ (6/23), HTN, HLD, PUD, hypothyroidism, PSHx C spine laminectomy presented to the ED for R sided abdominal pain w/ pain on deep inspiration. Pt states his pain in mid to upper right side that is constant and worse with palpation. Not related to food, and he has never had pain like this before. Denies n/v. Endorses fever to 101 at home, but has been afebrile here. Denies c/d. Denies urinary symptoms. Tolerating PO at this time.      PAST MEDICAL & SURGICAL HISTORY:  HLD (hyperlipidemia)      Hypothyroid      History of BPH      CAD (coronary artery disease)      S/P laminectomy with spinal fusion        Home Meds: Home Medications:  cyclobenzaprine 5 mg oral tablet: 1 tab(s) orally 2 times a day (06 Oct 2023 19:43)  Dulcolax Laxative 5 mg oral delayed release tablet: 1 tab(s) orally once a day (06 Oct 2023 19:46)  levothyroxine 50 mcg (0.05 mg) oral tablet: 1 tab(s) orally once a day (06 Oct 2023 19:45)  metoprolol succinate 25 mg oral capsule, extended release: 0.5 tab(s) orally once a day (06 Oct 2023 19:44)  rosuvastatin 20 mg oral tablet: 1 orally once a day (at bedtime) (06 Oct 2023 19:45)    Allergies: Allergies    No Known Drug Allergies  shellfish (Swelling; Hives)    Intolerances      Soc:   Advanced Directives: Presumed Full Code     CURRENT MEDICATIONS:   --------------------------------------------------------------------------------------  Neurologic Medications  cyclobenzaprine 5 milliGRAM(s) Oral two times a day PRN Muscle Spasm  gabapentin 100 milliGRAM(s) Oral at bedtime  oxyCODONE    IR 5 milliGRAM(s) Oral every 6 hours PRN Severe Pain (7 - 10)    Respiratory Medications    Cardiovascular Medications  furosemide   Injectable 40 milliGRAM(s) IV Push daily  metoprolol succinate ER 12.5 milliGRAM(s) Oral daily    Gastrointestinal Medications  bisacodyl Suppository 10 milliGRAM(s) Rectal daily PRN Constipation  dextrose 5%. 1000 milliLiter(s) IV Continuous <Continuous>  dextrose 5%. 1000 milliLiter(s) IV Continuous <Continuous>  pantoprazole    Tablet 40 milliGRAM(s) Oral at bedtime  senna 2 Tablet(s) Oral daily    Genitourinary Medications    Hematologic/Oncologic Medications  aspirin enteric coated 81 milliGRAM(s) Oral daily  clopidogrel Tablet 75 milliGRAM(s) Oral daily    Antimicrobial/Immunologic Medications  cefTRIAXone   IVPB 2000 milliGRAM(s) IV Intermittent every 24 hours  metroNIDAZOLE    Tablet 500 milliGRAM(s) Oral every 8 hours    Endocrine/Metabolic Medications  atorvastatin 40 milliGRAM(s) Oral at bedtime  dextrose 50% Injectable 25 Gram(s) IV Push once  dextrose 50% Injectable 12.5 Gram(s) IV Push once  dextrose 50% Injectable 25 Gram(s) IV Push once  dextrose Oral Gel 15 Gram(s) Oral once PRN Blood Glucose LESS THAN 70 milliGRAM(s)/deciliter  glucagon  Injectable 1 milliGRAM(s) IntraMuscular once  insulin lispro (ADMELOG) corrective regimen sliding scale   SubCutaneous three times a day before meals  insulin lispro (ADMELOG) corrective regimen sliding scale   SubCutaneous at bedtime  levothyroxine 50 MICROGram(s) Oral daily    Topical/Other Medications    --------------------------------------------------------------------------------------    VITAL SIGNS, INS/OUTS (last 24 hours):  --------------------------------------------------------------------------------------  ICU Vital Signs Last 24 Hrs  T(C): 36.6 (08 Oct 2023 08:55), Max: 36.6 (08 Oct 2023 08:55)  T(F): 97.8 (08 Oct 2023 08:55), Max: 97.8 (08 Oct 2023 08:55)  HR: 78 (08 Oct 2023 08:55) (78 - 80)  BP: 128/79 (08 Oct 2023 08:55) (122/74 - 134/81)  BP(mean): 98 (08 Oct 2023 08:55) (98 - 98)  RR: 18 (08 Oct 2023 08:55) (18 - 18)  SpO2: 96% (08 Oct 2023 08:55) (94% - 97%)    O2 Parameters below as of 08 Oct 2023 08:55  Patient On (Oxygen Delivery Method): nasal cannula  O2 Flow (L/min): 2        I&O's Summary    07 Oct 2023 07:01  -  08 Oct 2023 07:00  --------------------------------------------------------  IN: 360 mL / OUT: 1100 mL / NET: -740 mL    08 Oct 2023 07:01  -  08 Oct 2023 11:51  --------------------------------------------------------  IN: 120 mL / OUT: 500 mL / NET: -380 mL      --------------------------------------------------------------------------------------    EXAM:  GEN: NAD, resting comfortably in bed  HEENT: MMM  CV: RRR  Resp: nonlabored breathing, no respiratory distress, mild end expiratory wheezes  Abd: soft, nontender, mildly distended  Ext: WWP, no edema, no calf tenderness  Neuro: no focal deficits  Psych: pleasant    LABS  --------------------------------------------------------------------------------------  Labs:  CAPILLARY BLOOD GLUCOSE      POCT Blood Glucose.: 162 mg/dL (08 Oct 2023 08:08)  POCT Blood Glucose.: 132 mg/dL (07 Oct 2023 21:44)  POCT Blood Glucose.: 157 mg/dL (07 Oct 2023 16:44)  POCT Blood Glucose.: 148 mg/dL (07 Oct 2023 14:01)                          15.2   12.76 )-----------( 250      ( 08 Oct 2023 06:13 )             48.1       Auto Neutrophil %: 67.3 % (10-08-23 @ 06:13)  Auto Immature Granulocyte %: 0.9 % (10-08-23 @ 06:13)    10-08    124<L>  |  89<L>  |  18  ----------------------------<  156<H>  3.8   |  23  |  0.97      Calcium: 9.0 mg/dL (10-08-23 @ 06:13)      LFTs:             7.8  | 0.5  | 18       ------------------[101     ( 07 Oct 2023 06:16 )  3.4  | x    | 16          Lipase:x      Amylase:x         Lactate, Blood: 1.8 mmol/L (10-07-23 @ 06:16)  Lactate, Blood: 1.2 mmol/L (10-06-23 @ 18:33)      Coags:     12.8   ----< 1.13    ( 06 Oct 2023 17:40 )     35.8        CARDIAC MARKERS ( 06 Oct 2023 17:40 )  x     / x     / 95 U/L / x     / 1.4 ng/mL          Urinalysis Basic - ( 08 Oct 2023 06:13 )    Color: x / Appearance: x / SG: x / pH: x  Gluc: 156 mg/dL / Ketone: x  / Bili: x / Urobili: x   Blood: x / Protein: x / Nitrite: x   Leuk Esterase: x / RBC: x / WBC x   Sq Epi: x / Non Sq Epi: x / Bacteria: x        Culture - Blood (collected 06 Oct 2023 18:55)  Source: .Blood Blood-Peripheral  Preliminary Report (07 Oct 2023 20:00):    No growth at 1 day.    Culture - Blood (collected 06 Oct 2023 18:20)  Source: .Blood Blood-Peripheral  Preliminary Report (07 Oct 2023 20:00):    No growth at 1 day.    Urinalysis with Rflx Culture (collected 06 Oct 2023 18:11)    Culture - Urine (collected 06 Oct 2023 18:11)  Source: Clean Catch None  Final Report (08 Oct 2023 08:14):    Specimen appears CONTAMINATED. Lab suggests repeat clean catch specimen.        --------------------------------------------------------------------------------------    OTHER LABS    IMAGING RESULTS  RUQ US:    < from: US Abdomen Upper Quadrant Right (10.07.23 @ 12:41) >  FINDINGS:  Liver: Within normal limits.  Bile ducts: Normal caliber. Common bile duct measures 5 mm.  Gallbladder: Cholelithiasis. Markedly thickened gallbladder wall   measuring up to 9 mm. No focal pain.  Pancreas: Visualized portions are within normal limits.  Right kidney: 8.6 cm. No hydronephrosis.  Ascites: None.  IVC: Visualized portions are within normal limits.    IMPRESSION:  Cholelithiasis with significant gallbladder wall thickening. Findings can   be secondary to acute cholecystitis, in the right clinical setting. A   HIDA scan can be performed for definitive evaluation for acute   cholecystitis.    < end of copied text >      CT AP:    < from: CT Abdomen and Pelvis w/ Oral Cont and w/ IV Cont (10.07.23 @ 18:12) >  FINDINGS:  LOWER CHEST: Moderate right diaphragmatic eventration with mild   compressive right basilar atelectasis. Small right pleural effusion.   Coronary artery calcifications.    LIVER: Within normal limits.  BILE DUCTS: Normal caliber.  GALLBLADDER: 1.8 cm gallstone in the gallbladder neck. Significant   concentric wall thickening with mild adjacent fatty stranding.  SPLEEN: Within normal limits.  PANCREAS: Fatty atrophy.  ADRENALS: Within normal limits.  KIDNEYS/URETERS: 2 mm nonobstructing stone in the right lower pole.    BLADDER: Within normal limits.  REPRODUCTIVE ORGANS: Prostate is enlarged with protrusion into the   bladder neck.    BOWEL: No bowel obstruction. Colonic diverticulosis.. Appendix is normal.  PERITONEUM:No ascites.  VESSELS: Small noncalcified atheromatous plaque in the distal abdominal   aorta (series 5; image 91). Additional atherosclerotic calcifications.  RETROPERITONEUM/LYMPH NODES: No lymphadenopathy.  ABDOMINAL WALL: Bilateral fat-containing inguinal hernias.  BONES: Multilevel degenerative changes of the spine including grade 1   retrolisthesis of L3 on L4 and L4 on L5.    IMPRESSION:  Findings in the gallbladder can be seen with acute cholecystitis, in the   right clinical setting. A HIDA scan can be performed for definitive   evaluation.  Markedly enlarged prostate gland.    < end of copied text >     SURGERY CONSULT  ==============================================================================================================  HPI: 87y Male  HPI:  Patient is a 87M, (shellfish rxn w/ Lip Swelling ~ 20 yrs ago),  w/ PMHx CAD s/p JOSÉ to mLAD (6/2023), HTN, HLD, cervical spondylosis s/p laminectomy, gastric ulcers, hypothyroidism,  who presents to [facility] ED 10/6/23 for BELLO with minimal exertion, chest pressure, and orthopnea x 3 days. Also reports a fever of 101.8 yesterday but resolved with Tylenol with no recurrence since. Reported constipation at home with response after suppository at home on Tuesday (10/3), however, no BM since then. Does report pinpoint RUQ abdominal discomfort worse with palpation. 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 His appetite and fluid intake has decreased as well. Denies any palpitations, dizziness, syncope, diaphoresis, LE edema, N/V/D, cough, congestion, fever, chills or recent sick contact.    Of note, pt was admitted to [facility] in Sept 2023 for L leg discomfort. MR imaging with finding of moderate-severe spinal stenosis and was considered candidate for medical therapy instead of surgery. Patient is now admitted to cardiac/tele for suspicion of CHF and further ischemic evaluation pending optimization including Leukocytosis workup.     -Cardiac cath 6/5/23: IVUS guided JOSÉ/scoreflex to mLAD 95%, OM2  unable to cross, pRCA 50%, pLCx 50%, dLCx ectatic w/ slow [place], EDP 5, access R radial.  -TTE 3/27/23: LVEF normal 55-60%, no WMA, mild AR/MR/TR/ME.    -VS: /95, HR 89bpm, RR 20, T 97.7, O2 92%.  -Meds in ED: Lasix 20mg IV x 1, Ceftriaxone 1000mg IVPB X 1, Azithromycin 500mg IVP x 1.   -Labs in ED: WBC 16.26, Na 126, proBNP 1279, Trop T 18, Lactate 1.2  -EKG: NSR, TWI [id number]  -RVP: pending  -CXR: per ED MD wet read ?pleural effusion, ?patchy opacities.      (06 Oct 2023 18:23)    Surgery Addendum:    87M w/ PMHx CAD s/p JOSÉ (6/23), HTN, HLD, PUD, hypothyroidism, PSHx C spine laminectomy presented to the ED for R sided abdominal pain w/ pain on deep inspiration. Pt states his pain in mid to upper right side that is constant and worse with palpation. Not related to food, and he has never had pain like this before. Denies n/v. Endorses fever to 101 at home, but has been afebrile here. Denies c/d. Denies urinary symptoms. Tolerating PO at this time.      PAST MEDICAL & SURGICAL HISTORY:  HLD (hyperlipidemia)      Hypothyroid      History of BPH      CAD (coronary artery disease)      S/P laminectomy with spinal fusion        Home Meds: Home Medications:  cyclobenzaprine 5 mg oral tablet: 1 tab(s) orally 2 times a day (06 Oct 2023 19:43)  Dulcolax Laxative 5 mg oral delayed release tablet: 1 tab(s) orally once a day (06 Oct 2023 19:46)  levothyroxine 50 mcg (0.05 mg) oral tablet: 1 tab(s) orally once a day (06 Oct 2023 19:45)  metoprolol succinate 25 mg oral capsule, extended release: 0.5 tab(s) orally once a day (06 Oct 2023 19:44)  rosuvastatin 20 mg oral tablet: 1 orally once a day (at bedtime) (06 Oct 2023 19:45)    Allergies: Allergies    No Known Drug Allergies  shellfish (Swelling; Hives)    Intolerances      Soc:   Advanced Directives: Presumed Full Code     CURRENT MEDICATIONS:   --------------------------------------------------------------------------------------  Neurologic Medications  cyclobenzaprine 5 milliGRAM(s) Oral two times a day PRN Muscle Spasm  gabapentin 100 milliGRAM(s) Oral at bedtime  oxyCODONE    IR 5 milliGRAM(s) Oral every 6 hours PRN Severe Pain (7 - 10)    Respiratory Medications    Cardiovascular Medications  furosemide   Injectable 40 milliGRAM(s) IV Push daily  metoprolol succinate ER 12.5 milliGRAM(s) Oral daily    Gastrointestinal Medications  bisacodyl Suppository 10 milliGRAM(s) Rectal daily PRN Constipation  dextrose 5%. 1000 milliLiter(s) IV Continuous <Continuous>  dextrose 5%. 1000 milliLiter(s) IV Continuous <Continuous>  pantoprazole    Tablet 40 milliGRAM(s) Oral at bedtime  senna 2 Tablet(s) Oral daily    Genitourinary Medications    Hematologic/Oncologic Medications  aspirin enteric coated 81 milliGRAM(s) Oral daily  clopidogrel Tablet 75 milliGRAM(s) Oral daily    Antimicrobial/Immunologic Medications  cefTRIAXone   IVPB 2000 milliGRAM(s) IV Intermittent every 24 hours  metroNIDAZOLE    Tablet 500 milliGRAM(s) Oral every 8 hours    Endocrine/Metabolic Medications  atorvastatin 40 milliGRAM(s) Oral at bedtime  dextrose 50% Injectable 25 Gram(s) IV Push once  dextrose 50% Injectable 12.5 Gram(s) IV Push once  dextrose 50% Injectable 25 Gram(s) IV Push once  dextrose Oral Gel 15 Gram(s) Oral once PRN Blood Glucose LESS THAN 70 milliGRAM(s)/deciliter  glucagon  Injectable 1 milliGRAM(s) IntraMuscular once  insulin lispro (ADMELOG) corrective regimen sliding scale   SubCutaneous three times a day before meals  insulin lispro (ADMELOG) corrective regimen sliding scale   SubCutaneous at bedtime  levothyroxine 50 MICROGram(s) Oral daily    Topical/Other Medications    --------------------------------------------------------------------------------------    VITAL SIGNS, INS/OUTS (last 24 hours):  --------------------------------------------------------------------------------------  ICU Vital Signs Last 24 Hrs  T(C): 36.6 (08 Oct 2023 08:55), Max: 36.6 (08 Oct 2023 08:55)  T(F): 97.8 (08 Oct 2023 08:55), Max: 97.8 (08 Oct 2023 08:55)  HR: 78 (08 Oct 2023 08:55) (78 - 80)  BP: 128/79 (08 Oct 2023 08:55) (122/74 - 134/81)  BP(mean): 98 (08 Oct 2023 08:55) (98 - 98)  RR: 18 (08 Oct 2023 08:55) (18 - 18)  SpO2: 96% (08 Oct 2023 08:55) (94% - 97%)    O2 Parameters below as of 08 Oct 2023 08:55  Patient On (Oxygen Delivery Method): nasal cannula  O2 Flow (L/min): 2        I&O's Summary    07 Oct 2023 07:01  -  08 Oct 2023 07:00  --------------------------------------------------------  IN: 360 mL / OUT: 1100 mL / NET: -740 mL    08 Oct 2023 07:01  -  08 Oct 2023 11:51  --------------------------------------------------------  IN: 120 mL / OUT: 500 mL / NET: -380 mL      --------------------------------------------------------------------------------------    EXAM:  GEN: NAD, resting comfortably in bed  HEENT: MMM  CV: RRR  Resp: nonlabored breathing, no respiratory distress, mild end expiratory wheezes  Abd: soft, nontender, mildly distended  Ext: WWP, no edema, no calf tenderness  Neuro: no focal deficits  Psych: pleasant    LABS  --------------------------------------------------------------------------------------  Labs:  CAPILLARY BLOOD GLUCOSE      POCT Blood Glucose.: 162 mg/dL (08 Oct 2023 08:08)  POCT Blood Glucose.: 132 mg/dL (07 Oct 2023 21:44)  POCT Blood Glucose.: 157 mg/dL (07 Oct 2023 16:44)  POCT Blood Glucose.: 148 mg/dL (07 Oct 2023 14:01)                          15.2   12.76 )-----------( 250      ( 08 Oct 2023 06:13 )             48.1       Auto Neutrophil %: 67.3 % (10-08-23 @ 06:13)  Auto Immature Granulocyte %: 0.9 % (10-08-23 @ 06:13)    10-08    124<L>  |  89<L>  |  18  ----------------------------<  156<H>  3.8   |  23  |  0.97      Calcium: 9.0 mg/dL (10-08-23 @ 06:13)      LFTs:             7.8  | 0.5  | 18       ------------------[101     ( 07 Oct 2023 06:16 )  3.4  | x    | 16          Lipase:x      Amylase:x         Lactate, Blood: 1.8 mmol/L (10-07-23 @ 06:16)  Lactate, Blood: 1.2 mmol/L (10-06-23 @ 18:33)      Coags:     12.8   ----< 1.13    ( 06 Oct 2023 17:40 )     35.8        CARDIAC MARKERS ( 06 Oct 2023 17:40 )  x     / x     / 95 U/L / x     / 1.4 ng/mL          Urinalysis Basic - ( 08 Oct 2023 06:13 )    Color: x / Appearance: x / SG: x / pH: x  Gluc: 156 mg/dL / Ketone: x  / Bili: x / Urobili: x   Blood: x / Protein: x / Nitrite: x   Leuk Esterase: x / RBC: x / WBC x   Sq Epi: x / Non Sq Epi: x / Bacteria: x        Culture - Blood (collected 06 Oct 2023 18:55)  Source: .Blood Blood-Peripheral  Preliminary Report (07 Oct 2023 20:00):    No growth at 1 day.    Culture - Blood (collected 06 Oct 2023 18:20)  Source: .Blood Blood-Peripheral  Preliminary Report (07 Oct 2023 20:00):    No growth at 1 day.    Urinalysis with Rflx Culture (collected 06 Oct 2023 18:11)    Culture - Urine (collected 06 Oct 2023 18:11)  Source: Clean Catch None  Final Report (08 Oct 2023 08:14):    Specimen appears CONTAMINATED. Lab suggests repeat clean catch specimen.        --------------------------------------------------------------------------------------    OTHER LABS    IMAGING RESULTS  RUQ US:    < from: US Abdomen Upper Quadrant Right (10.07.23 @ 12:41) >  FINDINGS:  Liver: Within normal limits.  Bile ducts: Normal caliber. Common bile duct measures 5 mm.  Gallbladder: Cholelithiasis. Markedly thickened gallbladder wall   measuring up to 9 mm. No focal pain.  Pancreas: Visualized portions are within normal limits.  Right kidney: 8.6 cm. No hydronephrosis.  Ascites: None.  IVC: Visualized portions are within normal limits.    IMPRESSION:  Cholelithiasis with significant gallbladder wall thickening. Findings can   be secondary to acute cholecystitis, in the right clinical setting. A   HIDA scan can be performed for definitive evaluation for acute   cholecystitis.    < end of copied text >      CT AP:    < from: CT Abdomen and Pelvis w/ Oral Cont and w/ IV Cont (10.07.23 @ 18:12) >  FINDINGS:  LOWER CHEST: Moderate right diaphragmatic eventration with mild   compressive right basilar atelectasis. Small right pleural effusion.   Coronary artery calcifications.    LIVER: Within normal limits.  BILE DUCTS: Normal caliber.  GALLBLADDER: 1.8 cm gallstone in the gallbladder neck. Significant   concentric wall thickening with mild adjacent fatty stranding.  SPLEEN: Within normal limits.  PANCREAS: Fatty atrophy.  ADRENALS: Within normal limits.  KIDNEYS/URETERS: 2 mm nonobstructing stone in the right lower pole.    BLADDER: Within normal limits.  REPRODUCTIVE ORGANS: Prostate is enlarged with protrusion into the   bladder neck.    BOWEL: No bowel obstruction. Colonic diverticulosis.. Appendix is normal.  PERITONEUM:No ascites.  VESSELS: Small noncalcified atheromatous plaque in the distal abdominal   aorta (series 5; image 91). Additional atherosclerotic calcifications.  RETROPERITONEUM/LYMPH NODES: No lymphadenopathy.  ABDOMINAL WALL: Bilateral fat-containing inguinal hernias.  BONES: Multilevel degenerative changes of the spine including grade 1   retrolisthesis of L3 on L4 and L4 on L5.    IMPRESSION:  Findings in the gallbladder can be seen with acute cholecystitis, in the   right clinical setting. A HIDA scan can be performed for definitive   evaluation.  Markedly enlarged prostate gland.    < end of copied text >     SURGERY CONSULT  ==============================================================================================================  HPI: 87y Male  HPI:  Patient is a 87M, (shellfish rxn w/ Lip Swelling ~ 20 yrs ago),  w/ PMHx CAD s/p JOSÉ to mLAD (6/2023), HTN, HLD, cervical spondylosis s/p laminectomy, gastric ulcers, hypothyroidism,  who presents to [facility] ED 10/6/23 for BELLO with minimal exertion, chest pressure, and orthopnea x 3 days. Also reports a fever of 101.8 yesterday but resolved with Tylenol with no recurrence since. Reported constipation at home with response after suppository at home on Tuesday (10/3), however, no BM since then. Does report pinpoint RUQ abdominal discomfort worse with palpation. 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 His appetite and fluid intake has decreased as well. Denies any palpitations, dizziness, syncope, diaphoresis, LE edema, N/V/D, cough, congestion, fever, chills or recent sick contact.    Of note, pt was admitted to [facility] in Sept 2023 for L leg discomfort. MR imaging with finding of moderate-severe spinal stenosis and was considered candidate for medical therapy instead of surgery. Patient is now admitted to cardiac/tele for suspicion of CHF and further ischemic evaluation pending optimization including Leukocytosis workup.     -Cardiac cath 6/5/23: IVUS guided JOSÉ/scoreflex to mLAD 95%, OM2  unable to cross, pRCA 50%, pLCx 50%, dLCx ectatic w/ slow [place], EDP 5, access R radial.  -TTE 3/27/23: LVEF normal 55-60%, no WMA, mild AR/MR/TR/DC.    -VS: /95, HR 89bpm, RR 20, T 97.7, O2 92%.  -Meds in ED: Lasix 20mg IV x 1, Ceftriaxone 1000mg IVPB X 1, Azithromycin 500mg IVP x 1.   -Labs in ED: WBC 16.26, Na 126, proBNP 1279, Trop T 18, Lactate 1.2  -EKG: NSR, TWI [id number]  -RVP: pending  -CXR: per ED MD wet read ?pleural effusion, ?patchy opacities.      (06 Oct 2023 18:23)    Surgery Addendum:    87M w/ PMHx CAD s/p JOSÉ (6/23), HTN, HLD, PUD, hypothyroidism, PSHx C spine laminectomy presented to the ED for R sided abdominal pain w/ pain on deep inspiration. Pt states his pain in mid to upper right side that is constant and worse with palpation. Not related to food, and he has never had pain like this before. Denies n/v. Endorses fever to 101 at home, but has been afebrile here. Denies c/d. Denies urinary symptoms. Tolerating PO at this time.      PAST MEDICAL & SURGICAL HISTORY:  HLD (hyperlipidemia)      Hypothyroid      History of BPH      CAD (coronary artery disease)      S/P laminectomy with spinal fusion        Home Meds: Home Medications:  cyclobenzaprine 5 mg oral tablet: 1 tab(s) orally 2 times a day (06 Oct 2023 19:43)  Dulcolax Laxative 5 mg oral delayed release tablet: 1 tab(s) orally once a day (06 Oct 2023 19:46)  levothyroxine 50 mcg (0.05 mg) oral tablet: 1 tab(s) orally once a day (06 Oct 2023 19:45)  metoprolol succinate 25 mg oral capsule, extended release: 0.5 tab(s) orally once a day (06 Oct 2023 19:44)  rosuvastatin 20 mg oral tablet: 1 orally once a day (at bedtime) (06 Oct 2023 19:45)    Allergies: Allergies    No Known Drug Allergies  shellfish (Swelling; Hives)    Intolerances      Soc:   Advanced Directives: Presumed Full Code     CURRENT MEDICATIONS:   --------------------------------------------------------------------------------------  Neurologic Medications  cyclobenzaprine 5 milliGRAM(s) Oral two times a day PRN Muscle Spasm  gabapentin 100 milliGRAM(s) Oral at bedtime  oxyCODONE    IR 5 milliGRAM(s) Oral every 6 hours PRN Severe Pain (7 - 10)    Respiratory Medications    Cardiovascular Medications  furosemide   Injectable 40 milliGRAM(s) IV Push daily  metoprolol succinate ER 12.5 milliGRAM(s) Oral daily    Gastrointestinal Medications  bisacodyl Suppository 10 milliGRAM(s) Rectal daily PRN Constipation  dextrose 5%. 1000 milliLiter(s) IV Continuous <Continuous>  dextrose 5%. 1000 milliLiter(s) IV Continuous <Continuous>  pantoprazole    Tablet 40 milliGRAM(s) Oral at bedtime  senna 2 Tablet(s) Oral daily    Genitourinary Medications    Hematologic/Oncologic Medications  aspirin enteric coated 81 milliGRAM(s) Oral daily  clopidogrel Tablet 75 milliGRAM(s) Oral daily    Antimicrobial/Immunologic Medications  cefTRIAXone   IVPB 2000 milliGRAM(s) IV Intermittent every 24 hours  metroNIDAZOLE    Tablet 500 milliGRAM(s) Oral every 8 hours    Endocrine/Metabolic Medications  atorvastatin 40 milliGRAM(s) Oral at bedtime  dextrose 50% Injectable 25 Gram(s) IV Push once  dextrose 50% Injectable 12.5 Gram(s) IV Push once  dextrose 50% Injectable 25 Gram(s) IV Push once  dextrose Oral Gel 15 Gram(s) Oral once PRN Blood Glucose LESS THAN 70 milliGRAM(s)/deciliter  glucagon  Injectable 1 milliGRAM(s) IntraMuscular once  insulin lispro (ADMELOG) corrective regimen sliding scale   SubCutaneous three times a day before meals  insulin lispro (ADMELOG) corrective regimen sliding scale   SubCutaneous at bedtime  levothyroxine 50 MICROGram(s) Oral daily    Topical/Other Medications    --------------------------------------------------------------------------------------    VITAL SIGNS, INS/OUTS (last 24 hours):  --------------------------------------------------------------------------------------  ICU Vital Signs Last 24 Hrs  T(C): 36.6 (08 Oct 2023 08:55), Max: 36.6 (08 Oct 2023 08:55)  T(F): 97.8 (08 Oct 2023 08:55), Max: 97.8 (08 Oct 2023 08:55)  HR: 78 (08 Oct 2023 08:55) (78 - 80)  BP: 128/79 (08 Oct 2023 08:55) (122/74 - 134/81)  BP(mean): 98 (08 Oct 2023 08:55) (98 - 98)  RR: 18 (08 Oct 2023 08:55) (18 - 18)  SpO2: 96% (08 Oct 2023 08:55) (94% - 97%)    O2 Parameters below as of 08 Oct 2023 08:55  Patient On (Oxygen Delivery Method): nasal cannula  O2 Flow (L/min): 2        I&O's Summary    07 Oct 2023 07:01  -  08 Oct 2023 07:00  --------------------------------------------------------  IN: 360 mL / OUT: 1100 mL / NET: -740 mL    08 Oct 2023 07:01  -  08 Oct 2023 11:51  --------------------------------------------------------  IN: 120 mL / OUT: 500 mL / NET: -380 mL      --------------------------------------------------------------------------------------    EXAM:  GEN: NAD, resting comfortably in bed  HEENT: MMM  CV: RRR  Resp: nonlabored breathing, no respiratory distress, mild end expiratory wheezes  Abd: soft, nontender, mildly distended  Ext: WWP, no edema, no calf tenderness  Neuro: no focal deficits  Psych: pleasant    LABS  --------------------------------------------------------------------------------------  Labs:  CAPILLARY BLOOD GLUCOSE      POCT Blood Glucose.: 162 mg/dL (08 Oct 2023 08:08)  POCT Blood Glucose.: 132 mg/dL (07 Oct 2023 21:44)  POCT Blood Glucose.: 157 mg/dL (07 Oct 2023 16:44)  POCT Blood Glucose.: 148 mg/dL (07 Oct 2023 14:01)                          15.2   12.76 )-----------( 250      ( 08 Oct 2023 06:13 )             48.1       Auto Neutrophil %: 67.3 % (10-08-23 @ 06:13)  Auto Immature Granulocyte %: 0.9 % (10-08-23 @ 06:13)    10-08    124<L>  |  89<L>  |  18  ----------------------------<  156<H>  3.8   |  23  |  0.97      Calcium: 9.0 mg/dL (10-08-23 @ 06:13)      LFTs:             7.8  | 0.5  | 18       ------------------[101     ( 07 Oct 2023 06:16 )  3.4  | x    | 16          Lipase:x      Amylase:x         Lactate, Blood: 1.8 mmol/L (10-07-23 @ 06:16)  Lactate, Blood: 1.2 mmol/L (10-06-23 @ 18:33)      Coags:     12.8   ----< 1.13    ( 06 Oct 2023 17:40 )     35.8        CARDIAC MARKERS ( 06 Oct 2023 17:40 )  x     / x     / 95 U/L / x     / 1.4 ng/mL          Urinalysis Basic - ( 08 Oct 2023 06:13 )    Color: x / Appearance: x / SG: x / pH: x  Gluc: 156 mg/dL / Ketone: x  / Bili: x / Urobili: x   Blood: x / Protein: x / Nitrite: x   Leuk Esterase: x / RBC: x / WBC x   Sq Epi: x / Non Sq Epi: x / Bacteria: x        Culture - Blood (collected 06 Oct 2023 18:55)  Source: .Blood Blood-Peripheral  Preliminary Report (07 Oct 2023 20:00):    No growth at 1 day.    Culture - Blood (collected 06 Oct 2023 18:20)  Source: .Blood Blood-Peripheral  Preliminary Report (07 Oct 2023 20:00):    No growth at 1 day.    Urinalysis with Rflx Culture (collected 06 Oct 2023 18:11)    Culture - Urine (collected 06 Oct 2023 18:11)  Source: Clean Catch None  Final Report (08 Oct 2023 08:14):    Specimen appears CONTAMINATED. Lab suggests repeat clean catch specimen.        --------------------------------------------------------------------------------------    OTHER LABS    IMAGING RESULTS  RUQ US:    < from: US Abdomen Upper Quadrant Right (10.07.23 @ 12:41) >  FINDINGS:  Liver: Within normal limits.  Bile ducts: Normal caliber. Common bile duct measures 5 mm.  Gallbladder: Cholelithiasis. Markedly thickened gallbladder wall   measuring up to 9 mm. No focal pain.  Pancreas: Visualized portions are within normal limits.  Right kidney: 8.6 cm. No hydronephrosis.  Ascites: None.  IVC: Visualized portions are within normal limits.    IMPRESSION:  Cholelithiasis with significant gallbladder wall thickening. Findings can   be secondary to acute cholecystitis, in the right clinical setting. A   HIDA scan can be performed for definitive evaluation for acute   cholecystitis.    < end of copied text >      CT AP:    < from: CT Abdomen and Pelvis w/ Oral Cont and w/ IV Cont (10.07.23 @ 18:12) >  FINDINGS:  LOWER CHEST: Moderate right diaphragmatic eventration with mild   compressive right basilar atelectasis. Small right pleural effusion.   Coronary artery calcifications.    LIVER: Within normal limits.  BILE DUCTS: Normal caliber.  GALLBLADDER: 1.8 cm gallstone in the gallbladder neck. Significant   concentric wall thickening with mild adjacent fatty stranding.  SPLEEN: Within normal limits.  PANCREAS: Fatty atrophy.  ADRENALS: Within normal limits.  KIDNEYS/URETERS: 2 mm nonobstructing stone in the right lower pole.    BLADDER: Within normal limits.  REPRODUCTIVE ORGANS: Prostate is enlarged with protrusion into the   bladder neck.    BOWEL: No bowel obstruction. Colonic diverticulosis.. Appendix is normal.  PERITONEUM:No ascites.  VESSELS: Small noncalcified atheromatous plaque in the distal abdominal   aorta (series 5; image 91). Additional atherosclerotic calcifications.  RETROPERITONEUM/LYMPH NODES: No lymphadenopathy.  ABDOMINAL WALL: Bilateral fat-containing inguinal hernias.  BONES: Multilevel degenerative changes of the spine including grade 1   retrolisthesis of L3 on L4 and L4 on L5.    IMPRESSION:  Findings in the gallbladder can be seen with acute cholecystitis, in the   right clinical setting. A HIDA scan can be performed for definitive   evaluation.  Markedly enlarged prostate gland.    < end of copied text >

## 2023-10-08 NOTE — PROGRESS NOTE ADULT - ASSESSMENT
Patient is a 87M, (shellfish rxn w/ Lip Swelling ~ 20 yrs ago, does not eat shellfish since then),  w/ PMHx CAD s/p JOSÉ to mLAD (6/2023), HTN, HLD, cervical spondylosis s/p laminectomy, gastric ulcers, hypothyroidism,  who presents to [facility] ED 10/6/23 for BELLO with minimal exertion, chest pressure, and orthopnea x 3 days. Also reports a fever of 101.8 yesterday but resolved with Tylenol with no recurrence since. Reported constipation at home with response after suppository at home on Tuesday (10/3), however, no BM since then. Does report pinpoint RUQ abdominal discomfort worse with palpation x 1-2 days. 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 His appetite and fluid intake has decreased as well. CXR with concern for CHF vs PNA. Negative troponin and EKG nonischemic. Patient is now admitted to cardiac/tele for suspicion of CHF and further ischemic evaluation pending optimization including Leukocytosis workup.             Patient is a 87M, (shellfish rxn w/ Lip Swelling ~ 20 yrs ago, does not eat shellfish since then),  w/ PMHx CAD s/p JOSÉ to mLAD (6/2023), HTN, HLD, cervical spondylosis s/p laminectomy, gastric ulcers, hypothyroidism,  who presents to [facility] ED 10/6/23 for BELLO with minimal exertion, chest pressure, and orthopnea x 3 days. Also reports a fever of 101.8 yesterday but resolved with Tylenol with no recurrence since. Reported constipation at home with response after suppository at home on Tuesday (10/3), however, no BM since then. Does report pinpoint RUQ abdominal discomfort worse with palpation x 1-2 days. 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 His appetite and fluid intake has decreased as well. CXR with concern for CHF vs PNA. Negative troponin and EKG nonischemic. Patient is now admitted to cardiac/tele for suspicion of CHF and further ischemic evaluation pending optimization including Leukocytosis workup.             Patient is a 87M, (shellfish rxn w/ Lip Swelling ~ 20 yrs ago, does not eat shellfish since then),  w/ PMHx CAD s/p JOSÉ to mLAD (6/2023), HTN, HLD, cervical spondylosis s/p laminectomy, gastric ulcers, hypothyroidism,  who presents to [facility] ED 10/6/23 for BELLO with minimal exertion, chest pressure, and orthopnea x 3 days. Also reports a fever of 101.8 yesterday but resolved with Tylenol with no recurrence since. Reported constipation at home with response after suppository at home on Tuesday (10/3), however, no BM since then. Does report pinpoint RUQ abdominal discomfort worse with palpation x 1-2 days. 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 His appetite and fluid intake has decreased as well. CXR with concern for CHF vs PNA. Negative troponin and EKG nonischemic. Patient is now admitted to cardiac/tele for suspicion of CHF and further ischemic evaluation pending optimization including Leukocytosis workup.

## 2023-10-08 NOTE — PROGRESS NOTE ADULT - PROBLEM SELECTOR PLAN 9
per Daughter, pt's home Flomax d/c recently as was not beneficial. Pt has been voiding on his own.  -monitor for any urinary retention.      F: none  E: Replace if K < 4, Mag < 2  N: Dash, diet  Dispo: pending clinical course. per Daughter, pt's home Flomax d/c recently as was not beneficial. Pt has been voiding on his own.  -monitor for any urinary retention

## 2023-10-08 NOTE — PROGRESS NOTE ADULT - PROBLEM SELECTOR PLAN 2
Reports sharp pinpoint RUQ discomfort worsened with palpation x 1-2 days. Last BM 10/3 w/ suppository at home. Daughter reports decreased eating and fluid intake at home lately.  Has had issues with constipation. Has been taking Dulcolax x 2 weeks at home. CXR abd for evaluation of constipation  - RUQ abdominal u/s for further pain evaluation and CT AP w/ oral and iv contrast iso leukocytosis  - bowel regimen Reports sharp pinpoint RUQ discomfort worsened with palpation x 1-2 days. Last BM 10/3 w/ suppository at home. Daughter reports decreased eating and fluid intake at home lately.  Has had issues with constipation. Has been taking Dulcolax x 2 weeks at home. CXR abd for evaluation of constipation  RUQUS: cholelithiasis  CTAP: acute cholecystitis with fat stranding    - surgery consulted, f/u recs

## 2023-10-08 NOTE — PROGRESS NOTE ADULT - ATTENDING COMMENTS
made amendments to the documentation where necessary. Additional comments: 87M, (shellfish rxn w/ Lip Swelling ~ 20 yrs ago),  w/ PMHx CAD s/p JOSÉ to mLAD (6/2023), HTN, HLD, cervical spondylosis s/p laminectomy, gastric ulcers, hypothyroidism,  who presents to [facility] ED 10/6/23 for BELLO with minimal exertion, chest pressure, and orthopnea x 3 days. Also reports a fever of 101.8 yesterday.    1. CAD  Hx of CAD with now exertional angina.   Continue aspirin 81 mg lipitor, metoprolol. [facility] now post-poned due to acute cholecystitis.     2. Acute cholecystitis  Clinical presentation, physical examination, labs and CT consistent with acute cholecystitis. Consult general surgery, defer abx management to medicine.  Intermediate risk procedure, fair exercise capacity, recent JOSÉ stent placement at mid LAD 6.2023, reasonable to hold plavix for 5 days prior to procedure, continue aspirin. > 3 months post-JOSÉ, no need for cangrelor. Optimizing volume status prior to surgery    3. Microcytic anemia  Iron studies, electrophoresis. ? gi bleeding    4. DM/hypothyroidism  Consult endocrine    5. Hyponatremia  Likely from HF, lasix 40 mg IV, continue metoprolol, echo in AM.  5. made amendments to the documentation where necessary. Additional comments: 87M, (shellfish rxn w/ Lip Swelling ~ 20 yrs ago),  w/ PMHx CAD s/p JOSÉ to mLAD (6/2023), HTN, HLD, cervical spondylosis s/p laminectomy, gastric ulcers, hypothyroidism,  who presents to [facility] ED 10/6/23 for BELLO with minimal exertion, chest pressure, and orthopnea x 3 days. Also reports a fever of 101.8 yesterday.    1. CAD  Hx of CAD with now exertional angina.   Continue aspirin 81 mg lipitor, metoprolol. [facility] now post-poned due to acute cholecystitis.     2. Acute cholecystitis  Clinical presentation, physical examination, labs and CT consistent with acute cholecystitis. Consult general surgery, defer abx management to medicine.  Intermediate risk procedure, fair exercise capacity, recent JOSÉ stent placement at mid LAD 6.2023, reasonable to hold plavix for 5 days prior to procedure, continue aspirin. > 3 months post-JOSÉ, no need for cangrelor. Optimizing volume status prior to surgery    3. Microcytic anemia  Iron studies, electrophoresis. ? gi bleeding    4. DM/hypothyroidism  Consult endocrine    5. Hyponatremia  Likely from HF, lasix 40 mg IV, continue metoprolol, echo in AM.  5. made amendments to the documentation where necessary. Additional comments: 87M, (shellfish rxn w/ Lip Swelling ~ 20 yrs ago),  w/ PMHx CAD s/p JOSÉ to mLAD (6/2023), HTN, HLD, cervical spondylosis s/p laminectomy, gastric ulcers, hypothyroidism,  who presents to [facility] ED 10/6/23 for BELLO with minimal exertion, chest pressure, and orthopnea x 3 days. Also reports a fever of 101.8 yesterday.    1. CAD  Hx of CAD with now exertional angina.   Continue aspirin 81 mg lipitor, metoprolol. [facility] now post-poned due to acute cholecystitis.     2. Acute cholecystitis  Clinical presentation, physical examination, labs and CT consistent with acute cholecystitis. Consult general surgery, defer abx management to medicine.  Intermediate risk procedure, fair exercise capacity, recent JOSÉ stent placement at mid LAD 6.2023, reasonable to hold plavix for 5 days prior to procedure, continue aspirin. > 3 months post-JOSÉ, no need for cangrelor. Optimizing volume status prior to surgery    3. Microcytic anemia  Iron studies, electrophoresis. ? gi bleeding    4. DM/hypothyroidism  Consult endocrine    5. Hyponatremia  Likely from HF, lasix 40 mg IV, continue metoprolol, echo in AM.  5.

## 2023-10-08 NOTE — PROGRESS NOTE ADULT - SUBJECTIVE AND OBJECTIVE BOX
*****INCOMPLETE NOTE*****    INTERVAL HPI/OVERNIGHT EVENTS:    SUBJECTIVE: Patient seen and examined at bedside, resting comfortably in bed, and does not appear to be in any acute distress. Patient states  Patient denies any recent fevers, chills, nausea, vomiting, headache, acute sob, chest pain, abdominal pain, genitourinary sx, extremity pain or swelling.     ANTIBIOTICS/RELEVANT:    MEDICATIONS  (STANDING):  aspirin enteric coated 81 milliGRAM(s) Oral daily  atorvastatin 40 milliGRAM(s) Oral at bedtime  azithromycin  IVPB 500 milliGRAM(s) IV Intermittent every 24 hours  cefTRIAXone   IVPB 1000 milliGRAM(s) IV Intermittent every 24 hours  clopidogrel Tablet 75 milliGRAM(s) Oral daily  dextrose 5%. 1000 milliLiter(s) (50 mL/Hr) IV Continuous <Continuous>  dextrose 5%. 1000 milliLiter(s) (100 mL/Hr) IV Continuous <Continuous>  dextrose 50% Injectable 25 Gram(s) IV Push once  dextrose 50% Injectable 25 Gram(s) IV Push once  dextrose 50% Injectable 12.5 Gram(s) IV Push once  furosemide   Injectable 40 milliGRAM(s) IV Push daily  gabapentin 100 milliGRAM(s) Oral at bedtime  glucagon  Injectable 1 milliGRAM(s) IntraMuscular once  insulin lispro (ADMELOG) corrective regimen sliding scale   SubCutaneous three times a day before meals  insulin lispro (ADMELOG) corrective regimen sliding scale   SubCutaneous at bedtime  levothyroxine 50 MICROGram(s) Oral daily  metoprolol succinate ER 12.5 milliGRAM(s) Oral daily  pantoprazole    Tablet 40 milliGRAM(s) Oral at bedtime  senna 2 Tablet(s) Oral daily    MEDICATIONS  (PRN):  cyclobenzaprine 5 milliGRAM(s) Oral two times a day PRN Muscle Spasm  dextrose Oral Gel 15 Gram(s) Oral once PRN Blood Glucose LESS THAN 70 milliGRAM(s)/deciliter  oxyCODONE    IR 5 milliGRAM(s) Oral every 6 hours PRN Severe Pain (7 - 10)      Vital Signs Last 24 Hrs  T(C): 36.4 (07 Oct 2023 21:00), Max: 36.5 (07 Oct 2023 09:12)  T(F): 97.6 (07 Oct 2023 21:00), Max: 97.7 (07 Oct 2023 09:12)  HR: 80 (08 Oct 2023 00:00) (73 - 80)  BP: 122/74 (08 Oct 2023 00:00) (122/74 - 140/74)  BP(mean): --  RR: 18 (08 Oct 2023 00:00) (17 - 18)  SpO2: 94% (08 Oct 2023 00:00) (93% - 97%)    Parameters below as of 08 Oct 2023 00:00  Patient On (Oxygen Delivery Method): nasal cannula  O2 Flow (L/min): 2      PHYSICAL EXAM:  General: in no acute distress  Eyes: EOMI intact bilaterally. Anicteric sclerae, moist conjunctivae  HENT: Moist mucous membranes  Neck: Trachea midline, supple  Lungs: CTA B/L. No wheezes, rales, or rhonchi  Cardiovascular: RRR. No murmurs, rubs, or gallops  Abdomen: Soft, non-tender non-distended; No rebound or guarding  Extremities: WWP, No clubbing, cyanosis or edema  Neurological: Alert and oriented  Skin: Warm and dry. No obvious rash     LABS:                        15.2   12.76 )-----------( 250      ( 08 Oct 2023 06:13 )             48.1     10-08    124<L>  |  89<L>  |  18  ----------------------------<  156<H>  3.8   |  23  |  0.97    Ca    9.0      08 Oct 2023 06:13  Phos  3.2     10-08  Mg     2.2     10-08    TPro  7.8  /  Alb  3.4  /  TBili  0.5  /  DBili  x   /  AST  18  /  ALT  16  /  AlkPhos  101  10-07    PT/INR - ( 06 Oct 2023 17:40 )   PT: 12.8 sec;   INR: 1.13          PTT - ( 06 Oct 2023 17:40 )  PTT:35.8 sec  Urinalysis Basic - ( 08 Oct 2023 06:13 )    Color: x / Appearance: x / SG: x / pH: x  Gluc: 156 mg/dL / Ketone: x  / Bili: x / Urobili: x   Blood: x / Protein: x / Nitrite: x   Leuk Esterase: x / RBC: x / WBC x   Sq Epi: x / Non Sq Epi: x / Bacteria: x        MICROBIOLOGY:    RADIOLOGY & ADDITIONAL STUDIES: INTERVAL HPI/OVERNIGHT EVENTS: adam    SUBJECTIVE: Patient seen and examined at bedside, resting comfortably in bed, and does not appear to be in any acute distress. Patient states that he does not have any abdominal pain, but he states he has constipation. Denies any chest pain, shortness of breath, nausea vomiting or diarrhea.      MEDICATIONS  (STANDING):  aspirin enteric coated 81 milliGRAM(s) Oral daily  atorvastatin 40 milliGRAM(s) Oral at bedtime  azithromycin  IVPB 500 milliGRAM(s) IV Intermittent every 24 hours  cefTRIAXone   IVPB 1000 milliGRAM(s) IV Intermittent every 24 hours  clopidogrel Tablet 75 milliGRAM(s) Oral daily  dextrose 5%. 1000 milliLiter(s) (50 mL/Hr) IV Continuous <Continuous>  dextrose 5%. 1000 milliLiter(s) (100 mL/Hr) IV Continuous <Continuous>  dextrose 50% Injectable 25 Gram(s) IV Push once  dextrose 50% Injectable 25 Gram(s) IV Push once  dextrose 50% Injectable 12.5 Gram(s) IV Push once  furosemide   Injectable 40 milliGRAM(s) IV Push daily  gabapentin 100 milliGRAM(s) Oral at bedtime  glucagon  Injectable 1 milliGRAM(s) IntraMuscular once  insulin lispro (ADMELOG) corrective regimen sliding scale   SubCutaneous three times a day before meals  insulin lispro (ADMELOG) corrective regimen sliding scale   SubCutaneous at bedtime  levothyroxine 50 MICROGram(s) Oral daily  metoprolol succinate ER 12.5 milliGRAM(s) Oral daily  pantoprazole    Tablet 40 milliGRAM(s) Oral at bedtime  senna 2 Tablet(s) Oral daily    MEDICATIONS  (PRN):  cyclobenzaprine 5 milliGRAM(s) Oral two times a day PRN Muscle Spasm  dextrose Oral Gel 15 Gram(s) Oral once PRN Blood Glucose LESS THAN 70 milliGRAM(s)/deciliter  oxyCODONE    IR 5 milliGRAM(s) Oral every 6 hours PRN Severe Pain (7 - 10)      Vital Signs Last 24 Hrs  T(C): 36.4 (07 Oct 2023 21:00), Max: 36.5 (07 Oct 2023 09:12)  T(F): 97.6 (07 Oct 2023 21:00), Max: 97.7 (07 Oct 2023 09:12)  HR: 80 (08 Oct 2023 00:00) (73 - 80)  BP: 122/74 (08 Oct 2023 00:00) (122/74 - 140/74)  BP(mean): --  RR: 18 (08 Oct 2023 00:00) (17 - 18)  SpO2: 94% (08 Oct 2023 00:00) (93% - 97%)    Parameters below as of 08 Oct 2023 00:00  Patient On (Oxygen Delivery Method): nasal cannula  O2 Flow (L/min): 2      PHYSICAL EXAM:  General: in no acute distress  Eyes: EOMI intact bilaterally.  HENT: Moist mucous membranes  Neck: Trachea midline, supple  Lungs: CTA B/L. No wheezes, rales, or rhonchi  Cardiovascular: RRR. No murmurs, rubs, or gallops  Abdomen: No tenderness to soft or deep palpation. Negative murphys sign, negative mcburneys point  Extremities: WWP  Neurological: Alert and oriented  Skin: Warm and dry    LABS:                        15.2   12.76 )-----------( 250      ( 08 Oct 2023 06:13 )             48.1     10-08    124<L>  |  89<L>  |  18  ----------------------------<  156<H>  3.8   |  23  |  0.97    Ca    9.0      08 Oct 2023 06:13  Phos  3.2     10-08  Mg     2.2     10-08    TPro  7.8  /  Alb  3.4  /  TBili  0.5  /  DBili  x   /  AST  18  /  ALT  16  /  AlkPhos  101  10-07    PT/INR - ( 06 Oct 2023 17:40 )   PT: 12.8 sec;   INR: 1.13          PTT - ( 06 Oct 2023 17:40 )  PTT:35.8 sec  Urinalysis Basic - ( 08 Oct 2023 06:13 )    Color: x / Appearance: x / SG: x / pH: x  Gluc: 156 mg/dL / Ketone: x  / Bili: x / Urobili: x   Blood: x / Protein: x / Nitrite: x   Leuk Esterase: x / RBC: x / WBC x   Sq Epi: x / Non Sq Epi: x / Bacteria: x        MICROBIOLOGY:    RADIOLOGY & ADDITIONAL STUDIES:

## 2023-10-08 NOTE — PROGRESS NOTE ADULT - SUBJECTIVE AND OBJECTIVE BOX
Patient is a 87y old  Male who presents with a chief complaint of chest pain, SOB (08 Oct 2023 10:34)    INTERVAL EVENTS: NAEON    SUBJECTIVE:  Patient was seen and examined at bedside. on NC2L with SpO2 96-97%. Pt denies cough or sputum, reports breathing is a bit heavy although not appearing tachypneic. Pt reports intermittent RLQ pain ~ 6/10, denies fever,chills, N/V,etc. tolerating diet.    Review of systems: No fever, chills, dizziness, HA, Changes in vision, CP, dyspnea, nausea or vomiting, dysuria, changes in bowel movements, LE edema. Rest of 12 point Review of systems negative unless otherwise documented elsewhere in note.     Diet, DASH/TLC:   Sodium & Cholesterol Restricted (10-06-23 @ 20:44) [Active]      MEDICATIONS:  MEDICATIONS  (STANDING):  aspirin enteric coated 81 milliGRAM(s) Oral daily  atorvastatin 40 milliGRAM(s) Oral at bedtime  cefTRIAXone   IVPB 2000 milliGRAM(s) IV Intermittent every 24 hours  clopidogrel Tablet 75 milliGRAM(s) Oral daily  dextrose 5%. 1000 milliLiter(s) (50 mL/Hr) IV Continuous <Continuous>  dextrose 5%. 1000 milliLiter(s) (100 mL/Hr) IV Continuous <Continuous>  dextrose 50% Injectable 25 Gram(s) IV Push once  dextrose 50% Injectable 25 Gram(s) IV Push once  dextrose 50% Injectable 12.5 Gram(s) IV Push once  furosemide   Injectable 40 milliGRAM(s) IV Push daily  gabapentin 100 milliGRAM(s) Oral at bedtime  glucagon  Injectable 1 milliGRAM(s) IntraMuscular once  insulin lispro (ADMELOG) corrective regimen sliding scale   SubCutaneous at bedtime  insulin lispro (ADMELOG) corrective regimen sliding scale   SubCutaneous three times a day before meals  levothyroxine 50 MICROGram(s) Oral daily  metoprolol succinate ER 12.5 milliGRAM(s) Oral daily  metroNIDAZOLE    Tablet 500 milliGRAM(s) Oral every 8 hours  pantoprazole    Tablet 40 milliGRAM(s) Oral at bedtime  senna 2 Tablet(s) Oral daily    MEDICATIONS  (PRN):  bisacodyl Suppository 10 milliGRAM(s) Rectal daily PRN Constipation  cyclobenzaprine 5 milliGRAM(s) Oral two times a day PRN Muscle Spasm  dextrose Oral Gel 15 Gram(s) Oral once PRN Blood Glucose LESS THAN 70 milliGRAM(s)/deciliter  oxyCODONE    IR 5 milliGRAM(s) Oral every 6 hours PRN Severe Pain (7 - 10)      Allergies    No Known Drug Allergies  shellfish (Swelling; Hives)    Intolerances        OBJECTIVE:  Vital Signs Last 24 Hrs  T(C): 36.6 (08 Oct 2023 08:55), Max: 36.6 (08 Oct 2023 08:55)  T(F): 97.8 (08 Oct 2023 08:55), Max: 97.8 (08 Oct 2023 08:55)  HR: 78 (08 Oct 2023 08:55) (78 - 80)  BP: 128/79 (08 Oct 2023 08:55) (122/74 - 134/81)  BP(mean): 98 (08 Oct 2023 08:55) (98 - 98)  RR: 18 (08 Oct 2023 08:55) (18 - 18)  SpO2: 96% (08 Oct 2023 08:55) (94% - 97%)    Parameters below as of 08 Oct 2023 08:55  Patient On (Oxygen Delivery Method): nasal cannula  O2 Flow (L/min): 2    I&O's Summary    07 Oct 2023 07:01  -  08 Oct 2023 07:00  --------------------------------------------------------  IN: 360 mL / OUT: 1100 mL / NET: -740 mL    08 Oct 2023 07:01  -  08 Oct 2023 11:18  --------------------------------------------------------  IN: 120 mL / OUT: 500 mL / NET: -380 mL        PHYSICAL EXAM:  Gen: Reclining in bed at time of exam, appears stated age  HEENT: NCAT, MMM, clear OP,anicteric sclerae  Neck: supple, trachea at midline  CV: RRR, +S1/S2  Pulm: adequate respiratory effort, no increase in work of breathing  Abd: soft, NTND, no lafleur's sign  Skin: warm and dry,   Ext: WWP, no LE edema  Neuro: AOx3, no gross focal neurological deficits  Psych: affect and behavior appropriate, pleasant at time of interview    LABS:                        15.2   12.76 )-----------( 250      ( 08 Oct 2023 06:13 )             48.1     10-08    124<L>  |  89<L>  |  18  ----------------------------<  156<H>  3.8   |  23  |  0.97    Ca    9.0      08 Oct 2023 06:13  Phos  3.2     10-08  Mg     2.2     10-08    TPro  7.8  /  Alb  3.4  /  TBili  0.5  /  DBili  x   /  AST  18  /  ALT  16  /  AlkPhos  101  10-07    LIVER FUNCTIONS - ( 07 Oct 2023 06:16 )  Alb: 3.4 g/dL / Pro: 7.8 g/dL / ALK PHOS: 101 U/L / ALT: 16 U/L / AST: 18 U/L / GGT: x           PT/INR - ( 06 Oct 2023 17:40 )   PT: 12.8 sec;   INR: 1.13          PTT - ( 06 Oct 2023 17:40 )  PTT:35.8 sec  CAPILLARY BLOOD GLUCOSE      POCT Blood Glucose.: 162 mg/dL (08 Oct 2023 08:08)  POCT Blood Glucose.: 132 mg/dL (07 Oct 2023 21:44)  POCT Blood Glucose.: 157 mg/dL (07 Oct 2023 16:44)  POCT Blood Glucose.: 148 mg/dL (07 Oct 2023 14:01)    Urinalysis Basic - ( 08 Oct 2023 06:13 )    Color: x / Appearance: x / SG: x / pH: x  Gluc: 156 mg/dL / Ketone: x  / Bili: x / Urobili: x   Blood: x / Protein: x / Nitrite: x   Leuk Esterase: x / RBC: x / WBC x   Sq Epi: x / Non Sq Epi: x / Bacteria: x        MICRODATA:    Culture - Blood (collected 06 Oct 2023 18:55)  Source: .Blood Blood-Peripheral  Preliminary Report (07 Oct 2023 20:00):    No growth at 1 day.    Culture - Blood (collected 06 Oct 2023 18:20)  Source: .Blood Blood-Peripheral  Preliminary Report (07 Oct 2023 20:00):    No growth at 1 day.    Urinalysis with Rflx Culture (collected 06 Oct 2023 18:11)    Culture - Urine (collected 06 Oct 2023 18:11)  Source: Clean Catch None  Final Report (08 Oct 2023 08:14):    Specimen appears CONTAMINATED. Lab suggests repeat clean catch specimen.        RADIOLOGY/OTHER STUDIES:    PCP  Pharmacy:   Emergency contact:

## 2023-10-08 NOTE — PROGRESS NOTE ADULT - ASSESSMENT
87M with PMHx CAD s/p JOSÉ to [facility] (6/2023), HTN, HLD, cervical spondylosis s/p laminectomy, gastric ulcers, hypothyroidism,  constipation prior [facility] admission for L leg pain, presented to [facility] ED 10/6/23 for BELLO with minimal exertion, chest pressure, and orthopnea x 3 days and fever of 101.8*F yesterday but resolved with Tylenol with no recurrence since. Reported constipation at home with response after suppository at home on Tuesday (10/3), however, no BM since then. Does report pinpoint RLQ abdominal discomfort worse with palpation. 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 His appetite and fluid intake has decreased as well. Pt was admitted to Cardiology service/[facility] for acute hypoxic respiratory failure 2/2 acute HFpEF (BNP 1279) s/p lasix 20mg iv x1 at ED(10/6) and fever without clinical evidence of PNA (no cough/sputum) but RLQ pain and constipation with CT (10/7) findings of a gallstone ~1.8cm at the gallbladder neck and associated wall thickening and fat stranding.     -O2 supplement via NC2L with SpO2 96-97%  - Lasix 20mg iv (10/6)-> 40mg iv daily (10/7-)   - f/u UO -1040ml  - Surgery consult to evaluate surgical intervention for CT findings of gallstone ~ 1.8cm at the gallbladder neck and associated wall thickening and fat stranding ( Pt on Clopidogrel which needs to be kept off for at least 5 days and will need Cardiology clearance , s/p PCI to AD 6/2023)   - consider HIDA scan   - Ceftriaxone 1gm iv q24hr (10/6-10/7), increased to 2gm iv q24hr  Day 3, may change to Cefpodoxime 200mg po q12hr and metronidazole 500mg po q8hr total duration of 7 days upon discharge  - add Metronidazole 500mg po q8hr ( 10/8-) Day 1   - discontinued Azithromycin 500mg iv q24hr ( 10/6-10/7)   - trend WBC 16.26K(10/6)-> 15.18K(10/7)-> 12.76K (10/8)   - pro-calcitonin 0.24  - BCx: ngtd x 24hr   - CTAP reviewed   - Bowel regimen for constipation   - Hyponatremia: Na 126(10/6)-> 127(10/7)-> 124( 10/8), s/p lasix 40mg iv this am, check BMP q6hr , check urine lytes, do not correct na more than 6-8mmol/dl  - Endocrine consult appreciated re: Elevated TSH/hypothyroidism: 10.5%, c/w levothyroxine 50mcg po daily, repeat TSH/FT4   - CADs/p PCI mLAD 6/2023. c/w DAPT: ASA/Clopidogrel, metoprolol succinate 12.5mg daily, Atorvastatin 40mg qHS , Pt not going for LHC tomorrow as d/w Cardiologist Dr. Daryn Jarvis   - LBP//spondylosis: c/w gabapentin 100mg qHS ( can be adjusted),  cyclobenzaprine 5mg po bid, oxycodone IR 5mg po q6hr prn   - consider Lidocaine patch 4% AA, and adjusting gabapentin   - GI ppx: PPI po daily   - DMII: FS/NISS, check A1C%, goal -180  - SCDs for DVT ppx     POC as d/w covering resident Dr. Scotty Chambers, and Cards attending Dr. Andrea Julio  87M with PMHx CAD s/p JOSÉ to [facility] (6/2023), HTN, HLD, cervical spondylosis s/p laminectomy, gastric ulcers, hypothyroidism,  constipation prior [facility] admission for L leg pain, presented to [facility] ED 10/6/23 for BELLO with minimal exertion, chest pressure, and orthopnea x 3 days and fever of 101.8*F yesterday but resolved with Tylenol with no recurrence since. Reported constipation at home with response after suppository at home on Tuesday (10/3), however, no BM since then. Does report pinpoint RLQ abdominal discomfort worse with palpation. 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 His appetite and fluid intake has decreased as well. Pt was admitted to Cardiology service/[facility] for acute hypoxic respiratory failure 2/2 acute HFpEF (BNP 1279) s/p lasix 20mg iv x1 at ED(10/6) and fever without clinical evidence of PNA (no cough/sputum) but RLQ pain and constipation with CT (10/7) findings of a gallstone ~1.8cm at the gallbladder neck and associated wall thickening and fat stranding.     -O2 supplement via NC2L with SpO2 96-97%  - Lasix 20mg iv (10/6)-> 40mg iv daily (10/7-)   - f/u UO -1040ml  - Surgery consult to evaluate surgical intervention for CT findings of gallstone ~ 1.8cm at the gallbladder neck and associated wall thickening and fat stranding ( Pt on Clopidogrel which needs to be kept off for at least 5 days and will need Cardiology clearance , s/p PCI to AD 6/2023)   - consider HIDA scan   - Ceftriaxone 1gm iv q24hr (10/6-10/7), increased to 2gm iv q24hr  Day 3, may change to Cefpodoxime 200mg po q12hr and metronidazole 500mg po q8hr total duration of 7 days upon discharge  - add Metronidazole 500mg po q8hr ( 10/8-) Day 1   - discontinued Azithromycin 500mg iv q24hr ( 10/6-10/7)   - trend WBC 16.26K(10/6)-> 15.18K(10/7)-> 12.76K (10/8)   - pro-calcitonin 0.24  - BCx: ngtd x 24hr   - CTAP reviewed   - Bowel regimen for constipation   - Hyponatremia: Na 126(10/6)-> 127(10/7)-> 124( 10/8), s/p lasix 40mg iv this am, check BMP q6hr , check urine lytes, do not correct na more than 6-8mmol/dl  - Endocrine consult appreciated re: Elevated TSH/hypothyroidism: 10.5%, c/w levothyroxine 50mcg po daily, repeat TSH/FT4   - CADs/p PCI mLAD 6/2023. c/w DAPT: ASA/Clopidogrel, metoprolol succinate 12.5mg daily, Atorvastatin 40mg qHS , Pt not going for LHC tomorrow as d/w Cardiologist Dr. Daryn Jarvis   - LBP//spondylosis: c/w gabapentin 100mg qHS ( can be adjusted),  cyclobenzaprine 5mg po bid, oxycodone IR 5mg po q6hr prn   - consider Lidocaine patch 4% AA, and adjusting gabapentin   - GI ppx: PPI po daily   - DMII: FS/NISS, check A1C%, goal -180  - SCDs for DVT ppx     POC as d/w covering resident Dr. Scotty Chambers, and Cards attending Dr. Andrea Julio  87M with PMHx CAD s/p JOSÉ to [facility] (6/2023), HTN, HLD, cervical spondylosis s/p laminectomy, gastric ulcers, hypothyroidism,  constipation prior [facility] admission for L leg pain, presented to [facility] ED 10/6/23 for BELLO with minimal exertion, chest pressure, and orthopnea x 3 days and fever of 101.8*F yesterday but resolved with Tylenol with no recurrence since. Reported constipation at home with response after suppository at home on Tuesday (10/3), however, no BM since then. Does report pinpoint RLQ abdominal discomfort worse with palpation. 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 His appetite and fluid intake has decreased as well. Pt was admitted to Cardiology service/[facility] for acute hypoxic respiratory failure 2/2 acute HFpEF (BNP 1279) s/p lasix 20mg iv x1 at ED(10/6) and fever without clinical evidence of PNA (no cough/sputum) but RLQ pain and constipation with CT (10/7) findings of a gallstone ~1.8cm at the gallbladder neck and associated wall thickening and fat stranding.     -O2 supplement via NC2L with SpO2 96-97%  - Lasix 20mg iv (10/6)-> 40mg iv daily (10/7-)   - f/u UO -1040ml  - Surgery consult to evaluate surgical intervention for CT findings of gallstone ~ 1.8cm at the gallbladder neck and associated wall thickening and fat stranding ( Pt on Clopidogrel which needs to be kept off for at least 5 days and will need Cardiology clearance , s/p PCI to [facility] 6/2023)   - consider HIDA scan   - Ceftriaxone 1gm iv q24hr (10/6-10/7), increased to 2gm iv q24hr  Day 3, may change to Cefpodoxime 200mg po q12hr and metronidazole 500mg po q8hr total duration of 7 days upon discharge  - add Metronidazole 500mg po q8hr ( 10/8-) Day 1   - discontinued Azithromycin 500mg iv q24hr ( 10/6-10/7)   - trend WBC 16.26K(10/6)-> 15.18K(10/7)-> 12.76K (10/8)   - pro-calcitonin 0.24  - BCx: ngtd x 24hr   - CTAP reviewed   - Bowel regimen for constipation   - Hyponatremia: Na 126(10/6)-> 127(10/7)-> 124( 10/8), s/p lasix 40mg iv this am, check BMP q6hr , check urine lytes, do not correct na more than 6-8mmol/dl  - Endocrine consult appreciated re: Elevated TSH/hypothyroidism: 10.5%, c/w levothyroxine 50mcg po daily, repeat TSH/FT4   - CADs/p PCI mLAD 6/2023. c/w DAPT: ASA/Clopidogrel, metoprolol succinate 12.5mg daily, Atorvastatin 40mg qHS , Pt not going for LHC tomorrow as d/w Cardiologist Dr. Daryn Jarvis   - LBP//spondylosis: c/w gabapentin 100mg qHS ( can be adjusted),  cyclobenzaprine 5mg po bid, oxycodone IR 5mg po q6hr prn   - consider Lidocaine patch 4% AA, and adjusting gabapentin   - GI ppx: PPI po daily   - DMII: FS/NISS, check A1C%, goal -180  - SCDs for DVT ppx     POC as d/w covering resident Dr. Scotty Chambers, and Cards attending Dr. Andrea Julio

## 2023-10-08 NOTE — PROGRESS NOTE ADULT - PROBLEM SELECTOR PLAN 5
Na 126 on admission  -discussed w/ fellow, possible hypervolemic hyponatremia  -no neurological sxs currently.   -monitor trend Na 126 on admission. s/p lasix 40mg IV    - f/u urine lytes  - continue to monitor.

## 2023-10-08 NOTE — PROGRESS NOTE ADULT - PROBLEM SELECTOR PLAN 3
WBC 16.26 elevated on admission. Reported fever at home yesterday improved w/ Tylenol. Afebrile on admission  CXR with ?PNA vs CHF- f/u official read. RVP negative. procal 0.25 elevated  -s/p abx IV Ceftriaxone x 5 days and Azithromycin x 1 in ED. Continued both abx for now.   -UA positive (however asxs)  -f/u urine culture   -f/u Legionella urine  -f/u ESR, CRP.   -f/u blood cultures WBC 16.26 elevated on admission. Reported fever at home yesterday improved w/ Tylenol. Afebrile on admission  CXR with ?PNA vs CHF- f/u official read. RVP negative. procal 0.25 elevated s/p abx IV Ceftriaxone x 5 days and Azithromycin x 1 in ED. Continued both abx for now.    - increased ctx to 2gm, started on flagyl 500mg q8  -f/u Legionella urine  -f/u blood cultures

## 2023-10-08 NOTE — PROGRESS NOTE ADULT - PROBLEM SELECTOR PLAN 4
found to have elevated glucose on BMP  A1c found to be 7.7. no hx of diabetes    - start insulin sliding scale  - monitor finger sticks and switch diet to carb consistent if unable to control  - endocrine consult found to have elevated glucose on BMP  A1c found to be 7.7. no hx of diabetes    - c/w insulin sliding scale  - monitor finger sticks and switch diet to carb consistent if unable to control  - endocrinology consulted, f/u recs

## 2023-10-08 NOTE — CONSULT NOTE ADULT - ASSESSMENT
87M w/ recent cardiac stent 4 months ago here for abdominal pain and dyspnea. Pt was admitted to cardiology for pneumonia vs cardiac cause vs UTI. However further work up of RUQ pain yielded RUQ US and CT results showing 1.8cm gallstone w/ wall thickening. Per conversation with cardiology team, planned cath lab has been cancelled as presenting cardiac symptoms likely secondary to abdominal pathology. Leukocytosis improving to 12 from presentation of 16. Pt is currently afebrile and has no abdominal tenderness, but has been managed with ceftriaxone since admission. Pt presentation is consistent with acute cholecystitis, however given recent cardiac history and stenting is not an optimal surgical candidate at this time.    No acute surgical intervention at this time  Recommend IR consult for perc mich  Recommend continuing ceftriaxone and adding flagyl  Surgery Team 4C will continue to follow. Please page Team 4 with questions/clinical changes. [phone] 87M w/ recent cardiac stent 4 months ago here for abdominal pain and dyspnea. Pt was admitted to cardiology for pneumonia vs cardiac cause vs UTI. However further work up of RUQ pain yielded RUQ US and CT results showing 1.8cm gallstone w/ wall thickening. Per conversation with cardiology team, planned cath lab has been cancelled as presenting cardiac symptoms likely secondary to abdominal pathology. Leukocytosis improving to 12 from presentation of 16. Pt is currently afebrile and has no abdominal tenderness, but has been managed with ceftriaxone since admission. Pt presentation is consistent with acute cholecystitis, however given recent cardiac history and stenting is not an optimal surgical candidate at this time.    No acute surgical intervention at this time  Recommend IR consult for perc mich  Recommend continuing ceftriaxone and adding flagyl  Surgery Team 4C will continue to follow. Please page Team 4 with questions/clinical changes. [phone] 87M w/ recent cardiac stent 4 months ago here for abdominal pain and dyspnea. Pt was admitted to cardiology for pneumonia vs cardiac cause vs UTI. However further work up of RUQ pain yielded RUQ US and CT results showing 1.8cm gallstone w/ wall thickening. Per conversation with cardiology team, planned cath lab has been cancelled as presenting cardiac symptoms likely secondary to abdominal pathology. Leukocytosis improving to 12 from presentation of 16. Pt is currently afebrile and has no abdominal tenderness, but has been managed with ceftriaxone since admission. Pt presentation is consistent with acute cholecystitis, however given recent cardiac history and stenting is not an optimal surgical candidate at this time.    No acute surgical intervention at this time  Recommend IR consult for perc mich  Recommend continuing ceftriaxone and adding flagyl  Surgery Team 4C will continue to follow. Please page Team 4 with questions/clinical changes. [phone]

## 2023-10-08 NOTE — CONSULT NOTE ADULT - SUBJECTIVE AND OBJECTIVE BOX
HISTORY OF PRESENT ILLNESS  Hon Yanez is a 87-year-old male with a past medical history of hypertension, hyperlipidemia, coronary artery disease (s/p JOSÉ to mLAD on 6/2023), cervical spondylosis (s/p laminectomy), gastric ulcers, hypothyroidism and constipation who presented to the emergency department (10/6/23) for dyspnea on exertion, chest pressure, and orthopnea for 3 days. He was admitted to cardiology for acute hypoxic respiratory failure secondary to acute heart failure with preserved ejection fraction and fever, possibly secondary to pneumonia. His labs were remarkable for a hemoglobin A1C of 7.7% and a TSH of 10.5. Endocrinology was consulted for recommendations regarding management of hypothyroidism and ?newly diagnosed diabetes.     Upon evaluation, ****.    CAPILLARY BLOOD GLUCOSE & INSULIN RECEIVED  148 mg/dL (10-07 @ 14:01)  157 mg/dL (10-07 @ 16:44)  132 mg/dL (10-07 @ 21:44)  162 mg/dL (10-08 @ 08:08)    DIABETES HISTORY  - Age at diagnosis:   - Symptoms at time of diagnosis:   - Current Therapy:  - History of other regimens:   - History of hypoglycemia:   - History of DKA/HHS:   - Complications:   - Home FSG:        > Fasting: *** mg/dL.        > Before meals: *** mg/dL.        > Bedtime: *** mg/dL.  - Diet:          > Breakfast:         > Lunch:        > Dinner:        > Snacks:  - Physical activity:    - Outpatient follow-up:     PAST MEDICAL & SURGICAL HISTORY  As per history of present illness.     FAMILY HISTORY  - Diabetes:  - Thyroid:  - Autoimmune:  - Other:    SOCIAL HISTORY  - Work:  - Alcohol:  - Smoking:  - Recreational Drugs:    ALLERGIES  No Known Drug Allergies  shellfish (Swelling; Hives)    CURRENT MEDICATIONS  aspirin enteric coated 81 milliGRAM(s) Oral daily  atorvastatin 40 milliGRAM(s) Oral at bedtime  bisacodyl Suppository 10 milliGRAM(s) Rectal daily PRN  cefTRIAXone   IVPB 1000 milliGRAM(s) IV Intermittent every 24 hours  clopidogrel Tablet 75 milliGRAM(s) Oral daily  cyclobenzaprine 5 milliGRAM(s) Oral two times a day PRN  dextrose 5%. 1000 milliLiter(s) IV Continuous <Continuous>  dextrose 5%. 1000 milliLiter(s) IV Continuous <Continuous>  dextrose 50% Injectable 25 Gram(s) IV Push once  dextrose 50% Injectable 25 Gram(s) IV Push once  dextrose 50% Injectable 12.5 Gram(s) IV Push once  dextrose Oral Gel 15 Gram(s) Oral once PRN  furosemide   Injectable 40 milliGRAM(s) IV Push daily  gabapentin 100 milliGRAM(s) Oral at bedtime  glucagon  Injectable 1 milliGRAM(s) IntraMuscular once  insulin lispro (ADMELOG) corrective regimen sliding scale   SubCutaneous at bedtime  insulin lispro (ADMELOG) corrective regimen sliding scale   SubCutaneous three times a day before meals  levothyroxine 50 MICROGram(s) Oral daily  metoprolol succinate ER 12.5 milliGRAM(s) Oral daily  oxyCODONE    IR 5 milliGRAM(s) Oral every 6 hours PRN  pantoprazole    Tablet 40 milliGRAM(s) Oral at bedtime  senna 2 Tablet(s) Oral daily    REVIEW OF SYSTEMS  Constitutional:  Negative fever, chills or loss of appetite.  Eyes:  Negative blurry vision or double vision.  Cardiovascular:  Negative for chest pain or palpitations.  Respiratory:  Negative for cough, wheezing, or shortness of breath.   Gastrointestinal:  Negative for nausea, vomiting, diarrhea, constipation, or abdominal pain.  Genitourinary:  Negative frequency, urgency or dysuria.  Neurologic:  No headache, confusion, dizziness, lightheadedness.    PHYSICAL EXAM  Vital Signs Last 24 Hrs  T(C): 36.6 (08 Oct 2023 08:55), Max: 36.6 (08 Oct 2023 08:55)  T(F): 97.8 (08 Oct 2023 08:55), Max: 97.8 (08 Oct 2023 08:55)  HR: 78 (08 Oct 2023 08:55) (73 - 80)  BP: 128/79 (08 Oct 2023 08:55) (122/74 - 134/81)  BP(mean): 98 (08 Oct 2023 08:55) (98 - 98)  RR: 18 (08 Oct 2023 08:55) (17 - 18)  SpO2: 96% (08 Oct 2023 08:55) (93% - 97%)    Parameters below as of 08 Oct 2023 08:55  Patient On (Oxygen Delivery Method): nasal cannula  O2 Flow (L/min): 2  Constitutional: Awake, alert, in no acute distress.   HEENT: Normocephalic, atraumatic, PAMELA, no proptosis or lid retraction.   Neck: supple, no acanthosis, no thyromegaly or palpable thyroid nodules.  Respiratory: Lungs clear to ausculation bilaterally.   Cardiovascular: regular rhythm, normal S1 and S2, no audible murmurs.   GI: soft, non-tender, non-distended, bowel sounds present, no masses appreciated.  Extremities: No lower extremity edema, peripheral pulses present.   Skin: no rashes.   Psychiatric: AAO x 3. Normal affect/mood.     LABS  CBC - WBC/HGB/HTC/PLT: 12.76/15.2/48.1/250 (10-08-23)  BMP: Na/K/Cl/Bicarb/BUN/Cr/Gluc: 124/3.8/89/23/18/0.97/156 (10-08-23)  Anion Gap: 12 (10-08-23)  eGFR: 76 (10-08-23)  Calcium: 9.0 (10-08-23)  Phosphorus: 3.2 (10-08-23)  Magnesium: 2.2 (10-08-23)  LFT - Alb/Tprot/Tbili/Dbili/AlkPhos/ALT/AST: 3.4/--/0.5/--/101/16/18 (10-07-23)  PT/aPTT/INR: 12.8/35.8/1.13 (10-06-23)  Thyroid Stimulating Hormone, Serum: 10.510 (10-07-23)  Total T4/Free T4: 7.18/-- (10-07-23)    ASSESSMENT / RECOMMENDATIONS  Mr. Yanez is a 87-year-old male with a past medical history of hypertension, hyperlipidemia, coronary artery disease (s/p JOSÉ to mLAD on 6/2023), cervical spondylosis (s/p laminectomy), gastric ulcers, hypothyroidism and constipation who presented to the emergency department (10/6/23) for dyspnea on exertion, chest pressure, and orthopnea for 3 days, along with one day of fever. He was admitted to cardiology for acute hypoxic respiratory failure secondary to acute heart failure with preserved ejection fraction and fever, possibly secondary to pneumonia. His labs were remarkable for a hemoglobin A1C of 7.7% and a TSH of 10.5. Endocrinology was consulted for recommendations regarding management of hypothyroidism and ?newly diagnosed diabetes.     A1C: 7.7 %  BUN: 18  Creatinine: 0.97  GFR: 76  Weight: 63.5  BMI: 23.3  EF: 55-60%    # Hypothyroidism  - Continue with levothyroxine 50 mcg oral daily.   - Recheck TSH and Free T4.   - ***.    # Type 2 diabetes mellitus with hyperglycemia  - Please continue with lispro moderate dose sliding scale before meals and at bedtime.  - Patient's fingerstick glucose goal is 100-180 mg/dL.    - Discharge recommendations to be discussed.   - Patient can follow up at discharge with [facility] by calling [phone] to make an appointment.      Case discussed with Dr. Hobson. Primary team updated.       Sandro Gray    Endocrinology Fellow    Service Pager: [phone]  HISTORY OF PRESENT ILLNESS  Hon Yanez is a 87-year-old male with a past medical history of hypertension, hyperlipidemia, coronary artery disease (s/p JOSÉ to mLAD on 6/2023), cervical spondylosis (s/p laminectomy), gastric ulcers, hypothyroidism and constipation who presented to the emergency department (10/6/23) for dyspnea on exertion, chest pressure, and orthopnea for 3 days. He was admitted to cardiology for acute hypoxic respiratory failure secondary to acute heart failure with preserved ejection fraction and fever, possibly secondary to pneumonia. His labs were remarkable for a hemoglobin A1C of 7.7% and a TSH of 10.5. Endocrinology was consulted for recommendations regarding management of hypothyroidism and ?newly diagnosed diabetes.     Upon evaluation, ****.    CAPILLARY BLOOD GLUCOSE & INSULIN RECEIVED  148 mg/dL (10-07 @ 14:01)  157 mg/dL (10-07 @ 16:44)  132 mg/dL (10-07 @ 21:44)  162 mg/dL (10-08 @ 08:08)    DIABETES HISTORY  - Age at diagnosis:   - Symptoms at time of diagnosis:   - Current Therapy:  - History of other regimens:   - History of hypoglycemia:   - History of DKA/HHS:   - Complications:   - Home FSG:        > Fasting: *** mg/dL.        > Before meals: *** mg/dL.        > Bedtime: *** mg/dL.  - Diet:          > Breakfast:         > Lunch:        > Dinner:        > Snacks:  - Physical activity:    - Outpatient follow-up:     PAST MEDICAL & SURGICAL HISTORY  As per history of present illness.     FAMILY HISTORY  - Diabetes:  - Thyroid:  - Autoimmune:  - Other:    SOCIAL HISTORY  - Work:  - Alcohol:  - Smoking:  - Recreational Drugs:    ALLERGIES  No Known Drug Allergies  shellfish (Swelling; Hives)    CURRENT MEDICATIONS  aspirin enteric coated 81 milliGRAM(s) Oral daily  atorvastatin 40 milliGRAM(s) Oral at bedtime  bisacodyl Suppository 10 milliGRAM(s) Rectal daily PRN  cefTRIAXone   IVPB 1000 milliGRAM(s) IV Intermittent every 24 hours  clopidogrel Tablet 75 milliGRAM(s) Oral daily  cyclobenzaprine 5 milliGRAM(s) Oral two times a day PRN  dextrose 5%. 1000 milliLiter(s) IV Continuous <Continuous>  dextrose 5%. 1000 milliLiter(s) IV Continuous <Continuous>  dextrose 50% Injectable 25 Gram(s) IV Push once  dextrose 50% Injectable 25 Gram(s) IV Push once  dextrose 50% Injectable 12.5 Gram(s) IV Push once  dextrose Oral Gel 15 Gram(s) Oral once PRN  furosemide   Injectable 40 milliGRAM(s) IV Push daily  gabapentin 100 milliGRAM(s) Oral at bedtime  glucagon  Injectable 1 milliGRAM(s) IntraMuscular once  insulin lispro (ADMELOG) corrective regimen sliding scale   SubCutaneous at bedtime  insulin lispro (ADMELOG) corrective regimen sliding scale   SubCutaneous three times a day before meals  levothyroxine 50 MICROGram(s) Oral daily  metoprolol succinate ER 12.5 milliGRAM(s) Oral daily  oxyCODONE    IR 5 milliGRAM(s) Oral every 6 hours PRN  pantoprazole    Tablet 40 milliGRAM(s) Oral at bedtime  senna 2 Tablet(s) Oral daily    REVIEW OF SYSTEMS  Constitutional:  Negative fever, chills or loss of appetite.  Eyes:  Negative blurry vision or double vision.  Cardiovascular:  Negative for chest pain or palpitations.  Respiratory:  Negative for cough, wheezing, or shortness of breath.   Gastrointestinal:  Negative for nausea, vomiting, diarrhea, constipation, or abdominal pain.  Genitourinary:  Negative frequency, urgency or dysuria.  Neurologic:  No headache, confusion, dizziness, lightheadedness.    PHYSICAL EXAM  Vital Signs Last 24 Hrs  T(C): 36.6 (08 Oct 2023 08:55), Max: 36.6 (08 Oct 2023 08:55)  T(F): 97.8 (08 Oct 2023 08:55), Max: 97.8 (08 Oct 2023 08:55)  HR: 78 (08 Oct 2023 08:55) (73 - 80)  BP: 128/79 (08 Oct 2023 08:55) (122/74 - 134/81)  BP(mean): 98 (08 Oct 2023 08:55) (98 - 98)  RR: 18 (08 Oct 2023 08:55) (17 - 18)  SpO2: 96% (08 Oct 2023 08:55) (93% - 97%)    Parameters below as of 08 Oct 2023 08:55  Patient On (Oxygen Delivery Method): nasal cannula  O2 Flow (L/min): 2  Constitutional: Awake, alert, in no acute distress.   HEENT: Normocephalic, atraumatic, PAMELA, no proptosis or lid retraction.   Neck: supple, no acanthosis, no thyromegaly or palpable thyroid nodules.  Respiratory: Lungs clear to ausculation bilaterally.   Cardiovascular: regular rhythm, normal S1 and S2, no audible murmurs.   GI: soft, non-tender, non-distended, bowel sounds present, no masses appreciated.  Extremities: No lower extremity edema, peripheral pulses present.   Skin: no rashes.   Psychiatric: AAO x 3. Normal affect/mood.     LABS  CBC - WBC/HGB/HTC/PLT: 12.76/15.2/48.1/250 (10-08-23)  BMP: Na/K/Cl/Bicarb/BUN/Cr/Gluc: 124/3.8/89/23/18/0.97/156 (10-08-23)  Anion Gap: 12 (10-08-23)  eGFR: 76 (10-08-23)  Calcium: 9.0 (10-08-23)  Phosphorus: 3.2 (10-08-23)  Magnesium: 2.2 (10-08-23)  LFT - Alb/Tprot/Tbili/Dbili/AlkPhos/ALT/AST: 3.4/--/0.5/--/101/16/18 (10-07-23)  PT/aPTT/INR: 12.8/35.8/1.13 (10-06-23)  Thyroid Stimulating Hormone, Serum: 10.510 (10-07-23)  Total T4/Free T4: 7.18/-- (10-07-23)    ASSESSMENT / RECOMMENDATIONS  Mr. Yanez is a 87-year-old male with a past medical history of hypertension, hyperlipidemia, coronary artery disease (s/p JOSÉ to mLAD on 6/2023), cervical spondylosis (s/p laminectomy), gastric ulcers, hypothyroidism and constipation who presented to the emergency department (10/6/23) for dyspnea on exertion, chest pressure, and orthopnea for 3 days, along with one day of fever. He was admitted to cardiology for acute hypoxic respiratory failure secondary to acute heart failure with preserved ejection fraction and fever, possibly secondary to pneumonia. His labs were remarkable for a hemoglobin A1C of 7.7% and a TSH of 10.5. Endocrinology was consulted for recommendations regarding management of hypothyroidism and ?newly diagnosed diabetes.     A1C: 7.7 %  BUN: 18  Creatinine: 0.97  GFR: 76  Weight: 63.5  BMI: 23.3  EF: 55-60%    # Hypothyroidism  - Continue with levothyroxine 50 mcg oral daily.   - Recheck TSH and Free T4.   - ***.    # Type 2 diabetes mellitus with hyperglycemia  - Please continue with lispro moderate dose sliding scale before meals and at bedtime.  - Patient's fingerstick glucose goal is 100-180 mg/dL.    - Discharge recommendations to be discussed.   - Patient can follow up at discharge with [facility] by calling [phone] to make an appointment.      Case discussed with Dr. Hobson. Primary team updated.       Sandro Gray    Endocrinology Fellow    Service Pager: [phone]  HISTORY OF PRESENT ILLNESS  Hon Yanez is a 87-year-old male with a past medical history of hypertension, hyperlipidemia, coronary artery disease (s/p JOSÉ to mLAD on 6/2023), cervical spondylosis (s/p laminectomy), gastric ulcers, hypothyroidism and constipation who presented to the emergency department (10/6/23) for dyspnea on exertion, chest pressure, and orthopnea for 3 days. He was admitted to cardiology for acute hypoxic respiratory failure secondary to acute heart failure with preserved ejection fraction and fever, possibly secondary to pneumonia. His labs were remarkable for a hemoglobin A1C of 7.7% and a TSH of 10.5. Endocrinology was consulted for recommendations regarding management of hypothyroidism and ?newly diagnosed diabetes.     Upon evaluation, ****.    CAPILLARY BLOOD GLUCOSE & INSULIN RECEIVED  148 mg/dL (10-07 @ 14:01)  157 mg/dL (10-07 @ 16:44)  132 mg/dL (10-07 @ 21:44)  162 mg/dL (10-08 @ 08:08)    DIABETES HISTORY  - Age at diagnosis:   - Symptoms at time of diagnosis:   - Current Therapy:  - History of other regimens:   - History of hypoglycemia:   - History of DKA/HHS:   - Complications:   - Home FSG:        > Fasting: *** mg/dL.        > Before meals: *** mg/dL.        > Bedtime: *** mg/dL.  - Diet:          > Breakfast:         > Lunch:        > Dinner:        > Snacks:  - Physical activity:    - Outpatient follow-up:     PAST MEDICAL & SURGICAL HISTORY  As per history of present illness.     FAMILY HISTORY  - Diabetes:  - Thyroid:  - Autoimmune:  - Other:    SOCIAL HISTORY  - Work:  - Alcohol:  - Smoking:  - Recreational Drugs:    ALLERGIES  No Known Drug Allergies  shellfish (Swelling; Hives)    CURRENT MEDICATIONS  aspirin enteric coated 81 milliGRAM(s) Oral daily  atorvastatin 40 milliGRAM(s) Oral at bedtime  bisacodyl Suppository 10 milliGRAM(s) Rectal daily PRN  cefTRIAXone   IVPB 1000 milliGRAM(s) IV Intermittent every 24 hours  clopidogrel Tablet 75 milliGRAM(s) Oral daily  cyclobenzaprine 5 milliGRAM(s) Oral two times a day PRN  dextrose 5%. 1000 milliLiter(s) IV Continuous <Continuous>  dextrose 5%. 1000 milliLiter(s) IV Continuous <Continuous>  dextrose 50% Injectable 25 Gram(s) IV Push once  dextrose 50% Injectable 25 Gram(s) IV Push once  dextrose 50% Injectable 12.5 Gram(s) IV Push once  dextrose Oral Gel 15 Gram(s) Oral once PRN  furosemide   Injectable 40 milliGRAM(s) IV Push daily  gabapentin 100 milliGRAM(s) Oral at bedtime  glucagon  Injectable 1 milliGRAM(s) IntraMuscular once  insulin lispro (ADMELOG) corrective regimen sliding scale   SubCutaneous at bedtime  insulin lispro (ADMELOG) corrective regimen sliding scale   SubCutaneous three times a day before meals  levothyroxine 50 MICROGram(s) Oral daily  metoprolol succinate ER 12.5 milliGRAM(s) Oral daily  oxyCODONE    IR 5 milliGRAM(s) Oral every 6 hours PRN  pantoprazole    Tablet 40 milliGRAM(s) Oral at bedtime  senna 2 Tablet(s) Oral daily    REVIEW OF SYSTEMS  Constitutional:  Negative fever, chills or loss of appetite.  Eyes:  Negative blurry vision or double vision.  Cardiovascular:  Negative for chest pain or palpitations.  Respiratory:  Negative for cough, wheezing, or shortness of breath.   Gastrointestinal:  Negative for nausea, vomiting, diarrhea, constipation, or abdominal pain.  Genitourinary:  Negative frequency, urgency or dysuria.  Neurologic:  No headache, confusion, dizziness, lightheadedness.    PHYSICAL EXAM  Vital Signs Last 24 Hrs  T(C): 36.6 (08 Oct 2023 08:55), Max: 36.6 (08 Oct 2023 08:55)  T(F): 97.8 (08 Oct 2023 08:55), Max: 97.8 (08 Oct 2023 08:55)  HR: 78 (08 Oct 2023 08:55) (73 - 80)  BP: 128/79 (08 Oct 2023 08:55) (122/74 - 134/81)  BP(mean): 98 (08 Oct 2023 08:55) (98 - 98)  RR: 18 (08 Oct 2023 08:55) (17 - 18)  SpO2: 96% (08 Oct 2023 08:55) (93% - 97%)    Parameters below as of 08 Oct 2023 08:55  Patient On (Oxygen Delivery Method): nasal cannula  O2 Flow (L/min): 2  Constitutional: Awake, alert, in no acute distress.   HEENT: Normocephalic, atraumatic, PAMELA, no proptosis or lid retraction.   Neck: supple, no acanthosis, no thyromegaly or palpable thyroid nodules.  Respiratory: Lungs clear to ausculation bilaterally.   Cardiovascular: regular rhythm, normal S1 and S2, no audible murmurs.   GI: soft, non-tender, non-distended, bowel sounds present, no masses appreciated.  Extremities: No lower extremity edema, peripheral pulses present.   Skin: no rashes.   Psychiatric: AAO x 3. Normal affect/mood.     LABS  CBC - WBC/HGB/HTC/PLT: 12.76/15.2/48.1/250 (10-08-23)  BMP: Na/K/Cl/Bicarb/BUN/Cr/Gluc: 124/3.8/89/23/18/0.97/156 (10-08-23)  Anion Gap: 12 (10-08-23)  eGFR: 76 (10-08-23)  Calcium: 9.0 (10-08-23)  Phosphorus: 3.2 (10-08-23)  Magnesium: 2.2 (10-08-23)  LFT - Alb/Tprot/Tbili/Dbili/AlkPhos/ALT/AST: 3.4/--/0.5/--/101/16/18 (10-07-23)  PT/aPTT/INR: 12.8/35.8/1.13 (10-06-23)  Thyroid Stimulating Hormone, Serum: 10.510 (10-07-23)  Total T4/Free T4: 7.18/-- (10-07-23)    ASSESSMENT / RECOMMENDATIONS  Mr. Yanez is a 87-year-old male with a past medical history of hypertension, hyperlipidemia, coronary artery disease (s/p JOSÉ to mLAD on 6/2023), cervical spondylosis (s/p laminectomy), gastric ulcers, hypothyroidism and constipation who presented to the emergency department (10/6/23) for dyspnea on exertion, chest pressure, and orthopnea for 3 days, along with one day of fever. He was admitted to cardiology for acute hypoxic respiratory failure secondary to acute heart failure with preserved ejection fraction and fever, possibly secondary to pneumonia. His labs were remarkable for a hemoglobin A1C of 7.7% and a TSH of 10.5. Endocrinology was consulted for recommendations regarding management of hypothyroidism and ?newly diagnosed diabetes.     A1C: 7.7 %  BUN: 18  Creatinine: 0.97  GFR: 76  Weight: 63.5  BMI: 23.3  EF: 55-60%    # Hypothyroidism  - Continue with levothyroxine 50 mcg oral daily.   - Recheck TSH and Free T4.   - ***.    # Type 2 diabetes mellitus with hyperglycemia  - Please continue with lispro moderate dose sliding scale before meals and at bedtime.  - Patient's fingerstick glucose goal is 100-180 mg/dL.    - Discharge recommendations to be discussed.   - Patient can follow up at discharge with [facility] by calling [phone] to make an appointment.      Case discussed with Dr. Hobson. Primary team updated.       Sandro Gray    Endocrinology Fellow    Service Pager: [phone]  HISTORY OF PRESENT ILLNESS  Hon Yanez is a 87-year-old male with a past medical history of type 2 diabetes mellitus, hypertension, hyperlipidemia, coronary artery disease (s/p JOSÉ to mLAD on 6/2023), cervical spondylosis (s/p laminectomy), gastric ulcers, hypothyroidism and constipation who presented to the emergency department (10/6/23) for dyspnea on exertion, chest pressure, and orthopnea for 3 days. He was admitted to cardiology for acute hypoxic respiratory failure secondary to acute heart failure with preserved ejection fraction and fever, possibly secondary to pneumonia. His labs were remarkable for a hemoglobin A1C of 7.7% and a TSH of 10.5. Endocrinology was consulted for recommendations regarding management of hypothyroidism and diabetes.     CAPILLARY BLOOD GLUCOSE & INSULIN RECEIVED  148 mg/dL (10-07 @ 14:01)  157 mg/dL (10-07 @ 16:44)  132 mg/dL (10-07 @ 21:44)  162 mg/dL (10-08 @ 08:08)    DIABETES HISTORY  - Age at diagnosis: ~10 years ago  - Current Therapy: None.   - History of DKA/HHS: Denied.   - Complications: Denied. Last eye exam around 6 weeks ago.   - Home FSG: Doesn't check fingersticks at home.   - Diet:          > Breakfast: Toast (2) + Coffee        > Lunch: Skips OR might have a roll or a sandwich or leftovers from dinner.         > Dinner: Chicken or steak with rice (>1/2 plate) and vegetables.         > Snacks: crackers. Reports eating sweets at least 3 times per week.   - Outpatient follow-up: He follows with an endocrinologist.     HYPOTHYROIDISM  He was diagnosed with hypothyroidism around 20 years ago. His dose was adjusted around 1 year ago. A few months ago his TFT were rechecked, and reportedly were within normal limits. He currently takes levothyroxine 50 mcg 5 days a week and 100 mcg 2 days a week. He reports taking the levothyroxine 100 mcg randomly (doesn't have an specific day to take the medication). Possibly might be forgetting to take the increased dose in certain occasions.     PAST MEDICAL & SURGICAL HISTORY  As per history of present illness.     ALLERGIES  No Known Drug Allergies  shellfish (Swelling; Hives)    CURRENT MEDICATIONS  aspirin enteric coated 81 milliGRAM(s) Oral daily  atorvastatin 40 milliGRAM(s) Oral at bedtime  bisacodyl Suppository 10 milliGRAM(s) Rectal daily PRN  cefTRIAXone   IVPB 1000 milliGRAM(s) IV Intermittent every 24 hours  clopidogrel Tablet 75 milliGRAM(s) Oral daily  cyclobenzaprine 5 milliGRAM(s) Oral two times a day PRN  dextrose 5%. 1000 milliLiter(s) IV Continuous <Continuous>  dextrose 5%. 1000 milliLiter(s) IV Continuous <Continuous>  dextrose 50% Injectable 25 Gram(s) IV Push once  dextrose 50% Injectable 25 Gram(s) IV Push once  dextrose 50% Injectable 12.5 Gram(s) IV Push once  dextrose Oral Gel 15 Gram(s) Oral once PRN  furosemide   Injectable 40 milliGRAM(s) IV Push daily  gabapentin 100 milliGRAM(s) Oral at bedtime  glucagon  Injectable 1 milliGRAM(s) IntraMuscular once  insulin lispro (ADMELOG) corrective regimen sliding scale   SubCutaneous at bedtime  insulin lispro (ADMELOG) corrective regimen sliding scale   SubCutaneous three times a day before meals  levothyroxine 50 MICROGram(s) Oral daily  metoprolol succinate ER 12.5 milliGRAM(s) Oral daily  oxyCODONE    IR 5 milliGRAM(s) Oral every 6 hours PRN  pantoprazole    Tablet 40 milliGRAM(s) Oral at bedtime  senna 2 Tablet(s) Oral daily    REVIEW OF SYSTEMS  Constitutional:  Negative fever, chills or loss of appetite.  Cardiovascular:  Negative for chest pain or palpitations.  Respiratory:  Negative for cough, wheezing, or shortness of breath.   Gastrointestinal:  Negative for nausea, vomiting, diarrhea, constipation, or abdominal pain.  Neurologic:  No headache, confusion, dizziness, lightheadedness.    PHYSICAL EXAM  Vital Signs Last 24 Hrs  T(C): 36.6 (08 Oct 2023 08:55), Max: 36.6 (08 Oct 2023 08:55)  T(F): 97.8 (08 Oct 2023 08:55), Max: 97.8 (08 Oct 2023 08:55)  HR: 78 (08 Oct 2023 08:55) (73 - 80)  BP: 128/79 (08 Oct 2023 08:55) (122/74 - 134/81)  BP(mean): 98 (08 Oct 2023 08:55) (98 - 98)  RR: 18 (08 Oct 2023 08:55) (17 - 18)  SpO2: 96% (08 Oct 2023 08:55) (93% - 97%)    Parameters below as of 08 Oct 2023 08:55  Patient On (Oxygen Delivery Method): nasal cannula  O2 Flow (L/min): 2    Constitutional: Awake, alert, elderly male, in no acute distress.   HEENT: Normocephalic, atraumatic, PAMELA, no proptosis or lid retraction.   Neck: supple, no thyromegaly or palpable thyroid nodules.  Respiratory: Lungs clear to ausculation bilaterally.   Cardiovascular: regular rhythm, normal S1 and S2, no audible murmurs.   GI: soft, non-tender, non-distended, bowel sounds present,   Extremities: No lower extremity edema.   Psychiatric: AAO x 3.     LABS  CBC - WBC/HGB/HTC/PLT: 12.76/15.2/48.1/250 (10-08-23)  BMP: Na/K/Cl/Bicarb/BUN/Cr/Gluc: 124/3.8/89/23/18/0.97/156 (10-08-23)  Anion Gap: 12 (10-08-23)  eGFR: 76 (10-08-23)  Calcium: 9.0 (10-08-23)  Phosphorus: 3.2 (10-08-23)  Magnesium: 2.2 (10-08-23)  LFT - Alb/Tprot/Tbili/Dbili/AlkPhos/ALT/AST: 3.4/--/0.5/--/101/16/18 (10-07-23)  PT/aPTT/INR: 12.8/35.8/1.13 (10-06-23)  Thyroid Stimulating Hormone, Serum: 10.510 (10-07-23)  Total T4/Free T4: 7.18/-- (10-07-23)    ASSESSMENT / RECOMMENDATIONS  Mr. Yanez is a 87-year-old male with a past medical history of hypertension, hyperlipidemia, coronary artery disease (s/p JOSÉ to mLAD on 6/2023), cervical spondylosis (s/p laminectomy), gastric ulcers, hypothyroidism and constipation who presented to the emergency department (10/6/23) for dyspnea on exertion, chest pressure, and orthopnea for 3 days, along with one day of fever. He was admitted to cardiology for acute hypoxic respiratory failure secondary to acute heart failure with preserved ejection fraction and fever, possibly secondary to pneumonia. His labs were remarkable for a hemoglobin A1C of 7.7% and a TSH of 10.5. Endocrinology was consulted for recommendations regarding management of hypothyroidism and ?newly diagnosed diabetes.     A1C: 7.7 %  BUN: 18  Creatinine: 0.97  GFR: 76  Weight: 63.5  BMI: 23.3  EF: 55-60%    # Hypothyroidism  - Continue with levothyroxine 50 mcg 5 days a week and 100 mcg 2 days a week. Discussed to select two specific days of the week (ie, mondays and thursdays) instead of taking it when he remembers as he might be missing some doses of 100 mcg.   - Recheck TSH and Free T4 in 4-6 weeks outpatient.    - He should continue to follow-up with his endocrinologist for further titration of his levothyroxine.     # Type 2 diabetes mellitus with hyperglycemia  - Please continue with lispro moderate dose sliding scale before meals and at bedtime.  - Patient's fingerstick glucose goal is 100-180 mg/dL.    - Discharge recommendations to be discussed.   - Patient can follow up at discharge with [facility] by calling [phone] to make an appointment.      Case discussed with Dr. Hobson. Primary team updated.       Sandro Gray    Endocrinology Fellow    Service Pager: [phone]  HISTORY OF PRESENT ILLNESS  Hon Yanez is a 87-year-old male with a past medical history of type 2 diabetes mellitus, hypertension, hyperlipidemia, coronary artery disease (s/p JOSÉ to mLAD on 6/2023), cervical spondylosis (s/p laminectomy), gastric ulcers, hypothyroidism and constipation who presented to the emergency department (10/6/23) for dyspnea on exertion, chest pressure, and orthopnea for 3 days. He was admitted to cardiology for acute hypoxic respiratory failure secondary to acute heart failure with preserved ejection fraction and fever, possibly secondary to pneumonia. His labs were remarkable for a hemoglobin A1C of 7.7% and a TSH of 10.5. Endocrinology was consulted for recommendations regarding management of hypothyroidism and diabetes.     CAPILLARY BLOOD GLUCOSE & INSULIN RECEIVED  148 mg/dL (10-07 @ 14:01)  157 mg/dL (10-07 @ 16:44)  132 mg/dL (10-07 @ 21:44)  162 mg/dL (10-08 @ 08:08)    DIABETES HISTORY  - Age at diagnosis: ~10 years ago  - Current Therapy: None.   - History of DKA/HHS: Denied.   - Complications: Denied. Last eye exam around 6 weeks ago.   - Home FSG: Doesn't check fingersticks at home.   - Diet:          > Breakfast: Toast (2) + Coffee        > Lunch: Skips OR might have a roll or a sandwich or leftovers from dinner.         > Dinner: Chicken or steak with rice (>1/2 plate) and vegetables.         > Snacks: crackers. Reports eating sweets at least 3 times per week.   - Outpatient follow-up: He follows with an endocrinologist.     HYPOTHYROIDISM  He was diagnosed with hypothyroidism around 20 years ago. His dose was adjusted around 1 year ago. A few months ago his TFT were rechecked, and reportedly were within normal limits. He currently takes levothyroxine 50 mcg 5 days a week and 100 mcg 2 days a week. He reports taking the levothyroxine 100 mcg randomly (doesn't have an specific day to take the medication). Possibly might be forgetting to take the increased dose in certain occasions.     PAST MEDICAL & SURGICAL HISTORY  As per history of present illness.     ALLERGIES  No Known Drug Allergies  shellfish (Swelling; Hives)    CURRENT MEDICATIONS  aspirin enteric coated 81 milliGRAM(s) Oral daily  atorvastatin 40 milliGRAM(s) Oral at bedtime  bisacodyl Suppository 10 milliGRAM(s) Rectal daily PRN  cefTRIAXone   IVPB 1000 milliGRAM(s) IV Intermittent every 24 hours  clopidogrel Tablet 75 milliGRAM(s) Oral daily  cyclobenzaprine 5 milliGRAM(s) Oral two times a day PRN  dextrose 5%. 1000 milliLiter(s) IV Continuous <Continuous>  dextrose 5%. 1000 milliLiter(s) IV Continuous <Continuous>  dextrose 50% Injectable 25 Gram(s) IV Push once  dextrose 50% Injectable 25 Gram(s) IV Push once  dextrose 50% Injectable 12.5 Gram(s) IV Push once  dextrose Oral Gel 15 Gram(s) Oral once PRN  furosemide   Injectable 40 milliGRAM(s) IV Push daily  gabapentin 100 milliGRAM(s) Oral at bedtime  glucagon  Injectable 1 milliGRAM(s) IntraMuscular once  insulin lispro (ADMELOG) corrective regimen sliding scale   SubCutaneous at bedtime  insulin lispro (ADMELOG) corrective regimen sliding scale   SubCutaneous three times a day before meals  levothyroxine 50 MICROGram(s) Oral daily  metoprolol succinate ER 12.5 milliGRAM(s) Oral daily  oxyCODONE    IR 5 milliGRAM(s) Oral every 6 hours PRN  pantoprazole    Tablet 40 milliGRAM(s) Oral at bedtime  senna 2 Tablet(s) Oral daily    REVIEW OF SYSTEMS  Constitutional:  Negative fever, chills or loss of appetite.  Cardiovascular:  Negative for chest pain or palpitations.  Respiratory:  Negative for cough, wheezing, or shortness of breath.   Gastrointestinal:  Negative for nausea, vomiting, diarrhea, constipation, or abdominal pain.  Neurologic:  No headache, confusion, dizziness, lightheadedness.    PHYSICAL EXAM  Vital Signs Last 24 Hrs  T(C): 36.6 (08 Oct 2023 08:55), Max: 36.6 (08 Oct 2023 08:55)  T(F): 97.8 (08 Oct 2023 08:55), Max: 97.8 (08 Oct 2023 08:55)  HR: 78 (08 Oct 2023 08:55) (73 - 80)  BP: 128/79 (08 Oct 2023 08:55) (122/74 - 134/81)  BP(mean): 98 (08 Oct 2023 08:55) (98 - 98)  RR: 18 (08 Oct 2023 08:55) (17 - 18)  SpO2: 96% (08 Oct 2023 08:55) (93% - 97%)    Parameters below as of 08 Oct 2023 08:55  Patient On (Oxygen Delivery Method): nasal cannula  O2 Flow (L/min): 2    Constitutional: Awake, alert, elderly male, in no acute distress.   HEENT: Normocephalic, atraumatic, PAMELA, no proptosis or lid retraction.   Neck: supple, no thyromegaly or palpable thyroid nodules.  Respiratory: Lungs clear to ausculation bilaterally.   Cardiovascular: regular rhythm, normal S1 and S2, no audible murmurs.   GI: soft, non-tender, non-distended, bowel sounds present,   Extremities: No lower extremity edema.   Psychiatric: AAO x 3.     LABS  CBC - WBC/HGB/HTC/PLT: 12.76/15.2/48.1/250 (10-08-23)  BMP: Na/K/Cl/Bicarb/BUN/Cr/Gluc: 124/3.8/89/23/18/0.97/156 (10-08-23)  Anion Gap: 12 (10-08-23)  eGFR: 76 (10-08-23)  Calcium: 9.0 (10-08-23)  Phosphorus: 3.2 (10-08-23)  Magnesium: 2.2 (10-08-23)  LFT - Alb/Tprot/Tbili/Dbili/AlkPhos/ALT/AST: 3.4/--/0.5/--/101/16/18 (10-07-23)  PT/aPTT/INR: 12.8/35.8/1.13 (10-06-23)  Thyroid Stimulating Hormone, Serum: 10.510 (10-07-23)  Total T4/Free T4: 7.18/-- (10-07-23)    ASSESSMENT / RECOMMENDATIONS  Mr. Yanez is a 87-year-old male with a past medical history of hypertension, hyperlipidemia, coronary artery disease (s/p JOSÉ to mLAD on 6/2023), cervical spondylosis (s/p laminectomy), gastric ulcers, hypothyroidism and constipation who presented to the emergency department (10/6/23) for dyspnea on exertion, chest pressure, and orthopnea for 3 days, along with one day of fever. He was admitted to cardiology for acute hypoxic respiratory failure secondary to acute heart failure with preserved ejection fraction and fever, possibly secondary to pneumonia. His labs were remarkable for a hemoglobin A1C of 7.7% and a TSH of 10.5. Endocrinology was consulted for recommendations regarding management of hypothyroidism and ?newly diagnosed diabetes.     A1C: 7.7 %  BUN: 18  Creatinine: 0.97  GFR: 76  Weight: 63.5  BMI: 23.3  EF: 55-60%    # Hypothyroidism  - Continue with levothyroxine 50 mcg 5 days a week and 100 mcg 2 days a week. Discussed to select two specific days of the week (ie, mondays and thursdays) instead of taking it when he remembers as he might be missing some doses of 100 mcg.   - Recheck TSH and Free T4 in 4-6 weeks outpatient.    - He should continue to follow-up with his endocrinologist for further titration of his levothyroxine.     # Type 2 diabetes mellitus with hyperglycemia  - Please continue with lispro moderate dose sliding scale before meals and at bedtime.  - Patient's fingerstick glucose goal is 100-180 mg/dL.    - Discharge recommendations to be discussed.   - Patient can follow up at discharge with [facility] by calling [phone] to make an appointment.      Case discussed with Dr. Hobson. Primary team updated.       Sandro Gray    Endocrinology Fellow    Service Pager: [phone]  HISTORY OF PRESENT ILLNESS  Hon Yanez is a 87-year-old male with a past medical history of type 2 diabetes mellitus, hypertension, hyperlipidemia, coronary artery disease (s/p JOSÉ to mLAD on 6/2023), cervical spondylosis (s/p laminectomy), gastric ulcers, hypothyroidism and constipation who presented to the emergency department (10/6/23) for dyspnea on exertion, chest pressure, and orthopnea for 3 days. He was admitted to cardiology for acute hypoxic respiratory failure secondary to acute heart failure with preserved ejection fraction and fever, possibly secondary to pneumonia. His labs were remarkable for a hemoglobin A1C of 7.7% and a TSH of 10.5. Endocrinology was consulted for recommendations regarding management of hypothyroidism and diabetes.     CAPILLARY BLOOD GLUCOSE & INSULIN RECEIVED  148 mg/dL (10-07 @ 14:01)  157 mg/dL (10-07 @ 16:44)  132 mg/dL (10-07 @ 21:44)  162 mg/dL (10-08 @ 08:08)    DIABETES HISTORY  - Age at diagnosis: ~10 years ago  - Current Therapy: None.   - History of DKA/HHS: Denied.   - Complications: Denied. Last eye exam around 6 weeks ago.   - Home FSG: Doesn't check fingersticks at home.   - Diet:          > Breakfast: Toast (2) + Coffee        > Lunch: Skips OR might have a roll or a sandwich or leftovers from dinner.         > Dinner: Chicken or steak with rice (>1/2 plate) and vegetables.         > Snacks: crackers. Reports eating sweets at least 3 times per week.   - Outpatient follow-up: He follows with an endocrinologist.     HYPOTHYROIDISM  He was diagnosed with hypothyroidism around 20 years ago. His dose was adjusted around 1 year ago. A few months ago his TFT were rechecked, and reportedly were within normal limits. He currently takes levothyroxine 50 mcg 5 days a week and 100 mcg 2 days a week. He reports taking the levothyroxine 100 mcg randomly (doesn't have an specific day to take the medication). Possibly might be forgetting to take the increased dose in certain occasions.     PAST MEDICAL & SURGICAL HISTORY  As per history of present illness.     ALLERGIES  No Known Drug Allergies  shellfish (Swelling; Hives)    CURRENT MEDICATIONS  aspirin enteric coated 81 milliGRAM(s) Oral daily  atorvastatin 40 milliGRAM(s) Oral at bedtime  bisacodyl Suppository 10 milliGRAM(s) Rectal daily PRN  cefTRIAXone   IVPB 1000 milliGRAM(s) IV Intermittent every 24 hours  clopidogrel Tablet 75 milliGRAM(s) Oral daily  cyclobenzaprine 5 milliGRAM(s) Oral two times a day PRN  dextrose 5%. 1000 milliLiter(s) IV Continuous <Continuous>  dextrose 5%. 1000 milliLiter(s) IV Continuous <Continuous>  dextrose 50% Injectable 25 Gram(s) IV Push once  dextrose 50% Injectable 25 Gram(s) IV Push once  dextrose 50% Injectable 12.5 Gram(s) IV Push once  dextrose Oral Gel 15 Gram(s) Oral once PRN  furosemide   Injectable 40 milliGRAM(s) IV Push daily  gabapentin 100 milliGRAM(s) Oral at bedtime  glucagon  Injectable 1 milliGRAM(s) IntraMuscular once  insulin lispro (ADMELOG) corrective regimen sliding scale   SubCutaneous at bedtime  insulin lispro (ADMELOG) corrective regimen sliding scale   SubCutaneous three times a day before meals  levothyroxine 50 MICROGram(s) Oral daily  metoprolol succinate ER 12.5 milliGRAM(s) Oral daily  oxyCODONE    IR 5 milliGRAM(s) Oral every 6 hours PRN  pantoprazole    Tablet 40 milliGRAM(s) Oral at bedtime  senna 2 Tablet(s) Oral daily    REVIEW OF SYSTEMS  Constitutional:  Negative fever, chills or loss of appetite.  Cardiovascular:  Negative for chest pain or palpitations.  Respiratory:  Negative for cough, wheezing, or shortness of breath.   Gastrointestinal:  Negative for nausea, vomiting, diarrhea, constipation, or abdominal pain.  Neurologic:  No headache, confusion, dizziness, lightheadedness.    PHYSICAL EXAM  Vital Signs Last 24 Hrs  T(C): 36.6 (08 Oct 2023 08:55), Max: 36.6 (08 Oct 2023 08:55)  T(F): 97.8 (08 Oct 2023 08:55), Max: 97.8 (08 Oct 2023 08:55)  HR: 78 (08 Oct 2023 08:55) (73 - 80)  BP: 128/79 (08 Oct 2023 08:55) (122/74 - 134/81)  BP(mean): 98 (08 Oct 2023 08:55) (98 - 98)  RR: 18 (08 Oct 2023 08:55) (17 - 18)  SpO2: 96% (08 Oct 2023 08:55) (93% - 97%)    Parameters below as of 08 Oct 2023 08:55  Patient On (Oxygen Delivery Method): nasal cannula  O2 Flow (L/min): 2    Constitutional: Awake, alert, elderly male, in no acute distress.   HEENT: Normocephalic, atraumatic, PAMELA, no proptosis or lid retraction.   Neck: supple, no thyromegaly or palpable thyroid nodules.  Respiratory: Lungs clear to ausculation bilaterally.   Cardiovascular: regular rhythm, normal S1 and S2, no audible murmurs.   GI: soft, non-tender, non-distended, bowel sounds present,   Extremities: No lower extremity edema.   Psychiatric: AAO x 3.     LABS  CBC - WBC/HGB/HTC/PLT: 12.76/15.2/48.1/250 (10-08-23)  BMP: Na/K/Cl/Bicarb/BUN/Cr/Gluc: 124/3.8/89/23/18/0.97/156 (10-08-23)  Anion Gap: 12 (10-08-23)  eGFR: 76 (10-08-23)  Calcium: 9.0 (10-08-23)  Phosphorus: 3.2 (10-08-23)  Magnesium: 2.2 (10-08-23)  LFT - Alb/Tprot/Tbili/Dbili/AlkPhos/ALT/AST: 3.4/--/0.5/--/101/16/18 (10-07-23)  PT/aPTT/INR: 12.8/35.8/1.13 (10-06-23)  Thyroid Stimulating Hormone, Serum: 10.510 (10-07-23)  Total T4/Free T4: 7.18/-- (10-07-23)    ASSESSMENT / RECOMMENDATIONS  Mr. Yanez is a 87-year-old male with a past medical history of hypertension, hyperlipidemia, coronary artery disease (s/p JOSÉ to mLAD on 6/2023), cervical spondylosis (s/p laminectomy), gastric ulcers, hypothyroidism and constipation who presented to the emergency department (10/6/23) for dyspnea on exertion, chest pressure, and orthopnea for 3 days, along with one day of fever. He was admitted to cardiology for acute hypoxic respiratory failure secondary to acute heart failure with preserved ejection fraction and fever, possibly secondary to pneumonia. His labs were remarkable for a hemoglobin A1C of 7.7% and a TSH of 10.5. Endocrinology was consulted for recommendations regarding management of hypothyroidism and ?newly diagnosed diabetes.     A1C: 7.7 %  BUN: 18  Creatinine: 0.97  GFR: 76  Weight: 63.5  BMI: 23.3  EF: 55-60%    # Hypothyroidism  - Continue with levothyroxine 50 mcg 5 days a week and 100 mcg 2 days a week. Discussed to select two specific days of the week (ie, mondays and thursdays) instead of taking it when he remembers as he might be missing some doses of 100 mcg.   - Recheck TSH and Free T4 in 4-6 weeks outpatient.    - He should continue to follow-up with his endocrinologist for further titration of his levothyroxine.     # Type 2 diabetes mellitus with hyperglycemia  - Please continue with lispro moderate dose sliding scale before meals and at bedtime.  - Patient's fingerstick glucose goal is 100-180 mg/dL.    - Discharge recommendations to be discussed.   - Patient can follow up at discharge with [facility] by calling [phone] to make an appointment.      Case discussed with Dr. Hobson. Primary team updated.       Sandro Gray    Endocrinology Fellow    Service Pager: [phone]

## 2023-10-08 NOTE — PROGRESS NOTE ADULT - PROBLEM SELECTOR PLAN 1
Presents w/ BELLO w/ minimal exertion, chest pain, orthopnea (2 pillow use)  x 3 days. proBNP 1279. Prior hx CAD: JOSÉ mLAD in 6/2023. Compliant w/ DAPT aspirin/Plavix. Received Lasix 20mg IV x 1 in ED.   EKG nonischemic. Troponin T x 1 negative  -f/u Trop trend  -Echo ordered  -Lasix 40mg IVP x1, reassess lasix need on 10/8 am  -Plan:  possible Cath Mon (10/9/23) w/ Dr. Jarvis pending clinical course. Consent in chart. Presents w/ BELLO w/ minimal exertion, chest pain, orthopnea (2 pillow use)  x 3 days. proBNP 1279. Prior hx CAD: JOSÉ mLAD in 6/2023. Compliant w/ DAPT aspirin/Plavix. Received Lasix 20mg IV x 1 in ED.   EKG nonischemic. Troponin T x 1 negative. s/p lasix 50mg IVP. Cath denied    - f/u urine lytes  - strict I&Os  - f/u echo

## 2023-10-09 ENCOUNTER — TRANSCRIPTION ENCOUNTER (OUTPATIENT)
Age: 88
End: 2023-10-09

## 2023-10-09 DIAGNOSIS — E03.9 HYPOTHYROIDISM, UNSPECIFIED: ICD-10-CM

## 2023-10-09 DIAGNOSIS — D50.9 IRON DEFICIENCY ANEMIA, UNSPECIFIED: ICD-10-CM

## 2023-10-09 LAB
ALBUMIN SERPL ELPH-MCNC: 3.2 G/DL — LOW (ref 3.3–5)
ALP SERPL-CCNC: 142 U/L — HIGH (ref 40–120)
ALT FLD-CCNC: 24 U/L — SIGNIFICANT CHANGE UP (ref 10–45)
ANION GAP SERPL CALC-SCNC: 15 MMOL/L — SIGNIFICANT CHANGE UP (ref 5–17)
APTT BLD: 32.8 SEC — SIGNIFICANT CHANGE UP (ref 24.5–35.6)
AST SERPL-CCNC: 21 U/L — SIGNIFICANT CHANGE UP (ref 10–40)
BASOPHILS # BLD AUTO: 0 K/UL — SIGNIFICANT CHANGE UP (ref 0–0.2)
BASOPHILS NFR BLD AUTO: 0 % — SIGNIFICANT CHANGE UP (ref 0–2)
BILIRUB SERPL-MCNC: 0.6 MG/DL — SIGNIFICANT CHANGE UP (ref 0.2–1.2)
BLD GP AB SCN SERPL QL: NEGATIVE — SIGNIFICANT CHANGE UP
BUN SERPL-MCNC: 18 MG/DL — SIGNIFICANT CHANGE UP (ref 7–23)
BURR CELLS BLD QL SMEAR: PRESENT — SIGNIFICANT CHANGE UP
CALCIUM SERPL-MCNC: 9 MG/DL — SIGNIFICANT CHANGE UP (ref 8.4–10.5)
CHLORIDE SERPL-SCNC: 89 MMOL/L — LOW (ref 96–108)
CO2 SERPL-SCNC: 23 MMOL/L — SIGNIFICANT CHANGE UP (ref 22–31)
CREAT SERPL-MCNC: 1.01 MG/DL — SIGNIFICANT CHANGE UP (ref 0.5–1.3)
EGFR: 73 ML/MIN/1.73M2 — SIGNIFICANT CHANGE UP
EOSINOPHIL # BLD AUTO: 0 K/UL — SIGNIFICANT CHANGE UP (ref 0–0.5)
EOSINOPHIL NFR BLD AUTO: 0 % — SIGNIFICANT CHANGE UP (ref 0–6)
GIANT PLATELETS BLD QL SMEAR: PRESENT — SIGNIFICANT CHANGE UP
GLUCOSE BLDC GLUCOMTR-MCNC: 149 MG/DL — HIGH (ref 70–99)
GLUCOSE BLDC GLUCOMTR-MCNC: 150 MG/DL — HIGH (ref 70–99)
GLUCOSE BLDC GLUCOMTR-MCNC: 176 MG/DL — HIGH (ref 70–99)
GLUCOSE SERPL-MCNC: 156 MG/DL — HIGH (ref 70–99)
HCT VFR BLD CALC: 49.5 % — SIGNIFICANT CHANGE UP (ref 39–50)
HGB BLD-MCNC: 15.9 G/DL — SIGNIFICANT CHANGE UP (ref 13–17)
INR BLD: 1.19 — HIGH (ref 0.85–1.18)
LYMPHOCYTES # BLD AUTO: 0.88 K/UL — LOW (ref 1–3.3)
LYMPHOCYTES # BLD AUTO: 6.2 % — LOW (ref 13–44)
MAGNESIUM SERPL-MCNC: 2.1 MG/DL — SIGNIFICANT CHANGE UP (ref 1.6–2.6)
MANUAL SMEAR VERIFICATION: SIGNIFICANT CHANGE UP
MCHC RBC-ENTMCNC: 20.7 PG — LOW (ref 27–34)
MCHC RBC-ENTMCNC: 32.1 GM/DL — SIGNIFICANT CHANGE UP (ref 32–36)
MCV RBC AUTO: 64.5 FL — LOW (ref 80–100)
MONOCYTES # BLD AUTO: 1.14 K/UL — HIGH (ref 0–0.9)
MONOCYTES NFR BLD AUTO: 8 % — SIGNIFICANT CHANGE UP (ref 2–14)
NEUTROPHILS # BLD AUTO: 12.18 K/UL — HIGH (ref 1.8–7.4)
NEUTROPHILS NFR BLD AUTO: 85.8 % — HIGH (ref 43–77)
OVALOCYTES BLD QL SMEAR: SIGNIFICANT CHANGE UP
PHOSPHATE SERPL-MCNC: 3.5 MG/DL — SIGNIFICANT CHANGE UP (ref 2.5–4.5)
PLAT MORPH BLD: ABNORMAL
PLATELET # BLD AUTO: 249 K/UL — SIGNIFICANT CHANGE UP (ref 150–400)
POIKILOCYTOSIS BLD QL AUTO: SIGNIFICANT CHANGE UP
POLYCHROMASIA BLD QL SMEAR: SLIGHT — SIGNIFICANT CHANGE UP
POTASSIUM SERPL-MCNC: 3.4 MMOL/L — LOW (ref 3.5–5.3)
POTASSIUM SERPL-SCNC: 3.4 MMOL/L — LOW (ref 3.5–5.3)
PROT SERPL-MCNC: 7.8 G/DL — SIGNIFICANT CHANGE UP (ref 6–8.3)
PROTHROM AB SERPL-ACNC: 13.5 SEC — HIGH (ref 9.5–13)
RBC # BLD: 7.68 M/UL — HIGH (ref 4.2–5.8)
RBC # FLD: 17.2 % — HIGH (ref 10.3–14.5)
RBC BLD AUTO: ABNORMAL
RH IG SCN BLD-IMP: POSITIVE — SIGNIFICANT CHANGE UP
SMUDGE CELLS # BLD: PRESENT — SIGNIFICANT CHANGE UP
SODIUM SERPL-SCNC: 127 MMOL/L — LOW (ref 135–145)
T4 FREE SERPL-MCNC: 1.58 NG/DL — SIGNIFICANT CHANGE UP (ref 0.93–1.7)
TSH SERPL-MCNC: 6.99 UIU/ML — HIGH (ref 0.27–4.2)
WBC # BLD: 14.2 K/UL — HIGH (ref 3.8–10.5)
WBC # FLD AUTO: 14.2 K/UL — HIGH (ref 3.8–10.5)

## 2023-10-09 PROCEDURE — 99232 SBSQ HOSP IP/OBS MODERATE 35: CPT

## 2023-10-09 PROCEDURE — 78226 HEPATOBILIARY SYSTEM IMAGING: CPT | Mod: 26

## 2023-10-09 PROCEDURE — 99232 SBSQ HOSP IP/OBS MODERATE 35: CPT | Mod: GC

## 2023-10-09 RX ORDER — POTASSIUM CHLORIDE 20 MEQ
20 PACKET (EA) ORAL ONCE
Refills: 0 | Status: DISCONTINUED | OUTPATIENT
Start: 2023-10-09 | End: 2023-10-09

## 2023-10-09 RX ORDER — LEVOTHYROXINE SODIUM 125 MCG
50 TABLET ORAL
Refills: 0 | Status: DISCONTINUED | OUTPATIENT
Start: 2023-10-09 | End: 2023-10-10

## 2023-10-09 RX ORDER — POTASSIUM CHLORIDE 20 MEQ
40 PACKET (EA) ORAL ONCE
Refills: 0 | Status: COMPLETED | OUTPATIENT
Start: 2023-10-09 | End: 2023-10-09

## 2023-10-09 RX ORDER — LEVOTHYROXINE SODIUM 125 MCG
100 TABLET ORAL
Refills: 0 | Status: DISCONTINUED | OUTPATIENT
Start: 2023-10-09 | End: 2023-10-10

## 2023-10-09 RX ADMIN — CEFTRIAXONE 100 MILLIGRAM(S): 500 INJECTION, POWDER, FOR SOLUTION INTRAMUSCULAR; INTRAVENOUS at 19:31

## 2023-10-09 RX ADMIN — Medication 40 MILLIEQUIVALENT(S): at 13:32

## 2023-10-09 RX ADMIN — Medication 50 MICROGRAM(S): at 09:05

## 2023-10-09 RX ADMIN — GABAPENTIN 100 MILLIGRAM(S): 400 CAPSULE ORAL at 22:23

## 2023-10-09 RX ADMIN — Medication 500 MILLIGRAM(S): at 13:32

## 2023-10-09 RX ADMIN — Medication 500 MILLIGRAM(S): at 03:25

## 2023-10-09 RX ADMIN — PANTOPRAZOLE SODIUM 40 MILLIGRAM(S): 20 TABLET, DELAYED RELEASE ORAL at 22:23

## 2023-10-09 RX ADMIN — Medication 50 MICROGRAM(S): at 05:53

## 2023-10-09 RX ADMIN — ATORVASTATIN CALCIUM 40 MILLIGRAM(S): 80 TABLET, FILM COATED ORAL at 22:23

## 2023-10-09 RX ADMIN — Medication 81 MILLIGRAM(S): at 13:31

## 2023-10-09 RX ADMIN — Medication 12.5 MILLIGRAM(S): at 05:53

## 2023-10-09 RX ADMIN — Medication 40 MILLIGRAM(S): at 05:53

## 2023-10-09 RX ADMIN — Medication 500 MILLIGRAM(S): at 22:26

## 2023-10-09 NOTE — DISCHARGE NOTE PROVIDER - NSDCMRMEDTOKEN_GEN_ALL_CORE_FT
aspirin 81 mg oral tablet: 1 tab(s) orally once a day  cyclobenzaprine 5 mg oral tablet: 1 tab(s) orally 2 times a day  Dulcolax Laxative 5 mg oral delayed release tablet: 1 tab(s) orally once a day  gabapentin 100 mg oral capsule: 1 cap(s) orally once a day (at bedtime)  levothyroxine 50 mcg (0.05 mg) oral tablet: 1 tab(s) orally once a day  metoprolol succinate 25 mg oral capsule, extended release: 0.5 tab(s) orally once a day  oxyCODONE 5 mg oral tablet: 1 tab(s) orally every 6 hours as needed for  severe pain MDD: 20  pantoprazole 40 mg oral delayed release tablet: 1 tab(s) orally once a day  Plavix 75 mg oral tablet: 1 tab(s) orally once a day  rosuvastatin 20 mg oral tablet: 1 orally once a day (at bedtime)   aspirin 81 mg oral tablet: 1 tab(s) orally once a day  cefpodoxime 200 mg oral tablet: 1 tab(s) orally every 12 hours  cyclobenzaprine 5 mg oral tablet: 1 tab(s) orally 2 times a day  Dulcolax Laxative 5 mg oral delayed release tablet: 1 tab(s) orally once a day  gabapentin 100 mg oral capsule: 1 cap(s) orally once a day (at bedtime)  linagliptin 5 mg oral tablet: 1 tab(s) orally once a day (in the morning)  metoprolol succinate 25 mg oral capsule, extended release: 0.5 tab(s) orally once a day  metroNIDAZOLE 500 mg oral tablet: 1 tab(s) orally every 8 hours  oxyCODONE 5 mg oral tablet: 1 tab(s) orally every 6 hours as needed for  severe pain MDD: 20  pantoprazole 40 mg oral delayed release tablet: 1 tab(s) orally once a day  Plavix 75 mg oral tablet: 1 tab(s) orally once a day  rosuvastatin 20 mg oral tablet: 1 orally once a day (at bedtime)  Synthroid 75 mcg (0.075 mg) oral tablet: 1 tab(s) orally once a day   aspirin 81 mg oral tablet: 1 tab(s) orally once a day  cefpodoxime 200 mg oral tablet: 1 tab(s) orally every 12 hours  cyclobenzaprine 5 mg oral tablet: 1 tab(s) orally 2 times a day  Dulcolax Laxative 5 mg oral delayed release tablet: 1 tab(s) orally once a day  gabapentin 100 mg oral capsule: 1 cap(s) orally once a day (at bedtime)  linagliptin 5 mg oral tablet: 1 tab(s) orally once a day (in the morning)  metoprolol succinate 25 mg oral capsule, extended release: 0.5 tab(s) orally once a day  metroNIDAZOLE 500 mg oral tablet: 1 tab(s) orally every 8 hours  oxyCODONE 5 mg oral tablet: 1 tab(s) orally every 6 hours as needed for  severe pain MDD: 20  pantoprazole 40 mg oral delayed release tablet: 1 tab(s) orally once a day  Plavix 75 mg oral tablet: 1 tab(s) orally once a day  rosuvastatin 20 mg oral tablet: 1 orally once a day (at bedtime)  Synthroid 75 mcg (0.075 mg) oral tablet: 1 tab(s) orally once a day  urea 15 g oral powder for reconstitution: 1 packet(s) orally once a day   aspirin 81 mg oral tablet: 1 tab(s) orally once a day  cefpodoxime 200 mg oral tablet: 1 tab(s) orally every 12 hours  cyclobenzaprine 5 mg oral tablet: 1 tab(s) orally 2 times a day  Dulcolax Laxative 5 mg oral delayed release tablet: 1 tab(s) orally once a day  gabapentin 100 mg oral capsule: 1 cap(s) orally once a day (at bedtime)  Januvia 100 mg oral tablet: 1 tab(s) orally once a day before breakfast  metoprolol succinate 25 mg oral capsule, extended release: 0.5 tab(s) orally once a day  metroNIDAZOLE 500 mg oral tablet: 1 tab(s) orally every 8 hours  oxyCODONE 5 mg oral tablet: 1 tab(s) orally every 6 hours as needed for  severe pain MDD: 20  pantoprazole 40 mg oral delayed release tablet: 1 tab(s) orally once a day  Plavix 75 mg oral tablet: 1 tab(s) orally once a day  rosuvastatin 20 mg oral tablet: 1 orally once a day (at bedtime)  Synthroid 75 mcg (0.075 mg) oral tablet: 1 tab(s) orally once a day  urea 15 g oral powder for reconstitution: 1 packet(s) orally once a day

## 2023-10-09 NOTE — DISCHARGE NOTE PROVIDER - PROVIDER TOKENS
FREE:[LAST:[Shook],FIRST:[Danielle],PHONE:[phone]],FAX:[(   )    -],ADDRESS:[[address] [place] [address]],FOLLOWUP:[1 week],ESTABLISHEDPATIENT:[T]] FREE:[LAST:[Shook],FIRST:[Danielle],PHONE:[phone]],FAX:[(   )    -],ADDRESS:[[address] [place] [address]],FOLLOWUP:[1 week],ESTABLISHEDPATIENT:[T]] FREE:[LAST:[Shook],FIRST:[Danielle],PHONE:[phone]],FAX:[(   )    -],ADDRESS:[[address] [place] [address]],FOLLOWUP:[1 week],ESTABLISHEDPATIENT:[T]] FREE:[LAST:[Shook],FIRST:[Danielle],PHONE:[phone]],FAX:[(   )    -],ADDRESS:[[address] [place] [address]],SCHEDULEDAPPT:[10/17/2023],SCHEDULEDAPPTTIME:[09:30 AM],ESTABLISHEDPATIENT:[T]] FREE:[LAST:[Shook],FIRST:[Danielle],PHONE:[phone]],FAX:[(   )    -],ADDRESS:[[address] [place] [address]],SCHEDULEDAPPT:[10/17/2023],SCHEDULEDAPPTTIME:[09:30 AM],ESTABLISHEDPATIENT:[T]] FREE:[LAST:[Shook],FIRST:[Danielle],PHONE:[phone]],FAX:[(   )    -],ADDRESS:[[address] [place] [address]],SCHEDULEDAPPT:[10/17/2023],SCHEDULEDAPPTTIME:[09:30 AM],ESTABLISHEDPATIENT:[T]] FREE:[LAST:[Shook],FIRST:[Danielle],PHONE:[phone]],FAX:[(   )    -],ADDRESS:[[address] [place] [address]],SCHEDULEDAPPT:[10/16/2023],SCHEDULEDAPPTTIME:[09:00 AM],ESTABLISHEDPATIENT:[T]] FREE:[LAST:[Shook],FIRST:[Danielle],PHONE:[phone]],FAX:[(   )    -],ADDRESS:[[address] [place] [address]],SCHEDULEDAPPT:[10/16/2023],SCHEDULEDAPPTTIME:[09:00 AM],ESTABLISHEDPATIENT:[T]] FREE:[LAST:[Shook],FIRST:[Danielle],PHONE:[phone]],FAX:[(   )    -],ADDRESS:[[address] [place] [address]],SCHEDULEDAPPT:[10/16/2023],SCHEDULEDAPPTTIME:[09:00 AM],ESTABLISHEDPATIENT:[T]] FREE:[LAST:[Shook],FIRST:[Danielle],PHONE:[phone]],FAX:[(   )    -],ADDRESS:[[address] [place] [address]],SCHEDULEDAPPT:[10/16/2023],SCHEDULEDAPPTTIME:[09:00 AM],ESTABLISHEDPATIENT:[T]],PROVIDER:[TOKEN:[[id number]],FOLLOWUP:[2 weeks]] FREE:[LAST:[Shook],FIRST:[Danielle],PHONE:[phone]],FAX:[(   )    -],ADDRESS:[[address] [place] [address]],SCHEDULEDAPPT:[10/16/2023],SCHEDULEDAPPTTIME:[09:00 AM],ESTABLISHEDPATIENT:[T]],PROVIDER:[TOKEN:[[id number]],FOLLOWUP:[2 weeks]] FREE:[LAST:[Shook],FIRST:[Danielle],PHONE:[phone]],FAX:[(   )    -],ADDRESS:[[address] [place] [address]],SCHEDULEDAPPT:[10/16/2023],SCHEDULEDAPPTTIME:[09:00 AM],ESTABLISHEDPATIENT:[T]],PROVIDER:[TOKEN:[[id number]],FOLLOWUP:[2 weeks]] PROVIDER:[TOKEN:[[id number]],SCHEDULEDAPPT:[10/25/2023],SCHEDULEDAPPTTIME:[11:30 AM]],FREE:[LAST:[Shook],FIRST:[Danielle],PHONE:[phone]],FAX:[(   )    -],ADDRESS:[[address] [facility] [place] [address]],SCHEDULEDAPPT:[10/16/2023],SCHEDULEDAPPTTIME:[09:00 AM],ESTABLISHEDPATIENT:[T]] PROVIDER:[TOKEN:[[id number]],SCHEDULEDAPPT:[10/25/2023],SCHEDULEDAPPTTIME:[11:30 AM]],FREE:[LAST:[Shook],FIRST:[Danielle],PHONE:[phone]],FAX:[(   )    -],ADDRESS:[[address] [facility] [address]],SCHEDULEDAPPT:[10/16/2023],SCHEDULEDAPPTTIME:[09:00 AM],ESTABLISHEDPATIENT:[T]] PROVIDER:[TOKEN:[[id number]],SCHEDULEDAPPT:[10/25/2023],SCHEDULEDAPPTTIME:[11:30 AM]],FREE:[LAST:[Shook],FIRST:[Danielle],PHONE:[phone]],FAX:[(   )    -],ADDRESS:[[address] [facility] [place] [address]],SCHEDULEDAPPT:[10/16/2023],SCHEDULEDAPPTTIME:[09:00 AM],ESTABLISHEDPATIENT:[T]] PROVIDER:[TOKEN:[[id number]:[id number]],SCHEDULEDAPPT:[10/25/2023],SCHEDULEDAPPTTIME:[11:30 AM]],FREE:[LAST:[Shook],FIRST:[Danielle],PHONE:[phone]],FAX:[(   )    -],ADDRESS:[[address] [place] [address]],SCHEDULEDAPPT:[10/16/2023],SCHEDULEDAPPTTIME:[09:00 AM],ESTABLISHEDPATIENT:[T]],FREE:[LAST:[Eriberto Roldan],FIRST:[Sofya],PHONE:[phone]],FAX:[phone]],ADDRESS:[[address] [place] [address], [address] [place] [address]],SCHEDULEDAPPT:[10/17/2023],SCHEDULEDAPPTTIME:[03:00 PM]] PROVIDER:[TOKEN:[[id number]],SCHEDULEDAPPT:[10/25/2023],SCHEDULEDAPPTTIME:[11:30 AM]],FREE:[LAST:[Shook],FIRST:[Danielle],PHONE:[phone]],FAX:[(   )    -],ADDRESS:[[address] [place] [address]],SCHEDULEDAPPT:[10/16/2023],SCHEDULEDAPPTTIME:[09:00 AM],ESTABLISHEDPATIENT:[T]],FREE:[LAST:[Eriberto Roldan],FIRST:[Sofya],PHONE:[phone]],FAX:[phone]],ADDRESS:[[address] [place] [address], [address] [place] [address]],SCHEDULEDAPPT:[10/17/2023],SCHEDULEDAPPTTIME:[03:00 PM]] PROVIDER:[TOKEN:[[id number]],SCHEDULEDAPPT:[10/25/2023],SCHEDULEDAPPTTIME:[11:30 AM]],FREE:[LAST:[Shook],FIRST:[Danielle],PHONE:[phone]],FAX:[(   )    -],ADDRESS:[[address] [place] [address]],SCHEDULEDAPPT:[10/16/2023],SCHEDULEDAPPTTIME:[09:00 AM],ESTABLISHEDPATIENT:[T]],FREE:[LAST:[Eriberto Roldan],FIRST:[Sofya],PHONE:[phone]],FAX:[phone]],ADDRESS:[[address] [place] [address], [address] [place] [address]],SCHEDULEDAPPT:[10/17/2023],SCHEDULEDAPPTTIME:[03:00 PM]] PROVIDER:[TOKEN:[[id number]],SCHEDULEDAPPT:[10/25/2023],SCHEDULEDAPPTTIME:[11:30 AM]],FREE:[LAST:[Shook],FIRST:[Danielle],PHONE:[phone]],FAX:[(   )    -],ADDRESS:[[address] [facility] [place] [address]],SCHEDULEDAPPT:[10/16/2023],SCHEDULEDAPPTTIME:[09:00 AM],ESTABLISHEDPATIENT:[T]],FREE:[LAST:[Eriberto Roldan],FIRST:[Sofya],PHONE:[phone]:[phone]],ADDRESS:[[address] [place] [address], [address] [place] [address]],SCHEDULEDAPPT:[10/17/2023],SCHEDULEDAPPTTIME:[03:00 PM],ESTABLISHEDPATIENT:[T]],FREE:[LAST:[[facility]],PHONE:[phone]],FAX:[(   )    -],ADDRESS:[[address] [place] [address]  Please call to make an appointment for your diabetes and hypothyroidism],FOLLOWUP:[1 week]] PROVIDER:[TOKEN:[[id number]],SCHEDULEDAPPT:[10/25/2023],SCHEDULEDAPPTTIME:[11:30 AM]],FREE:[LAST:[Shook],FIRST:[Danielle],PHONE:[phone]],FAX:[(   )    -],ADDRESS:[[address] [facility] [place] [address]],SCHEDULEDAPPT:[10/16/2023],SCHEDULEDAPPTTIME:[09:00 AM],ESTABLISHEDPATIENT:[T]],FREE:[LAST:[Eriberto Roldan],FIRST:[Sofya],PHONE:[phone]],FAX:[phone]],ADDRESS:[[address] [place] [address], [address] [place] [address]],SCHEDULEDAPPT:[10/17/2023],SCHEDULEDAPPTTIME:[03:00 PM],ESTABLISHEDPATIENT:[T]],FREE:[LAST:[[facility]],PHONE:[phone]],FAX:[(   )    -],ADDRESS:[[address] [place] [address]  Please call to make an appointment for your diabetes and hypothyroidism],FOLLOWUP:[1 week]] PROVIDER:[TOKEN:[[id number]],SCHEDULEDAPPT:[10/25/2023],SCHEDULEDAPPTTIME:[11:30 AM]],FREE:[LAST:[Shook],FIRST:[Danielle],PHONE:[phone]],FAX:[(   )    -],ADDRESS:[[address] [facility] [place] [address]],SCHEDULEDAPPT:[10/16/2023],SCHEDULEDAPPTTIME:[09:00 AM],ESTABLISHEDPATIENT:[T]],FREE:[LAST:[Eriberto Roldan],FIRST:[Sofya],PHONE:[phone]],FAX:[phone]],ADDRESS:[[address] [place] [address], [address] [place] [address]],SCHEDULEDAPPT:[10/17/2023],SCHEDULEDAPPTTIME:[03:00 PM],ESTABLISHEDPATIENT:[T]],FREE:[LAST:[[facility]],PHONE:[phone]],FAX:[(   )    -],ADDRESS:[[address] [place] [address]  Please call to make an appointment for your diabetes and hypothyroidism],FOLLOWUP:[1 week]]

## 2023-10-09 NOTE — PROGRESS NOTE ADULT - SUBJECTIVE AND OBJECTIVE BOX
*****INCOMPLETE NOTE*****    INTERVAL HPI/OVERNIGHT EVENTS: adam    SUBJECTIVE: Patient seen and examined at bedside, resting comfortably in bed, and does not appear to be in any acute distress. Patient states that he is abdominal pain has worsened and has moved towards the middle of his stomach. States that he was able to have a bowel movement.  Patient denies any chest pain, shortness of breath, headaches, dizziness or gu sxs.    MEDICATIONS  (STANDING):  aspirin enteric coated 81 milliGRAM(s) Oral daily  atorvastatin 40 milliGRAM(s) Oral at bedtime  cefTRIAXone   IVPB 2000 milliGRAM(s) IV Intermittent every 24 hours  clopidogrel Tablet 75 milliGRAM(s) Oral daily  dextrose 5%. 1000 milliLiter(s) (100 mL/Hr) IV Continuous <Continuous>  dextrose 5%. 1000 milliLiter(s) (50 mL/Hr) IV Continuous <Continuous>  dextrose 50% Injectable 25 Gram(s) IV Push once  dextrose 50% Injectable 25 Gram(s) IV Push once  dextrose 50% Injectable 12.5 Gram(s) IV Push once  gabapentin 100 milliGRAM(s) Oral at bedtime  glucagon  Injectable 1 milliGRAM(s) IntraMuscular once  insulin lispro (ADMELOG) corrective regimen sliding scale   SubCutaneous at bedtime  insulin lispro (ADMELOG) corrective regimen sliding scale   SubCutaneous three times a day before meals  levothyroxine 50 MICROGram(s) Oral <User Schedule>  levothyroxine 100 MICROGram(s) Oral <User Schedule>  metoprolol succinate ER 12.5 milliGRAM(s) Oral daily  metroNIDAZOLE    Tablet 500 milliGRAM(s) Oral every 8 hours  pantoprazole    Tablet 40 milliGRAM(s) Oral at bedtime  potassium chloride   Powder 40 milliEquivalent(s) Oral once  senna 2 Tablet(s) Oral daily    MEDICATIONS  (PRN):  bisacodyl Suppository 10 milliGRAM(s) Rectal daily PRN Constipation  cyclobenzaprine 5 milliGRAM(s) Oral two times a day PRN Muscle Spasm  dextrose Oral Gel 15 Gram(s) Oral once PRN Blood Glucose LESS THAN 70 milliGRAM(s)/deciliter  oxyCODONE    IR 5 milliGRAM(s) Oral every 6 hours PRN Severe Pain (7 - 10)      Vital Signs Last 24 Hrs  T(C): 36.3 (09 Oct 2023 08:38), Max: 36.8 (09 Oct 2023 05:20)  T(F): 97.4 (09 Oct 2023 08:38), Max: 98.2 (09 Oct 2023 05:20)  HR: 77 (09 Oct 2023 08:38) (68 - 92)  BP: 110/68 (09 Oct 2023 08:38) (110/68 - 135/81)  BP(mean): 83 (09 Oct 2023 08:38) (83 - 93)  RR: 18 (09 Oct 2023 08:44) (16 - 18)  SpO2: 95% (09 Oct 2023 08:45) (89% - 97%)    Parameters below as of 09 Oct 2023 08:45  Patient On (Oxygen Delivery Method): nasal cannula  O2 Flow (L/min): 1      PHYSICAL EXAM:  General: in no acute distress  Eyes: EOMI intact bilaterally. Anicteric sclerae, moist conjunctivae  HENT: Moist mucous membranes  Neck: Trachea midline, supple  Lungs: CTA B/L. No wheezes, rales, or rhonchi  Cardiovascular: RRR. No murmurs, rubs, or gallops  Abdomen: Soft, non-tender non-distended; No rebound or guarding  Extremities: WWP, No clubbing, cyanosis or edema  Neurological: Alert and oriented  Skin: Warm and dry. No obvious rash     LABS:                        15.9   14.20 )-----------( 249      ( 09 Oct 2023 05:30 )             49.5     10-09    127<L>  |  89<L>  |  18  ----------------------------<  156<H>  3.4<L>   |  23  |  1.01    Ca    9.0      09 Oct 2023 05:30  Phos  3.5     10-09  Mg     2.1     10-09    TPro  7.8  /  Alb  3.2<L>  /  TBili  0.6  /  DBili  x   /  AST  21  /  ALT  24  /  AlkPhos  142<H>  10-09    PT/INR - ( 09 Oct 2023 05:30 )   PT: 13.5 sec;   INR: 1.19          PTT - ( 09 Oct 2023 05:30 )  PTT:32.8 sec  Urinalysis Basic - ( 09 Oct 2023 05:30 )    Color: x / Appearance: x / SG: x / pH: x  Gluc: 156 mg/dL / Ketone: x  / Bili: x / Urobili: x   Blood: x / Protein: x / Nitrite: x   Leuk Esterase: x / RBC: x / WBC x   Sq Epi: x / Non Sq Epi: x / Bacteria: x        MICROBIOLOGY:    RADIOLOGY & ADDITIONAL STUDIES: INTERVAL HPI/OVERNIGHT EVENTS: adam    SUBJECTIVE: Patient seen and examined at bedside, resting comfortably in bed, and does not appear to be in any acute distress. Patient states that he is abdominal pain has worsened and has moved towards the middle of his stomach. States that he was able to have a bowel movement.  Patient denies any chest pain, shortness of breath, headaches, dizziness or gu sxs.    MEDICATIONS  (STANDING):  aspirin enteric coated 81 milliGRAM(s) Oral daily  atorvastatin 40 milliGRAM(s) Oral at bedtime  cefTRIAXone   IVPB 2000 milliGRAM(s) IV Intermittent every 24 hours  clopidogrel Tablet 75 milliGRAM(s) Oral daily  dextrose 5%. 1000 milliLiter(s) (100 mL/Hr) IV Continuous <Continuous>  dextrose 5%. 1000 milliLiter(s) (50 mL/Hr) IV Continuous <Continuous>  dextrose 50% Injectable 25 Gram(s) IV Push once  dextrose 50% Injectable 25 Gram(s) IV Push once  dextrose 50% Injectable 12.5 Gram(s) IV Push once  gabapentin 100 milliGRAM(s) Oral at bedtime  glucagon  Injectable 1 milliGRAM(s) IntraMuscular once  insulin lispro (ADMELOG) corrective regimen sliding scale   SubCutaneous at bedtime  insulin lispro (ADMELOG) corrective regimen sliding scale   SubCutaneous three times a day before meals  levothyroxine 50 MICROGram(s) Oral <User Schedule>  levothyroxine 100 MICROGram(s) Oral <User Schedule>  metoprolol succinate ER 12.5 milliGRAM(s) Oral daily  metroNIDAZOLE    Tablet 500 milliGRAM(s) Oral every 8 hours  pantoprazole    Tablet 40 milliGRAM(s) Oral at bedtime  potassium chloride   Powder 40 milliEquivalent(s) Oral once  senna 2 Tablet(s) Oral daily    MEDICATIONS  (PRN):  bisacodyl Suppository 10 milliGRAM(s) Rectal daily PRN Constipation  cyclobenzaprine 5 milliGRAM(s) Oral two times a day PRN Muscle Spasm  dextrose Oral Gel 15 Gram(s) Oral once PRN Blood Glucose LESS THAN 70 milliGRAM(s)/deciliter  oxyCODONE    IR 5 milliGRAM(s) Oral every 6 hours PRN Severe Pain (7 - 10)      Vital Signs Last 24 Hrs  T(C): 36.3 (09 Oct 2023 08:38), Max: 36.8 (09 Oct 2023 05:20)  T(F): 97.4 (09 Oct 2023 08:38), Max: 98.2 (09 Oct 2023 05:20)  HR: 77 (09 Oct 2023 08:38) (68 - 92)  BP: 110/68 (09 Oct 2023 08:38) (110/68 - 135/81)  BP(mean): 83 (09 Oct 2023 08:38) (83 - 93)  RR: 18 (09 Oct 2023 08:44) (16 - 18)  SpO2: 95% (09 Oct 2023 08:45) (89% - 97%)    Parameters below as of 09 Oct 2023 08:45  Patient On (Oxygen Delivery Method): nasal cannula  O2 Flow (L/min): 1      PHYSICAL EXAM:  General: in no acute distress  Eyes: EOMI intact bilaterally. Anicteric sclerae, moist conjunctivae  HENT: Moist mucous membranes  Neck: Trachea midline, supple  Lungs: CTA B/L. No wheezes, rales, or rhonchi  Cardiovascular: RRR. No murmurs, rubs, or gallops  Abdomen: Soft, non-tender non-distended; No rebound or guarding  Extremities: WWP, No clubbing, cyanosis or edema  Neurological: Alert and oriented  Skin: Warm and dry. No obvious rash     LABS:                        15.9   14.20 )-----------( 249      ( 09 Oct 2023 05:30 )             49.5     10-09    127<L>  |  89<L>  |  18  ----------------------------<  156<H>  3.4<L>   |  23  |  1.01    Ca    9.0      09 Oct 2023 05:30  Phos  3.5     10-09  Mg     2.1     10-09    TPro  7.8  /  Alb  3.2<L>  /  TBili  0.6  /  DBili  x   /  AST  21  /  ALT  24  /  AlkPhos  142<H>  10-09    PT/INR - ( 09 Oct 2023 05:30 )   PT: 13.5 sec;   INR: 1.19          PTT - ( 09 Oct 2023 05:30 )  PTT:32.8 sec  Urinalysis Basic - ( 09 Oct 2023 05:30 )    Color: x / Appearance: x / SG: x / pH: x  Gluc: 156 mg/dL / Ketone: x  / Bili: x / Urobili: x   Blood: x / Protein: x / Nitrite: x   Leuk Esterase: x / RBC: x / WBC x   Sq Epi: x / Non Sq Epi: x / Bacteria: x        MICROBIOLOGY:    RADIOLOGY & ADDITIONAL STUDIES:

## 2023-10-09 NOTE — PROGRESS NOTE ADULT - SUBJECTIVE AND OBJECTIVE BOX
SUBJECTIVE / INTERVAL HPI: Patient was seen and examined this morning.     CAPILLARY BLOOD GLUCOSE & INSULIN RECEIVED  134 mg/dL (10-08 @ 12:05)  165 mg/dL (10-08 @ 17:05)  162 mg/dL (10-08 @ 21:40)  149 mg/dL (10-09 @ 07:47)  149 mg/dL (10-09 @ 13:06)  150 mg/dL (10-09 @ 16:58)  176 mg/dL (10-09 @ 21:49)    REVIEW OF SYSTEMS  Constitutional:  Negative fever, chills or loss of appetite.  Eyes:  Negative blurry vision or double vision.  Cardiovascular:  Negative for chest pain or palpitations.  Respiratory:  Negative for cough, wheezing, or shortness of breath.    Gastrointestinal:  Negative for nausea, vomiting, diarrhea, constipation, or abdominal pain.  Genitourinary:  Negative frequency, urgency or dysuria.  Neurologic:  No headache, confusion, dizziness, lightheadedness.    PHYSICAL EXAM  Vital Signs Last 24 Hrs  T(C): 36.8 (09 Oct 2023 20:39), Max: 36.8 (09 Oct 2023 05:20)  T(F): 98.3 (09 Oct 2023 20:39), Max: 98.3 (09 Oct 2023 20:39)  HR: 78 (09 Oct 2023 20:39) (76 - 92)  BP: 107/62 (09 Oct 2023 20:39) (100/58 - 121/76)  BP(mean): 79 (09 Oct 2023 20:39) (77 - 93)  RR: 16 (09 Oct 2023 20:39) (16 - 18)  SpO2: 95% (09 Oct 2023 20:39) (91% - 96%)    Parameters below as of 09 Oct 2023 20:39  Patient On (Oxygen Delivery Method): nasal cannula  O2 Flow (L/min): 1    Constitutional: Awake, alert, in no acute distress.   HEENT: Normocephalic, atraumatic, PAMELA.  Respiratory: Lungs clear to ausculation bilaterally.   Cardiovascular: regular rhythm, normal S1 and S2, no audible murmurs.   GI: soft, non-tender, non-distended, bowel sounds present.  Extremities: No lower extremity edema.  Psychiatric: AAO x 3. Normal affect/mood.     LABS  CBC - WBC/HGB/HTC/PLT: 14.20/15.9/49.5/249 (10-09-23)  BMP - Na/K/Cl/Bicarb/BUN/Cr/Gluc/AG/eGFR: 127/3.4/89/23/18/1.01/156/15/73 (10-09-23)  Ca - 9.0 (10-09-23)  Phos - 3.5 (10-09-23)  Mg - 2.1 (10-09-23)  LFT - Alb/Tprot/Tbili/Dbili/AlkPhos/ALT/AST: 3.2/--/0.6/--/142/24/21 (10-09-23)  PT/aPTT/INR: 13.5/32.8/1.19 (10-09-23)   Thyroid Stimulating Hormone, Serum: 6.990 (10-09-23)  Total T4/Free T4: --/1.580 (10-09-23)    MEDICATIONS  MEDICATIONS  (STANDING):  aspirin enteric coated 81 milliGRAM(s) Oral daily  atorvastatin 40 milliGRAM(s) Oral at bedtime  cefTRIAXone   IVPB 2000 milliGRAM(s) IV Intermittent every 24 hours  dextrose 5%. 1000 milliLiter(s) (50 mL/Hr) IV Continuous <Continuous>  dextrose 5%. 1000 milliLiter(s) (100 mL/Hr) IV Continuous <Continuous>  dextrose 50% Injectable 25 Gram(s) IV Push once  dextrose 50% Injectable 25 Gram(s) IV Push once  dextrose 50% Injectable 12.5 Gram(s) IV Push once  gabapentin 100 milliGRAM(s) Oral at bedtime  glucagon  Injectable 1 milliGRAM(s) IntraMuscular once  insulin lispro (ADMELOG) corrective regimen sliding scale   SubCutaneous three times a day before meals  insulin lispro (ADMELOG) corrective regimen sliding scale   SubCutaneous at bedtime  levothyroxine 50 MICROGram(s) Oral <User Schedule>  levothyroxine 100 MICROGram(s) Oral <User Schedule>  metoprolol succinate ER 12.5 milliGRAM(s) Oral daily  metroNIDAZOLE    Tablet 500 milliGRAM(s) Oral every 8 hours  pantoprazole    Tablet 40 milliGRAM(s) Oral at bedtime  senna 2 Tablet(s) Oral daily    MEDICATIONS  (PRN):  bisacodyl Suppository 10 milliGRAM(s) Rectal daily PRN Constipation  cyclobenzaprine 5 milliGRAM(s) Oral two times a day PRN Muscle Spasm  dextrose Oral Gel 15 Gram(s) Oral once PRN Blood Glucose LESS THAN 70 milliGRAM(s)/deciliter  oxyCODONE    IR 5 milliGRAM(s) Oral every 6 hours PRN Severe Pain (7 - 10)    ASSESSMENT / RECOMMENDATIONS    A1C: 7.7 %  BUN: 18  Creatinine: 1.01  GFR: 73  Weight: 63.5  BMI: 23.3  EF:     # Type 2 diabetes mellitus with hyperglycemia  - Please continue lantus *** units at bedtime.   - Continue lispro *** units before each meal.  - Continue lispro moderate / low dose sliding scale before meals and at bedtime.  - Patient's fingerstick glucose goal is 100-180 mg/dL.    - Discharge recommendations to be discussed.   - Patient can follow up at discharge with [facility] by calling [phone] to make an appointment.      Case discussed with Dr. Ness. Primary team updated.       Sandro Gray    Endocrinology Fellow    Service Pager: [phone]    SUBJECTIVE / INTERVAL HPI: Patient was seen and examined this morning.     CAPILLARY BLOOD GLUCOSE & INSULIN RECEIVED  134 mg/dL (10-08 @ 12:05)  165 mg/dL (10-08 @ 17:05)  162 mg/dL (10-08 @ 21:40)  149 mg/dL (10-09 @ 07:47)  149 mg/dL (10-09 @ 13:06)  150 mg/dL (10-09 @ 16:58)  176 mg/dL (10-09 @ 21:49)    REVIEW OF SYSTEMS  Constitutional:  Negative fever, chills or loss of appetite.  Eyes:  Negative blurry vision or double vision.  Cardiovascular:  Negative for chest pain or palpitations.  Respiratory:  Negative for cough, wheezing, or shortness of breath.    Gastrointestinal:  Negative for nausea, vomiting, diarrhea, constipation, or abdominal pain.  Genitourinary:  Negative frequency, urgency or dysuria.  Neurologic:  No headache, confusion, dizziness, lightheadedness.    PHYSICAL EXAM  Vital Signs Last 24 Hrs  T(C): 36.8 (09 Oct 2023 20:39), Max: 36.8 (09 Oct 2023 05:20)  T(F): 98.3 (09 Oct 2023 20:39), Max: 98.3 (09 Oct 2023 20:39)  HR: 78 (09 Oct 2023 20:39) (76 - 92)  BP: 107/62 (09 Oct 2023 20:39) (100/58 - 121/76)  BP(mean): 79 (09 Oct 2023 20:39) (77 - 93)  RR: 16 (09 Oct 2023 20:39) (16 - 18)  SpO2: 95% (09 Oct 2023 20:39) (91% - 96%)    Parameters below as of 09 Oct 2023 20:39  Patient On (Oxygen Delivery Method): nasal cannula  O2 Flow (L/min): 1    Constitutional: Awake, alert, in no acute distress.   HEENT: Normocephalic, atraumatic, PAMELA.  Respiratory: Lungs clear to ausculation bilaterally.   Cardiovascular: regular rhythm, normal S1 and S2, no audible murmurs.   GI: soft, non-tender, non-distended, bowel sounds present.  Extremities: No lower extremity edema.  Psychiatric: AAO x 3. Normal affect/mood.     LABS  CBC - WBC/HGB/HTC/PLT: 14.20/15.9/49.5/249 (10-09-23)  BMP - Na/K/Cl/Bicarb/BUN/Cr/Gluc/AG/eGFR: 127/3.4/89/23/18/1.01/156/15/73 (10-09-23)  Ca - 9.0 (10-09-23)  Phos - 3.5 (10-09-23)  Mg - 2.1 (10-09-23)  LFT - Alb/Tprot/Tbili/Dbili/AlkPhos/ALT/AST: 3.2/--/0.6/--/142/24/21 (10-09-23)  PT/aPTT/INR: 13.5/32.8/1.19 (10-09-23)   Thyroid Stimulating Hormone, Serum: 6.990 (10-09-23)  Total T4/Free T4: --/1.580 (10-09-23)    MEDICATIONS  MEDICATIONS  (STANDING):  aspirin enteric coated 81 milliGRAM(s) Oral daily  atorvastatin 40 milliGRAM(s) Oral at bedtime  cefTRIAXone   IVPB 2000 milliGRAM(s) IV Intermittent every 24 hours  dextrose 5%. 1000 milliLiter(s) (50 mL/Hr) IV Continuous <Continuous>  dextrose 5%. 1000 milliLiter(s) (100 mL/Hr) IV Continuous <Continuous>  dextrose 50% Injectable 25 Gram(s) IV Push once  dextrose 50% Injectable 25 Gram(s) IV Push once  dextrose 50% Injectable 12.5 Gram(s) IV Push once  gabapentin 100 milliGRAM(s) Oral at bedtime  glucagon  Injectable 1 milliGRAM(s) IntraMuscular once  insulin lispro (ADMELOG) corrective regimen sliding scale   SubCutaneous three times a day before meals  insulin lispro (ADMELOG) corrective regimen sliding scale   SubCutaneous at bedtime  levothyroxine 50 MICROGram(s) Oral <User Schedule>  levothyroxine 100 MICROGram(s) Oral <User Schedule>  metoprolol succinate ER 12.5 milliGRAM(s) Oral daily  metroNIDAZOLE    Tablet 500 milliGRAM(s) Oral every 8 hours  pantoprazole    Tablet 40 milliGRAM(s) Oral at bedtime  senna 2 Tablet(s) Oral daily    MEDICATIONS  (PRN):  bisacodyl Suppository 10 milliGRAM(s) Rectal daily PRN Constipation  cyclobenzaprine 5 milliGRAM(s) Oral two times a day PRN Muscle Spasm  dextrose Oral Gel 15 Gram(s) Oral once PRN Blood Glucose LESS THAN 70 milliGRAM(s)/deciliter  oxyCODONE    IR 5 milliGRAM(s) Oral every 6 hours PRN Severe Pain (7 - 10)    ASSESSMENT / RECOMMENDATIONS    A1C: 7.7 %  BUN: 18  Creatinine: 1.01  GFR: 73  Weight: 63.5  BMI: 23.3  EF:     # Type 2 diabetes mellitus with hyperglycemia  - Please continue lantus *** units at bedtime.   - Continue lispro *** units before each meal.  - Continue lispro moderate / low dose sliding scale before meals and at bedtime.  - Patient's fingerstick glucose goal is 100-180 mg/dL.    - Discharge recommendations to be discussed.   - Patient can follow up at discharge with [facility] by calling [phone] to make an appointment.      Case discussed with Dr. Ness. Primary team updated.       Sandro Gray    Endocrinology Fellow    Service Pager: [phone]    SUBJECTIVE / INTERVAL HPI: Patient was seen and examined this morning.     CAPILLARY BLOOD GLUCOSE & INSULIN RECEIVED  134 mg/dL (10-08 @ 12:05)  165 mg/dL (10-08 @ 17:05)  162 mg/dL (10-08 @ 21:40)  149 mg/dL (10-09 @ 07:47)  149 mg/dL (10-09 @ 13:06)  150 mg/dL (10-09 @ 16:58)  176 mg/dL (10-09 @ 21:49)    REVIEW OF SYSTEMS  Constitutional:  Negative fever, chills or loss of appetite.  Eyes:  Negative blurry vision or double vision.  Cardiovascular:  Negative for chest pain or palpitations.  Respiratory:  Negative for cough, wheezing, or shortness of breath.    Gastrointestinal:  Negative for nausea, vomiting, diarrhea, constipation, or abdominal pain.  Genitourinary:  Negative frequency, urgency or dysuria.  Neurologic:  No headache, confusion, dizziness, lightheadedness.    PHYSICAL EXAM  Vital Signs Last 24 Hrs  T(C): 36.8 (09 Oct 2023 20:39), Max: 36.8 (09 Oct 2023 05:20)  T(F): 98.3 (09 Oct 2023 20:39), Max: 98.3 (09 Oct 2023 20:39)  HR: 78 (09 Oct 2023 20:39) (76 - 92)  BP: 107/62 (09 Oct 2023 20:39) (100/58 - 121/76)  BP(mean): 79 (09 Oct 2023 20:39) (77 - 93)  RR: 16 (09 Oct 2023 20:39) (16 - 18)  SpO2: 95% (09 Oct 2023 20:39) (91% - 96%)    Parameters below as of 09 Oct 2023 20:39  Patient On (Oxygen Delivery Method): nasal cannula  O2 Flow (L/min): 1    Constitutional: Awake, alert, in no acute distress.   HEENT: Normocephalic, atraumatic, PAMELA.  Respiratory: Lungs clear to ausculation bilaterally.   Cardiovascular: regular rhythm, normal S1 and S2, no audible murmurs.   GI: soft, non-tender, non-distended, bowel sounds present.  Extremities: No lower extremity edema.  Psychiatric: AAO x 3. Normal affect/mood.     LABS  CBC - WBC/HGB/HTC/PLT: 14.20/15.9/49.5/249 (10-09-23)  BMP - Na/K/Cl/Bicarb/BUN/Cr/Gluc/AG/eGFR: 127/3.4/89/23/18/1.01/156/15/73 (10-09-23)  Ca - 9.0 (10-09-23)  Phos - 3.5 (10-09-23)  Mg - 2.1 (10-09-23)  LFT - Alb/Tprot/Tbili/Dbili/AlkPhos/ALT/AST: 3.2/--/0.6/--/142/24/21 (10-09-23)  PT/aPTT/INR: 13.5/32.8/1.19 (10-09-23)   Thyroid Stimulating Hormone, Serum: 6.990 (10-09-23)  Total T4/Free T4: --/1.580 (10-09-23)    MEDICATIONS  MEDICATIONS  (STANDING):  aspirin enteric coated 81 milliGRAM(s) Oral daily  atorvastatin 40 milliGRAM(s) Oral at bedtime  cefTRIAXone   IVPB 2000 milliGRAM(s) IV Intermittent every 24 hours  dextrose 5%. 1000 milliLiter(s) (50 mL/Hr) IV Continuous <Continuous>  dextrose 5%. 1000 milliLiter(s) (100 mL/Hr) IV Continuous <Continuous>  dextrose 50% Injectable 25 Gram(s) IV Push once  dextrose 50% Injectable 25 Gram(s) IV Push once  dextrose 50% Injectable 12.5 Gram(s) IV Push once  gabapentin 100 milliGRAM(s) Oral at bedtime  glucagon  Injectable 1 milliGRAM(s) IntraMuscular once  insulin lispro (ADMELOG) corrective regimen sliding scale   SubCutaneous three times a day before meals  insulin lispro (ADMELOG) corrective regimen sliding scale   SubCutaneous at bedtime  levothyroxine 50 MICROGram(s) Oral <User Schedule>  levothyroxine 100 MICROGram(s) Oral <User Schedule>  metoprolol succinate ER 12.5 milliGRAM(s) Oral daily  metroNIDAZOLE    Tablet 500 milliGRAM(s) Oral every 8 hours  pantoprazole    Tablet 40 milliGRAM(s) Oral at bedtime  senna 2 Tablet(s) Oral daily    MEDICATIONS  (PRN):  bisacodyl Suppository 10 milliGRAM(s) Rectal daily PRN Constipation  cyclobenzaprine 5 milliGRAM(s) Oral two times a day PRN Muscle Spasm  dextrose Oral Gel 15 Gram(s) Oral once PRN Blood Glucose LESS THAN 70 milliGRAM(s)/deciliter  oxyCODONE    IR 5 milliGRAM(s) Oral every 6 hours PRN Severe Pain (7 - 10)    ASSESSMENT / RECOMMENDATIONS    A1C: 7.7 %  BUN: 18  Creatinine: 1.01  GFR: 73  Weight: 63.5  BMI: 23.3  EF:     # Type 2 diabetes mellitus with hyperglycemia  - Please continue lantus *** units at bedtime.   - Continue lispro *** units before each meal.  - Continue lispro moderate / low dose sliding scale before meals and at bedtime.  - Patient's fingerstick glucose goal is 100-180 mg/dL.    - Discharge recommendations to be discussed.   - Patient can follow up at discharge with [facility] by calling [phone] to make an appointment.      Case discussed with Dr. Ness. Primary team updated.       Sandro Gray    Endocrinology Fellow    Service Pager: [phone]    SUBJECTIVE / INTERVAL HPI: Patient was seen and examined this morning. The patient was noted to have gallstone with wall thickening in CT abdomen. Surgery was consulted and impression was that patient had acute cholecystitis; however, he was deemed to be a poor surgical candidate at this time and IR drainage was recommended. Glucose levels have been within target range; however, mildly elevated.     CAPILLARY BLOOD GLUCOSE & INSULIN RECEIVED  134 mg/dL (10-08 @ 12:05) -> Ø   165 mg/dL (10-08 @ 17:05) -> 2 units of lss.    162 mg/dL (10-08 @ 21:40) -> Ø   149 mg/dL (10-09 @ 07:47) -> Ø   149 mg/dL (10-09 @ 13:06)    PHYSICAL EXAM  Vital Signs Last 24 Hrs  T(C): 36.8 (09 Oct 2023 20:39), Max: 36.8 (09 Oct 2023 05:20)  T(F): 98.3 (09 Oct 2023 20:39), Max: 98.3 (09 Oct 2023 20:39)  HR: 78 (09 Oct 2023 20:39) (76 - 92)  BP: 107/62 (09 Oct 2023 20:39) (100/58 - 121/76)  BP(mean): 79 (09 Oct 2023 20:39) (77 - 93)  RR: 16 (09 Oct 2023 20:39) (16 - 18)  SpO2: 95% (09 Oct 2023 20:39) (91% - 96%)    Parameters below as of 09 Oct 2023 20:39  Patient On (Oxygen Delivery Method): nasal cannula  O2 Flow (L/min): 1    Constitutional: Awake, alert, elderly male, in no acute distress.   HEENT: Normocephalic, atraumatic, PAMELA, no proptosis or lid retraction.   Neck: supple, no thyromegaly or palpable thyroid nodules.  Respiratory: Lungs clear to ausculation bilaterally.   Cardiovascular: regular rhythm, normal S1 and S2, no audible murmurs.   GI: soft, non-tender, non-distended, bowel sounds present,   Extremities: No lower extremity edema.   Psychiatric: AAO x 3.     LABS  CBC - WBC/HGB/HTC/PLT: 14.20/15.9/49.5/249 (10-09-23)  BMP - Na/K/Cl/Bicarb/BUN/Cr/Gluc/AG/eGFR: 127/3.4/89/23/18/1.01/156/15/73 (10-09-23)  Ca - 9.0 (10-09-23)  Phos - 3.5 (10-09-23)  Mg - 2.1 (10-09-23)  LFT - Alb/Tprot/Tbili/Dbili/AlkPhos/ALT/AST: 3.2/--/0.6/--/142/24/21 (10-09-23)  PT/aPTT/INR: 13.5/32.8/1.19 (10-09-23)   Thyroid Stimulating Hormone, Serum: 6.990 (10-09-23)  Total T4/Free T4: --/1.580 (10-09-23)    MEDICATIONS  MEDICATIONS  (STANDING):  aspirin enteric coated 81 milliGRAM(s) Oral daily  atorvastatin 40 milliGRAM(s) Oral at bedtime  cefTRIAXone   IVPB 2000 milliGRAM(s) IV Intermittent every 24 hours  dextrose 5%. 1000 milliLiter(s) (50 mL/Hr) IV Continuous <Continuous>  dextrose 5%. 1000 milliLiter(s) (100 mL/Hr) IV Continuous <Continuous>  dextrose 50% Injectable 25 Gram(s) IV Push once  dextrose 50% Injectable 25 Gram(s) IV Push once  dextrose 50% Injectable 12.5 Gram(s) IV Push once  gabapentin 100 milliGRAM(s) Oral at bedtime  glucagon  Injectable 1 milliGRAM(s) IntraMuscular once  insulin lispro (ADMELOG) corrective regimen sliding scale   SubCutaneous three times a day before meals  insulin lispro (ADMELOG) corrective regimen sliding scale   SubCutaneous at bedtime  levothyroxine 50 MICROGram(s) Oral <User Schedule>  levothyroxine 100 MICROGram(s) Oral <User Schedule>  metoprolol succinate ER 12.5 milliGRAM(s) Oral daily  metroNIDAZOLE    Tablet 500 milliGRAM(s) Oral every 8 hours  pantoprazole    Tablet 40 milliGRAM(s) Oral at bedtime  senna 2 Tablet(s) Oral daily    MEDICATIONS  (PRN):  bisacodyl Suppository 10 milliGRAM(s) Rectal daily PRN Constipation  cyclobenzaprine 5 milliGRAM(s) Oral two times a day PRN Muscle Spasm  dextrose Oral Gel 15 Gram(s) Oral once PRN Blood Glucose LESS THAN 70 milliGRAM(s)/deciliter  oxyCODONE    IR 5 milliGRAM(s) Oral every 6 hours PRN Severe Pain (7 - 10)    ASSESSMENT / RECOMMENDATIONS  Mr. Yanez is a 87-year-old male with a past medical history of hypertension, hyperlipidemia, coronary artery disease (s/p JOSÉ to mLAD on 6/2023), cervical spondylosis (s/p laminectomy), gastric ulcers, hypothyroidism and constipation who presented to the emergency department (10/6/23) for dyspnea on exertion, chest pressure, and orthopnea for 3 days, along with one day of fever. He was admitted to cardiology for acute hypoxic respiratory failure secondary to acute heart failure with preserved ejection fraction and fever, possibly secondary to pneumonia. His labs were remarkable for a hemoglobin A1C of 7.7% and a TSH of 10.5. Endocrinology was consulted for recommendations regarding management of hypothyroidism and diabetes.     A1C: 7.7 %  BUN: 18  Creatinine: 1.01  GFR: 73  Weight: 63.5  BMI: 23.3    # Hypothyroidism  - INCREASE levothyroxine to 75 mcg oral daily to simplify regimen.   - Recheck TSH and Free T4 in 4-6 weeks outpatient.    - He should continue to follow-up with his endocrinologist for further titration of his levothyroxine.     # Type 2 diabetes mellitus with hyperglycemia  - Please CHANGE diet to consistent carbohydrate.   - Please continue with lispro moderate dose sliding scale before meals and at bedtime for now.   - Patient's fingerstick glucose goal is 100-180 mg/dL.    - Discharge recommendations to be discussed.   - Patient can follow up at discharge with [facility] by calling [phone] to make an appointment.      Case discussed with Dr. Ness. Primary team updated.       Sandro Gray    Endocrinology Fellow    Service Pager: [phone]    SUBJECTIVE / INTERVAL HPI: Patient was seen and examined this morning. The patient was noted to have gallstone with wall thickening in CT abdomen. Surgery was consulted and impression was that patient had acute cholecystitis; however, he was deemed to be a poor surgical candidate at this time and IR drainage was recommended. Glucose levels have been within target range; however, mildly elevated.     CAPILLARY BLOOD GLUCOSE & INSULIN RECEIVED  134 mg/dL (10-08 @ 12:05) -> Ø   165 mg/dL (10-08 @ 17:05) -> 2 units of lss.    162 mg/dL (10-08 @ 21:40) -> Ø   149 mg/dL (10-09 @ 07:47) -> Ø   149 mg/dL (10-09 @ 13:06)    PHYSICAL EXAM  Vital Signs Last 24 Hrs  T(C): 36.8 (09 Oct 2023 20:39), Max: 36.8 (09 Oct 2023 05:20)  T(F): 98.3 (09 Oct 2023 20:39), Max: 98.3 (09 Oct 2023 20:39)  HR: 78 (09 Oct 2023 20:39) (76 - 92)  BP: 107/62 (09 Oct 2023 20:39) (100/58 - 121/76)  BP(mean): 79 (09 Oct 2023 20:39) (77 - 93)  RR: 16 (09 Oct 2023 20:39) (16 - 18)  SpO2: 95% (09 Oct 2023 20:39) (91% - 96%)    Parameters below as of 09 Oct 2023 20:39  Patient On (Oxygen Delivery Method): nasal cannula  O2 Flow (L/min): 1    Constitutional: Awake, alert, elderly male, in no acute distress.   HEENT: Normocephalic, atraumatic, PAMELA, no proptosis or lid retraction.   Neck: supple, no thyromegaly or palpable thyroid nodules.  Respiratory: Lungs clear to ausculation bilaterally.   Cardiovascular: regular rhythm, normal S1 and S2, no audible murmurs.   GI: soft, non-tender, non-distended, bowel sounds present,   Extremities: No lower extremity edema.   Psychiatric: AAO x 3.     LABS  CBC - WBC/HGB/HTC/PLT: 14.20/15.9/49.5/249 (10-09-23)  BMP - Na/K/Cl/Bicarb/BUN/Cr/Gluc/AG/eGFR: 127/3.4/89/23/18/1.01/156/15/73 (10-09-23)  Ca - 9.0 (10-09-23)  Phos - 3.5 (10-09-23)  Mg - 2.1 (10-09-23)  LFT - Alb/Tprot/Tbili/Dbili/AlkPhos/ALT/AST: 3.2/--/0.6/--/142/24/21 (10-09-23)  PT/aPTT/INR: 13.5/32.8/1.19 (10-09-23)   Thyroid Stimulating Hormone, Serum: 6.990 (10-09-23)  Total T4/Free T4: --/1.580 (10-09-23)    MEDICATIONS  MEDICATIONS  (STANDING):  aspirin enteric coated 81 milliGRAM(s) Oral daily  atorvastatin 40 milliGRAM(s) Oral at bedtime  cefTRIAXone   IVPB 2000 milliGRAM(s) IV Intermittent every 24 hours  dextrose 5%. 1000 milliLiter(s) (50 mL/Hr) IV Continuous <Continuous>  dextrose 5%. 1000 milliLiter(s) (100 mL/Hr) IV Continuous <Continuous>  dextrose 50% Injectable 25 Gram(s) IV Push once  dextrose 50% Injectable 25 Gram(s) IV Push once  dextrose 50% Injectable 12.5 Gram(s) IV Push once  gabapentin 100 milliGRAM(s) Oral at bedtime  glucagon  Injectable 1 milliGRAM(s) IntraMuscular once  insulin lispro (ADMELOG) corrective regimen sliding scale   SubCutaneous three times a day before meals  insulin lispro (ADMELOG) corrective regimen sliding scale   SubCutaneous at bedtime  levothyroxine 50 MICROGram(s) Oral <User Schedule>  levothyroxine 100 MICROGram(s) Oral <User Schedule>  metoprolol succinate ER 12.5 milliGRAM(s) Oral daily  metroNIDAZOLE    Tablet 500 milliGRAM(s) Oral every 8 hours  pantoprazole    Tablet 40 milliGRAM(s) Oral at bedtime  senna 2 Tablet(s) Oral daily    MEDICATIONS  (PRN):  bisacodyl Suppository 10 milliGRAM(s) Rectal daily PRN Constipation  cyclobenzaprine 5 milliGRAM(s) Oral two times a day PRN Muscle Spasm  dextrose Oral Gel 15 Gram(s) Oral once PRN Blood Glucose LESS THAN 70 milliGRAM(s)/deciliter  oxyCODONE    IR 5 milliGRAM(s) Oral every 6 hours PRN Severe Pain (7 - 10)    ASSESSMENT / RECOMMENDATIONS  Mr. Yanez is a 87-year-old male with a past medical history of hypertension, hyperlipidemia, coronary artery disease (s/p JOSÉ to mLAD on 6/2023), cervical spondylosis (s/p laminectomy), gastric ulcers, hypothyroidism and constipation who presented to the emergency department (10/6/23) for dyspnea on exertion, chest pressure, and orthopnea for 3 days, along with one day of fever. He was admitted to cardiology for acute hypoxic respiratory failure secondary to acute heart failure with preserved ejection fraction and fever, possibly secondary to pneumonia. His labs were remarkable for a hemoglobin A1C of 7.7% and a TSH of 10.5. Endocrinology was consulted for recommendations regarding management of hypothyroidism and diabetes.     A1C: 7.7 %  BUN: 18  Creatinine: 1.01  GFR: 73  Weight: 63.5  BMI: 23.3    # Hypothyroidism  - INCREASE levothyroxine to 75 mcg oral daily to simplify regimen.   - Recheck TSH and Free T4 in 4-6 weeks outpatient.    - He should continue to follow-up with his endocrinologist for further titration of his levothyroxine.     # Type 2 diabetes mellitus with hyperglycemia  - Please CHANGE diet to consistent carbohydrate.   - Please continue with lispro moderate dose sliding scale before meals and at bedtime for now.   - Patient's fingerstick glucose goal is 100-180 mg/dL.    - Discharge recommendations to be discussed.   - Patient can follow up at discharge with [facility] by calling [phone] to make an appointment.      Case discussed with Dr. Ness. Primary team updated.       Sandro Gray    Endocrinology Fellow    Service Pager: [phone]    SUBJECTIVE / INTERVAL HPI: Patient was seen and examined this morning. The patient was noted to have gallstone with wall thickening in CT abdomen. Surgery was consulted and impression was that patient had acute cholecystitis; however, he was deemed to be a poor surgical candidate at this time and IR drainage was recommended. Glucose levels have been within target range; however, mildly elevated.     CAPILLARY BLOOD GLUCOSE & INSULIN RECEIVED  134 mg/dL (10-08 @ 12:05) -> Ø   165 mg/dL (10-08 @ 17:05) -> 2 units of lss.    162 mg/dL (10-08 @ 21:40) -> Ø   149 mg/dL (10-09 @ 07:47) -> Ø   149 mg/dL (10-09 @ 13:06)    PHYSICAL EXAM  Vital Signs Last 24 Hrs  T(C): 36.8 (09 Oct 2023 20:39), Max: 36.8 (09 Oct 2023 05:20)  T(F): 98.3 (09 Oct 2023 20:39), Max: 98.3 (09 Oct 2023 20:39)  HR: 78 (09 Oct 2023 20:39) (76 - 92)  BP: 107/62 (09 Oct 2023 20:39) (100/58 - 121/76)  BP(mean): 79 (09 Oct 2023 20:39) (77 - 93)  RR: 16 (09 Oct 2023 20:39) (16 - 18)  SpO2: 95% (09 Oct 2023 20:39) (91% - 96%)    Parameters below as of 09 Oct 2023 20:39  Patient On (Oxygen Delivery Method): nasal cannula  O2 Flow (L/min): 1    Constitutional: Awake, alert, elderly male, in no acute distress.   HEENT: Normocephalic, atraumatic, PAMELA, no proptosis or lid retraction.   Neck: supple, no thyromegaly or palpable thyroid nodules.  Respiratory: Lungs clear to ausculation bilaterally.   Cardiovascular: regular rhythm, normal S1 and S2, no audible murmurs.   GI: soft, non-tender, non-distended, bowel sounds present,   Extremities: No lower extremity edema.   Psychiatric: AAO x 3.     LABS  CBC - WBC/HGB/HTC/PLT: 14.20/15.9/49.5/249 (10-09-23)  BMP - Na/K/Cl/Bicarb/BUN/Cr/Gluc/AG/eGFR: 127/3.4/89/23/18/1.01/156/15/73 (10-09-23)  Ca - 9.0 (10-09-23)  Phos - 3.5 (10-09-23)  Mg - 2.1 (10-09-23)  LFT - Alb/Tprot/Tbili/Dbili/AlkPhos/ALT/AST: 3.2/--/0.6/--/142/24/21 (10-09-23)  PT/aPTT/INR: 13.5/32.8/1.19 (10-09-23)   Thyroid Stimulating Hormone, Serum: 6.990 (10-09-23)  Total T4/Free T4: --/1.580 (10-09-23)    MEDICATIONS  MEDICATIONS  (STANDING):  aspirin enteric coated 81 milliGRAM(s) Oral daily  atorvastatin 40 milliGRAM(s) Oral at bedtime  cefTRIAXone   IVPB 2000 milliGRAM(s) IV Intermittent every 24 hours  dextrose 5%. 1000 milliLiter(s) (50 mL/Hr) IV Continuous <Continuous>  dextrose 5%. 1000 milliLiter(s) (100 mL/Hr) IV Continuous <Continuous>  dextrose 50% Injectable 25 Gram(s) IV Push once  dextrose 50% Injectable 25 Gram(s) IV Push once  dextrose 50% Injectable 12.5 Gram(s) IV Push once  gabapentin 100 milliGRAM(s) Oral at bedtime  glucagon  Injectable 1 milliGRAM(s) IntraMuscular once  insulin lispro (ADMELOG) corrective regimen sliding scale   SubCutaneous three times a day before meals  insulin lispro (ADMELOG) corrective regimen sliding scale   SubCutaneous at bedtime  levothyroxine 50 MICROGram(s) Oral <User Schedule>  levothyroxine 100 MICROGram(s) Oral <User Schedule>  metoprolol succinate ER 12.5 milliGRAM(s) Oral daily  metroNIDAZOLE    Tablet 500 milliGRAM(s) Oral every 8 hours  pantoprazole    Tablet 40 milliGRAM(s) Oral at bedtime  senna 2 Tablet(s) Oral daily    MEDICATIONS  (PRN):  bisacodyl Suppository 10 milliGRAM(s) Rectal daily PRN Constipation  cyclobenzaprine 5 milliGRAM(s) Oral two times a day PRN Muscle Spasm  dextrose Oral Gel 15 Gram(s) Oral once PRN Blood Glucose LESS THAN 70 milliGRAM(s)/deciliter  oxyCODONE    IR 5 milliGRAM(s) Oral every 6 hours PRN Severe Pain (7 - 10)    ASSESSMENT / RECOMMENDATIONS  Mr. Yanez is a 87-year-old male with a past medical history of hypertension, hyperlipidemia, coronary artery disease (s/p JOSÉ to mLAD on 6/2023), cervical spondylosis (s/p laminectomy), gastric ulcers, hypothyroidism and constipation who presented to the emergency department (10/6/23) for dyspnea on exertion, chest pressure, and orthopnea for 3 days, along with one day of fever. He was admitted to cardiology for acute hypoxic respiratory failure secondary to acute heart failure with preserved ejection fraction and fever, possibly secondary to pneumonia. His labs were remarkable for a hemoglobin A1C of 7.7% and a TSH of 10.5. Endocrinology was consulted for recommendations regarding management of hypothyroidism and diabetes.     A1C: 7.7 %  BUN: 18  Creatinine: 1.01  GFR: 73  Weight: 63.5  BMI: 23.3    # Hypothyroidism  - INCREASE levothyroxine to 75 mcg oral daily to simplify regimen.   - Recheck TSH and Free T4 in 4-6 weeks outpatient.    - He should continue to follow-up with his endocrinologist for further titration of his levothyroxine.     # Type 2 diabetes mellitus with hyperglycemia  - Please CHANGE diet to consistent carbohydrate.   - Please continue with lispro moderate dose sliding scale before meals and at bedtime for now.   - Patient's fingerstick glucose goal is 100-180 mg/dL.    - Discharge recommendations to be discussed.   - Patient can follow up at discharge with [facility] by calling [phone] to make an appointment.      Case discussed with Dr. Ness. Primary team updated.       Sandro Gray    Endocrinology Fellow    Service Pager: [phone]    SUBJECTIVE / INTERVAL HPI: Patient was seen and examined this morning. The patient was noted to have gallstone with wall thickening in CT abdomen. Surgery was consulted and impression was t havehat patient had acute cholecystitis; however, he was deemed to be a poor surgical candidate at this time and IR drainage was recommended. Glucose levels have been within target range; however, mildly elevated.     CAPILLARY BLOOD GLUCOSE & INSULIN RECEIVED  134 mg/dL (10-08 @ 12:05) -> Ø   165 mg/dL (10-08 @ 17:05) -> 2 units of lss.    162 mg/dL (10-08 @ 21:40) -> Ø   149 mg/dL (10-09 @ 07:47) -> Ø   149 mg/dL (10-09 @ 13:06)    PHYSICAL EXAM  Vital Signs Last 24 Hrs  T(C): 36.8 (09 Oct 2023 20:39), Max: 36.8 (09 Oct 2023 05:20)  T(F): 98.3 (09 Oct 2023 20:39), Max: 98.3 (09 Oct 2023 20:39)  HR: 78 (09 Oct 2023 20:39) (76 - 92)  BP: 107/62 (09 Oct 2023 20:39) (100/58 - 121/76)  BP(mean): 79 (09 Oct 2023 20:39) (77 - 93)  RR: 16 (09 Oct 2023 20:39) (16 - 18)  SpO2: 95% (09 Oct 2023 20:39) (91% - 96%)    Parameters below as of 09 Oct 2023 20:39  Patient On (Oxygen Delivery Method): nasal cannula  O2 Flow (L/min): 1    Constitutional: Awake, alert, elderly male, in no acute distress.   HEENT: Normocephalic, atraumatic, PAMELA, no proptosis or lid retraction.   Neck: supple, no thyromegaly or palpable thyroid nodules.  Respiratory: Lungs clear to ausculation bilaterally.   Cardiovascular: regular rhythm, normal S1 and S2, no audible murmurs.   GI: soft, non-tender, non-distended, bowel sounds present,   Extremities: No lower extremity edema.   Psychiatric: AAO x 3.     LABS  CBC - WBC/HGB/HTC/PLT: 14.20/15.9/49.5/249 (10-09-23)  BMP - Na/K/Cl/Bicarb/BUN/Cr/Gluc/AG/eGFR: 127/3.4/89/23/18/1.01/156/15/73 (10-09-23)  Ca - 9.0 (10-09-23)  Phos - 3.5 (10-09-23)  Mg - 2.1 (10-09-23)  LFT - Alb/Tprot/Tbili/Dbili/AlkPhos/ALT/AST: 3.2/--/0.6/--/142/24/21 (10-09-23)  PT/aPTT/INR: 13.5/32.8/1.19 (10-09-23)   Thyroid Stimulating Hormone, Serum: 6.990 (10-09-23)  Total T4/Free T4: --/1.580 (10-09-23)    MEDICATIONS  MEDICATIONS  (STANDING):  aspirin enteric coated 81 milliGRAM(s) Oral daily  atorvastatin 40 milliGRAM(s) Oral at bedtime  cefTRIAXone   IVPB 2000 milliGRAM(s) IV Intermittent every 24 hours  dextrose 5%. 1000 milliLiter(s) (50 mL/Hr) IV Continuous <Continuous>  dextrose 5%. 1000 milliLiter(s) (100 mL/Hr) IV Continuous <Continuous>  dextrose 50% Injectable 25 Gram(s) IV Push once  dextrose 50% Injectable 25 Gram(s) IV Push once  dextrose 50% Injectable 12.5 Gram(s) IV Push once  gabapentin 100 milliGRAM(s) Oral at bedtime  glucagon  Injectable 1 milliGRAM(s) IntraMuscular once  insulin lispro (ADMELOG) corrective regimen sliding scale   SubCutaneous three times a day before meals  insulin lispro (ADMELOG) corrective regimen sliding scale   SubCutaneous at bedtime  levothyroxine 50 MICROGram(s) Oral <User Schedule>  levothyroxine 100 MICROGram(s) Oral <User Schedule>  metoprolol succinate ER 12.5 milliGRAM(s) Oral daily  metroNIDAZOLE    Tablet 500 milliGRAM(s) Oral every 8 hours  pantoprazole    Tablet 40 milliGRAM(s) Oral at bedtime  senna 2 Tablet(s) Oral daily    MEDICATIONS  (PRN):  bisacodyl Suppository 10 milliGRAM(s) Rectal daily PRN Constipation  cyclobenzaprine 5 milliGRAM(s) Oral two times a day PRN Muscle Spasm  dextrose Oral Gel 15 Gram(s) Oral once PRN Blood Glucose LESS THAN 70 milliGRAM(s)/deciliter  oxyCODONE    IR 5 milliGRAM(s) Oral every 6 hours PRN Severe Pain (7 - 10)    ASSESSMENT / RECOMMENDATIONS  Mr. Yanez is a 87-year-old male with a past medical history of hypertension, hyperlipidemia, coronary artery disease (s/p JOSÉ to mLAD on 6/2023), cervical spondylosis (s/p laminectomy), gastric ulcers, hypothyroidism and constipation who presented to the emergency department (10/6/23) for dyspnea on exertion, chest pressure, and orthopnea for 3 days, along with one day of fever. He was admitted to cardiology for acute hypoxic respiratory failure secondary to acute heart failure with preserved ejection fraction and fever, possibly secondary to pneumonia. His labs were remarkable for a hemoglobin A1C of 7.7% and a TSH of 10.5. Endocrinology was consulted for recommendations regarding management of hypothyroidism and diabetes.     A1C: 7.7 %  BUN: 18  Creatinine: 1.01  GFR: 73  Weight: 63.5  BMI: 23.3    # Hypothyroidism  - INCREASE levothyroxine to 75 mcg oral daily to simplify regimen.   - Recheck TSH and Free T4 in 4-6 weeks outpatient.    - He should continue to follow-up with his endocrinologist for further titration of his levothyroxine.     # Type 2 diabetes mellitus with hyperglycemia  - Please CHANGE diet to consistent carbohydrate.   - Please continue with lispro moderate dose sliding scale before meals and at bedtime for now.   - Patient's fingerstick glucose goal is 100-180 mg/dL.    - Discharge recommendations to be discussed.   - Patient can follow up at discharge with [facility] by calling [phone] to make an appointment.      Case discussed with Dr. Ness. Primary team updated.       Sandro Gray    Endocrinology Fellow    Service Pager: [phone]    SUBJECTIVE / INTERVAL HPI: Patient was seen and examined this morning. The patient was noted to have gallstone with wall thickening in CT abdomen. Surgery was consulted and impression was t havehat patient had acute cholecystitis; however, he was deemed to be a poor surgical candidate at this time and IR drainage was recommended. Glucose levels have been within target range; however, mildly elevated.     CAPILLARY BLOOD GLUCOSE & INSULIN RECEIVED  134 mg/dL (10-08 @ 12:05) -> Ø   165 mg/dL (10-08 @ 17:05) -> 2 units of lss.    162 mg/dL (10-08 @ 21:40) -> Ø   149 mg/dL (10-09 @ 07:47) -> Ø   149 mg/dL (10-09 @ 13:06)    PHYSICAL EXAM  Vital Signs Last 24 Hrs  T(C): 36.8 (09 Oct 2023 20:39), Max: 36.8 (09 Oct 2023 05:20)  T(F): 98.3 (09 Oct 2023 20:39), Max: 98.3 (09 Oct 2023 20:39)  HR: 78 (09 Oct 2023 20:39) (76 - 92)  BP: 107/62 (09 Oct 2023 20:39) (100/58 - 121/76)  BP(mean): 79 (09 Oct 2023 20:39) (77 - 93)  RR: 16 (09 Oct 2023 20:39) (16 - 18)  SpO2: 95% (09 Oct 2023 20:39) (91% - 96%)    Parameters below as of 09 Oct 2023 20:39  Patient On (Oxygen Delivery Method): nasal cannula  O2 Flow (L/min): 1    Constitutional: Awake, alert, elderly male, in no acute distress.   HEENT: Normocephalic, atraumatic, PAMELA, no proptosis or lid retraction.   Neck: supple, no thyromegaly or palpable thyroid nodules.  Respiratory: Lungs clear to ausculation bilaterally.   Cardiovascular: regular rhythm, normal S1 and S2, no audible murmurs.   GI: soft, non-tender, non-distended, bowel sounds present,   Extremities: No lower extremity edema.   Psychiatric: AAO x 3.     LABS  CBC - WBC/HGB/HTC/PLT: 14.20/15.9/49.5/249 (10-09-23)  BMP - Na/K/Cl/Bicarb/BUN/Cr/Gluc/AG/eGFR: 127/3.4/89/23/18/1.01/156/15/73 (10-09-23)  Ca - 9.0 (10-09-23)  Phos - 3.5 (10-09-23)  Mg - 2.1 (10-09-23)  LFT - Alb/Tprot/Tbili/Dbili/AlkPhos/ALT/AST: 3.2/--/0.6/--/142/24/21 (10-09-23)  PT/aPTT/INR: 13.5/32.8/1.19 (10-09-23)   Thyroid Stimulating Hormone, Serum: 6.990 (10-09-23)  Total T4/Free T4: --/1.580 (10-09-23)    MEDICATIONS  MEDICATIONS  (STANDING):  aspirin enteric coated 81 milliGRAM(s) Oral daily  atorvastatin 40 milliGRAM(s) Oral at bedtime  cefTRIAXone   IVPB 2000 milliGRAM(s) IV Intermittent every 24 hours  dextrose 5%. 1000 milliLiter(s) (50 mL/Hr) IV Continuous <Continuous>  dextrose 5%. 1000 milliLiter(s) (100 mL/Hr) IV Continuous <Continuous>  dextrose 50% Injectable 25 Gram(s) IV Push once  dextrose 50% Injectable 25 Gram(s) IV Push once  dextrose 50% Injectable 12.5 Gram(s) IV Push once  gabapentin 100 milliGRAM(s) Oral at bedtime  glucagon  Injectable 1 milliGRAM(s) IntraMuscular once  insulin lispro (ADMELOG) corrective regimen sliding scale   SubCutaneous three times a day before meals  insulin lispro (ADMELOG) corrective regimen sliding scale   SubCutaneous at bedtime  levothyroxine 50 MICROGram(s) Oral <User Schedule>  levothyroxine 100 MICROGram(s) Oral <User Schedule>  metoprolol succinate ER 12.5 milliGRAM(s) Oral daily  metroNIDAZOLE    Tablet 500 milliGRAM(s) Oral every 8 hours  pantoprazole    Tablet 40 milliGRAM(s) Oral at bedtime  senna 2 Tablet(s) Oral daily    MEDICATIONS  (PRN):  bisacodyl Suppository 10 milliGRAM(s) Rectal daily PRN Constipation  cyclobenzaprine 5 milliGRAM(s) Oral two times a day PRN Muscle Spasm  dextrose Oral Gel 15 Gram(s) Oral once PRN Blood Glucose LESS THAN 70 milliGRAM(s)/deciliter  oxyCODONE    IR 5 milliGRAM(s) Oral every 6 hours PRN Severe Pain (7 - 10)    ASSESSMENT / RECOMMENDATIONS  Mr. Yanez is a 87-year-old male with a past medical history of hypertension, hyperlipidemia, coronary artery disease (s/p JOSÉ to mLAD on 6/2023), cervical spondylosis (s/p laminectomy), gastric ulcers, hypothyroidism and constipation who presented to the emergency department (10/6/23) for dyspnea on exertion, chest pressure, and orthopnea for 3 days, along with one day of fever. He was admitted to cardiology for acute hypoxic respiratory failure secondary to acute heart failure with preserved ejection fraction and fever, possibly secondary to pneumonia. His labs were remarkable for a hemoglobin A1C of 7.7% and a TSH of 10.5. Endocrinology was consulted for recommendations regarding management of hypothyroidism and diabetes.     A1C: 7.7 %  BUN: 18  Creatinine: 1.01  GFR: 73  Weight: 63.5  BMI: 23.3    # Hypothyroidism  - INCREASE levothyroxine to 75 mcg oral daily to simplify regimen.   - Recheck TSH and Free T4 in 4-6 weeks outpatient.    - He should continue to follow-up with his endocrinologist for further titration of his levothyroxine.     # Type 2 diabetes mellitus with hyperglycemia  - Please CHANGE diet to consistent carbohydrate.   - Please continue with lispro moderate dose sliding scale before meals and at bedtime for now.   - Patient's fingerstick glucose goal is 100-180 mg/dL.    - Discharge recommendations to be discussed.   - Patient can follow up at discharge with [facility] by calling [phone] to make an appointment.      Case discussed with Dr. Ness. Primary team updated.       Sandro Gray    Endocrinology Fellow    Service Pager: [phone]    SUBJECTIVE / INTERVAL HPI: Patient was seen and examined this morning. The patient was noted to have gallstone with wall thickening in CT abdomen. Surgery was consulted and impression was t havehat patient had acute cholecystitis; however, he was deemed to be a poor surgical candidate at this time and IR drainage was recommended. Glucose levels have been within target range; however, mildly elevated.     CAPILLARY BLOOD GLUCOSE & INSULIN RECEIVED  134 mg/dL (10-08 @ 12:05) -> Ø   165 mg/dL (10-08 @ 17:05) -> 2 units of lss.    162 mg/dL (10-08 @ 21:40) -> Ø   149 mg/dL (10-09 @ 07:47) -> Ø   149 mg/dL (10-09 @ 13:06)    PHYSICAL EXAM  Vital Signs Last 24 Hrs  T(C): 36.8 (09 Oct 2023 20:39), Max: 36.8 (09 Oct 2023 05:20)  T(F): 98.3 (09 Oct 2023 20:39), Max: 98.3 (09 Oct 2023 20:39)  HR: 78 (09 Oct 2023 20:39) (76 - 92)  BP: 107/62 (09 Oct 2023 20:39) (100/58 - 121/76)  BP(mean): 79 (09 Oct 2023 20:39) (77 - 93)  RR: 16 (09 Oct 2023 20:39) (16 - 18)  SpO2: 95% (09 Oct 2023 20:39) (91% - 96%)    Parameters below as of 09 Oct 2023 20:39  Patient On (Oxygen Delivery Method): nasal cannula  O2 Flow (L/min): 1    Constitutional: Awake, alert, elderly male, in no acute distress.   HEENT: Normocephalic, atraumatic, PAMELA, no proptosis or lid retraction.   Neck: supple, no thyromegaly or palpable thyroid nodules.  Respiratory: Lungs clear to ausculation bilaterally.   Cardiovascular: regular rhythm, normal S1 and S2, no audible murmurs.   GI: soft, non-tender, non-distended, bowel sounds present,   Extremities: No lower extremity edema.   Psychiatric: AAO x 3.     LABS  CBC - WBC/HGB/HTC/PLT: 14.20/15.9/49.5/249 (10-09-23)  BMP - Na/K/Cl/Bicarb/BUN/Cr/Gluc/AG/eGFR: 127/3.4/89/23/18/1.01/156/15/73 (10-09-23)  Ca - 9.0 (10-09-23)  Phos - 3.5 (10-09-23)  Mg - 2.1 (10-09-23)  LFT - Alb/Tprot/Tbili/Dbili/AlkPhos/ALT/AST: 3.2/--/0.6/--/142/24/21 (10-09-23)  PT/aPTT/INR: 13.5/32.8/1.19 (10-09-23)   Thyroid Stimulating Hormone, Serum: 6.990 (10-09-23)  Total T4/Free T4: --/1.580 (10-09-23)    MEDICATIONS  MEDICATIONS  (STANDING):  aspirin enteric coated 81 milliGRAM(s) Oral daily  atorvastatin 40 milliGRAM(s) Oral at bedtime  cefTRIAXone   IVPB 2000 milliGRAM(s) IV Intermittent every 24 hours  dextrose 5%. 1000 milliLiter(s) (50 mL/Hr) IV Continuous <Continuous>  dextrose 5%. 1000 milliLiter(s) (100 mL/Hr) IV Continuous <Continuous>  dextrose 50% Injectable 25 Gram(s) IV Push once  dextrose 50% Injectable 25 Gram(s) IV Push once  dextrose 50% Injectable 12.5 Gram(s) IV Push once  gabapentin 100 milliGRAM(s) Oral at bedtime  glucagon  Injectable 1 milliGRAM(s) IntraMuscular once  insulin lispro (ADMELOG) corrective regimen sliding scale   SubCutaneous three times a day before meals  insulin lispro (ADMELOG) corrective regimen sliding scale   SubCutaneous at bedtime  levothyroxine 50 MICROGram(s) Oral <User Schedule>  levothyroxine 100 MICROGram(s) Oral <User Schedule>  metoprolol succinate ER 12.5 milliGRAM(s) Oral daily  metroNIDAZOLE    Tablet 500 milliGRAM(s) Oral every 8 hours  pantoprazole    Tablet 40 milliGRAM(s) Oral at bedtime  senna 2 Tablet(s) Oral daily    MEDICATIONS  (PRN):  bisacodyl Suppository 10 milliGRAM(s) Rectal daily PRN Constipation  cyclobenzaprine 5 milliGRAM(s) Oral two times a day PRN Muscle Spasm  dextrose Oral Gel 15 Gram(s) Oral once PRN Blood Glucose LESS THAN 70 milliGRAM(s)/deciliter  oxyCODONE    IR 5 milliGRAM(s) Oral every 6 hours PRN Severe Pain (7 - 10)    ASSESSMENT / RECOMMENDATIONS  Mr. Yanez is a 87-year-old male with a past medical history of hypertension, hyperlipidemia, coronary artery disease (s/p JOSÉ to mLAD on 6/2023), cervical spondylosis (s/p laminectomy), gastric ulcers, hypothyroidism and constipation who presented to the emergency department (10/6/23) for dyspnea on exertion, chest pressure, and orthopnea for 3 days, along with one day of fever. He was admitted to cardiology for acute hypoxic respiratory failure secondary to acute heart failure with preserved ejection fraction and fever, possibly secondary to pneumonia. His labs were remarkable for a hemoglobin A1C of 7.7% and a TSH of 10.5. Endocrinology was consulted for recommendations regarding management of hypothyroidism and diabetes.     A1C: 7.7 %  BUN: 18  Creatinine: 1.01  GFR: 73  Weight: 63.5  BMI: 23.3    # Hypothyroidism  - INCREASE levothyroxine to 75 mcg oral daily to simplify regimen.   - Recheck TSH and Free T4 in 4-6 weeks outpatient.    - He should continue to follow-up with his endocrinologist for further titration of his levothyroxine.     # Type 2 diabetes mellitus with hyperglycemia  - Please CHANGE diet to consistent carbohydrate.   - Please continue with lispro moderate dose sliding scale before meals and at bedtime for now.   - Patient's fingerstick glucose goal is 100-180 mg/dL.    - Discharge recommendations to be discussed.   - Patient can follow up at discharge with [facility] by calling [phone] to make an appointment.      Case discussed with Dr. Ness. Primary team updated.       Sandro Gray    Endocrinology Fellow    Service Pager: [phone]

## 2023-10-09 NOTE — PRE-OP CHECKLIST - ALLERGY BAND ON
----- Message from Shabnam Larsen NP sent at 8/1/2017  8:47 AM CDT -----  Please let patient know her thyroid labs are within good range. She should continue her current Synthroid dose.   Shellfish/done

## 2023-10-09 NOTE — PROGRESS NOTE ADULT - PROBLEM SELECTOR PLAN 3
WBC 16.26 elevated on admission. Reported fever at home yesterday improved w/ Tylenol. Likely in the setting of cholecystitis. procal 0.25 elevated s/p abx IV Ceftriaxone x 5 days and Azithromycin x 1 in ED. Continued both abx for now.    - increased ctx to 2gm, started on flagyl 500mg q8  - f/u blood cultures ngtd WBC 16.26 elevated on admission. Reported fever at home yesterday improved w/ Tylenol. Likely in the setting of cholecystitis. procal 0.25 elevated s/p abx IV Ceftriaxone x 5 days and Azithromycin x 1 in ED. Continued both abx for now.    - c/w ctx to 2gm, started on flagyl 500mg q8  - f/u blood cultures ngtd

## 2023-10-09 NOTE — PROGRESS NOTE ADULT - ATTENDING COMMENTS
Pt seen on rounds this afternoon and events of the weekend reviewed.  87-yo man with HTN, HLP and CAD (with previous PCI/stent) who was admitted for CHF after presenting with CP/SOB.  Endocrinology has been consulted for management of his hypothyroidism and type 2 DM.  --Was noted to have a moderately elevated TSH level of 10.5 uU/ml on admission.  Regimen at home is levothyroxine 50 mcg 5 days/week, 100 mcg Sat/Sun.  Seems to be taking the medication consistently and correctly, and will even take an extra tablet if he is constipated.  He would appear to need an increase in dose, and it would be better with a simpler regimen.  His PCP had apparently suggested that he simply increase to 75 mcg 7 days/week, and we agree.  --With regard to DM, A1c level was moderately elevated at 7.7%, fingersticks in the hospital have been mostly in the mid-100 range.  He does not require insulin in the hospital, but would likely do better as outpatient on an oral agent, but has been resistant to starting one.  Would ideally be an agent such as Januvia which would help control post-prandial hyperglycemia and carry little risk of hypoglycemia.  We asked him to consider this

## 2023-10-09 NOTE — DISCHARGE NOTE PROVIDER - NSDCFUADDAPPT_GEN_ALL_CORE_FT
Please bring your Insurance card, Photo ID and Discharge paperwork to the following appointment:    (1) Please follow up with your Primary Care Provider, Dr. Agata Gomez on behalf of Dr. Danielle Shook at 14-[address] [place] [address] on 10/17/2023 at 9:30am.    Appointment was scheduled by Ms. DANETTE Kathleen, Referral Coordinator.   Please bring your Insurance card, Photo ID and Discharge paperwork to the following appointment:    (1) Please follow up with your Primary Care Provider, Dr. Agata Gomez on behalf of Dr. Danielle Shook at 14-[address] [place] [address] on 10/17/2023 at 9:30am.    Appointment was scheduled by Ms. DANETTE Kathleen, Referral Coordinator.   Please bring your Insurance card, Photo ID and Discharge paperwork to the following appointment:    (1) Please follow up with your Primary Care Provider, Dr. Agata Gomez on behalf of Dr. Danielle Shook at 14-[address] [place] [address] on 10/17/2023 at 9:30am.    Appointment was scheduled by Ms. DANETTE Kathleen, Referral Coordinator.   Please bring your Insurance card, Photo ID and Discharge paperwork to the following appointment:    (1) Please follow up with your Primary Care Provider Dr. Danielle Shook at 14-[address] [place] [address] on 10/16/2023 at 9:00am.    Appointment was scheduled by Ms. DANETTE Kathleen, Referral Coordinator.   Please bring your Insurance card, Photo ID and Discharge paperwork to the following appointment:    (1) Please follow up with your Primary Care Provider Dr. Danielle Shook at 14-[address] [place] [address] on 10/16/2023 at 9:00am.    Appointment was scheduled by Ms. DANETTE Kathleen, Referral Coordinator.   Please bring your Insurance card, Photo ID and Discharge paperwork to the following appointment:    (1) Please follow up with your Primary Care Provider Dr. Danielle Shook at 14-[address] [place] [address] on 10/16/2023 at 9:00am.    Appointment was scheduled by Ms. DANETTE Kathleen, Referral Coordinator.   Please bring your Insurance card, Photo ID and Discharge paperwork to the following appointments:    (1) Please follow up with your Primary Care Provider Dr. Danielle Shook at 14-[address] [place] [address] on 10/16/2023 at 9:00am.    Appointment was scheduled by Ms. DANETTE Kathleen, Referral Coordinator.    (2) Please follow up with Surgery Provider, Dr. Dl Gomez at [address], [address] [place], [place] [address] on 10/25/2023 at 11:30am.    Appointment was scheduled by Ms. DANETTE Kathleen, Referral Coordinator. Please bring your Insurance card, Photo ID and Discharge paperwork to the following appointments:    (1) Please follow up with your Primary Care Provider Dr. Danielle Shook at 14-[address] [place] [address] on 10/16/2023 at 9:00am.    Appointment was scheduled by Ms. DANETTE Kathleen, Referral Coordinator.    (2) Please follow up with Surgery Provider, Dr. Dl Gomez at [address], [address] [place], [place] [address] on 10/25/2023 at 11:30am.    Appointment was scheduled by Ms. DANETTE Kathleen, Referral Coordinator. Please bring your Insurance card, Photo ID and Discharge paperwork to the following appointments:    (1) Please follow up with your Primary Care Provider Dr. Danielle Shook at 14-[address] [place] [address] on 10/16/2023 at 9:00am.    Appointment was scheduled by Ms. DANETTE Kathleen, Referral Coordinator.    (2) Please follow up with Surgery Provider, Dr. Dl Gomez at [address], [address] [place], [place] [address] on 10/25/2023 at 11:30am.    Appointment was scheduled by Ms. DANETTE Kathleen, Referral Coordinator.

## 2023-10-09 NOTE — DISCHARGE NOTE PROVIDER - CARE PROVIDERS DIRECT ADDRESSES
,DirectAddress_Unknown ,DirectAddress_Unknown,[email or web address] ,DirectAddress_Unknown,[email or web address] ,DirectAddress_Unknown,[email or web address] ,[email or web address],DirectAddress_Unknown ,[email or web address],DirectAddress_Unknown ,[email or web address],DirectAddress_Unknown ,[email or web address],DirectAddress_Unknown,DirectAddress_Unknown ,[email or web address],DirectAddress_Unknown,DirectAddress_Unknown ,[email or web address],DirectAddress_Unknown,DirectAddress_Unknown ,[email or web address],DirectAddress_Unknown,DirectAddress_Unknown,DirectAddress_Unknown ,[email or web address],DirectAddress_Unknown,DirectAddress_Unknown,DirectAddress_Unknown ,[email or web address],DirectAddress_Unknown,DirectAddress_Unknown,DirectAddress_Unknown

## 2023-10-09 NOTE — PROGRESS NOTE ADULT - ASSESSMENT
87M w/ PMHx CAD s/p JOSÉ (6/23), HTN, HLD, PUD, hypothyroidism, PSHx C spine laminectomy, general surgery consulted for further w/u of abdominal pain. Found to have acute cholecystitis. Poor surgical candidate at this time, recommend IR drainage.     Recommendations:  - Poor candidate for cholecystectomy at this time  - IR consult for percutaneous cholecystostomy tube  - c/w CTX/Flagyl   Team 4c will continue to follow, please call with any questions/concerns.   Plan discussed with chief resident and attending surgeon.

## 2023-10-09 NOTE — PROGRESS NOTE ADULT - ASSESSMENT
Patient is a 87M, (shellfish rxn w/ Lip Swelling ~ 20 yrs ago, does not eat shellfish since then),  w/ PMHx CAD s/p JOSÉ to mLAD (6/2023), HTN, HLD, cervical spondylosis s/p laminectomy, gastric ulcers, hypothyroidism,  who presents to [facility] ED 10/6/23 for BELLO with minimal exertion, chest pressure, and orthopnea x 3 days. Also reports a fever of 101.8 yesterday but resolved with Tylenol with no recurrence since. Reported constipation at home with response after suppository at home on Tuesday (10/3), however, no BM since then. Does report pinpoint RUQ abdominal discomfort worse with palpation x 1-2 days. 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 His appetite and fluid intake has decreased as well. CXR with concern for CHF vs PNA. Negative troponin and EKG nonischemic. Patient is now admitted to cardiac/tele for suspicion of CHF and further ischemic evaluation pending optimization including Leukocytosis workup.             Patient is a 87M, (shellfish rxn w/ Lip Swelling ~ 20 yrs ago, does not eat shellfish since then),  w/ PMHx CAD s/p JOSÉ to mLAD (6/2023), HTN, HLD, cervical spondylosis s/p laminectomy, gastric ulcers, hypothyroidism,  who presents to [facility] ED 10/6/23 for BELLO with minimal exertion, chest pressure, and orthopnea x 3 days. Also reports a fever of 101.8 yesterday but resolved with Tylenol with no recurrence since. Reported constipation at home with response after suppository at home on Tuesday (10/3), however, no BM since then. Does report pinpoint RUQ abdominal discomfort worse with palpation x 1-2 days. 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 His appetite and fluid intake has decreased as well. CXR with concern for CHF vs PNA. Negative troponin and EKG nonischemic. Patient is now admitted to cardiac/tele for suspicion of CHF and further ischemic evaluation pending optimization including Leukocytosis workup.             Patient is a 87M, (shellfish rxn w/ Lip Swelling ~ 20 yrs ago, does not eat shellfish since then),  w/ PMHx CAD s/p JOSÉ to mLAD (6/2023), HTN, HLD, cervical spondylosis s/p laminectomy, gastric ulcers, hypothyroidism,  who presents to [facility] ED 10/6/23 for BELLO with minimal exertion, chest pressure, and orthopnea x 3 days. Also reports a fever of 101.8 yesterday but resolved with Tylenol with no recurrence since. Reported constipation at home with response after suppository at home on Tuesday (10/3), however, no BM since then. Does report pinpoint RUQ abdominal discomfort worse with palpation x 1-2 days. 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 His appetite and fluid intake has decreased as well. CXR with concern for CHF vs PNA. Negative troponin and EKG nonischemic. Patient is now admitted to cardiac/tele for suspicion of CHF and further ischemic evaluation pending optimization including Leukocytosis workup.

## 2023-10-09 NOTE — DISCHARGE NOTE PROVIDER - NSDCCPCAREPLAN_GEN_ALL_CORE_FT
PRINCIPAL DISCHARGE DIAGNOSIS  Diagnosis: Acute cholecystitis  Assessment and Plan of Treatment: Cholecystitis is the inflammation of the gallbladder an organ in the GI tract that helps with digestion. Usually this is due to obstruction of gallbladder opening by a gallstone. People usually have stomach pain on the right side and fever are the common symptoms. The definitive treatment is to remove the gallbladder as it is not needed to live a normal life.   Your treatment course included the antibiotics ceftriaxone and metronidazole and we performed a procedure called ____________________  Please follow up with your PCP after discharge to make sure no complications are forming. If you feel lightheaded, have worseing abdominal pain, fevers/chills, please seek immediate care.     PRINCIPAL DISCHARGE DIAGNOSIS  Diagnosis: Acute cholecystitis  Assessment and Plan of Treatment: Cholecystitis is the inflammation of the gallbladder an organ in the GI tract that helps with digestion. Usually this is due to obstruction of gallbladder opening by a gallstone. People usually have stomach pain on the right side and fever are the common symptoms. The definitive treatment is to remove the gallbladder as it is not needed to live a normal life.   Your treatment course included the antibiotics ceftriaxone and metronidazole.   Please follow up with your PCP after discharge to make sure no complications are forming. If you feel lightheaded, have worseing abdominal pain, fevers/chills, please seek immediate care.     PRINCIPAL DISCHARGE DIAGNOSIS  Diagnosis: Acute cholecystitis  Assessment and Plan of Treatment: Cholecystitis is the inflammation of the gallbladder an organ in the GI tract that helps with digestion. Usually this is due to obstruction of gallbladder opening by a gallstone. People usually have stomach pain on the right side and fever are the common symptoms. The definitive treatment is to remove the gallbladder as it is not needed to live a normal life.   Your treatment course included the antibiotics ceftriaxone and metronidazole.   Please follow up with your PCP after discharge to make sure no complications are forming. If you feel lightheaded, have worseing abdominal pain, fevers/chills, please seek immediate care.      SECONDARY DISCHARGE DIAGNOSES  Diagnosis: Medication management  Assessment and Plan of Treatment: You will need to take your antibiotics, cefpodoxime twice a day, and flagyl three times a day until 10/23.  We also changed the regimen for your synthroid. You will need to take 75mcg daily before breakfast.     PRINCIPAL DISCHARGE DIAGNOSIS  Diagnosis: Acute cholecystitis  Assessment and Plan of Treatment: Cholecystitis is the inflammation of the gallbladder an organ in the GI tract that helps with digestion. Usually this is due to obstruction of gallbladder opening by a gallstone. People usually have stomach pain on the right side and fever are the common symptoms. The definitive treatment is to remove the gallbladder as it is not needed to live a normal life.   Your treatment course included the antibiotics ceftriaxone and metronidazole.   Please follow up with your PCP after discharge to make sure no complications are forming. If you feel lightheaded, have worseing abdominal pain, fevers/chills, please seek immediate care.      SECONDARY DISCHARGE DIAGNOSES  Diagnosis: Medication management  Assessment and Plan of Treatment: You will need to take your antibiotics, cefpodoxime twice a day, and flagyl three times a day until 10/23.  We also changed the regimen for your synthroid. You will need to take 75mcg daily before breakfast.  You will be taking januvia 100mg qD before breakfast.     PRINCIPAL DISCHARGE DIAGNOSIS  Diagnosis: Acute cholecystitis  Assessment and Plan of Treatment: Cholecystitis is the inflammation of the gallbladder an organ in the GI tract that helps with digestion. Usually this is due to obstruction of gallbladder opening by a gallstone. People usually have stomach pain on the right side and fever are the common symptoms. The definitive treatment is to remove the gallbladder as it is not needed to live a normal life.   Your treatment course included the antibiotics ceftriaxone and metronidazole.   Please follow up with your PCP after discharge to make sure no complications are forming. If you feel lightheaded, have worseing abdominal pain, fevers/chills, please seek immediate care.      SECONDARY DISCHARGE DIAGNOSES  Diagnosis: CANDELARIA (iron deficiency anemia)  Assessment and Plan of Treatment: During this hospital admission you were found to have Iron deficiency anemia. This is  whenToo few healthy red blood cells due to too little iron in the body. Without enough iron, red blood cells can't carry enough oxygen to body tissues. Iron deficiency often causes low blood cell levels (anemia).  Treatment includes iron supplements and a focus on any underlying causes.   Please follow up with your primary care doctor to assess the cause of your anemia. you may require gastroenterology follow up to assess    Diagnosis: Weight loss  Assessment and Plan of Treatment: You shared with us that you have been losing weight over the past 2 months. Please follow up with your primary care doctor to determine the cause. You may require a nutritionist to help you maintain adequate caloric intake. This weight loss could also be due to the gallstone adn cholecystitis that you have had. Regardless, your doctor should be informed of this at your next appointment that we made for you.    Diagnosis: Medication management  Assessment and Plan of Treatment: You will need to take your antibiotics, cefpodoxime twice a day, and flagyl three times a day until 10/23.  We also changed the regimen for your synthroid. You will need to take 75mcg daily before breakfast.  You will be taking januvia 100mg qD before breakfast.     PRINCIPAL DISCHARGE DIAGNOSIS  Diagnosis: Acute cholecystitis  Assessment and Plan of Treatment: Cholecystitis is the inflammation of the gallbladder an organ in the GI tract that helps with digestion. Usually this is due to obstruction of gallbladder opening by a gallstone. People usually have stomach pain on the right side and fever are the common symptoms. The definitive treatment is to remove the gallbladder as it is not needed to live a normal life.   Your treatment course included the antibiotics ceftriaxone and metronidazole.   Please follow up with your PCP after discharge to make sure no complications are forming. If you feel lightheaded, have worseing abdominal pain, fevers/chills, please seek immediate care.      SECONDARY DISCHARGE DIAGNOSES  Diagnosis: CANDELARIA (iron deficiency anemia)  Assessment and Plan of Treatment: During this hospital admission you were found to have Iron deficiency anemia. This is  whenToo few healthy red blood cells due to too little iron in the body. Without enough iron, red blood cells can't carry enough oxygen to body tissues. Iron deficiency often causes low blood cell levels (anemia).  Treatment includes iron supplements and a focus on any underlying causes.   Please follow up with your primary care doctor to assess the cause of your anemia. you may require gastroenterology follow up to assess    Diagnosis: Weight loss  Assessment and Plan of Treatment: You shared with us that you have been losing weight over the past 2 months. Please follow up with your primary care doctor to determine the cause. You may require a nutritionist to help you maintain adequate caloric intake. This weight loss could also be due to the gallstone adn cholecystitis that you have had. Regardless, your doctor should be informed of this at your next appointment that we made for you.    Diagnosis: Medication management  Assessment and Plan of Treatment: You will need to take your antibiotics, cefpodoxime twice a day, and flagyl three times a day until 10/23.  We also changed the regimen for your synthroid. You will need to take 75mcg daily before breakfast.  You will be taking januvia 100mg qD before breakfast.    Diagnosis: Hypothyroid  Assessment and Plan of Treatment: You will need your thyroid levels checked outpatient to ensure your medication is working adequately.

## 2023-10-09 NOTE — PROGRESS NOTE ADULT - PROBLEM SELECTOR PLAN 2
Reports sharp pinpoint RUQ discomfort worsened with palpation x 1-2 days. Last BM 10/3 w/ suppository at home. Daughter reports decreased eating and fluid intake at home lately.  Has had issues with constipation. Has been taking Dulcolax x 2 weeks at home. CXR abd for evaluation of constipation  RUQUS: cholelithiasis  CTAP: acute cholecystitis with fat stranding    - surgery consulted, f/u recs  - per surgery, recommended IR consult for perc mich, and to continue ctx and flagyl  - IR consulted, and recommended hida scan, and to see pt's response to abx treatment Reports sharp pinpoint RUQ discomfort worsened with palpation x 1-2 days. Last BM 10/3 w/ suppository at home. Daughter reports decreased eating and fluid intake at home lately.  Has had issues with constipation. Has been taking Dulcolax x 2 weeks at home. CXR abd for evaluation of constipation  RUQUS: cholelithiasis  CTAP: acute cholecystitis with fat stranding    - surgery consulted, f/u recs  - per surgery, recommended IR consult for perc mich, and to continue ctx and flagyl  - IR consulted, and recommended hida scan, and to see pt's response to abx treatment  - f/u HIDA scan

## 2023-10-09 NOTE — PROGRESS NOTE ADULT - ATTENDING COMMENTS
amendments to the documentation where necessary. Additional comments: 87M, (shellfish rxn w/ Lip Swelling ~ 20 yrs ago),  w/ PMHx CAD s/p JOSÉ to mLAD (6/2023), HTN, HLD, cervical spondylosis s/p laminectomy, gastric ulcers, hypothyroidism,  who presents to [facility] ED 10/6/23 for BELLO with minimal exertion, chest pressure, and orthopnea x 3 days. Also reports a fever of 101.8 yesterday.    1. CAD  Hx of CAD with now exertional angina.   Continue aspirin 81 mg lipitor, metoprolol. [facility] now post-poned due to acute cholecystitis.     2. Acute cholecystitis  Clinical presentation, physical examination, labs and CT consistent with acute cholecystitis. Consulted general surgery, deemed to be a poor candidate for cholecystectomy at this time, IR consult for percutaneous cholecystostomy tube, c/w CTX/Flagyl, on antibiotics, suggested IR approach, requested HIDA scan.     Intermediate risk procedure, fair exercise capacity, recent JOSÉ stent placement at mid LAD 6.2023, reasonable to hold plavix for 5 days prior to procedure, continue aspirin. > 3 months post-JOSÉ, no need for cangrelor. Appears euvolemic.    3. Microcytic anemia  Iron studies, electrophoresis. ? gi bleeding    4. DM/hypothyroidism  Consult endocrine    5. Hyponatremia  Improving. amendments to the documentation where necessary. Additional comments: 87M, (shellfish rxn w/ Lip Swelling ~ 20 yrs ago),  w/ PMHx CAD s/p JOSÉ to mLAD (6/2023), HTN, HLD, cervical spondylosis s/p laminectomy, gastric ulcers, hypothyroidism,  who presents to [facility] ED 10/6/23 for BELLO with minimal exertion, chest pressure, and orthopnea x 3 days. Also reports a fever of 101.8 yesterday.    1. CAD  Hx of CAD with now exertional angina.   Continue aspirin 81 mg lipitor, metoprolol. [facility] now post-poned due to acute cholecystitis.     2. Acute cholecystitis  Clinical presentation, physical examination, labs and CT consistent with acute cholecystitis. Consulted general surgery, deemed to be a poor candidate for cholecystectomy at this time, IR consult for percutaneous cholecystostomy tube, c/w CTX/Flagyl, on antibiotics, suggested IR approach, requested HIDA scan.     Intermediate risk procedure, fair exercise capacity, recent JOSÉ stent placement at mid LAD 6.2023, reasonable to hold plavix for 5 days prior to procedure, continue aspirin. > 3 months post-JOSÉ, no need for cangrelor. Appears euvolemic.    3. Microcytic anemia  Iron studies, electrophoresis. ? gi bleeding    4. DM/hypothyroidism  Consult endocrine    5. Hyponatremia  Improving. amendments to the documentation where necessary. Additional comments: 87M, (shellfish rxn w/ Lip Swelling ~ 20 yrs ago),  w/ PMHx CAD s/p JOSÉ to mLAD (6/2023), HTN, HLD, cervical spondylosis s/p laminectomy, gastric ulcers, hypothyroidism,  who presents to [facility] ED 10/6/23 for BELLO with minimal exertion, chest pressure, and orthopnea x 3 days. Also reports a fever of 101.8 yesterday.    1. CAD  Hx of CAD with now exertional angina.   Continue aspirin 81 mg lipitor, metoprolol. [facility] now post-poned due to acute cholecystitis.     2. Acute cholecystitis  Clinical presentation, physical examination, labs and CT consistent with acute cholecystitis. Consulted general surgery, deemed to be a poor candidate for cholecystectomy at this time, IR consult for percutaneous cholecystostomy tube, c/w CTX/Flagyl, on antibiotics, suggested IR approach, requested HIDA scan.     Intermediate risk procedure, fair exercise capacity, recent JOSÉ stent placement at mid LAD 6.2023, reasonable to hold plavix for 5 days prior to procedure, continue aspirin. > 3 months post-JOSÉ, no need for cangrelor. Appears euvolemic.    3. Microcytic anemia  Iron studies, electrophoresis. ? gi bleeding    4. DM/hypothyroidism  Consult endocrine    5. Hyponatremia  Improving.

## 2023-10-09 NOTE — PROGRESS NOTE ADULT - PROBLEM SELECTOR PLAN 9
per Daughter, pt's home Flomax d/c recently as was not beneficial. Pt has been voiding on his own.  -monitor for any urinary retention

## 2023-10-09 NOTE — DISCHARGE NOTE PROVIDER - ATTENDING DISCHARGE PHYSICAL EXAMINATION:
87 year old male with PMH relevant for CAD s/p JOSÉ to mLAD (6/2023), HTN, HLD, cervical spondylosis s/p laminectomy, gastric ulcers, hypothyroidism,  who presents to [facility] ED 10/6/23 for BELLO with minimal exertion, chest pressure, and orthopnea x 3 days. Also reports a fever of 101.8 yesterday.    1. CAD  Chest pain resolved. Continue aspirin 81 mg, resume Plavix 75 mg, continue Lipitor, metoprolol.   LHC now postponed due to acute cholecystitis, to be discussed as an outpatient.    2. Acute cholecystitis  Poor candidate for cholecystectomy at this time, given improvement in current clinical status, will recommend continuing with CTX/Flagyl.  Continue Abx PO for 2 weeks. F/U with surgery in 2 weeks.     3. Microcytic anemia  outpatient follow up.    4. Hyponatremic hypovolemia  Improving with hydration, continue adequate hydration as an outpatient.    6. Acute hypoxic respiratory failure  From atelectasis, now resolved.  87 year old male with PMH relevant for CAD s/p JOSÉ to mLAD (6/2023), HTN, HLD, cervical spondylosis s/p laminectomy, gastric ulcers, hypothyroidism,  who presents to [facility] ED 10/6/23 for BELLO with minimal exertion, chest pressure, and orthopnea x 3 days. Also reports a fever of 101.8 yesterday.    1. CAD  Chest pain resolved. Continue aspirin 81 mg, resume Plavix 75 mg, continue Lipitor, metoprolol.   LHC now postponed due to acute cholecystitis, to be discussed as an outpatient.    2. Acute cholecystitis  Poor candidate for cholecystectomy at this time, given improvement in current clinical status, will recommend continuing with CTX/Flagyl.  Continue Abx PO for 2 weeks. F/U with surgery in 2 weeks.     3. Microcytic anemia  outpatient follow up.    4. Hyponatremic hypovolemia  Improving with hydration, continue adequate hydration as an outpatient.    6. Acute hypoxic respiratory failure  From atelectasis, now resolved.  87 year old male with PMH relevant for CAD s/p JOSÉ to mLAD (6/2023), HTN, HLD, cervical spondylosis s/p laminectomy, gastric ulcers, hypothyroidism,  who presents to [facility] ED 10/6/23 for BELLO with minimal exertion, chest pressure, and orthopnea x 3 days. Also reports a fever of 101.8 yesterday.    1. CAD  Chest pain resolved. Continue aspirin 81 mg, resume Plavix 75 mg, continue Lipitor, metoprolol.   LHC now postponed due to acute cholecystitis, to be discussed as an outpatient.    2. Acute cholecystitis  Poor candidate for cholecystectomy at this time, given improvement in current clinical status, will recommend continuing with CTX/Flagyl.  Continue Abx PO for 2 weeks. F/U with surgery in 2 weeks.     3. Microcytic anemia  outpatient follow up.    4. Hyponatremic hypovolemia  Improving with hydration, continue adequate hydration as an outpatient.    6. Acute hypoxic respiratory failure  From atelectasis, now resolved.

## 2023-10-09 NOTE — PROGRESS NOTE ADULT - SUBJECTIVE AND OBJECTIVE BOX
Patient is a 87y old  Male who presents with a chief complaint of chest pain, SOB (09 Oct 2023 11:04)    INTERVAL EVENTS: NAEON    SUBJECTIVE:  Patient was seen and examined at bedside with wife at bedside. Pt reports having intermittent RUQ pain ~ 6/10 for seconds and subsided, denies fever, chills, N/V,CP,SOB,etc. still on NC2L with SpO2 94%     Review of systems: No fever, chills, dizziness, HA, Changes in vision, CP, dyspnea, nausea or vomiting, dysuria, changes in bowel movements, LE edema. Rest of 12 point Review of systems negative unless otherwise documented elsewhere in note.     Diet, NPO:   Except Medications (10-09-23 @ 10:49) [Active]      MEDICATIONS:  MEDICATIONS  (STANDING):  aspirin enteric coated 81 milliGRAM(s) Oral daily  atorvastatin 40 milliGRAM(s) Oral at bedtime  cefTRIAXone   IVPB 2000 milliGRAM(s) IV Intermittent every 24 hours  clopidogrel Tablet 75 milliGRAM(s) Oral daily  dextrose 5%. 1000 milliLiter(s) (50 mL/Hr) IV Continuous <Continuous>  dextrose 5%. 1000 milliLiter(s) (100 mL/Hr) IV Continuous <Continuous>  dextrose 50% Injectable 25 Gram(s) IV Push once  dextrose 50% Injectable 25 Gram(s) IV Push once  dextrose 50% Injectable 12.5 Gram(s) IV Push once  gabapentin 100 milliGRAM(s) Oral at bedtime  glucagon  Injectable 1 milliGRAM(s) IntraMuscular once  insulin lispro (ADMELOG) corrective regimen sliding scale   SubCutaneous at bedtime  insulin lispro (ADMELOG) corrective regimen sliding scale   SubCutaneous three times a day before meals  levothyroxine 50 MICROGram(s) Oral <User Schedule>  levothyroxine 100 MICROGram(s) Oral <User Schedule>  metoprolol succinate ER 12.5 milliGRAM(s) Oral daily  metroNIDAZOLE    Tablet 500 milliGRAM(s) Oral every 8 hours  pantoprazole    Tablet 40 milliGRAM(s) Oral at bedtime  potassium chloride   Powder 40 milliEquivalent(s) Oral once  senna 2 Tablet(s) Oral daily    MEDICATIONS  (PRN):  bisacodyl Suppository 10 milliGRAM(s) Rectal daily PRN Constipation  cyclobenzaprine 5 milliGRAM(s) Oral two times a day PRN Muscle Spasm  dextrose Oral Gel 15 Gram(s) Oral once PRN Blood Glucose LESS THAN 70 milliGRAM(s)/deciliter  oxyCODONE    IR 5 milliGRAM(s) Oral every 6 hours PRN Severe Pain (7 - 10)      Allergies    No Known Drug Allergies  shellfish (Swelling; Hives)    Intolerances        OBJECTIVE:  Vital Signs Last 24 Hrs  T(C): 36.3 (09 Oct 2023 08:38), Max: 36.8 (09 Oct 2023 05:20)  T(F): 97.4 (09 Oct 2023 08:38), Max: 98.2 (09 Oct 2023 05:20)  HR: 77 (09 Oct 2023 08:38) (68 - 92)  BP: 110/68 (09 Oct 2023 08:38) (110/68 - 135/81)  BP(mean): 83 (09 Oct 2023 08:38) (83 - 93)  RR: 18 (09 Oct 2023 08:44) (16 - 18)  SpO2: 95% (09 Oct 2023 08:45) (89% - 97%)    Parameters below as of 09 Oct 2023 08:45  Patient On (Oxygen Delivery Method): nasal cannula  O2 Flow (L/min): 1    I&O's Summary    08 Oct 2023 07:01  -  09 Oct 2023 07:00  --------------------------------------------------------  IN: 120 mL / OUT: 500 mL / NET: -380 mL    09 Oct 2023 07:01  -  09 Oct 2023 11:41  --------------------------------------------------------  IN: 0 mL / OUT: 100 mL / NET: -100 mL        PHYSICAL EXAM:  Gen: Reclining in bed at time of exam, appears stated age  HEENT: NCAT, MMM, clear OP  Neck: supple, trachea at midline  CV: RRR, +S1/S2  Pulm: adequate respiratory effort, no increase in work of breathing  Abd: soft, NTND  Skin: warm and dry,   Ext: WWP, no LE edema  Neuro: AOx3, no gross focal neurological deficits  Psych: affect and behavior appropriate, pleasant at time of interview  :     LABS:                        15.9   14.20 )-----------( 249      ( 09 Oct 2023 05:30 )             49.5     10-09    127<L>  |  89<L>  |  18  ----------------------------<  156<H>  3.4<L>   |  23  |  1.01    Ca    9.0      09 Oct 2023 05:30  Phos  3.5     10-09  Mg     2.1     10-09    TPro  7.8  /  Alb  3.2<L>  /  TBili  0.6  /  DBili  x   /  AST  21  /  ALT  24  /  AlkPhos  142<H>  10-09    LIVER FUNCTIONS - ( 09 Oct 2023 05:30 )  Alb: 3.2 g/dL / Pro: 7.8 g/dL / ALK PHOS: 142 U/L / ALT: 24 U/L / AST: 21 U/L / GGT: x           PT/INR - ( 09 Oct 2023 05:30 )   PT: 13.5 sec;   INR: 1.19          PTT - ( 09 Oct 2023 05:30 )  PTT:32.8 sec  CAPILLARY BLOOD GLUCOSE      POCT Blood Glucose.: 149 mg/dL (09 Oct 2023 07:47)  POCT Blood Glucose.: 162 mg/dL (08 Oct 2023 21:40)  POCT Blood Glucose.: 165 mg/dL (08 Oct 2023 17:05)  POCT Blood Glucose.: 134 mg/dL (08 Oct 2023 12:05)    Urinalysis Basic - ( 09 Oct 2023 05:30 )    Color: x / Appearance: x / SG: x / pH: x  Gluc: 156 mg/dL / Ketone: x  / Bili: x / Urobili: x   Blood: x / Protein: x / Nitrite: x   Leuk Esterase: x / RBC: x / WBC x   Sq Epi: x / Non Sq Epi: x / Bacteria: x        MICRODATA:    Culture - Blood (collected 06 Oct 2023 18:55)  Source: .Blood Blood-Peripheral  Preliminary Report (08 Oct 2023 20:00):    No growth at 2 days.    Culture - Blood (collected 06 Oct 2023 18:20)  Source: .Blood Blood-Peripheral  Preliminary Report (08 Oct 2023 20:00):    No growth at 2 days.    Culture - Urine (collected 06 Oct 2023 18:11)  Source: Clean Catch None  Final Report (08 Oct 2023 08:14):    Specimen appears CONTAMINATED. Lab suggests repeat clean catch specimen.    Urinalysis with Rflx Culture (collected 06 Oct 2023 18:11)        RADIOLOGY/OTHER STUDIES:    PCP  Pharmacy:   Emergency contact:

## 2023-10-09 NOTE — PROGRESS NOTE ADULT - PROBLEM SELECTOR PLAN 4
found to have elevated glucose on BMP  A1c found to be 7.7. no hx of diabetes    - c/w insulin sliding scale  - monitor finger sticks and switch diet to carb consistent if unable to control  - endocrinology consulted, f/u recs  - per endo, c/w moderate dose sliding scale

## 2023-10-09 NOTE — PROGRESS NOTE ADULT - ASSESSMENT
87M with PMHx CAD s/p JOSÉ to mLAD (6/2023), HTN, HLD, cervical spondylosis s/p laminectomy, gastric ulcers, hypothyroidism,  constipation prior [facility] admission for L leg pain, presented to [facility] ED 10/6/23 for BELLO with minimal exertion, chest pressure, and orthopnea x 3 days and fever of 101.8*F yesterday but resolved with Tylenol with no recurrence since. Reported constipation at home with response after suppository at home on Tuesday (10/3), however, no BM since then. Does report pinpoint RLQ abdominal discomfort worse with palpation. 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 His appetite and fluid intake has decreased as well. Pt was admitted to Cardiology service/[facility] for acute hypoxic respiratory failure 2/2 acute HFpEF (BNP 1279) s/p lasix 20mg iv x1 at ED(10/6) and fever without clinical evidence of PNA (no cough/sputum) but RLQ pain and constipation with CT (10/7) findings of a gallstone ~1.8cm at the gallbladder neck and associated wall thickening and fat stranding.     -O2 supplement via NC2L with SpO2 96-97%  - Lasix 20mg iv (10/6)-> 40mg iv daily (10/7-)   - f/u UO -1520ml total since adm  - Surgery consult appreciated, re: acute cholecystitis   - CT findings of gallstone ~ 1.8cm at the gallbladder neck and associated wall thickening and fat stranding ( Pt on Clopidogrel which needs to be kept off for at least 5 days and will need Cardiology clearance , s/p PCI to mLAD 6/2023)   - IR consult appreciated, rec: HIDA scan today and further decision if perc cholecystostomy is still warranted   - Ceftriaxone 1gm iv q24hr (10/6-10/7), increased to 2gm iv q24hr ( 10/8-) Day 4  - added Metronidazole 500mg po q8hr ( 10/8-) Day 2   - discontinued Azithromycin 500mg iv q24hr ( 10/6-10/7)   - trend WBC 16.26K(10/6)-> 15.18K(10/7)-> 12.76K (10/8)-> 14.2K(10/9)   - pro-calcitonin 0.24  - BCx: ngtd x 2 days   - CTAP reviewed   - Bowel regimen for constipation : + BM after suppository yesterday ( 10/8)   - Hyponatremia: Na 126(10/6)-> 127(10/7)-> 124( 10/8)- >128(10/8)-> 127(10/9)   - c/w lasix 20mg iv ( 10/6)->  lasix 40mg iv daily,  daily BMP to trend Na, replete potassium K+ 3.4, keep K+>4 and Mg>2   - U osm 274, U Na 59( 10/9)   - Endocrine f/u appreciated re: Elevated TSH/hypothyroidism: 10.5%-> 10.16%-> 6.99,, c/w levothyroxine 50mcg po daily  - CADs/p PCI mLAD 6/2023. c/w DAPT: ASA/Clopidogrel, metoprolol succinate 12.5mg daily, Atorvastatin 40mg qHS , Pt not going for C tomorrow as d/w Cardiologist Dr. Daryn Jarvis   - LBP//spondylosis: c/w gabapentin 100mg qHS ( can be adjusted),  cyclobenzaprine 5mg po bid, oxycodone IR 5mg po q6hr prn   - consider Lidocaine patch 4% AA, and adjusting gabapentin   - GI ppx: PPI po daily   - DMII: FS/NISS, A1C% 7.7% (10/7) , goal -180  - SCDs for DVT ppx     Contacts:  Cardiologist Dr. Daryn Jarvis   daughter: Olga Yanez [phone]    POC as d/w 5 Cardiology team/ Dr. Scotty Chambers, and Cards attending Dr. Andrea Julio  87M with PMHx CAD s/p JOSÉ to mLAD (6/2023), HTN, HLD, cervical spondylosis s/p laminectomy, gastric ulcers, hypothyroidism,  constipation prior [facility] admission for L leg pain, presented to [facility] ED 10/6/23 for BELLO with minimal exertion, chest pressure, and orthopnea x 3 days and fever of 101.8*F yesterday but resolved with Tylenol with no recurrence since. Reported constipation at home with response after suppository at home on Tuesday (10/3), however, no BM since then. Does report pinpoint RLQ abdominal discomfort worse with palpation. 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 His appetite and fluid intake has decreased as well. Pt was admitted to Cardiology service/[facility] for acute hypoxic respiratory failure 2/2 acute HFpEF (BNP 1279) s/p lasix 20mg iv x1 at ED(10/6) and fever without clinical evidence of PNA (no cough/sputum) but RLQ pain and constipation with CT (10/7) findings of a gallstone ~1.8cm at the gallbladder neck and associated wall thickening and fat stranding.     -O2 supplement via NC2L with SpO2 96-97%  - Lasix 20mg iv (10/6)-> 40mg iv daily (10/7-)   - f/u UO -1520ml total since adm  - Surgery consult appreciated, re: acute cholecystitis   - CT findings of gallstone ~ 1.8cm at the gallbladder neck and associated wall thickening and fat stranding ( Pt on Clopidogrel which needs to be kept off for at least 5 days and will need Cardiology clearance , s/p PCI to mLAD 6/2023)   - IR consult appreciated, rec: HIDA scan today and further decision if perc cholecystostomy is still warranted   - Ceftriaxone 1gm iv q24hr (10/6-10/7), increased to 2gm iv q24hr ( 10/8-) Day 4  - added Metronidazole 500mg po q8hr ( 10/8-) Day 2   - discontinued Azithromycin 500mg iv q24hr ( 10/6-10/7)   - trend WBC 16.26K(10/6)-> 15.18K(10/7)-> 12.76K (10/8)-> 14.2K(10/9)   - pro-calcitonin 0.24  - BCx: ngtd x 2 days   - CTAP reviewed   - Bowel regimen for constipation : + BM after suppository yesterday ( 10/8)   - Hyponatremia: Na 126(10/6)-> 127(10/7)-> 124( 10/8)- >128(10/8)-> 127(10/9)   - c/w lasix 20mg iv ( 10/6)->  lasix 40mg iv daily,  daily BMP to trend Na, replete potassium K+ 3.4, keep K+>4 and Mg>2   - U osm 274, U Na 59( 10/9)   - Endocrine f/u appreciated re: Elevated TSH/hypothyroidism: 10.5%-> 10.16%-> 6.99,, c/w levothyroxine 50mcg po daily  - CADs/p PCI mLAD 6/2023. c/w DAPT: ASA/Clopidogrel, metoprolol succinate 12.5mg daily, Atorvastatin 40mg qHS , Pt not going for C tomorrow as d/w Cardiologist Dr. Daryn Jarvis   - LBP//spondylosis: c/w gabapentin 100mg qHS ( can be adjusted),  cyclobenzaprine 5mg po bid, oxycodone IR 5mg po q6hr prn   - consider Lidocaine patch 4% AA, and adjusting gabapentin   - GI ppx: PPI po daily   - DMII: FS/NISS, A1C% 7.7% (10/7) , goal -180  - SCDs for DVT ppx     Contacts:  Cardiologist Dr. Daryn Jarvis   daughter: Olga Yanez [phone]    POC as d/w 5 Cardiology team/ Dr. Scotty Chambers, and Cards attending Dr. Andrea Julio  87M with PMHx CAD s/p JOSÉ to mLAD (6/2023), HTN, HLD, cervical spondylosis s/p laminectomy, gastric ulcers, hypothyroidism,  constipation prior [facility] admission for L leg pain, presented to [facility] ED 10/6/23 for BELLO with minimal exertion, chest pressure, and orthopnea x 3 days and fever of 101.8*F yesterday but resolved with Tylenol with no recurrence since. Reported constipation at home with response after suppository at home on Tuesday (10/3), however, no BM since then. Does report pinpoint RLQ abdominal discomfort worse with palpation. 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 His appetite and fluid intake has decreased as well. Pt was admitted to Cardiology service/[facility] for acute hypoxic respiratory failure 2/2 acute HFpEF (BNP 1279) s/p lasix 20mg iv x1 at ED(10/6) and fever without clinical evidence of PNA (no cough/sputum) but RLQ pain and constipation with CT (10/7) findings of a gallstone ~1.8cm at the gallbladder neck and associated wall thickening and fat stranding.     -O2 supplement via NC2L with SpO2 96-97%  - Lasix 20mg iv (10/6)-> 40mg iv daily (10/7-)   - f/u UO -1520ml total since adm  - Surgery consult appreciated, re: acute cholecystitis   - CT findings of gallstone ~ 1.8cm at the gallbladder neck and associated wall thickening and fat stranding ( Pt on Clopidogrel which needs to be kept off for at least 5 days and will need Cardiology clearance , s/p PCI to mLAD 6/2023)   - IR consult appreciated, rec: HIDA scan today and further decision if perc cholecystostomy is still warranted   - Ceftriaxone 1gm iv q24hr (10/6-10/7), increased to 2gm iv q24hr ( 10/8-) Day 4  - added Metronidazole 500mg po q8hr ( 10/8-) Day 2   - discontinued Azithromycin 500mg iv q24hr ( 10/6-10/7)   - trend WBC 16.26K(10/6)-> 15.18K(10/7)-> 12.76K (10/8)-> 14.2K(10/9)   - pro-calcitonin 0.24  - BCx: ngtd x 2 days   - CTAP reviewed   - Bowel regimen for constipation : + BM after suppository yesterday ( 10/8)   - Hyponatremia: Na 126(10/6)-> 127(10/7)-> 124( 10/8)- >128(10/8)-> 127(10/9)   - c/w lasix 20mg iv ( 10/6)->  lasix 40mg iv daily,  daily BMP to trend Na, replete potassium K+ 3.4, keep K+>4 and Mg>2   - U osm 274, U Na 59( 10/9)   - Endocrine f/u appreciated re: Elevated TSH/hypothyroidism: 10.5%-> 10.16%-> 6.99,, c/w levothyroxine 50mcg po daily  - CADs/p PCI mLAD 6/2023. c/w DAPT: ASA/Clopidogrel, metoprolol succinate 12.5mg daily, Atorvastatin 40mg qHS , Pt not going for C tomorrow as d/w Cardiologist Dr. Daryn Jarvis   - LBP//spondylosis: c/w gabapentin 100mg qHS ( can be adjusted),  cyclobenzaprine 5mg po bid, oxycodone IR 5mg po q6hr prn   - consider Lidocaine patch 4% AA, and adjusting gabapentin   - GI ppx: PPI po daily   - DMII: FS/NISS, A1C% 7.7% (10/7) , goal -180  - SCDs for DVT ppx     Contacts:  Cardiologist Dr. Daryn Jarvis   daughter: Olga Yanez [phone]    POC as d/w 5 Cardiology team/ Dr. Scotty Chambers, and Cards attending Dr. Andrea Julio

## 2023-10-09 NOTE — PROGRESS NOTE ADULT - PROBLEM SELECTOR PLAN 11
TSH at 10.160. Home med synthroid 50mcg    - c/w home med F: none  E: Replace if K < 4, Mag < 2  N: Dash, diet  GI: ppi  DVT: plavix  Dispo: 5uris

## 2023-10-09 NOTE — PROGRESS NOTE ADULT - PROBLEM SELECTOR PLAN 8
continue home Synthroid 50mcg qd  - f/u TSH/T4 in am - tsh >10 continue home Synthroid 50mcg qd. TSH at 10.160. Home med synthroid 50mcg    - c/w home med  - f/u repeat tsh and t4

## 2023-10-09 NOTE — PROGRESS NOTE ADULT - SUBJECTIVE AND OBJECTIVE BOX
O/N: MARK.     SUBJECTIVE:  Patient seen and examined by chief resident and team on AM rounds. Patient appears well. NPO since MN for possible perc mich with IR today. -n/-v/+f/+BM overnight. Reports RUQ/RLQ pain yesterday, none this morning.     MEDICATIONS  (STANDING):  aspirin enteric coated 81 milliGRAM(s) Oral daily  atorvastatin 40 milliGRAM(s) Oral at bedtime  cefTRIAXone   IVPB 2000 milliGRAM(s) IV Intermittent every 24 hours  dextrose 5%. 1000 milliLiter(s) (100 mL/Hr) IV Continuous <Continuous>  dextrose 5%. 1000 milliLiter(s) (50 mL/Hr) IV Continuous <Continuous>  dextrose 50% Injectable 25 Gram(s) IV Push once  dextrose 50% Injectable 25 Gram(s) IV Push once  dextrose 50% Injectable 12.5 Gram(s) IV Push once  gabapentin 100 milliGRAM(s) Oral at bedtime  glucagon  Injectable 1 milliGRAM(s) IntraMuscular once  insulin lispro (ADMELOG) corrective regimen sliding scale   SubCutaneous at bedtime  insulin lispro (ADMELOG) corrective regimen sliding scale   SubCutaneous three times a day before meals  levothyroxine 50 MICROGram(s) Oral <User Schedule>  levothyroxine 100 MICROGram(s) Oral <User Schedule>  metoprolol succinate ER 12.5 milliGRAM(s) Oral daily  metroNIDAZOLE    Tablet 500 milliGRAM(s) Oral every 8 hours  pantoprazole    Tablet 40 milliGRAM(s) Oral at bedtime  senna 2 Tablet(s) Oral daily    MEDICATIONS  (PRN):  bisacodyl Suppository 10 milliGRAM(s) Rectal daily PRN Constipation  cyclobenzaprine 5 milliGRAM(s) Oral two times a day PRN Muscle Spasm  dextrose Oral Gel 15 Gram(s) Oral once PRN Blood Glucose LESS THAN 70 milliGRAM(s)/deciliter  oxyCODONE    IR 5 milliGRAM(s) Oral every 6 hours PRN Severe Pain (7 - 10)      Vital Signs Last 24 Hrs  T(C): 36.5 (09 Oct 2023 12:53), Max: 36.8 (09 Oct 2023 05:20)  T(F): 97.7 (09 Oct 2023 12:53), Max: 98.2 (09 Oct 2023 05:20)  HR: 76 (09 Oct 2023 12:53) (68 - 92)  BP: 106/55 (09 Oct 2023 12:53) (106/55 - 135/81)  BP(mean): 83 (09 Oct 2023 08:38) (83 - 93)  RR: 18 (09 Oct 2023 12:53) (16 - 18)  SpO2: 95% (09 Oct 2023 12:53) (91% - 97%)    Parameters below as of 09 Oct 2023 12:53  Patient On (Oxygen Delivery Method): nasal cannula  O2 Flow (L/min): 1      PHYSICAL EXAM:  Constitutional: A&Ox3  Respiratory: non labored breathing, no respiratory distress. Satting well on 2L NC.   Cardiovascular: NSR, RRR  Gastrointestinal: soft, NT, mildly distended abdomen. No rebound or guarding. Neg murphys sign.   Extremities: (-) edema      I&O's Detail    08 Oct 2023 07:01  -  09 Oct 2023 07:00  --------------------------------------------------------  IN:    Oral Fluid: 120 mL  Total IN: 120 mL    OUT:    Voided (mL): 500 mL  Total OUT: 500 mL    Total NET: -380 mL      09 Oct 2023 07:01  -  09 Oct 2023 14:33  --------------------------------------------------------  IN:  Total IN: 0 mL    OUT:    Oral Fluid: 0 mL    Voided (mL): 100 mL  Total OUT: 100 mL    Total NET: -100 mL          LABS:                        15.9   14.20 )-----------( 249      ( 09 Oct 2023 05:30 )             49.5     10-09    127<L>  |  89<L>  |  18  ----------------------------<  156<H>  3.4<L>   |  23  |  1.01    Ca    9.0      09 Oct 2023 05:30  Phos  3.5     10-09  Mg     2.1     10-09    TPro  7.8  /  Alb  3.2<L>  /  TBili  0.6  /  DBili  x   /  AST  21  /  ALT  24  /  AlkPhos  142<H>  10-09    PT/INR - ( 09 Oct 2023 05:30 )   PT: 13.5 sec;   INR: 1.19          PTT - ( 09 Oct 2023 05:30 )  PTT:32.8 sec  Urinalysis Basic - ( 09 Oct 2023 05:30 )    Color: x / Appearance: x / SG: x / pH: x  Gluc: 156 mg/dL / Ketone: x  / Bili: x / Urobili: x   Blood: x / Protein: x / Nitrite: x   Leuk Esterase: x / RBC: x / WBC x   Sq Epi: x / Non Sq Epi: x / Bacteria: x        RADIOLOGY & ADDITIONAL STUDIES:

## 2023-10-09 NOTE — PROGRESS NOTE ADULT - PROBLEM SELECTOR PLAN 1
Presents w/ BELLO w/ minimal exertion, chest pain, orthopnea (2 pillow use)  x 3 days. proBNP 1279. Prior hx CAD: JOSÉ mLAD in 6/2023. Compliant w/ DAPT aspirin/Plavix. Received Lasix 20mg IV x 1 in ED. EKG nonischemic. Troponin T x 1 negative. s/p lasix 50mg IVP. Cath denied.    Pt presented with acs sxs initially and admitted to cardiology service to r/o cardiac causes. Pt initally required oxygen, and was thought to be secondary to acute chf excerbation. Pt found to have cholecystits on CTAP and sxs are likely due to cholecystits. Pt is not complaining of shortness of breath or chest pain.    - strict I&Os Presents w/ BELLO w/ minimal exertion, chest pain, orthopnea (2 pillow use)  x 3 days. proBNP 1279. Prior hx CAD: JOSÉ mLAD in 6/2023. Compliant w/ DAPT aspirin/Plavix. Received Lasix 20mg IV x 1 in ED. EKG nonischemic. Troponin T x 1 negative. s/p lasix 50mg IVP. Cath denied.    Pt presented with acs sxs initially and admitted to cardiology service to r/o cardiac causes. Pt initally required oxygen, and was thought to be secondary to acute chf excerbation. Pt found to have cholecystits on CTAP and sxs are likely due to cholecystits. Pt is not complaining of shortness of breath or chest pain. Currently on 2L    - continue to manage  - strict I&Os

## 2023-10-09 NOTE — DISCHARGE NOTE PROVIDER - CARE PROVIDER_API CALL
Danielle Shook  [address] [facility] [place] [address]  Phone: [phone]  Fax: (   )    -  Established Patient  Follow Up Time: 1 week   Danielle Shook  [address] [facility] [place] [address]  Phone: [phone]  Fax: (   )    -  Established Patient  Follow Up Time: 1 week   Danielle Shook  [address] [facility] [place] [address]  Phone: [phone]  Fax: (   )    -  Established Patient  Follow Up Time: 1 week   Danielle Shook  [address] [facility] [place] [address]  Phone: [phone]  Fax: (   )    -  Established Patient  Scheduled Appointment: 10/17/2023 09:30 AM   Danielle Shook  [address] [facility] [place] [address]  Phone: [phone]  Fax: (   )    -  Established Patient  Scheduled Appointment: 10/17/2023 09:30 AM   Danielle Shook  [address] [facility] [place] [address]  Phone: [phone]  Fax: (   )    -  Established Patient  Scheduled Appointment: 10/17/2023 09:30 AM   Danielle Shook  [address] [facility] [place] [address]  Phone: [phone]  Fax: (   )    -  Established Patient  Scheduled Appointment: 10/16/2023 09:00 AM   Danielle Shook  [address] [facility] [place] [address]  Phone: [phone]  Fax: (   )    -  Established Patient  Scheduled Appointment: 10/16/2023 09:00 AM   Danielle Shook  [address] [facility] [place] [address]  Phone: [phone]  Fax: (   )    -  Established Patient  Scheduled Appointment: 10/16/2023 09:00 AM   Danielle Shook  [address] [facility] [place] [address]  Phone: [phone]  Fax: (   )    -  Established Patient  Scheduled Appointment: 10/16/2023 09:00 AM    Dl Gomez  Surgery  [address], [address]  [place] [address]  Phone: [phone]  Fax: [phone]  Follow Up Time: 2 weeks   Danielle Shook  [address] [facility] [place] [address]  Phone: [phone]  Fax: (   )    -  Established Patient  Scheduled Appointment: 10/16/2023 09:00 AM    Dl Gomez  Surgery  [address], [address]  [place] [address]  Phone: [phone]  Fax: [phone]  Follow Up Time: 2 weeks   Danielle Shook  [address] [facility] [place] [address]  Phone: [phone]  Fax: (   )    -  Established Patient  Scheduled Appointment: 10/16/2023 09:00 AM    Dl Gomez  Surgery  [address], [address]  [place] [address]  Phone: [phone]  Fax: [phone]  Follow Up Time: 2 weeks   Dl Gomez  Surgery  [address], [address]  [place] [address]  Phone: [phone]  Fax: [phone]  Scheduled Appointment: 10/25/2023 11:30 AM    Danielle Shook  [address] [facility] [place] [address]  Phone: [phone]  Fax: (   )    -  Established Patient  Scheduled Appointment: 10/16/2023 09:00 AM   Dl Gomez  Surgery  [address], [address]  [place] [address]  Phone: [phone]  Fax: [phone]  Scheduled Appointment: 10/25/2023 11:30 AM    Danielle Shook  [address] [facility] [place] [address]  Phone: [phone]  Fax: (   )    -  Established Patient  Scheduled Appointment: 10/16/2023 09:00 AM   Dl Gomez  Surgery  [address], [address]  [place] [address]  Phone: [phone]  Fax: [phone]  Scheduled Appointment: 10/25/2023 11:30 AM    Danielle Shook  [address] [facility] [place] [address]  Phone: [phone]  Fax: (   )    -  Established Patient  Scheduled Appointment: 10/16/2023 09:00 AM   Dl Gomez  Surgery  [address], [address]  [place] [address]  Phone: [phone]  Fax: [phone]  Scheduled Appointment: 10/25/2023 11:30 AM    Danielle Shook  [address] [place] [address]  Phone: [phone]  Fax: (   )    -  Established Patient  Scheduled Appointment: 10/16/2023 09:00 AM    Sofya Post  [address]  [place], [place] [address]  Phone: [phone]  Fax: [phone]  Scheduled Appointment: 10/17/2023 03:00 PM   Dl Gomez  Surgery  [address]  [place] [address]  Phone: [phone]  Fax: [phone]  Scheduled Appointment: 10/25/2023 11:30 AM    Danielle Shook  [address] [place] [address]  Phone: [phone]  Fax: (   )    -  Established Patient  Scheduled Appointment: 10/16/2023 09:00 AM    Sofya Post  [address]  [place], [place] [address]  Phone: [phone]  Fax: [phone]  Scheduled Appointment: 10/17/2023 03:00 PM   Dl Gomez  Surgery  [address], [address]  [place] [address]  Phone: [phone]  Fax: [phone]  Scheduled Appointment: 10/25/2023 11:30 AM    Danielle Shook  [address] [place] [address]  Phone: [phone]  Fax: (   )    -  Established Patient  Scheduled Appointment: 10/16/2023 09:00 AM    Sofya Post  [address]  [place], [place] [address]  Phone: [phone]  Fax: [phone]  Scheduled Appointment: 10/17/2023 03:00 PM   Dl Gomez  Surgery  [address]  [place] [address]  Phone: [phone]  Fax: [phone]  Scheduled Appointment: 10/25/2023 11:30 AM    Danielle Shook  [address] [place] [address]  Phone: [phone]  Fax: (   )    -  Established Patient  Scheduled Appointment: 10/16/2023 09:00 AM    Sofya Post  [address]  [place], [place] [address]  Phone: [phone]  Fax: [phone]  Established Patient  Scheduled Appointment: 10/17/2023 03:00 PM    [facility],   [address],   [place] [address]  Please call to make an appointment for your diabetes and hypothyroidism  Phone: [phone]  Fax: (   )    -  Follow Up Time: 1 week   Dl Gomez  Surgery  [address]  [place] [address]  Phone: [phone]  Fax: [phone]  Scheduled Appointment: 10/25/2023 11:30 AM    Danielle Shook  [address] [place] [address]  Phone: [phone]  Fax: (   )    -  Established Patient  Scheduled Appointment: 10/16/2023 09:00 AM    Sofya Post  [address]  [place], [place] [address]  Phone: [phone]  Fax: [phone]  Established Patient  Scheduled Appointment: 10/17/2023 03:00 PM    [facility],   [address],   [place] [address]  Please call to make an appointment for your diabetes and hypothyroidism  Phone: [phone]  Fax: (   )    -  Follow Up Time: 1 week   Dl Gomez  Surgery  [address], [address]  [place] [address]  Phone: [phone]  Fax: [phone]  Scheduled Appointment: 10/25/2023 11:30 AM    Danielle Shook  [address] [place] [address]  Phone: [phone]  Fax: (   )    -  Established Patient  Scheduled Appointment: 10/16/2023 09:00 AM    Sofya Post  [address]  [place], [place] [address]  Phone: [phone]  Fax: [phone]  Established Patient  Scheduled Appointment: 10/17/2023 03:00 PM    [facility],   [address],   [place] [address]  Please call to make an appointment for your diabetes and hypothyroidism  Phone: [phone]  Fax: (   )    -  Follow Up Time: 1 week

## 2023-10-09 NOTE — DISCHARGE NOTE PROVIDER - NPI NUMBER (FOR SYSADMIN USE ONLY) :
[[id number]] [UNKNOWN],[[id number]] [UNKNOWN],[[id number]] [UNKNOWN],[[id number]] [[id number]],[UNKNOWN] [[id number]],[UNKNOWN] [[id number]],[UNKNOWN] [[id number]],[UNKNOWN],[UNKNOWN] [[id number]],[UNKNOWN],[UNKNOWN] [[id number]],[UNKNOWN],[UNKNOWN] [[id number]],[UNKNOWN],[UNKNOWN],[UNKNOWN] [[id number]],[UNKNOWN],[UNKNOWN],[UNKNOWN] [[id number]],[UNKNOWN],[UNKNOWN],[UNKNOWN]

## 2023-10-10 LAB
ALBUMIN SERPL ELPH-MCNC: 2.8 G/DL — LOW (ref 3.3–5)
ALP SERPL-CCNC: 140 U/L — HIGH (ref 40–120)
ALT FLD-CCNC: 20 U/L — SIGNIFICANT CHANGE UP (ref 10–45)
ANION GAP SERPL CALC-SCNC: 10 MMOL/L — SIGNIFICANT CHANGE UP (ref 5–17)
ANION GAP SERPL CALC-SCNC: 10 MMOL/L — SIGNIFICANT CHANGE UP (ref 5–17)
ANION GAP SERPL CALC-SCNC: 13 MMOL/L — SIGNIFICANT CHANGE UP (ref 5–17)
ANISOCYTOSIS BLD QL: SLIGHT — SIGNIFICANT CHANGE UP
APPEARANCE UR: CLEAR — SIGNIFICANT CHANGE UP
APTT BLD: 33.4 SEC — SIGNIFICANT CHANGE UP (ref 24.5–35.6)
AST SERPL-CCNC: 25 U/L — SIGNIFICANT CHANGE UP (ref 10–40)
BASOPHILS # BLD AUTO: 0.31 K/UL — HIGH (ref 0–0.2)
BASOPHILS NFR BLD AUTO: 2.6 % — HIGH (ref 0–2)
BILIRUB SERPL-MCNC: 0.4 MG/DL — SIGNIFICANT CHANGE UP (ref 0.2–1.2)
BILIRUB UR-MCNC: NEGATIVE — SIGNIFICANT CHANGE UP
BLASTS # FLD: 0.9 % — HIGH (ref 0–0)
BUN SERPL-MCNC: 19 MG/DL — SIGNIFICANT CHANGE UP (ref 7–23)
BURR CELLS BLD QL SMEAR: PRESENT — SIGNIFICANT CHANGE UP
CALCIUM SERPL-MCNC: 8.4 MG/DL — SIGNIFICANT CHANGE UP (ref 8.4–10.5)
CALCIUM SERPL-MCNC: 8.4 MG/DL — SIGNIFICANT CHANGE UP (ref 8.4–10.5)
CALCIUM SERPL-MCNC: 8.7 MG/DL — SIGNIFICANT CHANGE UP (ref 8.4–10.5)
CHLORIDE SERPL-SCNC: 89 MMOL/L — LOW (ref 96–108)
CHLORIDE SERPL-SCNC: 92 MMOL/L — LOW (ref 96–108)
CHLORIDE SERPL-SCNC: 92 MMOL/L — LOW (ref 96–108)
CO2 SERPL-SCNC: 23 MMOL/L — SIGNIFICANT CHANGE UP (ref 22–31)
CO2 SERPL-SCNC: 24 MMOL/L — SIGNIFICANT CHANGE UP (ref 22–31)
CO2 SERPL-SCNC: 24 MMOL/L — SIGNIFICANT CHANGE UP (ref 22–31)
COLOR SPEC: YELLOW — SIGNIFICANT CHANGE UP
CREAT ?TM UR-MCNC: 91 MG/DL — SIGNIFICANT CHANGE UP
CREAT SERPL-MCNC: 1.09 MG/DL — SIGNIFICANT CHANGE UP (ref 0.5–1.3)
CREAT SERPL-MCNC: 1.14 MG/DL — SIGNIFICANT CHANGE UP (ref 0.5–1.3)
CREAT SERPL-MCNC: 1.14 MG/DL — SIGNIFICANT CHANGE UP (ref 0.5–1.3)
DACRYOCYTES BLD QL SMEAR: SLIGHT — SIGNIFICANT CHANGE UP
DIFF PNL FLD: NEGATIVE — SIGNIFICANT CHANGE UP
EGFR: 63 ML/MIN/1.73M2 — SIGNIFICANT CHANGE UP
EGFR: 63 ML/MIN/1.73M2 — SIGNIFICANT CHANGE UP
EGFR: 67 ML/MIN/1.73M2 — SIGNIFICANT CHANGE UP
EOSINOPHIL # BLD AUTO: 0.74 K/UL — HIGH (ref 0–0.5)
EOSINOPHIL NFR BLD AUTO: 6.1 % — HIGH (ref 0–6)
GIANT PLATELETS BLD QL SMEAR: PRESENT — SIGNIFICANT CHANGE UP
GLUCOSE BLDC GLUCOMTR-MCNC: 112 MG/DL — HIGH (ref 70–99)
GLUCOSE BLDC GLUCOMTR-MCNC: 112 MG/DL — HIGH (ref 70–99)
GLUCOSE BLDC GLUCOMTR-MCNC: 134 MG/DL — HIGH (ref 70–99)
GLUCOSE BLDC GLUCOMTR-MCNC: 158 MG/DL — HIGH (ref 70–99)
GLUCOSE BLDC GLUCOMTR-MCNC: 248 MG/DL — HIGH (ref 70–99)
GLUCOSE SERPL-MCNC: 129 MG/DL — HIGH (ref 70–99)
GLUCOSE SERPL-MCNC: 129 MG/DL — HIGH (ref 70–99)
GLUCOSE SERPL-MCNC: 147 MG/DL — HIGH (ref 70–99)
GLUCOSE UR QL: NEGATIVE — SIGNIFICANT CHANGE UP
HCT VFR BLD CALC: 44.6 % — SIGNIFICANT CHANGE UP (ref 39–50)
HGB BLD-MCNC: 14.5 G/DL — SIGNIFICANT CHANGE UP (ref 13–17)
HYPOCHROMIA BLD QL: SIGNIFICANT CHANGE UP
INR BLD: 1.26 — HIGH (ref 0.85–1.18)
KETONES UR-MCNC: NEGATIVE — SIGNIFICANT CHANGE UP
LEUKOCYTE ESTERASE UR-ACNC: NEGATIVE — SIGNIFICANT CHANGE UP
LYMPHOCYTES # BLD AUTO: 1.59 K/UL — SIGNIFICANT CHANGE UP (ref 1–3.3)
LYMPHOCYTES # BLD AUTO: 13.2 % — SIGNIFICANT CHANGE UP (ref 13–44)
MACROCYTES BLD QL: SLIGHT — SIGNIFICANT CHANGE UP
MAGNESIUM SERPL-MCNC: 2.1 MG/DL — SIGNIFICANT CHANGE UP (ref 1.6–2.6)
MANUAL SMEAR VERIFICATION: SIGNIFICANT CHANGE UP
MCHC RBC-ENTMCNC: 20.7 PG — LOW (ref 27–34)
MCHC RBC-ENTMCNC: 32.5 GM/DL — SIGNIFICANT CHANGE UP (ref 32–36)
MCV RBC AUTO: 63.7 FL — LOW (ref 80–100)
MICROCYTES BLD QL: SLIGHT — SIGNIFICANT CHANGE UP
MONOCYTES # BLD AUTO: 1.9 K/UL — HIGH (ref 0–0.9)
MONOCYTES NFR BLD AUTO: 15.8 % — HIGH (ref 2–14)
MYELOCYTES NFR BLD: 0.9 % — HIGH (ref 0–0)
NEUTROPHILS # BLD AUTO: 7.29 K/UL — SIGNIFICANT CHANGE UP (ref 1.8–7.4)
NEUTROPHILS NFR BLD AUTO: 60.5 % — SIGNIFICANT CHANGE UP (ref 43–77)
NITRITE UR-MCNC: NEGATIVE — SIGNIFICANT CHANGE UP
OSMOLALITY UR: 424 MOSM/KG — SIGNIFICANT CHANGE UP (ref 300–900)
OVALOCYTES BLD QL SMEAR: SIGNIFICANT CHANGE UP
PH UR: 6 — SIGNIFICANT CHANGE UP (ref 5–8)
PHOSPHATE SERPL-MCNC: 3 MG/DL — SIGNIFICANT CHANGE UP (ref 2.5–4.5)
PLAT MORPH BLD: NORMAL — SIGNIFICANT CHANGE UP
PLATELET # BLD AUTO: 265 K/UL — SIGNIFICANT CHANGE UP (ref 150–400)
POIKILOCYTOSIS BLD QL AUTO: SIGNIFICANT CHANGE UP
POLYCHROMASIA BLD QL SMEAR: SLIGHT — SIGNIFICANT CHANGE UP
POTASSIUM SERPL-MCNC: 3.8 MMOL/L — SIGNIFICANT CHANGE UP (ref 3.5–5.3)
POTASSIUM SERPL-MCNC: 3.8 MMOL/L — SIGNIFICANT CHANGE UP (ref 3.5–5.3)
POTASSIUM SERPL-MCNC: 4.1 MMOL/L — SIGNIFICANT CHANGE UP (ref 3.5–5.3)
POTASSIUM SERPL-SCNC: 3.8 MMOL/L — SIGNIFICANT CHANGE UP (ref 3.5–5.3)
POTASSIUM SERPL-SCNC: 3.8 MMOL/L — SIGNIFICANT CHANGE UP (ref 3.5–5.3)
POTASSIUM SERPL-SCNC: 4.1 MMOL/L — SIGNIFICANT CHANGE UP (ref 3.5–5.3)
PROT SERPL-MCNC: 6.8 G/DL — SIGNIFICANT CHANGE UP (ref 6–8.3)
PROT UR-MCNC: NEGATIVE MG/DL — SIGNIFICANT CHANGE UP
PROTHROM AB SERPL-ACNC: 14.3 SEC — HIGH (ref 9.5–13)
RBC # BLD: 7 M/UL — HIGH (ref 4.2–5.8)
RBC # FLD: 16.5 % — HIGH (ref 10.3–14.5)
RBC BLD AUTO: ABNORMAL
SCHISTOCYTES BLD QL AUTO: SLIGHT — SIGNIFICANT CHANGE UP
SMUDGE CELLS # BLD: PRESENT — SIGNIFICANT CHANGE UP
SODIUM SERPL-SCNC: 125 MMOL/L — LOW (ref 135–145)
SODIUM SERPL-SCNC: 126 MMOL/L — LOW (ref 135–145)
SODIUM SERPL-SCNC: 126 MMOL/L — LOW (ref 135–145)
SODIUM UR-SCNC: 20 MMOL/L — SIGNIFICANT CHANGE UP
SP GR SPEC: 1.01 — SIGNIFICANT CHANGE UP (ref 1–1.03)
TARGETS BLD QL SMEAR: SLIGHT — SIGNIFICANT CHANGE UP
UROBILINOGEN FLD QL: 0.2 E.U./DL — SIGNIFICANT CHANGE UP
UUN UR-MCNC: 796 MG/DL — SIGNIFICANT CHANGE UP
WBC # BLD: 12.05 K/UL — HIGH (ref 3.8–10.5)
WBC # FLD AUTO: 12.05 K/UL — HIGH (ref 3.8–10.5)

## 2023-10-10 PROCEDURE — 99233 SBSQ HOSP IP/OBS HIGH 50: CPT

## 2023-10-10 PROCEDURE — 99231 SBSQ HOSP IP/OBS SF/LOW 25: CPT

## 2023-10-10 PROCEDURE — 93306 TTE W/DOPPLER COMPLETE: CPT | Mod: 26

## 2023-10-10 RX ORDER — LEVOTHYROXINE SODIUM 125 MCG
75 TABLET ORAL DAILY
Refills: 0 | Status: DISCONTINUED | OUTPATIENT
Start: 2023-10-11 | End: 2023-10-14

## 2023-10-10 RX ORDER — SODIUM CHLORIDE 9 MG/ML
500 INJECTION INTRAMUSCULAR; INTRAVENOUS; SUBCUTANEOUS
Refills: 0 | Status: DISCONTINUED | OUTPATIENT
Start: 2023-10-10 | End: 2023-10-12

## 2023-10-10 RX ORDER — LINAGLIPTIN 5 MG/1
5 TABLET, FILM COATED ORAL DAILY
Refills: 0 | Status: DISCONTINUED | OUTPATIENT
Start: 2023-10-11 | End: 2023-10-12

## 2023-10-10 RX ORDER — SODIUM CHLORIDE 9 MG/ML
500 INJECTION INTRAMUSCULAR; INTRAVENOUS; SUBCUTANEOUS ONCE
Refills: 0 | Status: COMPLETED | OUTPATIENT
Start: 2023-10-10 | End: 2023-10-10

## 2023-10-10 RX ORDER — CEFPODOXIME PROXETIL 100 MG
1 TABLET ORAL
Refills: 0
Start: 2023-10-10

## 2023-10-10 RX ADMIN — Medication 81 MILLIGRAM(S): at 11:23

## 2023-10-10 RX ADMIN — ATORVASTATIN CALCIUM 40 MILLIGRAM(S): 80 TABLET, FILM COATED ORAL at 21:58

## 2023-10-10 RX ADMIN — Medication 500 MILLIGRAM(S): at 06:44

## 2023-10-10 RX ADMIN — Medication 2: at 17:35

## 2023-10-10 RX ADMIN — Medication 500 MILLIGRAM(S): at 13:43

## 2023-10-10 RX ADMIN — Medication 4: at 12:04

## 2023-10-10 RX ADMIN — CEFTRIAXONE 100 MILLIGRAM(S): 500 INJECTION, POWDER, FOR SOLUTION INTRAMUSCULAR; INTRAVENOUS at 17:35

## 2023-10-10 RX ADMIN — Medication 12.5 MILLIGRAM(S): at 06:44

## 2023-10-10 RX ADMIN — SODIUM CHLORIDE 1000 MILLILITER(S): 9 INJECTION INTRAMUSCULAR; INTRAVENOUS; SUBCUTANEOUS at 17:39

## 2023-10-10 RX ADMIN — Medication 100 MICROGRAM(S): at 05:37

## 2023-10-10 RX ADMIN — PANTOPRAZOLE SODIUM 40 MILLIGRAM(S): 20 TABLET, DELAYED RELEASE ORAL at 21:58

## 2023-10-10 RX ADMIN — GABAPENTIN 100 MILLIGRAM(S): 400 CAPSULE ORAL at 21:58

## 2023-10-10 RX ADMIN — SODIUM CHLORIDE 100 MILLILITER(S): 9 INJECTION INTRAMUSCULAR; INTRAVENOUS; SUBCUTANEOUS at 21:59

## 2023-10-10 RX ADMIN — SENNA PLUS 2 TABLET(S): 8.6 TABLET ORAL at 11:23

## 2023-10-10 RX ADMIN — Medication 500 MILLIGRAM(S): at 21:58

## 2023-10-10 NOTE — PROGRESS NOTE ADULT - ATTENDING COMMENTS
Patient seems to be improving with Abx.  RUQ pain and tenderness has subsided significantly.  Tolerating regular diet.  Will reevaluate tomorrow. Possibly will not need percutaneous cholecystostomy.

## 2023-10-10 NOTE — PROGRESS NOTE ADULT - NS ATTEND AMEND GEN_ALL_CORE FT
Pt seen on rounds this afternoon.  Glucoses were in the higher part of the target range yesterday evening (150-175) and the pre-lunch fingerstick spiked to 248 today--likely due to a non-carb consistent diet  The cholecystostomy tube has not yet been placed.    He is now willing to start on an oral agent for the type 2 DM, and with Januvia unavailable in the hospital will start Tradjenta 5 mg/day  Continue sliding scale coverage  Continue levothyroxine 75 mcg/day

## 2023-10-10 NOTE — PROGRESS NOTE ADULT - PROBLEM SELECTOR PLAN 1
Presents w/ BELLO w/ minimal exertion, chest pain, orthopnea (2 pillow use)  x 3 days. proBNP 1279. Prior hx CAD: JOSÉ mLAD in 6/2023. Compliant w/ DAPT aspirin/Plavix. Received Lasix 20mg IV x 1 in ED. EKG nonischemic. Troponin T x 1 negative. s/p lasix 50mg IVP. Cath denied.    Pt presented with acs sxs initially and admitted to cardiology service to r/o cardiac causes. Pt initally required oxygen, and was thought to be secondary to acute chf excerbation. Pt found to have cholecystits on CTAP and sxs are likely due to cholecystits. Pt is not complaining of shortness of breath or chest pain. Currently on 2L    - continue to manage  - strict I&Os

## 2023-10-10 NOTE — PROGRESS NOTE ADULT - PROBLEM SELECTOR PLAN 1
TSH 10.5, free thyroxine 1.58  - Was on alternating levothyroxine doses of 50mcg and 100mcg, and not taking consistently.   - INCREASE levothyroxine to 75 mcg oral daily to simplify regimen. (Instead of alternating doses)  - Recheck TSH and Free T4 in 4-6 weeks outpatient.    - He should continue to follow-up with his endocrinologist for further titration of his levothyroxine. TSH 10.5, free thyroxine 1.58  - Was on alternating levothyroxine doses of 50mcg and 100mcg, and not taking consistently.   - Change levothyroxine to 75 mcg oral daily to simplify regimen. (Instead of alternating doses)  - Recheck TSH and Free T4 in 4-6 weeks outpatient.    - He should continue to follow-up with his endocrinologist for further titration of his levothyroxine.

## 2023-10-10 NOTE — PROGRESS NOTE ADULT - PROBLEM SELECTOR PLAN 2
Reports sharp pinpoint RUQ discomfort worsened with palpation x 1-2 days. Last BM 10/3 w/ suppository at home. Daughter reports decreased eating and fluid intake at home lately.  Has had issues with constipation. Has been taking Dulcolax x 2 weeks at home. CXR abd for evaluation of constipation  RUQUS: cholelithiasis  CTAP: acute cholecystitis with fat stranding    - surgery consulted, f/u recs  - per surgery, recommended IR consult for perc mich, and to continue ctx and flagyl  - IR consulted, and recommended hida scan, and to see pt's response to abx treatment  - f/u HIDA scan Reports sharp pinpoint RUQ discomfort worsened with palpation x 1-2 days. Last BM 10/3 w/ suppository at home. Daughter reports decreased eating and fluid intake at home lately.  Has had issues with constipation. Has been taking Dulcolax x 2 weeks at home. CXR abd for evaluation of constipation  RUQUS: cholelithiasis  CTAP: acute cholecystitis with fat stranding  HIDA: non visualization of the gallbladder, likely representing acute cholecystitis    - surgery consulted, f/u recs  - per surgery, recommended IR consult for perc mich, and to continue ctx and flagyl  - IR consulted, f/u recs  - f/u HIDA scan Reports sharp pinpoint RUQ discomfort worsened with palpation x 1-2 days. Last BM 10/3 w/ suppository at home. Daughter reports decreased eating and fluid intake at home lately.  RUQUS: cholelithiasis  CTAP: acute cholecystitis with fat stranding  HIDA: non visualization of the gallbladder, likely representing acute cholecystitis  Pt currently day 2 of holding plavix 10/10    - surgery consulted, f/u recs  - per surgery, recommended IR consult for perc mich, and to continue ctx and flagyl  - IR consulted, f/u recs  - f/u HIDA scan

## 2023-10-10 NOTE — PROGRESS NOTE ADULT - PROBLEM SELECTOR PLAN 2
Type 2 diabetes mellitus with hyperglycemia  - Please CHANGE diet to consistent carbohydrate.   - Please continue with lispro moderate dose sliding scale before meals and at bedtime for now.   - Patient's fingerstick glucose goal is 100-180 mg/dL.    - Discharge recommendations to be discussed.   - Patient can follow up at discharge with [facility] by calling [phone] to make an appointment.      Case discussed with Dr. Ness. Primary team updated. Type 2 diabetes mellitus with hyperglycemia  - Please CHANGE diet to consistent carbohydrate.   - Please continue with lispro moderate dose sliding scale before meals and at bedtime for now.   - Patient's fingerstick glucose goal is 100-180 mg/dL.    - Discharge recommendations to be discussed.   - Patient can follow up at discharge with [facility] by calling [phone] to make an appointment.      Case discussed with Dr. Ness. Primary team updated. Type 2 diabetes mellitus with hyperglycemia  - Please CHANGE diet to consistent carbohydrate.   - Please continue with lispro moderate dose sliding scale before meals and at bedtime for now.   - Patient's fingerstick glucose goal is 100-180 mg/dL.    - Discharge recommendations to be discussed.   - Patient can follow up at discharge with [facility] by calling [phone] to make an appointment.      Case discussed with Dr. Ness. Primary team updated. Type 2 diabetes mellitus with hyperglycemia  - Please start tradjenta 5mg daily before breakfast.  - Please CHANGE diet to consistent carbohydrate.   - Please continue with lispro moderate dose sliding scale before meals and at bedtime for now.   - Patient's fingerstick glucose goal is 100-180 mg/dL.    - Discharge recommendations to be discussed; likely DDP 4  - Patient can follow up at discharge with [facility] by calling [phone] to make an appointment.      Case discussed with Dr. Ness. Primary team updated. Type 2 diabetes mellitus with hyperglycemia  - Please start tradjenta 5mg daily before breakfast.  - Please CHANGE diet to consistent carbohydrate.   - Please continue with lispro moderate dose sliding scale before meals and at bedtime for now.   - Patient's fingerstick glucose goal is 100-180 mg/dL.    - Discharge recommendations to be discussed; likely DDP 4  - Patient can follow up at discharge with [facility] by calling [phone] to make an appointment.      Case discussed with Dr. Ness. Primary team updated. Type 2 diabetes mellitus with hyperglycemia  - Please start tradjenta 5mg daily before breakfast.  - Please CHANGE diet to consistent carbohydrate.   - Please continue with lispro moderate dose sliding scale before meals and at bedtime for now.   - Patient's fingerstick glucose goal is 100-180 mg/dL.    - Discharge recommendations to be discussed; likely DDP 4  - Patient can follow up at discharge with [facility] by calling [phone] to make an appointment.      Case discussed with Dr. Ness. Primary team updated.

## 2023-10-10 NOTE — PROGRESS NOTE ADULT - ASSESSMENT
87M with PMHx CAD s/p JOSÉ to mLAD (6/2023), HTN, HLD, cervical spondylosis s/p laminectomy, gastric ulcers, hypothyroidism,  constipation prior [facility] admission for L leg pain, presented to [facility] ED 10/6/23 for BELLO with minimal exertion, chest pressure, and orthopnea x 3 days and fever of 101.8*F yesterday but resolved with Tylenol with no recurrence since. Reported constipation at home with response after suppository at home on Tuesday (10/3), however, no BM since then. Does report pinpoint RLQ abdominal discomfort worse with palpation. 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 His appetite and fluid intake has decreased as well. Pt was admitted to Cardiology service/[facility] for acute hypoxic respiratory failure 2/2 acute HFpEF (BNP 1279) s/p lasix 20mg iv x1 at ED(10/6) and fever without clinical evidence of PNA (no cough/sputum) but RLQ pain and constipation with CT (10/7) findings of a gallstone ~1.8cm at the gallbladder neck and associated wall thickening and fat stranding.     - Off Ox, s/p Lasix 20mg iv (10/6)-> 40mg iv daily (10/7-10/9)   - f/u UO -1340ml total since adm  - Surgery f/u appreciated, d/w Dr. Gomez re: acute cholecystitis that pt is clinically improving that he would benefit from an elective lap mich in a few weeks, and since pt is responding to the iv abx with improving abd pain and WBC 12K, pt may not need perc mich as we initially planned. continue to hold Plavix ( last dose Sun 10/8), NPO after MN, check WBC in am, if pt continues to be stable, he may not need perc mich by IR and actually may be able to go home on oral abx for total 2 weeks and of course resume Plavix ( again hold for 5 days before an elective lap mich with cardiac and medical clearance )   - CT findings of gallstone ~ 1.8cm at the gallbladder neck and associated wall thickening and fat stranding ( Pt on Clopidogrel which needs to be kept off for at least 5 days and will need Cardiology clearance , s/p PCI to mLAD 6/2023)   - IR consult appreciated,d/w Dr.Tat Burnett , HIDA scan (10/9) positive but will need to hold Plavix for 5 days and he is clinically improving, will re-evaluate tomorrow but likely not need for perc mich as d/w IR Dr. Burnett and Surgeon  - Ceftriaxone 1gm iv q24hr (10/6-10/7), increased to 2gm iv q24hr ( 10/8-) Day 5, may change to oral Cefpodoxime 200mg po q12hr upon d/c for total 2 weeks course   - added Metronidazole 500mg po q8hr ( 10/8-) Day 3  - discontinued Azithromycin 500mg iv q24hr ( 10/6-10/7)   - trend WBC 16.26K(10/6)-> 15.18K(10/7)-> 12.76K (10/8)-> 14.2K(10/9)-> 12.05(10/10)   - pro-calcitonin 0.24  - BCx: ngtd x 2 days   - CTAP reviewed   - Bowel regimen for constipation : + BM after suppository yesterday ( 10/8)   - Hyponatremia: Na 126(10/6)-> 127(10/7)-> 124( 10/8)- >128(10/8)-> 127(10/9) -> 125(10/10), please check urine lytes   - c/w lasix 20mg iv ( 10/6)->  lasix 40mg iv daily,  daily BMP to trend Na, replete potassium K+ 3.4, keep K+>4 and Mg>2   - U osm 274, U Na 59( 10/9)   - Endocrine f/u appreciated re: Elevated TSH/hypothyroidism: 10.5%-> 10.16%-> 6.99,, c/w levothyroxine 50mcg po daily  - CADs/p PCI mLAD 6/2023. c/w DAPT: ASA/Clopidogrel, metoprolol succinate 12.5mg daily, Atorvastatin 40mg qHS , Pt not going for LHC  as d/w Cardiologist Dr. Daryn Jarvis   - LBP//spondylosis: c/w gabapentin 100mg qHS ( can be adjusted),  cyclobenzaprine 5mg po bid, oxycodone IR 5mg po q6hr prn   - consider Lidocaine patch 4% AA, and adjusting gabapentin   - GI ppx: PPI po daily   - DMII: FS/NISS, A1C% 7.7% (10/7) , goal -180  - SCDs for DVT ppx     Contacts:  Cardiologist Dr. Daryn Jarvis   daughter: Olga Yanez [phone]    Disposition: medically improving, c/w iv Ceftriaxone/po Metronidazole, c/w holding Plavix, trend WBC in am and finalize decision of if perc mich still warranted ( unlikely as d/w IR Dr. Burnett and Surgeon Dr. Gomez since pt clinically improving), needs to address hyponatremia to make sure it's stable to 130's prior safe discharge     POC as d/w [facility] Cardiology team/ Dr. Scotty Chambers/Dr. Marva Day, and Cards attending Dr. Andrea Julio , and Interventional Cards Dr. Jarvis, IR Dr. Burnett and Surgeon Dr. Gomez      spoke .w daughter/RN Olga Yanez  87M with PMHx CAD s/p JOSÉ to mLAD (6/2023), HTN, HLD, cervical spondylosis s/p laminectomy, gastric ulcers, hypothyroidism,  constipation prior [facility] admission for L leg pain, presented to [facility] ED 10/6/23 for BELLO with minimal exertion, chest pressure, and orthopnea x 3 days and fever of 101.8*F yesterday but resolved with Tylenol with no recurrence since. Reported constipation at home with response after suppository at home on Tuesday (10/3), however, no BM since then. Does report pinpoint RLQ abdominal discomfort worse with palpation. 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 His appetite and fluid intake has decreased as well. Pt was admitted to Cardiology service/[facility] for acute hypoxic respiratory failure 2/2 acute HFpEF (BNP 1279) s/p lasix 20mg iv x1 at ED(10/6) and fever without clinical evidence of PNA (no cough/sputum) but RLQ pain and constipation with CT (10/7) findings of a gallstone ~1.8cm at the gallbladder neck and associated wall thickening and fat stranding.     - Off Ox, s/p Lasix 20mg iv (10/6)-> 40mg iv daily (10/7-10/9)   - f/u UO -1340ml total since adm  - Surgery f/u appreciated, d/w Dr. Gomez re: acute cholecystitis that pt is clinically improving that he would benefit from an elective lap mich in a few weeks, and since pt is responding to the iv abx with improving abd pain and WBC 12K, pt may not need perc mich as we initially planned. continue to hold Plavix ( last dose Sun 10/8), NPO after MN, check WBC in am, if pt continues to be stable, he may not need perc mich by IR and actually may be able to go home on oral abx for total 2 weeks and of course resume Plavix ( again hold for 5 days before an elective lap mich with cardiac and medical clearance )   - CT findings of gallstone ~ 1.8cm at the gallbladder neck and associated wall thickening and fat stranding ( Pt on Clopidogrel which needs to be kept off for at least 5 days and will need Cardiology clearance , s/p PCI to mLAD 6/2023)   - IR consult appreciated,d/w Dr.Tat Burnett , HIDA scan (10/9) positive but will need to hold Plavix for 5 days and he is clinically improving, will re-evaluate tomorrow but likely not need for perc mich as d/w IR Dr. Burnett and Surgeon  - Ceftriaxone 1gm iv q24hr (10/6-10/7), increased to 2gm iv q24hr ( 10/8-) Day 5, may change to oral Cefpodoxime 200mg po q12hr upon d/c for total 2 weeks course   - added Metronidazole 500mg po q8hr ( 10/8-) Day 3  - discontinued Azithromycin 500mg iv q24hr ( 10/6-10/7)   - trend WBC 16.26K(10/6)-> 15.18K(10/7)-> 12.76K (10/8)-> 14.2K(10/9)-> 12.05(10/10)   - pro-calcitonin 0.24  - BCx: ngtd x 2 days   - CTAP reviewed   - Bowel regimen for constipation : + BM after suppository yesterday ( 10/8)   - Hyponatremia: Na 126(10/6)-> 127(10/7)-> 124( 10/8)- >128(10/8)-> 127(10/9) -> 125(10/10), please check urine lytes   - c/w lasix 20mg iv ( 10/6)->  lasix 40mg iv daily,  daily BMP to trend Na, replete potassium K+ 3.4, keep K+>4 and Mg>2   - U osm 274, U Na 59( 10/9)   - Endocrine f/u appreciated re: Elevated TSH/hypothyroidism: 10.5%-> 10.16%-> 6.99,, c/w levothyroxine 50mcg po daily  - CADs/p PCI mLAD 6/2023. c/w DAPT: ASA/Clopidogrel, metoprolol succinate 12.5mg daily, Atorvastatin 40mg qHS , Pt not going for LHC  as d/w Cardiologist Dr. Daryn Jarvis   - LBP//spondylosis: c/w gabapentin 100mg qHS ( can be adjusted),  cyclobenzaprine 5mg po bid, oxycodone IR 5mg po q6hr prn   - consider Lidocaine patch 4% AA, and adjusting gabapentin   - GI ppx: PPI po daily   - DMII: FS/NISS, A1C% 7.7% (10/7) , goal -180  - SCDs for DVT ppx     Contacts:  Cardiologist Dr. Daryn Jarvis   daughter: Olga Yanez [phone]    Disposition: medically improving, c/w iv Ceftriaxone/po Metronidazole, c/w holding Plavix, trend WBC in am and finalize decision of if perc mich still warranted ( unlikely as d/w IR Dr. Burnett and Surgeon Dr. Gomez since pt clinically improving), needs to address hyponatremia to make sure it's stable to 130's prior safe discharge     POC as d/w [facility] Cardiology team/ Dr. Scotty Chambers/Dr. Marva Day, and Cards attending Dr. Andrea Julio , and Interventional Cards Dr. Jarvis, IR Dr. Burnett and Surgeon Dr. Gomez      spoke .w daughter/RN Olga Yanez  87M with PMHx CAD s/p JOSÉ to mLAD (6/2023), HTN, HLD, cervical spondylosis s/p laminectomy, gastric ulcers, hypothyroidism,  constipation prior [facility] admission for L leg pain, presented to [facility] ED 10/6/23 for BELLO with minimal exertion, chest pressure, and orthopnea x 3 days and fever of 101.8*F yesterday but resolved with Tylenol with no recurrence since. Reported constipation at home with response after suppository at home on Tuesday (10/3), however, no BM since then. Does report pinpoint RLQ abdominal discomfort worse with palpation. 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 His appetite and fluid intake has decreased as well. Pt was admitted to Cardiology service/[facility] for acute hypoxic respiratory failure 2/2 acute HFpEF (BNP 1279) s/p lasix 20mg iv x1 at ED(10/6) and fever without clinical evidence of PNA (no cough/sputum) but RLQ pain and constipation with CT (10/7) findings of a gallstone ~1.8cm at the gallbladder neck and associated wall thickening and fat stranding.     - Off Ox, s/p Lasix 20mg iv (10/6)-> 40mg iv daily (10/7-10/9)   - f/u UO -1340ml total since adm  - Surgery f/u appreciated, d/w Dr. Gomez re: acute cholecystitis that pt is clinically improving that he would benefit from an elective lap mich in a few weeks, and since pt is responding to the iv abx with improving abd pain and WBC 12K, pt may not need perc mich as we initially planned. continue to hold Plavix ( last dose Sun 10/8), NPO after MN, check WBC in am, if pt continues to be stable, he may not need perc mich by IR and actually may be able to go home on oral abx for total 2 weeks and of course resume Plavix ( again hold for 5 days before an elective lap mich with cardiac and medical clearance )   - CT findings of gallstone ~ 1.8cm at the gallbladder neck and associated wall thickening and fat stranding ( Pt on Clopidogrel which needs to be kept off for at least 5 days and will need Cardiology clearance , s/p PCI to mLAD 6/2023)   - IR consult appreciated,d/w Dr.Tat Burnett , HIDA scan (10/9) positive but will need to hold Plavix for 5 days and he is clinically improving, will re-evaluate tomorrow but likely not need for perc mich as d/w IR Dr. Burnett and Surgeon  - Ceftriaxone 1gm iv q24hr (10/6-10/7), increased to 2gm iv q24hr ( 10/8-) Day 5, may change to oral Cefpodoxime 200mg po q12hr upon d/c for total 2 weeks course   - added Metronidazole 500mg po q8hr ( 10/8-) Day 3  - discontinued Azithromycin 500mg iv q24hr ( 10/6-10/7)   - trend WBC 16.26K(10/6)-> 15.18K(10/7)-> 12.76K (10/8)-> 14.2K(10/9)-> 12.05(10/10)   - pro-calcitonin 0.24  - BCx: ngtd x 2 days   - CTAP reviewed   - Bowel regimen for constipation : + BM after suppository yesterday ( 10/8)   - Hyponatremia: Na 126(10/6)-> 127(10/7)-> 124( 10/8)- >128(10/8)-> 127(10/9) -> 125(10/10), please check urine lytes   - c/w lasix 20mg iv ( 10/6)->  lasix 40mg iv daily,  daily BMP to trend Na, replete potassium K+ 3.4, keep K+>4 and Mg>2   - U osm 274, U Na 59( 10/9)   - Endocrine f/u appreciated re: Elevated TSH/hypothyroidism: 10.5%-> 10.16%-> 6.99,, c/w levothyroxine 50mcg po daily  - CADs/p PCI mLAD 6/2023. c/w DAPT: ASA/Clopidogrel, metoprolol succinate 12.5mg daily, Atorvastatin 40mg qHS , Pt not going for LHC  as d/w Cardiologist Dr. Daryn Jarvis   - LBP//spondylosis: c/w gabapentin 100mg qHS ( can be adjusted),  cyclobenzaprine 5mg po bid, oxycodone IR 5mg po q6hr prn   - consider Lidocaine patch 4% AA, and adjusting gabapentin   - GI ppx: PPI po daily   - DMII: FS/NISS, A1C% 7.7% (10/7) , goal -180  - SCDs for DVT ppx     Contacts:  Cardiologist Dr. Daryn Jarvis   daughter: Olga Yanez [phone]    Disposition: medically improving, c/w iv Ceftriaxone/po Metronidazole, c/w holding Plavix, trend WBC in am and finalize decision of if perc mich still warranted ( unlikely as d/w IR Dr. Burnett and Surgeon Dr. Gomez since pt clinically improving), needs to address hyponatremia to make sure it's stable to 130's prior safe discharge     POC as d/w [facility] Cardiology team/ Dr. Scotty Chambers/Dr. Marva Day, and Cards attending Dr. Andrea Julio , and Interventional Cards Dr. Jarvis, IR Dr. Burnett and Surgeon Dr. Gomez      spoke .w daughter/RN Olga Yanez

## 2023-10-10 NOTE — PROGRESS NOTE ADULT - ASSESSMENT
87M w/ PMHx CAD s/p JOSÉ (6/23), HTN, HLD, PUD, hypothyroidism, PSHx C spine laminectomy, general surgery consulted for further w/u of abdominal pain. Found to have acute cholecystitis. Poor surgical candidate at this time, recommend nonsurgical management.     Recommendations:  - Poor candidate for cholecystectomy at this time  - IR consult for possible percutaneous cholecystostomy tube  - c/w CTX/Flagyl   Team 4c will continue to follow, please call with any questions/concerns.   Plan discussed with chief resident and attending surgeon.

## 2023-10-10 NOTE — PROGRESS NOTE ADULT - SUBJECTIVE AND OBJECTIVE BOX
*****INCOMPLETE NOTE*****    INTERVAL HPI/OVERNIGHT EVENTS: adam    SUBJECTIVE: Patient seen and examined at bedside, resting comfortably in bed, and does not appear to be in any acute distress. Patient states  Patient denies any recent fevers, chills, nausea, vomiting, headache, acute sob, chest pain, abdominal pain, genitourinary sx, extremity pain or swelling.     ANTIBIOTICS/RELEVANT:    MEDICATIONS  (STANDING):  aspirin enteric coated 81 milliGRAM(s) Oral daily  atorvastatin 40 milliGRAM(s) Oral at bedtime  cefTRIAXone   IVPB 2000 milliGRAM(s) IV Intermittent every 24 hours  dextrose 5%. 1000 milliLiter(s) (50 mL/Hr) IV Continuous <Continuous>  dextrose 5%. 1000 milliLiter(s) (100 mL/Hr) IV Continuous <Continuous>  dextrose 50% Injectable 25 Gram(s) IV Push once  dextrose 50% Injectable 25 Gram(s) IV Push once  dextrose 50% Injectable 12.5 Gram(s) IV Push once  gabapentin 100 milliGRAM(s) Oral at bedtime  glucagon  Injectable 1 milliGRAM(s) IntraMuscular once  insulin lispro (ADMELOG) corrective regimen sliding scale   SubCutaneous at bedtime  insulin lispro (ADMELOG) corrective regimen sliding scale   SubCutaneous three times a day before meals  levothyroxine 50 MICROGram(s) Oral <User Schedule>  levothyroxine 100 MICROGram(s) Oral <User Schedule>  metoprolol succinate ER 12.5 milliGRAM(s) Oral daily  metroNIDAZOLE    Tablet 500 milliGRAM(s) Oral every 8 hours  pantoprazole    Tablet 40 milliGRAM(s) Oral at bedtime  senna 2 Tablet(s) Oral daily    MEDICATIONS  (PRN):  bisacodyl Suppository 10 milliGRAM(s) Rectal daily PRN Constipation  cyclobenzaprine 5 milliGRAM(s) Oral two times a day PRN Muscle Spasm  dextrose Oral Gel 15 Gram(s) Oral once PRN Blood Glucose LESS THAN 70 milliGRAM(s)/deciliter  oxyCODONE    IR 5 milliGRAM(s) Oral every 6 hours PRN Severe Pain (7 - 10)      Vital Signs Last 24 Hrs  T(C): 36.4 (10 Oct 2023 05:13), Max: 36.8 (09 Oct 2023 20:39)  T(F): 97.5 (10 Oct 2023 05:13), Max: 98.3 (09 Oct 2023 20:39)  HR: 80 (10 Oct 2023 06:42) (72 - 83)  BP: 130/69 (10 Oct 2023 06:42) (100/58 - 134/80)  BP(mean): 94 (10 Oct 2023 06:42) (77 - 99)  RR: 16 (10 Oct 2023 05:13) (16 - 18)  SpO2: 91% (10 Oct 2023 05:13) (91% - 97%)    Parameters below as of 10 Oct 2023 05:13  Patient On (Oxygen Delivery Method): nasal cannula  O2 Flow (L/min): 1      PHYSICAL EXAM:  General: in no acute distress  Eyes: EOMI intact bilaterally. Anicteric sclerae, moist conjunctivae  HENT: Moist mucous membranes  Neck: Trachea midline, supple  Lungs: CTA B/L. No wheezes, rales, or rhonchi  Cardiovascular: RRR. No murmurs, rubs, or gallops  Abdomen: Soft, non-tender non-distended; No rebound or guarding  Extremities: WWP, No clubbing, cyanosis or edema  Neurological: Alert and oriented  Skin: Warm and dry. No obvious rash     LABS:                        15.9   14.20 )-----------( 249      ( 09 Oct 2023 05:30 )             49.5     10-09    127<L>  |  89<L>  |  18  ----------------------------<  156<H>  3.4<L>   |  23  |  1.01    Ca    9.0      09 Oct 2023 05:30  Phos  3.5     10-09  Mg     2.1     10-09    TPro  7.8  /  Alb  3.2<L>  /  TBili  0.6  /  DBili  x   /  AST  21  /  ALT  24  /  AlkPhos  142<H>  10-09    PT/INR - ( 09 Oct 2023 05:30 )   PT: 13.5 sec;   INR: 1.19          PTT - ( 09 Oct 2023 05:30 )  PTT:32.8 sec  Urinalysis Basic - ( 09 Oct 2023 05:30 )    Color: x / Appearance: x / SG: x / pH: x  Gluc: 156 mg/dL / Ketone: x  / Bili: x / Urobili: x   Blood: x / Protein: x / Nitrite: x   Leuk Esterase: x / RBC: x / WBC x   Sq Epi: x / Non Sq Epi: x / Bacteria: x        MICROBIOLOGY:    RADIOLOGY & ADDITIONAL STUDIES: INTERVAL HPI/OVERNIGHT EVENTS: adam    SUBJECTIVE: Patient seen and examined at bedside, resting comfortably in bed, and does not appear to be in any acute distress. Patient states that his abdominal pain is worsening. Denies any chest pain, shortness of breath, headaches, dizziness or gu sxs.    MEDICATIONS  (STANDING):  aspirin enteric coated 81 milliGRAM(s) Oral daily  atorvastatin 40 milliGRAM(s) Oral at bedtime  cefTRIAXone   IVPB 2000 milliGRAM(s) IV Intermittent every 24 hours  dextrose 5%. 1000 milliLiter(s) (50 mL/Hr) IV Continuous <Continuous>  dextrose 5%. 1000 milliLiter(s) (100 mL/Hr) IV Continuous <Continuous>  dextrose 50% Injectable 25 Gram(s) IV Push once  dextrose 50% Injectable 25 Gram(s) IV Push once  dextrose 50% Injectable 12.5 Gram(s) IV Push once  gabapentin 100 milliGRAM(s) Oral at bedtime  glucagon  Injectable 1 milliGRAM(s) IntraMuscular once  insulin lispro (ADMELOG) corrective regimen sliding scale   SubCutaneous at bedtime  insulin lispro (ADMELOG) corrective regimen sliding scale   SubCutaneous three times a day before meals  levothyroxine 50 MICROGram(s) Oral <User Schedule>  levothyroxine 100 MICROGram(s) Oral <User Schedule>  metoprolol succinate ER 12.5 milliGRAM(s) Oral daily  metroNIDAZOLE    Tablet 500 milliGRAM(s) Oral every 8 hours  pantoprazole    Tablet 40 milliGRAM(s) Oral at bedtime  senna 2 Tablet(s) Oral daily    MEDICATIONS  (PRN):  bisacodyl Suppository 10 milliGRAM(s) Rectal daily PRN Constipation  cyclobenzaprine 5 milliGRAM(s) Oral two times a day PRN Muscle Spasm  dextrose Oral Gel 15 Gram(s) Oral once PRN Blood Glucose LESS THAN 70 milliGRAM(s)/deciliter  oxyCODONE    IR 5 milliGRAM(s) Oral every 6 hours PRN Severe Pain (7 - 10)      Vital Signs Last 24 Hrs  T(C): 36.4 (10 Oct 2023 05:13), Max: 36.8 (09 Oct 2023 20:39)  T(F): 97.5 (10 Oct 2023 05:13), Max: 98.3 (09 Oct 2023 20:39)  HR: 80 (10 Oct 2023 06:42) (72 - 83)  BP: 130/69 (10 Oct 2023 06:42) (100/58 - 134/80)  BP(mean): 94 (10 Oct 2023 06:42) (77 - 99)  RR: 16 (10 Oct 2023 05:13) (16 - 18)  SpO2: 91% (10 Oct 2023 05:13) (91% - 97%)    Parameters below as of 10 Oct 2023 05:13  Patient On (Oxygen Delivery Method): nasal cannula  O2 Flow (L/min): 1      PHYSICAL EXAM:  General: in no acute distress  Eyes: EOMI intact bilaterally  HENT: Moist mucous membranes  Neck: Trachea midline, supple  Lungs: CTA B/L. No wheezes, rales, or rhonchi  Cardiovascular: RRR. No murmurs, rubs, or gallops  Abdomen: soft, mild tenderness to palpation of the right upper quadrant  Extremities: WWP  Neurological: Alert and oriented  Skin: Warm and dry    LABS:                        15.9   14.20 )-----------( 249      ( 09 Oct 2023 05:30 )             49.5     10-09    127<L>  |  89<L>  |  18  ----------------------------<  156<H>  3.4<L>   |  23  |  1.01    Ca    9.0      09 Oct 2023 05:30  Phos  3.5     10-09  Mg     2.1     10-09    TPro  7.8  /  Alb  3.2<L>  /  TBili  0.6  /  DBili  x   /  AST  21  /  ALT  24  /  AlkPhos  142<H>  10-09    PT/INR - ( 09 Oct 2023 05:30 )   PT: 13.5 sec;   INR: 1.19          PTT - ( 09 Oct 2023 05:30 )  PTT:32.8 sec  Urinalysis Basic - ( 09 Oct 2023 05:30 )    Color: x / Appearance: x / SG: x / pH: x  Gluc: 156 mg/dL / Ketone: x  / Bili: x / Urobili: x   Blood: x / Protein: x / Nitrite: x   Leuk Esterase: x / RBC: x / WBC x   Sq Epi: x / Non Sq Epi: x / Bacteria: x        MICROBIOLOGY:    RADIOLOGY & ADDITIONAL STUDIES:

## 2023-10-10 NOTE — PROGRESS NOTE ADULT - SUBJECTIVE AND OBJECTIVE BOX
Patient is a 87y old  Male who presents with a chief complaint of chest pain, SOB (10 Oct 2023 12:43)    INTERVAL EVENTS: NAEON    SUBJECTIVE:  Patient was seen and examined at bedside. in good spirits, had 3 BMs yesterday, no diarrhea. Reports RUQ pain only with palpation by doctors. Denies CP,SOB,etc. fair appetite but finished [place] for dinner last night     Review of systems: No fever, chills, dizziness, HA, Changes in vision, CP, dyspnea, nausea or vomiting, dysuria, changes in bowel movements, LE edema. Rest of 12 point Review of systems negative unless otherwise documented elsewhere in note.     Diet, NPO after Midnight:      NPO Start Date: 10-Oct-2023,   NPO Start Time: 23:59  Except Medications (10-10-23 @ 12:03) [Active]  Diet, DASH/TLC:   Sodium & Cholesterol Restricted  Consistent Carbohydrate No Snacks (CSTCHO) (10-10-23 @ 10:00) [Active]      MEDICATIONS:  MEDICATIONS  (STANDING):  aspirin enteric coated 81 milliGRAM(s) Oral daily  atorvastatin 40 milliGRAM(s) Oral at bedtime  cefTRIAXone   IVPB 2000 milliGRAM(s) IV Intermittent every 24 hours  dextrose 5%. 1000 milliLiter(s) (50 mL/Hr) IV Continuous <Continuous>  dextrose 5%. 1000 milliLiter(s) (100 mL/Hr) IV Continuous <Continuous>  dextrose 50% Injectable 25 Gram(s) IV Push once  dextrose 50% Injectable 25 Gram(s) IV Push once  dextrose 50% Injectable 12.5 Gram(s) IV Push once  gabapentin 100 milliGRAM(s) Oral at bedtime  glucagon  Injectable 1 milliGRAM(s) IntraMuscular once  insulin lispro (ADMELOG) corrective regimen sliding scale   SubCutaneous at bedtime  insulin lispro (ADMELOG) corrective regimen sliding scale   SubCutaneous three times a day before meals  levothyroxine 50 MICROGram(s) Oral <User Schedule>  levothyroxine 100 MICROGram(s) Oral <User Schedule>  metoprolol succinate ER 12.5 milliGRAM(s) Oral daily  metroNIDAZOLE    Tablet 500 milliGRAM(s) Oral every 8 hours  pantoprazole    Tablet 40 milliGRAM(s) Oral at bedtime  senna 2 Tablet(s) Oral daily    MEDICATIONS  (PRN):  bisacodyl Suppository 10 milliGRAM(s) Rectal daily PRN Constipation  cyclobenzaprine 5 milliGRAM(s) Oral two times a day PRN Muscle Spasm  dextrose Oral Gel 15 Gram(s) Oral once PRN Blood Glucose LESS THAN 70 milliGRAM(s)/deciliter  oxyCODONE    IR 5 milliGRAM(s) Oral every 6 hours PRN Severe Pain (7 - 10)      Allergies    No Known Drug Allergies  shellfish (Swelling; Hives)    Intolerances        OBJECTIVE:  Vital Signs Last 24 Hrs  T(C): 36.3 (10 Oct 2023 08:36), Max: 36.8 (09 Oct 2023 20:39)  T(F): 97.4 (10 Oct 2023 08:36), Max: 98.3 (09 Oct 2023 20:39)  HR: 71 (10 Oct 2023 11:21) (71 - 83)  BP: 118/71 (10 Oct 2023 11:21) (100/58 - 134/80)  BP(mean): 94 (10 Oct 2023 06:42) (77 - 99)  RR: 18 (10 Oct 2023 11:21) (16 - 18)  SpO2: 94% (10 Oct 2023 11:21) (91% - 97%)    Parameters below as of 10 Oct 2023 11:21  Patient On (Oxygen Delivery Method): room air      I&O's Summary    09 Oct 2023 07:01  -  10 Oct 2023 07:00  --------------------------------------------------------  IN: 180 mL / OUT: 100 mL / NET: 80 mL    10 Oct 2023 07:01  -  10 Oct 2023 13:23  --------------------------------------------------------  IN: 0 mL / OUT: 0 mL / NET: 0 mL        PHYSICAL EXAM:  Gen: Reclining in bed at time of exam, appears stated age  HEENT: NCAT, MMM, clear OP  Neck: supple, trachea at midline  CV: RRR, +S1/S2  Pulm: adequate respiratory effort, no increase in work of breathing  Abd: soft, NTND, lafleur's sign negative   Skin: warm and dry,   Ext: WWP, no LE edema  Neuro: AOx3, no gross focal neurological deficits  Psych: affect and behavior appropriate, pleasant at time of interview    LABS:                        14.5   12.05 )-----------( 265      ( 10 Oct 2023 05:30 )             44.6     10-10    125<L>  |  89<L>  |  19  ----------------------------<  147<H>  4.1   |  23  |  1.09    Ca    8.7      10 Oct 2023 05:30  Phos  3.0     10-10  Mg     2.1     10-10    TPro  6.8  /  Alb  2.8<L>  /  TBili  0.4  /  DBili  x   /  AST  25  /  ALT  20  /  AlkPhos  140<H>  10-10    LIVER FUNCTIONS - ( 10 Oct 2023 05:30 )  Alb: 2.8 g/dL / Pro: 6.8 g/dL / ALK PHOS: 140 U/L / ALT: 20 U/L / AST: 25 U/L / GGT: x           PT/INR - ( 10 Oct 2023 05:30 )   PT: 14.3 sec;   INR: 1.26          PTT - ( 10 Oct 2023 05:30 )  PTT:33.4 sec  CAPILLARY BLOOD GLUCOSE      POCT Blood Glucose.: 248 mg/dL (10 Oct 2023 11:56)  POCT Blood Glucose.: 134 mg/dL (10 Oct 2023 07:28)  POCT Blood Glucose.: 176 mg/dL (09 Oct 2023 21:49)  POCT Blood Glucose.: 150 mg/dL (09 Oct 2023 16:58)    Urinalysis Basic - ( 10 Oct 2023 05:30 )    Color: x / Appearance: x / SG: x / pH: x  Gluc: 147 mg/dL / Ketone: x  / Bili: x / Urobili: x   Blood: x / Protein: x / Nitrite: x   Leuk Esterase: x / RBC: x / WBC x   Sq Epi: x / Non Sq Epi: x / Bacteria: x        MICRODATA:      RADIOLOGY/OTHER STUDIES:       Patient is a 87y old  Male who presents with a chief complaint of chest pain, SOB (10 Oct 2023 12:43)    INTERVAL EVENTS: NAEON    SUBJECTIVE:  Patient was seen and examined at bedside. in good spirits, had 3 BMs yesterday, no diarrhea. Reports RUQ pain only with palpation by doctors. Denies CP,SOB,etc. fair appetite but finished [place] for dinner last night     Review of systems: No fever, chills, dizziness, HA, Changes in vision, CP, dyspnea, nausea or vomiting, dysuria, changes in bowel movements, LE edema. Rest of 12 point Review of systems negative unless otherwise documented elsewhere in note.     Diet, NPO after Midnight:      NPO Start Date: 10-Oct-2023,   NPO Start Time: 23:59  Except Medications (10-10-23 @ 12:03) [Active]  Diet, DASH/TLC:   Sodium & Cholesterol Restricted  Consistent Carbohydrate No Snacks (CSTCHO) (10-10-23 @ 10:00) [Active]      MEDICATIONS:  MEDICATIONS  (STANDING):  aspirin enteric coated 81 milliGRAM(s) Oral daily  atorvastatin 40 milliGRAM(s) Oral at bedtime  cefTRIAXone   IVPB 2000 milliGRAM(s) IV Intermittent every 24 hours  dextrose 5%. 1000 milliLiter(s) (50 mL/Hr) IV Continuous <Continuous>  dextrose 5%. 1000 milliLiter(s) (100 mL/Hr) IV Continuous <Continuous>  dextrose 50% Injectable 25 Gram(s) IV Push once  dextrose 50% Injectable 25 Gram(s) IV Push once  dextrose 50% Injectable 12.5 Gram(s) IV Push once  gabapentin 100 milliGRAM(s) Oral at bedtime  glucagon  Injectable 1 milliGRAM(s) IntraMuscular once  insulin lispro (ADMELOG) corrective regimen sliding scale   SubCutaneous at bedtime  insulin lispro (ADMELOG) corrective regimen sliding scale   SubCutaneous three times a day before meals  levothyroxine 50 MICROGram(s) Oral <User Schedule>  levothyroxine 100 MICROGram(s) Oral <User Schedule>  metoprolol succinate ER 12.5 milliGRAM(s) Oral daily  metroNIDAZOLE    Tablet 500 milliGRAM(s) Oral every 8 hours  pantoprazole    Tablet 40 milliGRAM(s) Oral at bedtime  senna 2 Tablet(s) Oral daily    MEDICATIONS  (PRN):  bisacodyl Suppository 10 milliGRAM(s) Rectal daily PRN Constipation  cyclobenzaprine 5 milliGRAM(s) Oral two times a day PRN Muscle Spasm  dextrose Oral Gel 15 Gram(s) Oral once PRN Blood Glucose LESS THAN 70 milliGRAM(s)/deciliter  oxyCODONE    IR 5 milliGRAM(s) Oral every 6 hours PRN Severe Pain (7 - 10)      Allergies    No Known Drug Allergies  shellfish (Swelling; Hives)    Intolerances        OBJECTIVE:  Vital Signs Last 24 Hrs  T(C): 36.3 (10 Oct 2023 08:36), Max: 36.8 (09 Oct 2023 20:39)  T(F): 97.4 (10 Oct 2023 08:36), Max: 98.3 (09 Oct 2023 20:39)  HR: 71 (10 Oct 2023 11:21) (71 - 83)  BP: 118/71 (10 Oct 2023 11:21) (100/58 - 134/80)  BP(mean): 94 (10 Oct 2023 06:42) (77 - 99)  RR: 18 (10 Oct 2023 11:21) (16 - 18)  SpO2: 94% (10 Oct 2023 11:21) (91% - 97%)    Parameters below as of 10 Oct 2023 11:21  Patient On (Oxygen Delivery Method): room air      I&O's Summary    09 Oct 2023 07:01  -  10 Oct 2023 07:00  --------------------------------------------------------  IN: 180 mL / OUT: 100 mL / NET: 80 mL    10 Oct 2023 07:01  -  10 Oct 2023 13:23  --------------------------------------------------------  IN: 0 mL / OUT: 0 mL / NET: 0 mL        PHYSICAL EXAM:  Gen: Reclining in bed at time of exam, appears stated age  HEENT: NCAT, MMM, clear OP  Neck: supple, trachea at midline  CV: RRR, +S1/S2  Pulm: adequate respiratory effort, no increase in work of breathing  Abd: soft, NTND, lafleur's sign negative   Skin: warm and dry,   Ext: WWP, no LE edema  Neuro: AOx3, no gross focal neurological deficits  Psych: affect and behavior appropriate, pleasant at time of interview    LABS:                        14.5   12.05 )-----------( 265      ( 10 Oct 2023 05:30 )             44.6     10-10    125<L>  |  89<L>  |  19  ----------------------------<  147<H>  4.1   |  23  |  1.09    Ca    8.7      10 Oct 2023 05:30  Phos  3.0     10-10  Mg     2.1     10-10    TPro  6.8  /  Alb  2.8<L>  /  TBili  0.4  /  DBili  x   /  AST  25  /  ALT  20  /  AlkPhos  140<H>  10-10    LIVER FUNCTIONS - ( 10 Oct 2023 05:30 )  Alb: 2.8 g/dL / Pro: 6.8 g/dL / ALK PHOS: 140 U/L / ALT: 20 U/L / AST: 25 U/L / GGT: x           PT/INR - ( 10 Oct 2023 05:30 )   PT: 14.3 sec;   INR: 1.26          PTT - ( 10 Oct 2023 05:30 )  PTT:33.4 sec  CAPILLARY BLOOD GLUCOSE      POCT Blood Glucose.: 248 mg/dL (10 Oct 2023 11:56)  POCT Blood Glucose.: 134 mg/dL (10 Oct 2023 07:28)  POCT Blood Glucose.: 176 mg/dL (09 Oct 2023 21:49)  POCT Blood Glucose.: 150 mg/dL (09 Oct 2023 16:58)    Urinalysis Basic - ( 10 Oct 2023 05:30 )    Color: x / Appearance: x / SG: x / pH: x  Gluc: 147 mg/dL / Ketone: x  / Bili: x / Urobili: x   Blood: x / Protein: x / Nitrite: x   Leuk Esterase: x / RBC: x / WBC x   Sq Epi: x / Non Sq Epi: x / Bacteria: x        MICRODATA:      RADIOLOGY/OTHER STUDIES:       Patient is a 87y old  Male who presents with a chief complaint of chest pain, SOB (10 Oct 2023 12:43)    INTERVAL EVENTS: NAEON    SUBJECTIVE:  Patient was seen and examined at bedside. in good spirits, had 3 BMs yesterday, no diarrhea. Reports RUQ pain only with palpation by doctors. Denies CP,SOB,etc. fair appetite but finished [place] for dinner last night     Review of systems: No fever, chills, dizziness, HA, Changes in vision, CP, dyspnea, nausea or vomiting, dysuria, changes in bowel movements, LE edema. Rest of 12 point Review of systems negative unless otherwise documented elsewhere in note.     Diet, NPO after Midnight:      NPO Start Date: 10-Oct-2023,   NPO Start Time: 23:59  Except Medications (10-10-23 @ 12:03) [Active]  Diet, DASH/TLC:   Sodium & Cholesterol Restricted  Consistent Carbohydrate No Snacks (CSTCHO) (10-10-23 @ 10:00) [Active]      MEDICATIONS:  MEDICATIONS  (STANDING):  aspirin enteric coated 81 milliGRAM(s) Oral daily  atorvastatin 40 milliGRAM(s) Oral at bedtime  cefTRIAXone   IVPB 2000 milliGRAM(s) IV Intermittent every 24 hours  dextrose 5%. 1000 milliLiter(s) (50 mL/Hr) IV Continuous <Continuous>  dextrose 5%. 1000 milliLiter(s) (100 mL/Hr) IV Continuous <Continuous>  dextrose 50% Injectable 25 Gram(s) IV Push once  dextrose 50% Injectable 25 Gram(s) IV Push once  dextrose 50% Injectable 12.5 Gram(s) IV Push once  gabapentin 100 milliGRAM(s) Oral at bedtime  glucagon  Injectable 1 milliGRAM(s) IntraMuscular once  insulin lispro (ADMELOG) corrective regimen sliding scale   SubCutaneous at bedtime  insulin lispro (ADMELOG) corrective regimen sliding scale   SubCutaneous three times a day before meals  levothyroxine 50 MICROGram(s) Oral <User Schedule>  levothyroxine 100 MICROGram(s) Oral <User Schedule>  metoprolol succinate ER 12.5 milliGRAM(s) Oral daily  metroNIDAZOLE    Tablet 500 milliGRAM(s) Oral every 8 hours  pantoprazole    Tablet 40 milliGRAM(s) Oral at bedtime  senna 2 Tablet(s) Oral daily    MEDICATIONS  (PRN):  bisacodyl Suppository 10 milliGRAM(s) Rectal daily PRN Constipation  cyclobenzaprine 5 milliGRAM(s) Oral two times a day PRN Muscle Spasm  dextrose Oral Gel 15 Gram(s) Oral once PRN Blood Glucose LESS THAN 70 milliGRAM(s)/deciliter  oxyCODONE    IR 5 milliGRAM(s) Oral every 6 hours PRN Severe Pain (7 - 10)      Allergies    No Known Drug Allergies  shellfish (Swelling; Hives)    Intolerances        OBJECTIVE:  Vital Signs Last 24 Hrs  T(C): 36.3 (10 Oct 2023 08:36), Max: 36.8 (09 Oct 2023 20:39)  T(F): 97.4 (10 Oct 2023 08:36), Max: 98.3 (09 Oct 2023 20:39)  HR: 71 (10 Oct 2023 11:21) (71 - 83)  BP: 118/71 (10 Oct 2023 11:21) (100/58 - 134/80)  BP(mean): 94 (10 Oct 2023 06:42) (77 - 99)  RR: 18 (10 Oct 2023 11:21) (16 - 18)  SpO2: 94% (10 Oct 2023 11:21) (91% - 97%)    Parameters below as of 10 Oct 2023 11:21  Patient On (Oxygen Delivery Method): room air      I&O's Summary    09 Oct 2023 07:01  -  10 Oct 2023 07:00  --------------------------------------------------------  IN: 180 mL / OUT: 100 mL / NET: 80 mL    10 Oct 2023 07:01  -  10 Oct 2023 13:23  --------------------------------------------------------  IN: 0 mL / OUT: 0 mL / NET: 0 mL        PHYSICAL EXAM:  Gen: Reclining in bed at time of exam, appears stated age  HEENT: NCAT, MMM, clear OP  Neck: supple, trachea at midline  CV: RRR, +S1/S2  Pulm: adequate respiratory effort, no increase in work of breathing  Abd: soft, NTND, lafleur's sign negative   Skin: warm and dry,   Ext: WWP, no LE edema  Neuro: AOx3, no gross focal neurological deficits  Psych: affect and behavior appropriate, pleasant at time of interview    LABS:                        14.5   12.05 )-----------( 265      ( 10 Oct 2023 05:30 )             44.6     10-10    125<L>  |  89<L>  |  19  ----------------------------<  147<H>  4.1   |  23  |  1.09    Ca    8.7      10 Oct 2023 05:30  Phos  3.0     10-10  Mg     2.1     10-10    TPro  6.8  /  Alb  2.8<L>  /  TBili  0.4  /  DBili  x   /  AST  25  /  ALT  20  /  AlkPhos  140<H>  10-10    LIVER FUNCTIONS - ( 10 Oct 2023 05:30 )  Alb: 2.8 g/dL / Pro: 6.8 g/dL / ALK PHOS: 140 U/L / ALT: 20 U/L / AST: 25 U/L / GGT: x           PT/INR - ( 10 Oct 2023 05:30 )   PT: 14.3 sec;   INR: 1.26          PTT - ( 10 Oct 2023 05:30 )  PTT:33.4 sec  CAPILLARY BLOOD GLUCOSE      POCT Blood Glucose.: 248 mg/dL (10 Oct 2023 11:56)  POCT Blood Glucose.: 134 mg/dL (10 Oct 2023 07:28)  POCT Blood Glucose.: 176 mg/dL (09 Oct 2023 21:49)  POCT Blood Glucose.: 150 mg/dL (09 Oct 2023 16:58)    Urinalysis Basic - ( 10 Oct 2023 05:30 )    Color: x / Appearance: x / SG: x / pH: x  Gluc: 147 mg/dL / Ketone: x  / Bili: x / Urobili: x   Blood: x / Protein: x / Nitrite: x   Leuk Esterase: x / RBC: x / WBC x   Sq Epi: x / Non Sq Epi: x / Bacteria: x        MICRODATA:      RADIOLOGY/OTHER STUDIES:

## 2023-10-10 NOTE — PROGRESS NOTE ADULT - ASSESSMENT
Patient is a 87M, (shellfish rxn w/ Lip Swelling ~ 20 yrs ago, does not eat shellfish since then),  w/ PMHx CAD s/p JOSÉ to mLAD (6/2023), HTN, HLD, cervical spondylosis s/p laminectomy, gastric ulcers, hypothyroidism,  who presents to [facility] ED 10/6/23 for BELLO with minimal exertion, chest pressure, and orthopnea x 3 days. Also reports a fever of 101.8 yesterday but resolved with Tylenol with no recurrence since. Reported constipation at home with response after suppository at home on Tuesday (10/3), however, no BM since then. Does report pinpoint RUQ abdominal discomfort worse with palpation x 1-2 days. 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 His appetite and fluid intake has decreased as well. CXR with concern for CHF vs PNA. Negative troponin and EKG nonischemic. Patient is now admitted to cardiac/tele for suspicion of CHF and further ischemic evaluation pending optimization including Leukocytosis workup.             Patient is a 87M, (shellfish rxn w/ Lip Swelling ~ 20 yrs ago, does not eat shellfish since then),  w/ PMHx CAD s/p JOSÉ to mLAD (6/2023), HTN, HLD, cervical spondylosis s/p laminectomy, gastric ulcers, hypothyroidism,  who presents to [facility] ED 10/6/23 for BELLO with minimal exertion, chest pressure, and orthopnea x 3 days. Also reports a fever of 101.8 yesterday but resolved with Tylenol with no recurrence since. Reported constipation at home with response after suppository at home on Tuesday (10/3), however, no BM since then. Does report pinpoint RUQ abdominal discomfort worse with palpation x 1-2 days. 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 His appetite and fluid intake has decreased as well. CXR with concern for CHF vs PNA. Negative troponin and EKG nonischemic. Patient is now admitted to cardiac/tele for suspicion of CHF and further ischemic evaluation pending optimization including Leukocytosis workup.

## 2023-10-10 NOTE — PROGRESS NOTE ADULT - NS ATTEND AMEND GEN_ALL_CORE FT
87M, (shellfish rxn w/ Lip Swelling ~ 20 yrs ago),  w/ PMHx CAD s/p JOSÉ to mLAD (6/2023), HTN, HLD, cervical spondylosis s/p laminectomy, gastric ulcers, hypothyroidism,  who presents to [facility] ED 10/6/23 for BELLO with minimal exertion, chest pressure, and orthopnea x 3 days. Also reports a fever of 101.8 yesterday.    1. CAD  Chest pain resolved. Continue aspirin 81 mg lipitor, metoprolol. [facility] now post-poned due to acute cholecystitis.     2. Acute cholecystitis  Clinical presentation, physical examination, labs and CT consistent with acute cholecystitis. Consulted general surgery, deemed to be a poor candidate for cholecystectomy at this time, IR consult for percutaneous cholecystostomy tube, c/w CTX/Flagyl, on antibiotics, suggested IR approach, requested HIDA scan, positive for cholecystitis. General surgery will follow up as outpatient, home tomorrow.    3. Microcytic anemia  Iron studies, electrophoresis. ? gi bleeding    4. DM/hypothyroidism  Consult endocrine, appreciate recommendations.    5. Hyponatremia  125, renal consult, ? tolvaptan. 87M, (shellfish rxn w/ Lip Swelling ~ 20 yrs ago),  w/ PMHx CAD s/p JOSÉ to mLAD (6/2023), HTN, HLD, cervical spondylosis s/p laminectomy, gastric ulcers, hypothyroidism,  who presents to [facility] ED 10/6/23 for BELLO with minimal exertion, chest pressure, and orthopnea x 3 days. Also reports a fever of 101.8 yesterday.    1. CAD  Chest pain resolved. Continue aspirin 81 mg lipitor, metoprolol. [facility] now post-poned due to acute cholecystitis.     2. Acute cholecystitis  Clinical presentation, physical examination, labs and CT consistent with acute cholecystitis. Consulted general surgery, deemed to be a poor candidate for cholecystectomy at this time, IR consult for percutaneous cholecystostomy tube, c/w CTX/Flagyl, on antibiotics, suggested IR approach, requested HIDA scan, positive for cholecystitis. General surgery will follow up as outpatient, home tomorrow.    3. Microcytic anemia  Iron studies, electrophoresis. ? gi bleeding    4. DM/hypothyroidism  Consult endocrine, appreciate recommendations.    5. Hyponatremia  125, renal consult, ? tolvaptan. 87M, (shellfish rxn w/ Lip Swelling ~ 20 yrs ago),  w/ PMHx CAD s/p JOSÉ to mLAD (6/2023), HTN, HLD, cervical spondylosis s/p laminectomy, gastric ulcers, hypothyroidism,  who presents to [facility] ED 10/6/23 for BELLO with minimal exertion, chest pressure, and orthopnea x 3 days. Also reports a fever of 101.8 yesterday.    1. CAD  Chest pain resolved. Continue aspirin 81 mg lipitor, metoprolol. [facility] now post-poned due to acute cholecystitis.     2. Acute cholecystitis  Clinical presentation, physical examination, labs and CT consistent with acute cholecystitis. Consulted general surgery, deemed to be a poor candidate for cholecystectomy at this time, IR consult for percutaneous cholecystostomy tube, c/w CTX/Flagyl, on antibiotics, suggested IR approach, requested HIDA scan, positive for cholecystitis. General surgery will follow up as outpatient, home tomorrow.    3. Microcytic anemia  Iron studies, electrophoresis. ? gi bleeding    4. DM/hypothyroidism  Consult endocrine, appreciate recommendations.    5. Hyponatremia  125, renal consult, ? tolvaptan.

## 2023-10-10 NOTE — PROGRESS NOTE ADULT - PROBLEM SELECTOR PLAN 3
WBC 16.26 elevated on admission. Reported fever at home yesterday improved w/ Tylenol. Likely in the setting of cholecystitis. procal 0.25 elevated s/p abx IV Ceftriaxone x 5 days and Azithromycin x 1 in ED. Continued both abx for now.    - c/w ctx to 2gm, started on flagyl 500mg q8  - f/u blood cultures ngtd

## 2023-10-10 NOTE — PROGRESS NOTE ADULT - PROBLEM SELECTOR PLAN 8
continue home Synthroid 50mcg qd. TSH at 10.160. Home med synthroid 50mcg    - c/w home med  - f/u repeat tsh and t4 continue home Synthroid 50mcg qd. TSH at 10.160. Home med synthroid 50mcg. Repeat tsh at 6.990    - c/w home med

## 2023-10-10 NOTE — PROGRESS NOTE ADULT - PROBLEM SELECTOR PLAN 4
found to have elevated glucose on BMP  A1c found to be 7.7. no hx of diabetes    - c/w insulin sliding scale  - monitor finger sticks and switch diet to carb consistent if unable to control  - endocrinology consulted, f/u recs  - per endo, c/w moderate dose sliding scale found to have elevated glucose on BMP  A1c found to be 7.7. no hx of diabetes    - monitor finger sticks and switch diet to carb consistent if unable to control  - endocrinology consulted, f/u recs  - per endo, c/w moderate dose sliding scale

## 2023-10-10 NOTE — PROGRESS NOTE ADULT - PROBLEM SELECTOR PLAN 5
Na 126 on admission. s/p lasix 40mg IV    - f/u urine lytes  - continue to monitor. Na 126 on admission. s/p lasix 40mg IV    - continue to monitor. Na 126 on admission. On lasix 40mg IV qD. Likely hypovolemic hyponatremia iso of decreased po intake vs diuretic(presented with na 126) vs siadh iso of pain. [place] 67.6%.    - continue to monitor. Na 126 on admission. On lasix 40mg IV qD. Likely hypovolemic hyponatremia iso of decreased po intake vs diuretic(presented with na 126) vs siadh iso of pain. [place] 50.2%    - continue to monitor.

## 2023-10-10 NOTE — PROGRESS NOTE ADULT - SUBJECTIVE AND OBJECTIVE BOX
SUBJECTIVE / INTERVAL HPI: Patient was seen and examined this morning. RUQ pain intermittently. IR to eval for perc choley tube. Ordered for ECHO. Ate breakfast, but not consistent carb. Diet changed for lunch.    CAPILLARY BLOOD GLUCOSE & INSULIN RECEIVED  156 mg/dL (10-09 @ 05:30)  149 mg/dL (10-09 @ 07:47)  149 mg/dL (10-09 @ 13:06)  150 mg/dL (10-09 @ 16:58)  176 mg/dL (10-09 @ 21:49)  147 mg/dL (10-10 @ 05:30)  134 mg/dL (10-10 @ 07:28)  248 mg/dL (10-10 @ 11:56)      REVIEW OF SYSTEMS  Constitutional:  Negative fever, chills or loss of appetite.  Eyes:  Negative blurry vision or double vision.  Cardiovascular:  Negative for chest pain or palpitations.  Respiratory:  Negative for cough, wheezing, or shortness of breath.    Gastrointestinal:  Negative for nausea, vomiting, diarrhea, constipation, or abdominal pain.  Genitourinary:  Negative frequency, urgency or dysuria.  Neurologic:  No headache, confusion, dizziness, lightheadedness.    PHYSICAL EXAM  Vital Signs Last 24 Hrs  T(C): 36.3 (10 Oct 2023 08:36), Max: 36.8 (09 Oct 2023 20:39)  T(F): 97.4 (10 Oct 2023 08:36), Max: 98.3 (09 Oct 2023 20:39)  HR: 71 (10 Oct 2023 11:21) (71 - 83)  BP: 118/71 (10 Oct 2023 11:21) (100/58 - 134/80)  BP(mean): 94 (10 Oct 2023 06:42) (77 - 99)  RR: 18 (10 Oct 2023 11:21) (16 - 18)  SpO2: 94% (10 Oct 2023 11:21) (91% - 97%)    Parameters below as of 10 Oct 2023 11:21  Patient On (Oxygen Delivery Method): room air        Constitutional: Awake, alert, in no acute distress.   HEENT: Normocephalic, atraumatic, PAMELA.  Respiratory: Lungs clear to ausculation bilaterally.   Cardiovascular: regular rhythm, normal S1 and S2, no audible murmurs.   GI: soft, non-tender, non-distended, bowel sounds present.  Extremities: No lower extremity edema.  Psychiatric: AAO x 3. Normal affect/mood.     LABS  CBC - WBC/HGB/HTC/PLT: 12.05/14.5/44.6/265 (10-10-23)  BMP - Na/K/Cl/Bicarb/BUN/Cr/Gluc/AG/eGFR: 125/4.1/89/23/19/1.09/147/13/67 (10-10-23)  Ca - 8.7 (10-10-23)  Phos - 3.0 (10-10-23)  Mg - 2.1 (10-10-23)  LFT - Alb/Tprot/Tbili/Dbili/AlkPhos/ALT/AST: 2.8/--/0.4/--/140/20/25 (10-10-23)  PT/aPTT/INR: 14.3/33.4/1.26 (10-10-23)   Thyroid Stimulating Hormone, Serum: 6.990 (10-09-23)  Total T4/Free T4: --/1.580 (10-09-23)    MEDICATIONS  MEDICATIONS  (STANDING):  aspirin enteric coated 81 milliGRAM(s) Oral daily  atorvastatin 40 milliGRAM(s) Oral at bedtime  cefTRIAXone   IVPB 2000 milliGRAM(s) IV Intermittent every 24 hours  dextrose 5%. 1000 milliLiter(s) (50 mL/Hr) IV Continuous <Continuous>  dextrose 5%. 1000 milliLiter(s) (100 mL/Hr) IV Continuous <Continuous>  dextrose 50% Injectable 25 Gram(s) IV Push once  dextrose 50% Injectable 25 Gram(s) IV Push once  dextrose 50% Injectable 12.5 Gram(s) IV Push once  gabapentin 100 milliGRAM(s) Oral at bedtime  glucagon  Injectable 1 milliGRAM(s) IntraMuscular once  insulin lispro (ADMELOG) corrective regimen sliding scale   SubCutaneous at bedtime  insulin lispro (ADMELOG) corrective regimen sliding scale   SubCutaneous three times a day before meals  levothyroxine 50 MICROGram(s) Oral <User Schedule>  levothyroxine 100 MICROGram(s) Oral <User Schedule>  metoprolol succinate ER 12.5 milliGRAM(s) Oral daily  metroNIDAZOLE    Tablet 500 milliGRAM(s) Oral every 8 hours  pantoprazole    Tablet 40 milliGRAM(s) Oral at bedtime  senna 2 Tablet(s) Oral daily    MEDICATIONS  (PRN):  bisacodyl Suppository 10 milliGRAM(s) Rectal daily PRN Constipation  cyclobenzaprine 5 milliGRAM(s) Oral two times a day PRN Muscle Spasm  dextrose Oral Gel 15 Gram(s) Oral once PRN Blood Glucose LESS THAN 70 milliGRAM(s)/deciliter  oxyCODONE    IR 5 milliGRAM(s) Oral every 6 hours PRN Severe Pain (7 - 10)

## 2023-10-10 NOTE — PROGRESS NOTE ADULT - SUBJECTIVE AND OBJECTIVE BOX
INTERVAL HPI/OVERNIGHT EVENTS:  O/N: MARK.     SUBJECTIVE:  Patient seen and examined by chief resident and team on AM rounds. Patient reports feeling well, with RUQ pain occurring only intermittently, much improved from prior. Denies any fevers, chills, nausea or vomiting, +F/+BM. IR to evaluate for possible perc mich.     MEDICATIONS  (STANDING):  aspirin enteric coated 81 milliGRAM(s) Oral daily  atorvastatin 40 milliGRAM(s) Oral at bedtime  cefTRIAXone   IVPB 2000 milliGRAM(s) IV Intermittent every 24 hours  dextrose 5%. 1000 milliLiter(s) (100 mL/Hr) IV Continuous <Continuous>  dextrose 5%. 1000 milliLiter(s) (50 mL/Hr) IV Continuous <Continuous>  dextrose 50% Injectable 25 Gram(s) IV Push once  dextrose 50% Injectable 25 Gram(s) IV Push once  dextrose 50% Injectable 12.5 Gram(s) IV Push once  gabapentin 100 milliGRAM(s) Oral at bedtime  glucagon  Injectable 1 milliGRAM(s) IntraMuscular once  insulin lispro (ADMELOG) corrective regimen sliding scale   SubCutaneous at bedtime  insulin lispro (ADMELOG) corrective regimen sliding scale   SubCutaneous three times a day before meals  levothyroxine 50 MICROGram(s) Oral <User Schedule>  levothyroxine 100 MICROGram(s) Oral <User Schedule>  metoprolol succinate ER 12.5 milliGRAM(s) Oral daily  metroNIDAZOLE    Tablet 500 milliGRAM(s) Oral every 8 hours  pantoprazole    Tablet 40 milliGRAM(s) Oral at bedtime  senna 2 Tablet(s) Oral daily    MEDICATIONS  (PRN):  bisacodyl Suppository 10 milliGRAM(s) Rectal daily PRN Constipation  cyclobenzaprine 5 milliGRAM(s) Oral two times a day PRN Muscle Spasm  dextrose Oral Gel 15 Gram(s) Oral once PRN Blood Glucose LESS THAN 70 milliGRAM(s)/deciliter  oxyCODONE    IR 5 milliGRAM(s) Oral every 6 hours PRN Severe Pain (7 - 10)      Vital Signs Last 24 Hrs  T(C): 36.3 (10 Oct 2023 08:36), Max: 36.8 (09 Oct 2023 20:39)  T(F): 97.4 (10 Oct 2023 08:36), Max: 98.3 (09 Oct 2023 20:39)  HR: 71 (10 Oct 2023 11:21) (71 - 83)  BP: 118/71 (10 Oct 2023 11:21) (100/58 - 134/80)  BP(mean): 94 (10 Oct 2023 06:42) (77 - 99)  RR: 18 (10 Oct 2023 11:21) (16 - 18)  SpO2: 94% (10 Oct 2023 11:21) (91% - 97%)    Parameters below as of 10 Oct 2023 11:21  Patient On (Oxygen Delivery Method): room air        PHYSICAL EXAM  Constitutional: A&Ox3  Respiratory: non labored breathing, no respiratory distress  Cardiovascular: NSR, RRR  Gastrointestinal: soft, moderately distended. NT. No rebound or guarding. Neg murphys sign.   Extremities: (-) edema  skin: no jaundice.       I&O's Detail    09 Oct 2023 07:01  -  10 Oct 2023 07:00  --------------------------------------------------------  IN:    Oral Fluid: 180 mL  Total IN: 180 mL    OUT:    Voided (mL): 100 mL  Total OUT: 100 mL    Total NET: 80 mL      10 Oct 2023 07:01  -  10 Oct 2023 12:24  --------------------------------------------------------  IN:  Total IN: 0 mL    OUT:    Oral Fluid: 0 mL  Total OUT: 0 mL    Total NET: 0 mL          LABS:                        14.5   12.05 )-----------( 265      ( 10 Oct 2023 05:30 )             44.6     10-10    125<L>  |  89<L>  |  19  ----------------------------<  147<H>  4.1   |  23  |  1.09    Ca    8.7      10 Oct 2023 05:30  Phos  3.0     10-10  Mg     2.1     10-10    TPro  6.8  /  Alb  2.8<L>  /  TBili  0.4  /  DBili  x   /  AST  25  /  ALT  20  /  AlkPhos  140<H>  10-10    PT/INR - ( 10 Oct 2023 05:30 )   PT: 14.3 sec;   INR: 1.26          PTT - ( 10 Oct 2023 05:30 )  PTT:33.4 sec  Urinalysis Basic - ( 10 Oct 2023 05:30 )    Color: x / Appearance: x / SG: x / pH: x  Gluc: 147 mg/dL / Ketone: x  / Bili: x / Urobili: x   Blood: x / Protein: x / Nitrite: x   Leuk Esterase: x / RBC: x / WBC x   Sq Epi: x / Non Sq Epi: x / Bacteria: x        RADIOLOGY & ADDITIONAL STUDIES:

## 2023-10-10 NOTE — PROGRESS NOTE ADULT - ASSESSMENT
Mr. Yanez is a 87-year-old male with a past medical history of hypertension, hyperlipidemia, coronary artery disease (s/p JOSÉ to mLAD on 6/2023), cervical spondylosis (s/p laminectomy), gastric ulcers, hypothyroidism and constipation who presented to the emergency department (10/6/23) for dyspnea on exertion, chest pressure, and orthopnea for 3 days, along with one day of fever. He was admitted to cardiology for acute hypoxic respiratory failure secondary to acute heart failure with preserved ejection fraction and fever, possibly secondary to pneumonia. His labs were remarkable for a hemoglobin A1C of 7.7% and a TSH of 10.5. Endocrinology was consulted for recommendations regarding management of hypothyroidism and diabetes.     A1C: 7.7 %  BUN: 19  Creatinine: 1.09  GFR: 67  Weight: 63.5  BMI: 23.3

## 2023-10-11 LAB
ALBUMIN SERPL ELPH-MCNC: 2.5 G/DL — LOW (ref 3.3–5)
ALP SERPL-CCNC: 126 U/L — HIGH (ref 40–120)
ALT FLD-CCNC: 17 U/L — SIGNIFICANT CHANGE UP (ref 10–45)
ANION GAP SERPL CALC-SCNC: 10 MMOL/L — SIGNIFICANT CHANGE UP (ref 5–17)
ANION GAP SERPL CALC-SCNC: 12 MMOL/L — SIGNIFICANT CHANGE UP (ref 5–17)
ANISOCYTOSIS BLD QL: SLIGHT — SIGNIFICANT CHANGE UP
AST SERPL-CCNC: 19 U/L — SIGNIFICANT CHANGE UP (ref 10–40)
BASOPHILS # BLD AUTO: 0.22 K/UL — HIGH (ref 0–0.2)
BASOPHILS NFR BLD AUTO: 1.7 % — SIGNIFICANT CHANGE UP (ref 0–2)
BILIRUB SERPL-MCNC: 0.3 MG/DL — SIGNIFICANT CHANGE UP (ref 0.2–1.2)
BUN SERPL-MCNC: 15 MG/DL — SIGNIFICANT CHANGE UP (ref 7–23)
BUN SERPL-MCNC: 17 MG/DL — SIGNIFICANT CHANGE UP (ref 7–23)
BUN SERPL-MCNC: 18 MG/DL — SIGNIFICANT CHANGE UP (ref 7–23)
BURR CELLS BLD QL SMEAR: PRESENT — SIGNIFICANT CHANGE UP
CALCIUM SERPL-MCNC: 8.2 MG/DL — LOW (ref 8.4–10.5)
CALCIUM SERPL-MCNC: 8.5 MG/DL — SIGNIFICANT CHANGE UP (ref 8.4–10.5)
CHLORIDE SERPL-SCNC: 94 MMOL/L — LOW (ref 96–108)
CHLORIDE SERPL-SCNC: 95 MMOL/L — LOW (ref 96–108)
CHLORIDE SERPL-SCNC: 97 MMOL/L — SIGNIFICANT CHANGE UP (ref 96–108)
CO2 SERPL-SCNC: 20 MMOL/L — LOW (ref 22–31)
CO2 SERPL-SCNC: 21 MMOL/L — LOW (ref 22–31)
CO2 SERPL-SCNC: 22 MMOL/L — SIGNIFICANT CHANGE UP (ref 22–31)
CREAT SERPL-MCNC: 0.84 MG/DL — SIGNIFICANT CHANGE UP (ref 0.5–1.3)
CREAT SERPL-MCNC: 0.94 MG/DL — SIGNIFICANT CHANGE UP (ref 0.5–1.3)
CREAT SERPL-MCNC: 1.02 MG/DL — SIGNIFICANT CHANGE UP (ref 0.5–1.3)
CULTURE RESULTS: SIGNIFICANT CHANGE UP
EGFR: 72 ML/MIN/1.73M2 — SIGNIFICANT CHANGE UP
EGFR: 79 ML/MIN/1.73M2 — SIGNIFICANT CHANGE UP
EGFR: 85 ML/MIN/1.73M2 — SIGNIFICANT CHANGE UP
EOSINOPHIL # BLD AUTO: 1.35 K/UL — HIGH (ref 0–0.5)
EOSINOPHIL NFR BLD AUTO: 10.5 % — HIGH (ref 0–6)
GIANT PLATELETS BLD QL SMEAR: PRESENT — SIGNIFICANT CHANGE UP
GLUCOSE BLDC GLUCOMTR-MCNC: 112 MG/DL — HIGH (ref 70–99)
GLUCOSE BLDC GLUCOMTR-MCNC: 136 MG/DL — HIGH (ref 70–99)
GLUCOSE BLDC GLUCOMTR-MCNC: 195 MG/DL — HIGH (ref 70–99)
GLUCOSE SERPL-MCNC: 126 MG/DL — HIGH (ref 70–99)
GLUCOSE SERPL-MCNC: 170 MG/DL — HIGH (ref 70–99)
GLUCOSE SERPL-MCNC: 244 MG/DL — HIGH (ref 70–99)
HCT VFR BLD CALC: 43.2 % — SIGNIFICANT CHANGE UP (ref 39–50)
HGB BLD-MCNC: 14.3 G/DL — SIGNIFICANT CHANGE UP (ref 13–17)
LYMPHOCYTES # BLD AUTO: 15.8 % — SIGNIFICANT CHANGE UP (ref 13–44)
LYMPHOCYTES # BLD AUTO: 2.03 K/UL — SIGNIFICANT CHANGE UP (ref 1–3.3)
MACROCYTES BLD QL: SLIGHT — SIGNIFICANT CHANGE UP
MAGNESIUM SERPL-MCNC: 2 MG/DL — SIGNIFICANT CHANGE UP (ref 1.6–2.6)
MANUAL SMEAR VERIFICATION: SIGNIFICANT CHANGE UP
MCHC RBC-ENTMCNC: 21.2 PG — LOW (ref 27–34)
MCHC RBC-ENTMCNC: 33.1 GM/DL — SIGNIFICANT CHANGE UP (ref 32–36)
MCV RBC AUTO: 63.9 FL — LOW (ref 80–100)
MONOCYTES # BLD AUTO: 1.01 K/UL — HIGH (ref 0–0.9)
MONOCYTES NFR BLD AUTO: 7.9 % — SIGNIFICANT CHANGE UP (ref 2–14)
NEUTROPHILS # BLD AUTO: 8.22 K/UL — HIGH (ref 1.8–7.4)
NEUTROPHILS NFR BLD AUTO: 63.2 % — SIGNIFICANT CHANGE UP (ref 43–77)
NEUTS BAND # BLD: 0.9 % — SIGNIFICANT CHANGE UP (ref 0–8)
OSMOLALITY SERPL: 270 MOSM/KG — LOW (ref 280–301)
OVALOCYTES BLD QL SMEAR: SIGNIFICANT CHANGE UP
PHOSPHATE SERPL-MCNC: 2.7 MG/DL — SIGNIFICANT CHANGE UP (ref 2.5–4.5)
PLAT MORPH BLD: ABNORMAL
PLATELET # BLD AUTO: 285 K/UL — SIGNIFICANT CHANGE UP (ref 150–400)
POIKILOCYTOSIS BLD QL AUTO: SIGNIFICANT CHANGE UP
POLYCHROMASIA BLD QL SMEAR: SLIGHT — SIGNIFICANT CHANGE UP
POTASSIUM SERPL-MCNC: 4 MMOL/L — SIGNIFICANT CHANGE UP (ref 3.5–5.3)
POTASSIUM SERPL-MCNC: 4.2 MMOL/L — SIGNIFICANT CHANGE UP (ref 3.5–5.3)
POTASSIUM SERPL-SCNC: 4 MMOL/L — SIGNIFICANT CHANGE UP (ref 3.5–5.3)
POTASSIUM SERPL-SCNC: 4.2 MMOL/L — SIGNIFICANT CHANGE UP (ref 3.5–5.3)
PROT SERPL-MCNC: 6.3 G/DL — SIGNIFICANT CHANGE UP (ref 6–8.3)
RBC # BLD: 6.76 M/UL — HIGH (ref 4.2–5.8)
RBC # FLD: 16 % — HIGH (ref 10.3–14.5)
RBC BLD AUTO: ABNORMAL
SMUDGE CELLS # BLD: PRESENT — SIGNIFICANT CHANGE UP
SODIUM SERPL-SCNC: 126 MMOL/L — LOW (ref 135–145)
SODIUM SERPL-SCNC: 127 MMOL/L — LOW (ref 135–145)
SODIUM SERPL-SCNC: 128 MMOL/L — LOW (ref 135–145)
SPECIMEN SOURCE: SIGNIFICANT CHANGE UP
UUN UR-MCNC: 882 MG/DL — SIGNIFICANT CHANGE UP
WBC # BLD: 12.83 K/UL — HIGH (ref 3.8–10.5)
WBC # FLD AUTO: 12.83 K/UL — HIGH (ref 3.8–10.5)

## 2023-10-11 PROCEDURE — 99222 1ST HOSP IP/OBS MODERATE 55: CPT

## 2023-10-11 PROCEDURE — 99232 SBSQ HOSP IP/OBS MODERATE 35: CPT | Mod: GC

## 2023-10-11 PROCEDURE — 99233 SBSQ HOSP IP/OBS HIGH 50: CPT

## 2023-10-11 PROCEDURE — 71045 X-RAY EXAM CHEST 1 VIEW: CPT | Mod: 26

## 2023-10-11 PROCEDURE — 99231 SBSQ HOSP IP/OBS SF/LOW 25: CPT

## 2023-10-11 PROCEDURE — 99232 SBSQ HOSP IP/OBS MODERATE 35: CPT

## 2023-10-11 RX ORDER — SODIUM CHLORIDE 9 MG/ML
500 INJECTION INTRAMUSCULAR; INTRAVENOUS; SUBCUTANEOUS ONCE
Refills: 0 | Status: COMPLETED | OUTPATIENT
Start: 2023-10-11 | End: 2023-10-11

## 2023-10-11 RX ORDER — CLOPIDOGREL BISULFATE 75 MG/1
75 TABLET, FILM COATED ORAL DAILY
Refills: 0 | Status: DISCONTINUED | OUTPATIENT
Start: 2023-10-11 | End: 2023-10-14

## 2023-10-11 RX ORDER — SODIUM CHLORIDE 5 G/100ML
500 INJECTION, SOLUTION INTRAVENOUS
Refills: 0 | Status: DISCONTINUED | OUTPATIENT
Start: 2023-10-11 | End: 2023-10-12

## 2023-10-11 RX ADMIN — LINAGLIPTIN 5 MILLIGRAM(S): 5 TABLET, FILM COATED ORAL at 12:40

## 2023-10-11 RX ADMIN — SODIUM CHLORIDE 30 MILLILITER(S): 5 INJECTION, SOLUTION INTRAVENOUS at 23:47

## 2023-10-11 RX ADMIN — CLOPIDOGREL BISULFATE 75 MILLIGRAM(S): 75 TABLET, FILM COATED ORAL at 17:39

## 2023-10-11 RX ADMIN — PANTOPRAZOLE SODIUM 40 MILLIGRAM(S): 20 TABLET, DELAYED RELEASE ORAL at 22:05

## 2023-10-11 RX ADMIN — SODIUM CHLORIDE 500 MILLILITER(S): 9 INJECTION INTRAMUSCULAR; INTRAVENOUS; SUBCUTANEOUS at 15:40

## 2023-10-11 RX ADMIN — Medication 500 MILLIGRAM(S): at 22:05

## 2023-10-11 RX ADMIN — Medication 500 MILLIGRAM(S): at 05:50

## 2023-10-11 RX ADMIN — SENNA PLUS 2 TABLET(S): 8.6 TABLET ORAL at 12:40

## 2023-10-11 RX ADMIN — SODIUM CHLORIDE 500 MILLILITER(S): 9 INJECTION INTRAMUSCULAR; INTRAVENOUS; SUBCUTANEOUS at 10:10

## 2023-10-11 RX ADMIN — Medication 500 MILLIGRAM(S): at 14:34

## 2023-10-11 RX ADMIN — Medication 81 MILLIGRAM(S): at 12:40

## 2023-10-11 RX ADMIN — GABAPENTIN 100 MILLIGRAM(S): 400 CAPSULE ORAL at 22:05

## 2023-10-11 RX ADMIN — Medication 2: at 12:41

## 2023-10-11 RX ADMIN — ATORVASTATIN CALCIUM 40 MILLIGRAM(S): 80 TABLET, FILM COATED ORAL at 22:05

## 2023-10-11 RX ADMIN — Medication 12.5 MILLIGRAM(S): at 05:50

## 2023-10-11 RX ADMIN — CEFTRIAXONE 100 MILLIGRAM(S): 500 INJECTION, POWDER, FOR SOLUTION INTRAMUSCULAR; INTRAVENOUS at 19:01

## 2023-10-11 NOTE — PROGRESS NOTE ADULT - PROBLEM SELECTOR PLAN 5
Na 126 on admission. On lasix 40mg IV qD. Likely hypovolemic hyponatremia iso of decreased po intake vs diuretic(presented with na 126) vs siadh iso of pain. Feurea 50.2%    - continue to monitor. Na 126 on admission. On lasix 40mg IV qD. Likely hypovolemic hyponatremia iso of decreased po intake vs diuretic(presented with na 126) vs siadh iso of pain. Feurea 50.2%    - nephrology consulted, f/u recs  - gentle hydration  - continue to monitor.

## 2023-10-11 NOTE — PROGRESS NOTE ADULT - TIME BILLING
Levothyroxine adjustment, discussion with pt regarding oral hypoglycemic agent
Tradjenta started
Diabetic management

## 2023-10-11 NOTE — PROGRESS NOTE ADULT - PROBLEM SELECTOR PLAN 11
F: none  E: Replace if K < 4, Mag < 2  N: Dash, diet  GI: ppi  DVT: plavix  Dispo: 5uris F: s/p 1500 cc ns  E: Replace if K < 4, Mag < 2  N: CC diet  GI: ppi  DVT: plavix  Dispo: 5uris

## 2023-10-11 NOTE — PHYSICAL THERAPY INITIAL EVALUATION ADULT - IMPAIRMENTS FOUND, PT EVAL
Physical Therapy  Evaluation    Avinash Poole   [id number]    Time Tracking:     PT Received On: 06/10/19   PT Start Time: 0854   PT Stop Time: 0910   PT Total Time (min): 16 min    Billable Minutes: Evaluation 16      Recommendations:     Discharge recommendations: Inpatient Rehabilitation     Equipment recommendations: TBD pending progress    Barriers to Discharge: Significant regression from baseline mobility/cognitive levels, NPO    Patient Information:     Recent Surgery: none    Diagnosis: Meningitis    General Precautions: Standard, contact, droplet, fall, NPO    Orthopedic Precautions: None    Assessment:     Avinash Poole is a 5 y.o. male admitted to [facility] on 6/6/19 for Meningitis. Avinash Poole tolerated evaluation poorly today. He briefly opens eyes during session but eyes are deviated to the left, no visual attention or tracking noted. Consistent with head turn left in supine and supported sitting, demonstrating R neglect. Keeps LUE flexed at elbow, hand closed; RUE extended. There is spontaneous active movement of UE/LE but nothing purposeful, legs weakly kick when agitated as well as turning head L and R. If left alone he is mostly calm with eyes closed but immediately agitated with any tactile or auditory stimulus. Did a cumulative ~5 minutes of supported sitting within bed as well as at edge of bed. Again fussy with all sitting, extensor posturing trunk, keeps LUE in high-guard, head turned L while crying. Head stays upright (doesn't lag into flexion or extension) for brief periods of time but doesn't seem to be any true intentional head control from my standpoint. Assisted back to supine, immediately calm once left alone. No family present but discussed PT role, POC, goals and recommendations with sitter present; verbalized understanding, also reviewed with JOSEFINA Augustin. This patient would benefit from IP rehab upon discharge barring any significant, rapid recovery. Avinash Poole would  benefit from acute PT services to promote mobility during this admission and improve return to PLOF.    Problem List: weakness, decreased endurance, impaired self-care skills, impaired mobility, decreased sitting or standing balance, gait instability, decreased cognition, decreased coordination, visual deficits, pain and abnormal tone    Rehab Prognosis: Fair; patient would benefit from acute skilled PT services to address these deficits and reach maximum level of function.    Plan:     Patient to be seen 3 x/week to address the above listed problems via gait training, therapeutic activities, therapeutic exercises, neuromuscular re-education    Plan of Care Expires: 07/08/19  Plan of Care reviewed with: JOSEFINA ac (no family present)    Subjective:     Communicated with JOSEFINA Augustin prior to evaluation, appropriate to see for evaluation.    Pt found supine in bed (HOB elevated) upon PT entry to room, agreeable to evaluation.    Does this patient have any cultural, spiritual, Faith conflicts given the current situation? Patient has no barriers to learning. Patient verbalizes understanding of his/her program and goals and demonstrates them correctly. No cultural, spiritual, or educational needs identified.    History reviewed. No pertinent past medical history.  Past Surgical History:   Procedure Laterality Date    CIRCUMCISION       Living Environment:  Unsure; no family present today    PLOF:  Per chart review patient was independent with all age-appropriate mobility and ADL's. Active child, PO eater. No family present to review specific 3-3.5 year old milestones.    DME:  Patient owns or has access to the following DME: None    Objective:     Patient found with: telemetry, peripheral IV, pulse ox (continuous), NG tube, EEG leads    Pain:  Pain Rating 1: 3/10 via FLACC pain scale with any auditory or tactile stimulus, audible cry with head turned L  Pain Rating Post-Intervention 1: 0/10, resting in bed    Cognitive  Exam:  MARIAH; pt is non-verbal and unable to answer any questions  Patient follows 0% of single-step commands.    Sensation:   Unable to formally assess but he does get agitated with any tactile stimulus (as simple as lightly grazing his arm)    Lower Extremity Range of Motion:  Right Lower Extremity: Positioned in neutral hip/knee ext, ankle PF 5 deg. All joints WFL passively  Left Lower Extremity: Positioned in hip flex 90 deg, externally rotated; knee flexed 110 deg, ankle PF 5 deg. All joints WFL passively    Lower Extremity Strength:  Right Lower Extremity: Grossly 3/5 at hip/knee, no ankle movement observed. No purposeful movement, unable to perform any type of MMT. I do feel he moves more at the LLE > RLE  Left Lower Extremity: Grossly 3/5 at hip/knee, no ankle movement observed. No purposeful movement, unable to perform any type of MMT. I do feel he moves more at the LLE > RLE    Functional Mobility:    · Bed Mobility:  · Supine to Sitting: Dependent  · Sitting to Supine: Dependent    · Transfers:  · NT    · Balance:  · Static Sit: Dependent for trunk control (extensor posturing noted); able to hold head upright for brief periods before lagging into flex/ext (doesn't seem to intentional head control, stares off to L with head rotated L) at EOB. Performed a cumulative 5 minutes of sitting within bed as well as at EOB    Additional Therapeutic Activity/Exercises:     1. Discussed PT role, POC, goals and recommendations with sitter and RN; verbalized understanding.    Patient was left supine in bed (HOB elevated) with all lines intact, RN notified and sitter present.    Clinical Decision Making for Evaluation Complexity:  1. Body System(s) Examination: 3  2. Clinical Presentation: Unstable  3. Evaluation Complexity: High    GOALS:   Multidisciplinary Problems     Physical Therapy Goals        Problem: Physical Therapy Goal    Goal Priority Disciplines Outcome Goal Variances Interventions   Physical Therapy Goal      PT, PT/OT      Description:  Goals to be met by: 6/17/19     1. Avinash will visually track toy/face on 100% of attempts in single session - Not met  2. Avinash will tolerate sitting up at EOB with CGA-min (A) x 1 minute without pain behaviors noted - Not met  3. Avinash will demo ability to accept 100% weight through legs in supported standing for at least 5 seconds, no pain behaviors - Not met  4. Avinash will reach and grasp for toy at shoulder height x 3 reps in supine or supported sitting play - Not met                    Wilman Rico, PT  6/10/2019   aerobic capacity/endurance/gait, locomotion, and balance

## 2023-10-11 NOTE — PROGRESS NOTE ADULT - PROBLEM SELECTOR PLAN 1
Presents w/ BELLO w/ minimal exertion, chest pain, orthopnea (2 pillow use)  x 3 days. proBNP 1279. Prior hx CAD: JOSÉ mLAD in 6/2023. Compliant w/ DAPT aspirin/Plavix. Received Lasix 20mg IV x 1 in ED. EKG nonischemic. Troponin T x 1 negative. s/p lasix 50mg IVP. Cath denied.    Pt presented with acs sxs initially and admitted to cardiology service to r/o cardiac causes. Pt initally required oxygen, and was thought to be secondary to acute chf excerbation. Pt found to have cholecystits on CTAP and sxs are likely due to cholecystits. Pt is not complaining of shortness of breath or chest pain. Currently on 2L    - continue to manage  - strict I&Os Presents w/ BELLO w/ minimal exertion, chest pain, orthopnea (2 pillow use)  x 3 days. proBNP 1279. Prior hx CAD: JOSÉ mLAD in 6/2023. Compliant w/ DAPT aspirin/Plavix. Received Lasix 20mg IV x 1 in ED. EKG nonischemic. Troponin T x 1 negative. s/p lasix 50mg IVP. Cath denied.    Pt presented with acs sxs initially and admitted to cardiology service to r/o cardiac causes. Pt initally required oxygen, and was thought to be secondary to acute chf excerbation. Pt found to have cholecystits on CTAP and sxs are likely due to cholecystits. Pt is not complaining of shortness of breath or chest pain. Currently on 2L    - f/u ambulatory o2 sats  - continue to manage  - strict I&Os

## 2023-10-11 NOTE — PROGRESS NOTE ADULT - PROBLEM SELECTOR PLAN 1
9/13: TSH 7.05, free T4 1.4  10/7: TSH 10.5, free T4 1.47  10/9: . free T4 1.58  - Was on alternating levothyroxine doses of 50mcg and 100mcg, and not taking consistently.   - Change levothyroxine to 75 mcg oral daily to simplify regimen. (Instead of alternating doses)  - Recheck TSH and Free T4 in 4-6 weeks outpatient.    - He should continue to follow-up with his endocrinologist for further titration of his levothyroxine. 9/13: TSH 7.05, free T4 1.4  10/7: TSH 10.5, free T4 1.47  10/9: TSH 6.39. free T4 1.58  - Was on alternating levothyroxine doses of 50mcg and 100mcg, and not taking consistently.   - Change levothyroxine to 75 mcg oral daily to simplify regimen. (Instead of alternating doses)  - Recheck TSH and Free T4 in 4-6 weeks outpatient.    - He should continue to follow-up with his endocrinologist for further titration of his levothyroxine.

## 2023-10-11 NOTE — PROGRESS NOTE ADULT - PROBLEM SELECTOR PLAN 2
Type 2 diabetes mellitus with hyperglycemia  - Please continue tradjenta 5mg daily before breakfast.  - Diet: consistent carbohydrate.   - Please continue with lispro moderate dose sliding scale before meals and at bedtime for now.   - Patient's fingerstick glucose goal is 100-180 mg/dL.    - Discharge recommendations to be discussed; likely DDP 4  - Patient can follow up at discharge with [facility] by calling [phone] to make an appointment.      Case discussed with Dr. Ness. Primary team updated. Type 2 diabetes mellitus with hyperglycemia  - Please continue tradjenta 5mg daily before breakfast.  - Diet: consistent carbohydrate.   - Please continue with lispro moderate dose sliding scale before meals and at bedtime for now.   - Patient's fingerstick glucose goal is 100-180 mg/dL.    - Discharge recommendations to be discussed; likely DDP 4  - Patient can follow up at discharge with [facility] by calling [phone] to make an appointment.      Case discussed with Dr. Ness. Primary team updated. Type 2 diabetes mellitus with hyperglycemia  - Please continue tradjenta 5mg daily before breakfast.  - Diet: consistent carbohydrate.   - Please continue with lispro moderate dose sliding scale before meals and at bedtime for now.   - Patient's fingerstick glucose goal is 100-180 mg/dL.    - Discharge recommendations to be discussed; likely DDP 4  - Patient can follow up at discharge with [facility] by calling [phone] to make an appointment.      Case discussed with Dr. Ness. Primary team updated.

## 2023-10-11 NOTE — DIETITIAN INITIAL EVALUATION ADULT - ADD RECOMMEND
1. Continue current diet  2. Follow intake closely, adjust prn  3. Monitor electrolytes, adjust and replete prn  4. Pain and bowel regimen per team   5. RD diet edu prn   1. Continue consistent carbohydrate diet + glucerna BID  2. Follow intake closely, adjust prn  3. Monitor electrolytes, adjust and replete prn  4. Pain and bowel regimen per team   5. RD diet edu prn

## 2023-10-11 NOTE — DIETITIAN INITIAL EVALUATION ADULT - PROBLEM SELECTOR PLAN 2
Reports sharp pinpoint RUQ discomfort worsened with palpation x 1-2 days. Last BM 10/3 w/ suppository at home. Daughter reports decreased eating and fluid intake at home lately.  -Has had issues with constipation. Has been taking Dulcolax x 2 weeks at home.   -CXR abd for evaluation of constipation  -RUQ abdominal u/s for further pain evaluation  -Consider repeat suppository as indicated pending above testing

## 2023-10-11 NOTE — PROGRESS NOTE ADULT - SUBJECTIVE AND OBJECTIVE BOX
SUBJECTIVE / INTERVAL HPI: Patient was seen and examined this morning. Denies RUQ pain, even on palpation. Eating well. + BM.     CAPILLARY BLOOD GLUCOSE & INSULIN RECEIVED  147 mg/dL (10-10 @ 05:30)  134 mg/dL (10-10 @ 07:28)  248 mg/dL (10-10 @ 11:56)  158 mg/dL (10-10 @ 16:53)  129 mg/dL (10-10 @ 20:37)  112 mg/dL (10-10 @ 21:36)  126 mg/dL (10-11 @ 05:30)  112 mg/dL (10-11 @ 07:26)  195 mg/dL (10-11 @ 11:51)  244 mg/dL (10-11 @ 12:26)      REVIEW OF SYSTEMS  Constitutional:  Negative fever, chills or loss of appetite.  Eyes:  Negative blurry vision or double vision.  Cardiovascular:  Negative for chest pain or palpitations.  Respiratory:  Negative for cough, wheezing, or shortness of breath.    Gastrointestinal:  Negative for nausea, vomiting, diarrhea, constipation, or abdominal pain.  Genitourinary:  Negative frequency, urgency or dysuria.  Neurologic:  No headache, confusion, dizziness, lightheadedness.    PHYSICAL EXAM  Vital Signs Last 24 Hrs  T(C): 36.3 (11 Oct 2023 12:10), Max: 36.5 (11 Oct 2023 08:44)  T(F): 97.4 (11 Oct 2023 12:10), Max: 97.7 (11 Oct 2023 08:44)  HR: 73 (11 Oct 2023 12:10) (69 - 80)  BP: 148/72 (11 Oct 2023 12:10) (110/61 - 166/91)  BP(mean): 99 (11 Oct 2023 05:41) (85 - 99)  RR: 17 (11 Oct 2023 12:10) (16 - 19)  SpO2: 94% (11 Oct 2023 12:10) (90% - 98%)    Parameters below as of 11 Oct 2023 12:10  Patient On (Oxygen Delivery Method): room air        Constitutional: Awake, alert, in no acute distress.   HEENT: Normocephalic, atraumatic, PAMELA.  Respiratory: Lungs clear to ausculation bilaterally.   Cardiovascular: regular rhythm, normal S1 and S2, no audible murmurs.   GI: soft, non-tender, non-distended, bowel sounds present.  Extremities: No lower extremity edema.  Psychiatric: AAO x 3. Normal affect/mood.     LABS  CBC - WBC/HGB/HTC/PLT: 12.83/14.3/43.2/285 (10-11-23)  BMP - Na/K/Cl/Bicarb/BUN/Cr/Gluc/AG/eGFR: 127/4.2/95/20/18/0.94/244/12/79 (10-11-23)  Ca - 8.2 (10-11-23)  Phos - -- (10-11-23)  Mg - -- (10-11-23)  LFT - Alb/Tprot/Tbili/Dbili/AlkPhos/ALT/AST: 2.5/--/0.3/--/126/17/19 (10-11-23)  PT/aPTT/INR: 14.3/33.4/1.26 (10-10-23)   Thyroid Stimulating Hormone, Serum: 6.990 (10-09-23)  Total T4/Free T4: --/1.580 (10-09-23)    MEDICATIONS  MEDICATIONS  (STANDING):  aspirin enteric coated 81 milliGRAM(s) Oral daily  atorvastatin 40 milliGRAM(s) Oral at bedtime  cefTRIAXone   IVPB 2000 milliGRAM(s) IV Intermittent every 24 hours  dextrose 5%. 1000 milliLiter(s) (50 mL/Hr) IV Continuous <Continuous>  dextrose 5%. 1000 milliLiter(s) (100 mL/Hr) IV Continuous <Continuous>  dextrose 50% Injectable 25 Gram(s) IV Push once  dextrose 50% Injectable 12.5 Gram(s) IV Push once  dextrose 50% Injectable 25 Gram(s) IV Push once  gabapentin 100 milliGRAM(s) Oral at bedtime  glucagon  Injectable 1 milliGRAM(s) IntraMuscular once  insulin lispro (ADMELOG) corrective regimen sliding scale   SubCutaneous at bedtime  insulin lispro (ADMELOG) corrective regimen sliding scale   SubCutaneous three times a day before meals  levothyroxine 75 MICROGram(s) Oral daily  linagliptin 5 milliGRAM(s) Oral daily  metoprolol succinate ER 12.5 milliGRAM(s) Oral daily  metroNIDAZOLE    Tablet 500 milliGRAM(s) Oral every 8 hours  pantoprazole    Tablet 40 milliGRAM(s) Oral at bedtime  senna 2 Tablet(s) Oral daily  sodium chloride 0.9%. 500 milliLiter(s) (100 mL/Hr) IV Continuous <Continuous>    MEDICATIONS  (PRN):  bisacodyl Suppository 10 milliGRAM(s) Rectal daily PRN Constipation  cyclobenzaprine 5 milliGRAM(s) Oral two times a day PRN Muscle Spasm  dextrose Oral Gel 15 Gram(s) Oral once PRN Blood Glucose LESS THAN 70 milliGRAM(s)/deciliter  oxyCODONE    IR 5 milliGRAM(s) Oral every 6 hours PRN Severe Pain (7 - 10)    ASSESSMENT / RECOMMENDATIONS    A1C: 7.7 %  BUN: 18  Creatinine: 0.94  GFR: 79  Weight: 63.5  BMI: 23.3  EF:     # Type 2 diabetes mellitus with hyperglycemia  - Please continue lantus *** units at bedtime.   - Continue lispro *** units before each meal.  - Continue lispro moderate / low dose sliding scale before meals and at bedtime.  - Patient's fingerstick glucose goal is 100-180 mg/dL.    - For discharge, patient can ***.    - Patient can follow up at discharge with [facility] by calling [phone] to make an appointment.      Case discussed with Dr. Ness. Primary team updated.          SUBJECTIVE / INTERVAL HPI: Patient was seen and examined this morning. Denies RUQ pain, even on palpation. Eating well. + BM.     CAPILLARY BLOOD GLUCOSE & INSULIN RECEIVED  147 mg/dL (10-10 @ 05:30)  134 mg/dL (10-10 @ 07:28)  248 mg/dL (10-10 @ 11:56)  158 mg/dL (10-10 @ 16:53)  129 mg/dL (10-10 @ 20:37)  112 mg/dL (10-10 @ 21:36)  126 mg/dL (10-11 @ 05:30)  112 mg/dL (10-11 @ 07:26)  195 mg/dL (10-11 @ 11:51)  244 mg/dL (10-11 @ 12:26)      REVIEW OF SYSTEMS  Constitutional:  Negative fever, chills or loss of appetite.  Eyes:  Negative blurry vision or double vision.  Cardiovascular:  Negative for chest pain or palpitations.  Respiratory:  Negative for cough, wheezing, or shortness of breath.    Gastrointestinal:  Negative for nausea, vomiting, diarrhea, constipation, or abdominal pain.  Genitourinary:  Negative frequency, urgency or dysuria.  Neurologic:  No headache, confusion, dizziness, lightheadedness.    PHYSICAL EXAM  Vital Signs Last 24 Hrs  T(C): 36.3 (11 Oct 2023 12:10), Max: 36.5 (11 Oct 2023 08:44)  T(F): 97.4 (11 Oct 2023 12:10), Max: 97.7 (11 Oct 2023 08:44)  HR: 73 (11 Oct 2023 12:10) (69 - 80)  BP: 148/72 (11 Oct 2023 12:10) (110/61 - 166/91)  BP(mean): 99 (11 Oct 2023 05:41) (85 - 99)  RR: 17 (11 Oct 2023 12:10) (16 - 19)  SpO2: 94% (11 Oct 2023 12:10) (90% - 98%)    Parameters below as of 11 Oct 2023 12:10  Patient On (Oxygen Delivery Method): room air        Constitutional: Awake, alert, in no acute distress.   HEENT: Normocephalic, atraumatic, PAMELA.  Respiratory: Lungs clear to ausculation bilaterally.   Cardiovascular: regular rhythm, normal S1 and S2, no audible murmurs.   GI: soft, non-tender, non-distended, bowel sounds present.  Extremities: No lower extremity edema.  Psychiatric: AAO x 3. Normal affect/mood.     LABS  CBC - WBC/HGB/HTC/PLT: 12.83/14.3/43.2/285 (10-11-23)  BMP - Na/K/Cl/Bicarb/BUN/Cr/Gluc/AG/eGFR: 127/4.2/95/20/18/0.94/244/12/79 (10-11-23)  Ca - 8.2 (10-11-23)  Phos - -- (10-11-23)  Mg - -- (10-11-23)  LFT - Alb/Tprot/Tbili/Dbili/AlkPhos/ALT/AST: 2.5/--/0.3/--/126/17/19 (10-11-23)  PT/aPTT/INR: 14.3/33.4/1.26 (10-10-23)   Thyroid Stimulating Hormone, Serum: 6.990 (10-09-23)  Total T4/Free T4: --/1.580 (10-09-23)    MEDICATIONS  MEDICATIONS  (STANDING):  aspirin enteric coated 81 milliGRAM(s) Oral daily  atorvastatin 40 milliGRAM(s) Oral at bedtime  cefTRIAXone   IVPB 2000 milliGRAM(s) IV Intermittent every 24 hours  dextrose 5%. 1000 milliLiter(s) (50 mL/Hr) IV Continuous <Continuous>  dextrose 5%. 1000 milliLiter(s) (100 mL/Hr) IV Continuous <Continuous>  dextrose 50% Injectable 25 Gram(s) IV Push once  dextrose 50% Injectable 12.5 Gram(s) IV Push once  dextrose 50% Injectable 25 Gram(s) IV Push once  gabapentin 100 milliGRAM(s) Oral at bedtime  glucagon  Injectable 1 milliGRAM(s) IntraMuscular once  insulin lispro (ADMELOG) corrective regimen sliding scale   SubCutaneous at bedtime  insulin lispro (ADMELOG) corrective regimen sliding scale   SubCutaneous three times a day before meals  levothyroxine 75 MICROGram(s) Oral daily  linagliptin 5 milliGRAM(s) Oral daily  metoprolol succinate ER 12.5 milliGRAM(s) Oral daily  metroNIDAZOLE    Tablet 500 milliGRAM(s) Oral every 8 hours  pantoprazole    Tablet 40 milliGRAM(s) Oral at bedtime  senna 2 Tablet(s) Oral daily  sodium chloride 0.9%. 500 milliLiter(s) (100 mL/Hr) IV Continuous <Continuous>    MEDICATIONS  (PRN):  bisacodyl Suppository 10 milliGRAM(s) Rectal daily PRN Constipation  cyclobenzaprine 5 milliGRAM(s) Oral two times a day PRN Muscle Spasm  dextrose Oral Gel 15 Gram(s) Oral once PRN Blood Glucose LESS THAN 70 milliGRAM(s)/deciliter  oxyCODONE    IR 5 milliGRAM(s) Oral every 6 hours PRN Severe Pain (7 - 10)    ASSESSMENT / RECOMMENDATIONS    A1C: 7.7 %  BUN: 18  Creatinine: 0.94  GFR: 79  Weight: 63.5  BMI: 23.3  EF:     # Type 2 diabetes mellitus with hyperglycemia  - Please continue lantus *** units at bedtime.   - Continue lispro *** units before each meal.  - Continue lispro moderate / low dose sliding scale before meals and at bedtime.  - Patient's fingerstick glucose goal is 100-180 mg/dL.    - For discharge, patient can ***.    - Patient can follow up at discharge with [facility] by calling [phone] to make an appointment.      Case discussed with Dr. Ness. Primary team updated.          SUBJECTIVE / INTERVAL HPI: Patient was seen and examined this morning. Denies RUQ pain, even on palpation. Eating well. + BM.     CAPILLARY BLOOD GLUCOSE & INSULIN RECEIVED  147 mg/dL (10-10 @ 05:30)  134 mg/dL (10-10 @ 07:28)  248 mg/dL (10-10 @ 11:56)  158 mg/dL (10-10 @ 16:53)  129 mg/dL (10-10 @ 20:37)  112 mg/dL (10-10 @ 21:36)  126 mg/dL (10-11 @ 05:30)  112 mg/dL (10-11 @ 07:26)  195 mg/dL (10-11 @ 11:51)  244 mg/dL (10-11 @ 12:26)      REVIEW OF SYSTEMS  Constitutional:  Negative fever, chills or loss of appetite.  Eyes:  Negative blurry vision or double vision.  Cardiovascular:  Negative for chest pain or palpitations.  Respiratory:  Negative for cough, wheezing, or shortness of breath.    Gastrointestinal:  Negative for nausea, vomiting, diarrhea, constipation, or abdominal pain.  Genitourinary:  Negative frequency, urgency or dysuria.  Neurologic:  No headache, confusion, dizziness, lightheadedness.    PHYSICAL EXAM  Vital Signs Last 24 Hrs  T(C): 36.3 (11 Oct 2023 12:10), Max: 36.5 (11 Oct 2023 08:44)  T(F): 97.4 (11 Oct 2023 12:10), Max: 97.7 (11 Oct 2023 08:44)  HR: 73 (11 Oct 2023 12:10) (69 - 80)  BP: 148/72 (11 Oct 2023 12:10) (110/61 - 166/91)  BP(mean): 99 (11 Oct 2023 05:41) (85 - 99)  RR: 17 (11 Oct 2023 12:10) (16 - 19)  SpO2: 94% (11 Oct 2023 12:10) (90% - 98%)    Parameters below as of 11 Oct 2023 12:10  Patient On (Oxygen Delivery Method): room air        Constitutional: Awake, alert, in no acute distress.   HEENT: Normocephalic, atraumatic, PAMELA.  Respiratory: Lungs clear to ausculation bilaterally.   Cardiovascular: regular rhythm, normal S1 and S2, no audible murmurs.   GI: soft, non-tender, non-distended, bowel sounds present.  Extremities: No lower extremity edema.  Psychiatric: AAO x 3. Normal affect/mood.     LABS  CBC - WBC/HGB/HTC/PLT: 12.83/14.3/43.2/285 (10-11-23)  BMP - Na/K/Cl/Bicarb/BUN/Cr/Gluc/AG/eGFR: 127/4.2/95/20/18/0.94/244/12/79 (10-11-23)  Ca - 8.2 (10-11-23)  Phos - -- (10-11-23)  Mg - -- (10-11-23)  LFT - Alb/Tprot/Tbili/Dbili/AlkPhos/ALT/AST: 2.5/--/0.3/--/126/17/19 (10-11-23)  PT/aPTT/INR: 14.3/33.4/1.26 (10-10-23)   Thyroid Stimulating Hormone, Serum: 6.990 (10-09-23)  Total T4/Free T4: --/1.580 (10-09-23)    MEDICATIONS  MEDICATIONS  (STANDING):  aspirin enteric coated 81 milliGRAM(s) Oral daily  atorvastatin 40 milliGRAM(s) Oral at bedtime  cefTRIAXone   IVPB 2000 milliGRAM(s) IV Intermittent every 24 hours  dextrose 5%. 1000 milliLiter(s) (50 mL/Hr) IV Continuous <Continuous>  dextrose 5%. 1000 milliLiter(s) (100 mL/Hr) IV Continuous <Continuous>  dextrose 50% Injectable 25 Gram(s) IV Push once  dextrose 50% Injectable 12.5 Gram(s) IV Push once  dextrose 50% Injectable 25 Gram(s) IV Push once  gabapentin 100 milliGRAM(s) Oral at bedtime  glucagon  Injectable 1 milliGRAM(s) IntraMuscular once  insulin lispro (ADMELOG) corrective regimen sliding scale   SubCutaneous at bedtime  insulin lispro (ADMELOG) corrective regimen sliding scale   SubCutaneous three times a day before meals  levothyroxine 75 MICROGram(s) Oral daily  linagliptin 5 milliGRAM(s) Oral daily  metoprolol succinate ER 12.5 milliGRAM(s) Oral daily  metroNIDAZOLE    Tablet 500 milliGRAM(s) Oral every 8 hours  pantoprazole    Tablet 40 milliGRAM(s) Oral at bedtime  senna 2 Tablet(s) Oral daily  sodium chloride 0.9%. 500 milliLiter(s) (100 mL/Hr) IV Continuous <Continuous>    MEDICATIONS  (PRN):  bisacodyl Suppository 10 milliGRAM(s) Rectal daily PRN Constipation  cyclobenzaprine 5 milliGRAM(s) Oral two times a day PRN Muscle Spasm  dextrose Oral Gel 15 Gram(s) Oral once PRN Blood Glucose LESS THAN 70 milliGRAM(s)/deciliter  oxyCODONE    IR 5 milliGRAM(s) Oral every 6 hours PRN Severe Pain (7 - 10)    ASSESSMENT / RECOMMENDATIONS    A1C: 7.7 %  BUN: 18  Creatinine: 0.94  GFR: 79  Weight: 63.5  BMI: 23.3  EF:     # Type 2 diabetes mellitus with hyperglycemia  - Please continue lantus *** units at bedtime.   - Continue lispro *** units before each meal.  - Continue lispro moderate / low dose sliding scale before meals and at bedtime.  - Patient's fingerstick glucose goal is 100-180 mg/dL.    - For discharge, patient can ***.    - Patient can follow up at discharge with [facility] by calling [phone] to make an appointment.      Case discussed with Dr. Ness. Primary team updated.          SUBJECTIVE / INTERVAL HPI: Patient was seen and examined this morning. Denies RUQ pain, even on palpation. Eating well. + BM. Hyponatremic to 130 (corrected for glucose). Tradjenta 5mg started today.    CAPILLARY BLOOD GLUCOSE & INSULIN RECEIVED  158 mg/dL (10-10 @ 16:53) - Lispro 2. Ate chicken, no starch.  129 mg/dL (10-10 @ 20:37)  112 mg/dL (10-10 @ 21:36)   126 mg/dL (10-11 @ 05:30)  112 mg/dL (10-11 @ 07:26) - Ate 1/2 egg and cheese on roll and /3 glucerna.  195 mg/dL (10-11 @ 11:51)  244 mg/dL (10-11 @ 12:26)      REVIEW OF SYSTEMS  Constitutional:  Negative fever, chills or loss of appetite.  Eyes:  Negative blurry vision or double vision.  Cardiovascular:  Negative for chest pain or palpitations.  Respiratory:  Negative for cough, wheezing, or shortness of breath.    Gastrointestinal:  Negative for nausea, vomiting, diarrhea, constipation, or abdominal pain.  Genitourinary:  Negative frequency, urgency or dysuria.  Neurologic:  No headache, confusion, dizziness, lightheadedness.    PHYSICAL EXAM  Vital Signs Last 24 Hrs  T(C): 36.3 (11 Oct 2023 12:10), Max: 36.5 (11 Oct 2023 08:44)  T(F): 97.4 (11 Oct 2023 12:10), Max: 97.7 (11 Oct 2023 08:44)  HR: 73 (11 Oct 2023 12:10) (69 - 80)  BP: 148/72 (11 Oct 2023 12:10) (110/61 - 166/91)  BP(mean): 99 (11 Oct 2023 05:41) (85 - 99)  RR: 17 (11 Oct 2023 12:10) (16 - 19)  SpO2: 94% (11 Oct 2023 12:10) (90% - 98%)    Parameters below as of 11 Oct 2023 12:10  Patient On (Oxygen Delivery Method): room air        Constitutional: Awake, alert, in no acute distress.   HEENT: Normocephalic, atraumatic, PAMELA.  Respiratory: Lungs clear to ausculation bilaterally.   Cardiovascular: regular rhythm, normal S1 and S2, no audible murmurs.   GI: soft, non-tender, non-distended, bowel sounds present.  Extremities: No lower extremity edema.  Psychiatric: AAO x 3. Normal affect/mood.     LABS  CBC - WBC/HGB/HTC/PLT: 12.83/14.3/43.2/285 (10-11-23)  BMP - Na/K/Cl/Bicarb/BUN/Cr/Gluc/AG/eGFR: 127/4.2/95/20/18/0.94/244/12/79 (10-11-23)  Ca - 8.2 (10-11-23)  Phos - -- (10-11-23)  Mg - -- (10-11-23)  LFT - Alb/Tprot/Tbili/Dbili/AlkPhos/ALT/AST: 2.5/--/0.3/--/126/17/19 (10-11-23)  PT/aPTT/INR: 14.3/33.4/1.26 (10-10-23)   Thyroid Stimulating Hormone, Serum: 6.990 (10-09-23)  Total T4/Free T4: --/1.580 (10-09-23)    MEDICATIONS  MEDICATIONS  (STANDING):  aspirin enteric coated 81 milliGRAM(s) Oral daily  atorvastatin 40 milliGRAM(s) Oral at bedtime  cefTRIAXone   IVPB 2000 milliGRAM(s) IV Intermittent every 24 hours  dextrose 5%. 1000 milliLiter(s) (50 mL/Hr) IV Continuous <Continuous>  dextrose 5%. 1000 milliLiter(s) (100 mL/Hr) IV Continuous <Continuous>  dextrose 50% Injectable 25 Gram(s) IV Push once  dextrose 50% Injectable 12.5 Gram(s) IV Push once  dextrose 50% Injectable 25 Gram(s) IV Push once  gabapentin 100 milliGRAM(s) Oral at bedtime  glucagon  Injectable 1 milliGRAM(s) IntraMuscular once  insulin lispro (ADMELOG) corrective regimen sliding scale   SubCutaneous at bedtime  insulin lispro (ADMELOG) corrective regimen sliding scale   SubCutaneous three times a day before meals  levothyroxine 75 MICROGram(s) Oral daily  linagliptin 5 milliGRAM(s) Oral daily  metoprolol succinate ER 12.5 milliGRAM(s) Oral daily  metroNIDAZOLE    Tablet 500 milliGRAM(s) Oral every 8 hours  pantoprazole    Tablet 40 milliGRAM(s) Oral at bedtime  senna 2 Tablet(s) Oral daily  sodium chloride 0.9%. 500 milliLiter(s) (100 mL/Hr) IV Continuous <Continuous>    MEDICATIONS  (PRN):  bisacodyl Suppository 10 milliGRAM(s) Rectal daily PRN Constipation  cyclobenzaprine 5 milliGRAM(s) Oral two times a day PRN Muscle Spasm  dextrose Oral Gel 15 Gram(s) Oral once PRN Blood Glucose LESS THAN 70 milliGRAM(s)/deciliter  oxyCODONE    IR 5 milliGRAM(s) Oral every 6 hours PRN Severe Pain (7 - 10)    ASSESSMENT / RECOMMENDATIONS    A1C: 7.7 %  BUN: 18  Creatinine: 0.94  GFR: 79  Weight: 63.5  BMI: 23.3  EF:     # Type 2 diabetes mellitus with hyperglycemia  - Please continue lantus *** units at bedtime.   - Continue lispro *** units before each meal.  - Continue lispro moderate / low dose sliding scale before meals and at bedtime.  - Patient's fingerstick glucose goal is 100-180 mg/dL.    - For discharge, patient can ***.    - Patient can follow up at discharge with [facility] by calling [phone] to make an appointment.      Case discussed with Dr. Ness. Primary team updated.          SUBJECTIVE / INTERVAL HPI: Patient was seen and examined this morning. Denies RUQ pain, even on palpation. Eating well. + BM. Hyponatremic to 130 (corrected for glucose). Tradjenta 5mg started today.    CAPILLARY BLOOD GLUCOSE & INSULIN RECEIVED  158 mg/dL (10-10 @ 16:53) - Lispro 2. Ate chicken, no starch.  129 mg/dL (10-10 @ 20:37)  112 mg/dL (10-10 @ 21:36)   126 mg/dL (10-11 @ 05:30)  112 mg/dL (10-11 @ 07:26) - Ate 1/2 egg and cheese on roll and /3 glucerna.  195 mg/dL (10-11 @ 11:51)  244 mg/dL (10-11 @ 12:26)      REVIEW OF SYSTEMS  Constitutional:  Negative fever, chills or loss of appetite.  Eyes:  Negative blurry vision or double vision.  Cardiovascular:  Negative for chest pain or palpitations.  Respiratory:  Negative for cough, wheezing, or shortness of breath.    Gastrointestinal:  Negative for nausea, vomiting, diarrhea, constipation, or abdominal pain.  Genitourinary:  Negative frequency, urgency or dysuria.  Neurologic:  No headache, confusion, dizziness, lightheadedness.    PHYSICAL EXAM  Vital Signs Last 24 Hrs  T(C): 36.3 (11 Oct 2023 12:10), Max: 36.5 (11 Oct 2023 08:44)  T(F): 97.4 (11 Oct 2023 12:10), Max: 97.7 (11 Oct 2023 08:44)  HR: 73 (11 Oct 2023 12:10) (69 - 80)  BP: 148/72 (11 Oct 2023 12:10) (110/61 - 166/91)  BP(mean): 99 (11 Oct 2023 05:41) (85 - 99)  RR: 17 (11 Oct 2023 12:10) (16 - 19)  SpO2: 94% (11 Oct 2023 12:10) (90% - 98%)    Parameters below as of 11 Oct 2023 12:10  Patient On (Oxygen Delivery Method): room air        Constitutional: Awake, alert, in no acute distress.   HEENT: Normocephalic, atraumatic, PAMELA.  Respiratory: Lungs clear to ausculation bilaterally.   Cardiovascular: regular rhythm, normal S1 and S2, no audible murmurs.   GI: soft, non-tender, non-distended, bowel sounds present.  Extremities: No lower extremity edema.  Psychiatric: AAO x 3. Normal affect/mood.     LABS  CBC - WBC/HGB/HTC/PLT: 12.83/14.3/43.2/285 (10-11-23)  BMP - Na/K/Cl/Bicarb/BUN/Cr/Gluc/AG/eGFR: 127/4.2/95/20/18/0.94/244/12/79 (10-11-23)  Ca - 8.2 (10-11-23)  Phos - -- (10-11-23)  Mg - -- (10-11-23)  LFT - Alb/Tprot/Tbili/Dbili/AlkPhos/ALT/AST: 2.5/--/0.3/--/126/17/19 (10-11-23)  PT/aPTT/INR: 14.3/33.4/1.26 (10-10-23)   Thyroid Stimulating Hormone, Serum: 6.990 (10-09-23)  Total T4/Free T4: --/1.580 (10-09-23)    MEDICATIONS  MEDICATIONS  (STANDING):  aspirin enteric coated 81 milliGRAM(s) Oral daily  atorvastatin 40 milliGRAM(s) Oral at bedtime  cefTRIAXone   IVPB 2000 milliGRAM(s) IV Intermittent every 24 hours  dextrose 5%. 1000 milliLiter(s) (50 mL/Hr) IV Continuous <Continuous>  dextrose 5%. 1000 milliLiter(s) (100 mL/Hr) IV Continuous <Continuous>  dextrose 50% Injectable 25 Gram(s) IV Push once  dextrose 50% Injectable 12.5 Gram(s) IV Push once  dextrose 50% Injectable 25 Gram(s) IV Push once  gabapentin 100 milliGRAM(s) Oral at bedtime  glucagon  Injectable 1 milliGRAM(s) IntraMuscular once  insulin lispro (ADMELOG) corrective regimen sliding scale   SubCutaneous at bedtime  insulin lispro (ADMELOG) corrective regimen sliding scale   SubCutaneous three times a day before meals  levothyroxine 75 MICROGram(s) Oral daily  linagliptin 5 milliGRAM(s) Oral daily  metoprolol succinate ER 12.5 milliGRAM(s) Oral daily  metroNIDAZOLE    Tablet 500 milliGRAM(s) Oral every 8 hours  pantoprazole    Tablet 40 milliGRAM(s) Oral at bedtime  senna 2 Tablet(s) Oral daily  sodium chloride 0.9%. 500 milliLiter(s) (100 mL/Hr) IV Continuous <Continuous>    MEDICATIONS  (PRN):  bisacodyl Suppository 10 milliGRAM(s) Rectal daily PRN Constipation  cyclobenzaprine 5 milliGRAM(s) Oral two times a day PRN Muscle Spasm  dextrose Oral Gel 15 Gram(s) Oral once PRN Blood Glucose LESS THAN 70 milliGRAM(s)/deciliter  oxyCODONE    IR 5 milliGRAM(s) Oral every 6 hours PRN Severe Pain (7 - 10)    ASSESSMENT / RECOMMENDATIONS    A1C: 7.7 %  BUN: 18  Creatinine: 0.94  GFR: 79  Weight: 63.5  BMI: 23.3  EF:     # Type 2 diabetes mellitus with hyperglycemia  - Please continue lantus *** units at bedtime.   - Continue lispro *** units before each meal.  - Continue lispro moderate / low dose sliding scale before meals and at bedtime.  - Patient's fingerstick glucose goal is 100-180 mg/dL.    - For discharge, patient can ***.    - Patient can follow up at discharge with [facility] by calling [phone] to make an appointment.      Case discussed with Dr. Ness. Primary team updated.          SUBJECTIVE / INTERVAL HPI: Patient was seen and examined this morning. Denies RUQ pain, even on palpation. Eating well. + BM. Hyponatremic to 130 (corrected for glucose). Tradjenta 5mg started today.    CAPILLARY BLOOD GLUCOSE & INSULIN RECEIVED  158 mg/dL (10-10 @ 16:53) - Lispro 2. Ate chicken, no starch.  129 mg/dL (10-10 @ 20:37)  112 mg/dL (10-10 @ 21:36)   126 mg/dL (10-11 @ 05:30)  112 mg/dL (10-11 @ 07:26) - Ate 1/2 egg and cheese on roll and /3 glucerna.  195 mg/dL (10-11 @ 11:51)  244 mg/dL (10-11 @ 12:26)      REVIEW OF SYSTEMS  Constitutional:  Negative fever, chills or loss of appetite.  Eyes:  Negative blurry vision or double vision.  Cardiovascular:  Negative for chest pain or palpitations.  Respiratory:  Negative for cough, wheezing, or shortness of breath.    Gastrointestinal:  Negative for nausea, vomiting, diarrhea, constipation, or abdominal pain.  Genitourinary:  Negative frequency, urgency or dysuria.  Neurologic:  No headache, confusion, dizziness, lightheadedness.    PHYSICAL EXAM  Vital Signs Last 24 Hrs  T(C): 36.3 (11 Oct 2023 12:10), Max: 36.5 (11 Oct 2023 08:44)  T(F): 97.4 (11 Oct 2023 12:10), Max: 97.7 (11 Oct 2023 08:44)  HR: 73 (11 Oct 2023 12:10) (69 - 80)  BP: 148/72 (11 Oct 2023 12:10) (110/61 - 166/91)  BP(mean): 99 (11 Oct 2023 05:41) (85 - 99)  RR: 17 (11 Oct 2023 12:10) (16 - 19)  SpO2: 94% (11 Oct 2023 12:10) (90% - 98%)    Parameters below as of 11 Oct 2023 12:10  Patient On (Oxygen Delivery Method): room air        Constitutional: Awake, alert, in no acute distress.   HEENT: Normocephalic, atraumatic, PAMELA.  Respiratory: Lungs clear to ausculation bilaterally.   Cardiovascular: regular rhythm, normal S1 and S2, no audible murmurs.   GI: soft, non-tender, non-distended, bowel sounds present.  Extremities: No lower extremity edema.  Psychiatric: AAO x 3. Normal affect/mood.     LABS  CBC - WBC/HGB/HTC/PLT: 12.83/14.3/43.2/285 (10-11-23)  BMP - Na/K/Cl/Bicarb/BUN/Cr/Gluc/AG/eGFR: 127/4.2/95/20/18/0.94/244/12/79 (10-11-23)  Ca - 8.2 (10-11-23)  Phos - -- (10-11-23)  Mg - -- (10-11-23)  LFT - Alb/Tprot/Tbili/Dbili/AlkPhos/ALT/AST: 2.5/--/0.3/--/126/17/19 (10-11-23)  PT/aPTT/INR: 14.3/33.4/1.26 (10-10-23)   Thyroid Stimulating Hormone, Serum: 6.990 (10-09-23)  Total T4/Free T4: --/1.580 (10-09-23)    MEDICATIONS  MEDICATIONS  (STANDING):  aspirin enteric coated 81 milliGRAM(s) Oral daily  atorvastatin 40 milliGRAM(s) Oral at bedtime  cefTRIAXone   IVPB 2000 milliGRAM(s) IV Intermittent every 24 hours  dextrose 5%. 1000 milliLiter(s) (50 mL/Hr) IV Continuous <Continuous>  dextrose 5%. 1000 milliLiter(s) (100 mL/Hr) IV Continuous <Continuous>  dextrose 50% Injectable 25 Gram(s) IV Push once  dextrose 50% Injectable 12.5 Gram(s) IV Push once  dextrose 50% Injectable 25 Gram(s) IV Push once  gabapentin 100 milliGRAM(s) Oral at bedtime  glucagon  Injectable 1 milliGRAM(s) IntraMuscular once  insulin lispro (ADMELOG) corrective regimen sliding scale   SubCutaneous at bedtime  insulin lispro (ADMELOG) corrective regimen sliding scale   SubCutaneous three times a day before meals  levothyroxine 75 MICROGram(s) Oral daily  linagliptin 5 milliGRAM(s) Oral daily  metoprolol succinate ER 12.5 milliGRAM(s) Oral daily  metroNIDAZOLE    Tablet 500 milliGRAM(s) Oral every 8 hours  pantoprazole    Tablet 40 milliGRAM(s) Oral at bedtime  senna 2 Tablet(s) Oral daily  sodium chloride 0.9%. 500 milliLiter(s) (100 mL/Hr) IV Continuous <Continuous>    MEDICATIONS  (PRN):  bisacodyl Suppository 10 milliGRAM(s) Rectal daily PRN Constipation  cyclobenzaprine 5 milliGRAM(s) Oral two times a day PRN Muscle Spasm  dextrose Oral Gel 15 Gram(s) Oral once PRN Blood Glucose LESS THAN 70 milliGRAM(s)/deciliter  oxyCODONE    IR 5 milliGRAM(s) Oral every 6 hours PRN Severe Pain (7 - 10)    ASSESSMENT / RECOMMENDATIONS    A1C: 7.7 %  BUN: 18  Creatinine: 0.94  GFR: 79  Weight: 63.5  BMI: 23.3  EF:     # Type 2 diabetes mellitus with hyperglycemia  - Please continue lantus *** units at bedtime.   - Continue lispro *** units before each meal.  - Continue lispro moderate / low dose sliding scale before meals and at bedtime.  - Patient's fingerstick glucose goal is 100-180 mg/dL.    - For discharge, patient can ***.    - Patient can follow up at discharge with [facility] by calling [phone] to make an appointment.      Case discussed with Dr. Ness. Primary team updated.          SUBJECTIVE / INTERVAL HPI: Patient was seen and examined this morning. Denies RUQ pain, even on palpation. Eating well. + BM. Hyponatremic to 130 (corrected for glucose). Tradjenta 5mg started today.    CAPILLARY BLOOD GLUCOSE & INSULIN RECEIVED  158 mg/dL (10-10 @ 16:53) - Lispro 2. Ate chicken, no starch.  129 mg/dL (10-10 @ 20:37)  112 mg/dL (10-10 @ 21:36)   126 mg/dL (10-11 @ 05:30)  112 mg/dL (10-11 @ 07:26) - Ate 1/2 egg and cheese on roll and 1/3 glucerna.  195 mg/dL (10-11 @ 11:51)  244 mg/dL (10-11 @ 12:26)      REVIEW OF SYSTEMS  Constitutional:  Negative fever, chills or loss of appetite.  Eyes:  Negative blurry vision or double vision.  Cardiovascular:  Negative for chest pain or palpitations.  Respiratory:  Negative for cough, wheezing, or shortness of breath.    Gastrointestinal:  Negative for nausea, vomiting, diarrhea, constipation, or abdominal pain.  Genitourinary:  Negative frequency, urgency or dysuria.  Neurologic:  No headache, confusion, dizziness, lightheadedness.    PHYSICAL EXAM  Vital Signs Last 24 Hrs  T(C): 36.3 (11 Oct 2023 12:10), Max: 36.5 (11 Oct 2023 08:44)  T(F): 97.4 (11 Oct 2023 12:10), Max: 97.7 (11 Oct 2023 08:44)  HR: 73 (11 Oct 2023 12:10) (69 - 80)  BP: 148/72 (11 Oct 2023 12:10) (110/61 - 166/91)  BP(mean): 99 (11 Oct 2023 05:41) (85 - 99)  RR: 17 (11 Oct 2023 12:10) (16 - 19)  SpO2: 94% (11 Oct 2023 12:10) (90% - 98%)    Parameters below as of 11 Oct 2023 12:10  Patient On (Oxygen Delivery Method): room air    Constitutional: Awake, alert, in no acute distress.   HEENT: Normocephalic, atraumatic, PAMELA.  Respiratory: Lungs clear to ausculation bilaterally.   Cardiovascular: regular rhythm, normal S1 and S2, no audible murmurs.   GI: soft, non-tender, non-distended, bowel sounds present.  Extremities: No lower extremity edema.  Psychiatric: AAO x 3. Normal affect/mood.     LABS  CBC - WBC/HGB/HTC/PLT: 12.83/14.3/43.2/285 (10-11-23)  BMP - Na/K/Cl/Bicarb/BUN/Cr/Gluc/AG/eGFR: 127/4.2/95/20/18/0.94/244/12/79 (10-11-23)  Ca - 8.2 (10-11-23)  Phos - -- (10-11-23)  Mg - -- (10-11-23)  LFT - Alb/Tprot/Tbili/Dbili/AlkPhos/ALT/AST: 2.5/--/0.3/--/126/17/19 (10-11-23)  PT/aPTT/INR: 14.3/33.4/1.26 (10-10-23)   Thyroid Stimulating Hormone, Serum: 6.990 (10-09-23)  Total T4/Free T4: --/1.580 (10-09-23)    MEDICATIONS  MEDICATIONS  (STANDING):  aspirin enteric coated 81 milliGRAM(s) Oral daily  atorvastatin 40 milliGRAM(s) Oral at bedtime  cefTRIAXone   IVPB 2000 milliGRAM(s) IV Intermittent every 24 hours  dextrose 5%. 1000 milliLiter(s) (50 mL/Hr) IV Continuous <Continuous>  dextrose 5%. 1000 milliLiter(s) (100 mL/Hr) IV Continuous <Continuous>  dextrose 50% Injectable 25 Gram(s) IV Push once  dextrose 50% Injectable 12.5 Gram(s) IV Push once  dextrose 50% Injectable 25 Gram(s) IV Push once  gabapentin 100 milliGRAM(s) Oral at bedtime  glucagon  Injectable 1 milliGRAM(s) IntraMuscular once  insulin lispro (ADMELOG) corrective regimen sliding scale   SubCutaneous at bedtime  insulin lispro (ADMELOG) corrective regimen sliding scale   SubCutaneous three times a day before meals  levothyroxine 75 MICROGram(s) Oral daily  linagliptin 5 milliGRAM(s) Oral daily  metoprolol succinate ER 12.5 milliGRAM(s) Oral daily  metroNIDAZOLE    Tablet 500 milliGRAM(s) Oral every 8 hours  pantoprazole    Tablet 40 milliGRAM(s) Oral at bedtime  senna 2 Tablet(s) Oral daily  sodium chloride 0.9%. 500 milliLiter(s) (100 mL/Hr) IV Continuous <Continuous>    MEDICATIONS  (PRN):  bisacodyl Suppository 10 milliGRAM(s) Rectal daily PRN Constipation  cyclobenzaprine 5 milliGRAM(s) Oral two times a day PRN Muscle Spasm  dextrose Oral Gel 15 Gram(s) Oral once PRN Blood Glucose LESS THAN 70 milliGRAM(s)/deciliter  oxyCODONE    IR 5 milliGRAM(s) Oral every 6 hours PRN Severe Pain (7 - 10)

## 2023-10-11 NOTE — PROGRESS NOTE ADULT - PROBLEM SELECTOR PLAN 3
WBC 16.26 elevated on admission. Reported fever at home yesterday improved w/ Tylenol. Likely in the setting of cholecystitis. procal 0.25 elevated s/p abx IV Ceftriaxone x 5 days and Azithromycin x 1 in ED. Continued both abx for now.    - c/w ctx to 2gm, started on flagyl 500mg q8  - f/u blood cultures ngtd WBC 16.26 elevated on admission. Reported fever at home yesterday improved w/ Tylenol. Likely in the setting of cholecystitis. procal 0.25 elevated s/p abx IV Ceftriaxone x 5 days and Azithromycin x 1 in ED. Continued both abx for now.    - c/w ctx to 2gm (end date10/22)  - c/w flagyl 500mg q8 (end date 10/23)  - f/u blood cultures ngtd

## 2023-10-11 NOTE — PROGRESS NOTE ADULT - SUBJECTIVE AND OBJECTIVE BOX
*****INCOMPLETE NOTE*****    INTERVAL HPI/OVERNIGHT EVENTS:    SUBJECTIVE: Patient seen and examined at bedside, resting comfortably in bed, and does not appear to be in any acute distress. Patient states  Patient denies any recent fevers, chills, nausea, vomiting, headache, acute sob, chest pain, abdominal pain, genitourinary sx, extremity pain or swelling.     ANTIBIOTICS/RELEVANT:    MEDICATIONS  (STANDING):  aspirin enteric coated 81 milliGRAM(s) Oral daily  atorvastatin 40 milliGRAM(s) Oral at bedtime  cefTRIAXone   IVPB 2000 milliGRAM(s) IV Intermittent every 24 hours  dextrose 5%. 1000 milliLiter(s) (50 mL/Hr) IV Continuous <Continuous>  dextrose 5%. 1000 milliLiter(s) (100 mL/Hr) IV Continuous <Continuous>  dextrose 50% Injectable 25 Gram(s) IV Push once  dextrose 50% Injectable 25 Gram(s) IV Push once  dextrose 50% Injectable 12.5 Gram(s) IV Push once  gabapentin 100 milliGRAM(s) Oral at bedtime  glucagon  Injectable 1 milliGRAM(s) IntraMuscular once  insulin lispro (ADMELOG) corrective regimen sliding scale   SubCutaneous at bedtime  insulin lispro (ADMELOG) corrective regimen sliding scale   SubCutaneous three times a day before meals  metoprolol succinate ER 12.5 milliGRAM(s) Oral daily  metroNIDAZOLE    Tablet 500 milliGRAM(s) Oral every 8 hours  pantoprazole    Tablet 40 milliGRAM(s) Oral at bedtime  senna 2 Tablet(s) Oral daily  sodium chloride 0.9%. 500 milliLiter(s) (100 mL/Hr) IV Continuous <Continuous>    MEDICATIONS  (PRN):  bisacodyl Suppository 10 milliGRAM(s) Rectal daily PRN Constipation  cyclobenzaprine 5 milliGRAM(s) Oral two times a day PRN Muscle Spasm  dextrose Oral Gel 15 Gram(s) Oral once PRN Blood Glucose LESS THAN 70 milliGRAM(s)/deciliter  oxyCODONE    IR 5 milliGRAM(s) Oral every 6 hours PRN Severe Pain (7 - 10)      Vital Signs Last 24 Hrs  T(C): 36.3 (11 Oct 2023 05:41), Max: 36.4 (10 Oct 2023 20:23)  T(F): 97.4 (11 Oct 2023 05:41), Max: 97.5 (10 Oct 2023 20:23)  HR: 80 (11 Oct 2023 05:41) (69 - 80)  BP: 138/75 (11 Oct 2023 05:41) (110/61 - 138/75)  BP(mean): 99 (11 Oct 2023 05:41) (85 - 99)  RR: 16 (11 Oct 2023 05:41) (16 - 19)  SpO2: 95% (11 Oct 2023 05:41) (90% - 95%)    Parameters below as of 11 Oct 2023 05:41  Patient On (Oxygen Delivery Method): nasal cannula  O2 Flow (L/min): 1      PHYSICAL EXAM:  General: in no acute distress  Eyes: EOMI intact bilaterally. Anicteric sclerae, moist conjunctivae  HENT: Moist mucous membranes  Neck: Trachea midline, supple  Lungs: CTA B/L. No wheezes, rales, or rhonchi  Cardiovascular: RRR. No murmurs, rubs, or gallops  Abdomen: Soft, non-tender non-distended; No rebound or guarding  Extremities: WWP, No clubbing, cyanosis or edema  Neurological: Alert and oriented  Skin: Warm and dry. No obvious rash     LABS:                        14.3   12.83 )-----------( 285      ( 11 Oct 2023 05:30 )             43.2     10-10    126<L>  |  92<L>  |  19  ----------------------------<  129<H>  3.8   |  24  |  1.14    Ca    8.4      10 Oct 2023 20:37  Phos  3.0     10-10  Mg     2.1     10-10    TPro  6.8  /  Alb  2.8<L>  /  TBili  0.4  /  DBili  x   /  AST  25  /  ALT  20  /  AlkPhos  140<H>  10-10    PT/INR - ( 10 Oct 2023 05:30 )   PT: 14.3 sec;   INR: 1.26          PTT - ( 10 Oct 2023 05:30 )  PTT:33.4 sec  Urinalysis Basic - ( 10 Oct 2023 20:37 )    Color: x / Appearance: x / SG: x / pH: x  Gluc: 129 mg/dL / Ketone: x  / Bili: x / Urobili: x   Blood: x / Protein: x / Nitrite: x   Leuk Esterase: x / RBC: x / WBC x   Sq Epi: x / Non Sq Epi: x / Bacteria: x        MICROBIOLOGY:    RADIOLOGY & ADDITIONAL STUDIES: INTERVAL HPI/OVERNIGHT EVENTS: adam    SUBJECTIVE: Patient seen and examined at bedside, resting comfortably in bed, and does not appear to be in any acute distress. Patient states that his abdominal pain has improved. States that the abdominal pain is with movement. Denies any chest pain, shortness of breath, or gu sxs.    MEDICATIONS  (STANDING):  aspirin enteric coated 81 milliGRAM(s) Oral daily  atorvastatin 40 milliGRAM(s) Oral at bedtime  cefTRIAXone   IVPB 2000 milliGRAM(s) IV Intermittent every 24 hours  dextrose 5%. 1000 milliLiter(s) (50 mL/Hr) IV Continuous <Continuous>  dextrose 5%. 1000 milliLiter(s) (100 mL/Hr) IV Continuous <Continuous>  dextrose 50% Injectable 25 Gram(s) IV Push once  dextrose 50% Injectable 25 Gram(s) IV Push once  dextrose 50% Injectable 12.5 Gram(s) IV Push once  gabapentin 100 milliGRAM(s) Oral at bedtime  glucagon  Injectable 1 milliGRAM(s) IntraMuscular once  insulin lispro (ADMELOG) corrective regimen sliding scale   SubCutaneous at bedtime  insulin lispro (ADMELOG) corrective regimen sliding scale   SubCutaneous three times a day before meals  metoprolol succinate ER 12.5 milliGRAM(s) Oral daily  metroNIDAZOLE    Tablet 500 milliGRAM(s) Oral every 8 hours  pantoprazole    Tablet 40 milliGRAM(s) Oral at bedtime  senna 2 Tablet(s) Oral daily  sodium chloride 0.9%. 500 milliLiter(s) (100 mL/Hr) IV Continuous <Continuous>    MEDICATIONS  (PRN):  bisacodyl Suppository 10 milliGRAM(s) Rectal daily PRN Constipation  cyclobenzaprine 5 milliGRAM(s) Oral two times a day PRN Muscle Spasm  dextrose Oral Gel 15 Gram(s) Oral once PRN Blood Glucose LESS THAN 70 milliGRAM(s)/deciliter  oxyCODONE    IR 5 milliGRAM(s) Oral every 6 hours PRN Severe Pain (7 - 10)      Vital Signs Last 24 Hrs  T(C): 36.3 (11 Oct 2023 05:41), Max: 36.4 (10 Oct 2023 20:23)  T(F): 97.4 (11 Oct 2023 05:41), Max: 97.5 (10 Oct 2023 20:23)  HR: 80 (11 Oct 2023 05:41) (69 - 80)  BP: 138/75 (11 Oct 2023 05:41) (110/61 - 138/75)  BP(mean): 99 (11 Oct 2023 05:41) (85 - 99)  RR: 16 (11 Oct 2023 05:41) (16 - 19)  SpO2: 95% (11 Oct 2023 05:41) (90% - 95%)    Parameters below as of 11 Oct 2023 05:41  Patient On (Oxygen Delivery Method): nasal cannula  O2 Flow (L/min): 1      PHYSICAL EXAM:  General: in no acute distress  Eyes: EOMI intact bilaterally  HENT: Moist mucous membranes  Neck: Trachea midline  Lungs: CTA B/L. No wheezes, rales, or rhonchi  Cardiovascular: RRR. No murmurs, rubs, or gallops  Abdomen: Soft, non-tender non-distended  Extremities: WWP, No clubbing, cyanosis or edema  Neurological: Alert and oriented  Skin: Warm and dry    LABS:                        14.3   12.83 )-----------( 285      ( 11 Oct 2023 05:30 )             43.2     10-10    126<L>  |  92<L>  |  19  ----------------------------<  129<H>  3.8   |  24  |  1.14    Ca    8.4      10 Oct 2023 20:37  Phos  3.0     10-10  Mg     2.1     10-10    TPro  6.8  /  Alb  2.8<L>  /  TBili  0.4  /  DBili  x   /  AST  25  /  ALT  20  /  AlkPhos  140<H>  10-10    PT/INR - ( 10 Oct 2023 05:30 )   PT: 14.3 sec;   INR: 1.26          PTT - ( 10 Oct 2023 05:30 )  PTT:33.4 sec  Urinalysis Basic - ( 10 Oct 2023 20:37 )    Color: x / Appearance: x / SG: x / pH: x  Gluc: 129 mg/dL / Ketone: x  / Bili: x / Urobili: x   Blood: x / Protein: x / Nitrite: x   Leuk Esterase: x / RBC: x / WBC x   Sq Epi: x / Non Sq Epi: x / Bacteria: x        MICROBIOLOGY:    RADIOLOGY & ADDITIONAL STUDIES:

## 2023-10-11 NOTE — DIETITIAN INITIAL EVALUATION ADULT - PERTINENT MEDS FT
MEDICATIONS  (STANDING):  aspirin enteric coated 81 milliGRAM(s) Oral daily  atorvastatin 40 milliGRAM(s) Oral at bedtime  cefTRIAXone   IVPB 2000 milliGRAM(s) IV Intermittent every 24 hours  clopidogrel Tablet 75 milliGRAM(s) Oral daily  dextrose 5%. 1000 milliLiter(s) (50 mL/Hr) IV Continuous <Continuous>  dextrose 5%. 1000 milliLiter(s) (100 mL/Hr) IV Continuous <Continuous>  dextrose 50% Injectable 25 Gram(s) IV Push once  dextrose 50% Injectable 25 Gram(s) IV Push once  dextrose 50% Injectable 12.5 Gram(s) IV Push once  gabapentin 100 milliGRAM(s) Oral at bedtime  glucagon  Injectable 1 milliGRAM(s) IntraMuscular once  insulin lispro (ADMELOG) corrective regimen sliding scale   SubCutaneous at bedtime  insulin lispro (ADMELOG) corrective regimen sliding scale   SubCutaneous three times a day before meals  levothyroxine 75 MICROGram(s) Oral daily  linagliptin 5 milliGRAM(s) Oral daily  metoprolol succinate ER 12.5 milliGRAM(s) Oral daily  metroNIDAZOLE    Tablet 500 milliGRAM(s) Oral every 8 hours  pantoprazole    Tablet 40 milliGRAM(s) Oral at bedtime  senna 2 Tablet(s) Oral daily  sodium chloride 0.9%. 500 milliLiter(s) (100 mL/Hr) IV Continuous <Continuous>    MEDICATIONS  (PRN):  bisacodyl Suppository 10 milliGRAM(s) Rectal daily PRN Constipation  cyclobenzaprine 5 milliGRAM(s) Oral two times a day PRN Muscle Spasm  dextrose Oral Gel 15 Gram(s) Oral once PRN Blood Glucose LESS THAN 70 milliGRAM(s)/deciliter  oxyCODONE    IR 5 milliGRAM(s) Oral every 6 hours PRN Severe Pain (7 - 10)

## 2023-10-11 NOTE — DIETITIAN INITIAL EVALUATION ADULT - NS FNS DIET ORDER
Diet, Consistent Carbohydrate w/Evening Snack:   Supplement Feeding Modality:  Oral  Glucerna Shake Cans or Servings Per Day:  2       Frequency:  Daily (10-11-23 @ 08:50)

## 2023-10-11 NOTE — PROGRESS NOTE ADULT - NS ATTEST RISK PROBLEM GEN_ALL_CORE FT
Inadequately treated hypothyroidsm, elevated A1c level
Glucoses mostly under control
Need for a trial of an oral hypoglycemic agent to assess for improved glycemic control

## 2023-10-11 NOTE — PROGRESS NOTE ADULT - NS ATTEND AMEND GEN_ALL_CORE FT
87 year old male with PMH relevant for CAD s/p JOSÉ to mLAD (6/2023), HTN, HLD, cervical spondylosis s/p laminectomy, gastric ulcers, hypothyroidism,  who presents to [facility] ED 10/6/23 for BELLO with minimal exertion, chest pressure, and orthopnea x 3 days. Also reports a fever of 101.8 yesterday.    1. CAD  Chest pain resolved. Continue aspirin 81 mg Lipitor, metoprolol. LHC now postponed due to acute cholecystitis.     2. Acute cholecystitis  Poor candidate for cholecystectomy at this time, given improvement in current clinical status, will recommend continuing with CTX/Flagyl.  Continue Abx PO for 2 weeks. F/U with surgery in 2 weeks.     3. Microcytic anemia  outpatient follow up.    4. DM/hypothyroidism  Consult endocrine, appreciate recommendations.    5. Hyponatremia  Appreciate renal recommendations, consider king trial of 3% NS.     discharge planning. 87 year old male with PMH relevant for CAD s/p JOSÉ to mLAD (6/2023), HTN, HLD, cervical spondylosis s/p laminectomy, gastric ulcers, hypothyroidism,  who presents to [facility] ED 10/6/23 for BELLO with minimal exertion, chest pressure, and orthopnea x 3 days. Also reports a fever of 101.8 yesterday.    1. CAD  Chest pain resolved. Continue aspirin 81 mg Lipitor, metoprolol. LHC now postponed due to acute cholecystitis.     2. Acute cholecystitis  Poor candidate for cholecystectomy at this time, given improvement in current clinical status, will recommend continuing with CTX/Flagyl.  Continue Abx PO for 2 weeks. F/U with surgery in 2 weeks.     3. Microcytic anemia  outpatient follow up.    4. DM/hypothyroidism  Consult endocrine, appreciate recommendations.    5. Hyponatremia  Appreciate renal recommendations, consider king trial of 3% NS.     discharge planning. 87 year old male with PMH relevant for CAD s/p JOSÉ to mLAD (6/2023), HTN, HLD, cervical spondylosis s/p laminectomy, gastric ulcers, hypothyroidism,  who presents to [facility] ED 10/6/23 for BELLO with minimal exertion, chest pressure, and orthopnea x 3 days. Also reports a fever of 101.8 yesterday.    1. CAD  Chest pain resolved. Continue aspirin 81 mg Lipitor, metoprolol. LHC now postponed due to acute cholecystitis.     2. Acute cholecystitis  Poor candidate for cholecystectomy at this time, given improvement in current clinical status, will recommend continuing with CTX/Flagyl.  Continue Abx PO for 2 weeks. F/U with surgery in 2 weeks.     3. Microcytic anemia  outpatient follow up.    4. DM/hypothyroidism  Consult endocrine, appreciate recommendations.    5. Hyponatremia  Appreciate renal recommendations, consider king trial of 3% NS.     discharge planning.

## 2023-10-11 NOTE — PROGRESS NOTE ADULT - ATTENDING COMMENTS
Ok to be discharged form surgery prospective.  Continue Abx PO for 2 weeks.  F/U with surgery in 2 weeks.

## 2023-10-11 NOTE — PROGRESS NOTE ADULT - PROBLEM SELECTOR PLAN 2
Reports sharp pinpoint RUQ discomfort worsened with palpation x 1-2 days. Last BM 10/3 w/ suppository at home. Daughter reports decreased eating and fluid intake at home lately.  RUQUS: cholelithiasis  CTAP: acute cholecystitis with fat stranding  HIDA: non visualization of the gallbladder, likely representing acute cholecystitis  Pt currently day 2 of holding plavix 10/10    - surgery consulted, f/u recs  - per surgery, recommended IR consult for perc mich, and to continue ctx and flagyl  - IR consulted, f/u recs  - f/u HIDA scan Reports sharp pinpoint RUQ discomfort worsened with palpation x 1-2 days. Last BM 10/3 w/ suppository at home. Daughter reports decreased eating and fluid intake at home lately.  RUQUS: cholelithiasis  CTAP: acute cholecystitis with fat stranding  HIDA: non visualization of the gallbladder, likely representing acute cholecystitis  Pt currently day 2 of holding plavix 10/10    - surgery consulted, f/u recs  - IR consulted, f/u recs  - c/w ctx to 2gm (end date10/22)  - c/w flagyl 500mg q8 (end date 10/23) Reports sharp pinpoint RUQ discomfort worsened with palpation x 1-2 days. Last BM 10/3 w/ suppository at home. Daughter reports decreased eating and fluid intake at home lately.  RUQUS: cholelithiasis  CTAP: acute cholecystitis with fat stranding  HIDA: non visualization of the gallbladder, likely representing acute cholecystitis  Pt currently day 2 of holding plavix 10/10    - surgery consulted, f/u recs  - per surgery, pt will continue with abx and follow upt outpatient  - IR consulted, f/u recs  - c/w ctx to 2gm (end date10/22)  - c/w flagyl 500mg q8 (end date 10/23)

## 2023-10-11 NOTE — PROGRESS NOTE ADULT - SUBJECTIVE AND OBJECTIVE BOX
INTERVAL HPI/OVERNIGHT EVENTS:  MARK overnight.     SUBJECTIVE:  Patient seen and examined by chief resident and team on AM rounds. Patient reports feeling well, without any abdominal pain. Denied any nausea or vomiting, fevers, or chills. +F/+ mulitple looser BMs. Abdomen remains moderately distended, however, nontender.     MEDICATIONS  (STANDING):  aspirin enteric coated 81 milliGRAM(s) Oral daily  atorvastatin 40 milliGRAM(s) Oral at bedtime  cefTRIAXone   IVPB 2000 milliGRAM(s) IV Intermittent every 24 hours  dextrose 5%. 1000 milliLiter(s) (50 mL/Hr) IV Continuous <Continuous>  dextrose 5%. 1000 milliLiter(s) (100 mL/Hr) IV Continuous <Continuous>  dextrose 50% Injectable 25 Gram(s) IV Push once  dextrose 50% Injectable 25 Gram(s) IV Push once  dextrose 50% Injectable 12.5 Gram(s) IV Push once  gabapentin 100 milliGRAM(s) Oral at bedtime  glucagon  Injectable 1 milliGRAM(s) IntraMuscular once  insulin lispro (ADMELOG) corrective regimen sliding scale   SubCutaneous at bedtime  insulin lispro (ADMELOG) corrective regimen sliding scale   SubCutaneous three times a day before meals  levothyroxine 75 MICROGram(s) Oral daily  linagliptin 5 milliGRAM(s) Oral daily  metoprolol succinate ER 12.5 milliGRAM(s) Oral daily  metroNIDAZOLE    Tablet 500 milliGRAM(s) Oral every 8 hours  pantoprazole    Tablet 40 milliGRAM(s) Oral at bedtime  senna 2 Tablet(s) Oral daily  sodium chloride 0.9%. 500 milliLiter(s) (100 mL/Hr) IV Continuous <Continuous>    MEDICATIONS  (PRN):  bisacodyl Suppository 10 milliGRAM(s) Rectal daily PRN Constipation  cyclobenzaprine 5 milliGRAM(s) Oral two times a day PRN Muscle Spasm  dextrose Oral Gel 15 Gram(s) Oral once PRN Blood Glucose LESS THAN 70 milliGRAM(s)/deciliter  oxyCODONE    IR 5 milliGRAM(s) Oral every 6 hours PRN Severe Pain (7 - 10)      Vital Signs Last 24 Hrs  T(C): 36.5 (11 Oct 2023 08:44), Max: 36.5 (11 Oct 2023 08:44)  T(F): 97.7 (11 Oct 2023 08:44), Max: 97.7 (11 Oct 2023 08:44)  HR: 71 (11 Oct 2023 08:44) (69 - 80)  BP: 133/80 (11 Oct 2023 08:44) (110/61 - 138/75)  BP(mean): 99 (11 Oct 2023 05:41) (85 - 99)  RR: 16 (11 Oct 2023 08:44) (16 - 19)  SpO2: 94% (11 Oct 2023 08:44) (90% - 95%)    Parameters below as of 11 Oct 2023 08:44  Patient On (Oxygen Delivery Method): room air        PHYSICAL EXAM:  Constitutional: A&Ox3  Respiratory: non labored breathing, no respiratory distress  Cardiovascular: NSR, RRR  Gastrointestinal: soft, moderately distended. NT. No rebound or guarding. Neg murphys sign.   Extremities: (-) edema  skin: no jaundice.       I&O's Detail    10 Oct 2023 07:01  -  11 Oct 2023 07:00  --------------------------------------------------------  IN:    Oral Fluid: 420 mL    sodium chloride 0.9%: 500 mL  Total IN: 920 mL    OUT:    Voided (mL): 975 mL  Total OUT: 975 mL    Total NET: -55 mL      11 Oct 2023 07:01  -  11 Oct 2023 12:16  --------------------------------------------------------  IN:    Oral Fluid: 420 mL  Total IN: 420 mL    OUT:  Total OUT: 0 mL    Total NET: 420 mL          LABS:                        14.3   12.83 )-----------( 285      ( 11 Oct 2023 05:30 )             43.2     10-11    126<L>  |  94<L>  |  17  ----------------------------<  126<H>  4.0   |  22  |  1.02    Ca    8.2<L>      11 Oct 2023 05:30  Phos  2.7     10-11  Mg     2.0     10-11    TPro  6.3  /  Alb  2.5<L>  /  TBili  0.3  /  DBili  x   /  AST  19  /  ALT  17  /  AlkPhos  126<H>  10-11    PT/INR - ( 10 Oct 2023 05:30 )   PT: 14.3 sec;   INR: 1.26          PTT - ( 10 Oct 2023 05:30 )  PTT:33.4 sec  Urinalysis Basic - ( 11 Oct 2023 05:30 )    Color: x / Appearance: x / SG: x / pH: x  Gluc: 126 mg/dL / Ketone: x  / Bili: x / Urobili: x   Blood: x / Protein: x / Nitrite: x   Leuk Esterase: x / RBC: x / WBC x   Sq Epi: x / Non Sq Epi: x / Bacteria: x        RADIOLOGY & ADDITIONAL STUDIES:

## 2023-10-11 NOTE — PHYSICAL THERAPY INITIAL EVALUATION ADULT - DIAGNOSIS, PT EVAL
6A: Primary Prevention/Risk Reduction for Cardiovascular/Pulmonary Disorders 5A: Primary Prevention/Risk Reduction for Loss of Balance and Falling

## 2023-10-11 NOTE — PROGRESS NOTE ADULT - NS ATTEND AMEND GEN_ALL_CORE FT
Pt seen on rounds this afternoon.  He looked well, continues to eat well  Says that his RUQ pain essentially resolved completely as of yesterday.  On exam today has no RUQ tenderness even to deep palpation  Glucoses stable and excellent overnight, but still rising post-prandially.  Tradjenta started today but was ordered to be given at noon, not before breakfast.  Will change the timing of the Tradjenta  Continue sliding scale coverage  Continue levothyroxine 75 mcg/day

## 2023-10-11 NOTE — PHYSICAL THERAPY INITIAL EVALUATION ADULT - PERTINENT HX OF CURRENT PROBLEM, REHAB EVAL
87M, (shellfish rxn w/ Lip Swelling ~ 20 yrs ago),  w/ PMHx CAD s/p JOSÉ to mLAD (6/2023), HTN, HLD, cervical spondylosis s/p laminectomy, gastric ulcers, hypothyroidism,  who presents to [facility] ED 10/6/23 for BELLO with minimal exertion, chest pressure, and orthopnea x 3 days. Also reports a fever of 101.8 yesterday but resolved with Tylenol with no recurrence since. Reported constipation at home with response after suppository at home on Tuesday (10/3), however, no BM since then. Does report pinpoint RUQ abdominal discomfort worse with palpation. 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 His appetite and fluid intake has decreased as well. Denies any palpitations, dizziness, syncope, diaphoresis, LE edema, N/V/D, cough, congestion, fever, chills or recent sick contact. 87M, (shellfish rxn w/ Lip Swelling ~ 20 yrs ago),  w/ PMHx CAD s/p JOSÉ to mLAD (6/2023), HTN, HLD, cervical spondylosis s/p laminectomy, gastric ulcers, hypothyroidism,  who presents to [facility] ED 10/6/23 for BELLO with minimal exertion, chest pressure, and orthopnea x 3 days. Also reports a fever of 101.8 yesterday but resolved with Tylenol with no recurrence since. Reported constipation at home with response after suppository at home on Tuesday (10/3), however, no BM since then. Does report pinpoint RUQ abdominal discomfort worse with palpation. 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 His appetite and fluid intake has decreased as well. Denies any palpitations, dizziness, syncope, diaphoresis, LE edema, N/V/D, cough, congestion, fever, chills or recent sick contact. 87M, (shellfish rxn w/ Lip Swelling ~ 20 yrs ago),  w/ PMHx CAD s/p JOSÉ to mLAD (6/2023), HTN, HLD, cervical spondylosis s/p laminectomy, gastric ulcers, hypothyroidism,  who presents to [facility] ED 10/6/23 for BELLO with minimal exertion, chest pressure, and orthopnea x 3 days. Also reports a fever of 101.8 yesterday but resolved with Tylenol with no recurrence since. Reported constipation at home with response after suppository at home on Tuesday (10/3), however, no BM since then. Does report pinpoint RUQ abdominal discomfort worse with palpation. 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 His appetite and fluid intake has decreased as well. Denies any palpitations, dizziness, syncope, diaphoresis, LE edema, N/V/D, cough, congestion, fever, chills or recent sick contact.

## 2023-10-11 NOTE — CONSULT NOTE ADULT - SUBJECTIVE AND OBJECTIVE BOX
NEPHROLOGY CONSULTATION NOTE    Patient is a 87y Male whom presented to the hospital with SOB and CXR with concern for CHF vs PNA. Negative troponin and EKG nonischemic. Echo showing nl EF, noted hyponatremia s/p Lasix use. Nephrology consulted for further management and recommendations.     PAST MEDICAL & SURGICAL HISTORY:  HLD (hyperlipidemia)      Hypothyroid      History of BPH      CAD (coronary artery disease)      S/P laminectomy with spinal fusion        Allergies:  No Known Drug Allergies  shellfish (Swelling; Hives)    Home Medications Reviewed  Hospital Medications:   MEDICATIONS  (STANDING):  aspirin enteric coated 81 milliGRAM(s) Oral daily  atorvastatin 40 milliGRAM(s) Oral at bedtime  cefTRIAXone   IVPB 2000 milliGRAM(s) IV Intermittent every 24 hours  dextrose 5%. 1000 milliLiter(s) (50 mL/Hr) IV Continuous <Continuous>  dextrose 5%. 1000 milliLiter(s) (100 mL/Hr) IV Continuous <Continuous>  dextrose 50% Injectable 25 Gram(s) IV Push once  dextrose 50% Injectable 25 Gram(s) IV Push once  dextrose 50% Injectable 12.5 Gram(s) IV Push once  gabapentin 100 milliGRAM(s) Oral at bedtime  glucagon  Injectable 1 milliGRAM(s) IntraMuscular once  insulin lispro (ADMELOG) corrective regimen sliding scale   SubCutaneous at bedtime  insulin lispro (ADMELOG) corrective regimen sliding scale   SubCutaneous three times a day before meals  levothyroxine 75 MICROGram(s) Oral daily  linagliptin 5 milliGRAM(s) Oral daily  metoprolol succinate ER 12.5 milliGRAM(s) Oral daily  metroNIDAZOLE    Tablet 500 milliGRAM(s) Oral every 8 hours  pantoprazole    Tablet 40 milliGRAM(s) Oral at bedtime  senna 2 Tablet(s) Oral daily  sodium chloride 0.9%. 500 milliLiter(s) (100 mL/Hr) IV Continuous <Continuous>    SOCIAL HISTORY:  Denies ETOH,Smoking,   FAMILY HISTORY:      REVIEW OF SYSTEMS:  CONSTITUTIONAL: No weakness, fevers or chills  EYES/ENT: No visual changes;  No vertigo or throat pain   NECK: No pain or stiffness  RESPIRATORY: No cough, wheezing, hemoptysis; No shortness of breath  CARDIOVASCULAR: No chest pain or palpitations.  GASTROINTESTINAL: No abdominal or epigastric pain. No nausea, vomiting, or hematemesis; No diarrhea or constipation. No melena or hematochezia.  GENITOURINARY: No dysuria, frequency, foamy urine, urinary urgency, incontinence or hematuria  NEUROLOGICAL: No numbness or weakness  SKIN: No itching, burning, rashes, or lesions   VASCULAR: No bilateral lower extremity edema.   All other review of systems is negative unless indicated above.    VITALS:  Vital Signs Last 24 Hrs  T(C): 36.3 (11 Oct 2023 12:10), Max: 36.5 (11 Oct 2023 08:44)  T(F): 97.4 (11 Oct 2023 12:10), Max: 97.7 (11 Oct 2023 08:44)  HR: 73 (11 Oct 2023 12:10) (69 - 80)  BP: 148/72 (11 Oct 2023 12:10) (110/61 - 148/72)  BP(mean): 99 (11 Oct 2023 05:41) (85 - 99)  RR: 17 (11 Oct 2023 12:10) (16 - 19)  SpO2: 94% (11 Oct 2023 12:10) (90% - 95%)    Parameters below as of 11 Oct 2023 12:10  Patient On (Oxygen Delivery Method): room air        10-10 @ 07:01  -  10-11 @ 07:00  --------------------------------------------------------  IN: 920 mL / OUT: 975 mL / NET: -55 mL    10-11 @ 07:01  -  10-11 @ 13:36  --------------------------------------------------------  IN: 420 mL / OUT: 0 mL / NET: 420 mL        PHYSICAL EXAM:  Constitutional: NAD  HEENT: anicteric sclera, oropharynx clear, MMM  Neck: No JVD  Respiratory: CTAB, no wheezes, rales or rhonchi  Cardiovascular: S1, S2, RRR  Gastrointestinal: BS+, soft, NT/ND  Extremities: No cyanosis or clubbing. No peripheral edema  Neurological: A/O x 3, no focal deficits  Psychiatric: Normal mood, normal affect  : No CVA tenderness. No christian.   Skin: No rashes  Vascular Access:    LABS:  10-11    127<L>  |  95<L>  |  18  ----------------------------<  244<H>  4.2   |  20<L>  |  0.94    Ca    8.2<L>      11 Oct 2023 12:26  Phos  2.7     10-11  Mg     2.0     10-11    TPro  6.3  /  Alb  2.5<L>  /  TBili  0.3  /  DBili      /  AST  19  /  ALT  17  /  AlkPhos  126<H>  10-11    Creatinine Trend: 0.94 <--, 1.02 <--, 1.14 <--, 1.09 <--, 1.01 <--, 1.13 <--, 0.97 <--, 1.16 <--, 1.10 <--, 1.12 <--, 1.05 <--, 1.23 <--                        14.3   12.83 )-----------( 285      ( 11 Oct 2023 05:30 )             43.2     Urine Studies:  Urinalysis Basic - ( 11 Oct 2023 12:26 )    Color:  / Appearance:  / SG:  / pH:   Gluc: 244 mg/dL / Ketone:   / Bili:  / Urobili:    Blood:  / Protein:  / Nitrite:    Leuk Esterase:  / RBC:  / WBC    Sq Epi:  / Non Sq Epi:  / Bacteria:       Sodium, Random Urine: 20 mmol/L (10-10 @ 15:40)  Creatinine, Random Urine: 91 mg/dL (10-10 @ 15:40)  Osmolality, Random Urine: 424 mosm/kg (10-10 @ 15:40)  Sodium, Random Urine: 59 mmol/L (10-08 @ 10:05)  Creatinine, Random Urine: 23 mg/dL (10-08 @ 10:05)  Osmolality, Random Urine: 274 mosm/kg (10-08 @ 10:05)  Osmolality, Random Urine: 357 mosm/kg (10-06 @ 18:27)  Sodium, Random Urine: 70 mmol/L (10-06 @ 18:27)  Creatinine, Random Urine: 68 mg/dL (10-06 @ 18:27)            RADIOLOGY & ADDITIONAL STUDIES:                 NEPHROLOGY CONSULTATION NOTE    Patient is a 87y Male whom presented to the hospital with SOB and CXR with concern for CHF vs PNA. Negative troponin and EKG nonischemic. Echo showing nl EF, noted hyponatremia s/p Lasix use. Nephrology consulted for further management and recommendations.     PAST MEDICAL & SURGICAL HISTORY:  HLD (hyperlipidemia)  Hypothyroid  History of BPH  CAD (coronary artery disease)  S/P laminectomy with spinal fusion    Allergies:  No Known Drug Allergies  shellfish (Swelling; Hives)    Home Medications Reviewed  Hospital Medications:   MEDICATIONS  (STANDING):  aspirin enteric coated 81 milliGRAM(s) Oral daily  atorvastatin 40 milliGRAM(s) Oral at bedtime  cefTRIAXone   IVPB 2000 milliGRAM(s) IV Intermittent every 24 hours  dextrose 5%. 1000 milliLiter(s) (50 mL/Hr) IV Continuous <Continuous>  dextrose 5%. 1000 milliLiter(s) (100 mL/Hr) IV Continuous <Continuous>  dextrose 50% Injectable 25 Gram(s) IV Push once  dextrose 50% Injectable 25 Gram(s) IV Push once  dextrose 50% Injectable 12.5 Gram(s) IV Push once  gabapentin 100 milliGRAM(s) Oral at bedtime  glucagon  Injectable 1 milliGRAM(s) IntraMuscular once  insulin lispro (ADMELOG) corrective regimen sliding scale   SubCutaneous at bedtime  insulin lispro (ADMELOG) corrective regimen sliding scale   SubCutaneous three times a day before meals  levothyroxine 75 MICROGram(s) Oral daily  linagliptin 5 milliGRAM(s) Oral daily  metoprolol succinate ER 12.5 milliGRAM(s) Oral daily  metroNIDAZOLE    Tablet 500 milliGRAM(s) Oral every 8 hours  pantoprazole    Tablet 40 milliGRAM(s) Oral at bedtime  senna 2 Tablet(s) Oral daily  sodium chloride 0.9%. 500 milliLiter(s) (100 mL/Hr) IV Continuous <Continuous>    SOCIAL HISTORY:  Denies ETOH,Smoking,   FAMILY HISTORY:      REVIEW OF SYSTEMS:  Denies complains.     VITALS:  Vital Signs Last 24 Hrs  T(C): 36.3 (11 Oct 2023 12:10), Max: 36.5 (11 Oct 2023 08:44)  T(F): 97.4 (11 Oct 2023 12:10), Max: 97.7 (11 Oct 2023 08:44)  HR: 73 (11 Oct 2023 12:10) (69 - 80)  BP: 148/72 (11 Oct 2023 12:10) (110/61 - 148/72)  BP(mean): 99 (11 Oct 2023 05:41) (85 - 99)  RR: 17 (11 Oct 2023 12:10) (16 - 19)  SpO2: 94% (11 Oct 2023 12:10) (90% - 95%)    Parameters below as of 11 Oct 2023 12:10  Patient On (Oxygen Delivery Method): room air        10-10 @ 07:01  -  10-11 @ 07:00  --------------------------------------------------------  IN: 920 mL / OUT: 975 mL / NET: -55 mL    10-11 @ 07:01  -  10-11 @ 13:36  --------------------------------------------------------  IN: 420 mL / OUT: 0 mL / NET: 420 mL        PHYSICAL EXAM:  Constitutional: NAD  HEENT: anicteric sclera, oropharynx clear  Neck: No JVD  Respiratory: CTAB, no wheezes, rales or rhonchi  Cardiovascular: S1, S2, RRR  Gastrointestinal: BS+, soft, NT/ND  Extremities: No cyanosis or clubbing. No peripheral edema  Neurological: A/O x 3, no focal deficits      LABS:  10-11    127<L>  |  95<L>  |  18  ----------------------------<  244<H>  4.2   |  20<L>  |  0.94    Ca    8.2<L>      11 Oct 2023 12:26  Phos  2.7     10-11  Mg     2.0     10-11    TPro  6.3  /  Alb  2.5<L>  /  TBili  0.3  /  DBili      /  AST  19  /  ALT  17  /  AlkPhos  126<H>  10-11    Creatinine Trend: 0.94 <--, 1.02 <--, 1.14 <--, 1.09 <--, 1.01 <--, 1.13 <--, 0.97 <--, 1.16 <--, 1.10 <--, 1.12 <--, 1.05 <--, 1.23 <--                        14.3   12.83 )-----------( 285      ( 11 Oct 2023 05:30 )             43.2     Urine Studies:  Urinalysis Basic - ( 11 Oct 2023 12:26 )    Color:  / Appearance:  / SG:  / pH:   Gluc: 244 mg/dL / Ketone:   / Bili:  / Urobili:    Blood:  / Protein:  / Nitrite:    Leuk Esterase:  / RBC:  / WBC    Sq Epi:  / Non Sq Epi:  / Bacteria:       Sodium, Random Urine: 20 mmol/L (10-10 @ 15:40)  Creatinine, Random Urine: 91 mg/dL (10-10 @ 15:40)  Osmolality, Random Urine: 424 mosm/kg (10-10 @ 15:40)  Sodium, Random Urine: 59 mmol/L (10-08 @ 10:05)  Creatinine, Random Urine: 23 mg/dL (10-08 @ 10:05)  Osmolality, Random Urine: 274 mosm/kg (10-08 @ 10:05)  Osmolality, Random Urine: 357 mosm/kg (10-06 @ 18:27)  Sodium, Random Urine: 70 mmol/L (10-06 @ 18:27)  Creatinine, Random Urine: 68 mg/dL (10-06 @ 18:27)            RADIOLOGY & ADDITIONAL STUDIES:                 NEPHROLOGY CONSULTATION NOTE    Patient is a 87y Male whom presented to the hospital with SOB and CXR with concern for CHF vs PNA. Negative troponin and EKG nonischemic. Echo showing nl EF, noted hyponatremia s/p Lasix use. Nephrology consulted for further management and recommendations. Patient seen and examined at bedside, no current complaints since having fluids feeling better, still feels thirsty. Denies CP, palpitations, dizziness, or other complaints.    PAST MEDICAL & SURGICAL HISTORY:  HLD (hyperlipidemia)  Hypothyroid  History of BPH  CAD (coronary artery disease)  S/P laminectomy with spinal fusion    Allergies:  No Known Drug Allergies  shellfish (Swelling; Hives)    Home Medications Reviewed  Hospital Medications:   MEDICATIONS  (STANDING):  aspirin enteric coated 81 milliGRAM(s) Oral daily  atorvastatin 40 milliGRAM(s) Oral at bedtime  cefTRIAXone   IVPB 2000 milliGRAM(s) IV Intermittent every 24 hours  dextrose 5%. 1000 milliLiter(s) (50 mL/Hr) IV Continuous <Continuous>  dextrose 5%. 1000 milliLiter(s) (100 mL/Hr) IV Continuous <Continuous>  dextrose 50% Injectable 25 Gram(s) IV Push once  dextrose 50% Injectable 25 Gram(s) IV Push once  dextrose 50% Injectable 12.5 Gram(s) IV Push once  gabapentin 100 milliGRAM(s) Oral at bedtime  glucagon  Injectable 1 milliGRAM(s) IntraMuscular once  insulin lispro (ADMELOG) corrective regimen sliding scale   SubCutaneous at bedtime  insulin lispro (ADMELOG) corrective regimen sliding scale   SubCutaneous three times a day before meals  levothyroxine 75 MICROGram(s) Oral daily  linagliptin 5 milliGRAM(s) Oral daily  metoprolol succinate ER 12.5 milliGRAM(s) Oral daily  metroNIDAZOLE    Tablet 500 milliGRAM(s) Oral every 8 hours  pantoprazole    Tablet 40 milliGRAM(s) Oral at bedtime  senna 2 Tablet(s) Oral daily  sodium chloride 0.9%. 500 milliLiter(s) (100 mL/Hr) IV Continuous <Continuous>    SOCIAL HISTORY:  Denies ETOH,Smoking,   FAMILY HISTORY:  Noncontributory to current admissions    REVIEW OF SYSTEMS:  Denies complains. All other ROS: Patient denies CP, SOB, BELLO, HA, Changes in vision, dizziness, palpitations, abdominal pain, N/V/D, negative otherwise    VITALS:  Vital Signs Last 24 Hrs  T(C): 36.3 (11 Oct 2023 12:10), Max: 36.5 (11 Oct 2023 08:44)  T(F): 97.4 (11 Oct 2023 12:10), Max: 97.7 (11 Oct 2023 08:44)  HR: 73 (11 Oct 2023 12:10) (69 - 80)  BP: 148/72 (11 Oct 2023 12:10) (110/61 - 148/72)  BP(mean): 99 (11 Oct 2023 05:41) (85 - 99)  RR: 17 (11 Oct 2023 12:10) (16 - 19)  SpO2: 94% (11 Oct 2023 12:10) (90% - 95%)    Parameters below as of 11 Oct 2023 12:10  Patient On (Oxygen Delivery Method): room air        10-10 @ 07:01  -  10-11 @ 07:00  --------------------------------------------------------  IN: 920 mL / OUT: 975 mL / NET: -55 mL    10-11 @ 07:01  -  10-11 @ 13:36  --------------------------------------------------------  IN: 420 mL / OUT: 0 mL / NET: 420 mL        PHYSICAL EXAM:  Constitutional: NAD, in bed  HEENT: anicteric sclera, oropharynx clear  Neck: No JVD, trachea midline  Respiratory: CTAB, no wheezes, rales or rhonchi  Cardiovascular: S1, S2, RRR  Gastrointestinal: BS+, soft, NT/ND  Extremities: No cyanosis or clubbing. No peripheral edema  Skin: Dry, no rashes or wounds  Neurological: A/O x 3, no focals deficits      LABS:  10-11    127<L>  |  95<L>  |  18  ----------------------------<  244<H>  4.2   |  20<L>  |  0.94    Ca    8.2<L>      11 Oct 2023 12:26  Phos  2.7     10-11  Mg     2.0     10-11    TPro  6.3  /  Alb  2.5<L>  /  TBili  0.3  /  DBili      /  AST  19  /  ALT  17  /  AlkPhos  126<H>  10-11    Creatinine Trend: 0.94 <--, 1.02 <--, 1.14 <--, 1.09 <--, 1.01 <--, 1.13 <--, 0.97 <--, 1.16 <--, 1.10 <--, 1.12 <--, 1.05 <--, 1.23 <--                        14.3   12.83 )-----------( 285      ( 11 Oct 2023 05:30 )             43.2     Urine Studies:  Urinalysis Basic - ( 11 Oct 2023 12:26 )    Color:  / Appearance:  / SG:  / pH:   Gluc: 244 mg/dL / Ketone:   / Bili:  / Urobili:    Blood:  / Protein:  / Nitrite:    Leuk Esterase:  / RBC:  / WBC    Sq Epi:  / Non Sq Epi:  / Bacteria:       Sodium, Random Urine: 20 mmol/L (10-10 @ 15:40)  Creatinine, Random Urine: 91 mg/dL (10-10 @ 15:40)  Osmolality, Random Urine: 424 mosm/kg (10-10 @ 15:40)  Sodium, Random Urine: 59 mmol/L (10-08 @ 10:05)  Creatinine, Random Urine: 23 mg/dL (10-08 @ 10:05)  Osmolality, Random Urine: 274 mosm/kg (10-08 @ 10:05)  Osmolality, Random Urine: 357 mosm/kg (10-06 @ 18:27)  Sodium, Random Urine: 70 mmol/L (10-06 @ 18:27)  Creatinine, Random Urine: 68 mg/dL (10-06 @ 18:27)            RADIOLOGY & ADDITIONAL STUDIES:        Creatinine: 0.84 mg/dL (10-11-23 @ 18:33)  Creatinine: 0.94 mg/dL (10-11-23 @ 12:26)  Creatinine: 1.02 mg/dL (10-11-23 @ 05:30)  Creatinine: 1.14 mg/dL (10-10-23 @ 20:37)  Creatinine: 1.09 mg/dL (10-10-23 @ 05:30)  Creatinine: 1.01 mg/dL (10-09-23 @ 05:30)  Creatinine: 1.13 mg/dL (10-08-23 @ 12:00)  Creatinine: 0.97 mg/dL (10-08-23 @ 06:13)  Creatinine: 1.16 mg/dL (10-07-23 @ 06:16)  Creatinine: 1.10 mg/dL (10-06-23 @ 17:40)

## 2023-10-11 NOTE — PHYSICAL THERAPY INITIAL EVALUATION ADULT - ADDITIONAL COMMENTS
Pt resides in  with ~25 steps to negotiate, pt has been staying in basement on a recliner 2/2 back pain and decreased endurance. Pt. recently started to use a RW and has transport chair,

## 2023-10-11 NOTE — PROGRESS NOTE ADULT - PROBLEM SELECTOR PLAN 4
found to have elevated glucose on BMP  A1c found to be 7.7. no hx of diabetes    - monitor finger sticks and switch diet to carb consistent if unable to control  - endocrinology consulted, f/u recs  - per endo, c/w moderate dose sliding scale found to have elevated glucose on BMP  A1c found to be 7.7. no hx of diabetes    - monitor finger sticks continue consistent carb diet  - endocrinology consulted, f/u recs  - per endo, c/w moderate dose sliding scale

## 2023-10-11 NOTE — CONSULT NOTE ADULT - ATTENDING COMMENTS
0
87 y.o M admitted for acute respiratory failure with an 1c A1C: 7.7 % and TSH 10.5  t2d ~ 10 years and hypothyroidism for ~ 20 years  BUN: 18,Creatinine: 0.97,GFR: 76,Weight: 63.5,BMI: 23.3,EF: 55-60%    # Hypothyroidism  - Continue with levothyroxine 50 mcg 5 days a week and 100 mcg 2 days a week. .   - Recheck TSH and Free T4 in 4-6 weeks outpatient.    - He should continue to follow-up with his endocrinologist for further titration of his levothyroxine.     # Type 2 diabetes mellitus with hyperglycemia  - Continue with lispro moderate dose sliding scale before meals and at bedtime.  - Patient's fingerstick glucose goal is 100-180 mg/dL.    - Discharge recommendations to be discussed.
Not a good surgical candidate, IR drainage of GB, IV ABX
I agree with the fellow's findings and plans as written above with the following additions/amendments:    Seen and examined at bedside. Discussed hypovolemic hyponatremia from dehydration while infected, now improving with hydration. Would continue to encourage PO intake and replete sodium as possible. Further recs as above, discussed in detail with primary team

## 2023-10-11 NOTE — DIETITIAN INITIAL EVALUATION ADULT - PROBLEM SELECTOR PLAN 1
Presents w/ BELLO w/ minimal exertion, chest pain, orthopnea (2 pillow use)  x 3 days. proBNP 1279  -Prior hx CAD: JOSÉ mLAD in 6/2023. Compliant w/ DAPT aspirin/Plavix.  -EKG nonischemic.  -Troponin T x 1 negative  -f/u Trop trend  -f/u EKG am  -Echo ordered  -Received Lasix 20mg IV x 1 in ED.   -Appears clinically euvolemic currently, reassess need for lasix in AM.   -Plan: Initially plan for cath at the time of his ER arrival, however, delayed given his clinical presentation and negative Trop. Now possible Cath Mon (10/9/23) w/ Dr. Jarvis pending clinical course. Consent in chart.

## 2023-10-11 NOTE — PROGRESS NOTE ADULT - ASSESSMENT
87M with PMHx CAD s/p JOSÉ to mLAD (6/2023), HTN, HLD, cervical spondylosis s/p laminectomy, gastric ulcers, hypothyroidism,  constipation prior [facility] admission for L leg pain, presented to [facility] ED 10/6/23 for BELLO with minimal exertion, chest pressure, and orthopnea x 3 days and fever of 101.8*F yesterday but resolved with Tylenol with no recurrence since. Reported constipation at home with response after suppository at home on Tuesday (10/3), however, no BM since then. Does report pinpoint RLQ abdominal discomfort worse with palpation. 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 His appetite and fluid intake has decreased as well. Pt was admitted to Cardiology service/[facility] for acute hypoxic respiratory failure 2/2 acute HFpEF (BNP 1279) s/p lasix 20mg iv x1 at ED(10/6) and fever without clinical evidence of PNA (no cough/sputum) but RLQ pain and constipation with CT (10/7) findings of a gallstone ~1.8cm at the gallbladder neck and associated wall thickening and fat stranding.     - Renal consult appreciated rec: hyponatremia likely hypovolemic/hypoosmolar  - Repeat Na 127 after 500ml NS this am, repeat 500ml NS now and repeat Na, plan for hypertonic saline if still hyponatremia per Renal as d.w Cardiology team   - check SpO2 RA on NC1L with Spo2 95% RA   - s/p Lasix 20mg iv (10/6)-> 40mg iv daily (10/7-10/9)   - Surgery f/u appreciated, d/w Dr. Gomez re: acute cholecystitis that pt is clinically improving on iv abx and agreed to holding off perc mich, resume Plavix 75mg po daily today 10/11 ( last dose Sun 10/8), transition to oral abx upon d/c home for total 2 weeks course   - CT findings of gallstone ~ 1.8cm at the gallbladder neck and associated wall thickening and fat stranding ( Pt on Clopidogrel which needs to be kept off for at least 5 days and will need Cardiology clearance , s/p PCI to mLAD 6/2023)   - IR consult appreciated, d/w Dr.Tat Burnett , HIDA scan (10/9) positive, and agreed to hold off per mich given clinical improvement   - Ceftriaxone 1gm iv q24hr (10/6-10/7), increased to 2gm iv q24hr ( 10/8-) Day 6, may change to oral Cefpodoxime 200mg po q12hr upon d/c for total 2 weeks course   - added Metronidazole 500mg po q8hr ( 10/8-) Day 4  - discontinued Azithromycin 500mg iv q24hr ( 10/6-10/7)   - trend WBC 16.26K(10/6)-> 15.18K(10/7)-> 12.76K (10/8)-> 14.2K(10/9)-> 12.05(10/10)-> 12.83K(10/11)   - pro-calcitonin 0.24  - BCx: ngtd   - CTAP reviewed   - Bowel regimen for constipation : + BMs  - Endocrine f/u appreciated re: Elevated TSH/hypothyroidism: 10.5%-> 10.16%-> 6.99,, c/w levothyroxine 50mcg po daily  - CADs/p PCI mLAD 6/2023. c/w DAPT: ASA/resumed Clopidogrel, metoprolol succinate 12.5mg daily, Atorvastatin 40mg qHS , Pt not going for LHC  as d/w Cardiologist Dr. Daryn Jarvis   - LBP//spondylosis: c/w gabapentin 100mg qHS ( can be adjusted),  cyclobenzaprine 5mg po bid, oxycodone IR 5mg po q6hr prn   - consider Lidocaine patch 4% AA, and adjusting gabapentin   - GI ppx: PPI po daily   - DMII: FS/NISS, A1C% 7.7% (10/7) , goal -180  - SCDs for DVT ppx     Contacts:  Cardiologist Dr. Daryn Jarvis   daughter: Olga Yanez [phone]    Disposition: medically improving from acute cholecystitis, c/w iv Ceftriaxone/po Metronidazole, Renal assistance appreciated re: hyponatremia to make sure it's stable to 130's prior safe discharge in the next 1-2 days, PT f/u for stair training, add Glucerna   SW referral for home PT please     POC as d/w [facility] Cardiology team/ Dr. Scotty Chambers/Dr. Marva Day, and Cards attending Dr. Andrea Julio , and Interventional Cards Dr. Jarvis, IR Dr. Burnett and Surgeon Dr. Gomez      spoke .w daughter/RN Olga Yanez as well  87M with PMHx CAD s/p JOSÉ to mLAD (6/2023), HTN, HLD, cervical spondylosis s/p laminectomy, gastric ulcers, hypothyroidism,  constipation prior [facility] admission for L leg pain, presented to [facility] ED 10/6/23 for BELLO with minimal exertion, chest pressure, and orthopnea x 3 days and fever of 101.8*F yesterday but resolved with Tylenol with no recurrence since. Reported constipation at home with response after suppository at home on Tuesday (10/3), however, no BM since then. Does report pinpoint RLQ abdominal discomfort worse with palpation. 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 His appetite and fluid intake has decreased as well. Pt was admitted to Cardiology service/[facility] for acute hypoxic respiratory failure 2/2 acute HFpEF (BNP 1279) s/p lasix 20mg iv x1 at ED(10/6) and fever without clinical evidence of PNA (no cough/sputum) but RLQ pain and constipation with CT (10/7) findings of a gallstone ~1.8cm at the gallbladder neck and associated wall thickening and fat stranding.     - Renal consult appreciated rec: hyponatremia likely hypovolemic/hypoosmolar  - Repeat Na 127 after 500ml NS this am, repeat 500ml NS now and repeat Na, plan for hypertonic saline if still hyponatremia per Renal as d.w Cardiology team   - check SpO2 RA on NC1L with Spo2 95% RA   - s/p Lasix 20mg iv (10/6)-> 40mg iv daily (10/7-10/9)   - Surgery f/u appreciated, d/w Dr. Gomez re: acute cholecystitis that pt is clinically improving on iv abx and agreed to holding off perc mich, resume Plavix 75mg po daily today 10/11 ( last dose Sun 10/8), transition to oral abx upon d/c home for total 2 weeks course   - CT findings of gallstone ~ 1.8cm at the gallbladder neck and associated wall thickening and fat stranding ( Pt on Clopidogrel which needs to be kept off for at least 5 days and will need Cardiology clearance , s/p PCI to mLAD 6/2023)   - IR consult appreciated, d/w Dr.Tat Burnett , HIDA scan (10/9) positive, and agreed to hold off per mich given clinical improvement   - Ceftriaxone 1gm iv q24hr (10/6-10/7), increased to 2gm iv q24hr ( 10/8-) Day 6, may change to oral Cefpodoxime 200mg po q12hr upon d/c for total 2 weeks course   - added Metronidazole 500mg po q8hr ( 10/8-) Day 4  - discontinued Azithromycin 500mg iv q24hr ( 10/6-10/7)   - trend WBC 16.26K(10/6)-> 15.18K(10/7)-> 12.76K (10/8)-> 14.2K(10/9)-> 12.05(10/10)-> 12.83K(10/11)   - pro-calcitonin 0.24  - BCx: ngtd   - CTAP reviewed   - Bowel regimen for constipation : + BMs  - Endocrine f/u appreciated re: Elevated TSH/hypothyroidism: 10.5%-> 10.16%-> 6.99,, c/w levothyroxine 50mcg po daily  - CADs/p PCI mLAD 6/2023. c/w DAPT: ASA/resumed Clopidogrel, metoprolol succinate 12.5mg daily, Atorvastatin 40mg qHS , Pt not going for LHC  as d/w Cardiologist Dr. Daryn Jarvis   - LBP//spondylosis: c/w gabapentin 100mg qHS ( can be adjusted),  cyclobenzaprine 5mg po bid, oxycodone IR 5mg po q6hr prn   - consider Lidocaine patch 4% AA, and adjusting gabapentin   - GI ppx: PPI po daily   - DMII: FS/NISS, A1C% 7.7% (10/7) , goal -180  - SCDs for DVT ppx     Contacts:  Cardiologist Dr. Daryn Jarvis   daughter: Olga Yanez [phone]    Disposition: medically improving from acute cholecystitis, c/w iv Ceftriaxone/po Metronidazole, Renal assistance appreciated re: hyponatremia to make sure it's stable to 130's prior safe discharge in the next 1-2 days, PT f/u for stair training, add Glucerna   SW referral for home PT please     POC as d/w [facility] Cardiology team/ Dr. Scotty Chambers/Dr. Marva Day, and Cards attending Dr. Andrea Julio , and Interventional Cards Dr. Jarvis, IR Dr. Burnett and Surgeon Dr. Gomez      spoke .w daughter/RN Olga Yanez as well  87M with PMHx CAD s/p JOSÉ to mLAD (6/2023), HTN, HLD, cervical spondylosis s/p laminectomy, gastric ulcers, hypothyroidism,  constipation prior [facility] admission for L leg pain, presented to [facility] ED 10/6/23 for BELLO with minimal exertion, chest pressure, and orthopnea x 3 days and fever of 101.8*F yesterday but resolved with Tylenol with no recurrence since. Reported constipation at home with response after suppository at home on Tuesday (10/3), however, no BM since then. Does report pinpoint RLQ abdominal discomfort worse with palpation. 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 His appetite and fluid intake has decreased as well. Pt was admitted to Cardiology service/[facility] for acute hypoxic respiratory failure 2/2 acute HFpEF (BNP 1279) s/p lasix 20mg iv x1 at ED(10/6) and fever without clinical evidence of PNA (no cough/sputum) but RLQ pain and constipation with CT (10/7) findings of a gallstone ~1.8cm at the gallbladder neck and associated wall thickening and fat stranding.     - Renal consult appreciated rec: hyponatremia likely hypovolemic/hypoosmolar  - Repeat Na 127 after 500ml NS this am, repeat 500ml NS now and repeat Na, plan for hypertonic saline if still hyponatremia per Renal as d.w Cardiology team   - check SpO2 RA on NC1L with Spo2 95% RA   - s/p Lasix 20mg iv (10/6)-> 40mg iv daily (10/7-10/9)   - Surgery f/u appreciated, d/w Dr. Gomez re: acute cholecystitis that pt is clinically improving on iv abx and agreed to holding off perc mich, resume Plavix 75mg po daily today 10/11 ( last dose Sun 10/8), transition to oral abx upon d/c home for total 2 weeks course   - CT findings of gallstone ~ 1.8cm at the gallbladder neck and associated wall thickening and fat stranding ( Pt on Clopidogrel which needs to be kept off for at least 5 days and will need Cardiology clearance , s/p PCI to mLAD 6/2023)   - IR consult appreciated, d/w Dr.Tat Burnett , HIDA scan (10/9) positive, and agreed to hold off per mich given clinical improvement   - Ceftriaxone 1gm iv q24hr (10/6-10/7), increased to 2gm iv q24hr ( 10/8-) Day 6, may change to oral Cefpodoxime 200mg po q12hr upon d/c for total 2 weeks course   - added Metronidazole 500mg po q8hr ( 10/8-) Day 4  - discontinued Azithromycin 500mg iv q24hr ( 10/6-10/7)   - trend WBC 16.26K(10/6)-> 15.18K(10/7)-> 12.76K (10/8)-> 14.2K(10/9)-> 12.05(10/10)-> 12.83K(10/11)   - pro-calcitonin 0.24  - BCx: ngtd   - CTAP reviewed   - Bowel regimen for constipation : + BMs  - Endocrine f/u appreciated re: Elevated TSH/hypothyroidism: 10.5%-> 10.16%-> 6.99,, c/w levothyroxine 50mcg po daily  - CADs/p PCI mLAD 6/2023. c/w DAPT: ASA/resumed Clopidogrel, metoprolol succinate 12.5mg daily, Atorvastatin 40mg qHS , Pt not going for LHC  as d/w Cardiologist Dr. Daryn Jarvis   - LBP//spondylosis: c/w gabapentin 100mg qHS ( can be adjusted),  cyclobenzaprine 5mg po bid, oxycodone IR 5mg po q6hr prn   - consider Lidocaine patch 4% AA, and adjusting gabapentin   - GI ppx: PPI po daily   - DMII: FS/NISS, A1C% 7.7% (10/7) , goal -180  - SCDs for DVT ppx     Contacts:  Cardiologist Dr. Daryn Jravis   daughter: Olga Yanez [phone]    Disposition: medically improving from acute cholecystitis, c/w iv Ceftriaxone/po Metronidazole, Renal assistance appreciated re: hyponatremia to make sure it's stable to 130's prior safe discharge in the next 1-2 days, PT f/u for stair training, add Glucerna   SW referral for home PT please     POC as d/w [facility] Cardiology team/ Dr. Scotty Chambers/Dr. Marva Day, and Cards attending Dr. Andrea Julio , and Interventional Cards Dr. Jarvis, IR Dr. Burnett and Surgeon Dr. Gomez      spoke .w daughter/RN Olga Yanez as well

## 2023-10-11 NOTE — PROGRESS NOTE ADULT - SUBJECTIVE AND OBJECTIVE BOX
Patient is a 87y old  Male who presents with a chief complaint of chest pain, SOB (11 Oct 2023 07:45)    INTERVAL EVENTS:    SUBJECTIVE:  Patient was seen and examined at bedside.    Review of systems: No fever, chills, dizziness, HA, Changes in vision, CP, dyspnea, nausea or vomiting, dysuria, changes in bowel movements, LE edema. Rest of 12 point Review of systems negative unless otherwise documented elsewhere in note.     Diet, Consistent Carbohydrate w/Evening Snack:   Supplement Feeding Modality:  Oral  Glucerna Shake Cans or Servings Per Day:  2       Frequency:  Daily (10-11-23 @ 08:50) [Active]      MEDICATIONS:  MEDICATIONS  (STANDING):  aspirin enteric coated 81 milliGRAM(s) Oral daily  atorvastatin 40 milliGRAM(s) Oral at bedtime  cefTRIAXone   IVPB 2000 milliGRAM(s) IV Intermittent every 24 hours  dextrose 5%. 1000 milliLiter(s) (50 mL/Hr) IV Continuous <Continuous>  dextrose 5%. 1000 milliLiter(s) (100 mL/Hr) IV Continuous <Continuous>  dextrose 50% Injectable 25 Gram(s) IV Push once  dextrose 50% Injectable 25 Gram(s) IV Push once  dextrose 50% Injectable 12.5 Gram(s) IV Push once  gabapentin 100 milliGRAM(s) Oral at bedtime  glucagon  Injectable 1 milliGRAM(s) IntraMuscular once  insulin lispro (ADMELOG) corrective regimen sliding scale   SubCutaneous at bedtime  insulin lispro (ADMELOG) corrective regimen sliding scale   SubCutaneous three times a day before meals  metoprolol succinate ER 12.5 milliGRAM(s) Oral daily  metroNIDAZOLE    Tablet 500 milliGRAM(s) Oral every 8 hours  pantoprazole    Tablet 40 milliGRAM(s) Oral at bedtime  senna 2 Tablet(s) Oral daily  sodium chloride 0.9% Bolus 500 milliLiter(s) IV Bolus once  sodium chloride 0.9%. 500 milliLiter(s) (100 mL/Hr) IV Continuous <Continuous>    MEDICATIONS  (PRN):  bisacodyl Suppository 10 milliGRAM(s) Rectal daily PRN Constipation  cyclobenzaprine 5 milliGRAM(s) Oral two times a day PRN Muscle Spasm  dextrose Oral Gel 15 Gram(s) Oral once PRN Blood Glucose LESS THAN 70 milliGRAM(s)/deciliter  oxyCODONE    IR 5 milliGRAM(s) Oral every 6 hours PRN Severe Pain (7 - 10)      Allergies    No Known Drug Allergies  shellfish (Swelling; Hives)    Intolerances        OBJECTIVE:  Vital Signs Last 24 Hrs  T(C): 36.3 (11 Oct 2023 05:41), Max: 36.4 (10 Oct 2023 20:23)  T(F): 97.4 (11 Oct 2023 05:41), Max: 97.5 (10 Oct 2023 20:23)  HR: 80 (11 Oct 2023 05:41) (69 - 80)  BP: 138/75 (11 Oct 2023 05:41) (110/61 - 138/75)  BP(mean): 99 (11 Oct 2023 05:41) (85 - 99)  RR: 16 (11 Oct 2023 05:41) (16 - 19)  SpO2: 95% (11 Oct 2023 05:41) (90% - 95%)    Parameters below as of 11 Oct 2023 05:41  Patient On (Oxygen Delivery Method): nasal cannula  O2 Flow (L/min): 1    I&O's Summary    10 Oct 2023 07:01  -  11 Oct 2023 07:00  --------------------------------------------------------  IN: 920 mL / OUT: 975 mL / NET: -55 mL        PHYSICAL EXAM:  Gen: Reclining in bed at time of exam, appears stated age  HEENT: NCAT, MMM, clear OP  Neck: supple, trachea at midline  CV: RRR, +S1/S2  Pulm: adequate respiratory effort, no increase in work of breathing  Abd: soft, NTND  Skin: warm and dry,   Ext: WWP, no LE edema  Neuro: AOx3, no gross focal neurological deficits  Psych: affect and behavior appropriate, pleasant at time of interview  :     LABS:                        14.3   12.83 )-----------( 285      ( 11 Oct 2023 05:30 )             43.2     10-11    126<L>  |  94<L>  |  17  ----------------------------<  126<H>  4.0   |  22  |  1.02    Ca    8.2<L>      11 Oct 2023 05:30  Phos  2.7     10-11  Mg     2.0     10-11    TPro  6.3  /  Alb  2.5<L>  /  TBili  0.3  /  DBili  x   /  AST  19  /  ALT  17  /  AlkPhos  126<H>  10-11    LIVER FUNCTIONS - ( 11 Oct 2023 05:30 )  Alb: 2.5 g/dL / Pro: 6.3 g/dL / ALK PHOS: 126 U/L / ALT: 17 U/L / AST: 19 U/L / GGT: x           PT/INR - ( 10 Oct 2023 05:30 )   PT: 14.3 sec;   INR: 1.26          PTT - ( 10 Oct 2023 05:30 )  PTT:33.4 sec  CAPILLARY BLOOD GLUCOSE      POCT Blood Glucose.: 112 mg/dL (11 Oct 2023 07:26)  POCT Blood Glucose.: 112 mg/dL (10 Oct 2023 21:36)  POCT Blood Glucose.: 158 mg/dL (10 Oct 2023 16:53)  POCT Blood Glucose.: 248 mg/dL (10 Oct 2023 11:56)    Urinalysis Basic - ( 11 Oct 2023 05:30 )    Color: x / Appearance: x / SG: x / pH: x  Gluc: 126 mg/dL / Ketone: x  / Bili: x / Urobili: x   Blood: x / Protein: x / Nitrite: x   Leuk Esterase: x / RBC: x / WBC x   Sq Epi: x / Non Sq Epi: x / Bacteria: x        MICRODATA:      RADIOLOGY/OTHER STUDIES:    PCP  Pharmacy:   Emergency contact:    Patient is a 87y old  Male who presents with a chief complaint of chest pain, SOB (11 Oct 2023 07:45)    INTERVAL EVENTS: NAEON    SUBJECTIVE:  Patient was seen and examined at bedside. Pt     Review of systems: No fever, chills, dizziness, HA, Changes in vision, CP, dyspnea, nausea or vomiting, dysuria, changes in bowel movements, LE edema. Rest of 12 point Review of systems negative unless otherwise documented elsewhere in note.     Diet, Consistent Carbohydrate w/Evening Snack:   Supplement Feeding Modality:  Oral  Glucerna Shake Cans or Servings Per Day:  2       Frequency:  Daily (10-11-23 @ 08:50) [Active]      MEDICATIONS:  MEDICATIONS  (STANDING):  aspirin enteric coated 81 milliGRAM(s) Oral daily  atorvastatin 40 milliGRAM(s) Oral at bedtime  cefTRIAXone   IVPB 2000 milliGRAM(s) IV Intermittent every 24 hours  dextrose 5%. 1000 milliLiter(s) (50 mL/Hr) IV Continuous <Continuous>  dextrose 5%. 1000 milliLiter(s) (100 mL/Hr) IV Continuous <Continuous>  dextrose 50% Injectable 25 Gram(s) IV Push once  dextrose 50% Injectable 25 Gram(s) IV Push once  dextrose 50% Injectable 12.5 Gram(s) IV Push once  gabapentin 100 milliGRAM(s) Oral at bedtime  glucagon  Injectable 1 milliGRAM(s) IntraMuscular once  insulin lispro (ADMELOG) corrective regimen sliding scale   SubCutaneous at bedtime  insulin lispro (ADMELOG) corrective regimen sliding scale   SubCutaneous three times a day before meals  metoprolol succinate ER 12.5 milliGRAM(s) Oral daily  metroNIDAZOLE    Tablet 500 milliGRAM(s) Oral every 8 hours  pantoprazole    Tablet 40 milliGRAM(s) Oral at bedtime  senna 2 Tablet(s) Oral daily  sodium chloride 0.9% Bolus 500 milliLiter(s) IV Bolus once  sodium chloride 0.9%. 500 milliLiter(s) (100 mL/Hr) IV Continuous <Continuous>    MEDICATIONS  (PRN):  bisacodyl Suppository 10 milliGRAM(s) Rectal daily PRN Constipation  cyclobenzaprine 5 milliGRAM(s) Oral two times a day PRN Muscle Spasm  dextrose Oral Gel 15 Gram(s) Oral once PRN Blood Glucose LESS THAN 70 milliGRAM(s)/deciliter  oxyCODONE    IR 5 milliGRAM(s) Oral every 6 hours PRN Severe Pain (7 - 10)      Allergies    No Known Drug Allergies  shellfish (Swelling; Hives)    Intolerances        OBJECTIVE:  Vital Signs Last 24 Hrs  T(C): 36.3 (11 Oct 2023 05:41), Max: 36.4 (10 Oct 2023 20:23)  T(F): 97.4 (11 Oct 2023 05:41), Max: 97.5 (10 Oct 2023 20:23)  HR: 80 (11 Oct 2023 05:41) (69 - 80)  BP: 138/75 (11 Oct 2023 05:41) (110/61 - 138/75)  BP(mean): 99 (11 Oct 2023 05:41) (85 - 99)  RR: 16 (11 Oct 2023 05:41) (16 - 19)  SpO2: 95% (11 Oct 2023 05:41) (90% - 95%)    Parameters below as of 11 Oct 2023 05:41  Patient On (Oxygen Delivery Method): nasal cannula  O2 Flow (L/min): 1    I&O's Summary    10 Oct 2023 07:01  -  11 Oct 2023 07:00  --------------------------------------------------------  IN: 920 mL / OUT: 975 mL / NET: -55 mL        PHYSICAL EXAM:  Gen: Reclining in bed at time of exam, appears stated age  HEENT: NCAT, MMM, clear OP  Neck: supple, trachea at midline  CV: RRR, +S1/S2  Pulm: adequate respiratory effort, no increase in work of breathing  Abd: soft, NTND  Skin: warm and dry,   Ext: WWP, no LE edema  Neuro: AOx3, no gross focal neurological deficits  Psych: affect and behavior appropriate, pleasant at time of interview  :     LABS:                        14.3   12.83 )-----------( 285      ( 11 Oct 2023 05:30 )             43.2     10-11    126<L>  |  94<L>  |  17  ----------------------------<  126<H>  4.0   |  22  |  1.02    Ca    8.2<L>      11 Oct 2023 05:30  Phos  2.7     10-11  Mg     2.0     10-11    TPro  6.3  /  Alb  2.5<L>  /  TBili  0.3  /  DBili  x   /  AST  19  /  ALT  17  /  AlkPhos  126<H>  10-11    LIVER FUNCTIONS - ( 11 Oct 2023 05:30 )  Alb: 2.5 g/dL / Pro: 6.3 g/dL / ALK PHOS: 126 U/L / ALT: 17 U/L / AST: 19 U/L / GGT: x           PT/INR - ( 10 Oct 2023 05:30 )   PT: 14.3 sec;   INR: 1.26          PTT - ( 10 Oct 2023 05:30 )  PTT:33.4 sec  CAPILLARY BLOOD GLUCOSE      POCT Blood Glucose.: 112 mg/dL (11 Oct 2023 07:26)  POCT Blood Glucose.: 112 mg/dL (10 Oct 2023 21:36)  POCT Blood Glucose.: 158 mg/dL (10 Oct 2023 16:53)  POCT Blood Glucose.: 248 mg/dL (10 Oct 2023 11:56)    Urinalysis Basic - ( 11 Oct 2023 05:30 )    Color: x / Appearance: x / SG: x / pH: x  Gluc: 126 mg/dL / Ketone: x  / Bili: x / Urobili: x   Blood: x / Protein: x / Nitrite: x   Leuk Esterase: x / RBC: x / WBC x   Sq Epi: x / Non Sq Epi: x / Bacteria: x        MICRODATA:      RADIOLOGY/OTHER STUDIES:    PCP  Pharmacy:   Emergency contact:

## 2023-10-11 NOTE — PROGRESS NOTE ADULT - PROBLEM SELECTOR PLAN 8
continue home Synthroid 50mcg qd. TSH at 10.160. Home med synthroid 50mcg. Repeat tsh at 6.990    - c/w home med continue home Synthroid 50mcg qd. TSH at 10.160. Home med synthroid 50mcg. Repeat tsh at 6.990    - started levothyroxine 75mcg

## 2023-10-11 NOTE — PROGRESS NOTE ADULT - ASSESSMENT
Patient is a 87M, (shellfish rxn w/ Lip Swelling ~ 20 yrs ago, does not eat shellfish since then),  w/ PMHx CAD s/p JOSÉ to mLAD (6/2023), HTN, HLD, cervical spondylosis s/p laminectomy, gastric ulcers, hypothyroidism,  who presents to [facility] ED 10/6/23 for BELLO with minimal exertion, chest pressure, and orthopnea x 3 days. Also reports a fever of 101.8 yesterday but resolved with Tylenol with no recurrence since. Reported constipation at home with response after suppository at home on Tuesday (10/3), however, no BM since then. Does report pinpoint RUQ abdominal discomfort worse with palpation x 1-2 days. 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 His appetite and fluid intake has decreased as well. CXR with concern for CHF vs PNA. Negative troponin and EKG nonischemic. Patient is now admitted to cardiac/tele for suspicion of CHF and further ischemic evaluation pending optimization including Leukocytosis workup.             Patient is a 87M, (shellfish rxn w/ Lip Swelling ~ 20 yrs ago, does not eat shellfish since then),  w/ PMHx CAD s/p JOSÉ to mLAD (6/2023), HTN, HLD, cervical spondylosis s/p laminectomy, gastric ulcers, hypothyroidism,  who presents to [facility] ED 10/6/23 for BELLO with minimal exertion, chest pressure, and orthopnea x 3 days. Also reports a fever of 101.8 yesterday but resolved with Tylenol with no recurrence since. Reported constipation at home with response after suppository at home on Tuesday (10/3), however, no BM since then. Does report pinpoint RUQ abdominal discomfort worse with palpation x 1-2 days. 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 His appetite and fluid intake has decreased as well. CXR with concern for CHF vs PNA. Negative troponin and EKG nonischemic. Patient is now admitted to cardiac/tele for suspicion of CHF and further ischemic evaluation pending optimization including Leukocytosis workup.             Patient is a 87M, (shellfish rxn w/ Lip Swelling ~ 20 yrs ago, does not eat shellfish since then),  w/ PMHx CAD s/p JOSÉ to mLAD (6/2023), HTN, HLD, cervical spondylosis s/p laminectomy, gastric ulcers, hypothyroidism,  who presents to [facility] ED 10/6/23 for BELLO with minimal exertion, chest pressure, and orthopnea x 3 days. Also reports a fever of 101.8 yesterday but resolved with Tylenol with no recurrence since. Reported constipation at home with response after suppository at home on Tuesday (10/3), however, no BM since then. Does report pinpoint RUQ abdominal discomfort worse with palpation x 1-2 days. 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 His appetite and fluid intake has decreased as well. CXR with concern for CHF vs PNA. Negative troponin and EKG nonischemic. Patient is now admitted to cardiac/tele for suspicion of CHF and further ischemic evaluation pending optimization including Leukocytosis workup.

## 2023-10-11 NOTE — PROGRESS NOTE ADULT - ASSESSMENT
Mr. Yanez is a 87-year-old male with a past medical history of hypertension, hyperlipidemia, coronary artery disease (s/p JOSÉ to mLAD on 6/2023), cervical spondylosis (s/p laminectomy), gastric ulcers, hypothyroidism and constipation who presented to the emergency department (10/6/23) for dyspnea on exertion, chest pressure, and orthopnea for 3 days, along with one day of fever. He was admitted to cardiology for acute hypoxic respiratory failure secondary to acute heart failure with preserved ejection fraction and fever, possibly secondary to pneumonia. His labs were remarkable for a hemoglobin A1C of 7.7% and a TSH of 10.5. Endocrinology was consulted for recommendations regarding management of hypothyroidism and diabetes.     A1C: 7.7 %  BUN: 18  Creatinine: 0.94  GFR: 79  Weight: 63.5  BMI: 23.3

## 2023-10-11 NOTE — CONSULT NOTE ADULT - ASSESSMENT
Patient is a 87y Male whom presented to the hospital with SOB and CXR with concern for CHF vs PNA. Negative troponin and EKG nonischemic. Echo showing nl EF, noted hyponatremia s/p Lasix use. Nephrology consulted for further management and recommendations.     Na 124-127; last Na in  with glucose 244; Corrected Na ~130  U Na: 70->20 s/p Lasix use.   U osmo: 357->424  U spec grav: <=1.005 (pH of 6>), improved to 1.015 s/p IV fluids.     Imp: Mild hyponatremia.     Plan:  IV  bolus once.   Recheck Na in pm labs  Recheck U. lytes and Osmo.   If still hyponatremic in pm labs may consider king trial of 3% NS.

## 2023-10-11 NOTE — DIETITIAN INITIAL EVALUATION ADULT - NAME AND PHONE
Fabienne Mcpherson, MS, RD, CDN, [facility] [id number]   Fabienne Mcpherson, MS, RD, CDN, [facility] [id number]   Fabienne Mcpherson, MS, RD, CDN, [facility] [id number]

## 2023-10-11 NOTE — DIETITIAN INITIAL EVALUATION ADULT - OTHER INFO
87M w/ PMHx CAD s/p JOSÉ (6/23), HTN, HLD, PUD, hypothyroidism, PSHx C spine laminectomy, general surgery consulted for further w/u of abdominal pain. Found to have acute cholecystitis. Poor surgical candidate at this time, recommend nonsurgical management.  87M w/ PMHx CAD s/p JOSÉ (6/23), HTN, HLD, PUD, hypothyroidism, PSHx C spine laminectomy, general surgery consulted for further w/u of abdominal pain. Found to have acute cholecystitis. Poor surgical candidate at this time, recommend nonsurgical management.     Pt assessed at bedside. Resting in bed. Started on consistent carbohydrate diet with glucerna BID. Tolerating diet fairly well, PO intake hindered by intermittent pain, decreased appetite. No intake or weight changes PTA. Reviewed adequate intake parameters, controlling carbohydrate intake, focus on nutrient dense foods. RD to follow, nutrition recommendations below.

## 2023-10-11 NOTE — PROGRESS NOTE ADULT - ASSESSMENT
87M w/ PMHx CAD s/p JOSÉ (6/23), HTN, HLD, PUD, hypothyroidism, PSHx C spine laminectomy, general surgery consulted for further w/u of abdominal pain. Found to have acute cholecystitis. Poor surgical candidate at this time, recommend nonsurgical management.     Recommendations:  - Poor candidate for cholecystectomy at this time  - Given improvement in current clinical status, will recommend continuing with CTX/Flagyl  Team 4c will continue to follow, please call with any questions/concerns.   Plan discussed with chief resident and attending surgeon.

## 2023-10-11 NOTE — DIETITIAN INITIAL EVALUATION ADULT - PROBLEM SELECTOR PLAN 3
WBC 16.26 elevated on admission. Reported fever at home yesterday improved w/ Tylenol.   -Afebrile today  -CXR with ?PNA vs CHF- f/u official read.  -s/p abx IV Ceftriaxone x 5 days and Azithromycin x 1 in ED. Continued both abx for now.   -Lactate 1.2 wnl  -RVP negative  -procal 0.25 elevated  -UA positive (however asxs)  -f/u urine culture   -f/u add-on Lipase lab  -f/u Legionella urine  -f/u ESR, CRP.   -f/u blood cultures  -consider ID consult for ?PNA and UTI in AM

## 2023-10-11 NOTE — DIETITIAN INITIAL EVALUATION ADULT - PERTINENT LABORATORY DATA
10-11    128<L>  |  97  |  15  ----------------------------<  170<H>  4.2   |  21<L>  |  0.84    Ca    8.5      11 Oct 2023 18:33  Phos  2.7     10-11  Mg     2.0     10-11    TPro  6.3  /  Alb  2.5<L>  /  TBili  0.3  /  DBili  x   /  AST  19  /  ALT  17  /  AlkPhos  126<H>  10-11  POCT Blood Glucose.: 136 mg/dL (10-11-23 @ 22:00)  A1C with Estimated Average Glucose Result: 7.7 % (10-07-23 @ 06:16)

## 2023-10-12 DIAGNOSIS — J96.01 ACUTE RESPIRATORY FAILURE WITH HYPOXIA: ICD-10-CM

## 2023-10-12 LAB
ANION GAP SERPL CALC-SCNC: 10 MMOL/L — SIGNIFICANT CHANGE UP (ref 5–17)
ANION GAP SERPL CALC-SCNC: 8 MMOL/L — SIGNIFICANT CHANGE UP (ref 5–17)
ANION GAP SERPL CALC-SCNC: 9 MMOL/L — SIGNIFICANT CHANGE UP (ref 5–17)
ANION GAP SERPL CALC-SCNC: 9 MMOL/L — SIGNIFICANT CHANGE UP (ref 5–17)
ANISOCYTOSIS BLD QL: SLIGHT — SIGNIFICANT CHANGE UP
APPEARANCE UR: CLEAR — SIGNIFICANT CHANGE UP
BASOPHILS # BLD AUTO: 0.27 K/UL — HIGH (ref 0–0.2)
BASOPHILS NFR BLD AUTO: 1.7 % — SIGNIFICANT CHANGE UP (ref 0–2)
BILIRUB UR-MCNC: NEGATIVE — SIGNIFICANT CHANGE UP
BUN SERPL-MCNC: 13 MG/DL — SIGNIFICANT CHANGE UP (ref 7–23)
BUN SERPL-MCNC: 14 MG/DL — SIGNIFICANT CHANGE UP (ref 7–23)
BURR CELLS BLD QL SMEAR: PRESENT — SIGNIFICANT CHANGE UP
CALCIUM SERPL-MCNC: 8.3 MG/DL — LOW (ref 8.4–10.5)
CALCIUM SERPL-MCNC: 8.5 MG/DL — SIGNIFICANT CHANGE UP (ref 8.4–10.5)
CHLORIDE SERPL-SCNC: 100 MMOL/L — SIGNIFICANT CHANGE UP (ref 96–108)
CHLORIDE SERPL-SCNC: 97 MMOL/L — SIGNIFICANT CHANGE UP (ref 96–108)
CO2 SERPL-SCNC: 22 MMOL/L — SIGNIFICANT CHANGE UP (ref 22–31)
CO2 SERPL-SCNC: 23 MMOL/L — SIGNIFICANT CHANGE UP (ref 22–31)
CO2 SERPL-SCNC: 25 MMOL/L — SIGNIFICANT CHANGE UP (ref 22–31)
COLOR SPEC: YELLOW — SIGNIFICANT CHANGE UP
CREAT ?TM UR-MCNC: 49 MG/DL — SIGNIFICANT CHANGE UP
CREAT SERPL-MCNC: 0.92 MG/DL — SIGNIFICANT CHANGE UP (ref 0.5–1.3)
CREAT SERPL-MCNC: 0.97 MG/DL — SIGNIFICANT CHANGE UP (ref 0.5–1.3)
CREAT SERPL-MCNC: 0.98 MG/DL — SIGNIFICANT CHANGE UP (ref 0.5–1.3)
DACRYOCYTES BLD QL SMEAR: SLIGHT — SIGNIFICANT CHANGE UP
DIFF PNL FLD: NEGATIVE — SIGNIFICANT CHANGE UP
EGFR: 76 ML/MIN/1.73M2 — SIGNIFICANT CHANGE UP
EGFR: 82 ML/MIN/1.73M2 — SIGNIFICANT CHANGE UP
EOSINOPHIL # BLD AUTO: 1.49 K/UL — HIGH (ref 0–0.5)
EOSINOPHIL NFR BLD AUTO: 9.3 % — HIGH (ref 0–6)
GIANT PLATELETS BLD QL SMEAR: PRESENT — SIGNIFICANT CHANGE UP
GLUCOSE BLDC GLUCOMTR-MCNC: 101 MG/DL — HIGH (ref 70–99)
GLUCOSE BLDC GLUCOMTR-MCNC: 121 MG/DL — HIGH (ref 70–99)
GLUCOSE BLDC GLUCOMTR-MCNC: 137 MG/DL — HIGH (ref 70–99)
GLUCOSE BLDC GLUCOMTR-MCNC: 206 MG/DL — HIGH (ref 70–99)
GLUCOSE BLDC GLUCOMTR-MCNC: 231 MG/DL — HIGH (ref 70–99)
GLUCOSE BLDC GLUCOMTR-MCNC: 231 MG/DL — HIGH (ref 70–99)
GLUCOSE SERPL-MCNC: 124 MG/DL — HIGH (ref 70–99)
GLUCOSE SERPL-MCNC: 153 MG/DL — HIGH (ref 70–99)
GLUCOSE SERPL-MCNC: 157 MG/DL — HIGH (ref 70–99)
GLUCOSE UR QL: NEGATIVE — SIGNIFICANT CHANGE UP
HCT VFR BLD CALC: 44.1 % — SIGNIFICANT CHANGE UP (ref 39–50)
HGB BLD-MCNC: 14.1 G/DL — SIGNIFICANT CHANGE UP (ref 13–17)
KETONES UR-MCNC: NEGATIVE — SIGNIFICANT CHANGE UP
LEUKOCYTE ESTERASE UR-ACNC: NEGATIVE — SIGNIFICANT CHANGE UP
LYMPHOCYTES # BLD AUTO: 0.94 K/UL — LOW (ref 1–3.3)
LYMPHOCYTES # BLD AUTO: 5.9 % — LOW (ref 13–44)
MAGNESIUM SERPL-MCNC: 2.1 MG/DL — SIGNIFICANT CHANGE UP (ref 1.6–2.6)
MANUAL SMEAR VERIFICATION: SIGNIFICANT CHANGE UP
MCHC RBC-ENTMCNC: 20.7 PG — LOW (ref 27–34)
MCHC RBC-ENTMCNC: 32 GM/DL — SIGNIFICANT CHANGE UP (ref 32–36)
MCV RBC AUTO: 64.8 FL — LOW (ref 80–100)
MICROCYTES BLD QL: SLIGHT — SIGNIFICANT CHANGE UP
MONOCYTES # BLD AUTO: 0.69 K/UL — SIGNIFICANT CHANGE UP (ref 0–0.9)
MONOCYTES NFR BLD AUTO: 4.3 % — SIGNIFICANT CHANGE UP (ref 2–14)
NEUTROPHILS # BLD AUTO: 12.61 K/UL — HIGH (ref 1.8–7.4)
NEUTROPHILS NFR BLD AUTO: 78.8 % — HIGH (ref 43–77)
NITRITE UR-MCNC: NEGATIVE — SIGNIFICANT CHANGE UP
OSMOLALITY UR: 371 MOSM/KG — SIGNIFICANT CHANGE UP (ref 300–900)
OVALOCYTES BLD QL SMEAR: SIGNIFICANT CHANGE UP
PH UR: 6 — SIGNIFICANT CHANGE UP (ref 5–8)
PHOSPHATE SERPL-MCNC: 2.8 MG/DL — SIGNIFICANT CHANGE UP (ref 2.5–4.5)
PLAT MORPH BLD: NORMAL — SIGNIFICANT CHANGE UP
PLATELET # BLD AUTO: 325 K/UL — SIGNIFICANT CHANGE UP (ref 150–400)
POIKILOCYTOSIS BLD QL AUTO: SIGNIFICANT CHANGE UP
POTASSIUM SERPL-MCNC: 4 MMOL/L — SIGNIFICANT CHANGE UP (ref 3.5–5.3)
POTASSIUM SERPL-MCNC: 4.3 MMOL/L — SIGNIFICANT CHANGE UP (ref 3.5–5.3)
POTASSIUM SERPL-SCNC: 4 MMOL/L — SIGNIFICANT CHANGE UP (ref 3.5–5.3)
POTASSIUM SERPL-SCNC: 4.3 MMOL/L — SIGNIFICANT CHANGE UP (ref 3.5–5.3)
PROT UR-MCNC: NEGATIVE MG/DL — SIGNIFICANT CHANGE UP
RBC # BLD: 6.81 M/UL — HIGH (ref 4.2–5.8)
RBC # FLD: 16.8 % — HIGH (ref 10.3–14.5)
RBC BLD AUTO: ABNORMAL
SMUDGE CELLS # BLD: PRESENT — SIGNIFICANT CHANGE UP
SODIUM SERPL-SCNC: 129 MMOL/L — LOW (ref 135–145)
SODIUM SERPL-SCNC: 131 MMOL/L — LOW (ref 135–145)
SODIUM UR-SCNC: 77 MMOL/L — SIGNIFICANT CHANGE UP
SP GR SPEC: <=1.005 — SIGNIFICANT CHANGE UP (ref 1–1.03)
UROBILINOGEN FLD QL: 0.2 E.U./DL — SIGNIFICANT CHANGE UP
UUN UR-MCNC: 482 MG/DL — SIGNIFICANT CHANGE UP
WBC # BLD: 16 K/UL — HIGH (ref 3.8–10.5)
WBC # FLD AUTO: 16 K/UL — HIGH (ref 3.8–10.5)

## 2023-10-12 PROCEDURE — 99233 SBSQ HOSP IP/OBS HIGH 50: CPT

## 2023-10-12 PROCEDURE — 99232 SBSQ HOSP IP/OBS MODERATE 35: CPT

## 2023-10-12 RX ORDER — SODIUM CHLORIDE 5 G/100ML
500 INJECTION, SOLUTION INTRAVENOUS
Refills: 0 | Status: DISCONTINUED | OUTPATIENT
Start: 2023-10-12 | End: 2023-10-12

## 2023-10-12 RX ORDER — ACETAMINOPHEN 500 MG
650 TABLET ORAL ONCE
Refills: 0 | Status: COMPLETED | OUTPATIENT
Start: 2023-10-12 | End: 2023-10-12

## 2023-10-12 RX ORDER — BENZOCAINE AND MENTHOL 5; 1 G/100ML; G/100ML
1 LIQUID ORAL
Refills: 0 | Status: DISCONTINUED | OUTPATIENT
Start: 2023-10-12 | End: 2023-10-14

## 2023-10-12 RX ORDER — LINAGLIPTIN 5 MG/1
5 TABLET, FILM COATED ORAL
Refills: 0 | Status: DISCONTINUED | OUTPATIENT
Start: 2023-10-12 | End: 2023-10-14

## 2023-10-12 RX ADMIN — CLOPIDOGREL BISULFATE 75 MILLIGRAM(S): 75 TABLET, FILM COATED ORAL at 12:18

## 2023-10-12 RX ADMIN — PANTOPRAZOLE SODIUM 40 MILLIGRAM(S): 20 TABLET, DELAYED RELEASE ORAL at 22:04

## 2023-10-12 RX ADMIN — BENZOCAINE AND MENTHOL 1 LOZENGE: 5; 1 LIQUID ORAL at 13:56

## 2023-10-12 RX ADMIN — Medication 4: at 17:19

## 2023-10-12 RX ADMIN — ATORVASTATIN CALCIUM 40 MILLIGRAM(S): 80 TABLET, FILM COATED ORAL at 22:03

## 2023-10-12 RX ADMIN — Medication 75 MICROGRAM(S): at 05:20

## 2023-10-12 RX ADMIN — SODIUM CHLORIDE 30 MILLILITER(S): 5 INJECTION, SOLUTION INTRAVENOUS at 12:29

## 2023-10-12 RX ADMIN — Medication 81 MILLIGRAM(S): at 12:19

## 2023-10-12 RX ADMIN — SENNA PLUS 2 TABLET(S): 8.6 TABLET ORAL at 12:19

## 2023-10-12 RX ADMIN — Medication 500 MILLIGRAM(S): at 22:03

## 2023-10-12 RX ADMIN — LINAGLIPTIN 5 MILLIGRAM(S): 5 TABLET, FILM COATED ORAL at 10:33

## 2023-10-12 RX ADMIN — BENZOCAINE AND MENTHOL 1 LOZENGE: 5; 1 LIQUID ORAL at 18:07

## 2023-10-12 RX ADMIN — Medication 12.5 MILLIGRAM(S): at 06:04

## 2023-10-12 RX ADMIN — CEFTRIAXONE 100 MILLIGRAM(S): 500 INJECTION, POWDER, FOR SOLUTION INTRAMUSCULAR; INTRAVENOUS at 18:20

## 2023-10-12 RX ADMIN — GABAPENTIN 100 MILLIGRAM(S): 400 CAPSULE ORAL at 22:03

## 2023-10-12 RX ADMIN — Medication 4: at 12:20

## 2023-10-12 RX ADMIN — Medication 500 MILLIGRAM(S): at 06:05

## 2023-10-12 RX ADMIN — Medication 500 MILLIGRAM(S): at 13:57

## 2023-10-12 RX ADMIN — BENZOCAINE AND MENTHOL 1 LOZENGE: 5; 1 LIQUID ORAL at 10:33

## 2023-10-12 NOTE — PROGRESS NOTE ADULT - PROBLEM SELECTOR PLAN 1
Presents w/ BELLO w/ minimal exertion, chest pain, orthopnea (2 pillow use)  x 3 days. proBNP 1279. Prior hx CAD: JOSÉ mLAD in 6/2023. Compliant w/ DAPT aspirin/Plavix. Received Lasix 20mg IV x 1 in ED. EKG nonischemic. Troponin T x 1 negative. s/p lasix 50mg IVP. Cath denied.    Pt presented with acs sxs initially and admitted to cardiology service to r/o cardiac causes. Pt initally required oxygen, and was thought to be secondary to acute chf excerbation. Pt found to have cholecystits on CTAP and sxs are likely due to cholecystits. Pt is not complaining of shortness of breath or chest pain. Currently on 2L    - continue to manage  - strict I&Os Presents w/ BELLO w/ minimal exertion, chest pain, orthopnea (2 pillow use)  x 3 days. proBNP 1279. Prior hx CAD: JOSÉ mLAD in 6/2023. Compliant w/ DAPT aspirin/Plavix. Received Lasix 20mg IV x 1 in ED. EKG nonischemic. Troponin T x 1 negative. s/p lasix 50mg IVP. Cath denied.  Pt presented with acs sxs initially and admitted to cardiology service to r/o cardiac causes. Pt initally required oxygen, and was thought to be secondary to acute chf excerbation. Pt found to have cholecystits on CTAP and sxs are likely due to cholecystits. Pt is not complaining of shortness of breath or chest pain. Currently on 2L. CXR revealed right hemidiaphragm. Acute hypoxic respiratory failure likely secondary to cholecystitis pain and hemidiaphragm    - continue to manage  - strict I&Os  - recheck ambulatory sats

## 2023-10-12 NOTE — PROGRESS NOTE ADULT - ATTENDING COMMENTS
87 year old male with PMH relevant for CAD s/p JOSÉ to mLAD (6/2023), HTN, HLD, cervical spondylosis s/p laminectomy, gastric ulcers, hypothyroidism,  who presents to [facility] ED 10/6/23 for BELLO with minimal exertion, chest pressure, and orthopnea x 3 days. Also reports a fever of 101.8 yesterday.    1. CAD  Chest pain resolved. Continue aspirin 81 mg, resume Plavix 75 mg, continue Lipitor, metoprolol.   LHC now postponed due to acute cholecystitis.     2. Acute cholecystitis  Poor candidate for cholecystectomy at this time, given improvement in current clinical status, will recommend continuing with CTX/Flagyl.  Continue Abx PO for 2 weeks. F/U with surgery in 2 weeks.     3. Microcytic anemia  outpatient follow up.    4. DM/hypothyroidism  Consult endocrine, appreciate recommendations.    5. Hyponatremia  Improving with hydration, another Hypertonic saline -expect normalization today.    discharge planning 87 year old male with PMH relevant for CAD s/p JOSÉ to mLAD (6/2023), HTN, HLD, cervical spondylosis s/p laminectomy, gastric ulcers, hypothyroidism,  who presents to [facility] ED 10/6/23 for BELLO with minimal exertion, chest pressure, and orthopnea x 3 days. Also reports a fever of 101.8 yesterday.    1. CAD  Chest pain resolved. Continue aspirin 81 mg, resume Plavix 75 mg, continue Lipitor, metoprolol.   LHC now postponed due to acute cholecystitis.     2. Acute cholecystitis  Poor candidate for cholecystectomy at this time, given improvement in current clinical status, will recommend continuing with CTX/Flagyl.  Continue Abx PO for 2 weeks. F/U with surgery in 2 weeks.     3. Microcytic anemia  outpatient follow up.    4. DM/hypothyroidism  Consult endocrine, appreciate recommendations.    5. Hyponatremia  Improving with hydration, another Hypertonic saline -expect normalization today.    discharge planning 87 year old male with PMH relevant for CAD s/p JOSÉ to mLAD (6/2023), HTN, HLD, cervical spondylosis s/p laminectomy, gastric ulcers, hypothyroidism,  who presents to [facility] ED 10/6/23 for BELLO with minimal exertion, chest pressure, and orthopnea x 3 days. Also reports a fever of 101.8 yesterday.    1. CAD  Chest pain resolved. Continue aspirin 81 mg, resume Plavix 75 mg, continue Lipitor, metoprolol.   LHC now postponed due to acute cholecystitis.     2. Acute cholecystitis  Poor candidate for cholecystectomy at this time, given improvement in current clinical status, will recommend continuing with CTX/Flagyl.  Continue Abx PO for 2 weeks. F/U with surgery in 2 weeks.     3. Microcytic anemia  outpatient follow up.    4. DM/hypothyroidism  Consult endocrine, appreciate recommendations.    5. Hyponatremia  Improving with hydration, another Hypertonic saline -expect normalization today.    discharge planning

## 2023-10-12 NOTE — PROGRESS NOTE ADULT - SUBJECTIVE AND OBJECTIVE BOX
SUBJECTIVE / INTERVAL HPI: Patient was seen and examined this morning. Tradjenta 5mg and levothyroxine 75mcg daily.    CAPILLARY BLOOD GLUCOSE & INSULIN RECEIVED  136 mg/dL (10-11 @ 17:07)  170 mg/dL (10-11 @ 18:33)  136 mg/dL (10-11 @ 22:00)  124 mg/dL (10-12 @ 05:30)  137 mg/dL (10-12 @ 06:09)  121 mg/dL (10-12 @ 08:18)      REVIEW OF SYSTEMS  Constitutional:  Negative fever, chills or loss of appetite.  Eyes:  Negative blurry vision or double vision.  Cardiovascular:  Negative for chest pain or palpitations.  Respiratory:  Negative for cough, wheezing, or shortness of breath.    Gastrointestinal:  Negative for nausea, vomiting, diarrhea, constipation, or abdominal pain.  Genitourinary:  Negative frequency, urgency or dysuria.  Neurologic:  No headache, confusion, dizziness, lightheadedness.    PHYSICAL EXAM  Vital Signs Last 24 Hrs  T(C): 36.5 (12 Oct 2023 08:57), Max: 36.7 (11 Oct 2023 21:01)  T(F): 97.7 (12 Oct 2023 08:57), Max: 98.1 (12 Oct 2023 05:11)  HR: 74 (12 Oct 2023 08:57) (71 - 87)  BP: 127/73 (12 Oct 2023 08:57) (127/73 - 166/91)  BP(mean): 97 (12 Oct 2023 00:54) (97 - 97)  RR: 16 (12 Oct 2023 08:57) (16 - 18)  SpO2: 94% (12 Oct 2023 08:57) (91% - 98%)    Parameters below as of 12 Oct 2023 08:57  Patient On (Oxygen Delivery Method): nasal cannula  O2 Flow (L/min): 1      Constitutional: Awake, alert, in no acute distress.   HEENT: Normocephalic, atraumatic, PAMELA.  Respiratory: Lungs clear to ausculation bilaterally.   Cardiovascular: regular rhythm, normal S1 and S2, no audible murmurs.   GI: soft, non-tender, non-distended, bowel sounds present.  Extremities: No lower extremity edema.  Psychiatric: AAO x 3. Normal affect/mood.     LABS  CBC - WBC/HGB/HTC/PLT: 16.00/14.1/44.1/325 (10-12-23)  BMP - Na/K/Cl/Bicarb/BUN/Cr/Gluc/AG/eGFR: 129/4.3/97/22/14/0.97/124/10/76 (10-12-23)  Ca - 8.3 (10-12-23)  Phos - 2.8 (10-12-23)  Mg - 2.1 (10-12-23)  LFT - Alb/Tprot/Tbili/Dbili/AlkPhos/ALT/AST: 2.5/--/0.3/--/126/17/19 (10-11-23)  PT/aPTT/INR: 14.3/33.4/1.26 (10-10-23)   Thyroid Stimulating Hormone, Serum: 6.990 (10-09-23)  Total T4/Free T4: --/1.580 (10-09-23)    MEDICATIONS  MEDICATIONS  (STANDING):  aspirin enteric coated 81 milliGRAM(s) Oral daily  atorvastatin 40 milliGRAM(s) Oral at bedtime  benzocaine/menthol Lozenge 1 Lozenge Oral four times a day  cefTRIAXone   IVPB 2000 milliGRAM(s) IV Intermittent every 24 hours  clopidogrel Tablet 75 milliGRAM(s) Oral daily  dextrose 5%. 1000 milliLiter(s) (50 mL/Hr) IV Continuous <Continuous>  dextrose 5%. 1000 milliLiter(s) (100 mL/Hr) IV Continuous <Continuous>  dextrose 50% Injectable 25 Gram(s) IV Push once  dextrose 50% Injectable 12.5 Gram(s) IV Push once  dextrose 50% Injectable 25 Gram(s) IV Push once  gabapentin 100 milliGRAM(s) Oral at bedtime  glucagon  Injectable 1 milliGRAM(s) IntraMuscular once  insulin lispro (ADMELOG) corrective regimen sliding scale   SubCutaneous at bedtime  insulin lispro (ADMELOG) corrective regimen sliding scale   SubCutaneous three times a day before meals  levothyroxine 75 MICROGram(s) Oral daily  linagliptin 5 milliGRAM(s) Oral <User Schedule>  metoprolol succinate ER 12.5 milliGRAM(s) Oral daily  metroNIDAZOLE    Tablet 500 milliGRAM(s) Oral every 8 hours  pantoprazole    Tablet 40 milliGRAM(s) Oral at bedtime  senna 2 Tablet(s) Oral daily  sodium chloride 0.9%. 500 milliLiter(s) (100 mL/Hr) IV Continuous <Continuous>  sodium chloride 3%. 500 milliLiter(s) (30 mL/Hr) IV Continuous <Continuous>    MEDICATIONS  (PRN):  bisacodyl Suppository 10 milliGRAM(s) Rectal daily PRN Constipation  cyclobenzaprine 5 milliGRAM(s) Oral two times a day PRN Muscle Spasm  dextrose Oral Gel 15 Gram(s) Oral once PRN Blood Glucose LESS THAN 70 milliGRAM(s)/deciliter  oxyCODONE    IR 5 milliGRAM(s) Oral every 6 hours PRN Severe Pain (7 - 10)   SUBJECTIVE / INTERVAL HPI: Patient was seen and examined this morning. Minimal RUQ pain, same as yesterday. + SOB. Na 129, started on 3% IV. WBC increased to 16, from 12.    CAPILLARY BLOOD GLUCOSE & INSULIN RECEIVED  136 mg/dL (10-11 @ 17:07)  170 mg/dL (10-11 @ 18:33)  136 mg/dL (10-11 @ 22:00)  124 mg/dL (10-12 @ 05:30)  137 mg/dL (10-12 @ 06:09)   121 mg/dL (10-12 @ 08:18) - Ate egg wrap.  231 mg/dL (10-12 @ lunch)      REVIEW OF SYSTEMS  Constitutional:  Negative fever, chills or loss of appetite.  Eyes:  Negative blurry vision or double vision.  Cardiovascular:  Negative for chest pain or palpitations.  Respiratory:  Negative for cough, wheezing, or shortness of breath.    Gastrointestinal:  Negative for nausea, vomiting, diarrhea, constipation, or abdominal pain.  Genitourinary:  Negative frequency, urgency or dysuria.  Neurologic:  No headache, confusion, dizziness, lightheadedness.    PHYSICAL EXAM  Vital Signs Last 24 Hrs  T(C): 36.5 (12 Oct 2023 08:57), Max: 36.7 (11 Oct 2023 21:01)  T(F): 97.7 (12 Oct 2023 08:57), Max: 98.1 (12 Oct 2023 05:11)  HR: 74 (12 Oct 2023 08:57) (71 - 87)  BP: 127/73 (12 Oct 2023 08:57) (127/73 - 166/91)  BP(mean): 97 (12 Oct 2023 00:54) (97 - 97)  RR: 16 (12 Oct 2023 08:57) (16 - 18)  SpO2: 94% (12 Oct 2023 08:57) (91% - 98%)    Parameters below as of 12 Oct 2023 08:57  Patient On (Oxygen Delivery Method): nasal cannula  O2 Flow (L/min): 1      Constitutional: Awake, alert, in no acute distress.   HEENT: Normocephalic, atraumatic, PAMELA.  Respiratory: Lungs clear to ausculation bilaterally.   Cardiovascular: regular rhythm, normal S1 and S2, no audible murmurs.   GI: soft, non-tender, non-distended, bowel sounds present.  Extremities: No lower extremity edema.  Psychiatric: AAO x 3. Normal affect/mood.     LABS  CBC - WBC/HGB/HTC/PLT: 16.00/14.1/44.1/325 (10-12-23)  BMP - Na/K/Cl/Bicarb/BUN/Cr/Gluc/AG/eGFR: 129/4.3/97/22/14/0.97/124/10/76 (10-12-23)  Ca - 8.3 (10-12-23)  Phos - 2.8 (10-12-23)  Mg - 2.1 (10-12-23)  LFT - Alb/Tprot/Tbili/Dbili/AlkPhos/ALT/AST: 2.5/--/0.3/--/126/17/19 (10-11-23)  PT/aPTT/INR: 14.3/33.4/1.26 (10-10-23)   Thyroid Stimulating Hormone, Serum: 6.990 (10-09-23)  Total T4/Free T4: --/1.580 (10-09-23)    MEDICATIONS  MEDICATIONS  (STANDING):  aspirin enteric coated 81 milliGRAM(s) Oral daily  atorvastatin 40 milliGRAM(s) Oral at bedtime  benzocaine/menthol Lozenge 1 Lozenge Oral four times a day  cefTRIAXone   IVPB 2000 milliGRAM(s) IV Intermittent every 24 hours  clopidogrel Tablet 75 milliGRAM(s) Oral daily  dextrose 5%. 1000 milliLiter(s) (50 mL/Hr) IV Continuous <Continuous>  dextrose 5%. 1000 milliLiter(s) (100 mL/Hr) IV Continuous <Continuous>  dextrose 50% Injectable 25 Gram(s) IV Push once  dextrose 50% Injectable 12.5 Gram(s) IV Push once  dextrose 50% Injectable 25 Gram(s) IV Push once  gabapentin 100 milliGRAM(s) Oral at bedtime  glucagon  Injectable 1 milliGRAM(s) IntraMuscular once  insulin lispro (ADMELOG) corrective regimen sliding scale   SubCutaneous at bedtime  insulin lispro (ADMELOG) corrective regimen sliding scale   SubCutaneous three times a day before meals  levothyroxine 75 MICROGram(s) Oral daily  linagliptin 5 milliGRAM(s) Oral <User Schedule>  metoprolol succinate ER 12.5 milliGRAM(s) Oral daily  metroNIDAZOLE    Tablet 500 milliGRAM(s) Oral every 8 hours  pantoprazole    Tablet 40 milliGRAM(s) Oral at bedtime  senna 2 Tablet(s) Oral daily  sodium chloride 0.9%. 500 milliLiter(s) (100 mL/Hr) IV Continuous <Continuous>  sodium chloride 3%. 500 milliLiter(s) (30 mL/Hr) IV Continuous <Continuous>    MEDICATIONS  (PRN):  bisacodyl Suppository 10 milliGRAM(s) Rectal daily PRN Constipation  cyclobenzaprine 5 milliGRAM(s) Oral two times a day PRN Muscle Spasm  dextrose Oral Gel 15 Gram(s) Oral once PRN Blood Glucose LESS THAN 70 milliGRAM(s)/deciliter  oxyCODONE    IR 5 milliGRAM(s) Oral every 6 hours PRN Severe Pain (7 - 10)

## 2023-10-12 NOTE — PROGRESS NOTE ADULT - ASSESSMENT
Mr. Yanez is a 87-year-old male with a past medical history of hypertension, hyperlipidemia, coronary artery disease (s/p JOSÉ to mLAD on 6/2023), cervical spondylosis (s/p laminectomy), gastric ulcers, hypothyroidism and constipation who presented to the emergency department (10/6/23) for dyspnea on exertion, chest pressure, and orthopnea for 3 days, along with one day of fever. He was admitted to cardiology for acute hypoxic respiratory failure secondary to acute heart failure with preserved ejection fraction and fever, possibly secondary to pneumonia. His labs were remarkable for a hemoglobin A1C of 7.7% and a TSH of 10.5. Endocrinology was consulted for recommendations regarding management of hypothyroidism and diabetes.     A1C: 7.7 %  BUN: 14  Creatinine: 0.97  GFR: 76  Weight: 63.5  BMI: 23.3

## 2023-10-12 NOTE — PROGRESS NOTE ADULT - ASSESSMENT
Patient is a 87M, (shellfish rxn w/ Lip Swelling ~ 20 yrs ago, does not eat shellfish since then),  w/ PMHx CAD s/p JOSÉ to mLAD (6/2023), HTN, HLD, cervical spondylosis s/p laminectomy, gastric ulcers, hypothyroidism,  who presents to [facility] ED 10/6/23 for BELLO with minimal exertion, chest pressure, and orthopnea x 3 days. Also reports a fever of 101.8 yesterday but resolved with Tylenol with no recurrence since. Reported constipation at home with response after suppository at home on Tuesday (10/3), however, no BM since then. Does report pinpoint RUQ abdominal discomfort worse with palpation x 1-2 days. 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 His appetite and fluid intake has decreased as well. CXR with concern for CHF vs PNA. Negative troponin and EKG nonischemic. Patient is now admitted to cardiac/tele for suspicion of CHF and further ischemic evaluation pending optimization including Leukocytosis workup.             Patient is a 87M, (shellfish rxn w/ Lip Swelling ~ 20 yrs ago, does not eat shellfish since then),  w/ PMHx CAD s/p JOSÉ to mLAD (6/2023), HTN, HLD, cervical spondylosis s/p laminectomy, gastric ulcers, hypothyroidism,  who presents to [facility] ED 10/6/23 for BELLO with minimal exertion, chest pressure, and orthopnea x 3 days. Also reports a fever of 101.8 yesterday but resolved with Tylenol with no recurrence since. Reported constipation at home with response after suppository at home on Tuesday (10/3), however, no BM since then. Does report pinpoint RUQ abdominal discomfort worse with palpation x 1-2 days. 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 His appetite and fluid intake has decreased as well. CXR with concern for CHF vs PNA. Negative troponin and EKG nonischemic. Patient is now admitted to cardiac/tele for suspicion of CHF and further ischemic evaluation pending optimization including Leukocytosis workup.             Patient is a 87M, (shellfish rxn w/ Lip Swelling ~ 20 yrs ago, does not eat shellfish since then),  w/ PMHx CAD s/p JOSÉ to mLAD (6/2023), HTN, HLD, cervical spondylosis s/p laminectomy, gastric ulcers, hypothyroidism,  who presents to [facility] ED 10/6/23 for BELLO with minimal exertion, chest pressure, and orthopnea x 3 days. Also reports a fever of 101.8 yesterday but resolved with Tylenol with no recurrence since. Reported constipation at home with response after suppository at home on Tuesday (10/3), however, no BM since then. Does report pinpoint RUQ abdominal discomfort worse with palpation x 1-2 days. 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 His appetite and fluid intake has decreased as well. CXR with concern for CHF vs PNA. Negative troponin and EKG nonischemic. Patient is now admitted to cardiac/tele for suspicion of CHF and further ischemic evaluation pending optimization including Leukocytosis workup.             Patient is a 87M, (shellfish rxn w/ Lip Swelling ~ 20 yrs ago, does not eat shellfish since then),  w/ PMHx CAD s/p JOSÉ to mLAD (6/2023), HTN, HLD, cervical spondylosis s/p laminectomy, gastric ulcers, hypothyroidism,  who presents to [facility] ED 10/6/23 for BELLO with minimal exertion, chest pressure, and orthopnea x 3 days. Also reports a fever of 101.8 yesterday but resolved with Tylenol with no recurrence since. Reported constipation at home with response after suppository at home on Tuesday (10/3), however, no BM since then. Does report pinpoint RUQ abdominal discomfort worse with palpation x 1-2 days. 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 His appetite and fluid intake has decreased as well. CXR with concern for CHF vs PNA. Negative troponin and EKG nonischemic. Patient is now admitted for cholecystitis.           Patient is a 87M, (shellfish rxn w/ Lip Swelling ~ 20 yrs ago, does not eat shellfish since then),  w/ PMHx CAD s/p JOSÉ to mLAD (6/2023), HTN, HLD, cervical spondylosis s/p laminectomy, gastric ulcers, hypothyroidism,  who presents to [facility] ED 10/6/23 for BELLO with minimal exertion, chest pressure, and orthopnea x 3 days. Also reports a fever of 101.8 yesterday but resolved with Tylenol with no recurrence since. Reported constipation at home with response after suppository at home on Tuesday (10/3), however, no BM since then. Does report pinpoint RUQ abdominal discomfort worse with palpation x 1-2 days. 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 His appetite and fluid intake has decreased as well. CXR with concern for CHF vs PNA. Negative troponin and EKG nonischemic. Patient is now admitted for cholecystitis.           Patient is a 87M, (shellfish rxn w/ Lip Swelling ~ 20 yrs ago, does not eat shellfish since then),  w/ PMHx CAD s/p JOSÉ to mLAD (6/2023), HTN, HLD, cervical spondylosis s/p laminectomy, gastric ulcers, hypothyroidism,  who presents to [facility] ED 10/6/23 for BELLO with minimal exertion, chest pressure, and orthopnea x 3 days. Also reports a fever of 101.8 yesterday but resolved with Tylenol with no recurrence since. Reported constipation at home with response after suppository at home on Tuesday (10/3), however, no BM since then. Does report pinpoint RUQ abdominal discomfort worse with palpation x 1-2 days. 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 His appetite and fluid intake has decreased as well. CXR with concern for CHF vs PNA. Negative troponin and EKG nonischemic. Patient is now admitted for cholecystitis.

## 2023-10-12 NOTE — PROGRESS NOTE ADULT - PROBLEM SELECTOR PLAN 2
Reports sharp pinpoint RUQ discomfort worsened with palpation x 1-2 days. Last BM 10/3 w/ suppository at home. Daughter reports decreased eating and fluid intake at home lately.  RUQUS: cholelithiasis  CTAP: acute cholecystitis with fat stranding  HIDA: non visualization of the gallbladder, likely representing acute cholecystitis  Pt currently day 2 of holding plavix 10/10    - surgery consulted, f/u recs  - per surgery, pt will continue with abx and follow upt outpatient  - IR consulted, f/u recs  - c/w ctx to 2gm (end date10/22)  - c/w flagyl 500mg q8 (end date 10/23) Reports sharp pinpoint RUQ discomfort worsened with palpation x 1-2 days. Last BM 10/3 w/ suppository at home. Daughter reports decreased eating and fluid intake at home lately.  RUQUS: cholelithiasis  CTAP: acute cholecystitis with fat stranding  HIDA: non visualization of the gallbladder, likely representing acute cholecystitis    - surgery consulted, f/u recs  - per surgery, pt will continue with abx and follow up outpatient  - IR consulted, f/u recs  - c/w ctx to 2gm (end date10/22)  - c/w flagyl 500mg q8 (end date 10/23)

## 2023-10-12 NOTE — PROGRESS NOTE ADULT - PROBLEM SELECTOR PLAN 1
9/13: TSH 7.05, free T4 1.4  10/7: TSH 10.5, free T4 1.47  10/9: . free T4 1.58  - Was on alternating levothyroxine doses of 50mcg and 100mcg, and not taking consistently.   - Change levothyroxine to 75 mcg oral daily to simplify regimen. (Instead of alternating doses)  - Recheck TSH and Free T4 in 4-6 weeks outpatient.    - He should continue to follow-up with his endocrinologist for further titration of his levothyroxine. 9/13: TSH 7.05, free T4 1.4  10/7: TSH 10.5, free T4 1.47  10/9: TSH 6.39, free T4 1.58  - Was on alternating levothyroxine doses of 50mcg and 100mcg, and not taking consistently.   - Change levothyroxine to 75 mcg oral daily to simplify regimen. (Instead of alternating doses)  - Recheck TSH and Free T4 in 4-6 weeks outpatient.    - He should continue to follow-up with his endocrinologist for further titration of his levothyroxine.

## 2023-10-12 NOTE — PROGRESS NOTE ADULT - ASSESSMENT
pt known to me from prior admission .     87 male Mandarin speaking  with PMHx CAD s/p JOSÉ to mLAD (6/2023), HTN, HLD, cervical spondylosis s/p laminectomy, gastric ulcers, hypothyroidism,  constipation prior [facility] admission for L leg pain, presented to [facility] ED 10/6/23 for dyspnea with minimal exertion, chest pressure, and orthopnea x 3 days and fever of 101.8*F day before admission- but resolved with Tylenol with no recurrence since. Reported constipation at home with response after suppository at home on Tuesday (10/3), family report his activity level has decreased due to his spinal stenosis discomfort as well. His appetite and fluid intake has decreased as well. Pt was admitted to Cardiology service/[facility] for acute hypoxic respiratory failure 2/2 acute HFpEF (BNP 1279) s/p lasix 20mg iv x1 at ED(10/6) and fever without clinical evidence of PNA (no cough/sputum) but RLQ pain and constipation with CT (10/7) findings of a gallstone ~1.8cm at the gallbladder neck and associated wall thickening and fat stranding- NM positive for acute Cholecystitis, on antibiotics, initially plan for IR perchole ( plavix held 10/9-10/10-)- symptoms improved , per=mich cancelled, plavix given on 10/11- Hyponatremia evaluated by renal -     - notes/labs/reviewed. patient seen.   still with pain in RUQ with some shortness of breath with ambulation, on oxygen NC 1 L ( not on home oxygen  - wbc is 16 , with sodium 129 ( was given IVF yesterday )- dw renal = plan for Hypertonic saline 3 % plan for today.   - off diuretics -   - s/p Lasix 20mg iv (10/6)-> 40mg iv daily (10/7-10/9)   - currently on oxygen nc- would given Incentive spirometer and to check oxygen saturation on room air.     - Surgery f/u appreciated, d/w Dr. Gomez re: acute cholecystitis - on antibiotics, per- mich held , plavix restarted 10/11-- wbc is 16 on 10/12-   monitor for WBC and abdominal exam   - plan antibiotics  for total 2 weeks course   - CT findings of gallstone ~ 1.8cm at the gallbladder neck and associated wall thickening and fat stranding ( Pt on Clopidogrel which needs to be kept off for at least 5 days and will need Cardiology clearance , s/p PCI to mLAD 6/2023)   - IR consult appreciated, d/w Dr.Tat Burnett , HIDA scan (10/9) positive    - Ceftriaxone 1gm iv q24hr (10/6-10/7), increased to 2gm iv q24hr ( 10/8-) , to continue IV antibiotics for now - consider changing to oral Cefpodoxime 200mg po q12hr along with flagyl upon d/c for total 2 weeks course   - added Metronidazole 500mg po q8hr ( 10/8-)   - discontinued Azithromycin 500mg iv q24hr ( 10/6-10/7)     - trend WBC 16.26K(10/6)-> 15.18K(10/7)-> 12.76K (10/8)-> 14.2K(10/9)-> 12.05(10/10)-> 12.83K(10/11) --> 16 ib 10/1  - BCx: ngtd   - Bowel regimen for constipation : + BMs  - Endocrine f/u appreciated re: Elevated TSH/hypothyroidism:   Hypothyroidism - on Synthroid 75 mcg per day  DMII-  FS/NISS, A1C% 7.7% (10/7) , goal -180- on trajenta 5 mg along with insulin sliding scale.     - CADs/p PCI mLAD 6/2023. c/w DAPT: ASA/ Clopidogrel restarted 10/11-    metoprolol succinate 12.5mg daily, Atorvastatin 40mg qHS , Pt not going for LHC , Cardiologist Dr. Daryn Jarvis     - LBP//spondylosis: c/w gabapentin 100mg qHS ( can be adjusted),  cyclobenzaprine 5mg po bid, oxycodone IR 5mg po q6hr prn   - consider Lidocaine patch 4% AA,   - GI ppx: PPI po daily     - SCDs for DVT ppx     Contacts:  Cardiologist Dr. Daryn Jarvis   daughter: Olga Yanez [phone]    Disposition:  fu serum sodium , to check oxygen saturation on room air ( currently on oxygen 1 L NC )- PT evaluation ( has steps at home )    POC as d/w [facility] Cardiology team/ Dr. Scotty Chambers, renal, daughter      pt known to me from prior admission .     87 male Mandarin speaking  with PMHx CAD s/p JOSÉ to mLAD (6/2023), HTN, HLD, cervical spondylosis s/p laminectomy, gastric ulcers, hypothyroidism,  constipation prior [facility] admission for L leg pain, presented to [facility] ED 10/6/23 for dyspnea with minimal exertion, chest pressure, and orthopnea x 3 days and fever of 101.8*F day before admission- but resolved with Tylenol with no recurrence since. Reported constipation at home with response after suppository at home on Tuesday (10/3), family report his activity level has decreased due to his spinal stenosis discomfort as well. His appetite and fluid intake has decreased as well. Pt was admitted to Cardiology service/[facility] for acute hypoxic respiratory failure 2/2 acute HFpEF (BNP 1279) s/p lasix 20mg iv x1 at ED(10/6) and fever without clinical evidence of PNA (no cough/sputum) but RLQ pain and constipation with CT (10/7) findings of a gallstone ~1.8cm at the gallbladder neck and associated wall thickening and fat stranding- NM positive for acute Cholecystitis, on antibiotics, initially plan for IR perchole ( plavix held 10/9-10/10-)- symptoms improved , per=mich cancelled, plavix given on 10/11- Hyponatremia evaluated by renal -     - notes/labs/reviewed. patient seen.   still with pain in RUQ with some shortness of breath with ambulation, on oxygen NC 1 L ( not on home oxygen  - wbc is 16 , with sodium 129 ( was given IVF yesterday )- dw renal = plan for Hypertonic saline 3 % plan for today.   - off diuretics -   - s/p Lasix 20mg iv (10/6)-> 40mg iv daily (10/7-10/9)   - currently on oxygen nc- would given Incentive spirometer and to check oxygen saturation on room air.     - Surgery f/u appreciated, d/w Dr. Gomez re: acute cholecystitis - on antibiotics, per- mich held , plavix restarted 10/11-- wbc is 16 on 10/12-   monitor for WBC and abdominal exam   - plan antibiotics  for total 2 weeks course   - CT findings of gallstone ~ 1.8cm at the gallbladder neck and associated wall thickening and fat stranding ( Pt on Clopidogrel which needs to be kept off for at least 5 days and will need Cardiology clearance , s/p PCI to mLAD 6/2023)   - IR consult appreciated, d/w Dr.Tat Burnett , HIDA scan (10/9) positive    - Ceftriaxone 1gm iv q24hr (10/6-10/7), increased to 2gm iv q24hr ( 10/8-) , to continue IV antibiotics for now - consider changing to oral Cefpodoxime 200mg po q12hr along with flagyl upon d/c for total 2 weeks course   - added Metronidazole 500mg po q8hr ( 10/8-)   - discontinued Azithromycin 500mg iv q24hr ( 10/6-10/7)     - trend WBC 16.26K(10/6)-> 15.18K(10/7)-> 12.76K (10/8)-> 14.2K(10/9)-> 12.05(10/10)-> 12.83K(10/11) --> 16 ib 10/1  - BCx: ngtd   - Bowel regimen for constipation : + BMs  - Endocrine f/u appreciated re: Elevated TSH/hypothyroidism:   Hypothyroidism - on Synthroid 75 mcg per day  DMII-  FS/NISS, A1C% 7.7% (10/7) , goal -180- on trajenta 5 mg along with insulin sliding scale.     - CADs/p PCI mLAD 6/2023. c/w DAPT: ASA/ Clopidogrel restarted 10/11-    metoprolol succinate 12.5mg daily, Atorvastatin 40mg qHS , Pt not going for LHC , Cardiologist Dr. Daryn Jarvis     - LBP//spondylosis: c/w gabapentin 100mg qHS ( can be adjusted),  cyclobenzaprine 5mg po bid, oxycodone IR 5mg po q6hr prn   - consider Lidocaine patch 4% AA,   - GI ppx: PPI po daily     - SCDs for DVT ppx     Contacts:  Cardiologist Dr. Daryn Jarvis   daughter: Olga Yanez [phone]    Disposition:  fu serum sodium , to check oxygen saturation on room air ( currently on oxygen 1 L NC )- PT evaluation ( has steps at home )    POC as d/w [facility] Cardiology team/ Dr. Scotty Chambers, renal, daughter      pt known to me from prior admission .     87 male Mandarin speaking  with PMHx CAD s/p JOSÉ to mLAD (6/2023), HTN, HLD, cervical spondylosis s/p laminectomy, gastric ulcers, hypothyroidism,  constipation prior [facility] admission for L leg pain, presented to [facility] ED 10/6/23 for dyspnea with minimal exertion, chest pressure, and orthopnea x 3 days and fever of 101.8*F day before admission- but resolved with Tylenol with no recurrence since. Reported constipation at home with response after suppository at home on Tuesday (10/3), family report his activity level has decreased due to his spinal stenosis discomfort as well. His appetite and fluid intake has decreased as well. Pt was admitted to Cardiology service/[facility] for acute hypoxic respiratory failure 2/2 acute HFpEF (BNP 1279) s/p lasix 20mg iv x1 at ED(10/6) and fever without clinical evidence of PNA (no cough/sputum) but RLQ pain and constipation with CT (10/7) findings of a gallstone ~1.8cm at the gallbladder neck and associated wall thickening and fat stranding- NM positive for acute Cholecystitis, on antibiotics, initially plan for IR perchole ( plavix held 10/9-10/10-)- symptoms improved , per=mich cancelled, plavix given on 10/11- Hyponatremia evaluated by renal -     - notes/labs/reviewed. patient seen.   still with pain in RUQ with some shortness of breath with ambulation, on oxygen NC 1 L ( not on home oxygen  - wbc is 16 , with sodium 129 ( was given IVF yesterday )- dw renal = plan for Hypertonic saline 3 % plan for today.   - off diuretics -   - s/p Lasix 20mg iv (10/6)-> 40mg iv daily (10/7-10/9)   - currently on oxygen nc- would given Incentive spirometer and to check oxygen saturation on room air.     - Surgery f/u appreciated, d/w Dr. Gomez re: acute cholecystitis - on antibiotics, per- mich held , plavix restarted 10/11-- wbc is 16 on 10/12-   monitor for WBC and abdominal exam   - plan antibiotics  for total 2 weeks course   - CT findings of gallstone ~ 1.8cm at the gallbladder neck and associated wall thickening and fat stranding ( Pt on Clopidogrel which needs to be kept off for at least 5 days and will need Cardiology clearance , s/p PCI to mLAD 6/2023)   - IR consult appreciated, d/w Dr.Tat Burnett , HIDA scan (10/9) positive    - Ceftriaxone 1gm iv q24hr (10/6-10/7), increased to 2gm iv q24hr ( 10/8-) , to continue IV antibiotics for now - consider changing to oral Cefpodoxime 200mg po q12hr along with flagyl upon d/c for total 2 weeks course   - added Metronidazole 500mg po q8hr ( 10/8-)   - discontinued Azithromycin 500mg iv q24hr ( 10/6-10/7)     - trend WBC 16.26K(10/6)-> 15.18K(10/7)-> 12.76K (10/8)-> 14.2K(10/9)-> 12.05(10/10)-> 12.83K(10/11) --> 16 ib 10/1  - BCx: ngtd   - Bowel regimen for constipation : + BMs  - Endocrine f/u appreciated re: Elevated TSH/hypothyroidism:   Hypothyroidism - on Synthroid 75 mcg per day  DMII-  FS/NISS, A1C% 7.7% (10/7) , goal -180- on trajenta 5 mg along with insulin sliding scale.     - CADs/p PCI mLAD 6/2023. c/w DAPT: ASA/ Clopidogrel restarted 10/11-    metoprolol succinate 12.5mg daily, Atorvastatin 40mg qHS , Pt not going for LHC , Cardiologist Dr. Daryn Jarvis     - LBP//spondylosis: c/w gabapentin 100mg qHS ( can be adjusted),  cyclobenzaprine 5mg po bid, oxycodone IR 5mg po q6hr prn   - consider Lidocaine patch 4% AA,   - GI ppx: PPI po daily     - SCDs for DVT ppx     Contacts:  Cardiologist Dr. Daryn Jarvis   daughter: Olga Yanez [phone]    Disposition:  fu serum sodium , to check oxygen saturation on room air ( currently on oxygen 1 L NC )- PT evaluation ( has steps at home )    POC as d/w [facility] Cardiology team/ Dr. Scotty Chambers, renal, daughter

## 2023-10-12 NOTE — PROGRESS NOTE ADULT - PROBLEM SELECTOR PLAN 4
found to have elevated glucose on BMP  A1c found to be 7.7. no hx of diabetes    - monitor finger sticks continue consistent carb diet  - endocrinology consulted, f/u recs  - per endo, c/w moderate dose sliding scale

## 2023-10-12 NOTE — PROGRESS NOTE ADULT - ASSESSMENT
Patient is a 87y Male w/ PMH of hypothyroidism whom presented to the hospital with SOB and CXR with concern for CHF vs PNA. Negative troponin and EKG nonischemic. Echo showing nl EF, noted hyponatremia s/p Lasix use. Nephrology consulted for further management and recommendations.     Na 128-->129 in 24 hrs  U Na: 70->20 s/p Lasix use. Today, 77  U osmo: 424 --> 371  U spec grav: 1.015 --> <=1.005    Imp: Hyponatremia 2/2 diuresis (lasix) vs potentially 2/2 hypothyroid    Plan:  - start 3% NS at 30cc/hr for total of 500ml  - recheck BMP s/p fluids  - recheck U lytes and Osm  - goal Na 135  - f/u endo recs for hypothyroid management Patient is a 87y Male w/ PMH of hypothyroidism whom presented to the hospital with SOB and CXR with concern for CHF vs PNA. Negative troponin and EKG nonischemic. Echo showing nl EF, noted hyponatremia s/p Lasix use. Nephrology consulted for further management and recommendations.     Na 128-->129 in 24 hrs  U Na: 70->20 s/p Lasix use. Today, 77  U osmo: 424 --> 371  U spec grav: 1.015 --> <=1.005    Imp: Hyponatremia 2/2 diuresis (lasix) vs potentially 2/2 hypothyroid    Plan:  - start 3% NS at 30cc/hr for total of 500ml  - recheck BMP s/p fluids  - recheck U lytes and Osm this evening and tomorrow AM with AM BMP  - goal Na 135  - f/u endo recs for hypothyroid management

## 2023-10-12 NOTE — PROGRESS NOTE ADULT - PROBLEM SELECTOR PLAN 8
continue home Synthroid 50mcg qd. TSH at 10.160. Home med synthroid 50mcg. Repeat tsh at 6.990    - started levothyroxine 75mcg continue home Synthroid 50mcg qd. TSH at 10.160. Home med synthroid 50mcg. Repeat tsh at 6.990    - c/w levothyroxine 75mcg TSH at 10.160. Home med synthroid 50mcg. Repeat tsh at 6.990    - endo consulted, f/u recs  - c/w levothyroxine 75mcg

## 2023-10-12 NOTE — PROGRESS NOTE ADULT - PROBLEM SELECTOR PLAN 3
WBC 16.26 elevated on admission. Reported fever at home yesterday improved w/ Tylenol. Likely in the setting of cholecystitis. procal 0.25 elevated s/p abx IV Ceftriaxone x 5 days and Azithromycin x 1 in ED. Continued both abx for now.    - c/w ctx to 2gm (end date10/22)  - c/w flagyl 500mg q8 (end date 10/23)  - f/u blood cultures ngtd

## 2023-10-12 NOTE — PROGRESS NOTE ADULT - SUBJECTIVE AND OBJECTIVE BOX
Nephrology progress note    Patient is a 87y Male with hyponatremia s/p Lasix use. Nephrology consulted for further management and recommendations. Patient seen and examined at bedside, no new current complaints. Still has dyspnea with exertion unchanged since admission. Denies CP, palpitations, dizziness, or other complaints.    Allergies:  No Known Drug Allergies  shellfish (Swelling; Hives)    Hospital Medications:   MEDICATIONS  (STANDING):  aspirin enteric coated 81 milliGRAM(s) Oral daily  atorvastatin 40 milliGRAM(s) Oral at bedtime  benzocaine/menthol Lozenge 1 Lozenge Oral four times a day  cefTRIAXone   IVPB 2000 milliGRAM(s) IV Intermittent every 24 hours  clopidogrel Tablet 75 milliGRAM(s) Oral daily  dextrose 5%. 1000 milliLiter(s) (50 mL/Hr) IV Continuous <Continuous>  dextrose 5%. 1000 milliLiter(s) (100 mL/Hr) IV Continuous <Continuous>  dextrose 50% Injectable 25 Gram(s) IV Push once  dextrose 50% Injectable 25 Gram(s) IV Push once  dextrose 50% Injectable 12.5 Gram(s) IV Push once  gabapentin 100 milliGRAM(s) Oral at bedtime  glucagon  Injectable 1 milliGRAM(s) IntraMuscular once  insulin lispro (ADMELOG) corrective regimen sliding scale   SubCutaneous at bedtime  insulin lispro (ADMELOG) corrective regimen sliding scale   SubCutaneous three times a day before meals  levothyroxine 75 MICROGram(s) Oral daily  linagliptin 5 milliGRAM(s) Oral <User Schedule>  metoprolol succinate ER 12.5 milliGRAM(s) Oral daily  metroNIDAZOLE    Tablet 500 milliGRAM(s) Oral every 8 hours  pantoprazole    Tablet 40 milliGRAM(s) Oral at bedtime  senna 2 Tablet(s) Oral daily  sodium chloride 3%. 500 milliLiter(s) (30 mL/Hr) IV Continuous <Continuous>    REVIEW OF SYSTEMS:  CONSTITUTIONAL: No weakness, fevers or chills  EYES/ENT: No visual changes;  No vertigo or throat pain   NECK: No pain or stiffness  RESPIRATORY: dyspnea with exertion. No cough, wheezing, hemoptysis  CARDIOVASCULAR: No chest pain or palpitations.  GASTROINTESTINAL: No abdominal or epigastric pain. No nausea, vomiting, or hematemesis; No diarrhea or constipation. No melena or hematochezia.  GENITOURINARY: No dysuria, frequency, foamy urine, urinary urgency, incontinence or hematuria  NEUROLOGICAL: No numbness or weakness  SKIN: No itching, burning, rashes, or lesions   VASCULAR: No bilateral lower extremity edema.   All other review of systems is negative unless indicated above.    VITALS:  T(F): 97.7 (10-12-23 @ 08:57), Max: 98.1 (10-12-23 @ 05:11)  HR: 74 (10-12-23 @ 08:57)  BP: 127/73 (10-12-23 @ 08:57)  RR: 16 (10-12-23 @ 08:57)  SpO2: 94% (10-12-23 @ 08:57)  Wt(kg): --    10-10 @ 07:01  -  10-11 @ 07:00  --------------------------------------------------------  IN: 920 mL / OUT: 975 mL / NET: -55 mL    10-11 @ 07:01  -  10-12 @ 07:00  --------------------------------------------------------  IN: 1050 mL / OUT: 1175 mL / NET: -125 mL    10-12 @ 07:01  -  10-12 @ 11:29  --------------------------------------------------------  IN: 180 mL / OUT: 0 mL / NET: 180 mL        PHYSICAL EXAM:  Constitutional: NAD, sitting upright in bed  HEENT: anicteric sclera, oropharynx clear, MMM  Neck: No JVD, trachea midline  Respiratory: mild bi-lateral crackles in b/l lower lobes, no wheezes, rales or rhonchi  Cardiovascular: S1, S2, RRR  Gastrointestinal: BS+, soft, NT/ND  Extremities: No cyanosis or clubbing. No peripheral edema  Neurological: mentating well, answering questions appropriately  Psychiatric: Normal mood, normal affect  : No CVA tenderness. No christian.   Skin: No rashes  Vascular Access:    LABS:  10-12    129<L>  |  97  |  14  ----------------------------<  124<H>  4.3   |  22  |  0.97    Ca    8.3<L>      12 Oct 2023 05:30  Phos  2.8     10-12  Mg     2.1     10-12    TPro  6.3  /  Alb  2.5<L>  /  TBili  0.3  /  DBili      /  AST  19  /  ALT  17  /  AlkPhos  126<H>  10-11                          14.1   16.00 )-----------( 325      ( 12 Oct 2023 05:30 )             44.1       Urine Studies:  Creatinine Trend: 0.97<--, 0.84<--, 0.94<--, 1.02<--, 1.14<--, 1.09<--  Urinalysis Basic - ( 12 Oct 2023 05:30 )    Color: Yellow / Appearance: Clear / SG: <=1.005 / pH:   Gluc: 124 mg/dL / Ketone: NEGATIVE  / Bili: Negative / Urobili: 0.2 E.U./dL   Blood:  / Protein: NEGATIVE mg/dL / Nitrite: NEGATIVE   Leuk Esterase: NEGATIVE / RBC:  / WBC    Sq Epi:  / Non Sq Epi:  / Bacteria:       Sodium, Random Urine: 77 mmol/L (10-12 @ 05:30)  Creatinine, Random Urine: 49 mg/dL (10-12 @ 05:30)  Osmolality, Random Urine: 371 mosm/kg (10-12 @ 05:30)  Sodium, Random Urine: 20 mmol/L (10-10 @ 15:40)  Creatinine, Random Urine: 91 mg/dL (10-10 @ 15:40)  Osmolality, Random Urine: 424 mosm/kg (10-10 @ 15:40)  Sodium, Random Urine: 59 mmol/L (10-08 @ 10:05)  Creatinine, Random Urine: 23 mg/dL (10-08 @ 10:05)  Osmolality, Random Urine: 274 mosm/kg (10-08 @ 10:05)  Osmolality, Random Urine: 357 mosm/kg (10-06 @ 18:27)  Sodium, Random Urine: 70 mmol/L (10-06 @ 18:27)  Creatinine, Random Urine: 68 mg/dL (10-06 @ 18:27)    RADIOLOGY & ADDITIONAL STUDIES:    Tatum Pugh, PGY3  Internal Medicine  Pager [phone] ([facility]) / [phone] (PETER)   Nephrology progress note    Patient is a 87y Male with hyponatremia s/p Lasix use. Nephrology consulted for further management and recommendations. Patient seen and examined at bedside, no new current complaints. Still has dyspnea with exertion unchanged since admission. Denies CP, palpitations, dizziness, or other complaints.    Allergies:  No Known Drug Allergies  shellfish (Swelling; Hives)    Hospital Medications:   MEDICATIONS  (STANDING):  aspirin enteric coated 81 milliGRAM(s) Oral daily  atorvastatin 40 milliGRAM(s) Oral at bedtime  benzocaine/menthol Lozenge 1 Lozenge Oral four times a day  cefTRIAXone   IVPB 2000 milliGRAM(s) IV Intermittent every 24 hours  clopidogrel Tablet 75 milliGRAM(s) Oral daily  dextrose 5%. 1000 milliLiter(s) (50 mL/Hr) IV Continuous <Continuous>  dextrose 5%. 1000 milliLiter(s) (100 mL/Hr) IV Continuous <Continuous>  dextrose 50% Injectable 25 Gram(s) IV Push once  dextrose 50% Injectable 25 Gram(s) IV Push once  dextrose 50% Injectable 12.5 Gram(s) IV Push once  gabapentin 100 milliGRAM(s) Oral at bedtime  glucagon  Injectable 1 milliGRAM(s) IntraMuscular once  insulin lispro (ADMELOG) corrective regimen sliding scale   SubCutaneous at bedtime  insulin lispro (ADMELOG) corrective regimen sliding scale   SubCutaneous three times a day before meals  levothyroxine 75 MICROGram(s) Oral daily  linagliptin 5 milliGRAM(s) Oral <User Schedule>  metoprolol succinate ER 12.5 milliGRAM(s) Oral daily  metroNIDAZOLE    Tablet 500 milliGRAM(s) Oral every 8 hours  pantoprazole    Tablet 40 milliGRAM(s) Oral at bedtime  senna 2 Tablet(s) Oral daily  sodium chloride 3%. 500 milliLiter(s) (30 mL/Hr) IV Continuous <Continuous>    REVIEW OF SYSTEMS:  CONSTITUTIONAL: No weakness, fevers or chills  EYES/ENT: No visual changes;  No vertigo or throat pain   NECK: No pain or stiffness  RESPIRATORY: dyspnea with exertion. No cough, wheezing, hemoptysis  CARDIOVASCULAR: No chest pain or palpitations.  GASTROINTESTINAL: No abdominal or epigastric pain. No nausea, vomiting, or hematemesis; No diarrhea or constipation. No melena or hematochezia.  GENITOURINARY: No dysuria, frequency, foamy urine, urinary urgency, incontinence or hematuria  NEUROLOGICAL: No numbness or weakness  SKIN: No itching, burning, rashes, or lesions   VASCULAR: No bilateral lower extremity edema.   All other review of systems is negative unless indicated above.    VITALS:  T(F): 97.7 (10-12-23 @ 08:57), Max: 98.1 (10-12-23 @ 05:11)  HR: 74 (10-12-23 @ 08:57)  BP: 127/73 (10-12-23 @ 08:57)  RR: 16 (10-12-23 @ 08:57)  SpO2: 94% (10-12-23 @ 08:57)  Wt(kg): --    10-10 @ 07:01  -  10-11 @ 07:00  --------------------------------------------------------  IN: 920 mL / OUT: 975 mL / NET: -55 mL    10-11 @ 07:01  -  10-12 @ 07:00  --------------------------------------------------------  IN: 1050 mL / OUT: 1175 mL / NET: -125 mL    10-12 @ 07:01  -  10-12 @ 11:29  --------------------------------------------------------  IN: 180 mL / OUT: 0 mL / NET: 180 mL        PHYSICAL EXAM:  Constitutional: NAD, sitting upright in bed  HEENT: anicteric sclera, oropharynx clear, MMM  Neck: No JVD, trachea midline  Respiratory: mild bi-lateral crackles in b/l lower lobes, no wheezes, rales or rhonchi  Cardiovascular: S1, S2, RRR  Gastrointestinal: BS+, soft, NT/ND  Extremities: No cyanosis or clubbing. No peripheral edema  Neurological: mentating well, answering questions appropriately  Psychiatric: Normal mood, normal affect  : No CVA tenderness. No christian.   Skin: No rashes  Vascular Access:    LABS:  10-12    129<L>  |  97  |  14  ----------------------------<  124<H>  4.3   |  22  |  0.97    Ca    8.3<L>      12 Oct 2023 05:30  Phos  2.8     10-12  Mg     2.1     10-12    TPro  6.3  /  Alb  2.5<L>  /  TBili  0.3  /  DBili      /  AST  19  /  ALT  17  /  AlkPhos  126<H>  10-11                          14.1   16.00 )-----------( 325      ( 12 Oct 2023 05:30 )             44.1       Urine Studies:  Creatinine Trend: 0.97<--, 0.84<--, 0.94<--, 1.02<--, 1.14<--, 1.09<--  Urinalysis Basic - ( 12 Oct 2023 05:30 )    Color: Yellow / Appearance: Clear / SG: <=1.005 / pH:   Gluc: 124 mg/dL / Ketone: NEGATIVE  / Bili: Negative / Urobili: 0.2 E.U./dL   Blood:  / Protein: NEGATIVE mg/dL / Nitrite: NEGATIVE   Leuk Esterase: NEGATIVE / RBC:  / WBC    Sq Epi:  / Non Sq Epi:  / Bacteria:       Sodium, Random Urine: 77 mmol/L (10-12 @ 05:30)  Creatinine, Random Urine: 49 mg/dL (10-12 @ 05:30)  Osmolality, Random Urine: 371 mosm/kg (10-12 @ 05:30)  Sodium, Random Urine: 20 mmol/L (10-10 @ 15:40)  Creatinine, Random Urine: 91 mg/dL (10-10 @ 15:40)  Osmolality, Random Urine: 424 mosm/kg (10-10 @ 15:40)  Sodium, Random Urine: 59 mmol/L (10-08 @ 10:05)  Creatinine, Random Urine: 23 mg/dL (10-08 @ 10:05)  Osmolality, Random Urine: 274 mosm/kg (10-08 @ 10:05)  Osmolality, Random Urine: 357 mosm/kg (10-06 @ 18:27)  Sodium, Random Urine: 70 mmol/L (10-06 @ 18:27)  Creatinine, Random Urine: 68 mg/dL (10-06 @ 18:27)    RADIOLOGY & ADDITIONAL STUDIES:    Tatum Pugh, PGY3  Internal Medicine  Pager [phone] ([facility]) / [phone] (PETER)   Nephrology progress note    Patient is a 87y Male with hyponatremia s/p Lasix use. Nephrology consulted for further management and recommendations. Patient seen and examined at bedside, no new current complaints. Still has dyspnea with exertion unchanged since admission. Denies CP, palpitations, dizziness, or other complaints.    Allergies:  No Known Drug Allergies  shellfish (Swelling; Hives)    Hospital Medications:   MEDICATIONS  (STANDING):  aspirin enteric coated 81 milliGRAM(s) Oral daily  atorvastatin 40 milliGRAM(s) Oral at bedtime  benzocaine/menthol Lozenge 1 Lozenge Oral four times a day  cefTRIAXone   IVPB 2000 milliGRAM(s) IV Intermittent every 24 hours  clopidogrel Tablet 75 milliGRAM(s) Oral daily  dextrose 5%. 1000 milliLiter(s) (50 mL/Hr) IV Continuous <Continuous>  dextrose 5%. 1000 milliLiter(s) (100 mL/Hr) IV Continuous <Continuous>  dextrose 50% Injectable 25 Gram(s) IV Push once  dextrose 50% Injectable 25 Gram(s) IV Push once  dextrose 50% Injectable 12.5 Gram(s) IV Push once  gabapentin 100 milliGRAM(s) Oral at bedtime  glucagon  Injectable 1 milliGRAM(s) IntraMuscular once  insulin lispro (ADMELOG) corrective regimen sliding scale   SubCutaneous at bedtime  insulin lispro (ADMELOG) corrective regimen sliding scale   SubCutaneous three times a day before meals  levothyroxine 75 MICROGram(s) Oral daily  linagliptin 5 milliGRAM(s) Oral <User Schedule>  metoprolol succinate ER 12.5 milliGRAM(s) Oral daily  metroNIDAZOLE    Tablet 500 milliGRAM(s) Oral every 8 hours  pantoprazole    Tablet 40 milliGRAM(s) Oral at bedtime  senna 2 Tablet(s) Oral daily  sodium chloride 3%. 500 milliLiter(s) (30 mL/Hr) IV Continuous <Continuous>    REVIEW OF SYSTEMS:  CONSTITUTIONAL: No weakness, fevers or chills  EYES/ENT: No visual changes;  No vertigo or throat pain   NECK: No pain or stiffness  RESPIRATORY: dyspnea with exertion. No cough, wheezing, hemoptysis  CARDIOVASCULAR: No chest pain or palpitations.  GASTROINTESTINAL: No abdominal or epigastric pain. No nausea, vomiting, or hematemesis; No diarrhea or constipation. No melena or hematochezia.  GENITOURINARY: No dysuria, frequency, foamy urine, urinary urgency, incontinence or hematuria  NEUROLOGICAL: No numbness or weakness  SKIN: No itching, burning, rashes, or lesions   VASCULAR: No bilateral lower extremity edema.   All other review of systems is negative unless indicated above.    VITALS:  T(F): 97.7 (10-12-23 @ 08:57), Max: 98.1 (10-12-23 @ 05:11)  HR: 74 (10-12-23 @ 08:57)  BP: 127/73 (10-12-23 @ 08:57)  RR: 16 (10-12-23 @ 08:57)  SpO2: 94% (10-12-23 @ 08:57)  Wt(kg): --    10-10 @ 07:01  -  10-11 @ 07:00  --------------------------------------------------------  IN: 920 mL / OUT: 975 mL / NET: -55 mL    10-11 @ 07:01  -  10-12 @ 07:00  --------------------------------------------------------  IN: 1050 mL / OUT: 1175 mL / NET: -125 mL    10-12 @ 07:01  -  10-12 @ 11:29  --------------------------------------------------------  IN: 180 mL / OUT: 0 mL / NET: 180 mL        PHYSICAL EXAM:  Constitutional: NAD, sitting upright in bed  HEENT: anicteric sclera, oropharynx clear, MMM  Neck: No JVD, trachea midline  Respiratory: mild bi-lateral crackles in b/l lower lobes, no wheezes, rales or rhonchi  Cardiovascular: S1, S2, RRR  Gastrointestinal: BS+, soft, NT/ND  Extremities: No cyanosis or clubbing. No peripheral edema  Neurological: mentating well, answering questions appropriately  Psychiatric: Normal mood, normal affect  : No CVA tenderness. No christian.   Skin: No rashes  Vascular Access:    LABS:  10-12    129<L>  |  97  |  14  ----------------------------<  124<H>  4.3   |  22  |  0.97    Ca    8.3<L>      12 Oct 2023 05:30  Phos  2.8     10-12  Mg     2.1     10-12    TPro  6.3  /  Alb  2.5<L>  /  TBili  0.3  /  DBili      /  AST  19  /  ALT  17  /  AlkPhos  126<H>  10-11                          14.1   16.00 )-----------( 325      ( 12 Oct 2023 05:30 )             44.1       Urine Studies:  Creatinine Trend: 0.97<--, 0.84<--, 0.94<--, 1.02<--, 1.14<--, 1.09<--  Urinalysis Basic - ( 12 Oct 2023 05:30 )    Color: Yellow / Appearance: Clear / SG: <=1.005 / pH:   Gluc: 124 mg/dL / Ketone: NEGATIVE  / Bili: Negative / Urobili: 0.2 E.U./dL   Blood:  / Protein: NEGATIVE mg/dL / Nitrite: NEGATIVE   Leuk Esterase: NEGATIVE / RBC:  / WBC    Sq Epi:  / Non Sq Epi:  / Bacteria:       Sodium, Random Urine: 77 mmol/L (10-12 @ 05:30)  Creatinine, Random Urine: 49 mg/dL (10-12 @ 05:30)  Osmolality, Random Urine: 371 mosm/kg (10-12 @ 05:30)  Sodium, Random Urine: 20 mmol/L (10-10 @ 15:40)  Creatinine, Random Urine: 91 mg/dL (10-10 @ 15:40)  Osmolality, Random Urine: 424 mosm/kg (10-10 @ 15:40)  Sodium, Random Urine: 59 mmol/L (10-08 @ 10:05)  Creatinine, Random Urine: 23 mg/dL (10-08 @ 10:05)  Osmolality, Random Urine: 274 mosm/kg (10-08 @ 10:05)  Osmolality, Random Urine: 357 mosm/kg (10-06 @ 18:27)  Sodium, Random Urine: 70 mmol/L (10-06 @ 18:27)  Creatinine, Random Urine: 68 mg/dL (10-06 @ 18:27)    RADIOLOGY & ADDITIONAL STUDIES:    Tatum Pugh, PGY3  Internal Medicine  Pager [phone] ([facility]) / [phone] (PETER)   Nephrology progress note    Patient is a 87y Male with hyponatremia s/p Lasix use. Nephrology consulted for further management and recommendations. Patient seen and examined at bedside, no new current complaints. Still has dyspnea with exertion unchanged since admission. Denies CP, palpitations, dizziness, or other complaints.    Allergies:  No Known Drug Allergies  shellfish (Swelling; Hives)    Hospital Medications:   MEDICATIONS  (STANDING):  aspirin enteric coated 81 milliGRAM(s) Oral daily  atorvastatin 40 milliGRAM(s) Oral at bedtime  benzocaine/menthol Lozenge 1 Lozenge Oral four times a day  cefTRIAXone   IVPB 2000 milliGRAM(s) IV Intermittent every 24 hours  clopidogrel Tablet 75 milliGRAM(s) Oral daily  dextrose 5%. 1000 milliLiter(s) (50 mL/Hr) IV Continuous <Continuous>  dextrose 5%. 1000 milliLiter(s) (100 mL/Hr) IV Continuous <Continuous>  dextrose 50% Injectable 25 Gram(s) IV Push once  dextrose 50% Injectable 25 Gram(s) IV Push once  dextrose 50% Injectable 12.5 Gram(s) IV Push once  gabapentin 100 milliGRAM(s) Oral at bedtime  glucagon  Injectable 1 milliGRAM(s) IntraMuscular once  insulin lispro (ADMELOG) corrective regimen sliding scale   SubCutaneous at bedtime  insulin lispro (ADMELOG) corrective regimen sliding scale   SubCutaneous three times a day before meals  levothyroxine 75 MICROGram(s) Oral daily  linagliptin 5 milliGRAM(s) Oral <User Schedule>  metoprolol succinate ER 12.5 milliGRAM(s) Oral daily  metroNIDAZOLE    Tablet 500 milliGRAM(s) Oral every 8 hours  pantoprazole    Tablet 40 milliGRAM(s) Oral at bedtime  senna 2 Tablet(s) Oral daily  sodium chloride 3%. 500 milliLiter(s) (30 mL/Hr) IV Continuous <Continuous>    REVIEW OF SYSTEMS:  All other review of systems is negative unless indicated above.    VITALS:  T(F): 97.7 (10-12-23 @ 08:57), Max: 98.1 (10-12-23 @ 05:11)  HR: 74 (10-12-23 @ 08:57)  BP: 127/73 (10-12-23 @ 08:57)  RR: 16 (10-12-23 @ 08:57)  SpO2: 94% (10-12-23 @ 08:57)  Wt(kg): --    10-10 @ 07:01  -  10-11 @ 07:00  --------------------------------------------------------  IN: 920 mL / OUT: 975 mL / NET: -55 mL    10-11 @ 07:01  -  10-12 @ 07:00  --------------------------------------------------------  IN: 1050 mL / OUT: 1175 mL / NET: -125 mL    10-12 @ 07:01  -  10-12 @ 11:29  --------------------------------------------------------  IN: 180 mL / OUT: 0 mL / NET: 180 mL        PHYSICAL EXAM:  Constitutional: NAD, sitting upright in bed  HEENT: anicteric sclera, oropharynx clear, MMM  Neck: No JVD, trachea midline  Respiratory: mild bi-lateral crackles in b/l lower lobes, no wheezes, rales or rhonchi  Cardiovascular: S1, S2, RRR  Gastrointestinal: BS+, soft, NT/ND  Extremities: No cyanosis or clubbing. No peripheral edema  Neurological: mentating well, answering questions appropriately  Psychiatric: Normal mood, normal affect  : No CVA tenderness. No christian.   Skin: No rashes  Vascular Access: None    LABS:  10-12    129<L>  |  97  |  14  ----------------------------<  124<H>  4.3   |  22  |  0.97    Ca    8.3<L>      12 Oct 2023 05:30  Phos  2.8     10-12  Mg     2.1     10-12    TPro  6.3  /  Alb  2.5<L>  /  TBili  0.3  /  DBili      /  AST  19  /  ALT  17  /  AlkPhos  126<H>  10-11                          14.1   16.00 )-----------( 325      ( 12 Oct 2023 05:30 )             44.1       Urine Studies:  Creatinine Trend: 0.97<--, 0.84<--, 0.94<--, 1.02<--, 1.14<--, 1.09<--  Urinalysis Basic - ( 12 Oct 2023 05:30 )    Color: Yellow / Appearance: Clear / SG: <=1.005 / pH:   Gluc: 124 mg/dL / Ketone: NEGATIVE  / Bili: Negative / Urobili: 0.2 E.U./dL   Blood:  / Protein: NEGATIVE mg/dL / Nitrite: NEGATIVE   Leuk Esterase: NEGATIVE / RBC:  / WBC    Sq Epi:  / Non Sq Epi:  / Bacteria:       Sodium, Random Urine: 77 mmol/L (10-12 @ 05:30)  Creatinine, Random Urine: 49 mg/dL (10-12 @ 05:30)  Osmolality, Random Urine: 371 mosm/kg (10-12 @ 05:30)  Sodium, Random Urine: 20 mmol/L (10-10 @ 15:40)  Creatinine, Random Urine: 91 mg/dL (10-10 @ 15:40)  Osmolality, Random Urine: 424 mosm/kg (10-10 @ 15:40)  Sodium, Random Urine: 59 mmol/L (10-08 @ 10:05)  Creatinine, Random Urine: 23 mg/dL (10-08 @ 10:05)  Osmolality, Random Urine: 274 mosm/kg (10-08 @ 10:05)  Osmolality, Random Urine: 357 mosm/kg (10-06 @ 18:27)  Sodium, Random Urine: 70 mmol/L (10-06 @ 18:27)  Creatinine, Random Urine: 68 mg/dL (10-06 @ 18:27)    RADIOLOGY & ADDITIONAL STUDIES:  Historical Values  Thyroid Stimulating Hormone, Serum: 6.990 uIU/mL (10.09.23 @ 05:30)   Thyroid Stimulating Hormone, Serum: 10.160 uIU/mL (10.08.23 @ 12:00)   Thyroid Stimulating Hormone, Serum: 10.510 uIU/mL (10.07.23 @ 06:16)   Thyroid Stimulating Hormone, Serum: 7.050 uIU/mL (09.13.23 @ 15:02)

## 2023-10-12 NOTE — PROGRESS NOTE ADULT - SUBJECTIVE AND OBJECTIVE BOX
HON SCOTT , [id number],  [facility] 05UR [id number] 02    Time of encounter : 8:30 am  wife at bedside  seen in bed with oxygen Nasal canula 1-L ( saturating around 92-95 %   reported pain is mostly at the RUQ area, tolerating diet.  had bowel movement yesterday.  mentating well.      T(C): 36.5 (10-12-23 @ 08:57), Max: 36.7 (10-11-23 @ 21:01)  HR: 74 (10-12-23 @ 08:57) (71 - 87)  BP: 127/73 (10-12-23 @ 08:57) (127/73 - 138/76)  RR: 16 (10-12-23 @ 08:57) (16 - 18)  SpO2: 94% (10-12-23 @ 08:57) (93% - 95%)    aspirin enteric coated 81 milliGRAM(s) Oral daily  atorvastatin 40 milliGRAM(s) Oral at bedtime  benzocaine/menthol Lozenge 1 Lozenge Oral four times a day  bisacodyl Suppository 10 milliGRAM(s) Rectal daily PRN  cefTRIAXone   IVPB 2000 milliGRAM(s) IV Intermittent every 24 hours  clopidogrel Tablet 75 milliGRAM(s) Oral daily  cyclobenzaprine 5 milliGRAM(s) Oral two times a day PRN  dextrose 5%. 1000 milliLiter(s) IV Continuous <Continuous>  dextrose 5%. 1000 milliLiter(s) IV Continuous <Continuous>  dextrose 50% Injectable 25 Gram(s) IV Push once  dextrose 50% Injectable 25 Gram(s) IV Push once  dextrose 50% Injectable 12.5 Gram(s) IV Push once  dextrose Oral Gel 15 Gram(s) Oral once PRN  gabapentin 100 milliGRAM(s) Oral at bedtime  glucagon  Injectable 1 milliGRAM(s) IntraMuscular once  insulin lispro (ADMELOG) corrective regimen sliding scale   SubCutaneous at bedtime  insulin lispro (ADMELOG) corrective regimen sliding scale   SubCutaneous three times a day before meals  levothyroxine 75 MICROGram(s) Oral daily  linagliptin 5 milliGRAM(s) Oral <User Schedule>  metoprolol succinate ER 12.5 milliGRAM(s) Oral daily  metroNIDAZOLE    Tablet 500 milliGRAM(s) Oral every 8 hours  oxyCODONE    IR 5 milliGRAM(s) Oral every 6 hours PRN  pantoprazole    Tablet 40 milliGRAM(s) Oral at bedtime  senna 2 Tablet(s) Oral daily  sodium chloride 3%. 500 milliLiter(s) IV Continuous <Continuous>      Physical Exam :    General exam :  on oxygen Nasal canula 1 L ,   RRR, good air entry bilaterally on upper. some crackle on bilateral bases.  bs present, soft, mild tenderness in RUQ , no guarding , no rebound.  ext- no edema no tenderness  neuro = aao x 3 non focal  no christian.      CBC Full  -  ( 12 Oct 2023 05:30 )  WBC Count : 16.00 K/uL  RBC Count : 6.81 M/uL  Hemoglobin : 14.1 g/dL  Hematocrit : 44.1 %  Platelet Count - Automated : 325 K/uL  Mean Cell Volume : 64.8 fl  Mean Cell Hemoglobin : 20.7 pg  Mean Cell Hemoglobin Concentration : 32.0 gm/dL  Auto Neutrophil # : 12.61 K/uL  Auto Lymphocyte # : 0.94 K/uL  Auto Monocyte # : 0.69 K/uL  Auto Eosinophil # : 1.49 K/uL  Auto Basophil # : 0.27 K/uL  Auto Neutrophil % : 78.8 %  Auto Lymphocyte % : 5.9 %  Auto Monocyte % : 4.3 %  Auto Eosinophil % : 9.3 %  Auto Basophil % : 1.7 %        10-12    129<L>  |  97  |  14  ----------------------------<  124<H>  4.3   |  22  |  0.97    Ca    8.3<L>      12 Oct 2023 05:30  Phos  2.8     10-12  Mg     2.1     10-12    TPro  6.3  /  Alb  2.5<L>  /  TBili  0.3  /  DBili  x   /  AST  19  /  ALT  17  /  AlkPhos  126<H>  10-11    Daily     Daily   CAPILLARY BLOOD GLUCOSE      POCT Blood Glucose.: 231 mg/dL (12 Oct 2023 12:11)      Urinalysis Basic - ( 12 Oct 2023 05:30 )    Color: Yellow / Appearance: Clear / SG: <=1.005 / pH: x  Gluc: 124 mg/dL / Ketone: NEGATIVE  / Bili: Negative / Urobili: 0.2 E.U./dL   Blood: x / Protein: NEGATIVE mg/dL / Nitrite: NEGATIVE   Leuk Esterase: NEGATIVE / RBC: x / WBC x   Sq Epi: x / Non Sq Epi: x / Bacteria: x                   HON SCOTT , [id number],  [facility] 05UR [id number] 02    Time of encounter : 8:30 am  wife at bedside  seen in bed with oxygen Nasal canula 1-L ( saturating around 92-95 %   reported pain is mostly at the RUQ area, tolerating diet.  had bowel movement yesterday.  mentating well.      T(C): 36.5 (10-12-23 @ 08:57), Max: 36.7 (10-11-23 @ 21:01)  HR: 74 (10-12-23 @ 08:57) (71 - 87)  BP: 127/73 (10-12-23 @ 08:57) (127/73 - 138/76)  RR: 16 (10-12-23 @ 08:57) (16 - 18)  SpO2: 94% (10-12-23 @ 08:57) (93% - 95%)    aspirin enteric coated 81 milliGRAM(s) Oral daily  atorvastatin 40 milliGRAM(s) Oral at bedtime  benzocaine/menthol Lozenge 1 Lozenge Oral four times a day  bisacodyl Suppository 10 milliGRAM(s) Rectal daily PRN  cefTRIAXone   IVPB 2000 milliGRAM(s) IV Intermittent every 24 hours  clopidogrel Tablet 75 milliGRAM(s) Oral daily  cyclobenzaprine 5 milliGRAM(s) Oral two times a day PRN  dextrose 5%. 1000 milliLiter(s) IV Continuous <Continuous>  dextrose 5%. 1000 milliLiter(s) IV Continuous <Continuous>  dextrose 50% Injectable 25 Gram(s) IV Push once  dextrose 50% Injectable 25 Gram(s) IV Push once  dextrose 50% Injectable 12.5 Gram(s) IV Push once  dextrose Oral Gel 15 Gram(s) Oral once PRN  gabapentin 100 milliGRAM(s) Oral at bedtime  glucagon  Injectable 1 milliGRAM(s) IntraMuscular once  insulin lispro (ADMELOG) corrective regimen sliding scale   SubCutaneous at bedtime  insulin lispro (ADMELOG) corrective regimen sliding scale   SubCutaneous three times a day before meals  levothyroxine 75 MICROGram(s) Oral daily  linagliptin 5 milliGRAM(s) Oral <User Schedule>  metoprolol succinate ER 12.5 milliGRAM(s) Oral daily  metroNIDAZOLE    Tablet 500 milliGRAM(s) Oral every 8 hours  oxyCODONE    IR 5 milliGRAM(s) Oral every 6 hours PRN  pantoprazole    Tablet 40 milliGRAM(s) Oral at bedtime  senna 2 Tablet(s) Oral daily  sodium chloride 3%. 500 milliLiter(s) IV Continuous <Continuous>      Physical Exam :    General exam :  on oxygen Nasal canula 1 L ,   RRR, good air entry bilaterally on upper. some crackle on bilateral bases.  bs present, soft, mild tenderness in RUQ , no guarding , no rebound.  ext- no edema no tenderness  neuro = aao x 3 non focal  no christian.      CBC Full  -  ( 12 Oct 2023 05:30 )  WBC Count : 16.00 K/uL  RBC Count : 6.81 M/uL  Hemoglobin : 14.1 g/dL  Hematocrit : 44.1 %  Platelet Count - Automated : 325 K/uL  Mean Cell Volume : 64.8 fl  Mean Cell Hemoglobin : 20.7 pg  Mean Cell Hemoglobin Concentration : 32.0 gm/dL  Auto Neutrophil # : 12.61 K/uL  Auto Lymphocyte # : 0.94 K/uL  Auto Monocyte # : 0.69 K/uL  Auto Eosinophil # : 1.49 K/uL  Auto Basophil # : 0.27 K/uL  Auto Neutrophil % : 78.8 %  Auto Lymphocyte % : 5.9 %  Auto Monocyte % : 4.3 %  Auto Eosinophil % : 9.3 %  Auto Basophil % : 1.7 %        10-12    129<L>  |  97  |  14  ----------------------------<  124<H>  4.3   |  22  |  0.97    Ca    8.3<L>      12 Oct 2023 05:30  Phos  2.8     10-12  Mg     2.1     10-12    TPro  6.3  /  Alb  2.5<L>  /  TBili  0.3  /  DBili  x   /  AST  19  /  ALT  17  /  AlkPhos  126<H>  10-11    Daily     Daily   CAPILLARY BLOOD GLUCOSE      POCT Blood Glucose.: 231 mg/dL (12 Oct 2023 12:11)      Urinalysis Basic - ( 12 Oct 2023 05:30 )    Color: Yellow / Appearance: Clear / SG: <=1.005 / pH: x  Gluc: 124 mg/dL / Ketone: NEGATIVE  / Bili: Negative / Urobili: 0.2 E.U./dL   Blood: x / Protein: NEGATIVE mg/dL / Nitrite: NEGATIVE   Leuk Esterase: NEGATIVE / RBC: x / WBC x   Sq Epi: x / Non Sq Epi: x / Bacteria: x                   HON SCOTT , [id number],  [facility] 05UR [id number] 02    Time of encounter : 8:30 am  wife at bedside  seen in bed with oxygen Nasal canula 1-L ( saturating around 92-95 %   reported pain is mostly at the RUQ area, tolerating diet.  had bowel movement yesterday.  mentating well.      T(C): 36.5 (10-12-23 @ 08:57), Max: 36.7 (10-11-23 @ 21:01)  HR: 74 (10-12-23 @ 08:57) (71 - 87)  BP: 127/73 (10-12-23 @ 08:57) (127/73 - 138/76)  RR: 16 (10-12-23 @ 08:57) (16 - 18)  SpO2: 94% (10-12-23 @ 08:57) (93% - 95%)    aspirin enteric coated 81 milliGRAM(s) Oral daily  atorvastatin 40 milliGRAM(s) Oral at bedtime  benzocaine/menthol Lozenge 1 Lozenge Oral four times a day  bisacodyl Suppository 10 milliGRAM(s) Rectal daily PRN  cefTRIAXone   IVPB 2000 milliGRAM(s) IV Intermittent every 24 hours  clopidogrel Tablet 75 milliGRAM(s) Oral daily  cyclobenzaprine 5 milliGRAM(s) Oral two times a day PRN  dextrose 5%. 1000 milliLiter(s) IV Continuous <Continuous>  dextrose 5%. 1000 milliLiter(s) IV Continuous <Continuous>  dextrose 50% Injectable 25 Gram(s) IV Push once  dextrose 50% Injectable 25 Gram(s) IV Push once  dextrose 50% Injectable 12.5 Gram(s) IV Push once  dextrose Oral Gel 15 Gram(s) Oral once PRN  gabapentin 100 milliGRAM(s) Oral at bedtime  glucagon  Injectable 1 milliGRAM(s) IntraMuscular once  insulin lispro (ADMELOG) corrective regimen sliding scale   SubCutaneous at bedtime  insulin lispro (ADMELOG) corrective regimen sliding scale   SubCutaneous three times a day before meals  levothyroxine 75 MICROGram(s) Oral daily  linagliptin 5 milliGRAM(s) Oral <User Schedule>  metoprolol succinate ER 12.5 milliGRAM(s) Oral daily  metroNIDAZOLE    Tablet 500 milliGRAM(s) Oral every 8 hours  oxyCODONE    IR 5 milliGRAM(s) Oral every 6 hours PRN  pantoprazole    Tablet 40 milliGRAM(s) Oral at bedtime  senna 2 Tablet(s) Oral daily  sodium chloride 3%. 500 milliLiter(s) IV Continuous <Continuous>      Physical Exam :    General exam :  on oxygen Nasal canula 1 L ,   RRR, good air entry bilaterally on upper. some crackle on bilateral bases.  bs present, soft, mild tenderness in RUQ , no guarding , no rebound.  ext- no edema no tenderness  neuro = aao x 3 non focal  no christian.      CBC Full  -  ( 12 Oct 2023 05:30 )  WBC Count : 16.00 K/uL  RBC Count : 6.81 M/uL  Hemoglobin : 14.1 g/dL  Hematocrit : 44.1 %  Platelet Count - Automated : 325 K/uL  Mean Cell Volume : 64.8 fl  Mean Cell Hemoglobin : 20.7 pg  Mean Cell Hemoglobin Concentration : 32.0 gm/dL  Auto Neutrophil # : 12.61 K/uL  Auto Lymphocyte # : 0.94 K/uL  Auto Monocyte # : 0.69 K/uL  Auto Eosinophil # : 1.49 K/uL  Auto Basophil # : 0.27 K/uL  Auto Neutrophil % : 78.8 %  Auto Lymphocyte % : 5.9 %  Auto Monocyte % : 4.3 %  Auto Eosinophil % : 9.3 %  Auto Basophil % : 1.7 %        10-12    129<L>  |  97  |  14  ----------------------------<  124<H>  4.3   |  22  |  0.97    Ca    8.3<L>      12 Oct 2023 05:30  Phos  2.8     10-12  Mg     2.1     10-12    TPro  6.3  /  Alb  2.5<L>  /  TBili  0.3  /  DBili  x   /  AST  19  /  ALT  17  /  AlkPhos  126<H>  10-11    Daily     Daily   CAPILLARY BLOOD GLUCOSE      POCT Blood Glucose.: 231 mg/dL (12 Oct 2023 12:11)      Urinalysis Basic - ( 12 Oct 2023 05:30 )    Color: Yellow / Appearance: Clear / SG: <=1.005 / pH: x  Gluc: 124 mg/dL / Ketone: NEGATIVE  / Bili: Negative / Urobili: 0.2 E.U./dL   Blood: x / Protein: NEGATIVE mg/dL / Nitrite: NEGATIVE   Leuk Esterase: NEGATIVE / RBC: x / WBC x   Sq Epi: x / Non Sq Epi: x / Bacteria: x

## 2023-10-12 NOTE — PROGRESS NOTE ADULT - ATTENDING COMMENTS
I agree with the fellow's findings and plans as written above with the following additions/amendments:    Seen and examined at bedside, feels well, no particular complaints, sodium becoming more stable. Would continue hypertonic as above, recheck labs and urine tests as above. Further recs as above

## 2023-10-12 NOTE — PROGRESS NOTE ADULT - PROBLEM SELECTOR PLAN 5
Na 126 on admission. On lasix 40mg IV qD. Likely hypovolemic hyponatremia iso of decreased po intake vs diuretic(presented with na 126) vs siadh iso of pain. Feurea 50.2%    - nephrology consulted, f/u recs  - gentle hydration  - continue to monitor. Na 126 on admission. On lasix 40mg IV qD. Likely hypovolemic hyponatremia iso of decreased po intake vs diuretic(presented with na 126) vs siadh iso of pain. Feurea 50.2%    - nephrology consulted, f/u recs  - continue to monitor. Na 126 on admission. s/p lasix 40mg IVqD. Likely hypovolemic hyponatremia iso of decreased po intake vs diuretic(presented with na 126) vs mixed picture with siadh iso of leg pain. Feurea 50.2%. s/p 2l ns.    - nephrology consulted, f/u recs  - 500cc 3% ns  - continue to monitor.

## 2023-10-12 NOTE — PROGRESS NOTE ADULT - ASSESSMENT
87M w/ PMHx CAD s/p JOSÉ (6/23), HTN, HLD, PUD, hypothyroidism, PSHx C spine laminectomy, general surgery consulted for further w/u of abdominal pain. Found to have acute cholecystitis. Poor surgical candidate at this time, recommend nonsurgical management. Patient now improving clinically on IV antibiotics alone.     Recommendations:  - Poor candidate for cholecystectomy at this time  - Given improvement in current clinical status, when discharged, to complete a 2 week course of antibiotics  - Follow up with Dr. Gomez in 2 weeks in clinic for further evaluation  Team 4c will sign off at this time, please reconsult as needed  Plan discussed with chief resident and attending surgeon.

## 2023-10-12 NOTE — PROGRESS NOTE ADULT - SUBJECTIVE AND OBJECTIVE BOX
*****INCOMPLETE NOTE*****    INTERVAL HPI/OVERNIGHT EVENTS:    SUBJECTIVE: Patient seen and examined at bedside, resting comfortably in bed, and does not appear to be in any acute distress. Patient states  Patient denies any recent fevers, chills, nausea, vomiting, headache, acute sob, chest pain, abdominal pain, genitourinary sx, extremity pain or swelling.     ANTIBIOTICS/RELEVANT:    MEDICATIONS  (STANDING):  aspirin enteric coated 81 milliGRAM(s) Oral daily  atorvastatin 40 milliGRAM(s) Oral at bedtime  cefTRIAXone   IVPB 2000 milliGRAM(s) IV Intermittent every 24 hours  clopidogrel Tablet 75 milliGRAM(s) Oral daily  dextrose 5%. 1000 milliLiter(s) (50 mL/Hr) IV Continuous <Continuous>  dextrose 5%. 1000 milliLiter(s) (100 mL/Hr) IV Continuous <Continuous>  dextrose 50% Injectable 25 Gram(s) IV Push once  dextrose 50% Injectable 25 Gram(s) IV Push once  dextrose 50% Injectable 12.5 Gram(s) IV Push once  gabapentin 100 milliGRAM(s) Oral at bedtime  glucagon  Injectable 1 milliGRAM(s) IntraMuscular once  insulin lispro (ADMELOG) corrective regimen sliding scale   SubCutaneous at bedtime  insulin lispro (ADMELOG) corrective regimen sliding scale   SubCutaneous three times a day before meals  levothyroxine 75 MICROGram(s) Oral daily  linagliptin 5 milliGRAM(s) Oral daily  metoprolol succinate ER 12.5 milliGRAM(s) Oral daily  metroNIDAZOLE    Tablet 500 milliGRAM(s) Oral every 8 hours  pantoprazole    Tablet 40 milliGRAM(s) Oral at bedtime  senna 2 Tablet(s) Oral daily  sodium chloride 0.9%. 500 milliLiter(s) (100 mL/Hr) IV Continuous <Continuous>  sodium chloride 3%. 500 milliLiter(s) (30 mL/Hr) IV Continuous <Continuous>    MEDICATIONS  (PRN):  bisacodyl Suppository 10 milliGRAM(s) Rectal daily PRN Constipation  cyclobenzaprine 5 milliGRAM(s) Oral two times a day PRN Muscle Spasm  dextrose Oral Gel 15 Gram(s) Oral once PRN Blood Glucose LESS THAN 70 milliGRAM(s)/deciliter  oxyCODONE    IR 5 milliGRAM(s) Oral every 6 hours PRN Severe Pain (7 - 10)      Vital Signs Last 24 Hrs  T(C): 36.7 (12 Oct 2023 05:11), Max: 36.7 (11 Oct 2023 21:01)  T(F): 98.1 (12 Oct 2023 05:11), Max: 98.1 (12 Oct 2023 05:11)  HR: 82 (12 Oct 2023 05:11) (71 - 87)  BP: 128/79 (12 Oct 2023 05:11) (128/79 - 166/91)  BP(mean): 97 (12 Oct 2023 00:54) (97 - 97)  RR: 17 (12 Oct 2023 05:11) (16 - 18)  SpO2: 93% (12 Oct 2023 05:11) (91% - 98%)    Parameters below as of 12 Oct 2023 05:11  Patient On (Oxygen Delivery Method): nasal cannula  O2 Flow (L/min): 1      PHYSICAL EXAM:  General: in no acute distress  Eyes: EOMI intact bilaterally. Anicteric sclerae, moist conjunctivae  HENT: Moist mucous membranes  Neck: Trachea midline, supple  Lungs: CTA B/L. No wheezes, rales, or rhonchi  Cardiovascular: RRR. No murmurs, rubs, or gallops  Abdomen: Soft, non-tender non-distended; No rebound or guarding  Extremities: WWP, No clubbing, cyanosis or edema  Neurological: Alert and oriented  Skin: Warm and dry. No obvious rash     LABS:                        14.1   16.00 )-----------( 325      ( 12 Oct 2023 05:30 )             44.1     10-12    129<L>  |  97  |  14  ----------------------------<  124<H>  4.3   |  22  |  0.97    Ca    8.3<L>      12 Oct 2023 05:30  Phos  2.8     10-12  Mg     2.1     10-12    TPro  6.3  /  Alb  2.5<L>  /  TBili  0.3  /  DBili  x   /  AST  19  /  ALT  17  /  AlkPhos  126<H>  10-11      Urinalysis Basic - ( 12 Oct 2023 05:30 )    Color: Yellow / Appearance: Clear / SG: <=1.005 / pH: x  Gluc: 124 mg/dL / Ketone: NEGATIVE  / Bili: Negative / Urobili: 0.2 E.U./dL   Blood: x / Protein: NEGATIVE mg/dL / Nitrite: NEGATIVE   Leuk Esterase: NEGATIVE / RBC: x / WBC x   Sq Epi: x / Non Sq Epi: x / Bacteria: x        MICROBIOLOGY:    RADIOLOGY & ADDITIONAL STUDIES: INTERVAL HPI/OVERNIGHT EVENTS: adam    SUBJECTIVE: Patient seen and examined at bedside, resting comfortably in bed, and does not appear to be in any acute distress. Patient states that his abdominal pain is worse with movement.  Patient denies any chest pain, shortness of breath, abdominal pain or gu sxs.    MEDICATIONS  (STANDING):  aspirin enteric coated 81 milliGRAM(s) Oral daily  atorvastatin 40 milliGRAM(s) Oral at bedtime  cefTRIAXone   IVPB 2000 milliGRAM(s) IV Intermittent every 24 hours  clopidogrel Tablet 75 milliGRAM(s) Oral daily  dextrose 5%. 1000 milliLiter(s) (50 mL/Hr) IV Continuous <Continuous>  dextrose 5%. 1000 milliLiter(s) (100 mL/Hr) IV Continuous <Continuous>  dextrose 50% Injectable 25 Gram(s) IV Push once  dextrose 50% Injectable 25 Gram(s) IV Push once  dextrose 50% Injectable 12.5 Gram(s) IV Push once  gabapentin 100 milliGRAM(s) Oral at bedtime  glucagon  Injectable 1 milliGRAM(s) IntraMuscular once  insulin lispro (ADMELOG) corrective regimen sliding scale   SubCutaneous at bedtime  insulin lispro (ADMELOG) corrective regimen sliding scale   SubCutaneous three times a day before meals  levothyroxine 75 MICROGram(s) Oral daily  linagliptin 5 milliGRAM(s) Oral daily  metoprolol succinate ER 12.5 milliGRAM(s) Oral daily  metroNIDAZOLE    Tablet 500 milliGRAM(s) Oral every 8 hours  pantoprazole    Tablet 40 milliGRAM(s) Oral at bedtime  senna 2 Tablet(s) Oral daily  sodium chloride 0.9%. 500 milliLiter(s) (100 mL/Hr) IV Continuous <Continuous>  sodium chloride 3%. 500 milliLiter(s) (30 mL/Hr) IV Continuous <Continuous>    MEDICATIONS  (PRN):  bisacodyl Suppository 10 milliGRAM(s) Rectal daily PRN Constipation  cyclobenzaprine 5 milliGRAM(s) Oral two times a day PRN Muscle Spasm  dextrose Oral Gel 15 Gram(s) Oral once PRN Blood Glucose LESS THAN 70 milliGRAM(s)/deciliter  oxyCODONE    IR 5 milliGRAM(s) Oral every 6 hours PRN Severe Pain (7 - 10)      Vital Signs Last 24 Hrs  T(C): 36.7 (12 Oct 2023 05:11), Max: 36.7 (11 Oct 2023 21:01)  T(F): 98.1 (12 Oct 2023 05:11), Max: 98.1 (12 Oct 2023 05:11)  HR: 82 (12 Oct 2023 05:11) (71 - 87)  BP: 128/79 (12 Oct 2023 05:11) (128/79 - 166/91)  BP(mean): 97 (12 Oct 2023 00:54) (97 - 97)  RR: 17 (12 Oct 2023 05:11) (16 - 18)  SpO2: 93% (12 Oct 2023 05:11) (91% - 98%)    Parameters below as of 12 Oct 2023 05:11  Patient On (Oxygen Delivery Method): nasal cannula  O2 Flow (L/min): 1      PHYSICAL EXAM:  General: in no acute distress  Eyes: EOMI intact bilaterally  HENT: Moist mucous membranes  Neck: Trachea midline, supple  Lungs: CTA B/L. No wheezes, rales, or rhonchi  Cardiovascular: RRR. No murmurs, rubs, or gallops  Abdomen: firm, mild tenderness to palpation of the right upper quadrant  Extremities: WWP, No clubbing, cyanosis or edema  Neurological: Alert and oriented  Skin: Warm and dry. No obvious rash     LABS:                        14.1   16.00 )-----------( 325      ( 12 Oct 2023 05:30 )             44.1     10-12    129<L>  |  97  |  14  ----------------------------<  124<H>  4.3   |  22  |  0.97    Ca    8.3<L>      12 Oct 2023 05:30  Phos  2.8     10-12  Mg     2.1     10-12    TPro  6.3  /  Alb  2.5<L>  /  TBili  0.3  /  DBili  x   /  AST  19  /  ALT  17  /  AlkPhos  126<H>  10-11      Urinalysis Basic - ( 12 Oct 2023 05:30 )    Color: Yellow / Appearance: Clear / SG: <=1.005 / pH: x  Gluc: 124 mg/dL / Ketone: NEGATIVE  / Bili: Negative / Urobili: 0.2 E.U./dL   Blood: x / Protein: NEGATIVE mg/dL / Nitrite: NEGATIVE   Leuk Esterase: NEGATIVE / RBC: x / WBC x   Sq Epi: x / Non Sq Epi: x / Bacteria: x        MICROBIOLOGY:    RADIOLOGY & ADDITIONAL STUDIES:

## 2023-10-12 NOTE — PROGRESS NOTE ADULT - PROBLEM SELECTOR PLAN 2
Type 2 diabetes mellitus with hyperglycemia  - Please continue tradjenta 5mg daily before breakfast.  - Diet: consistent carbohydrate.   - Please continue with lispro moderate dose sliding scale before meals and at bedtime for now.   - Patient's fingerstick glucose goal is 100-180 mg/dL.    - Discharge recommendations to be discussed; likely DDP 4  - Patient can follow up at discharge with [facility] by calling [phone] to make an appointment.      Case discussed with Dr. Ness. Primary team updated. Type 2 diabetes mellitus with hyperglycemia  - Please continue tradjenta 5mg daily before breakfast.  - Diet: consistent carbohydrate.   - Please continue with lispro moderate dose sliding scale before meals and at bedtime for now.   - Patient's fingerstick glucose goal is 100-180 mg/dL.    - Discharge recommendations to be discussed; likely DDP 4  - Patient can follow up at discharge with [facility] by calling [phone] to make an appointment.      Case discussed with Dr. Ness. Primary team updated. Type 2 diabetes mellitus with hyperglycemia  - Please continue tradjenta 5mg daily before breakfast.  - Diet: consistent carbohydrate.   - Please continue with lispro moderate dose sliding scale before meals and at bedtime for now.   - Patient's fingerstick glucose goal is 100-180 mg/dL.    - Discharge recommendations to be discussed; likely DDP 4  - Patient can follow up at discharge with [facility] by calling [phone] to make an appointment.      Case discussed with Dr. Ness. Primary team updated.

## 2023-10-12 NOTE — PROGRESS NOTE ADULT - SUBJECTIVE AND OBJECTIVE BOX
GENERAL SURGERY CONSULT - PROGRESS NOTE     SUBJECTIVE:  Patient seen and examined by chief resident and team on AM rounds. Patient appears well. Denies any nausea/vomiting, passing flatus and having BMs. Reports no pain at rest, minimal occasional RUQ pain with movement. No fevers or chills. WBC remains stable at 12.     MEDICATIONS  (STANDING):  acetaminophen     Tablet .. 650 milliGRAM(s) Oral once  aspirin enteric coated 81 milliGRAM(s) Oral daily  atorvastatin 40 milliGRAM(s) Oral at bedtime  benzocaine/menthol Lozenge 1 Lozenge Oral four times a day  cefTRIAXone   IVPB 2000 milliGRAM(s) IV Intermittent every 24 hours  clopidogrel Tablet 75 milliGRAM(s) Oral daily  dextrose 5%. 1000 milliLiter(s) (100 mL/Hr) IV Continuous <Continuous>  dextrose 5%. 1000 milliLiter(s) (50 mL/Hr) IV Continuous <Continuous>  dextrose 50% Injectable 25 Gram(s) IV Push once  dextrose 50% Injectable 25 Gram(s) IV Push once  dextrose 50% Injectable 12.5 Gram(s) IV Push once  gabapentin 100 milliGRAM(s) Oral at bedtime  glucagon  Injectable 1 milliGRAM(s) IntraMuscular once  insulin lispro (ADMELOG) corrective regimen sliding scale   SubCutaneous at bedtime  insulin lispro (ADMELOG) corrective regimen sliding scale   SubCutaneous three times a day before meals  levothyroxine 75 MICROGram(s) Oral daily  linagliptin 5 milliGRAM(s) Oral <User Schedule>  metoprolol succinate ER 12.5 milliGRAM(s) Oral daily  metroNIDAZOLE    Tablet 500 milliGRAM(s) Oral every 8 hours  pantoprazole    Tablet 40 milliGRAM(s) Oral at bedtime  senna 2 Tablet(s) Oral daily  sodium chloride 3%. 500 milliLiter(s) (30 mL/Hr) IV Continuous <Continuous>    MEDICATIONS  (PRN):  bisacodyl Suppository 10 milliGRAM(s) Rectal daily PRN Constipation  cyclobenzaprine 5 milliGRAM(s) Oral two times a day PRN Muscle Spasm  dextrose Oral Gel 15 Gram(s) Oral once PRN Blood Glucose LESS THAN 70 milliGRAM(s)/deciliter  oxyCODONE    IR 5 milliGRAM(s) Oral every 6 hours PRN Severe Pain (7 - 10)      Vital Signs Last 24 Hrs  T(C): 36.5 (12 Oct 2023 08:57), Max: 36.7 (11 Oct 2023 21:01)  T(F): 97.7 (12 Oct 2023 08:57), Max: 98.1 (12 Oct 2023 05:11)  HR: 74 (12 Oct 2023 08:57) (71 - 87)  BP: 127/73 (12 Oct 2023 08:57) (127/73 - 138/76)  BP(mean): 97 (12 Oct 2023 00:54) (97 - 97)  RR: 16 (12 Oct 2023 08:57) (16 - 18)  SpO2: 94% (12 Oct 2023 08:57) (93% - 95%)    Parameters below as of 12 Oct 2023 08:57  Patient On (Oxygen Delivery Method): nasal cannula  O2 Flow (L/min): 1      PHYSICAL EXAM:  Constitutional: A&Ox3  Respiratory: non labored breathing, no respiratory distress  Cardiovascular: NSR, RRR  Gastrointestinal: soft, moderately distended. NT. No rebound or guarding. Neg murphys sign.   Extremities: (-) edema  skin: no jaundice.     I&O's Detail    11 Oct 2023 07:01  -  12 Oct 2023 07:00  --------------------------------------------------------  IN:    Oral Fluid: 960 mL    sodium chloride 3%: 90 mL  Total IN: 1050 mL    OUT:    Voided (mL): 1175 mL  Total OUT: 1175 mL    Total NET: -125 mL      12 Oct 2023 07:01  -  12 Oct 2023 15:07  --------------------------------------------------------  IN:    Oral Fluid: 360 mL  Total IN: 360 mL    OUT:    Voided (mL): 350 mL  Total OUT: 350 mL    Total NET: 10 mL          LABS:                        14.1   16.00 )-----------( 325      ( 12 Oct 2023 05:30 )             44.1     10-12    129<L>  |  97  |  14  ----------------------------<  124<H>  4.3   |  22  |  0.97    Ca    8.3<L>      12 Oct 2023 05:30  Phos  2.8     10-12  Mg     2.1     10-12    TPro  6.3  /  Alb  2.5<L>  /  TBili  0.3  /  DBili  x   /  AST  19  /  ALT  17  /  AlkPhos  126<H>  10-11      Urinalysis Basic - ( 12 Oct 2023 05:30 )    Color: Yellow / Appearance: Clear / SG: <=1.005 / pH: x  Gluc: 124 mg/dL / Ketone: NEGATIVE  / Bili: Negative / Urobili: 0.2 E.U./dL   Blood: x / Protein: NEGATIVE mg/dL / Nitrite: NEGATIVE   Leuk Esterase: NEGATIVE / RBC: x / WBC x   Sq Epi: x / Non Sq Epi: x / Bacteria: x        RADIOLOGY & ADDITIONAL STUDIES:

## 2023-10-12 NOTE — PROGRESS NOTE ADULT - PROBLEM SELECTOR PLAN 11
F: s/p 1500 cc ns  E: Replace if K < 4, Mag < 2  N: CC diet  GI: ppi  DVT: plavix  Dispo: 5uris F: s/p 2000 cc ns, 3% ns 500ml  E: Replace if K < 4, Mag < 2  N: CC diet  GI: ppi  DVT: plavix  Dispo: 5uris

## 2023-10-13 LAB
ALBUMIN SERPL ELPH-MCNC: 2.5 G/DL — LOW (ref 3.3–5)
ALP SERPL-CCNC: 114 U/L — SIGNIFICANT CHANGE UP (ref 40–120)
ALT FLD-CCNC: 14 U/L — SIGNIFICANT CHANGE UP (ref 10–45)
ANION GAP SERPL CALC-SCNC: 11 MMOL/L — SIGNIFICANT CHANGE UP (ref 5–17)
ANION GAP SERPL CALC-SCNC: 7 MMOL/L — SIGNIFICANT CHANGE UP (ref 5–17)
ANISOCYTOSIS BLD QL: SLIGHT — SIGNIFICANT CHANGE UP
AST SERPL-CCNC: 18 U/L — SIGNIFICANT CHANGE UP (ref 10–40)
BASOPHILS # BLD AUTO: 0.14 K/UL — SIGNIFICANT CHANGE UP (ref 0–0.2)
BASOPHILS NFR BLD AUTO: 0.9 % — SIGNIFICANT CHANGE UP (ref 0–2)
BILIRUB SERPL-MCNC: 0.4 MG/DL — SIGNIFICANT CHANGE UP (ref 0.2–1.2)
BLD GP AB SCN SERPL QL: NEGATIVE — SIGNIFICANT CHANGE UP
BUN SERPL-MCNC: 12 MG/DL — SIGNIFICANT CHANGE UP (ref 7–23)
BUN SERPL-MCNC: 13 MG/DL — SIGNIFICANT CHANGE UP (ref 7–23)
BUN SERPL-MCNC: 18 MG/DL — SIGNIFICANT CHANGE UP (ref 7–23)
BURR CELLS BLD QL SMEAR: PRESENT — SIGNIFICANT CHANGE UP
CALCIUM SERPL-MCNC: 8.4 MG/DL — SIGNIFICANT CHANGE UP (ref 8.4–10.5)
CALCIUM SERPL-MCNC: 8.6 MG/DL — SIGNIFICANT CHANGE UP (ref 8.4–10.5)
CALCIUM SERPL-MCNC: SIGNIFICANT CHANGE UP (ref 8.4–10.5)
CHLORIDE SERPL-SCNC: 100 MMOL/L — SIGNIFICANT CHANGE UP (ref 96–108)
CHLORIDE SERPL-SCNC: 98 MMOL/L — SIGNIFICANT CHANGE UP (ref 96–108)
CHLORIDE SERPL-SCNC: SIGNIFICANT CHANGE UP (ref 96–108)
CO2 SERPL-SCNC: 20 MMOL/L — LOW (ref 22–31)
CO2 SERPL-SCNC: 25 MMOL/L — SIGNIFICANT CHANGE UP (ref 22–31)
CO2 SERPL-SCNC: SIGNIFICANT CHANGE UP (ref 22–31)
CREAT SERPL-MCNC: 0.76 MG/DL — SIGNIFICANT CHANGE UP (ref 0.5–1.3)
CREAT SERPL-MCNC: 1.02 MG/DL — SIGNIFICANT CHANGE UP (ref 0.5–1.3)
CREAT SERPL-MCNC: 1.04 MG/DL — SIGNIFICANT CHANGE UP (ref 0.5–1.3)
EGFR: 70 ML/MIN/1.73M2 — SIGNIFICANT CHANGE UP
EGFR: 72 ML/MIN/1.73M2 — SIGNIFICANT CHANGE UP
EGFR: 88 ML/MIN/1.73M2 — SIGNIFICANT CHANGE UP
EOSINOPHIL # BLD AUTO: 0.7 K/UL — HIGH (ref 0–0.5)
EOSINOPHIL NFR BLD AUTO: 4.4 % — SIGNIFICANT CHANGE UP (ref 0–6)
GIANT PLATELETS BLD QL SMEAR: PRESENT — SIGNIFICANT CHANGE UP
GLUCOSE BLDC GLUCOMTR-MCNC: 113 MG/DL — HIGH (ref 70–99)
GLUCOSE BLDC GLUCOMTR-MCNC: 140 MG/DL — HIGH (ref 70–99)
GLUCOSE BLDC GLUCOMTR-MCNC: 148 MG/DL — HIGH (ref 70–99)
GLUCOSE BLDC GLUCOMTR-MCNC: 181 MG/DL — HIGH (ref 70–99)
GLUCOSE SERPL-MCNC: 117 MG/DL — HIGH (ref 70–99)
GLUCOSE SERPL-MCNC: 153 MG/DL — HIGH (ref 70–99)
GLUCOSE SERPL-MCNC: 161 MG/DL — HIGH (ref 70–99)
HCT VFR BLD CALC: 45.8 % — SIGNIFICANT CHANGE UP (ref 39–50)
HGB BLD-MCNC: 14.6 G/DL — SIGNIFICANT CHANGE UP (ref 13–17)
LYMPHOCYTES # BLD AUTO: 1.55 K/UL — SIGNIFICANT CHANGE UP (ref 1–3.3)
LYMPHOCYTES # BLD AUTO: 9.7 % — LOW (ref 13–44)
MAGNESIUM SERPL-MCNC: 2.1 MG/DL — SIGNIFICANT CHANGE UP (ref 1.6–2.6)
MANUAL SMEAR VERIFICATION: SIGNIFICANT CHANGE UP
MCHC RBC-ENTMCNC: 20.8 PG — LOW (ref 27–34)
MCHC RBC-ENTMCNC: 31.9 GM/DL — LOW (ref 32–36)
MCV RBC AUTO: 65.1 FL — LOW (ref 80–100)
MICROCYTES BLD QL: SLIGHT — SIGNIFICANT CHANGE UP
MONOCYTES # BLD AUTO: 1.55 K/UL — HIGH (ref 0–0.9)
MONOCYTES NFR BLD AUTO: 9.7 % — SIGNIFICANT CHANGE UP (ref 2–14)
NEUTROPHILS # BLD AUTO: 12.03 K/UL — HIGH (ref 1.8–7.4)
NEUTROPHILS NFR BLD AUTO: 74.4 % — SIGNIFICANT CHANGE UP (ref 43–77)
NEUTS BAND # BLD: 0.9 % — SIGNIFICANT CHANGE UP (ref 0–8)
OSMOLALITY UR: 469 MOSM/KG — SIGNIFICANT CHANGE UP (ref 300–900)
OVALOCYTES BLD QL SMEAR: SIGNIFICANT CHANGE UP
PHOSPHATE SERPL-MCNC: 2.9 MG/DL — SIGNIFICANT CHANGE UP (ref 2.5–4.5)
PLAT MORPH BLD: ABNORMAL
PLATELET # BLD AUTO: 342 K/UL — SIGNIFICANT CHANGE UP (ref 150–400)
POIKILOCYTOSIS BLD QL AUTO: SIGNIFICANT CHANGE UP
POLYCHROMASIA BLD QL SMEAR: SLIGHT — SIGNIFICANT CHANGE UP
POTASSIUM SERPL-MCNC: 3.8 MMOL/L — SIGNIFICANT CHANGE UP (ref 3.5–5.3)
POTASSIUM SERPL-MCNC: 4.3 MMOL/L — SIGNIFICANT CHANGE UP (ref 3.5–5.3)
POTASSIUM SERPL-SCNC: 3.8 MMOL/L — SIGNIFICANT CHANGE UP (ref 3.5–5.3)
POTASSIUM SERPL-SCNC: 4.3 MMOL/L — SIGNIFICANT CHANGE UP (ref 3.5–5.3)
PROT SERPL-MCNC: 6.5 G/DL — SIGNIFICANT CHANGE UP (ref 6–8.3)
RBC # BLD: 7.03 M/UL — HIGH (ref 4.2–5.8)
RBC # FLD: 17.2 % — HIGH (ref 10.3–14.5)
RBC BLD AUTO: ABNORMAL
RH IG SCN BLD-IMP: POSITIVE — SIGNIFICANT CHANGE UP
SMUDGE CELLS # BLD: PRESENT — SIGNIFICANT CHANGE UP
SODIUM SERPL-SCNC: 129 MMOL/L — LOW (ref 135–145)
SODIUM SERPL-SCNC: 132 MMOL/L — LOW (ref 135–145)
SODIUM SERPL-SCNC: SIGNIFICANT CHANGE UP (ref 135–145)
SODIUM UR-SCNC: 66 MMOL/L — SIGNIFICANT CHANGE UP
SPHEROCYTES BLD QL SMEAR: SLIGHT — SIGNIFICANT CHANGE UP
WBC # BLD: 15.97 K/UL — HIGH (ref 3.8–10.5)
WBC # FLD AUTO: 15.97 K/UL — HIGH (ref 3.8–10.5)

## 2023-10-13 PROCEDURE — 99232 SBSQ HOSP IP/OBS MODERATE 35: CPT

## 2023-10-13 PROCEDURE — 99231 SBSQ HOSP IP/OBS SF/LOW 25: CPT

## 2023-10-13 PROCEDURE — 99239 HOSP IP/OBS DSCHRG MGMT >30: CPT

## 2023-10-13 RX ORDER — LINAGLIPTIN 5 MG/1
1 TABLET, FILM COATED ORAL
Qty: 30 | Refills: 0
Start: 2023-10-13

## 2023-10-13 RX ORDER — SITAGLIPTIN 50 MG/1
1 TABLET, FILM COATED ORAL
Qty: 30 | Refills: 0
Start: 2023-10-13

## 2023-10-13 RX ORDER — UREA 15 G
1 POWDER IN PACKET (EA) ORAL
Qty: 30 | Refills: 0
Start: 2023-10-13

## 2023-10-13 RX ORDER — LEVOTHYROXINE SODIUM 125 MCG
1 TABLET ORAL
Qty: 30 | Refills: 0
Start: 2023-10-13

## 2023-10-13 RX ORDER — UREA 15 G
15 POWDER IN PACKET (EA) ORAL DAILY
Refills: 0 | Status: DISCONTINUED | OUTPATIENT
Start: 2023-10-13 | End: 2023-10-14

## 2023-10-13 RX ORDER — SODIUM CHLORIDE 5 G/100ML
500 INJECTION, SOLUTION INTRAVENOUS
Refills: 0 | Status: DISCONTINUED | OUTPATIENT
Start: 2023-10-13 | End: 2023-10-13

## 2023-10-13 RX ORDER — CEFPODOXIME PROXETIL 100 MG
1 TABLET ORAL
Qty: 20 | Refills: 0
Start: 2023-10-13 | End: 2023-10-22

## 2023-10-13 RX ORDER — METRONIDAZOLE 500 MG
1 TABLET ORAL
Qty: 30 | Refills: 0
Start: 2023-10-13 | End: 2023-10-22

## 2023-10-13 RX ADMIN — GABAPENTIN 100 MILLIGRAM(S): 400 CAPSULE ORAL at 22:07

## 2023-10-13 RX ADMIN — BENZOCAINE AND MENTHOL 1 LOZENGE: 5; 1 LIQUID ORAL at 11:42

## 2023-10-13 RX ADMIN — SODIUM CHLORIDE 40 MILLILITER(S): 5 INJECTION, SOLUTION INTRAVENOUS at 09:35

## 2023-10-13 RX ADMIN — ATORVASTATIN CALCIUM 40 MILLIGRAM(S): 80 TABLET, FILM COATED ORAL at 22:07

## 2023-10-13 RX ADMIN — CLOPIDOGREL BISULFATE 75 MILLIGRAM(S): 75 TABLET, FILM COATED ORAL at 11:42

## 2023-10-13 RX ADMIN — Medication 75 MICROGRAM(S): at 05:52

## 2023-10-13 RX ADMIN — Medication 500 MILLIGRAM(S): at 05:53

## 2023-10-13 RX ADMIN — LINAGLIPTIN 5 MILLIGRAM(S): 5 TABLET, FILM COATED ORAL at 07:31

## 2023-10-13 RX ADMIN — Medication 12.5 MILLIGRAM(S): at 05:53

## 2023-10-13 RX ADMIN — Medication 500 MILLIGRAM(S): at 14:11

## 2023-10-13 RX ADMIN — BENZOCAINE AND MENTHOL 1 LOZENGE: 5; 1 LIQUID ORAL at 05:53

## 2023-10-13 RX ADMIN — CEFTRIAXONE 100 MILLIGRAM(S): 500 INJECTION, POWDER, FOR SOLUTION INTRAMUSCULAR; INTRAVENOUS at 19:12

## 2023-10-13 RX ADMIN — Medication 15 GRAM(S): at 14:12

## 2023-10-13 RX ADMIN — Medication 81 MILLIGRAM(S): at 11:42

## 2023-10-13 RX ADMIN — Medication 500 MILLIGRAM(S): at 22:08

## 2023-10-13 RX ADMIN — BENZOCAINE AND MENTHOL 1 LOZENGE: 5; 1 LIQUID ORAL at 17:37

## 2023-10-13 RX ADMIN — PANTOPRAZOLE SODIUM 40 MILLIGRAM(S): 20 TABLET, DELAYED RELEASE ORAL at 22:07

## 2023-10-13 NOTE — PROGRESS NOTE ADULT - ASSESSMENT
Mr. Yanez is a 87-year-old male with a past medical history of hypertension, hyperlipidemia, coronary artery disease (s/p JOSÉ to mLAD on 6/2023), cervical spondylosis (s/p laminectomy), gastric ulcers, hypothyroidism and constipation who presented to the emergency department (10/6/23) for dyspnea on exertion, chest pressure, and orthopnea for 3 days, along with one day of fever. He was admitted to cardiology for acute hypoxic respiratory failure secondary to acute heart failure with preserved ejection fraction and fever, possibly secondary to pneumonia. His labs were remarkable for a hemoglobin A1C of 7.7% and a TSH of 10.5. Endocrinology was consulted for recommendations regarding management of hypothyroidism and diabetes.     A1C: 7.7 % (Goal <8% due to pt age)  BUN: 13  Creatinine: 1.02  GFR: 72  Weight: 63.5  BMI: 23.3

## 2023-10-13 NOTE — PROGRESS NOTE ADULT - PROBLEM SELECTOR PLAN 1
Presents w/ BELLO w/ minimal exertion, chest pain, orthopnea (2 pillow use)  x 3 days. proBNP 1279. Prior hx CAD: JOSÉ mLAD in 6/2023. Compliant w/ DAPT aspirin/Plavix. Received Lasix 20mg IV x 1 in ED. EKG nonischemic. Troponin T x 1 negative. s/p lasix 50mg IVP. Cath denied.  Pt presented with acs sxs initially and admitted to cardiology service to r/o cardiac causes. Pt initally required oxygen, and was thought to be secondary to acute chf excerbation. Pt found to have cholecystits on CTAP and sxs are likely due to cholecystits. Pt is not complaining of shortness of breath or chest pain. Currently on 2L. CXR revealed right hemidiaphragm. Acute hypoxic respiratory failure likely secondary to cholecystitis pain and hemidiaphragm    - continue to manage  - strict I&Os  - recheck ambulatory sats Presents w/ BELLO w/ minimal exertion, chest pain, orthopnea (2 pillow use)  x 3 days. proBNP 1279. Prior hx CAD: JOSÉ mLAD in 6/2023. Compliant w/ DAPT aspirin/Plavix. Received Lasix 20mg IV x 1 in ED. EKG nonischemic. Troponin T x 1 negative. s/p lasix 50mg IVP. Cath denied.  Pt presented with acs sxs initially and admitted to cardiology service to r/o cardiac causes. Pt initally required oxygen, and was thought to be secondary to acute chf excerbation. Pt found to have cholecystits on CTAP and sxs are likely due to cholecystits. Pt is not complaining of shortness of breath or chest pain. Currently on 2L. CXR revealed right hemidiaphragm. Acute hypoxic respiratory failure likely secondary to cholecystitis pain and hemidiaphragm  Ambulatory sats reveal pt requiring 4L o2 while maintaining 92-94%    - continue to manage  - strict I&Os

## 2023-10-13 NOTE — PROGRESS NOTE ADULT - PROBLEM SELECTOR PLAN 5
Na 126 on admission. s/p lasix 40mg IVqD. Likely hypovolemic hyponatremia iso of decreased po intake vs diuretic(presented with na 126) vs mixed picture with siadh iso of leg pain. Feurea 50.2%. s/p 2l ns.    - nephrology consulted, f/u recs  - 500cc 3% ns  - continue to monitor. Na 126 on admission. s/p lasix 40mg IVqD. Likely hypovolemic hyponatremia iso of decreased po intake vs diuretic(presented with na 126) vs mixed picture with siadh iso of leg pain. Feurea 50.2%. s/p 2l ns. Now likely siadh    - nephrology consulted, f/u recs  - urena 15g daily  - continue to monitor.

## 2023-10-13 NOTE — PROGRESS NOTE ADULT - SUBJECTIVE AND OBJECTIVE BOX
HON SCOTT , [id number],  [facility] 05UR [id number]    Time of encounter : 9 am   seen with wife at bedside, drinking coffee.  less pain in right upper quadrant area, currently on room air -at rest -saturating around 93 %   tolerating diet.  no chest pain.     T(C): 36.6 (10-13-23 @ 12:19), Max: 36.7 (10-12-23 @ 20:28)  HR: 85 (10-13-23 @ 12:19) (78 - 88)  BP: 120/74 (10-13-23 @ 12:19) (115/67 - 135/84)  RR: 18 (10-13-23 @ 12:19) (16 - 18)  SpO2: 92% (10-13-23 @ 12:19) (92% - 95%)    aspirin enteric coated 81 milliGRAM(s) Oral daily  atorvastatin 40 milliGRAM(s) Oral at bedtime  benzocaine/menthol Lozenge 1 Lozenge Oral four times a day  bisacodyl Suppository 10 milliGRAM(s) Rectal daily PRN  cefTRIAXone   IVPB 2000 milliGRAM(s) IV Intermittent every 24 hours  clopidogrel Tablet 75 milliGRAM(s) Oral daily  cyclobenzaprine 5 milliGRAM(s) Oral two times a day PRN  dextrose 5%. 1000 milliLiter(s) IV Continuous <Continuous>  dextrose 5%. 1000 milliLiter(s) IV Continuous <Continuous>  dextrose 50% Injectable 25 Gram(s) IV Push once  dextrose 50% Injectable 25 Gram(s) IV Push once  dextrose 50% Injectable 12.5 Gram(s) IV Push once  dextrose Oral Gel 15 Gram(s) Oral once PRN  gabapentin 100 milliGRAM(s) Oral at bedtime  glucagon  Injectable 1 milliGRAM(s) IntraMuscular once  insulin lispro (ADMELOG) corrective regimen sliding scale   SubCutaneous at bedtime  insulin lispro (ADMELOG) corrective regimen sliding scale   SubCutaneous three times a day before meals  levothyroxine 75 MICROGram(s) Oral daily  linagliptin 5 milliGRAM(s) Oral <User Schedule>  metoprolol succinate ER 12.5 milliGRAM(s) Oral daily  metroNIDAZOLE    Tablet 500 milliGRAM(s) Oral every 8 hours  oxyCODONE    IR 5 milliGRAM(s) Oral every 6 hours PRN  pantoprazole    Tablet 40 milliGRAM(s) Oral at bedtime  senna 2 Tablet(s) Oral daily  sodium chloride 3%. 500 milliLiter(s) IV Continuous <Continuous>  urea Oral Powder 15 Gram(s) Oral daily      Physical Exam :    General exam :  clinically appear well, awake, alert , speaking full sentence.  RRR  good air entry bilaterally  bs present, not tender, no guarding, no rebound.  ext- no edema no tenderness  neuro -non focal.       CBC Full  -  ( 13 Oct 2023 05:30 )  WBC Count : 15.97 K/uL  RBC Count : 7.03 M/uL  Hemoglobin : 14.6 g/dL  Hematocrit : 45.8 %  Platelet Count - Automated : 342 K/uL  Mean Cell Volume : 65.1 fl  Mean Cell Hemoglobin : 20.8 pg  Mean Cell Hemoglobin Concentration : 31.9 gm/dL  Auto Neutrophil # : 12.03 K/uL  Auto Lymphocyte # : 1.55 K/uL  Auto Monocyte # : 1.55 K/uL  Auto Eosinophil # : 0.70 K/uL  Auto Basophil # : 0.14 K/uL  Auto Neutrophil % : 74.4 %  Auto Lymphocyte % : 9.7 %  Auto Monocyte % : 9.7 %  Auto Eosinophil % : 4.4 %  Auto Basophil % : 0.9 %        10-13    x   |  x   |  12  ----------------------------<  153<H>  3.8   |  x   |  0.76    Ca    8.6      13 Oct 2023 05:30  Phos  2.9     10-13  Mg     2.1     10-13    TPro  6.5  /  Alb  2.5<L>  /  TBili  0.4  /  DBili  x   /  AST  18  /  ALT  14  /  AlkPhos  114  10-13    Daily     Daily Weight in k.9 (13 Oct 2023 06:14)  CAPILLARY BLOOD GLUCOSE      POCT Blood Glucose.: 181 mg/dL (13 Oct 2023 12:08)      Urinalysis Basic - ( 13 Oct 2023 14:23 )    Color: x / Appearance: x / SG: x / pH: x  Gluc: 153 mg/dL / Ketone: x  / Bili: x / Urobili: x   Blood: x / Protein: x / Nitrite: x   Leuk Esterase: x / RBC: x / WBC x   Sq Epi: x / Non Sq Epi: x / Bacteria: x                   HON SCOTT , [id number],  [facility] [address] [id number] 02    Time of encounter : 9 am   seen with wife at bedside, drinking coffee.  less pain in right upper quadrant area, currently on room air -at rest -saturating around 93 %   tolerating diet.  no chest pain.     T(C): 36.6 (10-13-23 @ 12:19), Max: 36.7 (10-12-23 @ 20:28)  HR: 85 (10-13-23 @ 12:19) (78 - 88)  BP: 120/74 (10-13-23 @ 12:19) (115/67 - 135/84)  RR: 18 (10-13-23 @ 12:19) (16 - 18)  SpO2: 92% (10-13-23 @ 12:19) (92% - 95%)    aspirin enteric coated 81 milliGRAM(s) Oral daily  atorvastatin 40 milliGRAM(s) Oral at bedtime  benzocaine/menthol Lozenge 1 Lozenge Oral four times a day  bisacodyl Suppository 10 milliGRAM(s) Rectal daily PRN  cefTRIAXone   IVPB 2000 milliGRAM(s) IV Intermittent every 24 hours  clopidogrel Tablet 75 milliGRAM(s) Oral daily  cyclobenzaprine 5 milliGRAM(s) Oral two times a day PRN  dextrose 5%. 1000 milliLiter(s) IV Continuous <Continuous>  dextrose 5%. 1000 milliLiter(s) IV Continuous <Continuous>  dextrose 50% Injectable 25 Gram(s) IV Push once  dextrose 50% Injectable 25 Gram(s) IV Push once  dextrose 50% Injectable 12.5 Gram(s) IV Push once  dextrose Oral Gel 15 Gram(s) Oral once PRN  gabapentin 100 milliGRAM(s) Oral at bedtime  glucagon  Injectable 1 milliGRAM(s) IntraMuscular once  insulin lispro (ADMELOG) corrective regimen sliding scale   SubCutaneous at bedtime  insulin lispro (ADMELOG) corrective regimen sliding scale   SubCutaneous three times a day before meals  levothyroxine 75 MICROGram(s) Oral daily  linagliptin 5 milliGRAM(s) Oral <User Schedule>  metoprolol succinate ER 12.5 milliGRAM(s) Oral daily  metroNIDAZOLE    Tablet 500 milliGRAM(s) Oral every 8 hours  oxyCODONE    IR 5 milliGRAM(s) Oral every 6 hours PRN  pantoprazole    Tablet 40 milliGRAM(s) Oral at bedtime  senna 2 Tablet(s) Oral daily  sodium chloride 3%. 500 milliLiter(s) IV Continuous <Continuous>  urea Oral Powder 15 Gram(s) Oral daily      Physical Exam :    General exam :  clinically appear well, awake, alert , speaking full sentence.  RRR  good air entry bilaterally  bs present, not tender, no guarding, no rebound.  ext- no edema no tenderness  neuro -non focal.       CBC Full  -  ( 13 Oct 2023 05:30 )  WBC Count : 15.97 K/uL  RBC Count : 7.03 M/uL  Hemoglobin : 14.6 g/dL  Hematocrit : 45.8 %  Platelet Count - Automated : 342 K/uL  Mean Cell Volume : 65.1 fl  Mean Cell Hemoglobin : 20.8 pg  Mean Cell Hemoglobin Concentration : 31.9 gm/dL  Auto Neutrophil # : 12.03 K/uL  Auto Lymphocyte # : 1.55 K/uL  Auto Monocyte # : 1.55 K/uL  Auto Eosinophil # : 0.70 K/uL  Auto Basophil # : 0.14 K/uL  Auto Neutrophil % : 74.4 %  Auto Lymphocyte % : 9.7 %  Auto Monocyte % : 9.7 %  Auto Eosinophil % : 4.4 %  Auto Basophil % : 0.9 %        10-13    x   |  x   |  12  ----------------------------<  153<H>  3.8   |  x   |  0.76    Ca    8.6      13 Oct 2023 05:30  Phos  2.9     10-13  Mg     2.1     10-13    TPro  6.5  /  Alb  2.5<L>  /  TBili  0.4  /  DBili  x   /  AST  18  /  ALT  14  /  AlkPhos  114  10-13    Daily     Daily Weight in k.9 (13 Oct 2023 06:14)  CAPILLARY BLOOD GLUCOSE      POCT Blood Glucose.: 181 mg/dL (13 Oct 2023 12:08)      Urinalysis Basic - ( 13 Oct 2023 14:23 )    Color: x / Appearance: x / SG: x / pH: x  Gluc: 153 mg/dL / Ketone: x  / Bili: x / Urobili: x   Blood: x / Protein: x / Nitrite: x   Leuk Esterase: x / RBC: x / WBC x   Sq Epi: x / Non Sq Epi: x / Bacteria: x                   HON SCOTT , [id number],  [facility] [address] [id number] 02    Time of encounter : 9 am   seen with wife at bedside, drinking coffee.  less pain in right upper quadrant area, currently on room air -at rest -saturating around 93 %   tolerating diet.  no chest pain.     T(C): 36.6 (10-13-23 @ 12:19), Max: 36.7 (10-12-23 @ 20:28)  HR: 85 (10-13-23 @ 12:19) (78 - 88)  BP: 120/74 (10-13-23 @ 12:19) (115/67 - 135/84)  RR: 18 (10-13-23 @ 12:19) (16 - 18)  SpO2: 92% (10-13-23 @ 12:19) (92% - 95%)    aspirin enteric coated 81 milliGRAM(s) Oral daily  atorvastatin 40 milliGRAM(s) Oral at bedtime  benzocaine/menthol Lozenge 1 Lozenge Oral four times a day  bisacodyl Suppository 10 milliGRAM(s) Rectal daily PRN  cefTRIAXone   IVPB 2000 milliGRAM(s) IV Intermittent every 24 hours  clopidogrel Tablet 75 milliGRAM(s) Oral daily  cyclobenzaprine 5 milliGRAM(s) Oral two times a day PRN  dextrose 5%. 1000 milliLiter(s) IV Continuous <Continuous>  dextrose 5%. 1000 milliLiter(s) IV Continuous <Continuous>  dextrose 50% Injectable 25 Gram(s) IV Push once  dextrose 50% Injectable 25 Gram(s) IV Push once  dextrose 50% Injectable 12.5 Gram(s) IV Push once  dextrose Oral Gel 15 Gram(s) Oral once PRN  gabapentin 100 milliGRAM(s) Oral at bedtime  glucagon  Injectable 1 milliGRAM(s) IntraMuscular once  insulin lispro (ADMELOG) corrective regimen sliding scale   SubCutaneous at bedtime  insulin lispro (ADMELOG) corrective regimen sliding scale   SubCutaneous three times a day before meals  levothyroxine 75 MICROGram(s) Oral daily  linagliptin 5 milliGRAM(s) Oral <User Schedule>  metoprolol succinate ER 12.5 milliGRAM(s) Oral daily  metroNIDAZOLE    Tablet 500 milliGRAM(s) Oral every 8 hours  oxyCODONE    IR 5 milliGRAM(s) Oral every 6 hours PRN  pantoprazole    Tablet 40 milliGRAM(s) Oral at bedtime  senna 2 Tablet(s) Oral daily  sodium chloride 3%. 500 milliLiter(s) IV Continuous <Continuous>  urea Oral Powder 15 Gram(s) Oral daily      Physical Exam :    General exam :  clinically appear well, awake, alert , speaking full sentence.  RRR  good air entry bilaterally  bs present, not tender, no guarding, no rebound.  ext- no edema no tenderness  neuro -non focal.       CBC Full  -  ( 13 Oct 2023 05:30 )  WBC Count : 15.97 K/uL  RBC Count : 7.03 M/uL  Hemoglobin : 14.6 g/dL  Hematocrit : 45.8 %  Platelet Count - Automated : 342 K/uL  Mean Cell Volume : 65.1 fl  Mean Cell Hemoglobin : 20.8 pg  Mean Cell Hemoglobin Concentration : 31.9 gm/dL  Auto Neutrophil # : 12.03 K/uL  Auto Lymphocyte # : 1.55 K/uL  Auto Monocyte # : 1.55 K/uL  Auto Eosinophil # : 0.70 K/uL  Auto Basophil # : 0.14 K/uL  Auto Neutrophil % : 74.4 %  Auto Lymphocyte % : 9.7 %  Auto Monocyte % : 9.7 %  Auto Eosinophil % : 4.4 %  Auto Basophil % : 0.9 %        10-13    x   |  x   |  12  ----------------------------<  153<H>  3.8   |  x   |  0.76    Ca    8.6      13 Oct 2023 05:30  Phos  2.9     10-13  Mg     2.1     10-13    TPro  6.5  /  Alb  2.5<L>  /  TBili  0.4  /  DBili  x   /  AST  18  /  ALT  14  /  AlkPhos  114  10-13    Daily     Daily Weight in k.9 (13 Oct 2023 06:14)  CAPILLARY BLOOD GLUCOSE      POCT Blood Glucose.: 181 mg/dL (13 Oct 2023 12:08)      Urinalysis Basic - ( 13 Oct 2023 14:23 )    Color: x / Appearance: x / SG: x / pH: x  Gluc: 153 mg/dL / Ketone: x  / Bili: x / Urobili: x   Blood: x / Protein: x / Nitrite: x   Leuk Esterase: x / RBC: x / WBC x   Sq Epi: x / Non Sq Epi: x / Bacteria: x

## 2023-10-13 NOTE — PROGRESS NOTE ADULT - PROBLEM SELECTOR PLAN 8
TSH at 10.160. Home med synthroid 50mcg. Repeat tsh at 6.990    - endo consulted, f/u recs  - c/w levothyroxine 75mcg

## 2023-10-13 NOTE — PROGRESS NOTE ADULT - ATTENDING COMMENTS
87 year old male with PMH relevant for CAD s/p JOSÉ to mLAD (6/2023), HTN, HLD, cervical spondylosis s/p laminectomy, gastric ulcers, hypothyroidism,  who presents to [facility] ED 10/6/23 for BELLO with minimal exertion, chest pressure, and orthopnea x 3 days. Also reports a fever of 101.8 yesterday.    1. CAD  Chest pain resolved. Continue aspirin 81 mg, resume Plavix 75 mg, continue Lipitor, metoprolol.   LHC now postponed due to acute cholecystitis.     2. Acute cholecystitis  Poor candidate for cholecystectomy at this time, given improvement in current clinical status, will recommend continuing with CTX/Flagyl.  Continue Abx PO for 2 weeks. F/U with surgery in 2 weeks.     3. Microcytic anemia  outpatient follow up.    4. DM/hypothyroidism  Consult endocrine, appreciate recommendations.    5. Hyponatremia  Improving with hydration.    6. Acute hypoxic respiratory failure  From atelectasis, now resolved. 87 year old male with PMH relevant for CAD s/p JOSÉ to mLAD (6/2023), HTN, HLD, cervical spondylosis s/p laminectomy, gastric ulcers, hypothyroidism,  who presents to [facility] ED 10/6/23 for BELLO with minimal exertion, chest pressure, and orthopnea x 3 days. Also reports a fever of 101.8 yesterday.    1. CAD  Chest pain resolved. Continue aspirin 81 mg, resume Plavix 75 mg, continue Lipitor, metoprolol.   LHC now postponed due to acute cholecystitis.     2. Acute cholecystitis  Poor candidate for cholecystectomy at this time, given improvement in current clinical status, will recommend continuing with CTX/Flagyl.  Continue Abx PO for 2 weeks. F/U with surgery in 2 weeks.     3. Microcytic anemia  outpatient follow up.    4. DM/hypothyroidism  Consult endocrine, appreciate recommendations.    5. Hyponatremia  Improving with hydration.    6. Acute hypoxic respiratory failure  From atelectasis, now resolved. 87 year old male with PMH relevant for CAD s/p JOSÉ to mLAD (6/2023), HTN, HLD, cervical spondylosis s/p laminectomy, gastric ulcers, hypothyroidism,  who presents to [facility] ED 10/6/23 for BELLO with minimal exertion, chest pressure, and orthopnea x 3 days. Also reports a fever of 101.8 yesterday.    1. CAD  Chest pain resolved. Continue aspirin 81 mg, resume Plavix 75 mg, continue Lipitor, metoprolol.   LHC now postponed due to acute cholecystitis.     2. Acute cholecystitis  Poor candidate for cholecystectomy at this time, given improvement in current clinical status, will recommend continuing with CTX/Flagyl.  Continue Abx PO for 2 weeks. F/U with surgery in 2 weeks.     3. Microcytic anemia  outpatient follow up.    4. DM/hypothyroidism  Consult endocrine, appreciate recommendations.    5. Hyponatremia  Improving with hydration.    6. Acute hypoxic respiratory failure  From atelectasis, now resolved.

## 2023-10-13 NOTE — PROGRESS NOTE ADULT - SUBJECTIVE AND OBJECTIVE BOX
SUBJECTIVE / INTERVAL HPI: Patient was seen and examined this morning. Denies RUQ pain, even on palpation. Eating well. + BM. Hyponatremic to 130 (corrected for glucose). Glucose relatively well controlled on Tradjenta in the hospital. Glucose targets are liberated due to pt age.    CAPILLARY BLOOD GLUCOSE & INSULIN RECEIVED  124 mg/dL (10-12 @ 05:30)  137 mg/dL (10-12 @ 06:09)  121 mg/dL (10-12 @ 08:18)  231 mg/dL (10-12 @ 12:11)  153 mg/dL (10-12 @ 14:45)  206 mg/dL (10-12 @ 17:01)  157 mg/dL (10-12 @ 18:41)  101 mg/dL (10-12 @ 21:39)  117 mg/dL (10-13 @ 05:30)  113 mg/dL (10-13 @ 07:19)  181 mg/dL (10-13 @ 12:08)      REVIEW OF SYSTEMS  Constitutional:  Negative fever, chills or loss of appetite.  Eyes:  Negative blurry vision or double vision.  Cardiovascular:  Negative for chest pain or palpitations.  Respiratory:  Negative for cough, wheezing, or shortness of breath.    Gastrointestinal:  Negative for nausea, vomiting, diarrhea, constipation, or abdominal pain.  Genitourinary:  Negative frequency, urgency or dysuria.  Neurologic:  No headache, confusion, dizziness, lightheadedness.    PHYSICAL EXAM  Vital Signs Last 24 Hrs  T(C): 36.6 (13 Oct 2023 12:19), Max: 36.7 (12 Oct 2023 20:28)  T(F): 97.9 (13 Oct 2023 12:19), Max: 98 (12 Oct 2023 20:28)  HR: 85 (13 Oct 2023 12:19) (78 - 88)  BP: 120/74 (13 Oct 2023 12:19) (115/67 - 135/84)  BP(mean): 90 (13 Oct 2023 05:40) (90 - 90)  RR: 18 (13 Oct 2023 12:19) (16 - 18)  SpO2: 92% (13 Oct 2023 12:19) (92% - 95%)    Parameters below as of 13 Oct 2023 12:19  Patient On (Oxygen Delivery Method): room air    Constitutional: Awake, alert, in no acute distress.   HEENT: Normocephalic, atraumatic, PAMELA.  Respiratory: Lungs clear to ausculation bilaterally.   Cardiovascular: regular rhythm, normal S1 and S2, no audible murmurs.   GI: soft, non-tender, non-distended, bowel sounds present.  Extremities: No lower extremity edema.  Psychiatric: AAO x 3. Normal affect/mood.     LABS  CBC - WBC/HGB/HTC/PLT: 15.97/14.6/45.8/342 (10-13-23)  BMP - Na/K/Cl/Bicarb/BUN/Cr/Gluc/AG/eGFR: 129/4.3/98/20/13/1.02/117/11/72 (10-13-23)  Ca - 8.6 (10-13-23)  Phos - 2.9 (10-13-23)  Mg - 2.1 (10-13-23)  LFT - Alb/Tprot/Tbili/Dbili/AlkPhos/ALT/AST: 2.5/--/0.4/--/114/14/18 (10-13-23)  PT/aPTT/INR: 14.3/33.4/1.26 (10-10-23)   Thyroid Stimulating Hormone, Serum: 6.990 (10-09-23)  Total T4/Free T4: --/1.580 (10-09-23)    MEDICATIONS  MEDICATIONS  (STANDING):  aspirin enteric coated 81 milliGRAM(s) Oral daily  atorvastatin 40 milliGRAM(s) Oral at bedtime  benzocaine/menthol Lozenge 1 Lozenge Oral four times a day  cefTRIAXone   IVPB 2000 milliGRAM(s) IV Intermittent every 24 hours  clopidogrel Tablet 75 milliGRAM(s) Oral daily  dextrose 5%. 1000 milliLiter(s) (50 mL/Hr) IV Continuous <Continuous>  dextrose 5%. 1000 milliLiter(s) (100 mL/Hr) IV Continuous <Continuous>  dextrose 50% Injectable 25 Gram(s) IV Push once  dextrose 50% Injectable 12.5 Gram(s) IV Push once  dextrose 50% Injectable 25 Gram(s) IV Push once  gabapentin 100 milliGRAM(s) Oral at bedtime  glucagon  Injectable 1 milliGRAM(s) IntraMuscular once  insulin lispro (ADMELOG) corrective regimen sliding scale   SubCutaneous at bedtime  insulin lispro (ADMELOG) corrective regimen sliding scale   SubCutaneous three times a day before meals  levothyroxine 75 MICROGram(s) Oral daily  linagliptin 5 milliGRAM(s) Oral <User Schedule>  metoprolol succinate ER 12.5 milliGRAM(s) Oral daily  metroNIDAZOLE    Tablet 500 milliGRAM(s) Oral every 8 hours  pantoprazole    Tablet 40 milliGRAM(s) Oral at bedtime  senna 2 Tablet(s) Oral daily  sodium chloride 3%. 500 milliLiter(s) (40 mL/Hr) IV Continuous <Continuous>  urea Oral Powder 15 Gram(s) Oral daily    MEDICATIONS  (PRN):  bisacodyl Suppository 10 milliGRAM(s) Rectal daily PRN Constipation  cyclobenzaprine 5 milliGRAM(s) Oral two times a day PRN Muscle Spasm  dextrose Oral Gel 15 Gram(s) Oral once PRN Blood Glucose LESS THAN 70 milliGRAM(s)/deciliter  oxyCODONE    IR 5 milliGRAM(s) Oral every 6 hours PRN Severe Pain (7 - 10)    .

## 2023-10-13 NOTE — PROGRESS NOTE ADULT - SUBJECTIVE AND OBJECTIVE BOX
*****INCOMPLETE NOTE*****    INTERVAL HPI/OVERNIGHT EVENTS:    SUBJECTIVE: Patient seen and examined at bedside, resting comfortably in bed, and does not appear to be in any acute distress. Patient states  Patient denies any recent fevers, chills, nausea, vomiting, headache, acute sob, chest pain, abdominal pain, genitourinary sx, extremity pain or swelling.     ANTIBIOTICS/RELEVANT:    MEDICATIONS  (STANDING):  aspirin enteric coated 81 milliGRAM(s) Oral daily  atorvastatin 40 milliGRAM(s) Oral at bedtime  benzocaine/menthol Lozenge 1 Lozenge Oral four times a day  cefTRIAXone   IVPB 2000 milliGRAM(s) IV Intermittent every 24 hours  clopidogrel Tablet 75 milliGRAM(s) Oral daily  dextrose 5%. 1000 milliLiter(s) (100 mL/Hr) IV Continuous <Continuous>  dextrose 5%. 1000 milliLiter(s) (50 mL/Hr) IV Continuous <Continuous>  dextrose 50% Injectable 25 Gram(s) IV Push once  dextrose 50% Injectable 25 Gram(s) IV Push once  dextrose 50% Injectable 12.5 Gram(s) IV Push once  gabapentin 100 milliGRAM(s) Oral at bedtime  glucagon  Injectable 1 milliGRAM(s) IntraMuscular once  insulin lispro (ADMELOG) corrective regimen sliding scale   SubCutaneous at bedtime  insulin lispro (ADMELOG) corrective regimen sliding scale   SubCutaneous three times a day before meals  levothyroxine 75 MICROGram(s) Oral daily  linagliptin 5 milliGRAM(s) Oral <User Schedule>  metoprolol succinate ER 12.5 milliGRAM(s) Oral daily  metroNIDAZOLE    Tablet 500 milliGRAM(s) Oral every 8 hours  pantoprazole    Tablet 40 milliGRAM(s) Oral at bedtime  senna 2 Tablet(s) Oral daily    MEDICATIONS  (PRN):  bisacodyl Suppository 10 milliGRAM(s) Rectal daily PRN Constipation  cyclobenzaprine 5 milliGRAM(s) Oral two times a day PRN Muscle Spasm  dextrose Oral Gel 15 Gram(s) Oral once PRN Blood Glucose LESS THAN 70 milliGRAM(s)/deciliter  oxyCODONE    IR 5 milliGRAM(s) Oral every 6 hours PRN Severe Pain (7 - 10)      Vital Signs Last 24 Hrs  T(C): 36.5 (13 Oct 2023 05:40), Max: 36.7 (12 Oct 2023 20:28)  T(F): 97.7 (13 Oct 2023 05:40), Max: 98 (12 Oct 2023 20:28)  HR: 79 (13 Oct 2023 05:40) (74 - 88)  BP: 118/73 (13 Oct 2023 05:40) (115/67 - 135/84)  BP(mean): 90 (13 Oct 2023 05:40) (90 - 90)  RR: 17 (13 Oct 2023 05:40) (16 - 17)  SpO2: 94% (13 Oct 2023 05:40) (93% - 95%)    Parameters below as of 13 Oct 2023 05:40  Patient On (Oxygen Delivery Method): nasal cannula  O2 Flow (L/min): 1      PHYSICAL EXAM:  General: in no acute distress  Eyes: EOMI intact bilaterally. Anicteric sclerae, moist conjunctivae  HENT: Moist mucous membranes  Neck: Trachea midline, supple  Lungs: CTA B/L. No wheezes, rales, or rhonchi  Cardiovascular: RRR. No murmurs, rubs, or gallops  Abdomen: Soft, non-tender non-distended; No rebound or guarding  Extremities: WWP, No clubbing, cyanosis or edema  Neurological: Alert and oriented  Skin: Warm and dry. No obvious rash     LABS:                        14.1   16.00 )-----------( 325      ( 12 Oct 2023 05:30 )             44.1     10-12    131<L>  |  100  |  13  ----------------------------<  157<H>  4.0   |  23  |  0.92    Ca    8.5      12 Oct 2023 18:41  Phos  2.8     10-12  Mg     2.1     10-12        Urinalysis Basic - ( 12 Oct 2023 18:41 )    Color: x / Appearance: x / SG: x / pH: x  Gluc: 157 mg/dL / Ketone: x  / Bili: x / Urobili: x   Blood: x / Protein: x / Nitrite: x   Leuk Esterase: x / RBC: x / WBC x   Sq Epi: x / Non Sq Epi: x / Bacteria: x        MICROBIOLOGY:    RADIOLOGY & ADDITIONAL STUDIES: INTERVAL HPI/OVERNIGHT EVENTS: adam    SUBJECTIVE: Patient seen and examined at bedside, resting comfortably in bed, and does not appear to be in any acute distress. Patient states that his abdominal pain has improved and is only present with movement.  Patient denies any chest pain, shortness of breath, or gu sxs.    MEDICATIONS  (STANDING):  aspirin enteric coated 81 milliGRAM(s) Oral daily  atorvastatin 40 milliGRAM(s) Oral at bedtime  benzocaine/menthol Lozenge 1 Lozenge Oral four times a day  cefTRIAXone   IVPB 2000 milliGRAM(s) IV Intermittent every 24 hours  clopidogrel Tablet 75 milliGRAM(s) Oral daily  dextrose 5%. 1000 milliLiter(s) (100 mL/Hr) IV Continuous <Continuous>  dextrose 5%. 1000 milliLiter(s) (50 mL/Hr) IV Continuous <Continuous>  dextrose 50% Injectable 25 Gram(s) IV Push once  dextrose 50% Injectable 25 Gram(s) IV Push once  dextrose 50% Injectable 12.5 Gram(s) IV Push once  gabapentin 100 milliGRAM(s) Oral at bedtime  glucagon  Injectable 1 milliGRAM(s) IntraMuscular once  insulin lispro (ADMELOG) corrective regimen sliding scale   SubCutaneous at bedtime  insulin lispro (ADMELOG) corrective regimen sliding scale   SubCutaneous three times a day before meals  levothyroxine 75 MICROGram(s) Oral daily  linagliptin 5 milliGRAM(s) Oral <User Schedule>  metoprolol succinate ER 12.5 milliGRAM(s) Oral daily  metroNIDAZOLE    Tablet 500 milliGRAM(s) Oral every 8 hours  pantoprazole    Tablet 40 milliGRAM(s) Oral at bedtime  senna 2 Tablet(s) Oral daily    MEDICATIONS  (PRN):  bisacodyl Suppository 10 milliGRAM(s) Rectal daily PRN Constipation  cyclobenzaprine 5 milliGRAM(s) Oral two times a day PRN Muscle Spasm  dextrose Oral Gel 15 Gram(s) Oral once PRN Blood Glucose LESS THAN 70 milliGRAM(s)/deciliter  oxyCODONE    IR 5 milliGRAM(s) Oral every 6 hours PRN Severe Pain (7 - 10)      Vital Signs Last 24 Hrs  T(C): 36.5 (13 Oct 2023 05:40), Max: 36.7 (12 Oct 2023 20:28)  T(F): 97.7 (13 Oct 2023 05:40), Max: 98 (12 Oct 2023 20:28)  HR: 79 (13 Oct 2023 05:40) (74 - 88)  BP: 118/73 (13 Oct 2023 05:40) (115/67 - 135/84)  BP(mean): 90 (13 Oct 2023 05:40) (90 - 90)  RR: 17 (13 Oct 2023 05:40) (16 - 17)  SpO2: 94% (13 Oct 2023 05:40) (93% - 95%)    Parameters below as of 13 Oct 2023 05:40  Patient On (Oxygen Delivery Method): nasal cannula  O2 Flow (L/min): 1      PHYSICAL EXAM:  General: in no acute distress  Eyes: EOMI intact bilaterally  HENT: Moist mucous membranes  Neck: Trachea midline, supple  Lungs: CTA B/L. No wheezes, rales, or rhonchi  Cardiovascular: RRR. No murmurs, rubs, or gallops  Abdomen: firm, mild tenderness to palpation of the right upper quadrant  Extremities: WWP, No clubbing, cyanosis or edema  Neurological: Alert and oriented  Skin: Warm and dry. No obvious rash    LABS:                        14.1   16.00 )-----------( 325      ( 12 Oct 2023 05:30 )             44.1     10-12    131<L>  |  100  |  13  ----------------------------<  157<H>  4.0   |  23  |  0.92    Ca    8.5      12 Oct 2023 18:41  Phos  2.8     10-12  Mg     2.1     10-12        Urinalysis Basic - ( 12 Oct 2023 18:41 )    Color: x / Appearance: x / SG: x / pH: x  Gluc: 157 mg/dL / Ketone: x  / Bili: x / Urobili: x   Blood: x / Protein: x / Nitrite: x   Leuk Esterase: x / RBC: x / WBC x   Sq Epi: x / Non Sq Epi: x / Bacteria: x        MICROBIOLOGY:    RADIOLOGY & ADDITIONAL STUDIES:

## 2023-10-13 NOTE — PROGRESS NOTE ADULT - ATTENDING COMMENTS
I agree with the fellow's findings and plans as written above with the following additions/amendments:    Seen and examined at bedside. Urine lytes consistent with moderate SIADH. Would start urena as above for treatment. SIADH may resolve when infection no longer present. For now conitnue to monitor. FUrther recs as above

## 2023-10-13 NOTE — PROGRESS NOTE ADULT - ASSESSMENT
pt known to me from prior admission .     87 male Mandarin speaking  with PMHx CAD s/p JOSÉ to mLAD (6/2023), HTN, HLD, cervical spondylosis s/p laminectomy, gastric ulcers, hypothyroidism,  constipation prior [facility] admission for L leg pain, presented to [facility] ED 10/6/23 for dyspnea with minimal exertion, chest pressure, and orthopnea x 3 days and fever of 101.8*F day before admission- but resolved with Tylenol with no recurrence since. Reported constipation at home with response after suppository at home on Tuesday (10/3), family report his activity level has decreased due to his spinal stenosis discomfort as well. His appetite and fluid intake has decreased as well. Pt was admitted to Cardiology service/[facility] for acute hypoxic respiratory failure 2/2 acute HFpEF (BNP 1279) s/p lasix 20mg iv x1 at ED(10/6) and fever without clinical evidence of PNA (no cough/sputum) but RLQ pain and constipation with CT (10/7) findings of a gallstone ~1.8cm at the gallbladder neck and associated wall thickening and fat stranding- NM positive for acute Cholecystitis, on antibiotics, initially plan for IR perchole ( plavix held 10/9-10/10-)- symptoms improved , per=mich cancelled, plavix given on 10/11- Hyponatremia evaluated by renal -     - notes/labs/reviewed. patient seen.   event reviewed, given 3 % NS -sodium up to 131, then to 129 this morning, mentating well.  on room air during sitting, per team his saturating drop during ambulation= would need to get ambulatory oxygen checked and if low would need home oxygen set up for Acute hypoxic respiratory failure. ( likely from atelectasis, pain from cholecystitis with poor respiratory effort, encouraged mobilization, pain control. no evidence of pneumonia.   continue using incentive spirometery -has one at bedside,   - s/p Lasix 20mg iv (10/6)-> 40mg iv daily (10/7-10/9) -now off.     - Hyponatremia -mixed (dehydration from poor po intake and siadh- low serum osmolarity, given ivf and 3 % NS, sodium is 129 today, follow up renal, plan discussed plan for Urena - would need outpt follow up with renal.     -re: acute cholecystitis  Surgery f/u appreciated,  Dr. Gomez - on antibiotics, per- mich held , plavix restarted 10/11-- wbc is 16 on 10/12- 15.97 on 10/13- clinically feeling well, less pain and tolerating diet. surgery sign off, no plan for intervention this admission need follow up with surgery.   - plan antibiotics  for total 2 weeks course   - CT findings of gallstone ~ 1.8cm at the gallbladder neck and associated wall thickening and fat stranding ( Pt on Clopidogrel which needs to be kept off for at least 5 days and will need Cardiology clearance , s/p PCI to mLAD 6/2023)   - IR consult appreciated, d/w Dr.Tat Burnett , HIDA scan (10/9) positive    - Ceftriaxone 1gm iv q24hr (10/6-10/7), increased to 2gm iv q24hr ( 10/8-) , to continue IV antibiotics for now - consider changing to oral Cefpodoxime 200mg po q12hr along with flagyl upon d/c for total 2 weeks course   - added Metronidazole 500mg po q8hr ( 10/8-)   - discontinued Azithromycin 500mg iv q24hr ( 10/6-10/7)     - BCx: ngtd   - Bowel regimen for constipation : + BMs  - Endocrine f/u appreciated re: Elevated TSH/hypothyroidism:   Hypothyroidism - on Synthroid 75 mcg per day  DMII-  FS/NISS, A1C% 7.7% (10/7) , goal -180- on trajenta 5 mg along with insulin sliding scale. appreciated rec for discharge meds.     - CADs/p PCI mLAD 6/2023. c/w DAPT: ASA/ Clopidogrel restarted 10/11-    metoprolol succinate 12.5mg daily, Atorvastatin 40mg qHS , Pt not going for [facility] , Cardiologist Dr. Daryn Jarvis     - LBP//spondylosis: c/w gabapentin 100mg qHS ( can be adjusted),    cyclobenzaprine 5mg po bid, oxycodone IR 5mg po q6hr prn , tylenol prn for pain  - consider Lidocaine patch 4% AA,   - GI ppx: PPI po daily     - SCDs for DVT ppx     Contacts:  Cardiologist Dr. Daryn Jarvis   daughter: Olga Yanez [phone]    Disposition: continue antibiotics, follow up ambulatory saturation for possible need for home oxygen, follow up sodium level- home soon.     POC as d/w [facility] Cardiology team/ Dr. Scotty Chambers, renal,      pt known to me from prior admission .     87 male Mandarin speaking  with PMHx CAD s/p JOSÉ to mLAD (6/2023), HTN, HLD, cervical spondylosis s/p laminectomy, gastric ulcers, hypothyroidism,  constipation prior [facility] admission for L leg pain, presented to [facility] ED 10/6/23 for dyspnea with minimal exertion, chest pressure, and orthopnea x 3 days and fever of 101.8*F day before admission- but resolved with Tylenol with no recurrence since. Reported constipation at home with response after suppository at home on Tuesday (10/3), family report his activity level has decreased due to his spinal stenosis discomfort as well. His appetite and fluid intake has decreased as well. Pt was admitted to Cardiology service/[facility] for acute hypoxic respiratory failure 2/2 acute HFpEF (BNP 1279) s/p lasix 20mg iv x1 at ED(10/6) and fever without clinical evidence of PNA (no cough/sputum) but RLQ pain and constipation with CT (10/7) findings of a gallstone ~1.8cm at the gallbladder neck and associated wall thickening and fat stranding- NM positive for acute Cholecystitis, on antibiotics, initially plan for IR perchole ( plavix held 10/9-10/10-)- symptoms improved , per=mich cancelled, plavix given on 10/11- Hyponatremia evaluated by renal -     - notes/labs/reviewed. patient seen.   event reviewed, given 3 % NS -sodium up to 131, then to 129 this morning, mentating well.  on room air during sitting, per team his saturating drop during ambulation= would need to get ambulatory oxygen checked and if low would need home oxygen set up for Acute hypoxic respiratory failure. ( likely from atelectasis, pain from cholecystitis with poor respiratory effort, encouraged mobilization, pain control. no evidence of pneumonia.   continue using incentive spirometery -has one at bedside,   - s/p Lasix 20mg iv (10/6)-> 40mg iv daily (10/7-10/9) -now off.     - Hyponatremia -mixed (dehydration from poor po intake and siadh- low serum osmolarity, given ivf and 3 % NS, sodium is 129 today, follow up renal, plan discussed plan for Urena - would need outpt follow up with renal.     -re: acute cholecystitis  Surgery f/u appreciated,  Dr. Gomez - on antibiotics, per- mich held , plavix restarted 10/11-- wbc is 16 on 10/12- 15.97 on 10/13- clinically feeling well, less pain and tolerating diet. surgery sign off, no plan for intervention this admission need follow up with surgery.   - plan antibiotics  for total 2 weeks course   - CT findings of gallstone ~ 1.8cm at the gallbladder neck and associated wall thickening and fat stranding ( Pt on Clopidogrel which needs to be kept off for at least 5 days and will need Cardiology clearance , s/p PCI to mLAD 6/2023)   - IR consult appreciated, d/w Dr.Tat Burnett , HIDA scan (10/9) positive    - Ceftriaxone 1gm iv q24hr (10/6-10/7), increased to 2gm iv q24hr ( 10/8-) , to continue IV antibiotics for now - consider changing to oral Cefpodoxime 200mg po q12hr along with flagyl upon d/c for total 2 weeks course   - added Metronidazole 500mg po q8hr ( 10/8-)   - discontinued Azithromycin 500mg iv q24hr ( 10/6-10/7)     - BCx: ngtd   - Bowel regimen for constipation : + BMs  - Endocrine f/u appreciated re: Elevated TSH/hypothyroidism:   Hypothyroidism - on Synthroid 75 mcg per day  DMII-  FS/NISS, A1C% 7.7% (10/7) , goal -180- on trajenta 5 mg along with insulin sliding scale. appreciated rec for discharge meds.     - CADs/p PCI mLAD 6/2023. c/w DAPT: ASA/ Clopidogrel restarted 10/11-    metoprolol succinate 12.5mg daily, Atorvastatin 40mg qHS , Pt not going for [facility] , Cardiologist Dr. Daryn Jarvis     - LBP//spondylosis: c/w gabapentin 100mg qHS ( can be adjusted),    cyclobenzaprine 5mg po bid, oxycodone IR 5mg po q6hr prn , tylenol prn for pain  - consider Lidocaine patch 4% AA,   - GI ppx: PPI po daily     - SCDs for DVT ppx     Contacts:  Cardiologist Dr. Daryn Jarvis   daughter: Olga Yanez [phone]    Disposition: continue antibiotics, follow up ambulatory saturation for possible need for home oxygen, follow up sodium level- home soon.     POC as d/w [facility] Cardiology team/ Dr. Scotty Chambers, renal,      pt known to me from prior admission .     87 male Mandarin speaking  with PMHx CAD s/p JOSÉ to mLAD (6/2023), HTN, HLD, cervical spondylosis s/p laminectomy, gastric ulcers, hypothyroidism,  constipation prior [facility] admission for L leg pain, presented to [facility] ED 10/6/23 for dyspnea with minimal exertion, chest pressure, and orthopnea x 3 days and fever of 101.8*F day before admission- but resolved with Tylenol with no recurrence since. Reported constipation at home with response after suppository at home on Tuesday (10/3), family report his activity level has decreased due to his spinal stenosis discomfort as well. His appetite and fluid intake has decreased as well. Pt was admitted to Cardiology service/[facility] for acute hypoxic respiratory failure 2/2 acute HFpEF (BNP 1279) s/p lasix 20mg iv x1 at ED(10/6) and fever without clinical evidence of PNA (no cough/sputum) but RLQ pain and constipation with CT (10/7) findings of a gallstone ~1.8cm at the gallbladder neck and associated wall thickening and fat stranding- NM positive for acute Cholecystitis, on antibiotics, initially plan for IR perchole ( plavix held 10/9-10/10-)- symptoms improved , per=mich cancelled, plavix given on 10/11- Hyponatremia evaluated by renal -     - notes/labs/reviewed. patient seen.   event reviewed, given 3 % NS -sodium up to 131, then to 129 this morning, mentating well.  on room air during sitting, per team his saturating drop during ambulation= would need to get ambulatory oxygen checked and if low would need home oxygen set up for Acute hypoxic respiratory failure. ( likely from atelectasis, pain from cholecystitis with poor respiratory effort, encouraged mobilization, pain control. no evidence of pneumonia.   continue using incentive spirometery -has one at bedside,   - s/p Lasix 20mg iv (10/6)-> 40mg iv daily (10/7-10/9) -now off.     - Hyponatremia -mixed (dehydration from poor po intake and siadh- low serum osmolarity, given ivf and 3 % NS, sodium is 129 today, follow up renal, plan discussed plan for Urena - would need outpt follow up with renal.     -re: acute cholecystitis  Surgery f/u appreciated,  Dr. Gomez - on antibiotics, per- mich held , plavix restarted 10/11-- wbc is 16 on 10/12- 15.97 on 10/13- clinically feeling well, less pain and tolerating diet. surgery sign off, no plan for intervention this admission need follow up with surgery.   - plan antibiotics  for total 2 weeks course   - CT findings of gallstone ~ 1.8cm at the gallbladder neck and associated wall thickening and fat stranding ( Pt on Clopidogrel which needs to be kept off for at least 5 days and will need Cardiology clearance , s/p PCI to mLAD 6/2023)   - IR consult appreciated, d/w Dr.Tat Burnett , HIDA scan (10/9) positive    - Ceftriaxone 1gm iv q24hr (10/6-10/7), increased to 2gm iv q24hr ( 10/8-) , to continue IV antibiotics for now - consider changing to oral Cefpodoxime 200mg po q12hr along with flagyl upon d/c for total 2 weeks course   - added Metronidazole 500mg po q8hr ( 10/8-)   - discontinued Azithromycin 500mg iv q24hr ( 10/6-10/7)     - BCx: ngtd   - Bowel regimen for constipation : + BMs  - Endocrine f/u appreciated re: Elevated TSH/hypothyroidism:   Hypothyroidism - on Synthroid 75 mcg per day  DMII-  FS/NISS, A1C% 7.7% (10/7) , goal -180- on trajenta 5 mg along with insulin sliding scale. appreciated rec for discharge meds.     - CADs/p PCI mLAD 6/2023. c/w DAPT: ASA/ Clopidogrel restarted 10/11-    metoprolol succinate 12.5mg daily, Atorvastatin 40mg qHS , Pt not going for [facility] , Cardiologist Dr. Daryn Jarvis     - LBP//spondylosis: c/w gabapentin 100mg qHS ( can be adjusted),    cyclobenzaprine 5mg po bid, oxycodone IR 5mg po q6hr prn , tylenol prn for pain  - consider Lidocaine patch 4% AA,   - GI ppx: PPI po daily     - SCDs for DVT ppx     Contacts:  Cardiologist Dr. Daryn Jarvis   daughter: Olga Yanez [phone]    Disposition: continue antibiotics, follow up ambulatory saturation for possible need for home oxygen, follow up sodium level- home soon.     POC as d/w [facility] Cardiology team/ Dr. Scotty Chambers, renal,

## 2023-10-13 NOTE — PROGRESS NOTE ADULT - ASSESSMENT
Patient is a 87M, (shellfish rxn w/ Lip Swelling ~ 20 yrs ago, does not eat shellfish since then),  w/ PMHx CAD s/p JOSÉ to mLAD (6/2023), HTN, HLD, cervical spondylosis s/p laminectomy, gastric ulcers, hypothyroidism,  who presents to [facility] ED 10/6/23 for BELLO with minimal exertion, chest pressure, and orthopnea x 3 days. Also reports a fever of 101.8 yesterday but resolved with Tylenol with no recurrence since. Reported constipation at home with response after suppository at home on Tuesday (10/3), however, no BM since then. Does report pinpoint RUQ abdominal discomfort worse with palpation x 1-2 days. 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 His appetite and fluid intake has decreased as well. CXR with concern for CHF vs PNA. Negative troponin and EKG nonischemic. Patient is now admitted for cholecystitis.           Patient is a 87M, (shellfish rxn w/ Lip Swelling ~ 20 yrs ago, does not eat shellfish since then),  w/ PMHx CAD s/p JOSÉ to mLAD (6/2023), HTN, HLD, cervical spondylosis s/p laminectomy, gastric ulcers, hypothyroidism,  who presents to [facility] ED 10/6/23 for BELLO with minimal exertion, chest pressure, and orthopnea x 3 days. Also reports a fever of 101.8 yesterday but resolved with Tylenol with no recurrence since. Reported constipation at home with response after suppository at home on Tuesday (10/3), however, no BM since then. Does report pinpoint RUQ abdominal discomfort worse with palpation x 1-2 days. 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 His appetite and fluid intake has decreased as well. CXR with concern for CHF vs PNA. Negative troponin and EKG nonischemic. Patient is now admitted for cholecystitis.           Patient is a 87M, (shellfish rxn w/ Lip Swelling ~ 20 yrs ago, does not eat shellfish since then),  w/ PMHx CAD s/p JOSÉ to mLAD (6/2023), HTN, HLD, cervical spondylosis s/p laminectomy, gastric ulcers, hypothyroidism,  who presents to [facility] ED 10/6/23 for BELLO with minimal exertion, chest pressure, and orthopnea x 3 days. Also reports a fever of 101.8 yesterday but resolved with Tylenol with no recurrence since. Reported constipation at home with response after suppository at home on Tuesday (10/3), however, no BM since then. Does report pinpoint RUQ abdominal discomfort worse with palpation x 1-2 days. 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 His appetite and fluid intake has decreased as well. CXR with concern for CHF vs PNA. Negative troponin and EKG nonischemic. Patient is now admitted for cholecystitis.

## 2023-10-13 NOTE — PROGRESS NOTE ADULT - PROBLEM SELECTOR PLAN 11
F: s/p 2000 cc ns, 3% ns 500ml  E: Replace if K < 4, Mag < 2  N: CC diet  GI: ppi  DVT: plavix  Dispo: 5uris F: s/p 2000 cc ns, 3% ns  E: Replace if K < 4, Mag < 2  N: CC diet  GI: ppi  DVT: plavix  Dispo: 5uris

## 2023-10-13 NOTE — PROGRESS NOTE ADULT - ASSESSMENT
Patient is a 87y Male w/ PMH of hypothyroidism whom presented to the hospital with SOB and CXR with concern for CHF vs PNA. Negative troponin and EKG nonischemic. Echo showing nl EF, noted hyponatremia s/p Lasix use. Nephrology consulted for further management and recommendations.     Na 131-->129 in 24 hrs  U Na: 77--> 66  U osmo: 371-->469    Imp: Hyponatremia 2/2 diuresis (lasix) vs SIADH    Plan:  - start 15g daily UreNa   - recheck BMP s/p hypertonic fluids today  - recheck U lytes and Osm this evening and tomorrow AM with AM BMP  - goal Na 135  - f/u endo recs for hypothyroid management   Patient is a 87y Male w/ PMH of hypothyroidism whom presented to the hospital with SOB and CXR with concern for CHF vs PNA. Negative troponin and EKG nonischemic. Echo showing nl EF, noted hyponatremia s/p Lasix use. Nephrology consulted for further management and recommendations.     Na 131-->129 in 24 hrs  U Na: 77--> 66  U osmo: 371-->469    Imp: Hyponatremia s/p diuresis (lasix), now consistent with SIADH    Plan:  - start 15g daily UreNa   - recheck BMP s/p hypertonic fluids today  - recheck U lytes and Osm this evening and tomorrow AM with AM BMP  - goal Na 135  - f/u endo recs for hypothyroid management  - DC with Nephrology f/u

## 2023-10-13 NOTE — PROGRESS NOTE ADULT - PROBLEM SELECTOR PLAN 1
9/13: TSH 7.05, free T4 1.4  10/7: TSH 10.5, free T4 1.47  10/9: TSH 6.39. free T4 1.58  - Was on alternating levothyroxine doses of 50mcg and 100mcg, and not taking consistently.   - Change levothyroxine to 75 mcg oral daily to simplify regimen. (Instead of alternating doses)  - Recheck TSH and Free T4 in 4-6 weeks outpatient.    - He should continue to follow-up with his endocrinologist for further titration of his levothyroxine

## 2023-10-13 NOTE — PROGRESS NOTE ADULT - SUBJECTIVE AND OBJECTIVE BOX
3/17/2017    Deisi Liu MD  [facility]   [address] [place] [address]    RE: Victoria Montalvo       Dear Colleague,    I had the pleasure of seeing both Victoria Montalvo and her  today in Cardiology Clinic followup today.  She is a pleasant 79-year-old patient of Dr. Yepez's with a history of subclavian stenosis, status post percutaneous revascularization, hypertension and lower extremity edema.  She recently saw Dr. Yepez and was doing well but complaining of lower extremity edema.  She is wearing compression stockings at the time but wondered if there was anything else that could be done.  He recommended venous competency studies and followup with me today.      Her venous competency studies did confirm venous insufficiency.  The right was worse than the left, but she did have it bilaterally with 6.1 seconds noted at the midcalf on the right and between 1.1 in 4 seconds of reflux noted in the left proximal calf through to the ankle.  She complains of lower extremity edema, worse in the evening.  She also complains of leg fatigue.      She also tells me today that she is having exertional dyspnea.  She notices that it has gotten worse recently.  Going up the stairs will provoke it, walking short distances will also provoke it.  She has vague associated chest pain with it, sometimes she gets chest discomfort just at rest.  She will describe it as a twinge, sometimes she gets it with exertion, and the other thing she has noted more recently is a general sense of fatigue.      PHYSICAL EXAMINATION:   GENERAL:  A 79-year-old woman appears healthy.   VITAL SIGNS:  Blood pressure 110/68, heart rate 80, weight 176 pounds, BMI of 29.   LUNGS:  Clear throughout auscultation.   CARDIAC:  Rate regular, normal S1, S2.   EXTREMITIES:  She has bilateral lower extremity edema, right greater than left, with superficial varicosities.     Outpatient Encounter Prescriptions as of 3/17/2017    Medication Sig Dispense Refill     fluticasone-salmeterol (ADVAIR) 250-50 MCG/DOSE diskus inhaler Inhale 1 puff into the lungs every 12 hours       biotin 2.5 mg/mL Take by mouth daily 2000mcg       [DISCONTINUED] methimazole (TAPAZOLE) 10 MG tablet Take 1 tablet (10 mg) by mouth daily 30 tablet 3     spironolactone (ALDACTONE) 25 MG tablet Take 0.5 tablets (12.5 mg) by mouth daily (Patient taking differently: Take 12.5 mg by mouth Mon-wed-fri) 45 tablet 12     furosemide (LASIX) 20 MG tablet Take 1 tablet (20 mg) by mouth daily 30 tablet 0     omeprazole (PRILOSEC) 20 MG capsule Take 1 capsule (20 mg) by mouth daily 90 capsule 3     montelukast (SINGULAIR) 10 MG tablet Take 1 tablet (10 mg) by mouth At Bedtime 90 tablet 3     [DISCONTINUED] pravastatin (PRAVACHOL) 40 MG tablet Take 1 tablet (40 mg) by mouth daily 90 tablet 3     albuterol (PROAIR HFA, PROVENTIL HFA, VENTOLIN HFA) 108 (90 BASE) MCG/ACT inhaler Inhale 2 puffs into the lungs every 6 hours as needed for shortness of breath / dyspnea or wheezing 3 Inhaler 0     albuterol (2.5 MG/3ML) 0.083% nebulizer solution Take 1 vial (2.5 mg) by nebulization every 6 hours as needed for shortness of breath / dyspnea (Patient not taking: Reported on 5/11/2017) 1 Box 3     fluticasone (FLONASE) 50 MCG/ACT nasal spray Spray 1-2 sprays into both nostrils daily 48 g 3     Cholecalciferol (VITAMIN D3 PO) Take 2,000 Units by mouth daily        [DISCONTINUED] COENZYME Q-10 PO Take 400 mg by mouth daily       ORDER FOR DME Equipment being ordered: APAP 5-20cm/H2O full face mask with heated humidified air. 1 Device 0     ORDER FOR DME Equipment being ordered: CPAP  Auto-CPAP 5-25 cm H2O   Full face mask with heated humidified air 1 Device 0     aspirin 81 MG tablet Take 81 mg by mouth daily.       omega-3 fatty acids (FISH OIL DOUBLE STRENGTH) 1200 MG capsule Take 1 capsule by mouth 2 times daily        Calcium 3895-5916 MG-UNIT CHEW Take 1 tablet by mouth daily.        Boswellia-Glucosamine-Vit D (OSTEO BI-FLEX/5-LOXIN ADVANCED) TABS Take 1,500 mg by mouth 2 times daily        MAGNESIUM ACETATE Take 250 mg by mouth daily        cycloSPORINE (RESTASIS) 0.05 % ophthalmic emulsion Place 1 drop into both eyes 2 times daily        No facility-administered encounter medications on file as of 3/17/2017.         ASSESSMENT AND PLAN:   1.  Symptomatic venous insufficiency with superficial varicosities and venous competency studies demonstrating venous insufficiency.  She is currently wearing stockings, but they are not a great fit for her.  I fitted her with proper Jobst stockings, knee high with 20-30 mmHg for compression.  She put a pair of these on in our office and felt already relief just since she put them on.  She was quite excited to have it.  I tentatively set her up for an ablation of her right leg in 3 months with Dr. Yepez, with an ultrasound following it.  My schedule is not out that far yet, but she should see me prior to make sure the stockings are helping.   2.  Exertional shortness of breath with occasional chest discomfort with a history of subclavian stenosis.  I did recommend a nuclear stress test for her to look for any large areas of ischemia.  She is currently on a statin.  She is not on a beta blocker, presumably for her asthma.  Her last echocardiogram showed an EF of 60%-65% and diastolic dysfunction.      Thank you for allowing me to see June today.  I would like her to have her nuclear stress test next week, and I can see her next Friday to review the results.  She should continue to wear her compression stockings between now and for the next few months and see how she does with that.  If that does help with her symptoms, then I would have her follow up with a venous ablation after that.      Thank you for allowing me to see June today.     Sincerely,    NIMESH Espinoza [facility]       NEPHROLOGY CONSULTATION NOTE    Patient is a 87y Male whom presented to the hospital with hyponatremia which has since improved from admission. S/p hypertonic saline yesterday and mentating well today. Pt has no new complaints    PAST MEDICAL & SURGICAL HISTORY:  HLD (hyperlipidemia)      Hypothyroid      History of BPH      CAD (coronary artery disease)      S/P laminectomy with spinal fusion        Allergies:  No Known Drug Allergies  shellfish (Swelling; Hives)    Home Medications Reviewed  Hospital Medications:   MEDICATIONS  (STANDING):  aspirin enteric coated 81 milliGRAM(s) Oral daily  atorvastatin 40 milliGRAM(s) Oral at bedtime  benzocaine/menthol Lozenge 1 Lozenge Oral four times a day  cefTRIAXone   IVPB 2000 milliGRAM(s) IV Intermittent every 24 hours  clopidogrel Tablet 75 milliGRAM(s) Oral daily  dextrose 5%. 1000 milliLiter(s) (50 mL/Hr) IV Continuous <Continuous>  dextrose 5%. 1000 milliLiter(s) (100 mL/Hr) IV Continuous <Continuous>  dextrose 50% Injectable 25 Gram(s) IV Push once  dextrose 50% Injectable 12.5 Gram(s) IV Push once  dextrose 50% Injectable 25 Gram(s) IV Push once  gabapentin 100 milliGRAM(s) Oral at bedtime  glucagon  Injectable 1 milliGRAM(s) IntraMuscular once  insulin lispro (ADMELOG) corrective regimen sliding scale   SubCutaneous at bedtime  insulin lispro (ADMELOG) corrective regimen sliding scale   SubCutaneous three times a day before meals  levothyroxine 75 MICROGram(s) Oral daily  linagliptin 5 milliGRAM(s) Oral <User Schedule>  metoprolol succinate ER 12.5 milliGRAM(s) Oral daily  metroNIDAZOLE    Tablet 500 milliGRAM(s) Oral every 8 hours  pantoprazole    Tablet 40 milliGRAM(s) Oral at bedtime  senna 2 Tablet(s) Oral daily  sodium chloride 3%. 500 milliLiter(s) (40 mL/Hr) IV Continuous <Continuous>  urea Oral Powder 15 Gram(s) Oral daily      SOCIAL HISTORY:  Denies ETOH,Smoking,   FAMILY HISTORY:        REVIEW OF SYSTEMS:  All other review of systems is negative unless indicated above.    VITALS:  T(F): 97.7 (10-13-23 @ 09:05), Max: 98 (10-12-23 @ 20:28)  HR: 78 (10-13-23 @ 09:05)  BP: 134/86 (10-13-23 @ 09:05)  RR: 16 (10-13-23 @ 09:05)  SpO2: 93% (10-13-23 @ 09:05)    10-12 @ 07:01  -  10-13 @ 07:00  --------------------------------------------------------  IN: 660 mL / OUT: 900 mL / NET: -240 mL    10-13 @ 07:01  -  10-13 @ 12:44  --------------------------------------------------------  IN: 180 mL / OUT: 100 mL / NET: 80 mL            I&O's Detail    12 Oct 2023 07:01  -  13 Oct 2023 07:00  --------------------------------------------------------  IN:    Oral Fluid: 660 mL  Total IN: 660 mL    OUT:    Voided (mL): 900 mL  Total OUT: 900 mL    Total NET: -240 mL      13 Oct 2023 07:01  -  13 Oct 2023 12:44  --------------------------------------------------------  IN:    Oral Fluid: 180 mL  Total IN: 180 mL    OUT:    Voided (mL): 100 mL  Total OUT: 100 mL    Total NET: 80 mL            PHYSICAL EXAM:  Constitutional: NAD, lying in bed comfortably  HEENT: anicteric sclera, oropharynx clear, MMM  Neck: No JVD, trachea midline  Respiratory: CTAB, no wheezes, rales or rhonchi  Cardiovascular: S1, S2, RRR  Gastrointestinal: BS+, soft, NT/ND  Extremities: No cyanosis or clubbing. No peripheral edema  Neurological: A/O x 3, no focal deficits  Psychiatric: Normal mood, normal affect  : No CVA tenderness. No christian.   Vascular Access:    LABS:  10-13    129<L>  |  98  |  13  ----------------------------<  117<H>  4.3   |  20<L>  |  1.02    Ca    8.6      13 Oct 2023 05:30  Phos  2.9     10-13  Mg     2.1     10-13    TPro  6.5  /  Alb  2.5<L>  /  TBili  0.4  /  DBili      /  AST  18  /  ALT  14  /  AlkPhos  114  10-13    Creatinine Trend: 1.02 <--, 0.92 <--, 0.98 <--, 0.97 <--, 0.84 <--, 0.94 <--, 1.02 <--, 1.14 <--, 1.09 <--, 1.01 <--, 1.13 <--, 0.97 <--, 1.16 <--, 1.10 <--, 1.12 <--, 1.05 <--, 1.23 <--                        14.6   15.97 )-----------( 342      ( 13 Oct 2023 05:30 )             45.8     Urine Studies:  Urinalysis Basic - ( 13 Oct 2023 05:30 )    Color:  / Appearance:  / SG:  / pH:   Gluc: 117 mg/dL / Ketone:   / Bili:  / Urobili:    Blood:  / Protein:  / Nitrite:    Leuk Esterase:  / RBC:  / WBC    Sq Epi:  / Non Sq Epi:  / Bacteria:       Osmolality, Random Urine: 469 mosm/kg (10-13 @ 11:50)  Sodium, Random Urine: 66 mmol/L (10-13 @ 11:50)  Sodium, Random Urine: 77 mmol/L (10-12 @ 05:30)  Creatinine, Random Urine: 49 mg/dL (10-12 @ 05:30)  Osmolality, Random Urine: 371 mosm/kg (10-12 @ 05:30)  Sodium, Random Urine: 20 mmol/L (10-10 @ 15:40)  Creatinine, Random Urine: 91 mg/dL (10-10 @ 15:40)  Osmolality, Random Urine: 424 mosm/kg (10-10 @ 15:40)  Sodium, Random Urine: 59 mmol/L (10-08 @ 10:05)  Creatinine, Random Urine: 23 mg/dL (10-08 @ 10:05)  Osmolality, Random Urine: 274 mosm/kg (10-08 @ 10:05)  Osmolality, Random Urine: 357 mosm/kg (10-06 @ 18:27)  Sodium, Random Urine: 70 mmol/L (10-06 @ 18:27)  Creatinine, Random Urine: 68 mg/dL (10-06 @ 18:27)            RADIOLOGY & ADDITIONAL STUDIES:                 NEPHROLOGY CONSULTATION NOTE    Patient is a 87y Male whom presented to the hospital with hyponatremia which has since improved from admission. S/p hypertonic saline yesterday and mentating well today. Pt has no new complaints    PAST MEDICAL & SURGICAL HISTORY:  HLD (hyperlipidemia)      Hypothyroid      History of BPH      CAD (coronary artery disease)      S/P laminectomy with spinal fusion        Allergies:  No Known Drug Allergies  shellfish (Swelling; Hives)    Home Medications Reviewed  Hospital Medications:   MEDICATIONS  (STANDING):  aspirin enteric coated 81 milliGRAM(s) Oral daily  atorvastatin 40 milliGRAM(s) Oral at bedtime  benzocaine/menthol Lozenge 1 Lozenge Oral four times a day  cefTRIAXone   IVPB 2000 milliGRAM(s) IV Intermittent every 24 hours  clopidogrel Tablet 75 milliGRAM(s) Oral daily  dextrose 5%. 1000 milliLiter(s) (50 mL/Hr) IV Continuous <Continuous>  dextrose 5%. 1000 milliLiter(s) (100 mL/Hr) IV Continuous <Continuous>  dextrose 50% Injectable 25 Gram(s) IV Push once  dextrose 50% Injectable 12.5 Gram(s) IV Push once  dextrose 50% Injectable 25 Gram(s) IV Push once  gabapentin 100 milliGRAM(s) Oral at bedtime  glucagon  Injectable 1 milliGRAM(s) IntraMuscular once  insulin lispro (ADMELOG) corrective regimen sliding scale   SubCutaneous at bedtime  insulin lispro (ADMELOG) corrective regimen sliding scale   SubCutaneous three times a day before meals  levothyroxine 75 MICROGram(s) Oral daily  linagliptin 5 milliGRAM(s) Oral <User Schedule>  metoprolol succinate ER 12.5 milliGRAM(s) Oral daily  metroNIDAZOLE    Tablet 500 milliGRAM(s) Oral every 8 hours  pantoprazole    Tablet 40 milliGRAM(s) Oral at bedtime  senna 2 Tablet(s) Oral daily  sodium chloride 3%. 500 milliLiter(s) (40 mL/Hr) IV Continuous <Continuous>  urea Oral Powder 15 Gram(s) Oral daily      SOCIAL HISTORY:  Denies ETOH,Smoking,   FAMILY HISTORY:        REVIEW OF SYSTEMS:  All other review of systems is negative unless indicated above.    VITALS:  T(F): 97.7 (10-13-23 @ 09:05), Max: 98 (10-12-23 @ 20:28)  HR: 78 (10-13-23 @ 09:05)  BP: 134/86 (10-13-23 @ 09:05)  RR: 16 (10-13-23 @ 09:05)  SpO2: 93% (10-13-23 @ 09:05)    10-12 @ 07:01  -  10-13 @ 07:00  --------------------------------------------------------  IN: 660 mL / OUT: 900 mL / NET: -240 mL    10-13 @ 07:01  -  10-13 @ 12:44  --------------------------------------------------------  IN: 180 mL / OUT: 100 mL / NET: 80 mL            I&O's Detail    12 Oct 2023 07:01  -  13 Oct 2023 07:00  --------------------------------------------------------  IN:    Oral Fluid: 660 mL  Total IN: 660 mL    OUT:    Voided (mL): 900 mL  Total OUT: 900 mL    Total NET: -240 mL      13 Oct 2023 07:01  -  13 Oct 2023 12:44  --------------------------------------------------------  IN:    Oral Fluid: 180 mL  Total IN: 180 mL    OUT:    Voided (mL): 100 mL  Total OUT: 100 mL    Total NET: 80 mL            PHYSICAL EXAM:  Constitutional: NAD, lying in bed comfortably  HEENT: anicteric sclera, oropharynx clear, MMM  Neck: No JVD, trachea midline  Respiratory: CTAB, no wheezes, rales or rhonchi  Cardiovascular: S1, S2, RRR  Gastrointestinal: BS+, soft, NT/ND  Extremities: No cyanosis or clubbing. No peripheral edema  Neurological: A/O x 3, no focal deficits  Psychiatric: Normal mood, normal affect  : No CVA tenderness. No christian.   Vascular Access: none    LABS:  10-13    129<L>  |  98  |  13  ----------------------------<  117<H>  4.3   |  20<L>  |  1.02    Ca    8.6      13 Oct 2023 05:30  Phos  2.9     10-13  Mg     2.1     10-13    TPro  6.5  /  Alb  2.5<L>  /  TBili  0.4  /  DBili      /  AST  18  /  ALT  14  /  AlkPhos  114  10-13    Creatinine Trend: 1.02 <--, 0.92 <--, 0.98 <--, 0.97 <--, 0.84 <--, 0.94 <--, 1.02 <--, 1.14 <--, 1.09 <--, 1.01 <--, 1.13 <--, 0.97 <--, 1.16 <--, 1.10 <--, 1.12 <--, 1.05 <--, 1.23 <--                        14.6   15.97 )-----------( 342      ( 13 Oct 2023 05:30 )             45.8     Urine Studies:  Urinalysis Basic - ( 13 Oct 2023 05:30 )    Color:  / Appearance:  / SG:  / pH:   Gluc: 117 mg/dL / Ketone:   / Bili:  / Urobili:    Blood:  / Protein:  / Nitrite:    Leuk Esterase:  / RBC:  / WBC    Sq Epi:  / Non Sq Epi:  / Bacteria:       Osmolality, Random Urine: 469 mosm/kg (10-13 @ 11:50)  Sodium, Random Urine: 66 mmol/L (10-13 @ 11:50)  Sodium, Random Urine: 77 mmol/L (10-12 @ 05:30)  Creatinine, Random Urine: 49 mg/dL (10-12 @ 05:30)  Osmolality, Random Urine: 371 mosm/kg (10-12 @ 05:30)  Sodium, Random Urine: 20 mmol/L (10-10 @ 15:40)  Creatinine, Random Urine: 91 mg/dL (10-10 @ 15:40)  Osmolality, Random Urine: 424 mosm/kg (10-10 @ 15:40)  Sodium, Random Urine: 59 mmol/L (10-08 @ 10:05)  Creatinine, Random Urine: 23 mg/dL (10-08 @ 10:05)  Osmolality, Random Urine: 274 mosm/kg (10-08 @ 10:05)  Osmolality, Random Urine: 357 mosm/kg (10-06 @ 18:27)  Sodium, Random Urine: 70 mmol/L (10-06 @ 18:27)  Creatinine, Random Urine: 68 mg/dL (10-06 @ 18:27)            RADIOLOGY & ADDITIONAL STUDIES:

## 2023-10-13 NOTE — PROGRESS NOTE ADULT - PROBLEM SELECTOR PLAN 2
Reports sharp pinpoint RUQ discomfort worsened with palpation x 1-2 days. Last BM 10/3 w/ suppository at home. Daughter reports decreased eating and fluid intake at home lately.  RUQUS: cholelithiasis  CTAP: acute cholecystitis with fat stranding  HIDA: non visualization of the gallbladder, likely representing acute cholecystitis    - surgery consulted, f/u recs  - per surgery, pt will continue with abx and follow up outpatient  - IR consulted, f/u recs  - c/w ctx to 2gm (end date10/22)  - c/w flagyl 500mg q8 (end date 10/23) Reports sharp pinpoint RUQ discomfort worsened with palpation x 1-2 days. Last BM 10/3 w/ suppository at home. Daughter reports decreased eating and fluid intake at home lately.  RUQUS: cholelithiasis  CTAP: acute cholecystitis with fat stranding  HIDA: non visualization of the gallbladder, likely representing acute cholecystitis    - surgery consulted, f/u recs  - per surgery, pt will continue with abx and follow up outpatient  - IR consulted, f/u recs  - c/w ctx to 2gm (end date10/23)  - c/w flagyl 500mg q8 (end date 10/23)

## 2023-10-13 NOTE — PROGRESS NOTE ADULT - PROBLEM SELECTOR PLAN 2
Type 2 diabetes mellitus with hyperglycemia  - Please continue tradjenta 5mg daily before breakfast.  - Diet: consistent carbohydrate.   - Please continue with lispro moderate dose sliding scale before meals and at bedtime for now.   - Patient's fingerstick glucose goal is 100-180 mg/dL.    - Discharge recommendations: Januvia 100mg daily before breakfast.  - Patient can follow up at discharge with [facility] by calling [phone] to make an appointment.      Case discussed with Dr. Ness. Primary team updated. Type 2 diabetes mellitus with hyperglycemia  - Please continue tradjenta 5mg daily before breakfast.  - Diet: consistent carbohydrate.   - Please continue with lispro moderate dose sliding scale before meals and at bedtime for now.   - Patient's fingerstick glucose goal is 100-180 mg/dL.    - Discharge recommendations: Januvia 100mg daily before breakfast.  - Patient can follow up at discharge with [facility] by calling [phone] to make an appointment.      Case discussed with Dr. Ness. Primary team updated. Type 2 diabetes mellitus with hyperglycemia  - Please continue tradjenta 5mg daily before breakfast.  - Diet: consistent carbohydrate.   - Please continue with lispro moderate dose sliding scale before meals and at bedtime for now.   - Patient's fingerstick glucose goal is 100-180 mg/dL.    - Discharge recommendations: Januvia 100mg daily before breakfast.  - Patient can follow up at discharge with [facility] by calling [phone] to make an appointment.      Case discussed with Dr. Ness. Primary team updated.

## 2023-10-13 NOTE — PROGRESS NOTE ADULT - PROBLEM SELECTOR PLAN 4
found to have elevated glucose on BMP  A1c found to be 7.7. no hx of diabetes    - monitor finger sticks continue consistent carb diet  - endocrinology consulted, f/u recs  - per endo, c/w moderate dose sliding scale found to have elevated glucose on BMP  A1c found to be 7.7. no hx of diabetes    - monitor finger sticks continue consistent carb diet  - endocrinology consulted, f/u recs  - per asiya, c/w moderate dose sliding scale, and tradjenta  - d/c with januvia

## 2023-10-13 NOTE — PROGRESS NOTE ADULT - PROBLEM SELECTOR PLAN 3
WBC 16.26 elevated on admission. Reported fever at home yesterday improved w/ Tylenol. Likely in the setting of cholecystitis. procal 0.25 elevated s/p abx IV Ceftriaxone x 5 days and Azithromycin x 1 in ED. Continued both abx for now.    - c/w ctx to 2gm (end date10/22)  - c/w flagyl 500mg q8 (end date 10/23)  - f/u blood cultures ngtd WBC 16.26 elevated on admission. Reported fever at home yesterday improved w/ Tylenol. Likely in the setting of cholecystitis. procal 0.25 elevated s/p abx IV Ceftriaxone x 5 days and Azithromycin x 1 in ED. Continued both abx for now.    - c/w ctx to 2gm (end date10/23)  - c/w flagyl 500mg q8 (end date 10/23)  - f/u blood cultures ngtd

## 2023-10-14 ENCOUNTER — TRANSCRIPTION ENCOUNTER (OUTPATIENT)
Age: 88
End: 2023-10-14

## 2023-10-14 VITALS
SYSTOLIC BLOOD PRESSURE: 114 MMHG | DIASTOLIC BLOOD PRESSURE: 73 MMHG | RESPIRATION RATE: 17 BRPM | HEART RATE: 67 BPM | OXYGEN SATURATION: 95 %

## 2023-10-14 LAB
GLUCOSE BLDC GLUCOMTR-MCNC: 114 MG/DL — HIGH (ref 70–99)
GLUCOSE BLDC GLUCOMTR-MCNC: 145 MG/DL — HIGH (ref 70–99)

## 2023-10-14 PROCEDURE — 36415 COLL VENOUS BLD VENIPUNCTURE: CPT

## 2023-10-14 PROCEDURE — 83935 ASSAY OF URINE OSMOLALITY: CPT

## 2023-10-14 PROCEDURE — 85652 RBC SED RATE AUTOMATED: CPT

## 2023-10-14 PROCEDURE — 71045 X-RAY EXAM CHEST 1 VIEW: CPT

## 2023-10-14 PROCEDURE — 93306 TTE W/DOPPLER COMPLETE: CPT

## 2023-10-14 PROCEDURE — 84443 ASSAY THYROID STIM HORMONE: CPT

## 2023-10-14 PROCEDURE — 78226 HEPATOBILIARY SYSTEM IMAGING: CPT

## 2023-10-14 PROCEDURE — 82728 ASSAY OF FERRITIN: CPT

## 2023-10-14 PROCEDURE — 83735 ASSAY OF MAGNESIUM: CPT

## 2023-10-14 PROCEDURE — 99232 SBSQ HOSP IP/OBS MODERATE 35: CPT

## 2023-10-14 PROCEDURE — 84100 ASSAY OF PHOSPHORUS: CPT

## 2023-10-14 PROCEDURE — 85730 THROMBOPLASTIN TIME PARTIAL: CPT

## 2023-10-14 PROCEDURE — 84300 ASSAY OF URINE SODIUM: CPT

## 2023-10-14 PROCEDURE — 82570 ASSAY OF URINE CREATININE: CPT

## 2023-10-14 PROCEDURE — 87086 URINE CULTURE/COLONY COUNT: CPT

## 2023-10-14 PROCEDURE — 80048 BASIC METABOLIC PNL TOTAL CA: CPT

## 2023-10-14 PROCEDURE — 80061 LIPID PANEL: CPT

## 2023-10-14 PROCEDURE — 84145 PROCALCITONIN (PCT): CPT

## 2023-10-14 PROCEDURE — 97116 GAIT TRAINING THERAPY: CPT

## 2023-10-14 PROCEDURE — 74177 CT ABD & PELVIS W/CONTRAST: CPT

## 2023-10-14 PROCEDURE — 82550 ASSAY OF CK (CPK): CPT

## 2023-10-14 PROCEDURE — 83880 ASSAY OF NATRIURETIC PEPTIDE: CPT

## 2023-10-14 PROCEDURE — 81001 URINALYSIS AUTO W/SCOPE: CPT

## 2023-10-14 PROCEDURE — 86850 RBC ANTIBODY SCREEN: CPT

## 2023-10-14 PROCEDURE — 82962 GLUCOSE BLOOD TEST: CPT

## 2023-10-14 PROCEDURE — 99233 SBSQ HOSP IP/OBS HIGH 50: CPT

## 2023-10-14 PROCEDURE — 85025 COMPLETE CBC W/AUTO DIFF WBC: CPT

## 2023-10-14 PROCEDURE — 99285 EMERGENCY DEPT VISIT HI MDM: CPT

## 2023-10-14 PROCEDURE — 74019 RADEX ABDOMEN 2 VIEWS: CPT

## 2023-10-14 PROCEDURE — 83690 ASSAY OF LIPASE: CPT

## 2023-10-14 PROCEDURE — 82553 CREATINE MB FRACTION: CPT

## 2023-10-14 PROCEDURE — 83930 ASSAY OF BLOOD OSMOLALITY: CPT

## 2023-10-14 PROCEDURE — 84439 ASSAY OF FREE THYROXINE: CPT

## 2023-10-14 PROCEDURE — 83550 IRON BINDING TEST: CPT

## 2023-10-14 PROCEDURE — 97162 PT EVAL MOD COMPLEX 30 MIN: CPT

## 2023-10-14 PROCEDURE — 86900 BLOOD TYPING SEROLOGIC ABO: CPT

## 2023-10-14 PROCEDURE — 86901 BLOOD TYPING SEROLOGIC RH(D): CPT

## 2023-10-14 PROCEDURE — 80053 COMPREHEN METABOLIC PANEL: CPT

## 2023-10-14 PROCEDURE — 81003 URINALYSIS AUTO W/O SCOPE: CPT

## 2023-10-14 PROCEDURE — 84540 ASSAY OF URINE/UREA-N: CPT

## 2023-10-14 PROCEDURE — 84484 ASSAY OF TROPONIN QUANT: CPT

## 2023-10-14 PROCEDURE — 83540 ASSAY OF IRON: CPT

## 2023-10-14 PROCEDURE — 85610 PROTHROMBIN TIME: CPT

## 2023-10-14 PROCEDURE — 87040 BLOOD CULTURE FOR BACTERIA: CPT

## 2023-10-14 PROCEDURE — 96374 THER/PROPH/DIAG INJ IV PUSH: CPT

## 2023-10-14 PROCEDURE — 83605 ASSAY OF LACTIC ACID: CPT

## 2023-10-14 PROCEDURE — 86140 C-REACTIVE PROTEIN: CPT

## 2023-10-14 PROCEDURE — 83036 HEMOGLOBIN GLYCOSYLATED A1C: CPT

## 2023-10-14 PROCEDURE — 76705 ECHO EXAM OF ABDOMEN: CPT

## 2023-10-14 PROCEDURE — A9537: CPT

## 2023-10-14 PROCEDURE — 0225U NFCT DS DNA&RNA 21 SARSCOV2: CPT

## 2023-10-14 PROCEDURE — 84466 ASSAY OF TRANSFERRIN: CPT

## 2023-10-14 PROCEDURE — 84436 ASSAY OF TOTAL THYROXINE: CPT

## 2023-10-14 RX ORDER — ASPIRIN/CALCIUM CARB/MAGNESIUM 324 MG
1 TABLET ORAL
Qty: 30 | Refills: 0
Start: 2023-10-14 | End: 2023-11-12

## 2023-10-14 RX ORDER — ATORVASTATIN CALCIUM 80 MG/1
1 TABLET, FILM COATED ORAL
Qty: 30 | Refills: 0
Start: 2023-10-14 | End: 2023-11-12

## 2023-10-14 RX ADMIN — Medication 12.5 MILLIGRAM(S): at 06:44

## 2023-10-14 RX ADMIN — Medication 15 GRAM(S): at 12:36

## 2023-10-14 RX ADMIN — Medication 75 MICROGRAM(S): at 06:44

## 2023-10-14 RX ADMIN — LINAGLIPTIN 5 MILLIGRAM(S): 5 TABLET, FILM COATED ORAL at 09:58

## 2023-10-14 RX ADMIN — Medication 81 MILLIGRAM(S): at 12:37

## 2023-10-14 RX ADMIN — Medication 500 MILLIGRAM(S): at 06:44

## 2023-10-14 RX ADMIN — BENZOCAINE AND MENTHOL 1 LOZENGE: 5; 1 LIQUID ORAL at 06:47

## 2023-10-14 RX ADMIN — Medication 500 MILLIGRAM(S): at 12:37

## 2023-10-14 RX ADMIN — CLOPIDOGREL BISULFATE 75 MILLIGRAM(S): 75 TABLET, FILM COATED ORAL at 12:37

## 2023-10-14 NOTE — PROGRESS NOTE ADULT - PROBLEM SELECTOR PLAN 5
Na 126 on admission. s/p lasix 40mg IVqD. Likely hypovolemic hyponatremia iso of decreased po intake vs diuretic(presented with na 126) vs mixed picture with siadh iso of leg pain. Feurea 50.2%. s/p 2l ns. Now likely siadh    - nephrology consulted, f/u recs  - urena 15g daily  - continue to monitor.

## 2023-10-14 NOTE — PROGRESS NOTE ADULT - ASSESSMENT
pt known to me from prior admission .     87 male Mandarin speaking  with PMHx CAD s/p JOSÉ to mLAD (6/2023), HTN, HLD, cervical spondylosis s/p laminectomy, gastric ulcers, hypothyroidism,  constipation prior [facility] admission for L leg pain, presented to [facility] ED 10/6/23 for dyspnea with minimal exertion, chest pressure, and orthopnea x 3 days and fever of 101.8*F day before admission- but resolved with Tylenol with no recurrence since. Reported constipation at home with response after suppository at home on Tuesday (10/3), family report his activity level has decreased due to his spinal stenosis discomfort as well. His appetite and fluid intake has decreased as well. Pt was admitted to Cardiology service/[facility] for acute hypoxic respiratory failure 2/2 acute HFpEF (BNP 1279) s/p lasix 20mg iv x1 at ED(10/6) and fever without clinical evidence of PNA (no cough/sputum) but RLQ pain and constipation with CT (10/7) findings of a gallstone ~1.8cm at the gallbladder neck and associated wall thickening and fat stranding- NM positive for acute Cholecystitis, on antibiotics, initially plan for IR perchole ( plavix held 10/9-10/10-)- symptoms improved , per=mich cancelled, plavix given on 10/11- Hyponatremia evaluated by renal -     - notes/labs/reviewed. patient seen.   - remain stable awaiting home oxygen set up.   on room air during sitting, per team his saturating drop during ambulation= would need to get ambulatory oxygen checked and if low would need home oxygen set up for Acute hypoxic respiratory failure. ( likely from atelectasis, pain from cholecystitis with poor respiratory effort, encouraged mobilization, pain control. no evidence of pneumonia.   continue using incentive spirometery -has one at bedside,   - s/p Lasix 20mg iv (10/6)-> 40mg iv daily (10/7-10/9) -now off.     - Hyponatremia -mixed (dehydration from poor po intake and siadh- low serum osmolarity, given ivf and 3 % NS, most recent sodium 132- continue with Urena 15 mg,   - would need outpt follow up with renal.     -re: acute cholecystitis  Surgery f/u appreciated,  Dr. Gomez - on antibiotics, per- mich held , plavix restarted 10/11-- wbc is 16 on 10/12- 15.97 on 10/13- clinically feeling well, less pain and tolerating diet. surgery sign off, no plan for intervention this admission need follow up with surgery.   - plan antibiotics  for total 2 weeks course to 10/23-  - CT findings of gallstone ~ 1.8cm at the gallbladder neck and associated wall thickening and fat stranding ( Pt on Clopidogrel which needs to be kept off for at least 5 days and will need Cardiology clearance , s/p PCI to mLAD 6/2023)   - IR consult appreciated, d/w Dr.Tat Burnett , HIDA scan (10/9) positive    - Ceftriaxone 1gm iv q24hr (10/6-10/7), increased to 2gm iv q24hr ( 10/8-) , to continue IV antibiotics for now - consider changing to oral Cefpodoxime 200mg po q12hr along with flagyl upon d/c for total 2 weeks course   - added Metronidazole 500mg po q8hr ( 10/8-)   - discontinued Azithromycin 500mg iv q24hr ( 10/6-10/7)     - BCx: ngtd   - Bowel regimen for constipation : + BMs  - Endocrine f/u appreciated re: Elevated TSH/hypothyroidism:   Hypothyroidism - on Synthroid 75 mcg per day  DMII-  FS/NISS, A1C% 7.7% (10/7) , goal -180- on trajenta 5 mg along with insulin sliding scale. appreciated rec for discharge meds.     - CADs/p PCI mLAD 6/2023. c/w DAPT: ASA/ Clopidogrel restarted 10/11-    metoprolol succinate 12.5mg daily, Atorvastatin 40mg qHS , Pt not going for LHC , Cardiologist Dr. Daryn Jarvis     - LBP//spondylosis: c/w gabapentin 100mg qHS ( can be adjusted),    cyclobenzaprine 5mg po bid, oxycodone IR 5mg po q6hr prn , tylenol prn for pain  - consider Lidocaine patch 4% AA,   - GI ppx: PPI po daily     - SCDs for DVT ppx     Contacts:  Cardiologist Dr. Daryn Jarvis/ Dr. Cosmo Yanes  daughter: Olga Yanez [phone]    Disposition: continue antibiotics, medically stable, waiting for home oxygen set up for discharge.     POC as d/w 5UR Cardiology team Dr. Day.      pt known to me from prior admission .     87 male Mandarin speaking  with PMHx CAD s/p JOSÉ to mLAD (6/2023), HTN, HLD, cervical spondylosis s/p laminectomy, gastric ulcers, hypothyroidism,  constipation prior [facility] admission for L leg pain, presented to [facility] ED 10/6/23 for dyspnea with minimal exertion, chest pressure, and orthopnea x 3 days and fever of 101.8*F day before admission- but resolved with Tylenol with no recurrence since. Reported constipation at home with response after suppository at home on Tuesday (10/3), family report his activity level has decreased due to his spinal stenosis discomfort as well. His appetite and fluid intake has decreased as well. Pt was admitted to Cardiology service/[facility] for acute hypoxic respiratory failure 2/2 acute HFpEF (BNP 1279) s/p lasix 20mg iv x1 at ED(10/6) and fever without clinical evidence of PNA (no cough/sputum) but RLQ pain and constipation with CT (10/7) findings of a gallstone ~1.8cm at the gallbladder neck and associated wall thickening and fat stranding- NM positive for acute Cholecystitis, on antibiotics, initially plan for IR perchole ( plavix held 10/9-10/10-)- symptoms improved , per=mich cancelled, plavix given on 10/11- Hyponatremia evaluated by renal -     - notes/labs/reviewed. patient seen.   - remain stable awaiting home oxygen set up.   on room air during sitting, per team his saturating drop during ambulation= would need to get ambulatory oxygen checked and if low would need home oxygen set up for Acute hypoxic respiratory failure. ( likely from atelectasis, pain from cholecystitis with poor respiratory effort, encouraged mobilization, pain control. no evidence of pneumonia.   continue using incentive spirometery -has one at bedside,   - s/p Lasix 20mg iv (10/6)-> 40mg iv daily (10/7-10/9) -now off.     - Hyponatremia -mixed (dehydration from poor po intake and siadh- low serum osmolarity, given ivf and 3 % NS, most recent sodium 132- continue with Urena 15 mg,   - would need outpt follow up with renal.     -re: acute cholecystitis  Surgery f/u appreciated,  Dr. Gomez - on antibiotics, per- mich held , plavix restarted 10/11-- wbc is 16 on 10/12- 15.97 on 10/13- clinically feeling well, less pain and tolerating diet. surgery sign off, no plan for intervention this admission need follow up with surgery.   - plan antibiotics  for total 2 weeks course to 10/23-  - CT findings of gallstone ~ 1.8cm at the gallbladder neck and associated wall thickening and fat stranding ( Pt on Clopidogrel which needs to be kept off for at least 5 days and will need Cardiology clearance , s/p PCI to mLAD 6/2023)   - IR consult appreciated, d/w Dr.Tat Burnett , HIDA scan (10/9) positive    - Ceftriaxone 1gm iv q24hr (10/6-10/7), increased to 2gm iv q24hr ( 10/8-) , to continue IV antibiotics for now - consider changing to oral Cefpodoxime 200mg po q12hr along with flagyl upon d/c for total 2 weeks course   - added Metronidazole 500mg po q8hr ( 10/8-)   - discontinued Azithromycin 500mg iv q24hr ( 10/6-10/7)     - BCx: ngtd   - Bowel regimen for constipation : + BMs  - Endocrine f/u appreciated re: Elevated TSH/hypothyroidism:   Hypothyroidism - on Synthroid 75 mcg per day  DMII-  FS/NISS, A1C% 7.7% (10/7) , goal -180- on trajenta 5 mg along with insulin sliding scale. appreciated rec for discharge meds.     - CADs/p PCI mLAD 6/2023. c/w DAPT: ASA/ Clopidogrel restarted 10/11-    metoprolol succinate 12.5mg daily, Atorvastatin 40mg qHS , Pt not going for LHC , Cardiologist Dr. Daryn Jarvis     - LBP//spondylosis: c/w gabapentin 100mg qHS ( can be adjusted),    cyclobenzaprine 5mg po bid, oxycodone IR 5mg po q6hr prn , tylenol prn for pain  - consider Lidocaine patch 4% AA,   - GI ppx: PPI po daily     - SCDs for DVT ppx     Contacts:  Cardiologist Dr. Daryn Jarvis/ Dr. Cosmo Yanes  daughter: Olga Yanez [phone]    Disposition: continue antibiotics, medically stable, waiting for home oxygen set up for discharge.     POC as d/w 5[facility] Cardiology team Dr. Day.      pt known to me from prior admission .     87 male Mandarin speaking  with PMHx CAD s/p JOSÉ to mLAD (6/2023), HTN, HLD, cervical spondylosis s/p laminectomy, gastric ulcers, hypothyroidism,  constipation prior [facility] admission for L leg pain, presented to [facility] ED 10/6/23 for dyspnea with minimal exertion, chest pressure, and orthopnea x 3 days and fever of 101.8*F day before admission- but resolved with Tylenol with no recurrence since. Reported constipation at home with response after suppository at home on Tuesday (10/3), family report his activity level has decreased due to his spinal stenosis discomfort as well. His appetite and fluid intake has decreased as well. Pt was admitted to Cardiology service/[facility] for acute hypoxic respiratory failure 2/2 acute HFpEF (BNP 1279) s/p lasix 20mg iv x1 at ED(10/6) and fever without clinical evidence of PNA (no cough/sputum) but RLQ pain and constipation with CT (10/7) findings of a gallstone ~1.8cm at the gallbladder neck and associated wall thickening and fat stranding- NM positive for acute Cholecystitis, on antibiotics, initially plan for IR perchole ( plavix held 10/9-10/10-)- symptoms improved , per=mich cancelled, plavix given on 10/11- Hyponatremia evaluated by renal -     - notes/labs/reviewed. patient seen.   - remain stable awaiting home oxygen set up.   on room air during sitting, per team his saturating drop during ambulation= would need to get ambulatory oxygen checked and if low would need home oxygen set up for Acute hypoxic respiratory failure. ( likely from atelectasis, pain from cholecystitis with poor respiratory effort, encouraged mobilization, pain control. no evidence of pneumonia.   continue using incentive spirometery -has one at bedside,   - s/p Lasix 20mg iv (10/6)-> 40mg iv daily (10/7-10/9) -now off.     - Hyponatremia -mixed (dehydration from poor po intake and siadh- low serum osmolarity, given ivf and 3 % NS, most recent sodium 132- continue with Urena 15 mg,   - would need outpt follow up with renal.     -re: acute cholecystitis  Surgery f/u appreciated,  Dr. Gomez - on antibiotics, per- mich held , plavix restarted 10/11-- wbc is 16 on 10/12- 15.97 on 10/13- clinically feeling well, less pain and tolerating diet. surgery sign off, no plan for intervention this admission need follow up with surgery.   - plan antibiotics  for total 2 weeks course to 10/23-  - CT findings of gallstone ~ 1.8cm at the gallbladder neck and associated wall thickening and fat stranding ( Pt on Clopidogrel which needs to be kept off for at least 5 days and will need Cardiology clearance , s/p PCI to mLAD 6/2023)   - IR consult appreciated, d/w Dr.Tat Burnett , HIDA scan (10/9) positive    - Ceftriaxone 1gm iv q24hr (10/6-10/7), increased to 2gm iv q24hr ( 10/8-) , to continue IV antibiotics for now - consider changing to oral Cefpodoxime 200mg po q12hr along with flagyl upon d/c for total 2 weeks course   - added Metronidazole 500mg po q8hr ( 10/8-)   - discontinued Azithromycin 500mg iv q24hr ( 10/6-10/7)     - BCx: ngtd   - Bowel regimen for constipation : + BMs  - Endocrine f/u appreciated re: Elevated TSH/hypothyroidism:   Hypothyroidism - on Synthroid 75 mcg per day  DMII-  FS/NISS, A1C% 7.7% (10/7) , goal -180- on trajenta 5 mg along with insulin sliding scale. appreciated rec for discharge meds.     - CADs/p PCI mLAD 6/2023. c/w DAPT: ASA/ Clopidogrel restarted 10/11-    metoprolol succinate 12.5mg daily, Atorvastatin 40mg qHS , Pt not going for LHC , Cardiologist Dr. Daryn Jarvis     - LBP//spondylosis: c/w gabapentin 100mg qHS ( can be adjusted),    cyclobenzaprine 5mg po bid, oxycodone IR 5mg po q6hr prn , tylenol prn for pain  - consider Lidocaine patch 4% AA,   - GI ppx: PPI po daily     - SCDs for DVT ppx     Contacts:  Cardiologist Dr. Daryn Jarvis/ Dr. Cosmo Yanes  daughter: Olga Yanez [phone]    Disposition: continue antibiotics, medically stable, waiting for home oxygen set up for discharge.     POC as d/w 5UR Cardiology team Dr. Day.

## 2023-10-14 NOTE — PROGRESS NOTE ADULT - PROBLEM SELECTOR PROBLEM 2
Abdominal pain
Abdominal pain
Diabetes mellitus
Abdominal pain
Abdominal pain
Diabetes mellitus
Abdominal pain

## 2023-10-14 NOTE — DISCHARGE NOTE NURSING/CASE MANAGEMENT/SOCIAL WORK - NSDCDMENAME_GEN_ALL_CORE_FT
[facility] - [place]  [facility] - [place]  [facility] [place]

## 2023-10-14 NOTE — PROGRESS NOTE ADULT - REASON FOR ADMISSION
chest pain, SOB
Acute Calculus Cholecystitis
chest pain, SOB
chest pain, SOB

## 2023-10-14 NOTE — PROGRESS NOTE ADULT - PROBLEM SELECTOR PLAN 11
F: s/p 2000 cc ns, 3% ns  E: Replace if K < 4, Mag < 2  N: CC diet  GI: ppi  DVT: plavix  Dispo: 5uris

## 2023-10-14 NOTE — PROGRESS NOTE ADULT - PROBLEM SELECTOR PROBLEM 5
Hyponatremia
Pt instructed to take meds as prescribed

## 2023-10-14 NOTE — PROGRESS NOTE ADULT - PROBLEM SELECTOR PLAN 10
Total iron at 28, TIBC at 208, Iron saturation at 13, Ferritin at 622, transferrin at 161.    - consider iron supplementation when cholecystitis resolves.
not applicable (Male)
Total iron at 28, TIBC at 208, Iron saturation at 13, Ferritin at 622, transferrin at 161.    - consider iron supplementation when cholecystitis resolves.
Total iron at 28, TIBC at 208, Iron saturation at 13, Ferritin at 622, transferrin at 161.    - consider iron supplementation when cholecystitis resolves.
F: none  E: Replace if K < 4, Mag < 2  N: Dash, diet  GI: ppi  DVT: plavix  Dispo: 5uris

## 2023-10-14 NOTE — PROGRESS NOTE ADULT - ASSESSMENT
Patient is a 87M, (shellfish rxn w/ Lip Swelling ~ 20 yrs ago, does not eat shellfish since then),  w/ PMHx CAD s/p JOSÉ to mLAD (6/2023), HTN, HLD, cervical spondylosis s/p laminectomy, gastric ulcers, hypothyroidism,  who presents to [facility] ED 10/6/23 for BELLO with minimal exertion, chest pressure, and orthopnea x 3 days. Also reports a fever of 101.8 yesterday but resolved with Tylenol with no recurrence since. Reported constipation at home with response after suppository at home on Tuesday (10/3), however, no BM since then. Does report pinpoint RUQ abdominal discomfort worse with palpation x 1-2 days. 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 His appetite and fluid intake has decreased as well. CXR with concern for CHF vs PNA. Negative troponin and EKG nonischemic. Patient is now admitted for cholecystitis.           Patient is a 87M, (shellfish rxn w/ Lip Swelling ~ 20 yrs ago, does not eat shellfish since then),  w/ PMHx CAD s/p JOÉS to mLAD (6/2023), HTN, HLD, cervical spondylosis s/p laminectomy, gastric ulcers, hypothyroidism,  who presents to [facility] ED 10/6/23 for BELLO with minimal exertion, chest pressure, and orthopnea x 3 days. Also reports a fever of 101.8 yesterday but resolved with Tylenol with no recurrence since. Reported constipation at home with response after suppository at home on Tuesday (10/3), however, no BM since then. Does report pinpoint RUQ abdominal discomfort worse with palpation x 1-2 days. 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 His appetite and fluid intake has decreased as well. CXR with concern for CHF vs PNA. Negative troponin and EKG nonischemic. Patient is now admitted for cholecystitis.           Patient is a 87M, (shellfish rxn w/ Lip Swelling ~ 20 yrs ago, does not eat shellfish since then),  w/ PMHx CAD s/p JOSÉ to mLAD (6/2023), HTN, HLD, cervical spondylosis s/p laminectomy, gastric ulcers, hypothyroidism,  who presents to [facility] ED 10/6/23 for BELLO with minimal exertion, chest pressure, and orthopnea x 3 days. Also reports a fever of 101.8 yesterday but resolved with Tylenol with no recurrence since. Reported constipation at home with response after suppository at home on Tuesday (10/3), however, no BM since then. Does report pinpoint RUQ abdominal discomfort worse with palpation x 1-2 days. 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 His appetite and fluid intake has decreased as well. CXR with concern for CHF vs PNA. Negative troponin and EKG nonischemic. Patient is now admitted for cholecystitis.

## 2023-10-14 NOTE — PROGRESS NOTE ADULT - SUBJECTIVE AND OBJECTIVE BOX
HON SCOTT , [id number],  [facility] 05UR [id number]    Time of encounter : 9:30 am    seen sitting on bed with  leg down  tolerating diet   waiting for home oxygen set up for discharge.   no abdominal pain. he is well aware of the plan     T(C): 36.3 (10-14-23 @ 09:00), Max: 36.7 (10-14-23 @ 00:34)  HR: 67 (10-14-23 @ 12:33) (67 - 84)  BP: 114/73 (10-14-23 @ 12:33) (114/73 - 148/82)  RR: 17 (10-14-23 @ 12:33) (16 - 18)  SpO2: 95% (10-14-23 @ 12:33) (92% - 95%)    aspirin enteric coated 81 milliGRAM(s) Oral daily  atorvastatin 40 milliGRAM(s) Oral at bedtime  benzocaine/menthol Lozenge 1 Lozenge Oral four times a day  bisacodyl Suppository 10 milliGRAM(s) Rectal daily PRN  cefTRIAXone   IVPB 2000 milliGRAM(s) IV Intermittent every 24 hours  clopidogrel Tablet 75 milliGRAM(s) Oral daily  cyclobenzaprine 5 milliGRAM(s) Oral two times a day PRN  dextrose 5%. 1000 milliLiter(s) IV Continuous <Continuous>  dextrose 5%. 1000 milliLiter(s) IV Continuous <Continuous>  dextrose 50% Injectable 25 Gram(s) IV Push once  dextrose 50% Injectable 25 Gram(s) IV Push once  dextrose 50% Injectable 12.5 Gram(s) IV Push once  dextrose Oral Gel 15 Gram(s) Oral once PRN  gabapentin 100 milliGRAM(s) Oral at bedtime  glucagon  Injectable 1 milliGRAM(s) IntraMuscular once  insulin lispro (ADMELOG) corrective regimen sliding scale   SubCutaneous at bedtime  insulin lispro (ADMELOG) corrective regimen sliding scale   SubCutaneous three times a day before meals  levothyroxine 75 MICROGram(s) Oral daily  linagliptin 5 milliGRAM(s) Oral <User Schedule>  metoprolol succinate ER 12.5 milliGRAM(s) Oral daily  metroNIDAZOLE    Tablet 500 milliGRAM(s) Oral every 8 hours  pantoprazole    Tablet 40 milliGRAM(s) Oral at bedtime  senna 2 Tablet(s) Oral daily  urea Oral Powder 15 Gram(s) Oral daily      Physical Exam :    General exam : awake, alert, on oxygen nasal canula.  RRR  good air entry bilaterally  bs present, no pain /no tenderness, no guarding on RUQ  ext- no edema noted.  neuro- aao x 3 non focal.     CBC Full  -  ( 13 Oct 2023 05:30 )  WBC Count : 15.97 K/uL  RBC Count : 7.03 M/uL  Hemoglobin : 14.6 g/dL  Hematocrit : 45.8 %  Platelet Count - Automated : 342 K/uL  Mean Cell Volume : 65.1 fl  Mean Cell Hemoglobin : 20.8 pg  Mean Cell Hemoglobin Concentration : 31.9 gm/dL  Auto Neutrophil # : 12.03 K/uL  Auto Lymphocyte # : 1.55 K/uL  Auto Monocyte # : 1.55 K/uL  Auto Eosinophil # : 0.70 K/uL  Auto Basophil # : 0.14 K/uL  Auto Neutrophil % : 74.4 %  Auto Lymphocyte % : 9.7 %  Auto Monocyte % : 9.7 %  Auto Eosinophil % : 4.4 %  Auto Basophil % : 0.9 %        10    132<L>  |  100  |  18  ----------------------------<  161<H>  4.3   |  25  |  1.04    Ca    8.4      13 Oct 2023 16:14  Phos  2.9     10-13  Mg     2.1     10-    TPro  6.5  /  Alb  2.5<L>  /  TBili  0.4  /  DBili  x   /  AST  18  /  ALT  14  /  AlkPhos  114  10-13    Daily     Daily Weight in k.5 (14 Oct 2023 06:49)  CAPILLARY BLOOD GLUCOSE      POCT Blood Glucose.: 145 mg/dL (14 Oct 2023 12:02)      Urinalysis Basic - ( 13 Oct 2023 16:14 )    Color: x / Appearance: x / SG: x / pH: x  Gluc: 161 mg/dL / Ketone: x  / Bili: x / Urobili: x   Blood: x / Protein: x / Nitrite: x   Leuk Esterase: x / RBC: x / WBC x   Sq Epi: x / Non Sq Epi: x / Bacteria: x                   HON SCOTT , [id number],  [facility] 05UR [id number] 02    Time of encounter : 9:30 am    seen sitting on bed with  leg down  tolerating diet   waiting for home oxygen set up for discharge.   no abdominal pain. he is well aware of the plan     T(C): 36.3 (10-14-23 @ 09:00), Max: 36.7 (10-14-23 @ 00:34)  HR: 67 (10-14-23 @ 12:33) (67 - 84)  BP: 114/73 (10-14-23 @ 12:33) (114/73 - 148/82)  RR: 17 (10-14-23 @ 12:33) (16 - 18)  SpO2: 95% (10-14-23 @ 12:33) (92% - 95%)    aspirin enteric coated 81 milliGRAM(s) Oral daily  atorvastatin 40 milliGRAM(s) Oral at bedtime  benzocaine/menthol Lozenge 1 Lozenge Oral four times a day  bisacodyl Suppository 10 milliGRAM(s) Rectal daily PRN  cefTRIAXone   IVPB 2000 milliGRAM(s) IV Intermittent every 24 hours  clopidogrel Tablet 75 milliGRAM(s) Oral daily  cyclobenzaprine 5 milliGRAM(s) Oral two times a day PRN  dextrose 5%. 1000 milliLiter(s) IV Continuous <Continuous>  dextrose 5%. 1000 milliLiter(s) IV Continuous <Continuous>  dextrose 50% Injectable 25 Gram(s) IV Push once  dextrose 50% Injectable 25 Gram(s) IV Push once  dextrose 50% Injectable 12.5 Gram(s) IV Push once  dextrose Oral Gel 15 Gram(s) Oral once PRN  gabapentin 100 milliGRAM(s) Oral at bedtime  glucagon  Injectable 1 milliGRAM(s) IntraMuscular once  insulin lispro (ADMELOG) corrective regimen sliding scale   SubCutaneous at bedtime  insulin lispro (ADMELOG) corrective regimen sliding scale   SubCutaneous three times a day before meals  levothyroxine 75 MICROGram(s) Oral daily  linagliptin 5 milliGRAM(s) Oral <User Schedule>  metoprolol succinate ER 12.5 milliGRAM(s) Oral daily  metroNIDAZOLE    Tablet 500 milliGRAM(s) Oral every 8 hours  pantoprazole    Tablet 40 milliGRAM(s) Oral at bedtime  senna 2 Tablet(s) Oral daily  urea Oral Powder 15 Gram(s) Oral daily      Physical Exam :    General exam : awake, alert, on oxygen nasal canula.  RRR  good air entry bilaterally  bs present, no pain /no tenderness, no guarding on RUQ  ext- no edema noted.  neuro- aao x 3 non focal.     CBC Full  -  ( 13 Oct 2023 05:30 )  WBC Count : 15.97 K/uL  RBC Count : 7.03 M/uL  Hemoglobin : 14.6 g/dL  Hematocrit : 45.8 %  Platelet Count - Automated : 342 K/uL  Mean Cell Volume : 65.1 fl  Mean Cell Hemoglobin : 20.8 pg  Mean Cell Hemoglobin Concentration : 31.9 gm/dL  Auto Neutrophil # : 12.03 K/uL  Auto Lymphocyte # : 1.55 K/uL  Auto Monocyte # : 1.55 K/uL  Auto Eosinophil # : 0.70 K/uL  Auto Basophil # : 0.14 K/uL  Auto Neutrophil % : 74.4 %  Auto Lymphocyte % : 9.7 %  Auto Monocyte % : 9.7 %  Auto Eosinophil % : 4.4 %  Auto Basophil % : 0.9 %        10    132<L>  |  100  |  18  ----------------------------<  161<H>  4.3   |  25  |  1.04    Ca    8.4      13 Oct 2023 16:14  Phos  2.9     10-13  Mg     2.1     10-    TPro  6.5  /  Alb  2.5<L>  /  TBili  0.4  /  DBili  x   /  AST  18  /  ALT  14  /  AlkPhos  114  10-13    Daily     Daily Weight in k.5 (14 Oct 2023 06:49)  CAPILLARY BLOOD GLUCOSE      POCT Blood Glucose.: 145 mg/dL (14 Oct 2023 12:02)      Urinalysis Basic - ( 13 Oct 2023 16:14 )    Color: x / Appearance: x / SG: x / pH: x  Gluc: 161 mg/dL / Ketone: x  / Bili: x / Urobili: x   Blood: x / Protein: x / Nitrite: x   Leuk Esterase: x / RBC: x / WBC x   Sq Epi: x / Non Sq Epi: x / Bacteria: x                   HON SCOTT , [id number],  [facility] 05UR [id number] 02    Time of encounter : 9:30 am    seen sitting on bed with  leg down  tolerating diet   waiting for home oxygen set up for discharge.   no abdominal pain. he is well aware of the plan     T(C): 36.3 (10-14-23 @ 09:00), Max: 36.7 (10-14-23 @ 00:34)  HR: 67 (10-14-23 @ 12:33) (67 - 84)  BP: 114/73 (10-14-23 @ 12:33) (114/73 - 148/82)  RR: 17 (10-14-23 @ 12:33) (16 - 18)  SpO2: 95% (10-14-23 @ 12:33) (92% - 95%)    aspirin enteric coated 81 milliGRAM(s) Oral daily  atorvastatin 40 milliGRAM(s) Oral at bedtime  benzocaine/menthol Lozenge 1 Lozenge Oral four times a day  bisacodyl Suppository 10 milliGRAM(s) Rectal daily PRN  cefTRIAXone   IVPB 2000 milliGRAM(s) IV Intermittent every 24 hours  clopidogrel Tablet 75 milliGRAM(s) Oral daily  cyclobenzaprine 5 milliGRAM(s) Oral two times a day PRN  dextrose 5%. 1000 milliLiter(s) IV Continuous <Continuous>  dextrose 5%. 1000 milliLiter(s) IV Continuous <Continuous>  dextrose 50% Injectable 25 Gram(s) IV Push once  dextrose 50% Injectable 25 Gram(s) IV Push once  dextrose 50% Injectable 12.5 Gram(s) IV Push once  dextrose Oral Gel 15 Gram(s) Oral once PRN  gabapentin 100 milliGRAM(s) Oral at bedtime  glucagon  Injectable 1 milliGRAM(s) IntraMuscular once  insulin lispro (ADMELOG) corrective regimen sliding scale   SubCutaneous at bedtime  insulin lispro (ADMELOG) corrective regimen sliding scale   SubCutaneous three times a day before meals  levothyroxine 75 MICROGram(s) Oral daily  linagliptin 5 milliGRAM(s) Oral <User Schedule>  metoprolol succinate ER 12.5 milliGRAM(s) Oral daily  metroNIDAZOLE    Tablet 500 milliGRAM(s) Oral every 8 hours  pantoprazole    Tablet 40 milliGRAM(s) Oral at bedtime  senna 2 Tablet(s) Oral daily  urea Oral Powder 15 Gram(s) Oral daily      Physical Exam :    General exam : awake, alert, on oxygen nasal canula.  RRR  good air entry bilaterally  bs present, no pain /no tenderness, no guarding on RUQ  ext- no edema noted.  neuro- aao x 3 non focal.     CBC Full  -  ( 13 Oct 2023 05:30 )  WBC Count : 15.97 K/uL  RBC Count : 7.03 M/uL  Hemoglobin : 14.6 g/dL  Hematocrit : 45.8 %  Platelet Count - Automated : 342 K/uL  Mean Cell Volume : 65.1 fl  Mean Cell Hemoglobin : 20.8 pg  Mean Cell Hemoglobin Concentration : 31.9 gm/dL  Auto Neutrophil # : 12.03 K/uL  Auto Lymphocyte # : 1.55 K/uL  Auto Monocyte # : 1.55 K/uL  Auto Eosinophil # : 0.70 K/uL  Auto Basophil # : 0.14 K/uL  Auto Neutrophil % : 74.4 %  Auto Lymphocyte % : 9.7 %  Auto Monocyte % : 9.7 %  Auto Eosinophil % : 4.4 %  Auto Basophil % : 0.9 %        10    132<L>  |  100  |  18  ----------------------------<  161<H>  4.3   |  25  |  1.04    Ca    8.4      13 Oct 2023 16:14  Phos  2.9     10-13  Mg     2.1     10-    TPro  6.5  /  Alb  2.5<L>  /  TBili  0.4  /  DBili  x   /  AST  18  /  ALT  14  /  AlkPhos  114  10-13    Daily     Daily Weight in k.5 (14 Oct 2023 06:49)  CAPILLARY BLOOD GLUCOSE      POCT Blood Glucose.: 145 mg/dL (14 Oct 2023 12:02)      Urinalysis Basic - ( 13 Oct 2023 16:14 )    Color: x / Appearance: x / SG: x / pH: x  Gluc: 161 mg/dL / Ketone: x  / Bili: x / Urobili: x   Blood: x / Protein: x / Nitrite: x   Leuk Esterase: x / RBC: x / WBC x   Sq Epi: x / Non Sq Epi: x / Bacteria: x

## 2023-10-14 NOTE — PROGRESS NOTE ADULT - PROBLEM SELECTOR PLAN 6
- Continue home metoprolol 12.5mg qd

## 2023-10-14 NOTE — PROGRESS NOTE ADULT - PROBLEM SELECTOR PLAN 1
Presents w/ BELLO w/ minimal exertion, chest pain, orthopnea (2 pillow use)  x 3 days. proBNP 1279. Prior hx CAD: JOSÉ mLAD in 6/2023. Compliant w/ DAPT aspirin/Plavix. Received Lasix 20mg IV x 1 in ED. EKG nonischemic. Troponin T x 1 negative. s/p lasix 50mg IVP. Cath denied.  Pt presented with acs sxs initially and admitted to cardiology service to r/o cardiac causes. Pt initally required oxygen, and was thought to be secondary to acute chf excerbation. Pt found to have cholecystits on CTAP and sxs are likely due to cholecystits. Pt is not complaining of shortness of breath or chest pain. Currently on 2L. CXR revealed right hemidiaphragm. Acute hypoxic respiratory failure likely secondary to cholecystitis pain and hemidiaphragm  Ambulatory sats reveal pt requiring 4L o2 while maintaining 92-94%    - continue to manage  - strict I&Os

## 2023-10-14 NOTE — CHART NOTE - NSCHARTNOTEFT_GEN_A_CORE
During oxygen testing, the patient is desaturating due to atelectasis and acute cholecystitis with associated hemidiaphragm elevation; thus he will require oxygen for home use. The patient is mobile in the home and will require a portable system. Patient will require a commode for discharge because they cannot safely ambulate to the restroom.
Nephrology following for hyponatremia. Last sodium 132 yesterday. Recommend continuing urea 15g BID. Will need Nephrology follow up within 1 week from discharge and repeat BMP.
Attempted multiple calls to patient daughter and wife to discuss patient discharge. No response at this time from family. Will continue to reach out
During oxygen testing, the patient is desatting due to atelectasis and will require oxygen for home use. The patient is mobile in the home and will require a portable system. Patient will require a commode for discharge because they cannot safely ambulate to the restroom.

## 2023-10-14 NOTE — PROGRESS NOTE ADULT - SUBJECTIVE AND OBJECTIVE BOX
O/N Events: home oxygen was not delivered. patient needed to stay overnight to facilitate safe discharge    Subjective/ROS: Patient seen and examined at bedside. has some mild RUQ abdominal pain that he only notices based on position.     Denies Fever/Chills, HA, CP, SOB, n/v, changes in bowel/urinary habits.  12pt ROS otherwise negative.    VITALS  Vital Signs Last 24 Hrs  T(C): 36.6 (14 Oct 2023 05:38), Max: 36.7 (14 Oct 2023 00:34)  T(F): 97.9 (14 Oct 2023 05:38), Max: 98.1 (14 Oct 2023 00:34)  HR: 76 (14 Oct 2023 05:38) (76 - 85)  BP: 128/80 (14 Oct 2023 05:38) (120/74 - 141/74)  BP(mean): 98 (14 Oct 2023 05:38) (98 - 112)  RR: 16 (14 Oct 2023 05:38) (16 - 18)  SpO2: 92% (14 Oct 2023 05:38) (92% - 94%)    Parameters below as of 14 Oct 2023 05:38  Patient On (Oxygen Delivery Method): nasal cannula  O2 Flow (L/min): 1      CAPILLARY BLOOD GLUCOSE      POCT Blood Glucose.: 114 mg/dL (14 Oct 2023 07:37)  POCT Blood Glucose.: 140 mg/dL (13 Oct 2023 21:49)  POCT Blood Glucose.: 148 mg/dL (13 Oct 2023 17:03)  POCT Blood Glucose.: 181 mg/dL (13 Oct 2023 12:08)      PHYSICAL EXAM  General: NAD  Head: NC/AT; MMM; PERRL; EOMI;  Neck: Supple; no JVD  Respiratory: CTAB; no wheezes/rales/rhonchi  Cardiovascular: Regular rhythm/rate; S1/S2+, no murmurs, rubs gallops   Gastrointestinal: Soft; NTND; bowel sounds normal and present  Extremities: WWP; no edema/cyanosis  Neurological: A&Ox3, CNII-XII grossly intact; no obvious focal deficits    MEDICATIONS  (STANDING):  aspirin enteric coated 81 milliGRAM(s) Oral daily  atorvastatin 40 milliGRAM(s) Oral at bedtime  benzocaine/menthol Lozenge 1 Lozenge Oral four times a day  cefTRIAXone   IVPB 2000 milliGRAM(s) IV Intermittent every 24 hours  clopidogrel Tablet 75 milliGRAM(s) Oral daily  dextrose 5%. 1000 milliLiter(s) (50 mL/Hr) IV Continuous <Continuous>  dextrose 5%. 1000 milliLiter(s) (100 mL/Hr) IV Continuous <Continuous>  dextrose 50% Injectable 25 Gram(s) IV Push once  dextrose 50% Injectable 25 Gram(s) IV Push once  dextrose 50% Injectable 12.5 Gram(s) IV Push once  gabapentin 100 milliGRAM(s) Oral at bedtime  glucagon  Injectable 1 milliGRAM(s) IntraMuscular once  insulin lispro (ADMELOG) corrective regimen sliding scale   SubCutaneous at bedtime  insulin lispro (ADMELOG) corrective regimen sliding scale   SubCutaneous three times a day before meals  levothyroxine 75 MICROGram(s) Oral daily  linagliptin 5 milliGRAM(s) Oral <User Schedule>  metoprolol succinate ER 12.5 milliGRAM(s) Oral daily  metroNIDAZOLE    Tablet 500 milliGRAM(s) Oral every 8 hours  pantoprazole    Tablet 40 milliGRAM(s) Oral at bedtime  senna 2 Tablet(s) Oral daily  urea Oral Powder 15 Gram(s) Oral daily    MEDICATIONS  (PRN):  bisacodyl Suppository 10 milliGRAM(s) Rectal daily PRN Constipation  cyclobenzaprine 5 milliGRAM(s) Oral two times a day PRN Muscle Spasm  dextrose Oral Gel 15 Gram(s) Oral once PRN Blood Glucose LESS THAN 70 milliGRAM(s)/deciliter      No Known Drug Allergies  shellfish (Swelling; Hives)      LABS                        14.6   15.97 )-----------( 342      ( 13 Oct 2023 05:30 )             45.8     10-13    132<L>  |  100  |  18  ----------------------------<  161<H>  4.3   |  25  |  1.04    Ca    8.4      13 Oct 2023 16:14  Phos  2.9     10-13  Mg     2.1     10-13    TPro  6.5  /  Alb  2.5<L>  /  TBili  0.4  /  DBili  x   /  AST  18  /  ALT  14  /  AlkPhos  114  10-13      Urinalysis Basic - ( 13 Oct 2023 16:14 )    Color: x / Appearance: x / SG: x / pH: x  Gluc: 161 mg/dL / Ketone: x  / Bili: x / Urobili: x   Blood: x / Protein: x / Nitrite: x   Leuk Esterase: x / RBC: x / WBC x   Sq Epi: x / Non Sq Epi: x / Bacteria: x              IMAGING/EKG/ETC

## 2023-10-14 NOTE — PROGRESS NOTE ADULT - PROBLEM SELECTOR PLAN 4
found to have elevated glucose on BMP  A1c found to be 7.7. no hx of diabetes    - monitor finger sticks continue consistent carb diet  - endocrinology consulted, f/u recs  - per asiya, c/w moderate dose sliding scale, and tradjenta  - d/c with januvia

## 2023-10-14 NOTE — PROGRESS NOTE ADULT - PROBLEM SELECTOR PROBLEM 10
CANDELARIA (iron deficiency anemia)
Prophylactic measure

## 2023-10-14 NOTE — DISCHARGE NOTE NURSING/CASE MANAGEMENT/SOCIAL WORK - NSSCNAMETXT_GEN_ALL_CORE
[facility] (formerly [facility])  [facility] (formerly [facility])  [facility] (formerly [facility])

## 2023-10-14 NOTE — PROGRESS NOTE ADULT - SUBJECTIVE AND OBJECTIVE BOX
SUBJECTIVE / INTERVAL HPI: Patient was seen and examined this morning.     Overnight events:    CAPILLARY BLOOD GLUCOSE & INSULIN RECEIVED  101 mg/dL (10-12 @ 21:39)  113 mg/dL (10-13 @ 07:19)  181 mg/dL (10-13 @ 12:08)  148 mg/dL (10-13 @ 17:03)  140 mg/dL (10-13 @ 21:49)  114 mg/dL (10-14 @ 07:37)      REVIEW OF SYSTEMS  Constitutional:  Negative fever, chills or loss of appetite.  Eyes:  Negative blurry vision or double vision.  Cardiovascular:  Negative for chest pain or palpitations.  Respiratory:  Negative for cough, wheezing, or shortness of breath.    Gastrointestinal:  Negative for nausea, vomiting, diarrhea, constipation, or abdominal pain.  Genitourinary:  Negative frequency, urgency or dysuria.  Neurologic:  No headache, confusion, dizziness, lightheadedness.    PHYSICAL EXAM  Vital Signs Last 24 Hrs  T(C): 36.6 (14 Oct 2023 05:38), Max: 36.7 (14 Oct 2023 00:34)  T(F): 97.9 (14 Oct 2023 05:38), Max: 98.1 (14 Oct 2023 00:34)  HR: 76 (14 Oct 2023 05:38) (76 - 85)  BP: 128/80 (14 Oct 2023 05:38) (120/74 - 141/74)  BP(mean): 98 (14 Oct 2023 05:38) (98 - 112)  RR: 16 (14 Oct 2023 05:38) (16 - 18)  SpO2: 92% (14 Oct 2023 05:38) (92% - 94%)    Parameters below as of 14 Oct 2023 05:38  Patient On (Oxygen Delivery Method): nasal cannula  O2 Flow (L/min): 1      Constitutional: Awake, alert, in no acute distress.   HEENT: Normocephalic, atraumatic, PAMELA.  Respiratory: Lungs clear to ausculation bilaterally.   Cardiovascular: regular rhythm, normal S1 and S2, no audible murmurs.   GI: soft, non-tender, non-distended, bowel sounds present.  Extremities: No lower extremity edema.  Psychiatric: AAO x 3. Normal affect/mood.     LABS  CBC - WBC/HGB/HTC/PLT: 15.97/14.6/45.8/342 (10-13-23)  BMP - Na/K/Cl/Bicarb/BUN/Cr/Gluc/AG/eGFR: 132/4.3/100/25/18/1.04/161/7/70 (10-13-23)  Ca - 8.4 (10-13-23)  Phos - -- (10-13-23)  Mg - -- (10-13-23)  LFT - Alb/Tprot/Tbili/Dbili/AlkPhos/ALT/AST: 2.5/--/0.4/--/114/14/18 (10-13-23)  PT/aPTT/INR: 14.3/33.4/1.26 (10-10-23)   Thyroid Stimulating Hormone, Serum: 6.990 (10-09-23)  Total T4/Free T4: --/1.580 (10-09-23)      10-13-23 @ 07:01  -  10-14-23 @ 07:00  --------------------------------------------------------  IN: 360 mL / OUT: 620 mL / NET: -260 mL        MEDICATIONS  MEDICATIONS  (STANDING):  aspirin enteric coated 81 milliGRAM(s) Oral daily  atorvastatin 40 milliGRAM(s) Oral at bedtime  benzocaine/menthol Lozenge 1 Lozenge Oral four times a day  cefTRIAXone   IVPB 2000 milliGRAM(s) IV Intermittent every 24 hours  clopidogrel Tablet 75 milliGRAM(s) Oral daily  dextrose 5%. 1000 milliLiter(s) (50 mL/Hr) IV Continuous <Continuous>  dextrose 5%. 1000 milliLiter(s) (100 mL/Hr) IV Continuous <Continuous>  dextrose 50% Injectable 25 Gram(s) IV Push once  dextrose 50% Injectable 25 Gram(s) IV Push once  dextrose 50% Injectable 12.5 Gram(s) IV Push once  gabapentin 100 milliGRAM(s) Oral at bedtime  glucagon  Injectable 1 milliGRAM(s) IntraMuscular once  insulin lispro (ADMELOG) corrective regimen sliding scale   SubCutaneous at bedtime  insulin lispro (ADMELOG) corrective regimen sliding scale   SubCutaneous three times a day before meals  levothyroxine 75 MICROGram(s) Oral daily  linagliptin 5 milliGRAM(s) Oral <User Schedule>  metoprolol succinate ER 12.5 milliGRAM(s) Oral daily  metroNIDAZOLE    Tablet 500 milliGRAM(s) Oral every 8 hours  pantoprazole    Tablet 40 milliGRAM(s) Oral at bedtime  senna 2 Tablet(s) Oral daily  urea Oral Powder 15 Gram(s) Oral daily    MEDICATIONS  (PRN):  bisacodyl Suppository 10 milliGRAM(s) Rectal daily PRN Constipation  cyclobenzaprine 5 milliGRAM(s) Oral two times a day PRN Muscle Spasm  dextrose Oral Gel 15 Gram(s) Oral once PRN Blood Glucose LESS THAN 70 milliGRAM(s)/deciliter    ASSESSMENT / RECOMMENDATIONS    Mr. Yanez is a 87-year-old male with a past medical history of hypertension, hyperlipidemia, coronary artery disease (s/p JOSÉ to mLAD on 6/2023), cervical spondylosis (s/p laminectomy), gastric ulcers, hypothyroidism and constipation who presented to the emergency department (10/6/23) for dyspnea on exertion, chest pressure, and orthopnea for 3 days, along with one day of fever. He was admitted to cardiology for acute hypoxic respiratory failure secondary to acute heart failure with preserved ejection fraction and fever, possibly secondary to pneumonia. His labs were remarkable for a hemoglobin A1C of 7.7% and a TSH of 10.5. Endocrinology was consulted for recommendations regarding management of hypothyroidism and diabetes.       A1C: 7.7 %  BUN: 18  Creatinine: 1.04  GFR: 70  Weight: 63.5  BMI: 23.3    #  Hypothyroid.   Plan: 9/13: TSH 7.05, free T4 1.4  10/7: TSH 10.5, free T4 1.47  10/9: TSH 6.39. free T4 1.58  - Was on alternating levothyroxine doses of 50mcg and 100mcg, and not taking consistently.   - Change levothyroxine to 75 mcg oral daily to simplify regimen. (Instead of alternating doses)  - Recheck TSH and Free T4 in 4-6 weeks outpatient.    - He should continue to follow-up with his endocrinologist for further titration of his levothyroxine.    # Type 2 diabetes mellitus with hyperglycemia  - Please continue tradjenta 5mg daily before breakfast.  - Diet: consistent carbohydrate.   - Please continue with lispro moderate dose sliding scale before meals and at bedtime for now.   - Patient's fingerstick glucose goal is 100-180 mg/dL.    - Discharge recommendations: Januvia 100mg daily before breakfast.  - Patient can follow up at discharge with [facility] by calling [phone] to make an appointment.         Case discussed with Dr. Ness. Primary team updated.       Janneth Robles  Endocrinology Fellow    Service Pager: [phone]    SUBJECTIVE / INTERVAL HPI: Patient was seen and examined this morning.     Overnight events:    CAPILLARY BLOOD GLUCOSE & INSULIN RECEIVED  101 mg/dL (10-12 @ 21:39)  113 mg/dL (10-13 @ 07:19)  181 mg/dL (10-13 @ 12:08)  148 mg/dL (10-13 @ 17:03)  140 mg/dL (10-13 @ 21:49)  114 mg/dL (10-14 @ 07:37)      REVIEW OF SYSTEMS  Constitutional:  Negative fever, chills or loss of appetite.  Eyes:  Negative blurry vision or double vision.  Cardiovascular:  Negative for chest pain or palpitations.  Respiratory:  Negative for cough, wheezing, or shortness of breath.    Gastrointestinal:  Negative for nausea, vomiting, diarrhea, constipation, or abdominal pain.  Genitourinary:  Negative frequency, urgency or dysuria.  Neurologic:  No headache, confusion, dizziness, lightheadedness.    PHYSICAL EXAM  Vital Signs Last 24 Hrs  T(C): 36.6 (14 Oct 2023 05:38), Max: 36.7 (14 Oct 2023 00:34)  T(F): 97.9 (14 Oct 2023 05:38), Max: 98.1 (14 Oct 2023 00:34)  HR: 76 (14 Oct 2023 05:38) (76 - 85)  BP: 128/80 (14 Oct 2023 05:38) (120/74 - 141/74)  BP(mean): 98 (14 Oct 2023 05:38) (98 - 112)  RR: 16 (14 Oct 2023 05:38) (16 - 18)  SpO2: 92% (14 Oct 2023 05:38) (92% - 94%)    Parameters below as of 14 Oct 2023 05:38  Patient On (Oxygen Delivery Method): nasal cannula  O2 Flow (L/min): 1      Constitutional: Awake, alert, in no acute distress.   HEENT: Normocephalic, atraumatic, PAMELA.  Respiratory: Lungs clear to ausculation bilaterally.   Cardiovascular: regular rhythm, normal S1 and S2, no audible murmurs.   GI: soft, non-tender, non-distended, bowel sounds present.  Extremities: No lower extremity edema.  Psychiatric: AAO x 3. Normal affect/mood.     LABS  CBC - WBC/HGB/HTC/PLT: 15.97/14.6/45.8/342 (10-13-23)  BMP - Na/K/Cl/Bicarb/BUN/Cr/Gluc/AG/eGFR: 132/4.3/100/25/18/1.04/161/7/70 (10-13-23)  Ca - 8.4 (10-13-23)  Phos - -- (10-13-23)  Mg - -- (10-13-23)  LFT - Alb/Tprot/Tbili/Dbili/AlkPhos/ALT/AST: 2.5/--/0.4/--/114/14/18 (10-13-23)  PT/aPTT/INR: 14.3/33.4/1.26 (10-10-23)   Thyroid Stimulating Hormone, Serum: 6.990 (10-09-23)  Total T4/Free T4: --/1.580 (10-09-23)      10-13-23 @ 07:01  -  10-14-23 @ 07:00  --------------------------------------------------------  IN: 360 mL / OUT: 620 mL / NET: -260 mL        MEDICATIONS  MEDICATIONS  (STANDING):  aspirin enteric coated 81 milliGRAM(s) Oral daily  atorvastatin 40 milliGRAM(s) Oral at bedtime  benzocaine/menthol Lozenge 1 Lozenge Oral four times a day  cefTRIAXone   IVPB 2000 milliGRAM(s) IV Intermittent every 24 hours  clopidogrel Tablet 75 milliGRAM(s) Oral daily  dextrose 5%. 1000 milliLiter(s) (50 mL/Hr) IV Continuous <Continuous>  dextrose 5%. 1000 milliLiter(s) (100 mL/Hr) IV Continuous <Continuous>  dextrose 50% Injectable 25 Gram(s) IV Push once  dextrose 50% Injectable 25 Gram(s) IV Push once  dextrose 50% Injectable 12.5 Gram(s) IV Push once  gabapentin 100 milliGRAM(s) Oral at bedtime  glucagon  Injectable 1 milliGRAM(s) IntraMuscular once  insulin lispro (ADMELOG) corrective regimen sliding scale   SubCutaneous at bedtime  insulin lispro (ADMELOG) corrective regimen sliding scale   SubCutaneous three times a day before meals  levothyroxine 75 MICROGram(s) Oral daily  linagliptin 5 milliGRAM(s) Oral <User Schedule>  metoprolol succinate ER 12.5 milliGRAM(s) Oral daily  metroNIDAZOLE    Tablet 500 milliGRAM(s) Oral every 8 hours  pantoprazole    Tablet 40 milliGRAM(s) Oral at bedtime  senna 2 Tablet(s) Oral daily  urea Oral Powder 15 Gram(s) Oral daily    MEDICATIONS  (PRN):  bisacodyl Suppository 10 milliGRAM(s) Rectal daily PRN Constipation  cyclobenzaprine 5 milliGRAM(s) Oral two times a day PRN Muscle Spasm  dextrose Oral Gel 15 Gram(s) Oral once PRN Blood Glucose LESS THAN 70 milliGRAM(s)/deciliter    ASSESSMENT / RECOMMENDATIONS    Mr. Yanez is a 87-year-old male with a past medical history of hypertension, hyperlipidemia, coronary artery disease (s/p JOSÉ to mLAD on 6/2023), cervical spondylosis (s/p laminectomy), gastric ulcers, hypothyroidism and constipation who presented to the emergency department (10/6/23) for dyspnea on exertion, chest pressure, and orthopnea for 3 days, along with one day of fever. He was admitted to cardiology for acute hypoxic respiratory failure secondary to acute heart failure with preserved ejection fraction and fever, possibly secondary to pneumonia. His labs were remarkable for a hemoglobin A1C of 7.7% and a TSH of 10.5. Endocrinology was consulted for recommendations regarding management of hypothyroidism and diabetes.       A1C: 7.7 %  BUN: 18  Creatinine: 1.04  GFR: 70  Weight: 63.5  BMI: 23.3    #  Hypothyroid.   Plan: 9/13: TSH 7.05, free T4 1.4  10/7: TSH 10.5, free T4 1.47  10/9: TSH 6.39. free T4 1.58  - Was on alternating levothyroxine doses of 50mcg and 100mcg, and not taking consistently.   - Change levothyroxine to 75 mcg oral daily to simplify regimen. (Instead of alternating doses)  - Recheck TSH and Free T4 in 4-6 weeks outpatient.    - He should continue to follow-up with his endocrinologist for further titration of his levothyroxine.    # Type 2 diabetes mellitus with hyperglycemia  - Please continue tradjenta 5mg daily before breakfast.  - Diet: consistent carbohydrate.   - Please continue with lispro moderate dose sliding scale before meals and at bedtime for now.   - Patient's fingerstick glucose goal is 100-180 mg/dL.    - Discharge recommendations: Januvia 100mg daily before breakfast.  - Patient can follow up at discharge with [facility] by calling [phone] to make an appointment.         Case discussed with Dr. Ness. Primary team updated.       Janneth Robles  Endocrinology Fellow    Service Pager: [phone]    SUBJECTIVE / INTERVAL HPI: Patient was seen and examined this morning.     Overnight events:    CAPILLARY BLOOD GLUCOSE & INSULIN RECEIVED  101 mg/dL (10-12 @ 21:39)  113 mg/dL (10-13 @ 07:19)  181 mg/dL (10-13 @ 12:08)  148 mg/dL (10-13 @ 17:03)  140 mg/dL (10-13 @ 21:49)  114 mg/dL (10-14 @ 07:37)      REVIEW OF SYSTEMS  Constitutional:  Negative fever, chills or loss of appetite.  Eyes:  Negative blurry vision or double vision.  Cardiovascular:  Negative for chest pain or palpitations.  Respiratory:  Negative for cough, wheezing, or shortness of breath.    Gastrointestinal:  Negative for nausea, vomiting, diarrhea, constipation, or abdominal pain.  Genitourinary:  Negative frequency, urgency or dysuria.  Neurologic:  No headache, confusion, dizziness, lightheadedness.    PHYSICAL EXAM  Vital Signs Last 24 Hrs  T(C): 36.6 (14 Oct 2023 05:38), Max: 36.7 (14 Oct 2023 00:34)  T(F): 97.9 (14 Oct 2023 05:38), Max: 98.1 (14 Oct 2023 00:34)  HR: 76 (14 Oct 2023 05:38) (76 - 85)  BP: 128/80 (14 Oct 2023 05:38) (120/74 - 141/74)  BP(mean): 98 (14 Oct 2023 05:38) (98 - 112)  RR: 16 (14 Oct 2023 05:38) (16 - 18)  SpO2: 92% (14 Oct 2023 05:38) (92% - 94%)    Parameters below as of 14 Oct 2023 05:38  Patient On (Oxygen Delivery Method): nasal cannula  O2 Flow (L/min): 1      Constitutional: Awake, alert, in no acute distress.   HEENT: Normocephalic, atraumatic, PAMELA.  Respiratory: Lungs clear to ausculation bilaterally.   Cardiovascular: regular rhythm, normal S1 and S2, no audible murmurs.   GI: soft, non-tender, non-distended, bowel sounds present.  Extremities: No lower extremity edema.  Psychiatric: AAO x 3. Normal affect/mood.     LABS  CBC - WBC/HGB/HTC/PLT: 15.97/14.6/45.8/342 (10-13-23)  BMP - Na/K/Cl/Bicarb/BUN/Cr/Gluc/AG/eGFR: 132/4.3/100/25/18/1.04/161/7/70 (10-13-23)  Ca - 8.4 (10-13-23)  Phos - -- (10-13-23)  Mg - -- (10-13-23)  LFT - Alb/Tprot/Tbili/Dbili/AlkPhos/ALT/AST: 2.5/--/0.4/--/114/14/18 (10-13-23)  PT/aPTT/INR: 14.3/33.4/1.26 (10-10-23)   Thyroid Stimulating Hormone, Serum: 6.990 (10-09-23)  Total T4/Free T4: --/1.580 (10-09-23)      10-13-23 @ 07:01  -  10-14-23 @ 07:00  --------------------------------------------------------  IN: 360 mL / OUT: 620 mL / NET: -260 mL        MEDICATIONS  MEDICATIONS  (STANDING):  aspirin enteric coated 81 milliGRAM(s) Oral daily  atorvastatin 40 milliGRAM(s) Oral at bedtime  benzocaine/menthol Lozenge 1 Lozenge Oral four times a day  cefTRIAXone   IVPB 2000 milliGRAM(s) IV Intermittent every 24 hours  clopidogrel Tablet 75 milliGRAM(s) Oral daily  dextrose 5%. 1000 milliLiter(s) (50 mL/Hr) IV Continuous <Continuous>  dextrose 5%. 1000 milliLiter(s) (100 mL/Hr) IV Continuous <Continuous>  dextrose 50% Injectable 25 Gram(s) IV Push once  dextrose 50% Injectable 25 Gram(s) IV Push once  dextrose 50% Injectable 12.5 Gram(s) IV Push once  gabapentin 100 milliGRAM(s) Oral at bedtime  glucagon  Injectable 1 milliGRAM(s) IntraMuscular once  insulin lispro (ADMELOG) corrective regimen sliding scale   SubCutaneous at bedtime  insulin lispro (ADMELOG) corrective regimen sliding scale   SubCutaneous three times a day before meals  levothyroxine 75 MICROGram(s) Oral daily  linagliptin 5 milliGRAM(s) Oral <User Schedule>  metoprolol succinate ER 12.5 milliGRAM(s) Oral daily  metroNIDAZOLE    Tablet 500 milliGRAM(s) Oral every 8 hours  pantoprazole    Tablet 40 milliGRAM(s) Oral at bedtime  senna 2 Tablet(s) Oral daily  urea Oral Powder 15 Gram(s) Oral daily    MEDICATIONS  (PRN):  bisacodyl Suppository 10 milliGRAM(s) Rectal daily PRN Constipation  cyclobenzaprine 5 milliGRAM(s) Oral two times a day PRN Muscle Spasm  dextrose Oral Gel 15 Gram(s) Oral once PRN Blood Glucose LESS THAN 70 milliGRAM(s)/deciliter    ASSESSMENT / RECOMMENDATIONS    Mr. Yanez is a 87-year-old male with a past medical history of hypertension, hyperlipidemia, coronary artery disease (s/p JOSÉ to mLAD on 6/2023), cervical spondylosis (s/p laminectomy), gastric ulcers, hypothyroidism and constipation who presented to the emergency department (10/6/23) for dyspnea on exertion, chest pressure, and orthopnea for 3 days, along with one day of fever. He was admitted to cardiology for acute hypoxic respiratory failure secondary to acute heart failure with preserved ejection fraction and fever, possibly secondary to pneumonia. His labs were remarkable for a hemoglobin A1C of 7.7% and a TSH of 10.5. Endocrinology was consulted for recommendations regarding management of hypothyroidism and diabetes.       A1C: 7.7 %  BUN: 18  Creatinine: 1.04  GFR: 70  Weight: 63.5  BMI: 23.3    #  Hypothyroid.   Plan: 9/13: TSH 7.05, free T4 1.4  10/7: TSH 10.5, free T4 1.47  10/9: TSH 6.39. free T4 1.58  - Was on alternating levothyroxine doses of 50mcg and 100mcg, and not taking consistently.   - Change levothyroxine to 75 mcg oral daily to simplify regimen. (Instead of alternating doses)  - Recheck TSH and Free T4 in 4-6 weeks outpatient.    - He should continue to follow-up with his endocrinologist for further titration of his levothyroxine.    # Type 2 diabetes mellitus with hyperglycemia  - Please continue tradjenta 5mg daily before breakfast.  - Diet: consistent carbohydrate.   - Please continue with lispro moderate dose sliding scale before meals and at bedtime for now.   - Patient's fingerstick glucose goal is 100-180 mg/dL.    - Discharge recommendations: Januvia 100mg daily before breakfast.  - Patient can follow up at discharge with [facility] by calling [phone] to make an appointment.         Case discussed with Dr. Ness. Primary team updated.       Janneth Robles  Endocrinology Fellow    Service Pager: [phone]    SUBJECTIVE / INTERVAL HPI: Patient was seen and examined this morning.     Overnight events:  pt reports feelign well    CAPILLARY BLOOD GLUCOSE & INSULIN RECEIVED  101 mg/dL (10-12 @ 21:39)  113 mg/dL (10-13 @ 07:19)  181 mg/dL (10-13 @ 12:08)  148 mg/dL (10-13 @ 17:03)  140 mg/dL (10-13 @ 21:49)  114 mg/dL (10-14 @ 07:37)      REVIEW OF SYSTEMS  Constitutional:  Negative fever, chills or loss of appetite.  Eyes:  Negative blurry vision or double vision.  Cardiovascular:  Negative for chest pain or palpitations.  Respiratory:  Negative for cough, wheezing, or shortness of breath.    Gastrointestinal:  Negative for nausea, vomiting, diarrhea, constipation, or abdominal pain.  Genitourinary:  Negative frequency, urgency or dysuria.  Neurologic:  No headache, confusion, dizziness, lightheadedness.      PHYSICAL EXAM  Vital Signs Last 24 Hrs  T(C): 36.6 (14 Oct 2023 05:38), Max: 36.7 (14 Oct 2023 00:34)  T(F): 97.9 (14 Oct 2023 05:38), Max: 98.1 (14 Oct 2023 00:34)  HR: 76 (14 Oct 2023 05:38) (76 - 85)  BP: 128/80 (14 Oct 2023 05:38) (120/74 - 141/74)  BP(mean): 98 (14 Oct 2023 05:38) (98 - 112)  RR: 16 (14 Oct 2023 05:38) (16 - 18)  SpO2: 92% (14 Oct 2023 05:38) (92% - 94%)    Parameters below as of 14 Oct 2023 05:38  Patient On (Oxygen Delivery Method): nasal cannula  O2 Flow (L/min): 1      Constitutional: Awake, alert, in no acute distress.   HEENT: Normocephalic, atraumatic, PAMELA.  Respiratory: Lungs clear to ausculation bilaterally.   Cardiovascular: regular rhythm, normal S1 and S2, no audible murmurs.   GI: soft, non-tender, non-distended, bowel sounds present.  Extremities: No lower extremity edema.  Psychiatric: AAO x 3. Normal affect/mood.     LABS  CBC - WBC/HGB/HTC/PLT: 15.97/14.6/45.8/342 (10-13-23)  BMP - Na/K/Cl/Bicarb/BUN/Cr/Gluc/AG/eGFR: 132/4.3/100/25/18/1.04/161/7/70 (10-13-23)  Ca - 8.4 (10-13-23)  Phos - -- (10-13-23)  Mg - -- (10-13-23)  LFT - Alb/Tprot/Tbili/Dbili/AlkPhos/ALT/AST: 2.5/--/0.4/--/114/14/18 (10-13-23)  PT/aPTT/INR: 14.3/33.4/1.26 (10-10-23)   Thyroid Stimulating Hormone, Serum: 6.990 (10-09-23)  Total T4/Free T4: --/1.580 (10-09-23)      10-13-23 @ 07:01  -  10-14-23 @ 07:00  --------------------------------------------------------  IN: 360 mL / OUT: 620 mL / NET: -260 mL        MEDICATIONS  MEDICATIONS  (STANDING):  aspirin enteric coated 81 milliGRAM(s) Oral daily  atorvastatin 40 milliGRAM(s) Oral at bedtime  benzocaine/menthol Lozenge 1 Lozenge Oral four times a day  cefTRIAXone   IVPB 2000 milliGRAM(s) IV Intermittent every 24 hours  clopidogrel Tablet 75 milliGRAM(s) Oral daily  dextrose 5%. 1000 milliLiter(s) (50 mL/Hr) IV Continuous <Continuous>  dextrose 5%. 1000 milliLiter(s) (100 mL/Hr) IV Continuous <Continuous>  dextrose 50% Injectable 25 Gram(s) IV Push once  dextrose 50% Injectable 25 Gram(s) IV Push once  dextrose 50% Injectable 12.5 Gram(s) IV Push once  gabapentin 100 milliGRAM(s) Oral at bedtime  glucagon  Injectable 1 milliGRAM(s) IntraMuscular once  insulin lispro (ADMELOG) corrective regimen sliding scale   SubCutaneous at bedtime  insulin lispro (ADMELOG) corrective regimen sliding scale   SubCutaneous three times a day before meals  levothyroxine 75 MICROGram(s) Oral daily  linagliptin 5 milliGRAM(s) Oral <User Schedule>  metoprolol succinate ER 12.5 milliGRAM(s) Oral daily  metroNIDAZOLE    Tablet 500 milliGRAM(s) Oral every 8 hours  pantoprazole    Tablet 40 milliGRAM(s) Oral at bedtime  senna 2 Tablet(s) Oral daily  urea Oral Powder 15 Gram(s) Oral daily    MEDICATIONS  (PRN):  bisacodyl Suppository 10 milliGRAM(s) Rectal daily PRN Constipation  cyclobenzaprine 5 milliGRAM(s) Oral two times a day PRN Muscle Spasm  dextrose Oral Gel 15 Gram(s) Oral once PRN Blood Glucose LESS THAN 70 milliGRAM(s)/deciliter    ASSESSMENT / RECOMMENDATIONS    Mr. Yanez is a 87-year-old male with a past medical history of hypertension, hyperlipidemia, coronary artery disease (s/p JOSÉ to mLAD on 6/2023), cervical spondylosis (s/p laminectomy), gastric ulcers, hypothyroidism and constipation who presented to the emergency department (10/6/23) for dyspnea on exertion, chest pressure, and orthopnea for 3 days, along with one day of fever. He was admitted to cardiology for acute hypoxic respiratory failure secondary to acute heart failure with preserved ejection fraction and fever, possibly secondary to pneumonia. His labs were remarkable for a hemoglobin A1C of 7.7% and a TSH of 10.5. Endocrinology was consulted for recommendations regarding management of hypothyroidism and diabetes.       A1C: 7.7 %  BUN: 18  Creatinine: 1.04  GFR: 70  Weight: 63.5  BMI: 23.3    #  Hypothyroid.   Plan: 9/13: TSH 7.05, free T4 1.4  10/7: TSH 10.5, free T4 1.47  10/9: TSH 6.39. free T4 1.58  - Was on alternating levothyroxine doses of 50mcg and 100mcg, and not taking consistently.   - please discharge with levothyroxine to 75 mcg oral daily   - Recheck TSH and Free T4 in 4-6 weeks outpatient.    - He should continue to follow-up with his endocrinologist for further titration of his levothyroxine.    # Type 2 diabetes mellitus with hyperglycemia  - Please continue tradjenta 5mg daily before breakfast.  - Diet: consistent carbohydrate.   - Please continue with lispro moderate dose sliding scale before meals and at bedtime for now.   - Patient's fingerstick glucose goal is 100-180 mg/dL.    - Discharge recommendations: Januvia 100mg daily before breakfast.  - Patient can follow up at discharge with [facility] by calling [phone] to make an appointment.         Case discussed with Dr. Ness. Primary team updated.       Janneth Robles  Endocrinology Fellow    Service Pager: [phone]    SUBJECTIVE / INTERVAL HPI: Patient was seen and examined this morning.     Overnight events:  pt reports feelign well    CAPILLARY BLOOD GLUCOSE & INSULIN RECEIVED  101 mg/dL (10-12 @ 21:39)  113 mg/dL (10-13 @ 07:19)  181 mg/dL (10-13 @ 12:08)  148 mg/dL (10-13 @ 17:03)  140 mg/dL (10-13 @ 21:49)  114 mg/dL (10-14 @ 07:37)      REVIEW OF SYSTEMS  Constitutional:  Negative fever, chills or loss of appetite.  Eyes:  Negative blurry vision or double vision.  Cardiovascular:  Negative for chest pain or palpitations.  Respiratory:  Negative for cough, wheezing, or shortness of breath.    Gastrointestinal:  Negative for nausea, vomiting, diarrhea, constipation, or abdominal pain.  Genitourinary:  Negative frequency, urgency or dysuria.  Neurologic:  No headache, confusion, dizziness, lightheadedness.      PHYSICAL EXAM  Vital Signs Last 24 Hrs  T(C): 36.6 (14 Oct 2023 05:38), Max: 36.7 (14 Oct 2023 00:34)  T(F): 97.9 (14 Oct 2023 05:38), Max: 98.1 (14 Oct 2023 00:34)  HR: 76 (14 Oct 2023 05:38) (76 - 85)  BP: 128/80 (14 Oct 2023 05:38) (120/74 - 141/74)  BP(mean): 98 (14 Oct 2023 05:38) (98 - 112)  RR: 16 (14 Oct 2023 05:38) (16 - 18)  SpO2: 92% (14 Oct 2023 05:38) (92% - 94%)    Parameters below as of 14 Oct 2023 05:38  Patient On (Oxygen Delivery Method): nasal cannula  O2 Flow (L/min): 1      Constitutional: Awake, alert, in no acute distress.   HEENT: Normocephalic, atraumatic, PAMELA.  Respiratory: Lungs clear to ausculation bilaterally.   Cardiovascular: regular rhythm, normal S1 and S2, no audible murmurs.   GI: soft, non-tender, non-distended, bowel sounds present.  Extremities: No lower extremity edema.  Psychiatric: AAO x 3. Normal affect/mood.     LABS  CBC - WBC/HGB/HTC/PLT: 15.97/14.6/45.8/342 (10-13-23)  BMP - Na/K/Cl/Bicarb/BUN/Cr/Gluc/AG/eGFR: 132/4.3/100/25/18/1.04/161/7/70 (10-13-23)  Ca - 8.4 (10-13-23)  Phos - -- (10-13-23)  Mg - -- (10-13-23)  LFT - Alb/Tprot/Tbili/Dbili/AlkPhos/ALT/AST: 2.5/--/0.4/--/114/14/18 (10-13-23)  PT/aPTT/INR: 14.3/33.4/1.26 (10-10-23)   Thyroid Stimulating Hormone, Serum: 6.990 (10-09-23)  Total T4/Free T4: --/1.580 (10-09-23)      10-13-23 @ 07:01  -  10-14-23 @ 07:00  --------------------------------------------------------  IN: 360 mL / OUT: 620 mL / NET: -260 mL        MEDICATIONS  MEDICATIONS  (STANDING):  aspirin enteric coated 81 milliGRAM(s) Oral daily  atorvastatin 40 milliGRAM(s) Oral at bedtime  benzocaine/menthol Lozenge 1 Lozenge Oral four times a day  cefTRIAXone   IVPB 2000 milliGRAM(s) IV Intermittent every 24 hours  clopidogrel Tablet 75 milliGRAM(s) Oral daily  dextrose 5%. 1000 milliLiter(s) (50 mL/Hr) IV Continuous <Continuous>  dextrose 5%. 1000 milliLiter(s) (100 mL/Hr) IV Continuous <Continuous>  dextrose 50% Injectable 25 Gram(s) IV Push once  dextrose 50% Injectable 25 Gram(s) IV Push once  dextrose 50% Injectable 12.5 Gram(s) IV Push once  gabapentin 100 milliGRAM(s) Oral at bedtime  glucagon  Injectable 1 milliGRAM(s) IntraMuscular once  insulin lispro (ADMELOG) corrective regimen sliding scale   SubCutaneous at bedtime  insulin lispro (ADMELOG) corrective regimen sliding scale   SubCutaneous three times a day before meals  levothyroxine 75 MICROGram(s) Oral daily  linagliptin 5 milliGRAM(s) Oral <User Schedule>  metoprolol succinate ER 12.5 milliGRAM(s) Oral daily  metroNIDAZOLE    Tablet 500 milliGRAM(s) Oral every 8 hours  pantoprazole    Tablet 40 milliGRAM(s) Oral at bedtime  senna 2 Tablet(s) Oral daily  urea Oral Powder 15 Gram(s) Oral daily    MEDICATIONS  (PRN):  bisacodyl Suppository 10 milliGRAM(s) Rectal daily PRN Constipation  cyclobenzaprine 5 milliGRAM(s) Oral two times a day PRN Muscle Spasm  dextrose Oral Gel 15 Gram(s) Oral once PRN Blood Glucose LESS THAN 70 milliGRAM(s)/deciliter    ASSESSMENT / RECOMMENDATIONS    Mr. Yanez is a 87-year-old male with a past medical history of hypertension, hyperlipidemia, coronary artery disease (s/p JOSÉ to mLAD on 6/2023), cervical spondylosis (s/p laminectomy), gastric ulcers, hypothyroidism and constipation who presented to the emergency department (10/6/23) for dyspnea on exertion, chest pressure, and orthopnea for 3 days, along with one day of fever. He was admitted to cardiology for acute hypoxic respiratory failure secondary to acute heart failure with preserved ejection fraction and fever, possibly secondary to pneumonia. His labs were remarkable for a hemoglobin A1C of 7.7% and a TSH of 10.5. Endocrinology was consulted for recommendations regarding management of hypothyroidism and diabetes.       A1C: 7.7 %  BUN: 18  Creatinine: 1.04  GFR: 70  Weight: 63.5  BMI: 23.3    #  Hypothyroid.   Plan: 9/13: TSH 7.05, free T4 1.4  10/7: TSH 10.5, free T4 1.47  10/9: TSH 6.39. free T4 1.58  - Was on alternating levothyroxine doses of 50mcg and 100mcg, and not taking consistently.   - please discharge with levothyroxine to 75 mcg oral daily   - Recheck TSH and Free T4 in 4-6 weeks outpatient.    - He should continue to follow-up with his endocrinologist for further titration of his levothyroxine.    # Type 2 diabetes mellitus with hyperglycemia  - Please continue tradjenta 5mg daily before breakfast.  - Diet: consistent carbohydrate.   - Please continue with lispro moderate dose sliding scale before meals and at bedtime for now.   - Patient's fingerstick glucose goal is 100-180 mg/dL.    - Discharge recommendations: Januvia 100mg daily before breakfast.  - Patient can follow up at discharge with [facility] by calling [phone] to make an appointment.         Case discussed with Dr. Ness. Primary team updated.       Janneth Robles  Endocrinology Fellow    Service Pager: [phone]    SUBJECTIVE / INTERVAL HPI: Patient was seen and examined this morning.     Overnight events:  pt reports feelign well    CAPILLARY BLOOD GLUCOSE & INSULIN RECEIVED  101 mg/dL (10-12 @ 21:39)  113 mg/dL (10-13 @ 07:19)  181 mg/dL (10-13 @ 12:08)  148 mg/dL (10-13 @ 17:03)  140 mg/dL (10-13 @ 21:49)  114 mg/dL (10-14 @ 07:37)      REVIEW OF SYSTEMS  Constitutional:  Negative fever, chills or loss of appetite.  Eyes:  Negative blurry vision or double vision.  Cardiovascular:  Negative for chest pain or palpitations.  Respiratory:  Negative for cough, wheezing, or shortness of breath.    Gastrointestinal:  Negative for nausea, vomiting, diarrhea, constipation, or abdominal pain.  Genitourinary:  Negative frequency, urgency or dysuria.  Neurologic:  No headache, confusion, dizziness, lightheadedness.      PHYSICAL EXAM  Vital Signs Last 24 Hrs  T(C): 36.6 (14 Oct 2023 05:38), Max: 36.7 (14 Oct 2023 00:34)  T(F): 97.9 (14 Oct 2023 05:38), Max: 98.1 (14 Oct 2023 00:34)  HR: 76 (14 Oct 2023 05:38) (76 - 85)  BP: 128/80 (14 Oct 2023 05:38) (120/74 - 141/74)  BP(mean): 98 (14 Oct 2023 05:38) (98 - 112)  RR: 16 (14 Oct 2023 05:38) (16 - 18)  SpO2: 92% (14 Oct 2023 05:38) (92% - 94%)    Parameters below as of 14 Oct 2023 05:38  Patient On (Oxygen Delivery Method): nasal cannula  O2 Flow (L/min): 1      Constitutional: Awake, alert, in no acute distress.   HEENT: Normocephalic, atraumatic, PAMELA.  Respiratory: Lungs clear to ausculation bilaterally.   Cardiovascular: regular rhythm, normal S1 and S2, no audible murmurs.   GI: soft, non-tender, non-distended, bowel sounds present.  Extremities: No lower extremity edema.  Psychiatric: AAO x 3. Normal affect/mood.     LABS  CBC - WBC/HGB/HTC/PLT: 15.97/14.6/45.8/342 (10-13-23)  BMP - Na/K/Cl/Bicarb/BUN/Cr/Gluc/AG/eGFR: 132/4.3/100/25/18/1.04/161/7/70 (10-13-23)  Ca - 8.4 (10-13-23)  Phos - -- (10-13-23)  Mg - -- (10-13-23)  LFT - Alb/Tprot/Tbili/Dbili/AlkPhos/ALT/AST: 2.5/--/0.4/--/114/14/18 (10-13-23)  PT/aPTT/INR: 14.3/33.4/1.26 (10-10-23)   Thyroid Stimulating Hormone, Serum: 6.990 (10-09-23)  Total T4/Free T4: --/1.580 (10-09-23)      10-13-23 @ 07:01  -  10-14-23 @ 07:00  --------------------------------------------------------  IN: 360 mL / OUT: 620 mL / NET: -260 mL        MEDICATIONS  MEDICATIONS  (STANDING):  aspirin enteric coated 81 milliGRAM(s) Oral daily  atorvastatin 40 milliGRAM(s) Oral at bedtime  benzocaine/menthol Lozenge 1 Lozenge Oral four times a day  cefTRIAXone   IVPB 2000 milliGRAM(s) IV Intermittent every 24 hours  clopidogrel Tablet 75 milliGRAM(s) Oral daily  dextrose 5%. 1000 milliLiter(s) (50 mL/Hr) IV Continuous <Continuous>  dextrose 5%. 1000 milliLiter(s) (100 mL/Hr) IV Continuous <Continuous>  dextrose 50% Injectable 25 Gram(s) IV Push once  dextrose 50% Injectable 25 Gram(s) IV Push once  dextrose 50% Injectable 12.5 Gram(s) IV Push once  gabapentin 100 milliGRAM(s) Oral at bedtime  glucagon  Injectable 1 milliGRAM(s) IntraMuscular once  insulin lispro (ADMELOG) corrective regimen sliding scale   SubCutaneous at bedtime  insulin lispro (ADMELOG) corrective regimen sliding scale   SubCutaneous three times a day before meals  levothyroxine 75 MICROGram(s) Oral daily  linagliptin 5 milliGRAM(s) Oral <User Schedule>  metoprolol succinate ER 12.5 milliGRAM(s) Oral daily  metroNIDAZOLE    Tablet 500 milliGRAM(s) Oral every 8 hours  pantoprazole    Tablet 40 milliGRAM(s) Oral at bedtime  senna 2 Tablet(s) Oral daily  urea Oral Powder 15 Gram(s) Oral daily    MEDICATIONS  (PRN):  bisacodyl Suppository 10 milliGRAM(s) Rectal daily PRN Constipation  cyclobenzaprine 5 milliGRAM(s) Oral two times a day PRN Muscle Spasm  dextrose Oral Gel 15 Gram(s) Oral once PRN Blood Glucose LESS THAN 70 milliGRAM(s)/deciliter    ASSESSMENT / RECOMMENDATIONS    Mr. Yanez is a 87-year-old male with a past medical history of hypertension, hyperlipidemia, coronary artery disease (s/p JOSÉ to mLAD on 6/2023), cervical spondylosis (s/p laminectomy), gastric ulcers, hypothyroidism and constipation who presented to the emergency department (10/6/23) for dyspnea on exertion, chest pressure, and orthopnea for 3 days, along with one day of fever. He was admitted to cardiology for acute hypoxic respiratory failure secondary to acute heart failure with preserved ejection fraction and fever, possibly secondary to pneumonia. His labs were remarkable for a hemoglobin A1C of 7.7% and a TSH of 10.5. Endocrinology was consulted for recommendations regarding management of hypothyroidism and diabetes.       A1C: 7.7 %  BUN: 18  Creatinine: 1.04  GFR: 70  Weight: 63.5  BMI: 23.3    #  Hypothyroid.   Plan: 9/13: TSH 7.05, free T4 1.4  10/7: TSH 10.5, free T4 1.47  10/9: TSH 6.39. free T4 1.58  - Was on alternating levothyroxine doses of 50mcg and 100mcg, and not taking consistently.   - please discharge with levothyroxine to 75 mcg oral daily   - Recheck TSH and Free T4 in 4-6 weeks outpatient.    - He should continue to follow-up with his endocrinologist for further titration of his levothyroxine.    # Type 2 diabetes mellitus with hyperglycemia  - Please continue tradjenta 5mg daily before breakfast.  - Diet: consistent carbohydrate.   - Please continue with lispro moderate dose sliding scale before meals and at bedtime for now.   - Patient's fingerstick glucose goal is 100-180 mg/dL.    - Discharge recommendations: Januvia 100mg daily before breakfast.  - Patient can follow up at discharge with [facility] by calling [phone] to make an appointment.         Case discussed with Dr. Ness. Primary team updated.       Janneth Robles  Endocrinology Fellow    Service Pager: [phone]

## 2023-10-14 NOTE — DISCHARGE NOTE NURSING/CASE MANAGEMENT/SOCIAL WORK - NSDCPEFALRISK_GEN_ALL_CORE
For information on Fall & Injury Prevention, visit: [email or web address]/[email or web address]-prevention-[email or web address]-maintains-health-and-mobility OR  [email or web address]://www.[facility]/news/fall-prevention-tips-[email or web address]-injury OR  https://www.cdc.gov/steadi/patient.html For information on Fall & Injury Prevention, visit: https://www.[facility]/news/fall-prevention-protects-and-maintains-health-and-mobility OR  https://www.[facility]/news/fall-prevention-tips-to-avoid-injury OR  https://www.cdc.gov/steadi/patient.html For information on Fall & Injury Prevention, visit: https://www.[facility]/news/fall-prevention-protects-and-maintains-health-and-mobility OR  https://www.[facility]/news/fall-prevention-tips-to-avoid-injury OR  https://www.cdc.gov/steadi/patient.html

## 2023-10-14 NOTE — DISCHARGE NOTE NURSING/CASE MANAGEMENT/SOCIAL WORK - NSDCFUADDAPPT_GEN_ALL_CORE_FT
Please bring your Insurance card, Photo ID and Discharge paperwork to the following appointments:    (1) Please follow up with your Primary Care Provider Dr. Danielle Shook at [address] [place] [address] on 10/16/2023 at 9:00am.    Appointment was scheduled by Ms. DANETTE Kathleen, Referral Coordinator.    (2) Please follow up with Surgery Provider, Dr. Dl Gomez at [address], [address] [place], [place] [address] on 10/25/2023 at 11:30am.    Appointment was scheduled by Ms. DANETTE Kathleen, Referral Coordinator. Please bring your Insurance card, Photo ID and Discharge paperwork to the following appointments:    (1) Please follow up with your Primary Care Provider Dr. Danielle Shook at [address] [place] [address] on 10/16/2023 at 9:00am.    Appointment was scheduled by Ms. DANETTE Kathleen, Referral Coordinator.    (2) Please follow up with Surgery Provider, Dr. Dl Gomez at [address], [address] [place], [place] [address] on 10/25/2023 at 11:30am.    Appointment was scheduled by Ms. DANETTE Kathleen, Referral Coordinator. Please bring your Insurance card, Photo ID and Discharge paperwork to the following appointments:    (1) Please follow up with your Primary Care Provider Dr. Danielle Shook at [address] [place] [address] on 10/16/2023 at 9:00am.    Appointment was scheduled by Ms. DANETTE Kathleen, Referral Coordinator.    (2) Please follow up with Surgery Provider, Dr. Dl Gomez at [address], [address] [place], [place] [address] on 10/25/2023 at 11:30am.    Appointment was scheduled by Ms. DANETTE Kathleen, Referral Coordinator.

## 2023-10-14 NOTE — PROGRESS NOTE ADULT - PROBLEM SELECTOR PLAN 2
Reports sharp pinpoint RUQ discomfort worsened with palpation x 1-2 days. Last BM 10/3 w/ suppository at home. Daughter reports decreased eating and fluid intake at home lately.  RUQUS: cholelithiasis  CTAP: acute cholecystitis with fat stranding  HIDA: non visualization of the gallbladder, likely representing acute cholecystitis    - surgery consulted, f/u recs: pt will continue with abx and follow up outpatient  - c/w ctx to 2gm (end date10/23)  - c/w flagyl 500mg q8 (end date 10/23)

## 2023-10-14 NOTE — PROGRESS NOTE ADULT - PROBLEM SELECTOR PLAN 3
WBC 16.26 elevated on admission. Reported fever at home yesterday improved w/ Tylenol. Likely in the setting of cholecystitis. procal 0.25 elevated s/p abx IV Ceftriaxone x 5 days and Azithromycin x 1 in ED. Continued both abx for now.    - c/w ctx to 2gm (end date10/23)  - c/w flagyl 500mg q8 (end date 10/23)  - f/u blood cultures ngtd

## 2023-10-14 NOTE — DISCHARGE NOTE NURSING/CASE MANAGEMENT/SOCIAL WORK - PATIENT PORTAL LINK FT
You can access the FollowMyHealth Patient Portal offered by [facility] by registering at the following website: [email or web address]. By joining [email or web address]’s FollowMyHealth portal, you will also be able to view your health information using other applications (apps) compatible with our system. You can access the FollowMyHealth Patient Portal offered by [facility] by registering at the following website: [email or web address]. By joining [employer]’s FollowMyHealth portal, you will also be able to view your health information using other applications (apps) compatible with our system. normal... You can access the FollowMyHealth Patient Portal offered by [facility] by registering at the following website: [email or web address]. By joining [employer]’s FollowMyHealth portal, you will also be able to view your health information using other applications (apps) compatible with our system.

## 2023-10-16 ENCOUNTER — TRANSCRIPTION ENCOUNTER (OUTPATIENT)
Age: 88
End: 2023-10-16

## 2023-10-17 ENCOUNTER — TRANSCRIPTION ENCOUNTER (OUTPATIENT)
Age: 88
End: 2023-10-17

## 2023-10-19 ENCOUNTER — TRANSCRIPTION ENCOUNTER (OUTPATIENT)
Age: 88
End: 2023-10-19

## 2023-10-19 DIAGNOSIS — E22.2 SYNDROME OF INAPPROPRIATE SECRETION OF ANTIDIURETIC HORMONE: ICD-10-CM

## 2023-10-19 DIAGNOSIS — M48.00 SPINAL STENOSIS, SITE UNSPECIFIED: ICD-10-CM

## 2023-10-19 DIAGNOSIS — M47.9 SPONDYLOSIS, UNSPECIFIED: ICD-10-CM

## 2023-10-19 DIAGNOSIS — J96.01 ACUTE RESPIRATORY FAILURE WITH HYPOXIA: ICD-10-CM

## 2023-10-19 DIAGNOSIS — N40.0 BENIGN PROSTATIC HYPERPLASIA WITHOUT LOWER URINARY TRACT SYMPTOMS: ICD-10-CM

## 2023-10-19 DIAGNOSIS — Z79.891 LONG TERM (CURRENT) USE OF OPIATE ANALGESIC: ICD-10-CM

## 2023-10-19 DIAGNOSIS — E11.65 TYPE 2 DIABETES MELLITUS WITH HYPERGLYCEMIA: ICD-10-CM

## 2023-10-19 DIAGNOSIS — Z11.52 ENCOUNTER FOR SCREENING FOR COVID-19: ICD-10-CM

## 2023-10-19 DIAGNOSIS — E86.1 HYPOVOLEMIA: ICD-10-CM

## 2023-10-19 DIAGNOSIS — D50.9 IRON DEFICIENCY ANEMIA, UNSPECIFIED: ICD-10-CM

## 2023-10-19 DIAGNOSIS — E03.9 HYPOTHYROIDISM, UNSPECIFIED: ICD-10-CM

## 2023-10-19 DIAGNOSIS — I25.10 ATHEROSCLEROTIC HEART DISEASE OF NATIVE CORONARY ARTERY WITHOUT ANGINA PECTORIS: ICD-10-CM

## 2023-10-19 DIAGNOSIS — J98.11 ATELECTASIS: ICD-10-CM

## 2023-10-19 DIAGNOSIS — K59.00 CONSTIPATION, UNSPECIFIED: ICD-10-CM

## 2023-10-19 DIAGNOSIS — Z91.013 ALLERGY TO SEAFOOD: ICD-10-CM

## 2023-10-19 DIAGNOSIS — E86.0 DEHYDRATION: ICD-10-CM

## 2023-10-19 DIAGNOSIS — R63.4 ABNORMAL WEIGHT LOSS: ICD-10-CM

## 2023-10-19 DIAGNOSIS — Z79.02 LONG TERM (CURRENT) USE OF ANTITHROMBOTICS/ANTIPLATELETS: ICD-10-CM

## 2023-10-19 DIAGNOSIS — K80.00 CALCULUS OF GALLBLADDER WITH ACUTE CHOLECYSTITIS WITHOUT OBSTRUCTION: ICD-10-CM

## 2023-10-19 DIAGNOSIS — Z79.899 OTHER LONG TERM (CURRENT) DRUG THERAPY: ICD-10-CM

## 2023-10-19 DIAGNOSIS — I11.0 HYPERTENSIVE HEART DISEASE WITH HEART FAILURE: ICD-10-CM

## 2023-10-19 DIAGNOSIS — E78.5 HYPERLIPIDEMIA, UNSPECIFIED: ICD-10-CM

## 2023-10-19 DIAGNOSIS — Z98.1 ARTHRODESIS STATUS: ICD-10-CM

## 2023-10-19 DIAGNOSIS — Z87.11 PERSONAL HISTORY OF PEPTIC ULCER DISEASE: ICD-10-CM

## 2023-10-19 DIAGNOSIS — Z79.890 HORMONE REPLACEMENT THERAPY: ICD-10-CM

## 2023-10-19 DIAGNOSIS — Z95.5 PRESENCE OF CORONARY ANGIOPLASTY IMPLANT AND GRAFT: ICD-10-CM

## 2023-10-19 DIAGNOSIS — Z79.82 LONG TERM (CURRENT) USE OF ASPIRIN: ICD-10-CM

## 2023-10-19 DIAGNOSIS — I50.33 ACUTE ON CHRONIC DIASTOLIC (CONGESTIVE) HEART FAILURE: ICD-10-CM

## 2023-10-24 ENCOUNTER — APPOINTMENT (OUTPATIENT)
Dept: NEPHROLOGY | Facility: CLINIC | Age: 88
End: 2023-10-24

## 2023-11-13 ENCOUNTER — TRANSCRIPTION ENCOUNTER (OUTPATIENT)
Age: 88
End: 2023-11-13

## 2023-11-13 NOTE — PATIENT PROFILE ADULT - FUNCTIONAL ASSESSMENT - DAILY ACTIVITY 4.
----- Message from Luba Savitavenancio Sinha sent at 7/18/2018  2:44 PM CDT -----  Contact: [place] [phone]      ----- Message -----  From: Luba Savitavenancio Sinha  Sent: 7/18/2018   2:15 PM  To: [facility] BRAUN Staff    Prior Authorization Needed    Medication: flash glucose sensor (FREESTYLE CHRISTIANO SENSOR) Kit    Pharmacy Info: [id number] - [place], [place] - [address] [facility]    Plan does not cover this medication. Please call plan at [phone] to initiate prior authorization or call/fax pharmacy to change medication. Patient ID#[id number]    Note chart when prior authorization has been submitted.    Please notify pharmacy when prior authorization has been approved.    Thank You     2 = A lot of assistance

## 2023-11-13 NOTE — PATIENT PROFILE ADULT - FALL HARM RISK - HARM RISK INTERVENTIONS
Assistance with ambulation/Assistance OOB with selected safe patient handling equipment/Communicate Risk of Fall with Harm to all staff/Discuss with provider need for PT consult/Monitor gait and stability/Provide patient with walking aids - walker, cane, crutches/Reinforce activity limits and safety measures with patient and family/Sit up slowly, dangle for a short time, stand at bedside before walking/Tailored Fall Risk Interventions/Use of alarms - bed, chair and/or voice tab/Visual Cue: Yellow wristband and red socks/Bed in lowest position, wheels locked, appropriate side rails in place/Call bell, personal items and telephone in reach/Instruct patient to call for assistance before getting out of bed or chair/Non-slip footwear when patient is out of bed/[place] to call system/Physically safe environment - no spills, clutter or unnecessary equipment/Purposeful Proactive Rounding/Room/bathroom lighting operational, light cord in reach Assistance with ambulation/Assistance OOB with selected safe patient handling equipment/Communicate Risk of Fall with Harm to all staff/Discuss with provider need for PT consult/Monitor gait and stability/Provide patient with walking aids - walker, cane, crutches/Reinforce activity limits and safety measures with patient and family/Sit up slowly, dangle for a short time, stand at bedside before walking/Tailored Fall Risk Interventions/Use of alarms - bed, chair and/or voice tab/Visual Cue: Yellow wristband and red socks/Bed in lowest position, wheels locked, appropriate side rails in place/Call bell, personal items and telephone in reach/Instruct patient to call for assistance before getting out of bed or chair/Non-slip footwear when patient is out of bed/[place] to call system/Physically safe environment - no spills, clutter or unnecessary equipment/Purposeful Proactive Rounding/Room/bathroom lighting operational, light cord in reach Assistance with ambulation/Assistance OOB with selected safe patient handling equipment/Communicate Risk of Fall with Harm to all staff/Discuss with provider need for PT consult/Monitor gait and stability/Provide patient with walking aids - walker, cane, crutches/Reinforce activity limits and safety measures with patient and family/Sit up slowly, dangle for a short time, stand at bedside before walking/Tailored Fall Risk Interventions/Use of alarms - bed, chair and/or voice tab/Visual Cue: Yellow wristband and red socks/Bed in lowest position, wheels locked, appropriate side rails in place/Call bell, personal items and telephone in reach/Instruct patient to call for assistance before getting out of bed or chair/Non-slip footwear when patient is out of bed/[place] to call system/Physically safe environment - no spills, clutter or unnecessary equipment/Purposeful Proactive Rounding/Room/bathroom lighting operational, light cord in reach

## 2023-11-14 ENCOUNTER — APPOINTMENT (OUTPATIENT)
Dept: OPHTHALMOLOGY | Facility: AMBULATORY SURGERY CENTER | Age: 88
End: 2023-11-14

## 2023-11-14 ENCOUNTER — INPATIENT (INPATIENT)
Facility: HOSPITAL | Age: 88
LOS: 3 days | Discharge: HOME | DRG: 418 | End: 2023-11-18
Attending: SURGERY | Admitting: SURGERY
Payer: MEDICARE

## 2023-11-14 ENCOUNTER — RESULT REVIEW (OUTPATIENT)
Age: 88
End: 2023-11-14

## 2023-11-14 ENCOUNTER — TRANSCRIPTION ENCOUNTER (OUTPATIENT)
Age: 88
End: 2023-11-14

## 2023-11-14 VITALS — RESPIRATION RATE: 16 BRPM | HEIGHT: 65 IN | WEIGHT: 145.06 LBS

## 2023-11-14 DIAGNOSIS — Z98.1 ARTHRODESIS STATUS: Chronic | ICD-10-CM

## 2023-11-14 LAB
ALBUMIN SERPL ELPH-MCNC: 3.4 G/DL — SIGNIFICANT CHANGE UP (ref 3.3–5)
ALBUMIN SERPL ELPH-MCNC: 3.4 G/DL — SIGNIFICANT CHANGE UP (ref 3.3–5)
ALP SERPL-CCNC: 58 U/L — SIGNIFICANT CHANGE UP (ref 40–120)
ALP SERPL-CCNC: 58 U/L — SIGNIFICANT CHANGE UP (ref 40–120)
ALT FLD-CCNC: 36 U/L — SIGNIFICANT CHANGE UP (ref 10–45)
ALT FLD-CCNC: 36 U/L — SIGNIFICANT CHANGE UP (ref 10–45)
ANION GAP SERPL CALC-SCNC: 10 MMOL/L — SIGNIFICANT CHANGE UP (ref 5–17)
ANION GAP SERPL CALC-SCNC: 10 MMOL/L — SIGNIFICANT CHANGE UP (ref 5–17)
AST SERPL-CCNC: 61 U/L — HIGH (ref 10–40)
AST SERPL-CCNC: 61 U/L — HIGH (ref 10–40)
BILIRUB SERPL-MCNC: 0.5 MG/DL — SIGNIFICANT CHANGE UP (ref 0.2–1.2)
BILIRUB SERPL-MCNC: 0.5 MG/DL — SIGNIFICANT CHANGE UP (ref 0.2–1.2)
BUN SERPL-MCNC: 23 MG/DL — SIGNIFICANT CHANGE UP (ref 7–23)
BUN SERPL-MCNC: 23 MG/DL — SIGNIFICANT CHANGE UP (ref 7–23)
CALCIUM SERPL-MCNC: 8.7 MG/DL — SIGNIFICANT CHANGE UP (ref 8.4–10.5)
CALCIUM SERPL-MCNC: 8.7 MG/DL — SIGNIFICANT CHANGE UP (ref 8.4–10.5)
CHLORIDE SERPL-SCNC: 104 MMOL/L — SIGNIFICANT CHANGE UP (ref 96–108)
CHLORIDE SERPL-SCNC: 104 MMOL/L — SIGNIFICANT CHANGE UP (ref 96–108)
CO2 SERPL-SCNC: 23 MMOL/L — SIGNIFICANT CHANGE UP (ref 22–31)
CO2 SERPL-SCNC: 23 MMOL/L — SIGNIFICANT CHANGE UP (ref 22–31)
CREAT SERPL-MCNC: 0.98 MG/DL — SIGNIFICANT CHANGE UP (ref 0.5–1.3)
CREAT SERPL-MCNC: 0.98 MG/DL — SIGNIFICANT CHANGE UP (ref 0.5–1.3)
EGFR: 76 ML/MIN/1.73M2 — SIGNIFICANT CHANGE UP
EGFR: 76 ML/MIN/1.73M2 — SIGNIFICANT CHANGE UP
GLUCOSE BLDC GLUCOMTR-MCNC: 105 MG/DL — HIGH (ref 70–99)
GLUCOSE BLDC GLUCOMTR-MCNC: 105 MG/DL — HIGH (ref 70–99)
GLUCOSE BLDC GLUCOMTR-MCNC: 132 MG/DL — HIGH (ref 70–99)
GLUCOSE BLDC GLUCOMTR-MCNC: 132 MG/DL — HIGH (ref 70–99)
GLUCOSE SERPL-MCNC: 166 MG/DL — HIGH (ref 70–99)
GLUCOSE SERPL-MCNC: 166 MG/DL — HIGH (ref 70–99)
HCT VFR BLD CALC: 42.9 % — SIGNIFICANT CHANGE UP (ref 39–50)
HCT VFR BLD CALC: 42.9 % — SIGNIFICANT CHANGE UP (ref 39–50)
HGB BLD-MCNC: 13.4 G/DL — SIGNIFICANT CHANGE UP (ref 13–17)
HGB BLD-MCNC: 13.4 G/DL — SIGNIFICANT CHANGE UP (ref 13–17)
MCHC RBC-ENTMCNC: 20.8 PG — LOW (ref 27–34)
MCHC RBC-ENTMCNC: 20.8 PG — LOW (ref 27–34)
MCHC RBC-ENTMCNC: 31.2 GM/DL — LOW (ref 32–36)
MCHC RBC-ENTMCNC: 31.2 GM/DL — LOW (ref 32–36)
MCV RBC AUTO: 66.6 FL — LOW (ref 80–100)
MCV RBC AUTO: 66.6 FL — LOW (ref 80–100)
NRBC # BLD: 0 /100 WBCS — SIGNIFICANT CHANGE UP (ref 0–0)
NRBC # BLD: 0 /100 WBCS — SIGNIFICANT CHANGE UP (ref 0–0)
PLATELET # BLD AUTO: 182 K/UL — SIGNIFICANT CHANGE UP (ref 150–400)
PLATELET # BLD AUTO: 182 K/UL — SIGNIFICANT CHANGE UP (ref 150–400)
POTASSIUM SERPL-MCNC: 4.1 MMOL/L — SIGNIFICANT CHANGE UP (ref 3.5–5.3)
POTASSIUM SERPL-MCNC: 4.1 MMOL/L — SIGNIFICANT CHANGE UP (ref 3.5–5.3)
POTASSIUM SERPL-SCNC: 4.1 MMOL/L — SIGNIFICANT CHANGE UP (ref 3.5–5.3)
POTASSIUM SERPL-SCNC: 4.1 MMOL/L — SIGNIFICANT CHANGE UP (ref 3.5–5.3)
PROT SERPL-MCNC: 6.7 G/DL — SIGNIFICANT CHANGE UP (ref 6–8.3)
PROT SERPL-MCNC: 6.7 G/DL — SIGNIFICANT CHANGE UP (ref 6–8.3)
RBC # BLD: 6.44 M/UL — HIGH (ref 4.2–5.8)
RBC # BLD: 6.44 M/UL — HIGH (ref 4.2–5.8)
RBC # FLD: 17.8 % — HIGH (ref 10.3–14.5)
RBC # FLD: 17.8 % — HIGH (ref 10.3–14.5)
SODIUM SERPL-SCNC: 137 MMOL/L — SIGNIFICANT CHANGE UP (ref 135–145)
SODIUM SERPL-SCNC: 137 MMOL/L — SIGNIFICANT CHANGE UP (ref 135–145)
WBC # BLD: 16.1 K/UL — HIGH (ref 3.8–10.5)
WBC # BLD: 16.1 K/UL — HIGH (ref 3.8–10.5)
WBC # FLD AUTO: 16.1 K/UL — HIGH (ref 3.8–10.5)
WBC # FLD AUTO: 16.1 K/UL — HIGH (ref 3.8–10.5)

## 2023-11-14 PROCEDURE — 88304 TISSUE EXAM BY PATHOLOGIST: CPT | Mod: 26

## 2023-11-14 DEVICE — ARISTA 3GR: Type: IMPLANTABLE DEVICE | Status: FUNCTIONAL

## 2023-11-14 DEVICE — LIGATING CLIPS WECK HEMOLOK POLYMER MEDIUM-LARGE (GREEN) 6: Type: IMPLANTABLE DEVICE | Status: FUNCTIONAL

## 2023-11-14 RX ORDER — INSULIN LISPRO 100/ML
VIAL (ML) SUBCUTANEOUS
Refills: 0 | Status: DISCONTINUED | OUTPATIENT
Start: 2023-11-14 | End: 2023-11-18

## 2023-11-14 RX ORDER — SODIUM CHLORIDE 9 MG/ML
1000 INJECTION, SOLUTION INTRAVENOUS
Refills: 0 | Status: DISCONTINUED | OUTPATIENT
Start: 2023-11-14 | End: 2023-11-18

## 2023-11-14 RX ORDER — DEXTROSE 50 % IN WATER 50 %
15 SYRINGE (ML) INTRAVENOUS ONCE
Refills: 0 | Status: DISCONTINUED | OUTPATIENT
Start: 2023-11-14 | End: 2023-11-18

## 2023-11-14 RX ORDER — DEXTROSE 50 % IN WATER 50 %
25 SYRINGE (ML) INTRAVENOUS ONCE
Refills: 0 | Status: DISCONTINUED | OUTPATIENT
Start: 2023-11-14 | End: 2023-11-18

## 2023-11-14 RX ORDER — PANTOPRAZOLE SODIUM 20 MG/1
40 TABLET, DELAYED RELEASE ORAL DAILY
Refills: 0 | Status: DISCONTINUED | OUTPATIENT
Start: 2023-11-14 | End: 2023-11-18

## 2023-11-14 RX ORDER — CYCLOBENZAPRINE HYDROCHLORIDE 10 MG/1
5 TABLET, FILM COATED ORAL DAILY
Refills: 0 | Status: DISCONTINUED | OUTPATIENT
Start: 2023-11-14 | End: 2023-11-18

## 2023-11-14 RX ORDER — SODIUM CHLORIDE 9 MG/ML
1000 INJECTION, SOLUTION INTRAVENOUS
Refills: 0 | Status: DISCONTINUED | OUTPATIENT
Start: 2023-11-14 | End: 2023-11-15

## 2023-11-14 RX ORDER — ACETAMINOPHEN 500 MG
1000 TABLET ORAL ONCE
Refills: 0 | Status: COMPLETED | OUTPATIENT
Start: 2023-11-15 | End: 2023-11-15

## 2023-11-14 RX ORDER — GLUCAGON INJECTION, SOLUTION 0.5 MG/.1ML
1 INJECTION, SOLUTION SUBCUTANEOUS ONCE
Refills: 0 | Status: DISCONTINUED | OUTPATIENT
Start: 2023-11-14 | End: 2023-11-18

## 2023-11-14 RX ORDER — METRONIDAZOLE 500 MG
500 TABLET ORAL EVERY 8 HOURS
Refills: 0 | Status: DISCONTINUED | OUTPATIENT
Start: 2023-11-14 | End: 2023-11-15

## 2023-11-14 RX ORDER — HYDROMORPHONE HYDROCHLORIDE 2 MG/ML
0.2 INJECTION INTRAMUSCULAR; INTRAVENOUS; SUBCUTANEOUS EVERY 6 HOURS
Refills: 0 | Status: DISCONTINUED | OUTPATIENT
Start: 2023-11-14 | End: 2023-11-15

## 2023-11-14 RX ORDER — CEFTRIAXONE 500 MG/1
1000 INJECTION, POWDER, FOR SOLUTION INTRAMUSCULAR; INTRAVENOUS ONCE
Refills: 0 | Status: COMPLETED | OUTPATIENT
Start: 2023-11-14 | End: 2023-11-14

## 2023-11-14 RX ORDER — HYDROMORPHONE HYDROCHLORIDE 2 MG/ML
0.5 INJECTION INTRAMUSCULAR; INTRAVENOUS; SUBCUTANEOUS EVERY 6 HOURS
Refills: 0 | Status: DISCONTINUED | OUTPATIENT
Start: 2023-11-14 | End: 2023-11-15

## 2023-11-14 RX ORDER — ATORVASTATIN CALCIUM 80 MG/1
40 TABLET, FILM COATED ORAL AT BEDTIME
Refills: 0 | Status: DISCONTINUED | OUTPATIENT
Start: 2023-11-15 | End: 2023-11-18

## 2023-11-14 RX ORDER — METOPROLOL TARTRATE 50 MG
12.5 TABLET ORAL DAILY
Refills: 0 | Status: DISCONTINUED | OUTPATIENT
Start: 2023-11-15 | End: 2023-11-18

## 2023-11-14 RX ORDER — LEVOTHYROXINE SODIUM 125 MCG
75 TABLET ORAL DAILY
Refills: 0 | Status: DISCONTINUED | OUTPATIENT
Start: 2023-11-14 | End: 2023-11-18

## 2023-11-14 RX ORDER — DEXTROSE 50 % IN WATER 50 %
12.5 SYRINGE (ML) INTRAVENOUS ONCE
Refills: 0 | Status: DISCONTINUED | OUTPATIENT
Start: 2023-11-14 | End: 2023-11-18

## 2023-11-14 RX ADMIN — Medication 100 MILLIGRAM(S): at 21:13

## 2023-11-14 RX ADMIN — CEFTRIAXONE 100 MILLIGRAM(S): 500 INJECTION, POWDER, FOR SOLUTION INTRAMUSCULAR; INTRAVENOUS at 22:32

## 2023-11-14 RX ADMIN — CYCLOBENZAPRINE HYDROCHLORIDE 5 MILLIGRAM(S): 10 TABLET, FILM COATED ORAL at 21:14

## 2023-11-14 NOTE — PRE-ANESTHESIA EVALUATION ADULT - NSANTHPEFT_GEN_ALL_CORE
General: Appearance is consistent with chronological age. No abnormal facies.  EENT: Anicteric sclera; oropharynx clear, moist mucus membranes  Cardiovascular:  Rate and rhythm evaluated  Respiratory: Unlabored breathing  Neurological: Awake and alert, moves all extremities  Constitutional: METs limited by back pain

## 2023-11-14 NOTE — H&P ADULT - ASSESSMENT
87M w/ PMHx CAD s/p JOSÉ to mLAD (6/2023), HTN, HLD, cervical spondylosis s/p laminectomy, PUD, hypothyroidism, presenting for elective cholecystectomy. Patient initially presented to [facility] ED 10/6/23 for BELLO with minimal exertion, chest pressure, and orthopnea x 3 days. Patient was noted to have severe hyponatremia and leukocytosis. He was managed for CHF under medicine, and started on empiric antibiotics. Upon further work up, diagnosis of acute cholecystitis was made. Surgery was consulted but patient symptoms/signs and labs already improved on Abx. Given cardiac history and presentation, operative intervention was deferred pending patient stabilization. Patient saw Dr. Gomez in the outpatient setting and surgery was scheduled for today after Cardiology clearance. He is now s/p robot-assisted cholecystectomy 11/14. . .     Plan:   - Pain control   - Aggressive IS/OOB   - Continue Metoprolol and Atorvastatin   - NPO/IVF (judicious fluids given cardiac history)   - PACU labs and AM labs   - Jess-op IV Avx x 24 hrs (Cef & Flagyl)   - Maintain Kennedy   - Monitor drain output   - No SQH or Toradol. SCDs for DVT ppx 87M w/ PMHx CAD s/p JOSÉ to mLAD (6/2023), HTN, HLD, cervical spondylosis s/p laminectomy, PUD, hypothyroidism, presenting for elective cholecystectomy. Patient initially presented to [facility] ED 10/6/23 for BELLO with minimal exertion, chest pressure, and orthopnea x 3 days. Patient was noted to have severe hyponatremia and leukocytosis. He was managed for CHF under medicine, and started on empiric antibiotics. Upon further work up, diagnosis of acute cholecystitis was made. Surgery was consulted but patient symptoms/signs and labs already improved on Abx. Given cardiac history and presentation, operative intervention was deferred pending patient stabilization. Patient saw Dr. Gomez in the outpatient setting and surgery was scheduled for today after Cardiology clearance. He is now s/p robot-assisted cholecystectomy 11/14. . .     Plan:   - Pain control   - Aggressive IS/OOB   - Continue Metoprolol and Atorvastatin   - NPO/IVF (judicious fluids given cardiac history)   - PACU labs and AM labs   - Jess-op IV Avx x 24 hrs (Cef & Flagyl)   - Maintain Kennedy   - Monitor drain output   - No SQH or Toradol. SCDs for DVT ppx 87M w/ PMHx CAD s/p JOSÉ to mLAD (6/2023), HTN, HLD, cervical spondylosis s/p laminectomy, PUD, hypothyroidism, presenting for elective cholecystectomy. Patient initially presented to [facility] ED 10/6/23 for BELLO with minimal exertion, chest pressure, and orthopnea x 3 days. Patient was noted to have severe hyponatremia and leukocytosis. He was managed for CHF under medicine, and started on empiric antibiotics. Upon further work up, diagnosis of acute cholecystitis was made. Surgery was consulted but patient symptoms/signs and labs already improved on Abx. Given cardiac history and presentation, operative intervention was deferred pending patient stabilization. Patient saw Dr. Gomez in the outpatient setting and surgery was scheduled for today after Cardiology clearance. He is now s/p robot-assisted cholecystectomy 11/14. . .     Plan:   - Pain control   - Aggressive IS/OOB   - Continue Metoprolol and Atorvastatin   - NPO/IVF (judicious fluids given cardiac history)   - PACU labs and AM labs   - Jess-op IV Avx x 24 hrs (Cef & Flagyl)   - Maintain Kennedy   - Monitor drain output   - No SQH or Toradol. SCDs for DVT ppx 87M w/ PMHx CAD s/p JOSÉ to mLAD (6/2023), HTN, HLD, cervical spondylosis s/p laminectomy, PUD, hypothyroidism, presenting for elective cholecystectomy. Patient initially presented to [facility] ED 10/6/23 for BELLO with minimal exertion, chest pressure, and orthopnea x 3 days. Patient was noted to have severe hyponatremia and leukocytosis. He was managed for CHF under medicine, and started on empiric antibiotics. Upon further work up, diagnosis of acute cholecystitis was made. Surgery was consulted but patient symptoms/signs and labs already improved on Abx. Given cardiac history and presentation, operative intervention was deferred pending patient stabilization. Patient saw Dr. Gomez in the outpatient setting and surgery was scheduled for today after Cardiology clearance. He is now s/p robot-assisted cholecystectomy 11/14. . .     Plan:   - Pain control   - Aggressive IS/OOB   - Continue Metoprolol and Atorvastatin   - NPO/IVF (judicious fluids given cardiac history)   - SSI; monitor BG    - Resume Synthroid tomorrow   - PACU labs and AM labs   - Jess-op IV Abx x 24 hrs (Cef & Flagyl)   - Maintain Kennedy   - Monitor drain output   - No SQH or Toradol. SCDs for DVT ppx 87M w/ PMHx CAD s/p JOSÉ to mLAD (6/2023), HTN, HLD, cervical spondylosis s/p laminectomy, PUD, hypothyroidism, presenting for elective cholecystectomy. Patient initially presented to [facility] ED 10/6/23 for BELLO with minimal exertion, chest pressure, and orthopnea x 3 days. Patient was noted to have severe hyponatremia and leukocytosis. He was managed for CHF under medicine, and started on empiric antibiotics. Upon further work up, diagnosis of acute cholecystitis was made. Surgery was consulted but patient symptoms/signs and labs already improved on Abx. Given cardiac history and presentation, operative intervention was deferred pending patient stabilization. Patient saw Dr. Gomez in the outpatient setting and surgery was scheduled for today after Cardiology clearance. He is now s/p robot-assisted cholecystectomy 11/14. . .     Plan:   - Pain control   - Aggressive IS/OOB   - Continue Metoprolol and Atorvastatin   - NPO/IVF (judicious fluids given cardiac history)   - SSI; monitor BG    - Resume Synthroid tomorrow   - PACU labs and AM labs   - Jess-op IV Abx x 24 hrs (Cef & Flagyl)   - Maintain Kennedy   - Monitor drain output   - No SQH or Toradol. SCDs for DVT ppx

## 2023-11-14 NOTE — H&P ADULT - NSHPLABSRESULTS_GEN_ALL_CORE
87M w/ PMHx CAD s/p JOSÉ to mLAD (6/2023), HTN, HLD, cervical spondylosis s/p laminectomy, PUD, hypothyroidism, presenting for elective cholecystectomy. Patient initially presented to [facility] ED 10/6/23 for BELLO with minimal exertion, chest pressure, and orthopnea x 3 days. Patient was noted to have severe hyponatremia and leukocytosis. He was managed for CHF under medicine, and started on empiric antibiotics. Upon further work up, diagnosis of acute cholecystitis was made. Surgery was consulted but patient symptoms/signs and labs already improved on Abx. Given cardiac history and presentation, operative intervention was deferred pending patient stabilization. Patient saw Dr. Gomez in the outpatient setting and surgery was scheduled for today after Cardiology clearance. He is now s/p robot-assisted cholecystectomy 11/14. . .     Plan:   - Pain control   - Aggressive IS/OOB   - Continue Metoprolol and Atorvastatin   - NPO/IVF (judicious fluids given cardiac history)   - PACU labs and AM labs   - Jess-op IV Avx x 24 hrs (Cef & Flagyl)   - Maintain Kennedy   - Monitor drain output   - No SQH or Toradol. SCDs for DVT ppx 87M w/ PMHx CAD s/p JOSÉ to mLAD (6/2023), HTN, HLD, cervical spondylosis s/p laminectomy, PUD, hypothyroidism, presenting for elective cholecystectomy. Patient initially presented to [facility] ED 10/6/23 for BELLO with minimal exertion, chest pressure, and orthopnea x 3 days. Patient was noted to have severe hyponatremia and leukocytosis. He was managed for CHF under medicine, and started on empiric antibiotics. Upon further work up, diagnosis of acute cholecystitis was made. Surgery was consulted but patient symptoms/signs and labs already improved on Abx. Given cardiac history and presentation, operative intervention was deferred pending patient stabilization. Patient saw Dr. Gomez in the outpatient setting and surgery was scheduled for today after Cardiology clearance. He is now s/p robot-assisted cholecystectomy 11/14. . .     Plan:   - Pain control   - Aggressive IS/OOB   - Continue Metoprolol and Atorvastatin   - NPO/IVF (judicious fluids given cardiac history)   - PACU labs and AM labs   - Jess-op IV Avx x 24 hrs (Cef & Flagyl)   - Maintain Kennedy   - Monitor drain output   - No SQH or Toradol. SCDs for DVT ppx 87M w/ PMHx CAD s/p JOSÉ to mLAD (6/2023), HTN, HLD, cervical spondylosis s/p laminectomy, PUD, hypothyroidism, presenting for elective cholecystectomy. Patient initially presented to [facility] ED 10/6/23 for BELLO with minimal exertion, chest pressure, and orthopnea x 3 days. Patient was noted to have severe hyponatremia and leukocytosis. He was managed for CHF under medicine, and started on empiric antibiotics. Upon further work up, diagnosis of acute cholecystitis was made. Surgery was consulted but patient symptoms/signs and labs already improved on Abx. Given cardiac history and presentation, operative intervention was deferred pending patient stabilization. Patient saw Dr. Gomez in the outpatient setting and surgery was scheduled for today after Cardiology clearance. He is now s/p robot-assisted cholecystectomy 11/14. . .     Plan:   - Pain control   - Aggressive IS/OOB   - Continue Metoprolol and Atorvastatin   - NPO/IVF (judicious fluids given cardiac history)   - PACU labs and AM labs   - Jess-op IV Avx x 24 hrs (Cef & Flagyl)   - Maintain Kennedy   - Monitor drain output   - No SQH or Toradol. SCDs for DVT ppx

## 2023-11-14 NOTE — H&P ADULT - HISTORY OF PRESENT ILLNESS
Patient is a 87M, (shellfish rxn w/ lip swelling ~ 20 yrs ago, does not eat shellfish since then) w/ PMHx CAD s/p JOSÉ to mLAD (6/2023), HTN, HLD, cervical spondylosis s/p laminectomy, gastric ulcers, hypothyroidism, presenting for elective cholecystectomy. Patient initially presented to [facility] ED 10/6/23 for BELLO with minimal exertion, chest pressure, and orthopnea x 3 days. Patient was noted to have severe hyponatremia and leukocytosis. He was managed for CHF under medicine, and started on empiric antibiotics. Upon further work up, diagnosis of acute cholecystitis was made. Surgery was consulted but patient symptoms/signs and labs already improved on Abx. Given cardiac history and presentation, operative intervention was deferred pending patient stabilization. Patient saw Dr. Gomez in the outpatient setting and surgery was scheduled for today after Cardiology clearance     Patient denies abdominal pain, nausea, emesis, chills, sob, palpitations or chest pain. He is having normal bowel function  for BELLO with minimal exertion, chest pressure, and orthopnea x 3 days. Patient was noted to have severe hyponatremia and leukocytosis. He was managed for CHF under medicine, and started on empiric antibiotics. Upon further work up, diagnosis of acute cholecystitis was made. Surgery was consulted but patient symptoms/signs and labs already improved on Abx. Given cardiac history and presentation, operative intervention was deferred pending patient stabilization. Patient saw Dr. Gomez in the outpatient setting and surgery was scheduled for today after Cardiology clearance     Patient denies abdominal pain, nausea, emesis, chills, sob, palpitations or chest pain. He is having normal bowel function  Patient is a 87M, (shellfish rxn w/ lip swelling ~ 20 yrs ago, does not eat shellfish since then) w/ PMHx CAD s/p JOSÉ to mLAD (6/2023), HTN, HLD, cervical spondylosis s/p laminectomy, gastric ulcers, hypothyroidism, presenting for elective cholecystectomy. Patient initially presented to [facility] ED 10/6/23 for BELLO with minimal exertion, chest pressure, and orthopnea x 3 days. Patient was noted to have severe hyponatremia and leukocytosis. He was managed for CHF under medicine, and started on empiric antibiotics. Upon further work up, diagnosis of acute cholecystitis was made. Surgery was consulted but patient symptoms/signs and labs already improved on Abx. Given cardiac history and presentation, operative intervention was deferred pending patient stabilization. Patient saw Dr. Gomez in the outpatient setting and surgery was scheduled for today after Cardiology clearance     Patient denies abdominal pain, nausea, emesis, chills, sob, palpitations or chest pain. He is having normal bowel function  for BELLO with minimal exertion, chest pressure, and orthopnea x 3 days. Patient was noted to have severe hyponatremia and leukocytosis. He was managed for CHF under medicine, and started on empiric antibiotics. Upon further work up, diagnosis of acute cholecystitis was made. Surgery was consulted but patient symptoms/signs and labs already improved on Abx. Given cardiac history and presentation, operative intervention was deferred pending patient stabilization. Patient saw Dr. Gomez in the outpatient setting and surgery was scheduled for today after Cardiology clearance     Patient denies abdominal pain, nausea, emesis, chills, sob, palpitations or chest pain. He is having normal bowel function  Patient is a 87M, (shellfish rxn w/ lip swelling ~ 20 yrs ago, does not eat shellfish since then) w/ PMHx CAD s/p JOSÉ to mLAD (6/2023), HTN, HLD, cervical spondylosis s/p laminectomy, gastric ulcers, hypothyroidism, presenting for elective cholecystectomy. Patient initially presented to [facility] ED 10/6/23 for BELLO with minimal exertion, chest pressure, and orthopnea x 3 days. Patient was noted to have severe hyponatremia and leukocytosis. He was managed for CHF under medicine, and started on empiric antibiotics. Upon further work up, diagnosis of acute cholecystitis was made. Surgery was consulted but patient symptoms/signs and labs already improved on Abx. Given cardiac history and presentation, operative intervention was deferred pending patient stabilization. Patient saw Dr. Gomez in the outpatient setting and surgery was scheduled for today after Cardiology clearance     Patient denies abdominal pain, nausea, emesis, chills, sob, palpitations or chest pain. He is having normal bowel function  for BELLO with minimal exertion, chest pressure, and orthopnea x 3 days. Patient was noted to have severe hyponatremia and leukocytosis. He was managed for CHF under medicine, and started on empiric antibiotics. Upon further work up, diagnosis of acute cholecystitis was made. Surgery was consulted but patient symptoms/signs and labs already improved on Abx. Given cardiac history and presentation, operative intervention was deferred pending patient stabilization. Patient saw Dr. Gomez in the outpatient setting and surgery was scheduled for today after Cardiology clearance     Patient denies abdominal pain, nausea, emesis, chills, sob, palpitations or chest pain. He is having normal bowel function  Patient is a 87M, (shellfish rxn w/ lip swelling ~ 20 yrs ago, does not eat shellfish since then) w/ PMHx CAD s/p JOSÉ to mLAD (6/2023), HTN, HLD, cervical spondylosis s/p laminectomy, gastric ulcers, hypothyroidism, presenting for elective cholecystectomy. Patient initially presented to [facility] ED 10/6/23 for BELLO with minimal exertion, chest pressure, and orthopnea x 3 days. Patient was noted to have severe hyponatremia and leukocytosis. He was managed for CHF under medicine, and started on empiric antibiotics. Upon further work up, diagnosis of acute cholecystitis was made. Surgery was consulted but patient symptoms/signs and labs already improved on Abx. Given cardiac history and presentation, operative intervention was deferred pending patient stabilization. Patient saw Dr. Gomez in the outpatient setting and surgery was scheduled for today after Cardiology clearance     Patient denies abdominal pain, nausea, emesis, chills, sob, palpitations or chest pain. He is having normal bowel function. Last dose of Plavix was 11/7      Patient is a 87M, (shellfish rxn w/ lip swelling ~ 20 yrs ago, does not eat shellfish since then) w/ PMHx CAD s/p JOSÉ to mLAD (6/2023), HTN, HLD, cervical spondylosis s/p laminectomy, gastric ulcers, hypothyroidism, presenting for elective cholecystectomy. Patient initially presented to [facility] ED 10/6/23 for BELLO with minimal exertion, chest pressure, and orthopnea x 3 days. Patient was noted to have severe hyponatremia and leukocytosis. He was managed for CHF under medicine, and started on empiric antibiotics. Upon further work up, diagnosis of acute cholecystitis was made. Surgery was consulted but patient symptoms/signs and labs already improved on Abx. Given cardiac history and presentation, operative intervention was deferred pending patient stabilization. Patient saw Dr. Gomez in the outpatient setting and surgery was scheduled for today after Cardiology clearance     Patient denies abdominal pain, nausea, emesis, chills, sob, palpitations or chest pain. He is having normal bowel function. Last dose of Plavix was 11/7      Patient is a 87M, (shellfish rxn w/ lip swelling ~ 20 yrs ago, does not eat shellfish since then) w/ PMHx CAD s/p JOSÉ to mLAD (6/2023), HTN, HLD, cervical spondylosis s/p laminectomy, gastric ulcers, hypothyroidism, presenting for elective cholecystectomy. Patient initially presented to [facility] ED 10/6/23 for BELLO with minimal exertion, chest pressure, and orthopnea x 3 days. Patient was noted to have severe hyponatremia and leukocytosis. He was managed for CHF under medicine, and started on empiric antibiotics. Upon further work up, diagnosis of acute cholecystitis was made. Surgery was consulted but patient symptoms/signs and labs already improved on Abx. Given cardiac history and presentation, operative intervention was deferred pending patient stabilization. Patient saw Dr. Gomez in the outpatient setting and surgery was scheduled for today after Cardiology clearance     Patient denies abdominal pain, nausea, emesis, chills, sob, palpitations or chest pain. He is having normal bowel function. Last dose of Plavix was 11/7

## 2023-11-14 NOTE — H&P ADULT - NSICDXPASTMEDICALHX_GEN_ALL_CORE_FT
PAST MEDICAL HISTORY:  CAD (coronary artery disease) coronary Stent 6/2023    H/O cholecystitis     History of BPH     HLD (hyperlipidemia)     HTN (hypertension)     Hypothyroid

## 2023-11-14 NOTE — PRE-ANESTHESIA EVALUATION ADULT - NSANTHOSAYNRD_GEN_A_CORE
No. NAHUM screening performed.  STOP BANG Legend: 0-2 = LOW Risk; 3-4 = INTERMEDIATE Risk; 5-8 = HIGH Risk

## 2023-11-14 NOTE — H&P ADULT - NSHPPHYSICALEXAM_GEN_ALL_CORE
GEN: NAD, resting comfortably in bed  HEENT: MMM, normal EOM   CV: RRR, peripheral pulses palpable   Resp: nonlabored breathing, no respiratory distress   Abd: soft, nontender, non-distended  Ext: WWP, no edema, no calf tenderness   Skin: No jaundice, normal cap refill   Neuro: no focal deficits  Psych: pleasant, appropriate mood and affect

## 2023-11-14 NOTE — BRIEF OPERATIVE NOTE - OPERATION/FINDINGS
Upon entry into abdomen, inflamed gallbladder with peritoneal adhesions appreciated. Robot was docked. Cholecystectomy was completed. Hemostasis of liver bed/gallbladder fossa was achieved. RUQ was washed out. Aristat was applied to liver bed for additional hemostasis. 19 Fr Heber drain was placed in emigdio-hepatic/gallbladder fossa and extracted through RLQ incision. Umbilical defect repaired.

## 2023-11-15 ENCOUNTER — APPOINTMENT (OUTPATIENT)
Dept: OPHTHALMOLOGY | Facility: CLINIC | Age: 88
End: 2023-11-15

## 2023-11-15 LAB
ALBUMIN SERPL ELPH-MCNC: 3.1 G/DL — LOW (ref 3.3–5)
ALBUMIN SERPL ELPH-MCNC: 3.1 G/DL — LOW (ref 3.3–5)
ALP SERPL-CCNC: 54 U/L — SIGNIFICANT CHANGE UP (ref 40–120)
ALP SERPL-CCNC: 54 U/L — SIGNIFICANT CHANGE UP (ref 40–120)
ALT FLD-CCNC: 39 U/L — SIGNIFICANT CHANGE UP (ref 10–45)
ALT FLD-CCNC: 39 U/L — SIGNIFICANT CHANGE UP (ref 10–45)
ANION GAP SERPL CALC-SCNC: 9 MMOL/L — SIGNIFICANT CHANGE UP (ref 5–17)
ANION GAP SERPL CALC-SCNC: 9 MMOL/L — SIGNIFICANT CHANGE UP (ref 5–17)
AST SERPL-CCNC: 52 U/L — HIGH (ref 10–40)
AST SERPL-CCNC: 52 U/L — HIGH (ref 10–40)
BILIRUB SERPL-MCNC: 0.6 MG/DL — SIGNIFICANT CHANGE UP (ref 0.2–1.2)
BILIRUB SERPL-MCNC: 0.6 MG/DL — SIGNIFICANT CHANGE UP (ref 0.2–1.2)
BUN SERPL-MCNC: 19 MG/DL — SIGNIFICANT CHANGE UP (ref 7–23)
BUN SERPL-MCNC: 19 MG/DL — SIGNIFICANT CHANGE UP (ref 7–23)
CALCIUM SERPL-MCNC: 8.5 MG/DL — SIGNIFICANT CHANGE UP (ref 8.4–10.5)
CALCIUM SERPL-MCNC: 8.5 MG/DL — SIGNIFICANT CHANGE UP (ref 8.4–10.5)
CHLORIDE SERPL-SCNC: 103 MMOL/L — SIGNIFICANT CHANGE UP (ref 96–108)
CHLORIDE SERPL-SCNC: 103 MMOL/L — SIGNIFICANT CHANGE UP (ref 96–108)
CO2 SERPL-SCNC: 26 MMOL/L — SIGNIFICANT CHANGE UP (ref 22–31)
CO2 SERPL-SCNC: 26 MMOL/L — SIGNIFICANT CHANGE UP (ref 22–31)
CREAT SERPL-MCNC: 1.05 MG/DL — SIGNIFICANT CHANGE UP (ref 0.5–1.3)
CREAT SERPL-MCNC: 1.05 MG/DL — SIGNIFICANT CHANGE UP (ref 0.5–1.3)
EGFR: 70 ML/MIN/1.73M2 — SIGNIFICANT CHANGE UP
EGFR: 70 ML/MIN/1.73M2 — SIGNIFICANT CHANGE UP
GLUCOSE BLDC GLUCOMTR-MCNC: 101 MG/DL — HIGH (ref 70–99)
GLUCOSE BLDC GLUCOMTR-MCNC: 101 MG/DL — HIGH (ref 70–99)
GLUCOSE BLDC GLUCOMTR-MCNC: 132 MG/DL — HIGH (ref 70–99)
GLUCOSE BLDC GLUCOMTR-MCNC: 149 MG/DL — HIGH (ref 70–99)
GLUCOSE BLDC GLUCOMTR-MCNC: 149 MG/DL — HIGH (ref 70–99)
GLUCOSE SERPL-MCNC: 125 MG/DL — HIGH (ref 70–99)
GLUCOSE SERPL-MCNC: 125 MG/DL — HIGH (ref 70–99)
HCT VFR BLD CALC: 40.9 % — SIGNIFICANT CHANGE UP (ref 39–50)
HCT VFR BLD CALC: 40.9 % — SIGNIFICANT CHANGE UP (ref 39–50)
HGB BLD-MCNC: 12.7 G/DL — LOW (ref 13–17)
HGB BLD-MCNC: 12.7 G/DL — LOW (ref 13–17)
MAGNESIUM SERPL-MCNC: 2.1 MG/DL — SIGNIFICANT CHANGE UP (ref 1.6–2.6)
MAGNESIUM SERPL-MCNC: 2.1 MG/DL — SIGNIFICANT CHANGE UP (ref 1.6–2.6)
MCHC RBC-ENTMCNC: 20.9 PG — LOW (ref 27–34)
MCHC RBC-ENTMCNC: 20.9 PG — LOW (ref 27–34)
MCHC RBC-ENTMCNC: 31.1 GM/DL — LOW (ref 32–36)
MCHC RBC-ENTMCNC: 31.1 GM/DL — LOW (ref 32–36)
MCV RBC AUTO: 67.4 FL — LOW (ref 80–100)
MCV RBC AUTO: 67.4 FL — LOW (ref 80–100)
NRBC # BLD: 0 /100 WBCS — SIGNIFICANT CHANGE UP (ref 0–0)
NRBC # BLD: 0 /100 WBCS — SIGNIFICANT CHANGE UP (ref 0–0)
PHOSPHATE SERPL-MCNC: 3.9 MG/DL — SIGNIFICANT CHANGE UP (ref 2.5–4.5)
PHOSPHATE SERPL-MCNC: 3.9 MG/DL — SIGNIFICANT CHANGE UP (ref 2.5–4.5)
PLATELET # BLD AUTO: 188 K/UL — SIGNIFICANT CHANGE UP (ref 150–400)
PLATELET # BLD AUTO: 188 K/UL — SIGNIFICANT CHANGE UP (ref 150–400)
POTASSIUM SERPL-MCNC: 3.9 MMOL/L — SIGNIFICANT CHANGE UP (ref 3.5–5.3)
POTASSIUM SERPL-MCNC: 3.9 MMOL/L — SIGNIFICANT CHANGE UP (ref 3.5–5.3)
POTASSIUM SERPL-SCNC: 3.9 MMOL/L — SIGNIFICANT CHANGE UP (ref 3.5–5.3)
POTASSIUM SERPL-SCNC: 3.9 MMOL/L — SIGNIFICANT CHANGE UP (ref 3.5–5.3)
PROT SERPL-MCNC: 6.2 G/DL — SIGNIFICANT CHANGE UP (ref 6–8.3)
PROT SERPL-MCNC: 6.2 G/DL — SIGNIFICANT CHANGE UP (ref 6–8.3)
RBC # BLD: 6.07 M/UL — HIGH (ref 4.2–5.8)
RBC # BLD: 6.07 M/UL — HIGH (ref 4.2–5.8)
RBC # FLD: 18 % — HIGH (ref 10.3–14.5)
RBC # FLD: 18 % — HIGH (ref 10.3–14.5)
SODIUM SERPL-SCNC: 138 MMOL/L — SIGNIFICANT CHANGE UP (ref 135–145)
SODIUM SERPL-SCNC: 138 MMOL/L — SIGNIFICANT CHANGE UP (ref 135–145)
WBC # BLD: 10.58 K/UL — HIGH (ref 3.8–10.5)
WBC # BLD: 10.58 K/UL — HIGH (ref 3.8–10.5)
WBC # FLD AUTO: 10.58 K/UL — HIGH (ref 3.8–10.5)
WBC # FLD AUTO: 10.58 K/UL — HIGH (ref 3.8–10.5)

## 2023-11-15 PROCEDURE — 99222 1ST HOSP IP/OBS MODERATE 55: CPT

## 2023-11-15 RX ORDER — SODIUM CHLORIDE 9 MG/ML
1000 INJECTION, SOLUTION INTRAVENOUS
Refills: 0 | Status: DISCONTINUED | OUTPATIENT
Start: 2023-11-15 | End: 2023-11-15

## 2023-11-15 RX ORDER — TAMSULOSIN HYDROCHLORIDE 0.4 MG/1
0.4 CAPSULE ORAL AT BEDTIME
Refills: 0 | Status: DISCONTINUED | OUTPATIENT
Start: 2023-11-15 | End: 2023-11-17

## 2023-11-15 RX ORDER — ACETAMINOPHEN 500 MG
650 TABLET ORAL EVERY 6 HOURS
Refills: 0 | Status: DISCONTINUED | OUTPATIENT
Start: 2023-11-15 | End: 2023-11-18

## 2023-11-15 RX ORDER — OXYCODONE HYDROCHLORIDE 5 MG/1
5 TABLET ORAL EVERY 6 HOURS
Refills: 0 | Status: DISCONTINUED | OUTPATIENT
Start: 2023-11-15 | End: 2023-11-18

## 2023-11-15 RX ORDER — ASPIRIN/CALCIUM CARB/MAGNESIUM 324 MG
81 TABLET ORAL DAILY
Refills: 0 | Status: DISCONTINUED | OUTPATIENT
Start: 2023-11-15 | End: 2023-11-18

## 2023-11-15 RX ORDER — HEPARIN SODIUM 5000 [USP'U]/ML
5000 INJECTION INTRAVENOUS; SUBCUTANEOUS EVERY 8 HOURS
Refills: 0 | Status: DISCONTINUED | OUTPATIENT
Start: 2023-11-15 | End: 2023-11-18

## 2023-11-15 RX ORDER — LABETALOL HCL 100 MG
10 TABLET ORAL ONCE
Refills: 0 | Status: DISCONTINUED | OUTPATIENT
Start: 2023-11-15 | End: 2023-11-15

## 2023-11-15 RX ADMIN — ATORVASTATIN CALCIUM 40 MILLIGRAM(S): 80 TABLET, FILM COATED ORAL at 21:55

## 2023-11-15 RX ADMIN — Medication 100 MILLIGRAM(S): at 06:16

## 2023-11-15 RX ADMIN — HEPARIN SODIUM 5000 UNIT(S): 5000 INJECTION INTRAVENOUS; SUBCUTANEOUS at 13:57

## 2023-11-15 RX ADMIN — Medication 81 MILLIGRAM(S): at 13:56

## 2023-11-15 RX ADMIN — TAMSULOSIN HYDROCHLORIDE 0.4 MILLIGRAM(S): 0.4 CAPSULE ORAL at 21:55

## 2023-11-15 RX ADMIN — Medication 650 MILLIGRAM(S): at 22:38

## 2023-11-15 RX ADMIN — CYCLOBENZAPRINE HYDROCHLORIDE 5 MILLIGRAM(S): 10 TABLET, FILM COATED ORAL at 11:09

## 2023-11-15 RX ADMIN — Medication 650 MILLIGRAM(S): at 21:29

## 2023-11-15 RX ADMIN — PANTOPRAZOLE SODIUM 40 MILLIGRAM(S): 20 TABLET, DELAYED RELEASE ORAL at 11:08

## 2023-11-15 RX ADMIN — Medication 12.5 MILLIGRAM(S): at 06:16

## 2023-11-15 RX ADMIN — Medication 400 MILLIGRAM(S): at 00:29

## 2023-11-15 RX ADMIN — Medication 75 MICROGRAM(S): at 06:16

## 2023-11-15 RX ADMIN — HEPARIN SODIUM 5000 UNIT(S): 5000 INJECTION INTRAVENOUS; SUBCUTANEOUS at 21:55

## 2023-11-15 NOTE — PHYSICAL THERAPY INITIAL EVALUATION ADULT - GENERAL OBSERVATIONS, REHAB EVAL
Pt. received semi supine, +JAMEY, +telemetry, +pulse ox, +heplock, +bilateral SCDs, NAD, agreeable to PT.

## 2023-11-15 NOTE — CONSULT NOTE ADULT - SUBJECTIVE AND OBJECTIVE BOX
HPI:  Patient is a 87M, (shellfish rxn w/ lip swelling ~ 20 yrs ago, does not eat shellfish since then) w/ PMHx CAD s/p JOSÉ to mLAD (6/2023), HTN, HLD, cervical spondylosis s/p laminectomy, gastric ulcers, hypothyroidism, presenting for elective cholecystectomy. Patient initially presented to [facility] ED 10/6/23 for BELLO with minimal exertion, chest pressure, and orthopnea x 3 days. Patient was noted to have severe hyponatremia and leukocytosis. He was managed for CHF under medicine, and started on empiric antibiotics. Upon further work up, diagnosis of acute cholecystitis was made. Surgery was consulted but patient symptoms/signs and labs already improved on Abx. Given cardiac history and presentation, operative intervention was deferred pending patient stabilization. Patient saw Dr. Gomez in the outpatient setting and after Cardiology clearance that Plavix was held since 11/7 and underwent an elective robotic assisted cholecystectomy ( 11/14/23)  POD#1     Patient denies abdominal pain, nausea, emesis, chills, sob, palpitations or chest pain. He is having normal bowel function. Last dose of Plavix was 11/7      (14 Nov 2023 19:15)      PAST MEDICAL & SURGICAL HISTORY:  HLD (hyperlipidemia)      Hypothyroid      History of BPH      CAD (coronary artery disease)  coronary Stent 6/2023      HTN (hypertension)      H/O cholecystitis      S/P laminectomy with spinal fusion  cervical        Home Medications:  clopidogrel 75 mg oral tablet: 1 tab(s) orally once a day (14 Nov 2023 14:39)  cyclobenzaprine 5 mg oral tablet: 1 tab(s) orally once a day (14 Nov 2023 13:38)  Dulcolax Laxative 5 mg oral tablet: 1 tab(s) orally 2 times a day (14 Nov 2023 13:38)  Januvia 100 mg oral tablet: 1 tab(s) orally once a day (14 Nov 2023 13:38)  levothyroxine 75 mcg (0.075 mg) oral capsule: 1 cap(s) orally once a day (14 Nov 2023 13:38)  metoprolol 12.5 mg po daily:  (14 Nov 2023 13:38)  oxycodone 5 mg po prn:  (14 Nov 2023 13:38)  rosuvastatin 20 mg oral capsule: 1 cap(s) orally once a day (14 Nov 2023 13:38)  urea 15 g oral powder for reconstitution: orally once a day (14 Nov 2023 13:38)    Allergies    shellfish (Swelling; Hives)  No Known Drug Allergies    Intolerances      FAMILY HISTORY:    Social History:      REVIEW OF SYSTEMS:  CONSTITUTIONAL: No fever, weight loss  EYES: No eye pain, or discharge  ENMT:  No tinnitus, vertigo  NECK: No pain or stiffness  RESPIRATORY: No cough, No dyspnea  CARDIOVASCULAR: No chest pain, or leg swelling  GASTROINTESTINAL: No abdominal pain. No diarrhea ;No melena or hematochezia.  GENITOURINARY: No dysuria, frequency, or hematuria  NEUROLOGICAL: No numbness, or tremors  SKIN: No itching, burning, rashes, or lesions   ENDOCRINE: No heat or cold intolerance;  MUSCULOSKELETAL: No joint pain or swelling;   PSYCHIATRIC: No mood swings, or difficulty sleeping  HEME/LYMPH: No easy bruising, or bleeding gums  ALLERGY AND IMMUNOLOGIC: No hives or eczema    Diet, Regular:   Low Fat (LOWFAT) (11-15-23 @ 08:46) [Active]      CURRENT MEDICATIONS:   acetaminophen     Tablet .. 650 milliGRAM(s) Oral every 6 hours PRN  aspirin  chewable 81 milliGRAM(s) Oral daily  atorvastatin 40 milliGRAM(s) Oral at bedtime  cyclobenzaprine 5 milliGRAM(s) Oral daily  dextrose 5%. 1000 milliLiter(s) IV Continuous <Continuous>  dextrose 5%. 1000 milliLiter(s) IV Continuous <Continuous>  dextrose 50% Injectable 25 Gram(s) IV Push once  dextrose 50% Injectable 25 Gram(s) IV Push once  dextrose 50% Injectable 12.5 Gram(s) IV Push once  dextrose Oral Gel 15 Gram(s) Oral once PRN  glucagon  Injectable 1 milliGRAM(s) IntraMuscular once  heparin   Injectable 5000 Unit(s) SubCutaneous every 8 hours  insulin lispro (ADMELOG) corrective regimen sliding scale   SubCutaneous Before meals and at bedtime  levothyroxine 75 MICROGram(s) Oral daily  metoprolol tartrate 12.5 milliGRAM(s) Oral daily  oxyCODONE    IR 5 milliGRAM(s) Oral every 6 hours PRN  pantoprazole  Injectable 40 milliGRAM(s) IV Push daily      VITAL SIGNS, INS/OUTS (last 24 hours):  Vital Signs Last 24 Hrs  T(C): 37.4 (15 Nov 2023 12:47), Max: 37.4 (15 Nov 2023 12:47)  T(F): 99.3 (15 Nov 2023 12:47), Max: 99.3 (15 Nov 2023 12:47)  HR: 74 (15 Nov 2023 12:47) (68 - 98)  BP: 133/83 (15 Nov 2023 12:47) (114/62 - 149/88)  BP(mean): 102 (14 Nov 2023 20:43) (98 - 114)  RR: 18 (15 Nov 2023 12:47) (13 - 23)  SpO2: 92% (15 Nov 2023 12:47) (92% - 100%)    Parameters below as of 15 Nov 2023 12:47  Patient On (Oxygen Delivery Method): room air      I&O's Summary    14 Nov 2023 07:01  -  15 Nov 2023 07:00  --------------------------------------------------------  IN: 730 mL / OUT: 1160 mL / NET: -430 mL        PHYSICAL EXAM:  Gen: Reclining in bed at time of exam, appears stated age  HEENT: NCAT, MMM, clear OP  Neck: supple, trachea at midline  CV: RRR, +S1/S2  Pulm: adequate respiratory effort, no increase in work of breathing  Abd: soft, NTND, JAMEY drain in place w. minimal serosanguinous fluid   Skin: warm and dry,  Ext: WWP, no LE edema  Neuro: AOx3, no gross focal neurological deficits  Psych: affect and behavior appropriate, pleasant at time of interview    BASIC LABS:                        12.7   10.58 )-----------( 188      ( 15 Nov 2023 05:30 )             40.9     11-15    138  |  103  |  19  ----------------------------<  125<H>  3.9   |  26  |  1.05    Ca    8.5      15 Nov 2023 05:30  Phos  3.9     11-15  Mg     2.1     11-15    TPro  6.2  /  Alb  3.1<L>  /  TBili  0.6  /  DBili  x   /  AST  52<H>  /  ALT  39  /  AlkPhos  54  11-15      Urinalysis Basic - ( 15 Nov 2023 05:30 )    Color: x / Appearance: x / SG: x / pH: x  Gluc: 125 mg/dL / Ketone: x  / Bili: x / Urobili: x   Blood: x / Protein: x / Nitrite: x   Leuk Esterase: x / RBC: x / WBC x   Sq Epi: x / Non Sq Epi: x / Bacteria: x      CAPILLARY BLOOD GLUCOSE      POCT Blood Glucose.: 149 mg/dL (15 Nov 2023 12:09)  POCT Blood Glucose.: 101 mg/dL (15 Nov 2023 07:24)  POCT Blood Glucose.: 132 mg/dL (14 Nov 2023 19:06)  POCT Blood Glucose.: 105 mg/dL (14 Nov 2023 14:17)      OTHER LABS:        MICRODATA:      IMAGING:    EKG:    #Diet - Diet, Regular:   Low Fat (LOWFAT) (11-15-23 @ 08:46) [Active]        #DVT PPx -  HPI:  Patient is a 87M, (shellfish rxn w/ lip swelling ~ 20 yrs ago, does not eat shellfish since then) w/ PMHx CAD s/p JOSÉ to mLAD (6/2023), HTN, HLD, cervical spondylosis s/p laminectomy, gastric ulcers, hypothyroidism, presenting for elective cholecystectomy. Patient initially presented to [facility] ED 10/6/23 for BELLO with minimal exertion, chest pressure, and orthopnea x 3 days. Patient was noted to have severe hyponatremia and leukocytosis. He was managed for CHF under medicine, and started on empiric antibiotics. Upon further work up, diagnosis of acute cholecystitis was made. Surgery was consulted but patient symptoms/signs and labs already improved on Abx. Given cardiac history and presentation, operative intervention was deferred pending patient stabilization. Patient saw Dr. Gomez in the outpatient setting and after Cardiology clearance that Plavix was held since 11/7 and underwent an elective robotic assisted cholecystectomy ( 11/14/23)  POD#1     Patient denies abdominal pain, nausea, emesis, chills, sob, palpitations or chest pain. He is having normal bowel function. Last dose of Plavix was 11/7      (14 Nov 2023 19:15)      PAST MEDICAL & SURGICAL HISTORY:  HLD (hyperlipidemia)      Hypothyroid      History of BPH      CAD (coronary artery disease)  coronary Stent 6/2023      HTN (hypertension)      H/O cholecystitis      S/P laminectomy with spinal fusion  cervical        Home Medications:  clopidogrel 75 mg oral tablet: 1 tab(s) orally once a day (14 Nov 2023 14:39)  cyclobenzaprine 5 mg oral tablet: 1 tab(s) orally once a day (14 Nov 2023 13:38)  Dulcolax Laxative 5 mg oral tablet: 1 tab(s) orally 2 times a day (14 Nov 2023 13:38)  Januvia 100 mg oral tablet: 1 tab(s) orally once a day (14 Nov 2023 13:38)  levothyroxine 75 mcg (0.075 mg) oral capsule: 1 cap(s) orally once a day (14 Nov 2023 13:38)  metoprolol 12.5 mg po daily:  (14 Nov 2023 13:38)  oxycodone 5 mg po prn:  (14 Nov 2023 13:38)  rosuvastatin 20 mg oral capsule: 1 cap(s) orally once a day (14 Nov 2023 13:38)  urea 15 g oral powder for reconstitution: orally once a day (14 Nov 2023 13:38)    Allergies    shellfish (Swelling; Hives)  No Known Drug Allergies    Intolerances      FAMILY HISTORY:    Social History:      REVIEW OF SYSTEMS:  CONSTITUTIONAL: No fever, weight loss  EYES: No eye pain, or discharge  ENMT:  No tinnitus, vertigo  NECK: No pain or stiffness  RESPIRATORY: No cough, No dyspnea  CARDIOVASCULAR: No chest pain, or leg swelling  GASTROINTESTINAL: No abdominal pain. No diarrhea ;No melena or hematochezia.  GENITOURINARY: No dysuria, frequency, or hematuria  NEUROLOGICAL: No numbness, or tremors  SKIN: No itching, burning, rashes, or lesions   ENDOCRINE: No heat or cold intolerance;  MUSCULOSKELETAL: No joint pain or swelling;   PSYCHIATRIC: No mood swings, or difficulty sleeping  HEME/LYMPH: No easy bruising, or bleeding gums  ALLERGY AND IMMUNOLOGIC: No hives or eczema    Diet, Regular:   Low Fat (LOWFAT) (11-15-23 @ 08:46) [Active]      CURRENT MEDICATIONS:   acetaminophen     Tablet .. 650 milliGRAM(s) Oral every 6 hours PRN  aspirin  chewable 81 milliGRAM(s) Oral daily  atorvastatin 40 milliGRAM(s) Oral at bedtime  cyclobenzaprine 5 milliGRAM(s) Oral daily  dextrose 5%. 1000 milliLiter(s) IV Continuous <Continuous>  dextrose 5%. 1000 milliLiter(s) IV Continuous <Continuous>  dextrose 50% Injectable 25 Gram(s) IV Push once  dextrose 50% Injectable 25 Gram(s) IV Push once  dextrose 50% Injectable 12.5 Gram(s) IV Push once  dextrose Oral Gel 15 Gram(s) Oral once PRN  glucagon  Injectable 1 milliGRAM(s) IntraMuscular once  heparin   Injectable 5000 Unit(s) SubCutaneous every 8 hours  insulin lispro (ADMELOG) corrective regimen sliding scale   SubCutaneous Before meals and at bedtime  levothyroxine 75 MICROGram(s) Oral daily  metoprolol tartrate 12.5 milliGRAM(s) Oral daily  oxyCODONE    IR 5 milliGRAM(s) Oral every 6 hours PRN  pantoprazole  Injectable 40 milliGRAM(s) IV Push daily      VITAL SIGNS, INS/OUTS (last 24 hours):  Vital Signs Last 24 Hrs  T(C): 37.4 (15 Nov 2023 12:47), Max: 37.4 (15 Nov 2023 12:47)  T(F): 99.3 (15 Nov 2023 12:47), Max: 99.3 (15 Nov 2023 12:47)  HR: 74 (15 Nov 2023 12:47) (68 - 98)  BP: 133/83 (15 Nov 2023 12:47) (114/62 - 149/88)  BP(mean): 102 (14 Nov 2023 20:43) (98 - 114)  RR: 18 (15 Nov 2023 12:47) (13 - 23)  SpO2: 92% (15 Nov 2023 12:47) (92% - 100%)    Parameters below as of 15 Nov 2023 12:47  Patient On (Oxygen Delivery Method): room air      I&O's Summary    14 Nov 2023 07:01  -  15 Nov 2023 07:00  --------------------------------------------------------  IN: 730 mL / OUT: 1160 mL / NET: -430 mL        PHYSICAL EXAM:  Gen: Reclining in bed at time of exam, appears stated age  HEENT: NCAT, MMM, clear OP  Neck: supple, trachea at midline  CV: RRR, +S1/S2  Pulm: adequate respiratory effort, no increase in work of breathing  Abd: soft, NTND, JAMEY drain in place w. minimal serosanguinous fluid   Skin: warm and dry,  Ext: WWP, no LE edema  Neuro: AOx3, no gross focal neurological deficits  Psych: affect and behavior appropriate, pleasant at time of interview    BASIC LABS:                        12.7   10.58 )-----------( 188      ( 15 Nov 2023 05:30 )             40.9     11-15    138  |  103  |  19  ----------------------------<  125<H>  3.9   |  26  |  1.05    Ca    8.5      15 Nov 2023 05:30  Phos  3.9     11-15  Mg     2.1     11-15    TPro  6.2  /  Alb  3.1<L>  /  TBili  0.6  /  DBili  x   /  AST  52<H>  /  ALT  39  /  AlkPhos  54  11-15      Urinalysis Basic - ( 15 Nov 2023 05:30 )    Color: x / Appearance: x / SG: x / pH: x  Gluc: 125 mg/dL / Ketone: x  / Bili: x / Urobili: x   Blood: x / Protein: x / Nitrite: x   Leuk Esterase: x / RBC: x / WBC x   Sq Epi: x / Non Sq Epi: x / Bacteria: x      CAPILLARY BLOOD GLUCOSE      POCT Blood Glucose.: 149 mg/dL (15 Nov 2023 12:09)  POCT Blood Glucose.: 101 mg/dL (15 Nov 2023 07:24)  POCT Blood Glucose.: 132 mg/dL (14 Nov 2023 19:06)  POCT Blood Glucose.: 105 mg/dL (14 Nov 2023 14:17)      OTHER LABS:        MICRODATA:      IMAGING:    EKG:    #Diet - Diet, Regular:   Low Fat (LOWFAT) (11-15-23 @ 08:46) [Active]        #DVT PPx -  HPI:  Patient is a 87M, (shellfish rxn w/ lip swelling ~ 20 yrs ago, does not eat shellfish since then) w/ PMHx CAD s/p JOSÉ to mLAD (6/2023), HTN, HLD, cervical spondylosis s/p laminectomy, gastric ulcers, hypothyroidism, presenting for elective cholecystectomy. Patient initially presented to [facility] ED 10/6/23 for BELLO with minimal exertion, chest pressure, and orthopnea x 3 days. Patient was noted to have severe hyponatremia and leukocytosis. He was managed for CHF under medicine, and started on empiric antibiotics. Upon further work up, diagnosis of acute cholecystitis was made. Surgery was consulted but patient symptoms/signs and labs already improved on Abx. Given cardiac history and presentation, operative intervention was deferred pending patient stabilization. Patient saw Dr. Gomez in the outpatient setting and after Cardiology clearance that Plavix was held since 11/7 and underwent an elective robotic assisted cholecystectomy ( 11/14/23)  POD#1     Patient denies abdominal pain, nausea, emesis, chills, sob, palpitations or chest pain. He is having normal bowel function. Last dose of Plavix was 11/7      (14 Nov 2023 19:15)      PAST MEDICAL & SURGICAL HISTORY:  HLD (hyperlipidemia)      Hypothyroid      History of BPH      CAD (coronary artery disease)  coronary Stent 6/2023      HTN (hypertension)      H/O cholecystitis      S/P laminectomy with spinal fusion  cervical        Home Medications:  clopidogrel 75 mg oral tablet: 1 tab(s) orally once a day (14 Nov 2023 14:39)  cyclobenzaprine 5 mg oral tablet: 1 tab(s) orally once a day (14 Nov 2023 13:38)  Dulcolax Laxative 5 mg oral tablet: 1 tab(s) orally 2 times a day (14 Nov 2023 13:38)  Januvia 100 mg oral tablet: 1 tab(s) orally once a day (14 Nov 2023 13:38)  levothyroxine 75 mcg (0.075 mg) oral capsule: 1 cap(s) orally once a day (14 Nov 2023 13:38)  metoprolol 12.5 mg po daily:  (14 Nov 2023 13:38)  oxycodone 5 mg po prn:  (14 Nov 2023 13:38)  rosuvastatin 20 mg oral capsule: 1 cap(s) orally once a day (14 Nov 2023 13:38)  urea 15 g oral powder for reconstitution: orally once a day (14 Nov 2023 13:38)    Allergies    shellfish (Swelling; Hives)  No Known Drug Allergies    Intolerances      FAMILY HISTORY:    Social History:      REVIEW OF SYSTEMS:  CONSTITUTIONAL: No fever, weight loss  EYES: No eye pain, or discharge  ENMT:  No tinnitus, vertigo  NECK: No pain or stiffness  RESPIRATORY: No cough, No dyspnea  CARDIOVASCULAR: No chest pain, or leg swelling  GASTROINTESTINAL: No abdominal pain. No diarrhea ;No melena or hematochezia.  GENITOURINARY: No dysuria, frequency, or hematuria  NEUROLOGICAL: No numbness, or tremors  SKIN: No itching, burning, rashes, or lesions   ENDOCRINE: No heat or cold intolerance;  MUSCULOSKELETAL: No joint pain or swelling;   PSYCHIATRIC: No mood swings, or difficulty sleeping  HEME/LYMPH: No easy bruising, or bleeding gums  ALLERGY AND IMMUNOLOGIC: No hives or eczema    Diet, Regular:   Low Fat (LOWFAT) (11-15-23 @ 08:46) [Active]      CURRENT MEDICATIONS:   acetaminophen     Tablet .. 650 milliGRAM(s) Oral every 6 hours PRN  aspirin  chewable 81 milliGRAM(s) Oral daily  atorvastatin 40 milliGRAM(s) Oral at bedtime  cyclobenzaprine 5 milliGRAM(s) Oral daily  dextrose 5%. 1000 milliLiter(s) IV Continuous <Continuous>  dextrose 5%. 1000 milliLiter(s) IV Continuous <Continuous>  dextrose 50% Injectable 25 Gram(s) IV Push once  dextrose 50% Injectable 25 Gram(s) IV Push once  dextrose 50% Injectable 12.5 Gram(s) IV Push once  dextrose Oral Gel 15 Gram(s) Oral once PRN  glucagon  Injectable 1 milliGRAM(s) IntraMuscular once  heparin   Injectable 5000 Unit(s) SubCutaneous every 8 hours  insulin lispro (ADMELOG) corrective regimen sliding scale   SubCutaneous Before meals and at bedtime  levothyroxine 75 MICROGram(s) Oral daily  metoprolol tartrate 12.5 milliGRAM(s) Oral daily  oxyCODONE    IR 5 milliGRAM(s) Oral every 6 hours PRN  pantoprazole  Injectable 40 milliGRAM(s) IV Push daily      VITAL SIGNS, INS/OUTS (last 24 hours):  Vital Signs Last 24 Hrs  T(C): 37.4 (15 Nov 2023 12:47), Max: 37.4 (15 Nov 2023 12:47)  T(F): 99.3 (15 Nov 2023 12:47), Max: 99.3 (15 Nov 2023 12:47)  HR: 74 (15 Nov 2023 12:47) (68 - 98)  BP: 133/83 (15 Nov 2023 12:47) (114/62 - 149/88)  BP(mean): 102 (14 Nov 2023 20:43) (98 - 114)  RR: 18 (15 Nov 2023 12:47) (13 - 23)  SpO2: 92% (15 Nov 2023 12:47) (92% - 100%)    Parameters below as of 15 Nov 2023 12:47  Patient On (Oxygen Delivery Method): room air      I&O's Summary    14 Nov 2023 07:01  -  15 Nov 2023 07:00  --------------------------------------------------------  IN: 730 mL / OUT: 1160 mL / NET: -430 mL        PHYSICAL EXAM:  Gen: Reclining in bed at time of exam, appears stated age  HEENT: NCAT, MMM, clear OP  Neck: supple, trachea at midline  CV: RRR, +S1/S2  Pulm: adequate respiratory effort, no increase in work of breathing  Abd: soft, NTND, JAMEY drain in place w. minimal serosanguinous fluid   Skin: warm and dry,  Ext: WWP, no LE edema  Neuro: AOx3, no gross focal neurological deficits  Psych: affect and behavior appropriate, pleasant at time of interview    BASIC LABS:                        12.7   10.58 )-----------( 188      ( 15 Nov 2023 05:30 )             40.9     11-15    138  |  103  |  19  ----------------------------<  125<H>  3.9   |  26  |  1.05    Ca    8.5      15 Nov 2023 05:30  Phos  3.9     11-15  Mg     2.1     11-15    TPro  6.2  /  Alb  3.1<L>  /  TBili  0.6  /  DBili  x   /  AST  52<H>  /  ALT  39  /  AlkPhos  54  11-15      Urinalysis Basic - ( 15 Nov 2023 05:30 )    Color: x / Appearance: x / SG: x / pH: x  Gluc: 125 mg/dL / Ketone: x  / Bili: x / Urobili: x   Blood: x / Protein: x / Nitrite: x   Leuk Esterase: x / RBC: x / WBC x   Sq Epi: x / Non Sq Epi: x / Bacteria: x      CAPILLARY BLOOD GLUCOSE      POCT Blood Glucose.: 149 mg/dL (15 Nov 2023 12:09)  POCT Blood Glucose.: 101 mg/dL (15 Nov 2023 07:24)  POCT Blood Glucose.: 132 mg/dL (14 Nov 2023 19:06)  POCT Blood Glucose.: 105 mg/dL (14 Nov 2023 14:17)      OTHER LABS:        MICRODATA:      IMAGING:    EKG:    #Diet - Diet, Regular:   Low Fat (LOWFAT) (11-15-23 @ 08:46) [Active]        #DVT PPx -

## 2023-11-15 NOTE — PHYSICAL THERAPY INITIAL EVALUATION ADULT - LEVEL OF INDEPENDENCE: GAIT, REHAB EVAL
39 yo  at 39+2/7 weeks IUP with Irregular PNC with PMHx of Discoid Lupus, Short cervix, Hx of PEC, Hx of PTL, PPROM and PTD and Grand Multiparity was sent in to L&D for NST/BPP and MFM follow up (Pt. being cared by [facility] MFM). Patient was found to have elevated -150/80-90s.  Due to her Hx of PEC in the past and elevated BP, advised delivery by IOL.   Patient initially wanted to "SIGN OUT AMA" but after I spoke to her about the potential risk of PEC on her and her fetus, she changed her mind and decided to stay for IOL.     H&P as above per NP, reviewed, edited as needed, and agreed with above.    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 
contact guard

## 2023-11-15 NOTE — CONSULT NOTE ADULT - ASSESSMENT
Patient is a 87M, (shellfish rxn w/ lip swelling ~ 20 yrs ago, does not eat shellfish since then) w/ PMHx CAD s/p JOSÉ to mLAD (6/2023), HTN, HLD, cervical spondylosis s/p laminectomy, gastric ulcers, hypothyroidism, presenting for elective cholecystectomy. Patient initially presented to [facility] ED 10/6/23 for BELLO with minimal exertion, chest pressure, and orthopnea x 3 days. Patient was noted to have severe hyponatremia and leukocytosis. He was managed for CHF under medicine, and started on empiric antibiotics. Upon further work up, diagnosis of acute cholecystitis was made. Surgery was consulted but patient symptoms/signs and labs already improved on Abx. Given cardiac history and presentation, operative intervention was deferred pending patient stabilization. Patient saw Dr. Gomez in the outpatient setting and after Cardiology clearance that Plavix was held since 11/7 and underwent an elective robotic assisted cholecystectomy ( 11/14/23)  POD#1     - diet per surgery  - monitor JAMEY drain output  - pain control:   - acetaminophen     Tablet .. 650 milliGRAM(s) Oral every 6 hours PRN                              oxyCODONE    IR 5 milliGRAM(s) Oral every 6 hours PRN  - resume aspirin  chewable 81 milliGRAM(s) Oral daily  - plavix last dose 11/7/23, on hold and awaiting surgery clearance to resume Plavix   - HLD: atorvastatin 40 milliGRAM(s) Oral at bedtime  - HTN: metoprolol tartrate 12.5 milliGRAM(s) Oral daily  - Hypothyroidism: levothyroxine 75 MICROGram(s) Oral daily  - GI ppx: pantoprazole  Injectable 40 milliGRAM(s) IV Push daily  - DVT ppx: heparin   Injectable 5000 Unit(s) SubCutaneous every 8 hours  - PT eval appreciated   - Incentive spironmetry at bedside   POC as d/w Dr. Gomez and daughter Olga Yanez RN    Patient is a 87M, (shellfish rxn w/ lip swelling ~ 20 yrs ago, does not eat shellfish since then) w/ PMHx CAD s/p JOSÉ to mLAD (6/2023), HTN, HLD, cervical spondylosis s/p laminectomy, gastric ulcers, hypothyroidism, presenting for elective cholecystectomy. Patient initially presented to [facility] ED 10/6/23 for BELLO with minimal exertion, chest pressure, and orthopnea x 3 days. Patient was noted to have severe hyponatremia and leukocytosis. He was managed for CHF under medicine, and started on empiric antibiotics. Upon further work up, diagnosis of acute cholecystitis was made. Surgery was consulted but patient symptoms/signs and labs already improved on Abx. Given cardiac history and presentation, operative intervention was deferred pending patient stabilization. Patient saw Dr. Gomez in the outpatient setting and after Cardiology clearance that Plavix was held since 11/7 and underwent an elective robotic assisted cholecystectomy ( 11/14/23)  POD#1     - diet per surgery  - monitor JAMEY drain output  - pain control:   - acetaminophen     Tablet .. 650 milliGRAM(s) Oral every 6 hours PRN                              oxyCODONE    IR 5 milliGRAM(s) Oral every 6 hours PRN  - resume aspirin  chewable 81 milliGRAM(s) Oral daily  - plavix last dose 11/7/23, on hold and awaiting surgery clearance to resume Plavix   - HLD: atorvastatin 40 milliGRAM(s) Oral at bedtime  - HTN: metoprolol tartrate 12.5 milliGRAM(s) Oral daily  - Hypothyroidism: levothyroxine 75 MICROGram(s) Oral daily  - GI ppx: pantoprazole  Injectable 40 milliGRAM(s) IV Push daily  - DVT ppx: heparin   Injectable 5000 Unit(s) SubCutaneous every 8 hours  - PT eval appreciated   - Incentive spironmetry at bedside   POC as d/w Dr. Gomez and daughter Olga Yanez RN    Patient is a 87M, (shellfish rxn w/ lip swelling ~ 20 yrs ago, does not eat shellfish since then) w/ PMHx CAD s/p JOSÉ to mLAD (6/2023), HTN, HLD, cervical spondylosis s/p laminectomy, gastric ulcers, hypothyroidism, presenting for elective cholecystectomy. Patient initially presented to [facility] ED 10/6/23 for BELLO with minimal exertion, chest pressure, and orthopnea x 3 days. Patient was noted to have severe hyponatremia and leukocytosis. He was managed for CHF under medicine, and started on empiric antibiotics. Upon further work up, diagnosis of acute cholecystitis was made. Surgery was consulted but patient symptoms/signs and labs already improved on Abx. Given cardiac history and presentation, operative intervention was deferred pending patient stabilization. Patient saw Dr. Gomez in the outpatient setting and after Cardiology clearance that Plavix was held since 11/7 and underwent an elective robotic assisted cholecystectomy ( 11/14/23)  POD#1     - diet per surgery  - monitor JAMEY drain output  - pain control:   - acetaminophen     Tablet .. 650 milliGRAM(s) Oral every 6 hours PRN                              oxyCODONE    IR 5 milliGRAM(s) Oral every 6 hours PRN  - resume aspirin  chewable 81 milliGRAM(s) Oral daily  - plavix last dose 11/7/23, on hold and awaiting surgery clearance to resume Plavix   - HLD: atorvastatin 40 milliGRAM(s) Oral at bedtime  - HTN: metoprolol tartrate 12.5 milliGRAM(s) Oral daily  - Hypothyroidism: levothyroxine 75 MICROGram(s) Oral daily  - GI ppx: pantoprazole  Injectable 40 milliGRAM(s) IV Push daily  - DVT ppx: heparin   Injectable 5000 Unit(s) SubCutaneous every 8 hours  - PT eval appreciated   - Incentive spironmetry at bedside   POC as d/w Dr. Gomez and daughter Olga Yanez RN

## 2023-11-15 NOTE — PHYSICAL THERAPY INITIAL EVALUATION ADULT - PERTINENT HX OF CURRENT PROBLEM, REHAB EVAL
Patient is a 87M, (shellfish rxn w/ lip swelling ~ 20 yrs ago, does not eat shellfish since then) w/ PMHx CAD s/p JOSÉ to mLAD (6/2023), HTN, HLD, cervical spondylosis s/p laminectomy, gastric ulcers, hypothyroidism, presenting for elective cholecystectomy. Patient initially presented to [facility] ED 10/6/23 for BELLO with minimal exertion, chest pressure, and orthopnea x 3 days. Patient was noted to have severe hyponatremia and leukocytosis. He was managed for CHF under medicine, and started on empiric antibiotics. Upon further work up, diagnosis of acute cholecystitis was made. Surgery was consulted but patient symptoms/signs and labs already improved on Abx. Given cardiac history and presentation, operative intervention was deferred pending patient stabilization. Patient saw Dr. Gomez in the outpatient setting and surgery was scheduled for 11/14 after Cardiology clearance, s/p MELANIE epps Patient is a 87M, (shellfish rxn w/ lip swelling ~ 20 yrs ago, does not eat shellfish since then) w/ PMHx CAD s/p JOSÉ to mLAD (6/2023), HTN, HLD, cervical spondylosis s/p laminectomy, gastric ulcers, hypothyroidism, presenting for elective cholecystectomy. Patient initially presented to [facility] ED 10/6/23 for BELLO with minimal exertion, chest pressure, and orthopnea x 3 days. Patient was noted to have severe hyponatremia and leukocytosis. He was managed for CHF under medicine, and started on empiric antibiotics. Upon further work up, diagnosis of acute cholecystitis was made. Surgery was consulted but patient symptoms/signs and labs already improved on Abx. Given cardiac history and presentation, operative intervention was deferred pending patient stabilization. Patient saw Dr. Gomez in the outpatient setting and surgery was scheduled for 11/14 after Cardiology clearance, s/p MELANIE epps Patient is a 87M, (shellfish rxn w/ lip swelling ~ 20 yrs ago, does not eat shellfish since then) w/ PMHx CAD s/p JOSÉ to mLAD (6/2023), HTN, HLD, cervical spondylosis s/p laminectomy, gastric ulcers, hypothyroidism, presenting for elective cholecystectomy. Patient initially presented to [facility] ED 10/6/23 for BELLO with minimal exertion, chest pressure, and orthopnea x 3 days. Patient was noted to have severe hyponatremia and leukocytosis. He was managed for CHF under medicine, and started on empiric antibiotics. Upon further work up, diagnosis of acute cholecystitis was made. Surgery was consulted but patient symptoms/signs and labs already improved on Abx. Given cardiac history and presentation, operative intervention was deferred pending patient stabilization. Patient saw Dr. Gomez in the outpatient setting and surgery was scheduled for 11/14 after Cardiology clearance, s/p MELANIE epps

## 2023-11-15 NOTE — PROGRESS NOTE ADULT - SUBJECTIVE AND OBJECTIVE BOX
SUBJECTIVE: Feeling well, no pain, no N/V. Patient seen and examined bedside by chief resident.    metoprolol tartrate 12.5 milliGRAM(s) Oral daily  metroNIDAZOLE  IVPB 500 milliGRAM(s) IV Intermittent every 8 hours    MEDICATIONS  (PRN):  dextrose Oral Gel 15 Gram(s) Oral once PRN Blood Glucose LESS THAN 70 milliGRAM(s)/deciliter  HYDROmorphone  Injectable 0.2 milliGRAM(s) IV Push every 6 hours PRN Moderate Pain (4 - 6)  HYDROmorphone  Injectable 0.5 milliGRAM(s) IV Push every 6 hours PRN Severe Pain (7 - 10)      I&O's Detail    14 Nov 2023 07:01  -  15 Nov 2023 07:00  --------------------------------------------------------  IN:    IV PiggyBack: 100 mL    Lactated Ringers: 630 mL  Total IN: 730 mL    OUT:    Bulb (mL): 15 mL    Indwelling Catheter - Urethral (mL): 1145 mL  Total OUT: 1160 mL    Total NET: -430 mL          Vital Signs Last 24 Hrs  T(C): 36.7 (15 Nov 2023 05:33), Max: 36.7 (14 Nov 2023 18:43)  T(F): 98 (15 Nov 2023 05:33), Max: 98 (14 Nov 2023 18:43)  HR: 98 (15 Nov 2023 05:33) (68 - 98)  BP: 114/62 (15 Nov 2023 05:33) (114/62 - 149/88)  BP(mean): 102 (14 Nov 2023 20:43) (98 - 114)  RR: 18 (15 Nov 2023 05:33) (13 - 23)  SpO2: 96% (15 Nov 2023 05:33) (95% - 100%)    Parameters below as of 15 Nov 2023 05:33  Patient On (Oxygen Delivery Method): room air        General: NAD, resting comfortably in bed  C/V: NSR  Pulm: Nonlabored breathing, no respiratory distress  Abd: soft, NT/ND, incisions CDI, JAMEY SS  Extrem: SCDs in place    LABS:                        12.7   10.58 )-----------( 188      ( 15 Nov 2023 05:30 )             40.9     11-15    138  |  103  |  19  ----------------------------<  125<H>  3.9   |  26  |  1.05    Ca    8.5      15 Nov 2023 05:30  Phos  3.9     11-15  Mg     2.1     11-15    TPro  6.2  /  Alb  3.1<L>  /  TBili  0.6  /  DBili  x   /  AST  52<H>  /  ALT  39  /  AlkPhos  54  11-15      Urinalysis Basic - ( 15 Nov 2023 05:30 )    Color: x / Appearance: x / SG: x / pH: x  Gluc: 125 mg/dL / Ketone: x  / Bili: x / Urobili: x   Blood: x / Protein: x / Nitrite: x   Leuk Esterase: x / RBC: x / WBC x   Sq Epi: x / Non Sq Epi: x / Bacteria: x

## 2023-11-15 NOTE — PROGRESS NOTE ADULT - ASSESSMENT
Patient is a 87M, (shellfish rxn w/ lip swelling ~ 20 yrs ago, does not eat shellfish since then) w/ PMHx CAD s/p JOSÉ to mLAD (6/2023), HTN, HLD, cervical spondylosis s/p laminectomy, gastric ulcers, hypothyroidism, presenting for elective cholecystectomy. Patient initially presented to [facility] ED 10/6/23 for BELLO with minimal exertion, chest pressure, and orthopnea x 3 days. Patient was noted to have severe hyponatremia and leukocytosis. He was managed for CHF under medicine, and started on empiric antibiotics. Upon further work up, diagnosis of acute cholecystitis was made now s/p roboic mich (11/14)    NPO/soft IVF, ISS  Pain/nausea control  Cef & Flagyl  Aggressive IS/OOB   Continue Metoprolol and Atorvastatin   Resume Synthroid  Kennedy   JAMEY x1  No SQH or Toradol. SCDs for DVT ppx  AM labs Patient is a 87M, (shellfish rxn w/ lip swelling ~ 20 yrs ago, does not eat shellfish since then) w/ PMHx CAD s/p JOSÉ to mLAD (6/2023), HTN, HLD, cervical spondylosis s/p laminectomy, gastric ulcers, hypothyroidism, presenting for elective cholecystectomy. Patient initially presented to [facility] ED 10/6/23 for BELLO with minimal exertion, chest pressure, and orthopnea x 3 days. Patient was noted to have severe hyponatremia and leukocytosis. He was managed for CHF under medicine, and started on empiric antibiotics. Upon further work up, diagnosis of acute cholecystitis was made now s/p roboic mich (11/14)    NPO/soft IVF, ISS  Pain/nausea control  Cef & Flagyl  Aggressive IS/OOB   Continue Metoprolol and Atorvastatin   Resume Synthroid  Kennedy   JAMEY x1  No SQH or Toradol. SCDs for DVT ppx  AM labs Patient is a 87M, (shellfish rxn w/ lip swelling ~ 20 yrs ago, does not eat shellfish since then) w/ PMHx CAD s/p JOSÉ to mLAD (6/2023), HTN, HLD, cervical spondylosis s/p laminectomy, gastric ulcers, hypothyroidism, presenting for elective cholecystectomy. Patient initially presented to [facility] ED 10/6/23 for BELLO with minimal exertion, chest pressure, and orthopnea x 3 days. Patient was noted to have severe hyponatremia and leukocytosis. He was managed for CHF under medicine, and started on empiric antibiotics. Upon further work up, diagnosis of acute cholecystitis was made now s/p roboic mich (11/14)    NPO/soft IVF, ISS  Pain/nausea control  Cef & Flagyl  Aggressive IS/OOB   Continue Metoprolol and Atorvastatin   Resume Synthroid  Kennedy   JAEMY x1  No SQH or Toradol. SCDs for DVT ppx  AM labs

## 2023-11-15 NOTE — PHYSICAL THERAPY INITIAL EVALUATION ADULT - ADDITIONAL COMMENTS
Pt. lives with his wife in a house with 6 LUIS E and 1 FOS to reach bedroom/bath. At baseline, ambulates short distances independently with SC or RW. Pt. Pt. lives with his wife in a house with 6 LUIS E and 1 FOS to reach bedroom/bath. At baseline, ambulates short distances independently with SC or RW. Pt. ambulates limited distances due to increased pain in back during ambulation. Has WC for longer distances. Pt's wife assists him as needed and his son will be staying with them for the next 2 weeks.

## 2023-11-15 NOTE — PROGRESS NOTE ADULT - SUBJECTIVE AND OBJECTIVE BOX
General Surgery Post op Check    Pt seen and examined without complaints. Pain is controlled. Denies SOB/CP/N/V.     Vital Signs Last 24 Hrs  T(C): 36.4 (15 Nov 2023 00:29), Max: 36.7 (14 Nov 2023 18:43)  T(F): 97.6 (15 Nov 2023 00:29), Max: 98 (14 Nov 2023 18:43)  HR: 76 (15 Nov 2023 00:29) (68 - 76)  BP: 121/64 (15 Nov 2023 00:29) (121/64 - 149/88)  BP(mean): 102 (14 Nov 2023 20:43) (98 - 114)  RR: 20 (15 Nov 2023 00:29) (13 - 23)  SpO2: 95% (15 Nov 2023 00:29) (95% - 100%)    Parameters below as of 15 Nov 2023 00:29  Patient On (Oxygen Delivery Method): room air        I&O's Summary    14 Nov 2023 07:01  -  15 Nov 2023 03:41  --------------------------------------------------------  IN: 1220 mL / OUT: 700 mL / NET: 520 mL        Physical Exam  Gen: NAD, A&Ox3  Pulm: No respiratory distress, no subcostal retractions  CV: RRR, no JVD  Abd: Soft, appropriate incisional TTP, ND, no rebound or guarding      Patient is a 87M, (shellfish rxn w/ lip swelling ~ 20 yrs ago, does not eat shellfish since then) w/ PMHx CAD s/p JOSÉ to mLAD (6/2023), HTN, HLD, cervical spondylosis s/p laminectomy, gastric ulcers, hypothyroidism, presenting for elective cholecystectomy. Patient initially presented to [facility] ED 10/6/23 for BELLO with minimal exertion, chest pressure, and orthopnea x 3 days. Patient was noted to have severe hyponatremia and leukocytosis. He was managed for CHF under medicine, and started on empiric antibiotics. Upon further work up, diagnosis of acute cholecystitis was made. Surgery was consulted but patient symptoms/signs and labs already improved on Abx. Given cardiac history and presentation, operative intervention was deferred pending patient stabilization. Patient saw Dr. Gomez in the outpatient setting and surgery was scheduled for today after Cardiology clearance, s/p MELANIE epps    Plan:   - Pain control   - Aggressive IS/OOB   - Continue Metoprolol and Atorvastatin   - NPO/IVF (judicious fluids given cardiac history)   - SSI; monitor BG    - Resume Synthroid tomorrow   - PACU labs and AM labs   - Jess-op IV Abx x 24 hrs (Cef & Flagyl)   - Maintain Kennedy   - Monitor drain output   - No SQH or Toradol. SCDs for DVT ppx       General Surgery Post op Check    Pt seen and examined without complaints. Pain is controlled. Denies SOB/CP/N/V.     Vital Signs Last 24 Hrs  T(C): 36.4 (15 Nov 2023 00:29), Max: 36.7 (14 Nov 2023 18:43)  T(F): 97.6 (15 Nov 2023 00:29), Max: 98 (14 Nov 2023 18:43)  HR: 76 (15 Nov 2023 00:29) (68 - 76)  BP: 121/64 (15 Nov 2023 00:29) (121/64 - 149/88)  BP(mean): 102 (14 Nov 2023 20:43) (98 - 114)  RR: 20 (15 Nov 2023 00:29) (13 - 23)  SpO2: 95% (15 Nov 2023 00:29) (95% - 100%)    Parameters below as of 15 Nov 2023 00:29  Patient On (Oxygen Delivery Method): room air        I&O's Summary    14 Nov 2023 07:01  -  15 Nov 2023 03:41  --------------------------------------------------------  IN: 1220 mL / OUT: 700 mL / NET: 520 mL        Physical Exam  Gen: NAD, A&Ox3  Pulm: No respiratory distress, no subcostal retractions  CV: RRR, no JVD  Abd: Soft, appropriate incisional TTP, ND, no rebound or guarding      Patient is a 87M, (shellfish rxn w/ lip swelling ~ 20 yrs ago, does not eat shellfish since then) w/ PMHx CAD s/p JOSÉ to mLAD (6/2023), HTN, HLD, cervical spondylosis s/p laminectomy, gastric ulcers, hypothyroidism, presenting for elective cholecystectomy. Patient initially presented to [facility] ED 10/6/23 for BELLO with minimal exertion, chest pressure, and orthopnea x 3 days. Patient was noted to have severe hyponatremia and leukocytosis. He was managed for CHF under medicine, and started on empiric antibiotics. Upon further work up, diagnosis of acute cholecystitis was made. Surgery was consulted but patient symptoms/signs and labs already improved on Abx. Given cardiac history and presentation, operative intervention was deferred pending patient stabilization. Patient saw Dr. Gomez in the outpatient setting and surgery was scheduled for today after Cardiology clearance, s/p MELANIE epps    Plan:   - Pain control   - Aggressive IS/OOB   - Continue Metoprolol and Atorvastatin   - NPO/IVF (judicious fluids given cardiac history)   - SSI; monitor BG    - Resume Synthroid tomorrow   - PACU labs and AM labs   - Jess-op IV Abx x 24 hrs (Cef & Flagyl)   - Maintain Kennedy   - Monitor drain output   - No SQH or Toradol. SCDs for DVT ppx       General Surgery Post op Check    Pt seen and examined without complaints. Pain is controlled. Denies SOB/CP/N/V.     Vital Signs Last 24 Hrs  T(C): 36.4 (15 Nov 2023 00:29), Max: 36.7 (14 Nov 2023 18:43)  T(F): 97.6 (15 Nov 2023 00:29), Max: 98 (14 Nov 2023 18:43)  HR: 76 (15 Nov 2023 00:29) (68 - 76)  BP: 121/64 (15 Nov 2023 00:29) (121/64 - 149/88)  BP(mean): 102 (14 Nov 2023 20:43) (98 - 114)  RR: 20 (15 Nov 2023 00:29) (13 - 23)  SpO2: 95% (15 Nov 2023 00:29) (95% - 100%)    Parameters below as of 15 Nov 2023 00:29  Patient On (Oxygen Delivery Method): room air        I&O's Summary    14 Nov 2023 07:01  -  15 Nov 2023 03:41  --------------------------------------------------------  IN: 1220 mL / OUT: 700 mL / NET: 520 mL        Physical Exam  Gen: NAD, A&Ox3  Pulm: No respiratory distress, no subcostal retractions  CV: RRR, no JVD  Abd: Soft, appropriate incisional TTP, ND, no rebound or guarding      Patient is a 87M, (shellfish rxn w/ lip swelling ~ 20 yrs ago, does not eat shellfish since then) w/ PMHx CAD s/p JOSÉ to mLAD (6/2023), HTN, HLD, cervical spondylosis s/p laminectomy, gastric ulcers, hypothyroidism, presenting for elective cholecystectomy. Patient initially presented to [facility] ED 10/6/23 for BELLO with minimal exertion, chest pressure, and orthopnea x 3 days. Patient was noted to have severe hyponatremia and leukocytosis. He was managed for CHF under medicine, and started on empiric antibiotics. Upon further work up, diagnosis of acute cholecystitis was made. Surgery was consulted but patient symptoms/signs and labs already improved on Abx. Given cardiac history and presentation, operative intervention was deferred pending patient stabilization. Patient saw Dr. Gomez in the outpatient setting and surgery was scheduled for today after Cardiology clearance, s/p MELANIE epps    Plan:   - Pain control   - Aggressive IS/OOB   - Continue Metoprolol and Atorvastatin   - NPO/IVF (judicious fluids given cardiac history)   - SSI; monitor BG    - Resume Synthroid tomorrow   - PACU labs and AM labs   - Jess-op IV Abx x 24 hrs (Cef & Flagyl)   - Maintain Kennedy   - Monitor drain output   - No SQH or Toradol. SCDs for DVT ppx

## 2023-11-16 DIAGNOSIS — R31.9 HEMATURIA, UNSPECIFIED: ICD-10-CM

## 2023-11-16 LAB
ALBUMIN SERPL ELPH-MCNC: 2.8 G/DL — LOW (ref 3.3–5)
ALBUMIN SERPL ELPH-MCNC: 2.8 G/DL — LOW (ref 3.3–5)
ALP SERPL-CCNC: 53 U/L — SIGNIFICANT CHANGE UP (ref 40–120)
ALP SERPL-CCNC: 53 U/L — SIGNIFICANT CHANGE UP (ref 40–120)
ALT FLD-CCNC: 33 U/L — SIGNIFICANT CHANGE UP (ref 10–45)
ALT FLD-CCNC: 33 U/L — SIGNIFICANT CHANGE UP (ref 10–45)
ANION GAP SERPL CALC-SCNC: 9 MMOL/L — SIGNIFICANT CHANGE UP (ref 5–17)
ANION GAP SERPL CALC-SCNC: 9 MMOL/L — SIGNIFICANT CHANGE UP (ref 5–17)
AST SERPL-CCNC: 40 U/L — SIGNIFICANT CHANGE UP (ref 10–40)
AST SERPL-CCNC: 40 U/L — SIGNIFICANT CHANGE UP (ref 10–40)
BILIRUB SERPL-MCNC: 0.5 MG/DL — SIGNIFICANT CHANGE UP (ref 0.2–1.2)
BILIRUB SERPL-MCNC: 0.5 MG/DL — SIGNIFICANT CHANGE UP (ref 0.2–1.2)
BUN SERPL-MCNC: 16 MG/DL — SIGNIFICANT CHANGE UP (ref 7–23)
BUN SERPL-MCNC: 16 MG/DL — SIGNIFICANT CHANGE UP (ref 7–23)
CALCIUM SERPL-MCNC: 8.4 MG/DL — SIGNIFICANT CHANGE UP (ref 8.4–10.5)
CALCIUM SERPL-MCNC: 8.4 MG/DL — SIGNIFICANT CHANGE UP (ref 8.4–10.5)
CHLORIDE SERPL-SCNC: 104 MMOL/L — SIGNIFICANT CHANGE UP (ref 96–108)
CHLORIDE SERPL-SCNC: 104 MMOL/L — SIGNIFICANT CHANGE UP (ref 96–108)
CO2 SERPL-SCNC: 24 MMOL/L — SIGNIFICANT CHANGE UP (ref 22–31)
CO2 SERPL-SCNC: 24 MMOL/L — SIGNIFICANT CHANGE UP (ref 22–31)
CREAT SERPL-MCNC: 1 MG/DL — SIGNIFICANT CHANGE UP (ref 0.5–1.3)
CREAT SERPL-MCNC: 1 MG/DL — SIGNIFICANT CHANGE UP (ref 0.5–1.3)
EGFR: 74 ML/MIN/1.73M2 — SIGNIFICANT CHANGE UP
EGFR: 74 ML/MIN/1.73M2 — SIGNIFICANT CHANGE UP
GLUCOSE BLDC GLUCOMTR-MCNC: 108 MG/DL — HIGH (ref 70–99)
GLUCOSE BLDC GLUCOMTR-MCNC: 108 MG/DL — HIGH (ref 70–99)
GLUCOSE BLDC GLUCOMTR-MCNC: 121 MG/DL — HIGH (ref 70–99)
GLUCOSE BLDC GLUCOMTR-MCNC: 121 MG/DL — HIGH (ref 70–99)
GLUCOSE BLDC GLUCOMTR-MCNC: 122 MG/DL — HIGH (ref 70–99)
GLUCOSE BLDC GLUCOMTR-MCNC: 122 MG/DL — HIGH (ref 70–99)
GLUCOSE BLDC GLUCOMTR-MCNC: 135 MG/DL — HIGH (ref 70–99)
GLUCOSE BLDC GLUCOMTR-MCNC: 135 MG/DL — HIGH (ref 70–99)
GLUCOSE SERPL-MCNC: 130 MG/DL — HIGH (ref 70–99)
GLUCOSE SERPL-MCNC: 130 MG/DL — HIGH (ref 70–99)
HCT VFR BLD CALC: 40.3 % — SIGNIFICANT CHANGE UP (ref 39–50)
HCT VFR BLD CALC: 40.3 % — SIGNIFICANT CHANGE UP (ref 39–50)
HCT VFR BLD CALC: 41.5 % — SIGNIFICANT CHANGE UP (ref 39–50)
HCT VFR BLD CALC: 41.5 % — SIGNIFICANT CHANGE UP (ref 39–50)
HCT VFR BLD CALC: 41.6 % — SIGNIFICANT CHANGE UP (ref 39–50)
HCT VFR BLD CALC: 41.6 % — SIGNIFICANT CHANGE UP (ref 39–50)
HGB BLD-MCNC: 12.8 G/DL — LOW (ref 13–17)
MAGNESIUM SERPL-MCNC: 2.4 MG/DL — SIGNIFICANT CHANGE UP (ref 1.6–2.6)
MAGNESIUM SERPL-MCNC: 2.4 MG/DL — SIGNIFICANT CHANGE UP (ref 1.6–2.6)
MCHC RBC-ENTMCNC: 20.6 PG — LOW (ref 27–34)
MCHC RBC-ENTMCNC: 20.6 PG — LOW (ref 27–34)
MCHC RBC-ENTMCNC: 20.9 PG — LOW (ref 27–34)
MCHC RBC-ENTMCNC: 30.8 GM/DL — LOW (ref 32–36)
MCHC RBC-ENTMCNC: 31.8 GM/DL — LOW (ref 32–36)
MCHC RBC-ENTMCNC: 31.8 GM/DL — LOW (ref 32–36)
MCV RBC AUTO: 66 FL — LOW (ref 80–100)
MCV RBC AUTO: 66 FL — LOW (ref 80–100)
MCV RBC AUTO: 67.1 FL — LOW (ref 80–100)
MCV RBC AUTO: 67.1 FL — LOW (ref 80–100)
MCV RBC AUTO: 67.7 FL — LOW (ref 80–100)
MCV RBC AUTO: 67.7 FL — LOW (ref 80–100)
NRBC # BLD: 0 /100 WBCS — SIGNIFICANT CHANGE UP (ref 0–0)
PHOSPHATE SERPL-MCNC: 2.8 MG/DL — SIGNIFICANT CHANGE UP (ref 2.5–4.5)
PHOSPHATE SERPL-MCNC: 2.8 MG/DL — SIGNIFICANT CHANGE UP (ref 2.5–4.5)
PLATELET # BLD AUTO: 167 K/UL — SIGNIFICANT CHANGE UP (ref 150–400)
PLATELET # BLD AUTO: 167 K/UL — SIGNIFICANT CHANGE UP (ref 150–400)
PLATELET # BLD AUTO: 169 K/UL — SIGNIFICANT CHANGE UP (ref 150–400)
PLATELET # BLD AUTO: 169 K/UL — SIGNIFICANT CHANGE UP (ref 150–400)
PLATELET # BLD AUTO: 172 K/UL — SIGNIFICANT CHANGE UP (ref 150–400)
PLATELET # BLD AUTO: 172 K/UL — SIGNIFICANT CHANGE UP (ref 150–400)
POTASSIUM SERPL-MCNC: 3.8 MMOL/L — SIGNIFICANT CHANGE UP (ref 3.5–5.3)
POTASSIUM SERPL-MCNC: 3.8 MMOL/L — SIGNIFICANT CHANGE UP (ref 3.5–5.3)
POTASSIUM SERPL-SCNC: 3.8 MMOL/L — SIGNIFICANT CHANGE UP (ref 3.5–5.3)
POTASSIUM SERPL-SCNC: 3.8 MMOL/L — SIGNIFICANT CHANGE UP (ref 3.5–5.3)
PROT SERPL-MCNC: 6.2 G/DL — SIGNIFICANT CHANGE UP (ref 6–8.3)
PROT SERPL-MCNC: 6.2 G/DL — SIGNIFICANT CHANGE UP (ref 6–8.3)
RBC # BLD: 6.11 M/UL — HIGH (ref 4.2–5.8)
RBC # BLD: 6.11 M/UL — HIGH (ref 4.2–5.8)
RBC # BLD: 6.13 M/UL — HIGH (ref 4.2–5.8)
RBC # BLD: 6.13 M/UL — HIGH (ref 4.2–5.8)
RBC # BLD: 6.2 M/UL — HIGH (ref 4.2–5.8)
RBC # BLD: 6.2 M/UL — HIGH (ref 4.2–5.8)
RBC # FLD: 17.6 % — HIGH (ref 10.3–14.5)
RBC # FLD: 17.7 % — HIGH (ref 10.3–14.5)
RBC # FLD: 17.7 % — HIGH (ref 10.3–14.5)
SODIUM SERPL-SCNC: 137 MMOL/L — SIGNIFICANT CHANGE UP (ref 135–145)
SODIUM SERPL-SCNC: 137 MMOL/L — SIGNIFICANT CHANGE UP (ref 135–145)
WBC # BLD: 10.14 K/UL — SIGNIFICANT CHANGE UP (ref 3.8–10.5)
WBC # BLD: 10.14 K/UL — SIGNIFICANT CHANGE UP (ref 3.8–10.5)
WBC # BLD: 10.35 K/UL — SIGNIFICANT CHANGE UP (ref 3.8–10.5)
WBC # BLD: 10.35 K/UL — SIGNIFICANT CHANGE UP (ref 3.8–10.5)
WBC # BLD: 11.91 K/UL — HIGH (ref 3.8–10.5)
WBC # BLD: 11.91 K/UL — HIGH (ref 3.8–10.5)
WBC # FLD AUTO: 10.14 K/UL — SIGNIFICANT CHANGE UP (ref 3.8–10.5)
WBC # FLD AUTO: 10.14 K/UL — SIGNIFICANT CHANGE UP (ref 3.8–10.5)
WBC # FLD AUTO: 10.35 K/UL — SIGNIFICANT CHANGE UP (ref 3.8–10.5)
WBC # FLD AUTO: 10.35 K/UL — SIGNIFICANT CHANGE UP (ref 3.8–10.5)
WBC # FLD AUTO: 11.91 K/UL — HIGH (ref 3.8–10.5)
WBC # FLD AUTO: 11.91 K/UL — HIGH (ref 3.8–10.5)

## 2023-11-16 PROCEDURE — 99232 SBSQ HOSP IP/OBS MODERATE 35: CPT

## 2023-11-16 RX ORDER — POTASSIUM CHLORIDE 20 MEQ
20 PACKET (EA) ORAL ONCE
Refills: 0 | Status: COMPLETED | OUTPATIENT
Start: 2023-11-16 | End: 2023-11-16

## 2023-11-16 RX ORDER — CLOPIDOGREL BISULFATE 75 MG/1
75 TABLET, FILM COATED ORAL DAILY
Refills: 0 | Status: DISCONTINUED | OUTPATIENT
Start: 2023-11-16 | End: 2023-11-16

## 2023-11-16 RX ORDER — BACITRACIN ZINC 500 UNIT/G
1 OINTMENT IN PACKET (EA) TOPICAL ONCE
Refills: 0 | Status: COMPLETED | OUTPATIENT
Start: 2023-11-16 | End: 2023-11-16

## 2023-11-16 RX ADMIN — PANTOPRAZOLE SODIUM 40 MILLIGRAM(S): 20 TABLET, DELAYED RELEASE ORAL at 12:00

## 2023-11-16 RX ADMIN — Medication 75 MICROGRAM(S): at 05:52

## 2023-11-16 RX ADMIN — Medication 10 MILLIGRAM(S): at 12:00

## 2023-11-16 RX ADMIN — Medication 81 MILLIGRAM(S): at 12:00

## 2023-11-16 RX ADMIN — Medication 650 MILLIGRAM(S): at 19:30

## 2023-11-16 RX ADMIN — HEPARIN SODIUM 5000 UNIT(S): 5000 INJECTION INTRAVENOUS; SUBCUTANEOUS at 05:52

## 2023-11-16 RX ADMIN — Medication 12.5 MILLIGRAM(S): at 05:52

## 2023-11-16 RX ADMIN — ATORVASTATIN CALCIUM 40 MILLIGRAM(S): 80 TABLET, FILM COATED ORAL at 22:02

## 2023-11-16 RX ADMIN — Medication 20 MILLIEQUIVALENT(S): at 14:52

## 2023-11-16 RX ADMIN — CYCLOBENZAPRINE HYDROCHLORIDE 5 MILLIGRAM(S): 10 TABLET, FILM COATED ORAL at 12:00

## 2023-11-16 RX ADMIN — Medication 1 APPLICATION(S): at 14:19

## 2023-11-16 RX ADMIN — Medication 650 MILLIGRAM(S): at 18:51

## 2023-11-16 RX ADMIN — HEPARIN SODIUM 5000 UNIT(S): 5000 INJECTION INTRAVENOUS; SUBCUTANEOUS at 14:19

## 2023-11-16 RX ADMIN — TAMSULOSIN HYDROCHLORIDE 0.4 MILLIGRAM(S): 0.4 CAPSULE ORAL at 22:01

## 2023-11-16 RX ADMIN — CLOPIDOGREL BISULFATE 75 MILLIGRAM(S): 75 TABLET, FILM COATED ORAL at 12:00

## 2023-11-16 RX ADMIN — HEPARIN SODIUM 5000 UNIT(S): 5000 INJECTION INTRAVENOUS; SUBCUTANEOUS at 22:01

## 2023-11-16 NOTE — PROGRESS NOTE ADULT - SUBJECTIVE AND OBJECTIVE BOX
Patient is a 87y old  Male who presents with a chief complaint of     INTERVAL EVENTS:event noted, s/p christian cath placement with gross hematuria and bladder irrigation with 1.5L sterilized water     SUBJECTIVE:  Patient was seen and examined at bedside.    Review of systems: No fever, chills, dizziness, HA, Changes in vision, CP, dyspnea, nausea or vomiting, dysuria, changes in bowel movements, LE edema. Rest of 12 point Review of systems negative unless otherwise documented elsewhere in note.     Diet, Regular:   Consistent Carbohydrate Evening Snack (CSTCHOSN)  Low Fat (LOWFAT) (11-15-23 @ 17:42) [Active]      MEDICATIONS:  MEDICATIONS  (STANDING):  aspirin  chewable 81 milliGRAM(s) Oral daily  atorvastatin 40 milliGRAM(s) Oral at bedtime  bisacodyl Suppository 10 milliGRAM(s) Rectal once  clopidogrel Tablet 75 milliGRAM(s) Oral daily  cyclobenzaprine 5 milliGRAM(s) Oral daily  dextrose 5%. 1000 milliLiter(s) (100 mL/Hr) IV Continuous <Continuous>  dextrose 5%. 1000 milliLiter(s) (50 mL/Hr) IV Continuous <Continuous>  dextrose 50% Injectable 25 Gram(s) IV Push once  dextrose 50% Injectable 12.5 Gram(s) IV Push once  dextrose 50% Injectable 25 Gram(s) IV Push once  glucagon  Injectable 1 milliGRAM(s) IntraMuscular once  heparin   Injectable 5000 Unit(s) SubCutaneous every 8 hours  insulin lispro (ADMELOG) corrective regimen sliding scale   SubCutaneous Before meals and at bedtime  levothyroxine 75 MICROGram(s) Oral daily  metoprolol tartrate 12.5 milliGRAM(s) Oral daily  pantoprazole  Injectable 40 milliGRAM(s) IV Push daily  tamsulosin 0.4 milliGRAM(s) Oral at bedtime    MEDICATIONS  (PRN):  acetaminophen     Tablet .. 650 milliGRAM(s) Oral every 6 hours PRN Mild Pain (1 - 3), Moderate Pain (4 - 6)  dextrose Oral Gel 15 Gram(s) Oral once PRN Blood Glucose LESS THAN 70 milliGRAM(s)/deciliter  oxyCODONE    IR 5 milliGRAM(s) Oral every 6 hours PRN Severe Pain (7 - 10)      Allergies    shellfish (Swelling; Hives)  No Known Drug Allergies    Intolerances        OBJECTIVE:  Vital Signs Last 24 Hrs  T(C): 37.3 (16 Nov 2023 08:14), Max: 37.4 (15 Nov 2023 12:47)  T(F): 99.2 (16 Nov 2023 08:14), Max: 99.3 (15 Nov 2023 12:47)  HR: 72 (16 Nov 2023 08:14) (72 - 87)  BP: 101/65 (16 Nov 2023 08:14) (101/65 - 159/71)  BP(mean): --  RR: 18 (16 Nov 2023 08:14) (18 - 18)  SpO2: 99% (16 Nov 2023 08:14) (92% - 99%)    Parameters below as of 16 Nov 2023 08:14  Patient On (Oxygen Delivery Method): room air      I&O's Summary    15 Nov 2023 07:01  -  16 Nov 2023 07:00  --------------------------------------------------------  IN: 0 mL / OUT: 2525 mL / NET: -2525 mL        PHYSICAL EXAM:  Gen: Reclining in bed at time of exam, appears stated age  HEENT: NCAT, MMM, clear OP  Neck: supple, trachea at midline  CV: RRR, +S1/S2  Pulm: adequate respiratory effort, no increase in work of breathing  Abd: soft, NTND, JAMEY drain in place w. minimal serosanguinous fluid   Skin: warm and dry,   Ext: WWP, no LE edema  Neuro: AOx3, no gross focal neurological deficits  Psych: affect and behavior appropriate, pleasant at time of interview  : christian in place w. red urine     LABS:                        12.8   10.14 )-----------( 169      ( 16 Nov 2023 07:12 )             41.5     11-16    137  |  104  |  16  ----------------------------<  130<H>  3.8   |  24  |  1.00    Ca    8.4      16 Nov 2023 07:12  Phos  2.8     11-16  Mg     2.4     11-16    TPro  6.2  /  Alb  2.8<L>  /  TBili  0.5  /  DBili  x   /  AST  40  /  ALT  33  /  AlkPhos  53  11-16    LIVER FUNCTIONS - ( 16 Nov 2023 07:12 )  Alb: 2.8 g/dL / Pro: 6.2 g/dL / ALK PHOS: 53 U/L / ALT: 33 U/L / AST: 40 U/L / GGT: x             CAPILLARY BLOOD GLUCOSE      POCT Blood Glucose.: 108 mg/dL (16 Nov 2023 07:15)  POCT Blood Glucose.: 132 mg/dL (15 Nov 2023 22:22)  POCT Blood Glucose.: 132 mg/dL (15 Nov 2023 17:02)  POCT Blood Glucose.: 149 mg/dL (15 Nov 2023 12:09)    Urinalysis Basic - ( 16 Nov 2023 07:12 )    Color: x / Appearance: x / SG: x / pH: x  Gluc: 130 mg/dL / Ketone: x  / Bili: x / Urobili: x   Blood: x / Protein: x / Nitrite: x   Leuk Esterase: x / RBC: x / WBC x   Sq Epi: x / Non Sq Epi: x / Bacteria: x        MICRODATA:      RADIOLOGY/OTHER STUDIES:

## 2023-11-16 NOTE — PROGRESS NOTE ADULT - SUBJECTIVE AND OBJECTIVE BOX
SUBJECTIVE: Patient seen and examined bedside by chief resident. Patient retained urine overnight, christian catheter placed. Endorses good pain control with medication. Denies nausea/vomiting tolerating low fat diet. Endorses flatus and bowel movements. No F/C.     aspirin  chewable 81 milliGRAM(s) Oral daily  heparin   Injectable 5000 Unit(s) SubCutaneous every 8 hours  metoprolol tartrate 12.5 milliGRAM(s) Oral daily    MEDICATIONS  (PRN):  acetaminophen     Tablet .. 650 milliGRAM(s) Oral every 6 hours PRN Mild Pain (1 - 3), Moderate Pain (4 - 6)  dextrose Oral Gel 15 Gram(s) Oral once PRN Blood Glucose LESS THAN 70 milliGRAM(s)/deciliter  oxyCODONE    IR 5 milliGRAM(s) Oral every 6 hours PRN Severe Pain (7 - 10)      I&O's Detail    15 Nov 2023 07:01  -  16 Nov 2023 07:00  --------------------------------------------------------  IN:  Total IN: 0 mL    OUT:    Bulb (mL): 25 mL    Indwelling Catheter - Urethral (mL): 1150 mL    Intermittent Catheterization - Urethral (mL): 1350 mL  Total OUT: 2525 mL    Total NET: -2525 mL      16 Nov 2023 07:01  -  16 Nov 2023 18:35  --------------------------------------------------------  IN:  Total IN: 0 mL    OUT:    Indwelling Catheter - Urethral (mL): 350 mL  Total OUT: 350 mL    Total NET: -350 mL          Vital Signs Last 24 Hrs  T(C): 36.5 (16 Nov 2023 11:56), Max: 37.3 (16 Nov 2023 08:14)  T(F): 97.7 (16 Nov 2023 11:56), Max: 99.2 (16 Nov 2023 08:14)  HR: 72 (16 Nov 2023 11:56) (72 - 87)  BP: 120/77 (16 Nov 2023 11:56) (101/65 - 159/71)  BP(mean): --  RR: 18 (16 Nov 2023 11:56) (18 - 18)  SpO2: 95% (16 Nov 2023 11:56) (95% - 99%)    Parameters below as of 16 Nov 2023 11:56  Patient On (Oxygen Delivery Method): room air        General: NAD, resting comfortably in bed  C/V: NSR  Pulm: Nonlabored breathing, no respiratory distress  Abd: soft, NT/ND, incisions c/d/i  Extrem: WWP, no edema, SCDs in place    LABS:                        12.8   10.35 )-----------( 167      ( 16 Nov 2023 15:21 )             41.6     11-16    137  |  104  |  16  ----------------------------<  130<H>  3.8   |  24  |  1.00    Ca    8.4      16 Nov 2023 07:12  Phos  2.8     11-16  Mg     2.4     11-16    TPro  6.2  /  Alb  2.8<L>  /  TBili  0.5  /  DBili  x   /  AST  40  /  ALT  33  /  AlkPhos  53  11-16      Urinalysis Basic - ( 16 Nov 2023 07:12 )    Color: x / Appearance: x / SG: x / pH: x  Gluc: 130 mg/dL / Ketone: x  / Bili: x / Urobili: x   Blood: x / Protein: x / Nitrite: x   Leuk Esterase: x / RBC: x / WBC x   Sq Epi: x / Non Sq Epi: x / Bacteria: x        RADIOLOGY & ADDITIONAL STUDIES:

## 2023-11-16 NOTE — CONSULT NOTE ADULT - ASSESSMENT
87M, (shellfish rxn w/ lip swelling ~ 20 yrs ago, does not eat shellfish since then) w/ PMHx CAD s/p JOSÉ to mLAD (6/2023), HTN, HLD, cervical spondylosis s/p laminectomy, gastric ulcers, hypothyroidism, presenting for elective cholecystectomy. Patient initially presented to [facility] ED 10/6/23 for BELLO with minimal exertion, chest pressure, and orthopnea x 3 days. Patient was noted to have severe hyponatremia and leukocytosis. He was managed for CHF under medicine, and started on empiric antibiotics. Upon further work up, diagnosis of acute cholecystitis was made now s/p roboic mich (11/14)    Urology consulted for new hematuria post op. Patient denies history of hematuria in the past. Since the procedure patient has had 4 catheterizations.   87M, (shellfish rxn w/ lip swelling ~ 20 yrs ago, does not eat shellfish since then) w/ PMHx CAD s/p JOSÉ to mLAD (6/2023), HTN, HLD, cervical spondylosis s/p laminectomy, gastric ulcers, hypothyroidism, presenting for elective cholecystectomy. Patient initially presented to [facility] ED 10/6/23 for BELLO with minimal exertion, chest pressure, and orthopnea x 3 days. Patient was noted to have severe hyponatremia and leukocytosis. He was managed for CHF under medicine, and started on empiric antibiotics. Upon further work up, diagnosis of acute cholecystitis was made now s/p roboic mich (11/14)    Urology consulted for new hematuria post op. Patient denies history of hematuria in the past. Since the procedure patient has had 4 catheterizations.

## 2023-11-16 NOTE — PROGRESS NOTE ADULT - ASSESSMENT
Patient is a 87M, (shellfish rxn w/ lip swelling ~ 20 yrs ago, does not eat shellfish since then) w/ PMHx CAD s/p JOSÉ to mLAD (6/2023), HTN, HLD, cervical spondylosis s/p laminectomy, gastric ulcers, hypothyroidism, presenting for elective cholecystectomy. Patient initially presented to [facility] ED 10/6/23 for BELLO with minimal exertion, chest pressure, and orthopnea x 3 days. Patient was noted to have severe hyponatremia and leukocytosis. He was managed for CHF under medicine, and started on empiric antibiotics. Upon further work up, diagnosis of acute cholecystitis was made. Surgery was consulted but patient symptoms/signs and labs already improved on Abx. Given cardiac history and presentation, operative intervention was deferred pending patient stabilization. Patient saw Dr. Gomez in the outpatient setting and after Cardiology clearance that Plavix was held since 11/7 and underwent an elective robotic assisted cholecystectomy ( 11/14/23)  POD#2    - diet per surgery  - monitor JAMEY drain output= 25ml last 24hr   - pain control:   - acetaminophen     Tablet .. 650 milliGRAM(s) Oral every 6 hours PRN                              oxyCODONE    IR 5 milliGRAM(s) Oral every 6 hours PRN  - c/w aspirin  chewable 81 milliGRAM(s) Oral daily  - plavix last dose 11/7/23 prior surgery, plan to resume Plavix 75mg po daily toady ( 11/16)   - hematuria: failed TOV, 16Fr indwelling christian in place,  consulted s/p bladder irrigation with 1.5L sterilized water, monitor hematuria. on Tamzulosin 0.4mg qHS ,monitor BM  - HLD: atorvastatin 40 milliGRAM(s) Oral at bedtime  - HTN: metoprolol tartrate 12.5 milliGRAM(s) Oral daily  - Hypothyroidism: levothyroxine 75 MICROGram(s) Oral daily  - GI ppx: pantoprazole  Injectable 40 milliGRAM(s) IV Push daily  - DVT ppx: heparin   Injectable 5000 Unit(s) SubCutaneous every 8 hours  - PT eval appreciated   - Incentive spironmetry at bedside     POC as d/w Dr. Gomez and daughter Olgaseverino Yanez RN          Patient is a 87M, (shellfish rxn w/ lip swelling ~ 20 yrs ago, does not eat shellfish since then) w/ PMHx CAD s/p JOSÉ to mLAD (6/2023), HTN, HLD, cervical spondylosis s/p laminectomy, gastric ulcers, hypothyroidism, presenting for elective cholecystectomy. Patient initially presented to [facility] ED 10/6/23 for BELLO with minimal exertion, chest pressure, and orthopnea x 3 days. Patient was noted to have severe hyponatremia and leukocytosis. He was managed for CHF under medicine, and started on empiric antibiotics. Upon further work up, diagnosis of acute cholecystitis was made. Surgery was consulted but patient symptoms/signs and labs already improved on Abx. Given cardiac history and presentation, operative intervention was deferred pending patient stabilization. Patient saw Dr. Gomez in the outpatient setting and after Cardiology clearance that Plavix was held since 11/7 and underwent an elective robotic assisted cholecystectomy ( 11/14/23)  POD#2    - diet per surgery  - monitor JAMEY drain output= 25ml last 24hr   - pain control:   - acetaminophen     Tablet .. 650 milliGRAM(s) Oral every 6 hours PRN                              oxyCODONE    IR 5 milliGRAM(s) Oral every 6 hours PRN  - c/w aspirin  chewable 81 milliGRAM(s) Oral daily  - plavix last dose 11/7/23 prior surgery, plan to resume Plavix 75mg po daily toady ( 11/16)   - hematuria: failed TOV, 16Fr indwelling christian in place,  consulted s/p bladder irrigation with 1.5L sterilized water, monitor hematuria. on Tamzulosin 0.4mg qHS ,monitor BM  - HLD: atorvastatin 40 milliGRAM(s) Oral at bedtime  - HTN: metoprolol tartrate 12.5 milliGRAM(s) Oral daily  - Hypothyroidism: levothyroxine 75 MICROGram(s) Oral daily  - GI ppx: pantoprazole  Injectable 40 milliGRAM(s) IV Push daily  - DVT ppx: heparin   Injectable 5000 Unit(s) SubCutaneous every 8 hours  - PT eval appreciated   - Incentive spironmetry at bedside     POC as d/w Dr. Gomez and daughter Olgaseverino Yanez RN          Patient is a 87M, (shellfish rxn w/ lip swelling ~ 20 yrs ago, does not eat shellfish since then) w/ PMHx CAD s/p JOSÉ to mLAD (6/2023), HTN, HLD, cervical spondylosis s/p laminectomy, gastric ulcers, hypothyroidism, presenting for elective cholecystectomy. Patient initially presented to [facility] ED 10/6/23 for BELLO with minimal exertion, chest pressure, and orthopnea x 3 days. Patient was noted to have severe hyponatremia and leukocytosis. He was managed for CHF under medicine, and started on empiric antibiotics. Upon further work up, diagnosis of acute cholecystitis was made. Surgery was consulted but patient symptoms/signs and labs already improved on Abx. Given cardiac history and presentation, operative intervention was deferred pending patient stabilization. Patient saw Dr. Gomez in the outpatient setting and after Cardiology clearance that Plavix was held since 11/7 and underwent an elective robotic assisted cholecystectomy ( 11/14/23)  POD#2    - diet per surgery  - monitor JAMEY drain output= 25ml last 24hr   - pain control:   - acetaminophen     Tablet .. 650 milliGRAM(s) Oral every 6 hours PRN                              oxyCODONE    IR 5 milliGRAM(s) Oral every 6 hours PRN  - c/w aspirin  chewable 81 milliGRAM(s) Oral daily  - plavix last dose 11/7/23 prior surgery, plan to resume Plavix 75mg po daily toady ( 11/16)   - hematuria: failed TOV, 16Fr indwelling christian in place,  consulted s/p bladder irrigation with 1.5L sterilized water, monitor hematuria. on Tamzulosin 0.4mg qHS ,monitor BM  - HLD: atorvastatin 40 milliGRAM(s) Oral at bedtime  - HTN: metoprolol tartrate 12.5 milliGRAM(s) Oral daily  - Hypothyroidism: levothyroxine 75 MICROGram(s) Oral daily  - GI ppx: pantoprazole  Injectable 40 milliGRAM(s) IV Push daily  - DVT ppx: heparin   Injectable 5000 Unit(s) SubCutaneous every 8 hours  - PT eval appreciated   - Incentive spironmetry at bedside     POC as d/w Dr. Gomez and daughter Olgaseverino Yanez RN

## 2023-11-16 NOTE — CONSULT NOTE ADULT - SUBJECTIVE AND OBJECTIVE BOX
Patient is a 87y old  Male who presents with a chief complaint of   HPI:  Patient is a 87M, (shellfish rxn w/ lip swelling ~ 20 yrs ago, does not eat shellfish since then) w/ PMHx CAD s/p JOSÉ to mLAD (6/2023), HTN, HLD, cervical spondylosis s/p laminectomy, gastric ulcers, hypothyroidism, presenting for elective cholecystectomy. Patient initially presented to [facility] ED 10/6/23 for BELLO with minimal exertion, chest pressure, and orthopnea x 3 days. Patient was noted to have severe hyponatremia and leukocytosis. He was managed for CHF under medicine, and started on empiric antibiotics. Upon further work up, diagnosis of acute cholecystitis was made. Surgery was consulted but patient symptoms/signs and labs already improved on Abx. Given cardiac history and presentation, operative intervention was deferred pending patient stabilization. Patient saw Dr. Gomez in the outpatient setting and surgery was scheduled for today after Cardiology clearance     Patient denies abdominal pain, nausea, emesis, chills, sob, palpitations or chest pain. He is having normal bowel function. Last dose of Plavix was 11/7      (14 Nov 2023 19:15)    : As stated above. Urology consulted for hematuria. Per primary team patient had a christian placed intraop on 11/14. Hcristian was removed on 11/15. Patient was unable to urinate and required CIC x2. After patient failed TOV overnight a 16fr christian was placed by nursing. Per primary team each urine output post op has been red. Patient denies history of hematuria. States today he had the urge to urinate but was unable to start stream. Patient denies history of smoking.     Bladder scan zero prior to my exam. 16fr balloon was deflated. Christian was advanced and catheter was flushed with 1.5L of sterile water. Small amount of old blood clots irrigated from bladder. Balloon was reinflated and urine was light red.     Vital Signs Last 24 Hrs  T(C): 36.6 (16 Nov 2023 05:06), Max: 37.4 (15 Nov 2023 12:47)  T(F): 97.9 (16 Nov 2023 05:06), Max: 99.3 (15 Nov 2023 12:47)  HR: 87 (16 Nov 2023 05:06) (71 - 98)  BP: 149/81 (16 Nov 2023 05:06) (114/62 - 159/71)  BP(mean): --  RR: 18 (16 Nov 2023 05:06) (14 - 18)  SpO2: 98% (16 Nov 2023 05:06) (92% - 98%)    Parameters below as of 16 Nov 2023 05:06  Patient On (Oxygen Delivery Method): room air      I&O's Summary    14 Nov 2023 07:01  -  15 Nov 2023 07:00  --------------------------------------------------------  IN: 730 mL / OUT: 1160 mL / NET: -430 mL    15 Nov 2023 07:01  -  16 Nov 2023 05:28  --------------------------------------------------------  IN: 0 mL / OUT: 1375 mL / NET: -1375 mL        PE:  Gen: alert and oriented. no acute distress   Abd: soft, NT/ND, incisions CDI, JAMEY SS  : 16fr christian in place draining clear, red urine s/p irrigation     LABS:                        12.7   10.58 )-----------( 188      ( 15 Nov 2023 05:30 )             40.9     11-15    138  |  103  |  19  ----------------------------<  125<H>  3.9   |  26  |  1.05    Ca    8.5      15 Nov 2023 05:30  Phos  3.9     11-15  Mg     2.1     11-15    TPro  6.2  /  Alb  3.1<L>  /  TBili  0.6  /  DBili  x   /  AST  52<H>  /  ALT  39  /  AlkPhos  54  11-15       Patient is a 87y old  Male who presents with a chief complaint of   HPI:  Patient is a 87M, (shellfish rxn w/ lip swelling ~ 20 yrs ago, does not eat shellfish since then) w/ PMHx CAD s/p JOSÉ to mLAD (6/2023), HTN, HLD, cervical spondylosis s/p laminectomy, gastric ulcers, hypothyroidism, presenting for elective cholecystectomy. Patient initially presented to [facility] ED 10/6/23 for BELLO with minimal exertion, chest pressure, and orthopnea x 3 days. Patient was noted to have severe hyponatremia and leukocytosis. He was managed for CHF under medicine, and started on empiric antibiotics. Upon further work up, diagnosis of acute cholecystitis was made. Surgery was consulted but patient symptoms/signs and labs already improved on Abx. Given cardiac history and presentation, operative intervention was deferred pending patient stabilization. Patient saw Dr. Gomez in the outpatient setting and surgery was scheduled for today after Cardiology clearance     Patient denies abdominal pain, nausea, emesis, chills, sob, palpitations or chest pain. He is having normal bowel function. Last dose of Plavix was 11/7      (14 Nov 2023 19:15)    : As stated above. Urology consulted for hematuria. Per primary team patient had a christian placed intraop on 11/14. Christian was removed on 11/15. Patient was unable to urinate and required CIC x2. After patient failed TOV overnight a 16fr christian was placed by nursing. Per primary team each urine output post op has been red. Patient denies history of hematuria. States today he had the urge to urinate but was unable to start stream. Patient denies history of smoking.     Bladder scan zero prior to my exam. 16fr balloon was deflated. Christian was advanced and catheter was flushed with 1.5L of sterile water. Small amount of old blood clots irrigated from bladder. Balloon was reinflated and urine was light red.     Vital Signs Last 24 Hrs  T(C): 36.6 (16 Nov 2023 05:06), Max: 37.4 (15 Nov 2023 12:47)  T(F): 97.9 (16 Nov 2023 05:06), Max: 99.3 (15 Nov 2023 12:47)  HR: 87 (16 Nov 2023 05:06) (71 - 98)  BP: 149/81 (16 Nov 2023 05:06) (114/62 - 159/71)  BP(mean): --  RR: 18 (16 Nov 2023 05:06) (14 - 18)  SpO2: 98% (16 Nov 2023 05:06) (92% - 98%)    Parameters below as of 16 Nov 2023 05:06  Patient On (Oxygen Delivery Method): room air      I&O's Summary    14 Nov 2023 07:01  -  15 Nov 2023 07:00  --------------------------------------------------------  IN: 730 mL / OUT: 1160 mL / NET: -430 mL    15 Nov 2023 07:01  -  16 Nov 2023 05:28  --------------------------------------------------------  IN: 0 mL / OUT: 1375 mL / NET: -1375 mL        PE:  Gen: alert and oriented. no acute distress   Abd: soft, NT/ND, incisions CDI, JAMEY SS  : 16fr christian in place draining clear, red urine s/p irrigation     LABS:                        12.7   10.58 )-----------( 188      ( 15 Nov 2023 05:30 )             40.9     11-15    138  |  103  |  19  ----------------------------<  125<H>  3.9   |  26  |  1.05    Ca    8.5      15 Nov 2023 05:30  Phos  3.9     11-15  Mg     2.1     11-15    TPro  6.2  /  Alb  3.1<L>  /  TBili  0.6  /  DBili  x   /  AST  52<H>  /  ALT  39  /  AlkPhos  54  11-15       Patient is a 87y old  Male who presents with a chief complaint of   HPI:  Patient is a 87M, (shellfish rxn w/ lip swelling ~ 20 yrs ago, does not eat shellfish since then) w/ PMHx CAD s/p JOSÉ to mLAD (6/2023), HTN, HLD, cervical spondylosis s/p laminectomy, gastric ulcers, hypothyroidism, presenting for elective cholecystectomy. Patient initially presented to [facility] ED 10/6/23 for BELLO with minimal exertion, chest pressure, and orthopnea x 3 days. Patient was noted to have severe hyponatremia and leukocytosis. He was managed for CHF under medicine, and started on empiric antibiotics. Upon further work up, diagnosis of acute cholecystitis was made. Surgery was consulted but patient symptoms/signs and labs already improved on Abx. Given cardiac history and presentation, operative intervention was deferred pending patient stabilization. Patient saw Dr. Gomez in the outpatient setting and surgery was scheduled for today after Cardiology clearance     Patient denies abdominal pain, nausea, emesis, chills, sob, palpitations or chest pain. He is having normal bowel function. Last dose of Plavix was 11/7      (14 Nov 2023 19:15)    : As stated above. Urology consulted for hematuria. Per primary team patient had a christian placed intraop on 11/14. Christian was removed on 11/15. Patient was unable to urinate and required CIC x2. After patient failed TOV overnight a 16fr christian was placed by nursing. Per primary team each urine output post op has been red. Patient denies history of hematuria. States today he had the urge to urinate but was unable to start stream. Patient denies history of smoking.     Bladder scan zero prior to my exam. 16fr balloon was deflated. Christian was advanced and catheter was flushed with 1.5L of sterile water. Small amount of old blood clots irrigated from bladder. Balloon was reinflated and urine was light red.     Vital Signs Last 24 Hrs  T(C): 36.6 (16 Nov 2023 05:06), Max: 37.4 (15 Nov 2023 12:47)  T(F): 97.9 (16 Nov 2023 05:06), Max: 99.3 (15 Nov 2023 12:47)  HR: 87 (16 Nov 2023 05:06) (71 - 98)  BP: 149/81 (16 Nov 2023 05:06) (114/62 - 159/71)  BP(mean): --  RR: 18 (16 Nov 2023 05:06) (14 - 18)  SpO2: 98% (16 Nov 2023 05:06) (92% - 98%)    Parameters below as of 16 Nov 2023 05:06  Patient On (Oxygen Delivery Method): room air      I&O's Summary    14 Nov 2023 07:01  -  15 Nov 2023 07:00  --------------------------------------------------------  IN: 730 mL / OUT: 1160 mL / NET: -430 mL    15 Nov 2023 07:01  -  16 Nov 2023 05:28  --------------------------------------------------------  IN: 0 mL / OUT: 1375 mL / NET: -1375 mL        PE:  Gen: alert and oriented. no acute distress   Abd: soft, NT/ND, incisions CDI, JAMEY SS  : 16fr christian in place draining clear, red urine s/p irrigation     LABS:                        12.7   10.58 )-----------( 188      ( 15 Nov 2023 05:30 )             40.9     11-15    138  |  103  |  19  ----------------------------<  125<H>  3.9   |  26  |  1.05    Ca    8.5      15 Nov 2023 05:30  Phos  3.9     11-15  Mg     2.1     11-15    TPro  6.2  /  Alb  3.1<L>  /  TBili  0.6  /  DBili  x   /  AST  52<H>  /  ALT  39  /  AlkPhos  54  11-15

## 2023-11-16 NOTE — CONSULT NOTE ADULT - PROBLEM SELECTOR RECOMMENDATION 9
- 16fr christian in place draining red urine   - monitor urine output   - encourage PO hydration   - hematuria likely secondary to traumatic cauterizations given patient denies history of hematuria in the past  - if hematuria does not improve patient should follow up outpatient with  for full workup including cystoscopy   - rest of care as per primary team   -  will continue to follow

## 2023-11-16 NOTE — PROGRESS NOTE ADULT - ASSESSMENT
Patient is a 87M, (shellfish rxn w/ lip swelling ~ 20 yrs ago, does not eat shellfish since then) w/ PMHx CAD s/p JOSÉ to mLAD (6/2023), HTN, HLD, DM, cervical spondylosis s/p laminectomy, gastric ulcers, hypothyroidism, presenting for elective cholecystectomy. Patient initially presented to [facility] ED 10/6/23 for BELLO with minimal exertion, chest pressure, and orthopnea x 3 days. Patient was noted to have severe hyponatremia and leukocytosis. He was managed for CHF under medicine, and started on empiric antibiotics. Upon further work up, diagnosis of acute cholecystitis was made now s/p roboic mich (11/14) c/b hematuria 2/2 traumatic cath s/p 20fr christian catheter insertion .     CC Low fat, ISS  Pain/nausea control  SCDs/SQHIS/OOB  Continue Metoprolol and Atorvastatin   ASA  JAMEY x1  AM labs  f/u with urology outpatient  Dispo: home PT Patient is a 87M, (shellfish rxn w/ lip swelling ~ 20 yrs ago, does not eat shellfish since then) w/ PMHx CAD s/p JOSÉ to mLAD (6/2023), HTN, HLD, DM, cervical spondylosis s/p laminectomy, gastric ulcers, hypothyroidism, presenting for elective cholecystectomy. Patient initially presented to [facility] ED 10/6/23 for BELLO with minimal exertion, chest pressure, and orthopnea x 3 days. Patient was noted to have severe hyponatremia and leukocytosis. He was managed for CHF under medicine, and started on empiric antibiotics. Upon further work up, diagnosis of acute cholecystitis was made now s/p roboic mich (11/14) c/b hematuria 2/2 traumatic cath s/p 20fr christian catheter insertion .     CC Low fat, ISS  Pain/nausea control  SCDs/SQHIS/OOB  Continue Metoprolol and Atorvastatin   ASA  JAMEY x1  AM labs  f/u with urology outpatient  Dispo: home PT Patient is a 87M, (shellfish rxn w/ lip swelling ~ 20 yrs ago, does not eat shellfish since then) w/ PMHx CAD s/p JOSÉ to mLAD (6/2023), HTN, HLD, DM, cervical spondylosis s/p laminectomy, gastric ulcers, hypothyroidism, presenting for elective cholecystectomy. Patient initially presented to [facility] ED 10/6/23 for BELLO with minimal exertion, chest pressure, and orthopnea x 3 days. Patient was noted to have severe hyponatremia and leukocytosis. He was managed for CHF under medicine, and started on empiric antibiotics. Upon further work up, diagnosis of acute cholecystitis was made now s/p roboic mich (11/14) c/b hematuria 2/2 traumatic cath s/p 20fr christian catheter insertion .     CC Low fat, ISS  Pain/nausea control  SCDs/SQHIS/OOB  Continue Metoprolol and Atorvastatin   ASA  JAMEY x1  AM labs  f/u with urology outpatient  Dispo: home PT

## 2023-11-17 LAB
ALBUMIN SERPL ELPH-MCNC: 2.8 G/DL — LOW (ref 3.3–5)
ALBUMIN SERPL ELPH-MCNC: 2.8 G/DL — LOW (ref 3.3–5)
ALP SERPL-CCNC: 53 U/L — SIGNIFICANT CHANGE UP (ref 40–120)
ALP SERPL-CCNC: 53 U/L — SIGNIFICANT CHANGE UP (ref 40–120)
ALT FLD-CCNC: 23 U/L — SIGNIFICANT CHANGE UP (ref 10–45)
ALT FLD-CCNC: 23 U/L — SIGNIFICANT CHANGE UP (ref 10–45)
ANION GAP SERPL CALC-SCNC: 11 MMOL/L — SIGNIFICANT CHANGE UP (ref 5–17)
ANION GAP SERPL CALC-SCNC: 11 MMOL/L — SIGNIFICANT CHANGE UP (ref 5–17)
AST SERPL-CCNC: 22 U/L — SIGNIFICANT CHANGE UP (ref 10–40)
AST SERPL-CCNC: 22 U/L — SIGNIFICANT CHANGE UP (ref 10–40)
BILIRUB SERPL-MCNC: 0.6 MG/DL — SIGNIFICANT CHANGE UP (ref 0.2–1.2)
BILIRUB SERPL-MCNC: 0.6 MG/DL — SIGNIFICANT CHANGE UP (ref 0.2–1.2)
BUN SERPL-MCNC: 15 MG/DL — SIGNIFICANT CHANGE UP (ref 7–23)
BUN SERPL-MCNC: 15 MG/DL — SIGNIFICANT CHANGE UP (ref 7–23)
CALCIUM SERPL-MCNC: 8.4 MG/DL — SIGNIFICANT CHANGE UP (ref 8.4–10.5)
CALCIUM SERPL-MCNC: 8.4 MG/DL — SIGNIFICANT CHANGE UP (ref 8.4–10.5)
CHLORIDE SERPL-SCNC: 100 MMOL/L — SIGNIFICANT CHANGE UP (ref 96–108)
CHLORIDE SERPL-SCNC: 100 MMOL/L — SIGNIFICANT CHANGE UP (ref 96–108)
CO2 SERPL-SCNC: 22 MMOL/L — SIGNIFICANT CHANGE UP (ref 22–31)
CO2 SERPL-SCNC: 22 MMOL/L — SIGNIFICANT CHANGE UP (ref 22–31)
CREAT SERPL-MCNC: 1.16 MG/DL — SIGNIFICANT CHANGE UP (ref 0.5–1.3)
CREAT SERPL-MCNC: 1.16 MG/DL — SIGNIFICANT CHANGE UP (ref 0.5–1.3)
EGFR: 62 ML/MIN/1.73M2 — SIGNIFICANT CHANGE UP
EGFR: 62 ML/MIN/1.73M2 — SIGNIFICANT CHANGE UP
GLUCOSE BLDC GLUCOMTR-MCNC: 109 MG/DL — HIGH (ref 70–99)
GLUCOSE BLDC GLUCOMTR-MCNC: 109 MG/DL — HIGH (ref 70–99)
GLUCOSE BLDC GLUCOMTR-MCNC: 118 MG/DL — HIGH (ref 70–99)
GLUCOSE BLDC GLUCOMTR-MCNC: 118 MG/DL — HIGH (ref 70–99)
GLUCOSE BLDC GLUCOMTR-MCNC: 126 MG/DL — HIGH (ref 70–99)
GLUCOSE BLDC GLUCOMTR-MCNC: 126 MG/DL — HIGH (ref 70–99)
GLUCOSE SERPL-MCNC: 148 MG/DL — HIGH (ref 70–99)
GLUCOSE SERPL-MCNC: 148 MG/DL — HIGH (ref 70–99)
HCT VFR BLD CALC: 39.9 % — SIGNIFICANT CHANGE UP (ref 39–50)
HCT VFR BLD CALC: 39.9 % — SIGNIFICANT CHANGE UP (ref 39–50)
HGB BLD-MCNC: 12.2 G/DL — LOW (ref 13–17)
HGB BLD-MCNC: 12.2 G/DL — LOW (ref 13–17)
MAGNESIUM SERPL-MCNC: 2 MG/DL — SIGNIFICANT CHANGE UP (ref 1.6–2.6)
MAGNESIUM SERPL-MCNC: 2 MG/DL — SIGNIFICANT CHANGE UP (ref 1.6–2.6)
MCHC RBC-ENTMCNC: 20.7 PG — LOW (ref 27–34)
MCHC RBC-ENTMCNC: 20.7 PG — LOW (ref 27–34)
MCHC RBC-ENTMCNC: 30.6 GM/DL — LOW (ref 32–36)
MCHC RBC-ENTMCNC: 30.6 GM/DL — LOW (ref 32–36)
MCV RBC AUTO: 67.9 FL — LOW (ref 80–100)
MCV RBC AUTO: 67.9 FL — LOW (ref 80–100)
NRBC # BLD: 0 /100 WBCS — SIGNIFICANT CHANGE UP (ref 0–0)
NRBC # BLD: 0 /100 WBCS — SIGNIFICANT CHANGE UP (ref 0–0)
PHOSPHATE SERPL-MCNC: 2.5 MG/DL — SIGNIFICANT CHANGE UP (ref 2.5–4.5)
PHOSPHATE SERPL-MCNC: 2.5 MG/DL — SIGNIFICANT CHANGE UP (ref 2.5–4.5)
PLATELET # BLD AUTO: 162 K/UL — SIGNIFICANT CHANGE UP (ref 150–400)
PLATELET # BLD AUTO: 162 K/UL — SIGNIFICANT CHANGE UP (ref 150–400)
POTASSIUM SERPL-MCNC: 3.9 MMOL/L — SIGNIFICANT CHANGE UP (ref 3.5–5.3)
POTASSIUM SERPL-MCNC: 3.9 MMOL/L — SIGNIFICANT CHANGE UP (ref 3.5–5.3)
POTASSIUM SERPL-SCNC: 3.9 MMOL/L — SIGNIFICANT CHANGE UP (ref 3.5–5.3)
POTASSIUM SERPL-SCNC: 3.9 MMOL/L — SIGNIFICANT CHANGE UP (ref 3.5–5.3)
PROT SERPL-MCNC: 6.3 G/DL — SIGNIFICANT CHANGE UP (ref 6–8.3)
PROT SERPL-MCNC: 6.3 G/DL — SIGNIFICANT CHANGE UP (ref 6–8.3)
RBC # BLD: 5.88 M/UL — HIGH (ref 4.2–5.8)
RBC # BLD: 5.88 M/UL — HIGH (ref 4.2–5.8)
RBC # FLD: 17.5 % — HIGH (ref 10.3–14.5)
RBC # FLD: 17.5 % — HIGH (ref 10.3–14.5)
SODIUM SERPL-SCNC: 133 MMOL/L — LOW (ref 135–145)
SODIUM SERPL-SCNC: 133 MMOL/L — LOW (ref 135–145)
WBC # BLD: 12.37 K/UL — HIGH (ref 3.8–10.5)
WBC # BLD: 12.37 K/UL — HIGH (ref 3.8–10.5)
WBC # FLD AUTO: 12.37 K/UL — HIGH (ref 3.8–10.5)
WBC # FLD AUTO: 12.37 K/UL — HIGH (ref 3.8–10.5)

## 2023-11-17 PROCEDURE — 99232 SBSQ HOSP IP/OBS MODERATE 35: CPT

## 2023-11-17 RX ORDER — LIDOCAINE HCL 20 MG/ML
5 VIAL (ML) INJECTION ONCE
Refills: 0 | Status: DISCONTINUED | OUTPATIENT
Start: 2023-11-17 | End: 2023-11-18

## 2023-11-17 RX ORDER — LIDOCAINE HCL 20 MG/ML
VIAL (ML) INJECTION
Refills: 0 | Status: DISCONTINUED | OUTPATIENT
Start: 2023-11-17 | End: 2023-11-18

## 2023-11-17 RX ORDER — LIDOCAINE HCL 20 MG/ML
5 VIAL (ML) INJECTION ONCE
Refills: 0 | Status: COMPLETED | OUTPATIENT
Start: 2023-11-17 | End: 2023-11-17

## 2023-11-17 RX ADMIN — PANTOPRAZOLE SODIUM 40 MILLIGRAM(S): 20 TABLET, DELAYED RELEASE ORAL at 14:00

## 2023-11-17 RX ADMIN — HEPARIN SODIUM 5000 UNIT(S): 5000 INJECTION INTRAVENOUS; SUBCUTANEOUS at 05:14

## 2023-11-17 RX ADMIN — HEPARIN SODIUM 5000 UNIT(S): 5000 INJECTION INTRAVENOUS; SUBCUTANEOUS at 14:00

## 2023-11-17 RX ADMIN — HEPARIN SODIUM 5000 UNIT(S): 5000 INJECTION INTRAVENOUS; SUBCUTANEOUS at 21:55

## 2023-11-17 RX ADMIN — ATORVASTATIN CALCIUM 40 MILLIGRAM(S): 80 TABLET, FILM COATED ORAL at 21:56

## 2023-11-17 RX ADMIN — Medication 81 MILLIGRAM(S): at 14:00

## 2023-11-17 RX ADMIN — Medication 12.5 MILLIGRAM(S): at 05:14

## 2023-11-17 RX ADMIN — Medication 5 MILLILITER(S): at 17:05

## 2023-11-17 RX ADMIN — Medication 75 MICROGRAM(S): at 05:14

## 2023-11-17 NOTE — PROGRESS NOTE ADULT - SUBJECTIVE AND OBJECTIVE BOX
MEDICARE WELLNESS VISIT + NOTE    CHIEF COMPLAINT:  Yudith Jones presents for her Subsequent Annual Medicare Wellness Visit.   Her additional complaints or concerns are addressed below.   Patient has given consent to record this visit for documentation in their clinical record.     Patient Care Team:  Gwen Kenny MD as PCP - General (Internal Medicine)        Patient Active Problem List   Diagnosis   • Near syncope   • GI bleed   • Atrial fibrillation (CMD)   • Intercostal neuralgia   • Thoracic spondylosis   • HTN (hypertension), benign         Past Medical History:   Diagnosis Date   • Antral ulcer 06/03/2016   • Atrial fibrillation (CMD)    • Diverticulosis    • Gastritis 06/03/2016   • High blood pressure    • High cholesterol    • New onset a-fib (CMD) 8/10/2017         Past Surgical History:   Procedure Laterality Date   • Back surgery     • Colonoscopy  6/3/2016      Dong   • Esophagogastroduodenoscopy  08/10/2017, 9/20/17   • Hysterectomy           Social History     Tobacco Use   • Smoking status: Never   • Smokeless tobacco: Never   Vaping Use   • Vaping Use: never used   Substance Use Topics   • Alcohol use: No   • Drug use: No     Family History   Problem Relation Age of Onset   • Cancer Mother    • Cancer Sister    • Cancer Daughter          Current Outpatient Medications   Medication Sig Dispense Refill   • metoPROLOL succinate (TOPROL-XL) 25 MG 24 hr tablet Take 0.5 tablets by mouth nightly. 45 tablet 3   • spironolactone (ALDACTONE) 25 MG tablet Take 1 tablet by mouth daily. 90 tablet 1   • cyclobenzaprine (FLEXERIL) 10 MG tablet Take 0.5 tablets by mouth 3 times daily as needed for Muscle spasms. 15 tablet 0   • apixaBAN (Eliquis) 5 MG Tab Take 1 tablet by mouth every 12 hours. Further refills after office visit on 6/2/22 180 tablet 3   • lisinopril (ZESTRIL) 2.5 MG tablet Take 1 tablet by mouth 2 times daily. 180 tablet 3   • amLODIPine (NORVASC) 10 MG tablet TAKE 1 TABLET EVERY DAY 90  UROLOGY PROGRESS NOTE    SUBJECTIVE: Patient seen and examined bedside. Patient denies acute complaints, no fevers/chills or abd/flank pain.     aspirin  chewable 81 milliGRAM(s) Oral daily  heparin   Injectable 5000 Unit(s) SubCutaneous every 8 hours  metoprolol tartrate 12.5 milliGRAM(s) Oral daily      Vital Signs Last 24 Hrs  T(C): 36.9 (17 Nov 2023 05:15), Max: 36.9 (17 Nov 2023 05:15)  T(F): 98.4 (17 Nov 2023 05:15), Max: 98.4 (17 Nov 2023 05:15)  HR: 81 (17 Nov 2023 05:15) (72 - 89)  BP: 131/75 (17 Nov 2023 05:15) (120/77 - 149/83)  BP(mean): --  RR: 18 (17 Nov 2023 05:15) (18 - 18)  SpO2: 97% (17 Nov 2023 05:15) (89% - 97%)    Parameters below as of 17 Nov 2023 05:15  Patient On (Oxygen Delivery Method): room air      I&O's Detail    16 Nov 2023 07:01  -  17 Nov 2023 07:00  --------------------------------------------------------  IN:  Total IN: 0 mL    OUT:    Indwelling Catheter - Urethral (mL): 3150 mL  Total OUT: 3150 mL    Total NET: -3150 mL          PHYSICAL EXAM    General: NAD, resting comfortably in bed  C/V: NSR  Pulm: Nonlabored breathing, no respiratory distress on room air  Abd: soft, ND/NT, no rebound tenderness, no guarding  : 20fr christian draining light fruit punch  Extrem: WWP, no edema, SCDs in place        LABS:                        12.8   11.91 )-----------( 172      ( 16 Nov 2023 20:28 )             40.3     11-16    137  |  104  |  16  ----------------------------<  130<H>  3.8   |  24  |  1.00    Ca    8.4      16 Nov 2023 07:12  Phos  2.8     11-16  Mg     2.4     11-16    TPro  6.2  /  Alb  2.8<L>  /  TBili  0.5  /  DBili  x   /  AST  40  /  ALT  33  /  AlkPhos  53  11-16      Urinalysis Basic - ( 16 Nov 2023 07:12 )    Color: x / Appearance: x / SG: x / pH: x  Gluc: 130 mg/dL / Ketone: x  / Bili: x / Urobili: x   Blood: x / Protein: x / Nitrite: x   Leuk Esterase: x / RBC: x / WBC x   Sq Epi: x / Non Sq Epi: x / Bacteria: x        CULTURES:          RADIOLOGY & ADDITIONAL STUDIES:   tablet 2   • aspirin 81 MG EC tablet Take 1 tablet by mouth daily. 30 tablet 0   • acetaminophen (TYLENOL) 500 MG tablet Take 500 mg by mouth every 6 hours as needed for Pain.       No current facility-administered medications for this visit.        The following items on the Medicare Health Risk Assessment were found to be positive  1.) Do you have an Advance directive, living will, or power of  for health care document that contains your wishes for end of life care?: No     4.) During the past 4 weeks, what was the hardest physical activity you could do for at least 2 minutes?: Very Light     5.) Do you do moderate to strenuous exercise (brisk walk) for about 20 minutes for 3 or more days per week?: No, I usually do not exercise this much     6 a.) How many servings of Fruits and Vegetables do you have each day ( 1 serving = 1 piece of fruit, 1/2 cup fruits or vegetables): None     6 b.) How many servings of High Fiber / Whole Grain Foods to you have each day ( 1 serving = 1 cup cold cereal, 1/2 cup cooked cereal, 1 slice bread): 1 per day     11e.) Tiredness or Fatigue: Always         Vision and Hearing screens: No results found.    Advance care planning documents on file - no    Cognitive/Functional Status: no evidence of cognitive dysfunction by direct observation    Opioid Review: Yudith is not taking opioid medications.    Recent PHQ 2/9 Score:    PHQ 2:  Date Adult PHQ 2 Score Adult PHQ 2 Interpretation   3/22/2023 2 No further screening needed       PHQ 9:  Date Adult PHQ 9 Score   2/28/2022 0       DEPRESSION ASSESSMENT/PLAN:  Depression screening is negative no further plan needed.     Body mass index is 28.4 kg/m².    BMI ASSESSMENT/PLAN:  Patient is overweight.    Caloric restriction and Low carbohydrate diet        See Patient Instructions section.   Return in about 1 year (around 3/22/2024) for Medicare Wellness Visit, Labs today may leave, Fasting labs prior to 1 yr appt.      OUTPATIENT  PROGRESS NOTE    Subjective   Chief Complaint Medicare Wellness Visit and Establish Care    HPI   Longstanding persistent atrial fibrillation (CMD): Has a known history of paroxysmal afib. Currently, in sinus rhythm. Does CEP. On Eliquis, Lisinopril, and Metoprolol with benefit. Will be seeing EP which is going to be scheduled and electrocardiogram has been ordered by them. Patient is on anticoagulation. No signs of active bleeding. She does have a history of ulcer, but no signs of recurring. 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 Was started on Metoprolol recently and is tolerating well. Patient does have history of syncope, has not had since 2018/2017. Had ablation before and was on medications. Mild valvular prolapse with moderate MR. Follows up with cardiology. Had stomach ulcer in the past. Has a known history of hypertension and high cholesterol. Heart rate was 85 bpm.      HTN (hypertension), benign: Has a known history of hypertension. Blood pressure was 118/78 mmHg today.     Intercostal neuralgia; Thoracic spondylosis: Has chronic lingering back pain with scoliosis. Evaluations by pain with injections and with neurology. She will take Tylenol at low doses and monitor. Had seen neurologist who did not offer further management for her. Albumin and liver test was good. Denies using cane while walking.      Need for hepatitis C screening test: Due.       Malaise and fatigue: Had been to the ER on 03/13/2023 for fatigue and malaise. Reviewed labs and reviewed visit note. Did have CT brain. Seems like she is back to normal and thought it was for taking too many Tylenol.      Medicare annual wellness visit, subsequent; Encounter for preventive care:    Up-to-date with preventative care.    Immunization: Due for pneumonia, shingles, and influenza vaccines. Patient declined.       Bone screening: Due for DEXA scan. Declined DEXA scan.     Colonoscopy screening: Up-to-date with colonoscopy. Last was done in 2016,  next will be in 2026.     ADL: Is able to move around the house and perform her activities of daily living. Drives car.     Weight: Her weight was 152 lb, was 150 lb before.     Vision screening: Sees an ophthalmologist 1-2 times a year.   Medications  Medications were reviewed and updated today.    Histories  I have personally reviewed and updated the patient's past medical, past surgical, family and social histories during today's visit.    Review of Systems  Constitutional: As Per HPI.  Eyes: As Per HPI.  Respiratory: As Per HPI.  Cardiovascular: As Per HPI.  Gastrointestinal: As Per HPI.   Musculoskeletal: As Per HPI.  Neurological: As Per HPI.  Hematological: As Per HPI.     All Review of Systems is negative except as noted in HPI.    Objective   Visit Vitals  /78 (BP Location: RUE - Right upper extremity, Patient Position: Sitting, Cuff Size: Large Adult)   Pulse 85   Temp 97.5 °F (36.4 °C) (Temporal)   Ht 5' 1.5\"   Wt 69.3 kg (152 lb 12.5 oz)   SpO2 96%   BMI 28.40 kg/m²     Physical Exam  Constitutional: alert, in no acute distress and current vital signs reviewed. Her insight seems to be good. Very low speaking and her voice is very quiet. She is able to get out of the chair very easily. She does not follow commands every easily. Clothing and hair are quite well done. Appearance is good.   Head and Face: atraumatic, no deformities, normocephalic, normal facies.  Eyes: no discharge, normal conjunctiva, no eyelid swelling, no ptosis and the sclerae were normal. pupils equal, round and reactive to light and accommodation, conjugate gaze and extraocular movements were intact. ENT: normal appearing outer ear, normal appearing nose. examination of the tympanic membrane showed normal landmarks, normal appearing external canal. nasal mucosa moist and pink, no nasal discharge. normal lips. oral mucosa pink and moist, no oral lesions.  Neck: normal appearing neck, supple neck and no mass was seen. Thyroid not  enlarged and no thyroid nodules. No lymphadenopathy.  Pulmonary: no respiratory distress, normal respiratory rate and effort and no accessory muscle use. breath sounds clear to auscultation bilaterally.  Cardiovascular: normal rate, no murmurs were heard, regular rhythm, normal S1 and normal S2. edema was not present in the lower extremities.  Abdomen: soft, nontender, nondistended, normal bowel sounds and no abdominal mass. no hepatomegaly and no splenomegaly. no umbilical hernia was discovered.  Musculoskeletal: normal gait. no musculoskeletal erythema was seen, no joint swelling seen and no joint tenderness was elicited. no scoliosis. normal range of motion. there was no joint instability noted. muscle strength and tone were normal.  Neurologic: cranial nerves grossly intact. normal DTRs. no sensory deficits noted. no coordination deficits. normal gait.  Psychiatric: oriented to person, oriented to place and oriented to time. alert and awake, interactive and patients mood is fairly flat. Affect were appropriate. judgement not impaired. normal attention span. short term memory intact.  Skin, Hair, Nails: normal skin color and pigmentation and no rash. no foot ulcers and no skin ulcer was seen. normal skin turgor. no clubbing or cyanosis of the fingernails.  Normal Breast Exam unless otherwise specified: Breast Exam: No dominant mass. No nodularity, No skin changes, No nipple retraction or discharge, and no axillary LAD.    Laboratory  I have reviewed the pertinent laboratory tests. These are the pertinent findings:  Lab Services on 03/22/2023   Component Date Value Ref Range Status   • Cholesterol 03/22/2023 222 (A)  <=199 mg/dL Final   • Triglycerides 03/22/2023 187 (A)  <=149 mg/dL Final   • HDL 03/22/2023 42 (A)  >=50 mg/dL Final   • LDL 03/22/2023 143 (A)  <=129 mg/dL Final   • Non-HDL Cholesterol 03/22/2023 180  mg/dL Final   • Cholesterol/ HDL Ratio 03/22/2023 5.3 (A)  <=4.4 Final   • TSH 03/22/2023 1.465   0.350 - 5.000 mcUnits/mL Final   • Hepatitis C Antibody 03/22/2023 Negative  Negative Final   • Magnesium 03/22/2023 1.9  1.7 - 2.4 mg/dL Final   Hospital Outpatient Visit on 03/14/2023   Component Date Value Ref Range Status   • AV Mean Gradient 03/14/2023 5.961  mmHg Final   • Interventricular Septum in End Whitley* 03/14/2023 0.678  cm Final   • Left Ventricular Internal Dimensio* 03/14/2023 4.578  cm Final   • LV end diastolic posterior wall th* 03/14/2023 0.843  cm Final   • Left Internal Dimenson in Systole 03/14/2023 3.136  cm Final   • LV outflow tract 03/14/2023 2.147  cm Final   • PV Peak Velocity 03/14/2023 1.0  m/s Final   • DOP Calc LVOT Peak Fito 03/14/2023 1.0  m/s Final   • LVOT VTI 03/14/2023 19.695  cm Final   • AV Peak Velocity 03/14/2023 1.5  m/s Final   • Ejection Fraction 03/14/2023 59  % Final   • ELOISA LVOT Peak Gradient 03/14/2023 2.02  mmHg Final   • AV Peak Gradient 03/14/2023 10.006  mmHg Final   • Aortic Valve Area 03/14/2023 2.146  cmÂ² Final   Admission on 03/13/2023, Discharged on 03/13/2023   Component Date Value Ref Range Status   • Sodium 03/13/2023 139  135 - 145 mmol/L Final   • Potassium 03/13/2023 3.8  3.4 - 5.1 mmol/L Final   • Chloride 03/13/2023 108  97 - 110 mmol/L Final   • Carbon Dioxide 03/13/2023 28  21 - 32 mmol/L Final   • Anion Gap 03/13/2023 7  7 - 19 mmol/L Final   • Glucose 03/13/2023 152 (A)  70 - 99 mg/dL Final   • BUN 03/13/2023 11  6 - 20 mg/dL Final   • Creatinine 03/13/2023 0.68  0.51 - 0.95 mg/dL Final   • Glomerular Filtration Rate 03/13/2023 89  >=60 Final   • BUN/Cr 03/13/2023 16  7 - 25 Final   • Calcium 03/13/2023 9.1  8.4 - 10.2 mg/dL Final   • Bilirubin, Total 03/13/2023 1.0  0.2 - 1.0 mg/dL Final   • GOT/AST 03/13/2023 16  <=37 Units/L Final   • GPT/ALT 03/13/2023 15  <64 Units/L Final   • Alkaline Phosphatase 03/13/2023 72  45 - 117 Units/L Final   • Albumin 03/13/2023 3.5 (A)  3.6 - 5.1 g/dL Final   • Protein, Total 03/13/2023 6.4  6.4 - 8.2 g/dL Final    • Globulin 03/13/2023 2.9  2.0 - 4.0 g/dL Final   • A/G Ratio 03/13/2023 1.2  1.0 - 2.4 Final   • Lipase 03/13/2023 122  73 - 393 Units/L Final   • Troponin I, High Sensitivity 03/13/2023 6  <52 ng/L Final   • NT-proBNP 03/13/2023 176  <=450 pg/mL Final   • Systolic Blood Pressure 03/13/2023 138   Final   • Diastolic Blood Pressure 03/13/2023 65   Final   • Ventricular Rate EKG/Min (BPM) 03/13/2023 72   Final   • Atrial Rate (BPM) 03/13/2023 72   Final   • SC-Interval (MSEC) 03/13/2023 204   Final   • QRS-Interval (MSEC) 03/13/2023 88   Final   • QT-Interval (MSEC) 03/13/2023 380   Final   • QTc 03/13/2023 416   Final   • P Axis (Degrees) 03/13/2023 67   Final   • R Axis (Degrees) 03/13/2023 3   Final   • T Axis (Degrees) 03/13/2023 3   Final   • REPORT TEXT 03/13/2023    Final                    Value:Normal sinus rhythm  Normal ECG  When compared with ECG of  25-AUG-2022 07:49,  T wave inversion now evident in  Inferior leads  Nonspecific T wave abnormality, improved in  Lateral leads  Confirmed by OZIEL CADENA M.D. ([id number]),  Maribel Mora ([id number]) on 3/13/2023 3:12:55 PM     • gFOB (guaiac) - Occult Blood 03/13/2023 Negative  Negative Final   • Internal Procedural Controls Accep* 03/13/2023 Yes  Yes Final   • Procalcitonin 03/13/2023 <0.05  <=0.09 ng/mL Final   • WBC 03/13/2023 7.7  4.2 - 11.0 K/mcL Final   • RBC 03/13/2023 4.55  4.00 - 5.20 mil/mcL Final   • HGB 03/13/2023 13.8  12.0 - 15.5 g/dL Final   • HCT 03/13/2023 42.9  36.0 - 46.5 % Final   • MCV 03/13/2023 94.3  78.0 - 100.0 fl Final   • MCH 03/13/2023 30.3  26.0 - 34.0 pg Final   • MCHC 03/13/2023 32.2  32.0 - 36.5 g/dL Final   • RDW-CV 03/13/2023 12.2  11.0 - 15.0 % Final   • RDW-SD 03/13/2023 42.8  39.0 - 50.0 fL Final   • PLT 03/13/2023 243  140 - 450 K/mcL Final   • NRBC 03/13/2023 0  <=0 /100 WBC Final   • Neutrophil, Percent 03/13/2023 69  % Final   • Lymphocytes, Percent 03/13/2023 23  % Final   • Mono, Percent 03/13/2023 7  % Final   •  Eosinophils, Percent 03/13/2023 1  % Final   • Basophils, Percent 03/13/2023 0  % Final   • Immature Granulocytes 03/13/2023 0  % Final   • Absolute Neutrophils 03/13/2023 5.3  1.8 - 7.7 K/mcL Final   • Absolute Lymphocytes 03/13/2023 1.8  1.0 - 4.0 K/mcL Final   • Absolute Monocytes 03/13/2023 0.5  0.3 - 0.9 K/mcL Final   • Absolute Eosinophils  03/13/2023 0.1  0.0 - 0.5 K/mcL Final   • Absolute Basophils 03/13/2023 0.0  0.0 - 0.3 K/mcL Final   • Absolute Immature Granulocytes 03/13/2023 0.0  0.0 - 0.2 K/mcL Final   • LACTIC ACID, VENOUS - POINT OF CARE 03/13/2023 2.0 (A)  <2.0 mmol/L Final   ·   ·     Imaging  I have reviewed the pertinent imaging study reports. These are the pertinent findings:  ·     Assessment & Plan   Diagnoses and associated orders for this visit:  1. Longstanding persistent atrial fibrillation (CMD)  -     Thyroid Stimulating Hormone Reflex  -     PREV NEW AGE >64  2. HTN (hypertension), benign  -     Lipid Panel Without Reflex  -     Lipid Panel Without Reflex  -     Comprehensive Metabolic Panel  -     Magnesium  -     PREV NEW AGE >64  3. Intercostal neuralgia  -     PREV NEW AGE >64  4. Thoracic spondylosis  -     PREV NEW AGE >64  5. Need for hepatitis C screening test  -     Hepatitis C Antibody With Reflex  -     PREV NEW AGE >64  6. Malaise and fatigue  -     PREV NEW AGE >64  7. Medicare annual wellness visit, subsequent  8. Encounter for preventive care  -     PREV NEW AGE >64    Longstanding persistent atrial fibrillation (CMD):  Reviewed and discussed previous reports.  Ordered TSH. Refer to orders.  Continue current regimen.   Continue Metoprolol, Lisinopril, and Eliquis.   Continue to monitor ulcer or bleeding. Let us know if notice ay symptoms.   Advised following up with cardiology as planned.   Discussed may need pacer replacement in the future, follow up with EP in next few months as planned.      HTN (hypertension), benign:  Well controlled.   Reviewed and discussed  previous reports.  Ordered CMP, lipid panel without reflex, and magnesium. Refer to orders.  Continue current regimen.   Continue Metoprolol, Spironolactone, Lisinopril and Amlodipine. To be taken as directed.     Intercostal neuralgia; Thoracic spondylosis:  Continue low dose Tylenol and monitor.      Need for hepatitis C screening test:  Ordered hepatitis C antibody with reflex. Refer to orders.     Malaise and fatigue:  Stable.   Continue current regimen.   Reviewed labs and reviewed visit note. Will provide a copy of her reports.   Informed thyroid and cholesterol labs to be done; significance explained.     Medicare annual wellness visit, subsequent; Encounter for preventive care:      PREV NEW AGE >64.     Immunization:   Recommended immunization; patient declined.     Breast screening:   Educated mammogram screening is not done for her age; criteria and protocol discussed.     Bone screening:   Reviewed.     Colonoscopy screening:   Next colonoscopy will be in 2026. Guidelines and criteria explained.     ADL:   Reviewed.     Weight:   BMI 28 kg/m².  Reviewed and discussed previous reports.     Vision screening:   Reviewed and guidelines explained.     Follow up in a year or sooner if needed.     Refer to orders.  Medical compliance with plan discussed and risks of non-compliance reviewed.  Patient education completed on disease process, etiology & prognosis.  Proper usage and side effects of medications reviewed & discussed.  Return to clinic as clinically indicated as discussed with patient who verbalized understanding of the plan and is in agreement with the plan.    I, Dr. Eunice Thomas, have created a visit summary document based on the audio recording between Gwen Kenny MD and this patient for the physician to review, edit as needed, and authenticate.  Creation Date: 3/23/2023    I have reviewed and edited the visit summary above and attest that it is accurate.

## 2023-11-17 NOTE — PROGRESS NOTE ADULT - ASSESSMENT
Patient is a 87M, (shellfish rxn w/ lip swelling ~ 20 yrs ago, does not eat shellfish since then) w/ PMHx CAD s/p JOSÉ to mLAD (6/2023), HTN, HLD, cervical spondylosis s/p laminectomy, gastric ulcers, hypothyroidism, presenting for elective cholecystectomy. Patient initially presented to [facility] ED 10/6/23 for BELLO with minimal exertion, chest pressure, and orthopnea x 3 days. Patient was noted to have severe hyponatremia and leukocytosis. He was managed for CHF under medicine, and started on empiric antibiotics. Upon further work up, diagnosis of acute cholecystitis was made. Surgery was consulted but patient symptoms/signs and labs already improved on Abx. Given cardiac history and presentation, operative intervention was deferred pending patient stabilization. Patient saw Dr. Gomez in the outpatient setting and after Cardiology clearance that Plavix was held since 11/7 and underwent an elective robotic assisted cholecystectomy ( 11/14/23)  POD#3    - diet per surgery  - JAMEY drain removed   - pain control:   - acetaminophen     Tablet .. 650 milliGRAM(s) Oral every 6 hours PRN                              oxyCODONE    IR 5 milliGRAM(s) Oral every 6 hours PRN  - c/w aspirin  chewable 81 milliGRAM(s) Oral daily  - resumed Plavix 75mg po daily ( 11/16)   - hematuria 2/2 traumatic christian: failed TOV, upsized to 20Fr indwelling christian ( 11/16), monitor hematuria. on Tamzulosin 0.4mg qHS, pt will need to go home with indwelling christian and f/u  Dr. Christopher for TOV next week   - HLD: atorvastatin 40 milliGRAM(s) Oral at bedtime  - HTN: metoprolol tartrate 12.5 milliGRAM(s) Oral daily  - Hypothyroidism: levothyroxine 75 MICROGram(s) Oral daily  - GI ppx: pantoprazole  Injectable 40 milliGRAM(s) IV Push daily  - DVT ppx: heparin   Injectable 5000 Unit(s) SubCutaneous every 8 hours  - PT eval appreciated   - Incentive spironmetry at bedside     POC as d/w Surgery team/ACS and daughter lOga Yanez RN          Patient is a 87M, (shellfish rxn w/ lip swelling ~ 20 yrs ago, does not eat shellfish since then) w/ PMHx CAD s/p JOSÉ to mLAD (6/2023), HTN, HLD, cervical spondylosis s/p laminectomy, gastric ulcers, hypothyroidism, presenting for elective cholecystectomy. Patient initially presented to [facility] ED 10/6/23 for BELLO with minimal exertion, chest pressure, and orthopnea x 3 days. Patient was noted to have severe hyponatremia and leukocytosis. He was managed for CHF under medicine, and started on empiric antibiotics. Upon further work up, diagnosis of acute cholecystitis was made. Surgery was consulted but patient symptoms/signs and labs already improved on Abx. Given cardiac history and presentation, operative intervention was deferred pending patient stabilization. Patient saw Dr. Gomez in the outpatient setting and after Cardiology clearance that Plavix was held since 11/7 and underwent an elective robotic assisted cholecystectomy ( 11/14/23)  POD#3    - diet per surgery  - JAMEY drain removed   - pain control:   - acetaminophen     Tablet .. 650 milliGRAM(s) Oral every 6 hours PRN                              oxyCODONE    IR 5 milliGRAM(s) Oral every 6 hours PRN  - c/w aspirin  chewable 81 milliGRAM(s) Oral daily  - resumed Plavix 75mg po daily ( 11/16)   - hematuria 2/2 traumatic christian: failed TOV, upsized to 20Fr indwelling christian ( 11/16), monitor hematuria. on Tamzulosin 0.4mg qHS, pt will need to go home with indwelling christian and f/u  Dr. Christopher for TOV next week   - HLD: atorvastatin 40 milliGRAM(s) Oral at bedtime  - HTN: metoprolol tartrate 12.5 milliGRAM(s) Oral daily  - Hypothyroidism: levothyroxine 75 MICROGram(s) Oral daily  - GI ppx: pantoprazole  Injectable 40 milliGRAM(s) IV Push daily  - DVT ppx: heparin   Injectable 5000 Unit(s) SubCutaneous every 8 hours  - PT eval appreciated   - Incentive spironmetry at bedside     POC as d/w Surgery team/ACS and daughter Olga Yanez RN          Patient is a 87M, (shellfish rxn w/ lip swelling ~ 20 yrs ago, does not eat shellfish since then) w/ PMHx CAD s/p JOSÉ to mLAD (6/2023), HTN, HLD, cervical spondylosis s/p laminectomy, gastric ulcers, hypothyroidism, presenting for elective cholecystectomy. Patient initially presented to [facility] ED 10/6/23 for BELLO with minimal exertion, chest pressure, and orthopnea x 3 days. Patient was noted to have severe hyponatremia and leukocytosis. He was managed for CHF under medicine, and started on empiric antibiotics. Upon further work up, diagnosis of acute cholecystitis was made. Surgery was consulted but patient symptoms/signs and labs already improved on Abx. Given cardiac history and presentation, operative intervention was deferred pending patient stabilization. Patient saw Dr. Gomez in the outpatient setting and after Cardiology clearance that Plavix was held since 11/7 and underwent an elective robotic assisted cholecystectomy ( 11/14/23)  POD#3    - diet per surgery  - JAMEY drain removed   - pain control:   - acetaminophen     Tablet .. 650 milliGRAM(s) Oral every 6 hours PRN                              oxyCODONE    IR 5 milliGRAM(s) Oral every 6 hours PRN  - c/w aspirin  chewable 81 milliGRAM(s) Oral daily  - resumed Plavix 75mg po daily ( 11/16)   - hematuria 2/2 traumatic christian: failed TOV, upsized to 20Fr indwelling christian ( 11/16), monitor hematuria. on Tamzulosin 0.4mg qHS, pt will need to go home with indwelling christian and f/u  Dr. Christopher for TOV next week   - HLD: atorvastatin 40 milliGRAM(s) Oral at bedtime  - HTN: metoprolol tartrate 12.5 milliGRAM(s) Oral daily  - Hypothyroidism: levothyroxine 75 MICROGram(s) Oral daily  - GI ppx: pantoprazole  Injectable 40 milliGRAM(s) IV Push daily  - DVT ppx: heparin   Injectable 5000 Unit(s) SubCutaneous every 8 hours  - PT eval appreciated   - Incentive spironmetry at bedside     POC as d/w Surgery team/ACS and daughter Olga Yanez RN

## 2023-11-17 NOTE — PROGRESS NOTE ADULT - ASSESSMENT
Patient is a 87M, (shellfish rxn w/ lip swelling ~ 20 yrs ago, does not eat shellfish since then) w/ PMHx CAD s/p JOSÉ to mLAD (6/2023), HTN, HLD, DM, cervical spondylosis s/p laminectomy, gastric ulcers, hypothyroidism, presenting for elective cholecystectomy. Patient initially presented to [facility] ED 10/6/23 for BELLO with minimal exertion, chest pressure, and orthopnea x 3 days. Patient was noted to have severe hyponatremia and leukocytosis. He was managed for CHF under medicine, and started on empiric antibiotics. Upon further work up, diagnosis of acute cholecystitis was made now s/p roboic mcih (11/14)    CC Low fat, ISS  Pain/nausea control  SCDs/SQHIS/OOB   Continue Metoprolol and Atorvastatin   ASA  JAMEY x1  AM labs  Dispo: home PT Patient is a 87M, (shellfish rxn w/ lip swelling ~ 20 yrs ago, does not eat shellfish since then) w/ PMHx CAD s/p JOSÉ to mLAD (6/2023), HTN, HLD, DM, cervical spondylosis s/p laminectomy, gastric ulcers, hypothyroidism, presenting for elective cholecystectomy. Patient initially presented to [facility] ED 10/6/23 for BELLO with minimal exertion, chest pressure, and orthopnea x 3 days. Patient was noted to have severe hyponatremia and leukocytosis. He was managed for CHF under medicine, and started on empiric antibiotics. Upon further work up, diagnosis of acute cholecystitis was made now s/p roboic mich (11/14)    CC Low fat, ISS  Pain/nausea control  SCDs/SQHIS/OOB   Continue Metoprolol and Atorvastatin   ASA  JAMEY x1  AM labs  Dispo: home PT Patient is a 87M, (shellfish rxn w/ lip swelling ~ 20 yrs ago, does not eat shellfish since then) w/ PMHx CAD s/p JOSÉ to mLAD (6/2023), HTN, HLD, DM, cervical spondylosis s/p laminectomy, gastric ulcers, hypothyroidism, presenting for elective cholecystectomy. Patient initially presented to [facility] ED 10/6/23 for BELLO with minimal exertion, chest pressure, and orthopnea x 3 days. Patient was noted to have severe hyponatremia and leukocytosis. He was managed for CHF under medicine, and started on empiric antibiotics. Upon further work up, diagnosis of acute cholecystitis was made now s/p roboic mich (11/14)    CC Low fat, ISS  Pain/nausea control  SCDs/SQHIS/OOB   Continue Metoprolol and Atorvastatin   ASA  JAMEY x1  AM labs  Dispo: home PT

## 2023-11-17 NOTE — PROGRESS NOTE ADULT - SUBJECTIVE AND OBJECTIVE BOX
SUBJECTIVE: Patient seen and examined bedside by chief resident. BENITO. Endorses good pain control with medication. Denies nausea/vomiting tolerating low fat diet. Endorses flatus and bowel movements. No F/C.     aspirin  chewable 81 milliGRAM(s) Oral daily  heparin   Injectable 5000 Unit(s) SubCutaneous every 8 hours  metoprolol tartrate 12.5 milliGRAM(s) Oral daily    MEDICATIONS  (PRN):  acetaminophen     Tablet .. 650 milliGRAM(s) Oral every 6 hours PRN Mild Pain (1 - 3), Moderate Pain (4 - 6)  dextrose Oral Gel 15 Gram(s) Oral once PRN Blood Glucose LESS THAN 70 milliGRAM(s)/deciliter  oxyCODONE    IR 5 milliGRAM(s) Oral every 6 hours PRN Severe Pain (7 - 10)      I&O's Detail    16 Nov 2023 07:01  -  17 Nov 2023 07:00  --------------------------------------------------------  IN:  Total IN: 0 mL    OUT:    Indwelling Catheter - Urethral (mL): 3150 mL  Total OUT: 3150 mL    Total NET: -3150 mL          Vital Signs Last 24 Hrs  T(C): 36.9 (17 Nov 2023 05:15), Max: 37.3 (16 Nov 2023 08:14)  T(F): 98.4 (17 Nov 2023 05:15), Max: 99.2 (16 Nov 2023 08:14)  HR: 81 (17 Nov 2023 05:15) (72 - 89)  BP: 131/75 (17 Nov 2023 05:15) (101/65 - 149/83)  BP(mean): --  RR: 18 (17 Nov 2023 05:15) (18 - 18)  SpO2: 97% (17 Nov 2023 05:15) (89% - 99%)    Parameters below as of 17 Nov 2023 05:15  Patient On (Oxygen Delivery Method): room air        General: NAD, resting comfortably in bed  C/V: NSR  Pulm: Nonlabored breathing, no respiratory distress  Abd: soft, NT/ND, christian in place draining pink urine  Extrem: WWP, no edema, SCDs in place    LABS:                        12.8   11.91 )-----------( 172      ( 16 Nov 2023 20:28 )             40.3     11-16    137  |  104  |  16  ----------------------------<  130<H>  3.8   |  24  |  1.00    Ca    8.4      16 Nov 2023 07:12  Phos  2.8     11-16  Mg     2.4     11-16    TPro  6.2  /  Alb  2.8<L>  /  TBili  0.5  /  DBili  x   /  AST  40  /  ALT  33  /  AlkPhos  53  11-16      Urinalysis Basic - ( 16 Nov 2023 07:12 )    Color: x / Appearance: x / SG: x / pH: x  Gluc: 130 mg/dL / Ketone: x  / Bili: x / Urobili: x   Blood: x / Protein: x / Nitrite: x   Leuk Esterase: x / RBC: x / WBC x   Sq Epi: x / Non Sq Epi: x / Bacteria: x        RADIOLOGY & ADDITIONAL STUDIES:

## 2023-11-17 NOTE — PROGRESS NOTE ADULT - ATTENDING COMMENTS
Doing well.  Denies any major complaints.  Afebrile.  Kennedy is removed.  Worked with PT.  Started PO intake.  Labs noted.  Restart ASA.  Increase activity.  D/C planning tomorrow.
No acute events. Hematuria is improving slowly. Plavix is on hold.  H&H is stable.  Continue current care.  Urology recs are noted.  Will follow.
Developed hematuria overnight.  Evaluated by urology.  will hold plavix for now.  Check serial H&H.  Ok to continue diet.  Hold discharge till hematuria resolves.

## 2023-11-17 NOTE — PROGRESS NOTE ADULT - SUBJECTIVE AND OBJECTIVE BOX
Patient is a 87y old  Male who presents with a chief complaint of   INTERVAL EVENTS: NAEON    SUBJECTIVE:  Patient was seen and examined at bedside. Hematuria clearing up. Pt's daughter reports pt having urethral pain     Review of systems: No fever, chills, dizziness, HA, Changes in vision, CP, dyspnea, nausea or vomiting, dysuria, changes in bowel movements, LE edema. Rest of 12 point Review of systems negative unless otherwise documented elsewhere in note.     Diet, Regular:   Consistent Carbohydrate Evening Snack (CSTCHOSN)  Low Fat (LOWFAT) (11-15-23 @ 17:42) [Active]      MEDICATIONS:  MEDICATIONS  (STANDING):  aspirin  chewable 81 milliGRAM(s) Oral daily  atorvastatin 40 milliGRAM(s) Oral at bedtime  cyclobenzaprine 5 milliGRAM(s) Oral daily  dextrose 5%. 1000 milliLiter(s) (100 mL/Hr) IV Continuous <Continuous>  dextrose 5%. 1000 milliLiter(s) (50 mL/Hr) IV Continuous <Continuous>  dextrose 50% Injectable 25 Gram(s) IV Push once  dextrose 50% Injectable 12.5 Gram(s) IV Push once  dextrose 50% Injectable 25 Gram(s) IV Push once  glucagon  Injectable 1 milliGRAM(s) IntraMuscular once  heparin   Injectable 5000 Unit(s) SubCutaneous every 8 hours  insulin lispro (ADMELOG) corrective regimen sliding scale   SubCutaneous Before meals and at bedtime  levothyroxine 75 MICROGram(s) Oral daily  lidocaine 2% Jelly      metoprolol tartrate 12.5 milliGRAM(s) Oral daily  pantoprazole  Injectable 40 milliGRAM(s) IV Push daily    MEDICATIONS  (PRN):  acetaminophen     Tablet .. 650 milliGRAM(s) Oral every 6 hours PRN Mild Pain (1 - 3), Moderate Pain (4 - 6)  dextrose Oral Gel 15 Gram(s) Oral once PRN Blood Glucose LESS THAN 70 milliGRAM(s)/deciliter  oxyCODONE    IR 5 milliGRAM(s) Oral every 6 hours PRN Severe Pain (7 - 10)      Allergies    shellfish (Swelling; Hives)  No Known Drug Allergies    Intolerances        OBJECTIVE:  Vital Signs Last 24 Hrs  T(C): 36.3 (17 Nov 2023 09:25), Max: 36.9 (17 Nov 2023 05:15)  T(F): 97.3 (17 Nov 2023 09:25), Max: 98.4 (17 Nov 2023 05:15)  HR: 78 (17 Nov 2023 09:25) (72 - 89)  BP: 90/52 (17 Nov 2023 09:25) (90/52 - 149/83)  BP(mean): --  RR: 17 (17 Nov 2023 09:25) (17 - 18)  SpO2: 96% (17 Nov 2023 09:25) (89% - 97%)    Parameters below as of 17 Nov 2023 09:25  Patient On (Oxygen Delivery Method): room air      I&O's Summary    16 Nov 2023 07:01  -  17 Nov 2023 07:00  --------------------------------------------------------  IN: 0 mL / OUT: 3150 mL / NET: -3150 mL    17 Nov 2023 07:01  -  17 Nov 2023 11:16  --------------------------------------------------------  IN: 200 mL / OUT: 0 mL / NET: 200 mL        PHYSICAL EXAM:  Gen: Reclining in bed at time of exam, appears stated age  HEENT: NCAT, MMM, clear OP  Neck: supple, trachea at midline  CV: RRR, +S1/S2  Pulm: adequate respiratory effort, no increase in work of breathing  Abd: soft, NTND  Skin: warm and dry,   Ext: WWP, no LE edema  Neuro: AOx3, no gross focal neurological deficits  Psych: affect and behavior appropriate, pleasant at time of interview  : Kennedy in place w. grapefruit colored urine     LABS:                        12.8   11.91 )-----------( 172      ( 16 Nov 2023 20:28 )             40.3     11-16    137  |  104  |  16  ----------------------------<  130<H>  3.8   |  24  |  1.00    Ca    8.4      16 Nov 2023 07:12  Phos  2.8     11-16  Mg     2.4     11-16    TPro  6.2  /  Alb  2.8<L>  /  TBili  0.5  /  DBili  x   /  AST  40  /  ALT  33  /  AlkPhos  53  11-16    LIVER FUNCTIONS - ( 16 Nov 2023 07:12 )  Alb: 2.8 g/dL / Pro: 6.2 g/dL / ALK PHOS: 53 U/L / ALT: 33 U/L / AST: 40 U/L / GGT: x             CAPILLARY BLOOD GLUCOSE      POCT Blood Glucose.: 109 mg/dL (17 Nov 2023 07:45)  POCT Blood Glucose.: 122 mg/dL (16 Nov 2023 22:13)  POCT Blood Glucose.: 135 mg/dL (16 Nov 2023 17:10)  POCT Blood Glucose.: 121 mg/dL (16 Nov 2023 12:09)    Urinalysis Basic - ( 16 Nov 2023 07:12 )    Color: x / Appearance: x / SG: x / pH: x  Gluc: 130 mg/dL / Ketone: x  / Bili: x / Urobili: x   Blood: x / Protein: x / Nitrite: x   Leuk Esterase: x / RBC: x / WBC x   Sq Epi: x / Non Sq Epi: x / Bacteria: x        MICRODATA:      RADIOLOGY/OTHER STUDIES:

## 2023-11-17 NOTE — PROGRESS NOTE ADULT - ASSESSMENT
87M, (shellfish rxn w/ lip swelling ~ 20 yrs ago, does not eat shellfish since then) w/ PMHx CAD s/p JOSÉ to mLAD (6/2023), HTN, HLD, cervical spondylosis s/p laminectomy, gastric ulcers, hypothyroidism, presenting for elective cholecystectomy. Patient initially presented to [facility] ED 10/6/23 for BELLO with minimal exertion, chest pressure, and orthopnea x 3 days. Patient was noted to have severe hyponatremia and leukocytosis. He was managed for CHF under medicine, and started on empiric antibiotics. Upon further work up, diagnosis of acute cholecystitis was made now s/p roboic mich (11/14).    Urology was consulted for post-operative hematuria, likely 2/2 traumatic catheterization. 16fr christian was upsized to 20fr yesterday with minimal clots on manual irrigation.    Recommendations:  -Please continue christian on discharge  -Patient will follow up with his urologist Dr. Christopher next week for trial of void  -Alternatively, patient may follow up at the [facility] if unable to make an appointment with Dr. Christopher. The office is located at [address] [place] [address], [address], [place] [address]. The office phone number is [phone].   -Encourage PO hydration  -Appreciate excellent care per primary team  -Discussed with attending             87M, (shellfish rxn w/ lip swelling ~ 20 yrs ago, does not eat shellfish since then) w/ PMHx CAD s/p JOSÉ to mLAD (6/2023), HTN, HLD, cervical spondylosis s/p laminectomy, gastric ulcers, hypothyroidism, presenting for elective cholecystectomy. Patient initially presented to [facility] ED 10/6/23 for BELLO with minimal exertion, chest pressure, and orthopnea x 3 days. Patient was noted to have severe hyponatremia and leukocytosis. He was managed for CHF under medicine, and started on empiric antibiotics. Upon further work up, diagnosis of acute cholecystitis was made now s/p roboic mich (11/14).    Urology was consulted for post-operative hematuria, likely 2/2 traumatic catheterization. 16fr christian was upsized to 20fr yesterday with minimal clots on manual irrigation.    Recommendations:  -Please continue christian on discharge  -Patient will follow up with his urologist Dr. Christopher next week for trial of void  -Alternatively, patient may follow up at the [facility] if unable to make an appointment with Dr. Christopher. The office is located at [address] [place] [address], [address], [place] [address]. The office phone number is [phone].   -Encourage PO hydration  -Appreciate excellent care per primary team  -Discussed with attending             87M, (shellfish rxn w/ lip swelling ~ 20 yrs ago, does not eat shellfish since then) w/ PMHx CAD s/p JOSÉ to mLAD (6/2023), HTN, HLD, cervical spondylosis s/p laminectomy, gastric ulcers, hypothyroidism, presenting for elective cholecystectomy. Patient initially presented to [facility] ED 10/6/23 for BELLO with minimal exertion, chest pressure, and orthopnea x 3 days. Patient was noted to have severe hyponatremia and leukocytosis. He was managed for CHF under medicine, and started on empiric antibiotics. Upon further work up, diagnosis of acute cholecystitis was made now s/p roboic mich (11/14).    Urology was consulted for post-operative hematuria, likely 2/2 traumatic catheterization. 16fr christian was upsized to 20fr yesterday with minimal clots on manual irrigation.    Recommendations:  -Please continue christian on discharge  -Patient will follow up with his urologist Dr. Christopher next week for trial of void  -Alternatively, patient may follow up at the [facility] if unable to make an appointment with Dr. Christopher. The office is located at [address] [place] [address], [address] [address]. The office phone number is [phone].   -Encourage PO hydration  -Appreciate excellent care per primary team  -Discussed with attending             87M, (shellfish rxn w/ lip swelling ~ 20 yrs ago, does not eat shellfish since then) w/ PMHx CAD s/p JOSÉ to mLAD (6/2023), HTN, HLD, cervical spondylosis s/p laminectomy, gastric ulcers, hypothyroidism, presenting for elective cholecystectomy. Patient initially presented to [facility] ED 10/6/23 for BELLO with minimal exertion, chest pressure, and orthopnea x 3 days. Patient was noted to have severe hyponatremia and leukocytosis. He was managed for CHF under medicine, and started on empiric antibiotics. Upon further work up, diagnosis of acute cholecystitis was made now s/p roboic mich (11/14).    Urology was consulted for post-operative hematuria, likely 2/2 traumatic catheterization. 16fr christian was upsized to 20fr yesterday with minimal clots on manual irrigation.    Recommendations:  -Please continue christian on discharge  -Patient will follow up with his urologist Dr. Christopher next week for trial of void  -Alternatively, patient may follow up with Dr. Carlton if unable to make an appointment with Dr. Christopher  -Encourage PO hydration  -Appreciate excellent care per primary team  -Discussed with attending             87M, (shellfish rxn w/ lip swelling ~ 20 yrs ago, does not eat shellfish since then) w/ PMHx CAD s/p JOSÉ to mLAD (6/2023), HTN, HLD, cervical spondylosis s/p laminectomy, gastric ulcers, hypothyroidism, presenting for elective cholecystectomy. Patient initially presented to [facility] ED 10/6/23 for BELLO with minimal exertion, chest pressure, and orthopnea x 3 days. Patient was noted to have severe hyponatremia and leukocytosis. He was managed for CHF under medicine, and started on empiric antibiotics. Upon further work up, diagnosis of acute cholecystitis was made now s/p roboic mich (11/14).    Urology was consulted for post-operative hematuria, likely 2/2 traumatic catheterization. 16fr christian was upsized to 20fr yesterday with minimal clots on manual irrigation.    Recommendations:  -Please continue christian on discharge  -Patient will follow up with his urologist Dr. Christopher next week for trial of void  -Alternatively, patient may follow up with Dr. Carlton if unable to make an appointment with Dr. Christopher  -Encourage PO hydration  -Appreciate excellent care per primary team  -Discussed with attending             87M, (shellfish rxn w/ lip swelling ~ 20 yrs ago, does not eat shellfish since then) w/ PMHx CAD s/p JOSÉ to mLAD (6/2023), HTN, HLD, cervical spondylosis s/p laminectomy, gastric ulcers, hypothyroidism, presenting for elective cholecystectomy. Patient initially presented to [facility] ED 10/6/23 for BELLO with minimal exertion, chest pressure, and orthopnea x 3 days. Patient was noted to have severe hyponatremia and leukocytosis. He was managed for CHF under medicine, and started on empiric antibiotics. Upon further work up, diagnosis of acute cholecystitis was made now s/p roboic mich (11/14).    Urology was consulted for post-operative hematuria, likely 2/2 traumatic catheterization. 16fr christian was upsized to 20fr yesterday with minimal clots on manual irrigation.    Recommendations:  -Please continue christian on discharge  -Patient will follow up with his urologist Dr. Christopher next week for trial of void  -Alternatively, patient may follow up with Dr. Carlton if unable to make an appointment with Dr. Christopher  -Encourage PO hydration  -Appreciate excellent care per primary team  -Discussed with attending

## 2023-11-18 ENCOUNTER — TRANSCRIPTION ENCOUNTER (OUTPATIENT)
Age: 88
End: 2023-11-18

## 2023-11-18 VITALS
OXYGEN SATURATION: 98 % | DIASTOLIC BLOOD PRESSURE: 72 MMHG | RESPIRATION RATE: 18 BRPM | TEMPERATURE: 98 F | HEART RATE: 85 BPM | SYSTOLIC BLOOD PRESSURE: 124 MMHG

## 2023-11-18 LAB
ALBUMIN SERPL ELPH-MCNC: 2.9 G/DL — LOW (ref 3.3–5)
ALBUMIN SERPL ELPH-MCNC: 2.9 G/DL — LOW (ref 3.3–5)
ALP SERPL-CCNC: 57 U/L — SIGNIFICANT CHANGE UP (ref 40–120)
ALP SERPL-CCNC: 57 U/L — SIGNIFICANT CHANGE UP (ref 40–120)
ALT FLD-CCNC: 21 U/L — SIGNIFICANT CHANGE UP (ref 10–45)
ALT FLD-CCNC: 21 U/L — SIGNIFICANT CHANGE UP (ref 10–45)
ANION GAP SERPL CALC-SCNC: 10 MMOL/L — SIGNIFICANT CHANGE UP (ref 5–17)
ANION GAP SERPL CALC-SCNC: 10 MMOL/L — SIGNIFICANT CHANGE UP (ref 5–17)
AST SERPL-CCNC: 21 U/L — SIGNIFICANT CHANGE UP (ref 10–40)
AST SERPL-CCNC: 21 U/L — SIGNIFICANT CHANGE UP (ref 10–40)
BILIRUB DIRECT SERPL-MCNC: <0.2 MG/DL — SIGNIFICANT CHANGE UP (ref 0–0.3)
BILIRUB DIRECT SERPL-MCNC: <0.2 MG/DL — SIGNIFICANT CHANGE UP (ref 0–0.3)
BILIRUB INDIRECT FLD-MCNC: SIGNIFICANT CHANGE UP (ref 0.2–1)
BILIRUB INDIRECT FLD-MCNC: SIGNIFICANT CHANGE UP (ref 0.2–1)
BILIRUB SERPL-MCNC: 0.5 MG/DL — SIGNIFICANT CHANGE UP (ref 0.2–1.2)
BILIRUB SERPL-MCNC: 0.5 MG/DL — SIGNIFICANT CHANGE UP (ref 0.2–1.2)
BUN SERPL-MCNC: 16 MG/DL — SIGNIFICANT CHANGE UP (ref 7–23)
BUN SERPL-MCNC: 16 MG/DL — SIGNIFICANT CHANGE UP (ref 7–23)
CALCIUM SERPL-MCNC: 8.5 MG/DL — SIGNIFICANT CHANGE UP (ref 8.4–10.5)
CALCIUM SERPL-MCNC: 8.5 MG/DL — SIGNIFICANT CHANGE UP (ref 8.4–10.5)
CHLORIDE SERPL-SCNC: 103 MMOL/L — SIGNIFICANT CHANGE UP (ref 96–108)
CHLORIDE SERPL-SCNC: 103 MMOL/L — SIGNIFICANT CHANGE UP (ref 96–108)
CO2 SERPL-SCNC: 22 MMOL/L — SIGNIFICANT CHANGE UP (ref 22–31)
CO2 SERPL-SCNC: 22 MMOL/L — SIGNIFICANT CHANGE UP (ref 22–31)
CREAT SERPL-MCNC: 1.09 MG/DL — SIGNIFICANT CHANGE UP (ref 0.5–1.3)
CREAT SERPL-MCNC: 1.09 MG/DL — SIGNIFICANT CHANGE UP (ref 0.5–1.3)
EGFR: 67 ML/MIN/1.73M2 — SIGNIFICANT CHANGE UP
EGFR: 67 ML/MIN/1.73M2 — SIGNIFICANT CHANGE UP
GLUCOSE BLDC GLUCOMTR-MCNC: 111 MG/DL — HIGH (ref 70–99)
GLUCOSE BLDC GLUCOMTR-MCNC: 111 MG/DL — HIGH (ref 70–99)
GLUCOSE BLDC GLUCOMTR-MCNC: 129 MG/DL — HIGH (ref 70–99)
GLUCOSE BLDC GLUCOMTR-MCNC: 129 MG/DL — HIGH (ref 70–99)
GLUCOSE SERPL-MCNC: 123 MG/DL — HIGH (ref 70–99)
GLUCOSE SERPL-MCNC: 123 MG/DL — HIGH (ref 70–99)
HCT VFR BLD CALC: 38.3 % — LOW (ref 39–50)
HCT VFR BLD CALC: 38.3 % — LOW (ref 39–50)
HGB BLD-MCNC: 12.1 G/DL — LOW (ref 13–17)
HGB BLD-MCNC: 12.1 G/DL — LOW (ref 13–17)
MAGNESIUM SERPL-MCNC: 1.9 MG/DL — SIGNIFICANT CHANGE UP (ref 1.6–2.6)
MAGNESIUM SERPL-MCNC: 1.9 MG/DL — SIGNIFICANT CHANGE UP (ref 1.6–2.6)
MCHC RBC-ENTMCNC: 21 PG — LOW (ref 27–34)
MCHC RBC-ENTMCNC: 21 PG — LOW (ref 27–34)
MCHC RBC-ENTMCNC: 31.6 GM/DL — LOW (ref 32–36)
MCHC RBC-ENTMCNC: 31.6 GM/DL — LOW (ref 32–36)
MCV RBC AUTO: 66.5 FL — LOW (ref 80–100)
MCV RBC AUTO: 66.5 FL — LOW (ref 80–100)
NRBC # BLD: 0 /100 WBCS — SIGNIFICANT CHANGE UP (ref 0–0)
NRBC # BLD: 0 /100 WBCS — SIGNIFICANT CHANGE UP (ref 0–0)
PHOSPHATE SERPL-MCNC: 2.5 MG/DL — SIGNIFICANT CHANGE UP (ref 2.5–4.5)
PHOSPHATE SERPL-MCNC: 2.5 MG/DL — SIGNIFICANT CHANGE UP (ref 2.5–4.5)
PLATELET # BLD AUTO: 191 K/UL — SIGNIFICANT CHANGE UP (ref 150–400)
PLATELET # BLD AUTO: 191 K/UL — SIGNIFICANT CHANGE UP (ref 150–400)
POTASSIUM SERPL-MCNC: 3.8 MMOL/L — SIGNIFICANT CHANGE UP (ref 3.5–5.3)
POTASSIUM SERPL-MCNC: 3.8 MMOL/L — SIGNIFICANT CHANGE UP (ref 3.5–5.3)
POTASSIUM SERPL-SCNC: 3.8 MMOL/L — SIGNIFICANT CHANGE UP (ref 3.5–5.3)
POTASSIUM SERPL-SCNC: 3.8 MMOL/L — SIGNIFICANT CHANGE UP (ref 3.5–5.3)
PROT SERPL-MCNC: 6.3 G/DL — SIGNIFICANT CHANGE UP (ref 6–8.3)
PROT SERPL-MCNC: 6.3 G/DL — SIGNIFICANT CHANGE UP (ref 6–8.3)
RBC # BLD: 5.76 M/UL — SIGNIFICANT CHANGE UP (ref 4.2–5.8)
RBC # BLD: 5.76 M/UL — SIGNIFICANT CHANGE UP (ref 4.2–5.8)
RBC # FLD: 17.2 % — HIGH (ref 10.3–14.5)
RBC # FLD: 17.2 % — HIGH (ref 10.3–14.5)
SODIUM SERPL-SCNC: 135 MMOL/L — SIGNIFICANT CHANGE UP (ref 135–145)
SODIUM SERPL-SCNC: 135 MMOL/L — SIGNIFICANT CHANGE UP (ref 135–145)
WBC # BLD: 11.93 K/UL — HIGH (ref 3.8–10.5)
WBC # BLD: 11.93 K/UL — HIGH (ref 3.8–10.5)
WBC # FLD AUTO: 11.93 K/UL — HIGH (ref 3.8–10.5)
WBC # FLD AUTO: 11.93 K/UL — HIGH (ref 3.8–10.5)

## 2023-11-18 PROCEDURE — 99233 SBSQ HOSP IP/OBS HIGH 50: CPT

## 2023-11-18 RX ORDER — DOCUSATE SODIUM 100 MG
1 CAPSULE ORAL
Qty: 7 | Refills: 0 | DISCHARGE
Start: 2023-11-18 | End: 2023-11-24

## 2023-11-18 RX ORDER — OXYCODONE HYDROCHLORIDE 5 MG/1
1 TABLET ORAL
Qty: 16 | Refills: 0
Start: 2023-11-18 | End: 2023-11-21

## 2023-11-18 RX ORDER — METOPROLOL TARTRATE 50 MG
0.5 TABLET ORAL
Qty: 15 | Refills: 0
Start: 2023-11-18 | End: 2023-12-17

## 2023-11-18 RX ORDER — ROSUVASTATIN CALCIUM 5 MG/1
1 TABLET ORAL
Qty: 30 | Refills: 0
Start: 2023-11-18 | End: 2023-12-17

## 2023-11-18 RX ORDER — ASPIRIN/CALCIUM CARB/MAGNESIUM 324 MG
1 TABLET ORAL
Qty: 30 | Refills: 0
Start: 2023-11-18 | End: 2023-12-17

## 2023-11-18 RX ORDER — LEVOTHYROXINE SODIUM 125 MCG
1 TABLET ORAL
Qty: 30 | Refills: 0
Start: 2023-11-18 | End: 2023-12-17

## 2023-11-18 RX ORDER — SODIUM,POTASSIUM PHOSPHATES 278-250MG
2 POWDER IN PACKET (EA) ORAL ONCE
Refills: 0 | Status: COMPLETED | OUTPATIENT
Start: 2023-11-18 | End: 2023-11-18

## 2023-11-18 RX ORDER — CYCLOBENZAPRINE HYDROCHLORIDE 10 MG/1
1 TABLET, FILM COATED ORAL
Qty: 30 | Refills: 0
Start: 2023-11-18 | End: 2023-12-17

## 2023-11-18 RX ORDER — PANTOPRAZOLE SODIUM 20 MG/1
1 TABLET, DELAYED RELEASE ORAL
Qty: 30 | Refills: 0
Start: 2023-11-18 | End: 2023-12-17

## 2023-11-18 RX ORDER — DOCUSATE SODIUM 100 MG
1 CAPSULE ORAL
Qty: 7 | Refills: 0
Start: 2023-11-18 | End: 2023-11-24

## 2023-11-18 RX ORDER — MAGNESIUM SULFATE 500 MG/ML
1 VIAL (ML) INJECTION ONCE
Refills: 0 | Status: COMPLETED | OUTPATIENT
Start: 2023-11-18 | End: 2023-11-18

## 2023-11-18 RX ADMIN — HEPARIN SODIUM 5000 UNIT(S): 5000 INJECTION INTRAVENOUS; SUBCUTANEOUS at 05:24

## 2023-11-18 RX ADMIN — CYCLOBENZAPRINE HYDROCHLORIDE 5 MILLIGRAM(S): 10 TABLET, FILM COATED ORAL at 12:32

## 2023-11-18 RX ADMIN — Medication 75 MICROGRAM(S): at 05:24

## 2023-11-18 RX ADMIN — Medication 100 GRAM(S): at 08:41

## 2023-11-18 RX ADMIN — Medication 81 MILLIGRAM(S): at 12:32

## 2023-11-18 RX ADMIN — Medication 12.5 MILLIGRAM(S): at 05:24

## 2023-11-18 RX ADMIN — Medication 2 PACKET(S): at 08:44

## 2023-11-18 RX ADMIN — PANTOPRAZOLE SODIUM 40 MILLIGRAM(S): 20 TABLET, DELAYED RELEASE ORAL at 12:36

## 2023-11-18 NOTE — DISCHARGE NOTE NURSING/CASE MANAGEMENT/SOCIAL WORK - PATIENT PORTAL LINK FT
You can access the FollowMyHealth Patient Portal offered by [facility] by registering at the following website: [email or web address]. By joining [employer]’s FollowMyHealth portal, you will also be able to view your health information using other applications (apps) compatible with our system. You can access the FollowMyHealth Patient Portal offered by [facility] by registering at the following website: [email or web address]. By joining [employer]’s FollowMyHealth portal, you will also be able to view your health information using other applications (apps) compatible with our system. You can access the FollowMyHealth Patient Portal offered by [facility] by registering at the following website: [email or web address]. By joining [employer]’s FollowMyHealth portal, you will also be able to view your health information using other applications (apps) compatible with our system.

## 2023-11-18 NOTE — DISCHARGE NOTE PROVIDER - PROVIDER TOKENS
PROVIDER:[TOKEN:[[id number]],FOLLOWUP:[2 weeks],ESTABLISHEDPATIENT:[T]],PROVIDER:[TOKEN:[[id number]],FOLLOWUP:[1-3 days],ESTABLISHEDPATIENT:[T]],PROVIDER:[TOKEN:[[id number]]] PROVIDER:[TOKEN:[[id number]],FOLLOWUP:[2 weeks],ESTABLISHEDPATIENT:[T]],PROVIDER:[TOKEN:[[id number]],FOLLOWUP:[1-3 days],ESTABLISHEDPATIENT:[T]],PROVIDER:[TOKEN:[[id number]]] PROVIDER:[TOKEN:[[id number]],FOLLOWUP:[2 weeks],ESTABLISHEDPATIENT:[T]],PROVIDER:[TOKEN:[[id number]],FOLLOWUP:[1-3 days],ESTABLISHEDPATIENT:[T]],PROVIDER:[TOKEN:[[id number]]]

## 2023-11-18 NOTE — DISCHARGE NOTE PROVIDER - HOSPITAL COURSE
Patient w/ PMHx CAD s/p JOSÉ to mLAD (6/2023), HTN, HLD, DM, cervical spondylosis s/p laminectomy, PUD, hypothyroidism, presenting for elective cholecystectomy, after recent hospitalization for CHF (10/6), when he as also found to have acute mich but managed non-op due to medical condition. He is now s/p robot-assisted cholecystectomy 11/14. Patient had urinary retention, requiring straight catheterization and christian placement. Blood was noted in urine post placement. Urology was consulted and recommended patient be discharged with christian.  On day of discharge patient was stable to be d/c'd home.

## 2023-11-18 NOTE — DISCHARGE NOTE PROVIDER - NSDCMRMEDTOKEN_GEN_ALL_CORE_FT
aspirin 81 mg oral tablet: 1 tab(s) orally once a day  cyclobenzaprine 5 mg oral tablet: 1 tab(s) orally once a day  Januvia 100 mg oral tablet: 1 tab(s) orally once a day  levothyroxine 75 mcg (0.075 mg) oral capsule: 1 cap(s) orally once a day  metoprolol 12.5 mg po daily:   pantoprazole 40 mg oral delayed release tablet: 1 tab(s) orally once a day  rosuvastatin 20 mg oral capsule: 1 cap(s) orally once a day   aspirin 81 mg oral tablet: 1 tab(s) orally once a day  Colace 100 mg oral capsule: 1 cap(s) orally once a day as needed for opioid constipation  cyclobenzaprine 5 mg oral tablet: 1 tab(s) orally once a day  levothyroxine 75 mcg (0.075 mg) oral capsule: 1 cap(s) orally once a day  metoprolol succinate 25 mg oral tablet, extended release: 0.5 tab(s) orally once a day  oxyCODONE 5 mg oral tablet: 1 tab(s) orally every 6 hours as needed for Severe Pain (7 - 10) MDD: 4 tablet  pantoprazole 40 mg oral delayed release tablet: 1 tab(s) orally once a day  rosuvastatin 20 mg oral capsule: 1 cap(s) orally once a day

## 2023-11-18 NOTE — DISCHARGE NOTE PROVIDER - NPI NUMBER (FOR SYSADMIN USE ONLY) :
[[id number]],[[id number]],[[id number]] [[id number]],[[id number]],[[id number]] [[id number]],[[id number]],[[id number]]

## 2023-11-18 NOTE — DISCHARGE NOTE NURSING/CASE MANAGEMENT/SOCIAL WORK - NSDCPEFALRISK_GEN_ALL_CORE
For information on Fall & Injury Prevention, visit: [email or web address]-[email or web address]-[email or web address]-mobility OR  [email or web address]://[email or web address].[email or web address]/[email or web address]-tips-[email or web address]-avoid-injury OR  https://www.cdc.gov/steadi/patient.html For information on Fall & Injury Prevention, visit: https://www.[facility]/news/fall-prevention-protects-and-maintains-health-and-mobility OR  https://www.[facility]/news/fall-prevention-tips-to-avoid-injury OR  https://www.cdc.gov/steadi/patient.html For information on Fall & Injury Prevention, visit: https://www.[facility]/news/fall-prevention-protects-and-maintains-health-and-mobility OR  https://www.[facility]/news/fall-prevention-tips-to-avoid-injury OR  https://www.cdc.gov/steadi/patient.html

## 2023-11-18 NOTE — DISCHARGE NOTE PROVIDER - NSDCFUADDINST_GEN_ALL_CORE_FT
Please follow up with Dr. Gomez in 1-2 weeks.    Please follow up with your PCP and endocrinologist before restarting Plavix or Januvia.      Christian management   -Patient will follow up with his urologist Dr. Christopher next week for trial of void  -Alternatively, patient may follow up with Dr. Carlton if unable to make an appointment with Dr. Christopher  -Encourage PO hydration  - Secure christian to leg, for ease of ambulation and decrease risk of christian being caught on anything.   - Monitor urine output, empty bad at least 3 times a day, or more depending on urine output. If below 600cc in 24 hours please call the office above.   It’s important to thoroughly wash your hands with soap and water before and after handling a Christian catheter and collection bag. Washing your hands helps prevent the spread of germs that can cause an infection. You should also clean the catheter tube at least once a day with soapy water and a wet paper towel or washcloth. Gently pat the tube dry      General Discharge Instructions:  Please resume all regular home medications unless specifically advised not to take a particular medication. Also, please take any new medications as prescribed.  Please get plenty of rest, continue to ambulate several times per day, and drink adequate amounts of fluids. Avoid lifting weights greater than 5-10 lbs until you follow-up with your surgeon, who will instruct you further regarding activity restrictions.  Avoid driving or operating heavy machinery while taking pain medications.  Please follow-up with your surgeon and Primary Care Provider (PCP) as advised.  Incision Care:  *Please call your doctor or nurse practitioner if you have increased pain, swelling, redness, or drainage from the incision site.  *You incisions are covered with Derma-Bond, when in the shower, do not scrub or pat dry.    Warning Signs to monitor:  Please call your doctor or nurse practitioner if you experience the following:  *You experience new chest pain, pressure, squeezing or tightness.  *New or worsening cough, shortness of breath, or wheeze.  *If you are vomiting and cannot keep down fluids or your medications.  *You are getting dehydrated due to continued vomiting, diarrhea, or other reasons. Signs of dehydration include dry mouth, rapid heartbeat, or feeling dizzy or faint when standing.  *You see blood or dark/black material when you vomit or have a bowel movement.  *You experience burning when you urinate, have blood in your urine, or experience a discharge.  *Your pain is not improving within 8-12 hours or is not gone within 24 hours. Call or return immediately if your pain is getting worse, changes location, or moves to your chest or back.  *You have shaking chills, or fever greater than 101.5 degrees Fahrenheit or 38 degrees Celsius.  *Any change in your symptoms, or any new symptoms that concern you.    If unable to reach a medical professional please go to an emergency room.

## 2023-11-18 NOTE — DISCHARGE NOTE PROVIDER - CARE PROVIDERS DIRECT ADDRESSES
,[email or web address],DirectAddress_Unknown,DirectAddress_Unknown ,[email or web address],DirectAddress_Unknown,DirectAddress_Unknown ,[email or web address],DirectAddress_Unknown,DirectAddress_Unknown

## 2023-11-18 NOTE — PROGRESS NOTE ADULT - ASSESSMENT
Patient is a 87M, (shellfish rxn w/ lip swelling ~ 20 yrs ago, does not eat shellfish since then) w/ PMHx CAD s/p JOSÉ to mLAD (6/2023), HTN, HLD, DM, cervical spondylosis s/p laminectomy, gastric ulcers, hypothyroidism, presenting for elective cholecystectomy. Patient initially presented to [facility] ED 10/6/23 for BELLO with minimal exertion, chest pressure, and orthopnea x 3 days. Patient was noted to have severe hyponatremia and leukocytosis. He was managed for CHF under medicine, and started on empiric antibiotics. Upon further work up, diagnosis of acute cholecystitis was made now s/p roboic mich (11/14)    CC Low fat, ISS  Pain/nausea control  SCDs/SQHIS/OOB   Continue Metoprolol and Atorvastatin   ASA  JAMEY x1  AM labs  Dispo: home PT Patient is a 87M, (shellfish rxn w/ lip swelling ~ 20 yrs ago, does not eat shellfish since then) w/ PMHx CAD s/p JOSÉ to mLAD (6/2023), HTN, HLD, DM, cervical spondylosis s/p laminectomy, gastric ulcers, hypothyroidism, presenting for elective cholecystectomy. Patient initially presented to [facility] ED 10/6/23 for BELLO with minimal exertion, chest pressure, and orthopnea x 3 days. Patient was noted to have severe hyponatremia and leukocytosis. He was managed for CHF under medicine, and started on empiric antibiotics. Upon further work up, diagnosis of acute cholecystitis was made now s/p roboic mich (11/14)    CC Low fat, ISS  Pain/nausea control  SCDs/SQHIS/OOB   Continue Metoprolol and Atorvastatin   ASA  JAMEY x1  AM labs  Dispo: home PT Patient is a 87M, (shellfish rxn w/ lip swelling ~ 20 yrs ago, does not eat shellfish since then) w/ PMHx CAD s/p JOSÉ to mLAD (6/2023), HTN, HLD, DM, cervical spondylosis s/p laminectomy, gastric ulcers, hypothyroidism, presenting for elective cholecystectomy. Patient initially presented to [facility] ED 10/6/23 for BELLO with minimal exertion, chest pressure, and orthopnea x 3 days. Patient was noted to have severe hyponatremia and leukocytosis. He was managed for CHF under medicine, and started on empiric antibiotics. Upon further work up, diagnosis of acute cholecystitis was made now s/p roboic mich (11/14)    CC Low fat, ISS  Pain/nausea control  SCDs/SQHIS/OOB   Continue Metoprolol and Atorvastatin   ASA  JAMEY x1  AM labs  Dispo: home PT

## 2023-11-18 NOTE — PROGRESS NOTE ADULT - ASSESSMENT
Patient is a 87M, (shellfish rxn w/ lip swelling ~ 20 yrs ago, does not eat shellfish since then) w/ PMHx CAD s/p JOSÉ to mLAD (6/2023), HTN, HLD, cervical spondylosis s/p laminectomy, gastric ulcers, hypothyroidism, presenting for elective cholecystectomy. Patient initially presented to [facility] ED 10/6/23 for BELLO with minimal exertion, chest pressure, and orthopnea x 3 days. Patient was noted to have severe hyponatremia and leukocytosis. He was managed for CHF under medicine, and started on empiric antibiotics. Upon further work up, diagnosis of acute cholecystitis was made. Surgery was consulted but patient symptoms/signs and labs already improved on Abx. Given cardiac history and presentation, operative intervention was deferred pending patient stabilization. Patient saw Dr. Gomez in the outpatient setting and after Cardiology clearance that Plavix was held since 11/7 and underwent an elective robotic assisted cholecystectomy ( 11/14/23)  POD#4    - diet per surgery  - JAMEY drain removed   - pain control:   - acetaminophen     Tablet .. 650 milliGRAM(s) Oral every 6 hours PRN                              oxyCODONE    IR 5 milliGRAM(s) Oral every 6 hours PRN  - c/w aspirin  chewable 81 milliGRAM(s) Oral daily  - resumed Plavix 75mg po daily ( 11/16)   - hematuria 2/2 traumatic christian: failed TOV, upsized to 20Fr indwelling christian ( 11/16), monitor hematuria which is clearing up. on Tamzulosin 0.4mg qHS, pt will need to go home with indwelling christian and f/u KARLA Chrisotpher in [place] office for TOV this coming week  - HLD: atorvastatin 40 milliGRAM(s) Oral at bedtime  - HTN: metoprolol tartrate 12.5 milliGRAM(s) Oral daily  - Hypothyroidism: levothyroxine 75 MICROGram(s) Oral daily  - GI ppx: pantoprazole  Injectable 40 milliGRAM(s) IV Push daily-> change to po  - DVT ppx: heparin   Injectable 5000 Unit(s) SubCutaneous every 8 hours  - PT eval appreciated   - Incentive spironmetry at bedside     Disposition: medically stable, d/c plan per Surgery team, will need home PT referral and go home with indwelling christian cath via leg bag and f/u KARLA Christopher for TOV outpt and Surgery Dr. Gomez for postop care and Cardiologist Dr. Daryn Jarvis     POC as d/w Surgery team/ACS and daughter Olga Yanez RN          Patient is a 87M, (shellfish rxn w/ lip swelling ~ 20 yrs ago, does not eat shellfish since then) w/ PMHx CAD s/p JOSÉ to mLAD (6/2023), HTN, HLD, cervical spondylosis s/p laminectomy, gastric ulcers, hypothyroidism, presenting for elective cholecystectomy. Patient initially presented to [facility] ED 10/6/23 for BELLO with minimal exertion, chest pressure, and orthopnea x 3 days. Patient was noted to have severe hyponatremia and leukocytosis. He was managed for CHF under medicine, and started on empiric antibiotics. Upon further work up, diagnosis of acute cholecystitis was made. Surgery was consulted but patient symptoms/signs and labs already improved on Abx. Given cardiac history and presentation, operative intervention was deferred pending patient stabilization. Patient saw Dr. Gomez in the outpatient setting and after Cardiology clearance that Plavix was held since 11/7 and underwent an elective robotic assisted cholecystectomy ( 11/14/23)  POD#4    - diet per surgery  - JAMEY drain removed   - pain control:   - acetaminophen     Tablet .. 650 milliGRAM(s) Oral every 6 hours PRN                              oxyCODONE    IR 5 milliGRAM(s) Oral every 6 hours PRN  - c/w aspirin  chewable 81 milliGRAM(s) Oral daily  - resumed Plavix 75mg po daily ( 11/16)   - hematuria 2/2 traumatic christian: failed TOV, upsized to 20Fr indwelling christian ( 11/16), monitor hematuria which is clearing up. on Tamzulosin 0.4mg qHS, pt will need to go home with indwelling christian and f/u KARLA Christopher in [place] office for TOV this coming week  - HLD: atorvastatin 40 milliGRAM(s) Oral at bedtime  - HTN: metoprolol tartrate 12.5 milliGRAM(s) Oral daily  - Hypothyroidism: levothyroxine 75 MICROGram(s) Oral daily  - GI ppx: pantoprazole  Injectable 40 milliGRAM(s) IV Push daily-> change to po  - DVT ppx: heparin   Injectable 5000 Unit(s) SubCutaneous every 8 hours  - PT eval appreciated   - Incentive spironmetry at bedside     Disposition: medically stable, d/c plan per Surgery team, will need home PT referral and go home with indwelling christian cath via leg bag and f/u KARLA Christopher for TOV outpt and Surgery Dr. Gomez for postop care and Cardiologist Dr. Daryn Jarvis     POC as d/w Surgery team/ACS and daughter Olga Yanez RN          Patient is a 87M, (shellfish rxn w/ lip swelling ~ 20 yrs ago, does not eat shellfish since then) w/ PMHx CAD s/p JOSÉ to mLAD (6/2023), HTN, HLD, cervical spondylosis s/p laminectomy, gastric ulcers, hypothyroidism, presenting for elective cholecystectomy. Patient initially presented to [facility] ED 10/6/23 for BELLO with minimal exertion, chest pressure, and orthopnea x 3 days. Patient was noted to have severe hyponatremia and leukocytosis. He was managed for CHF under medicine, and started on empiric antibiotics. Upon further work up, diagnosis of acute cholecystitis was made. Surgery was consulted but patient symptoms/signs and labs already improved on Abx. Given cardiac history and presentation, operative intervention was deferred pending patient stabilization. Patient saw Dr. Gomez in the outpatient setting and after Cardiology clearance that Plavix was held since 11/7 and underwent an elective robotic assisted cholecystectomy ( 11/14/23)  POD#4    - diet per surgery  - JAMEY drain removed   - pain control:   - acetaminophen     Tablet .. 650 milliGRAM(s) Oral every 6 hours PRN                              oxyCODONE    IR 5 milliGRAM(s) Oral every 6 hours PRN  - c/w aspirin  chewable 81 milliGRAM(s) Oral daily  - resumed Plavix 75mg po daily ( 11/16)   - hematuria 2/2 traumatic christian: failed TOV, upsized to 20Fr indwelling christian ( 11/16), monitor hematuria which is clearing up. on Tamzulosin 0.4mg qHS, pt will need to go home with indwelling christian and f/u KARLA Christopher in [place] office for TOV this coming week  - HLD: atorvastatin 40 milliGRAM(s) Oral at bedtime  - HTN: metoprolol tartrate 12.5 milliGRAM(s) Oral daily  - Hypothyroidism: levothyroxine 75 MICROGram(s) Oral daily  - GI ppx: pantoprazole  Injectable 40 milliGRAM(s) IV Push daily-> change to po  - DVT ppx: heparin   Injectable 5000 Unit(s) SubCutaneous every 8 hours  - PT eval appreciated   - Incentive spironmetry at bedside     Disposition: medically stable, d/c plan per Surgery team, will need home PT referral and go home with indwelling christian cath via leg bag and f/u KARLA Christopher for TOV outpt and Surgery Dr. Gomez for postop care and Cardiologist Dr. Daryn Jarvis     POC as d/w Surgery team/ACS and daughter Olga Yanez RN

## 2023-11-18 NOTE — PROGRESS NOTE ADULT - SUBJECTIVE AND OBJECTIVE BOX
Patient is a 87y old  Male who presents with a chief complaint of   INTERVAL EVENTS: NAEON    SUBJECTIVE:  Patient was seen and examined at bedside. Urine is clearing in christian, no complaints, in good spirits.     Review of systems: No fever, chills, dizziness, HA, Changes in vision, CP, dyspnea, nausea or vomiting, dysuria, changes in bowel movements, LE edema. Rest of 12 point Review of systems negative unless otherwise documented elsewhere in note.     Diet, Regular:   Consistent Carbohydrate Evening Snack (CSTCHOSN)  Low Fat (LOWFAT) (11-15-23 @ 17:42) [Active]      MEDICATIONS:  MEDICATIONS  (STANDING):  aspirin  chewable 81 milliGRAM(s) Oral daily  atorvastatin 40 milliGRAM(s) Oral at bedtime  cyclobenzaprine 5 milliGRAM(s) Oral daily  dextrose 5%. 1000 milliLiter(s) (100 mL/Hr) IV Continuous <Continuous>  dextrose 5%. 1000 milliLiter(s) (50 mL/Hr) IV Continuous <Continuous>  dextrose 50% Injectable 25 Gram(s) IV Push once  dextrose 50% Injectable 12.5 Gram(s) IV Push once  dextrose 50% Injectable 25 Gram(s) IV Push once  glucagon  Injectable 1 milliGRAM(s) IntraMuscular once  heparin   Injectable 5000 Unit(s) SubCutaneous every 8 hours  insulin lispro (ADMELOG) corrective regimen sliding scale   SubCutaneous Before meals and at bedtime  levothyroxine 75 MICROGram(s) Oral daily  lidocaine 2% Jelly      lidocaine 2% Jelly 5 milliLiter(s) IntraUrethral once  metoprolol tartrate 12.5 milliGRAM(s) Oral daily  pantoprazole  Injectable 40 milliGRAM(s) IV Push daily    MEDICATIONS  (PRN):  acetaminophen     Tablet .. 650 milliGRAM(s) Oral every 6 hours PRN Mild Pain (1 - 3), Moderate Pain (4 - 6)  dextrose Oral Gel 15 Gram(s) Oral once PRN Blood Glucose LESS THAN 70 milliGRAM(s)/deciliter  oxyCODONE    IR 5 milliGRAM(s) Oral every 6 hours PRN Severe Pain (7 - 10)      Allergies    shellfish (Swelling; Hives)  No Known Drug Allergies    Intolerances        OBJECTIVE:  Vital Signs Last 24 Hrs  T(C): 36.2 (18 Nov 2023 08:20), Max: 37.2 (17 Nov 2023 13:52)  T(F): 97.2 (18 Nov 2023 08:20), Max: 98.9 (17 Nov 2023 13:52)  HR: 87 (18 Nov 2023 08:20) (78 - 92)  BP: 118/72 (18 Nov 2023 08:20) (95/57 - 131/74)  BP(mean): --  RR: 18 (18 Nov 2023 08:20) (17 - 18)  SpO2: 98% (18 Nov 2023 08:20) (93% - 98%)    Parameters below as of 18 Nov 2023 08:20  Patient On (Oxygen Delivery Method): room air      I&O's Summary    17 Nov 2023 07:01  -  18 Nov 2023 07:00  --------------------------------------------------------  IN: 600 mL / OUT: 3250 mL / NET: -2650 mL    18 Nov 2023 07:01  -  18 Nov 2023 11:23  --------------------------------------------------------  IN: 240 mL / OUT: 0 mL / NET: 240 mL        PHYSICAL EXAM:  Gen: Reclining in bed at time of exam, appears stated age  HEENT: NCAT, MMM, clear OP  Neck: supple, trachea at midline  CV: RRR, +S1/S2  Pulm: adequate respiratory effort, no increase in work of breathing  Abd: soft, NTND  Skin: warm and dry,   Ext: WWP, no LE edema  Neuro: AOx3, no gross focal neurological deficits  Psych: affect and behavior appropriate, pleasant at time of interview  : christian in place, hematuria clearing up     LABS:                        12.1   11.93 )-----------( 191      ( 18 Nov 2023 07:37 )             38.3     11-18    135  |  103  |  16  ----------------------------<  123<H>  3.8   |  22  |  1.09    Ca    8.5      18 Nov 2023 07:37  Phos  2.5     11-18  Mg     1.9     11-18    TPro  6.3  /  Alb  2.9<L>  /  TBili  0.5  /  DBili  <0.2  /  AST  21  /  ALT  21  /  AlkPhos  57  11-18    LIVER FUNCTIONS - ( 18 Nov 2023 07:37 )  Alb: 2.9 g/dL / Pro: 6.3 g/dL / ALK PHOS: 57 U/L / ALT: 21 U/L / AST: 21 U/L / GGT: x             CAPILLARY BLOOD GLUCOSE      POCT Blood Glucose.: 111 mg/dL (18 Nov 2023 07:03)  POCT Blood Glucose.: 126 mg/dL (17 Nov 2023 22:13)  POCT Blood Glucose.: 118 mg/dL (17 Nov 2023 11:54)    Urinalysis Basic - ( 18 Nov 2023 07:37 )    Color: x / Appearance: x / SG: x / pH: x  Gluc: 123 mg/dL / Ketone: x  / Bili: x / Urobili: x   Blood: x / Protein: x / Nitrite: x   Leuk Esterase: x / RBC: x / WBC x   Sq Epi: x / Non Sq Epi: x / Bacteria: x        MICRODATA:      RADIOLOGY/OTHER STUDIES:

## 2023-11-18 NOTE — DISCHARGE NOTE PROVIDER - CARE PROVIDER_API CALL
Dl Gomez  Surgery  [address], [address]  [place] [address]  Phone: [phone]  Fax: [phone]  Established Patient  Follow Up Time: 2 weeks    Ernst Christopher  Urology  [address] [place] [address], [place] [address]  Phone: [phone]  Fax: [phone]  Established Patient  Follow Up Time: 1-3 days    Zeina Carlton  Urology  [address], [address]  [place] [address]  Phone: [phone]  Fax: [phone]  Follow Up Time:    Dl Gomez  Surgery  [address], [address]  [place] [address]  Phone: [phone]  Fax: [phone]  Established Patient  Follow Up Time: 2 weeks    Ernst Christopher  Urology  [address] [place] [address], [address] [address]  Phone: [phone]  Fax: [phone]  Established Patient  Follow Up Time: 1-3 days    Zeina Carlton  Urology  [address], [address]  [place] [address]  Phone: [phone]  Fax: [phone]  Follow Up Time:    Dl Gomez  Surgery  [address], [address]  [place] [address]  Phone: [phone]  Fax: [phone]  Established Patient  Follow Up Time: 2 weeks    Ernst Christopher  Urology  [address] [place] [address], [address] [address]  Phone: [phone]  Fax: [phone]  Established Patient  Follow Up Time: 1-3 days    Zeina Carlton  Urology  [address], [address]  [place] [address]  Phone: [phone]  Fax: [phone]  Follow Up Time:

## 2023-11-18 NOTE — PROGRESS NOTE ADULT - SUBJECTIVE AND OBJECTIVE BOX
Overnight events: No acute overnight events.       POD#4 [facility] [place]    SUBJECTIVE: Patient seen at bedside with chief resident. Patient reports feeling "much better."       MEDICATIONS  (STANDING):  aspirin  chewable 81 milliGRAM(s) Oral daily  atorvastatin 40 milliGRAM(s) Oral at bedtime  cyclobenzaprine 5 milliGRAM(s) Oral daily  dextrose 5%. 1000 milliLiter(s) (100 mL/Hr) IV Continuous <Continuous>  dextrose 5%. 1000 milliLiter(s) (50 mL/Hr) IV Continuous <Continuous>  dextrose 50% Injectable 25 Gram(s) IV Push once  dextrose 50% Injectable 12.5 Gram(s) IV Push once  dextrose 50% Injectable 25 Gram(s) IV Push once  glucagon  Injectable 1 milliGRAM(s) IntraMuscular once  heparin   Injectable 5000 Unit(s) SubCutaneous every 8 hours  insulin lispro (ADMELOG) corrective regimen sliding scale   SubCutaneous Before meals and at bedtime  levothyroxine 75 MICROGram(s) Oral daily  lidocaine 2% Jelly      lidocaine 2% Jelly 5 milliLiter(s) IntraUrethral once  metoprolol tartrate 12.5 milliGRAM(s) Oral daily  pantoprazole  Injectable 40 milliGRAM(s) IV Push daily    MEDICATIONS  (PRN):  acetaminophen     Tablet .. 650 milliGRAM(s) Oral every 6 hours PRN Mild Pain (1 - 3), Moderate Pain (4 - 6)  dextrose Oral Gel 15 Gram(s) Oral once PRN Blood Glucose LESS THAN 70 milliGRAM(s)/deciliter  oxyCODONE    IR 5 milliGRAM(s) Oral every 6 hours PRN Severe Pain (7 - 10)      Vital Signs Last 24 Hrs  T(C): 36.2 (18 Nov 2023 08:20), Max: 37.2 (17 Nov 2023 13:52)  T(F): 97.2 (18 Nov 2023 08:20), Max: 98.9 (17 Nov 2023 13:52)  HR: 87 (18 Nov 2023 08:20) (78 - 92)  BP: 118/72 (18 Nov 2023 08:20) (95/57 - 131/74)  BP(mean): --  RR: 18 (18 Nov 2023 08:20) (17 - 18)  SpO2: 98% (18 Nov 2023 08:20) (93% - 98%)    Parameters below as of 18 Nov 2023 08:20  Patient On (Oxygen Delivery Method): room air        Physical Exam:  General: NAD, resting comfortably in bed  Pulmonary: Nonlabored breathing, no respiratory distress  Cardiovascular: NSR  Abdominal: soft, NT/ND, urine clearing up in christian tubing   Extremities: WWP, normal strength  Neuro: A/O x 3, CNs II-XII grossly intact, no focal deficits, normal motor/sensation    I&O's Summary    17 Nov 2023 07:01  -  18 Nov 2023 07:00  --------------------------------------------------------  IN: 600 mL / OUT: 3250 mL / NET: -2650 mL        LABS:                        12.1   11.93 )-----------( 191      ( 18 Nov 2023 07:37 )             38.3     11-18    135  |  103  |  16  ----------------------------<  123<H>  3.8   |  22  |  1.09    Ca    8.5      18 Nov 2023 07:37  Phos  2.5     11-18  Mg     1.9     11-18    TPro  6.3  /  Alb  2.9<L>  /  TBili  0.5  /  DBili  <0.2  /  AST  21  /  ALT  21  /  AlkPhos  57  11-18      Urinalysis Basic - ( 18 Nov 2023 07:37 )    Color: x / Appearance: x / SG: x / pH: x  Gluc: 123 mg/dL / Ketone: x  / Bili: x / Urobili: x   Blood: x / Protein: x / Nitrite: x   Leuk Esterase: x / RBC: x / WBC x   Sq Epi: x / Non Sq Epi: x / Bacteria: x      CAPILLARY BLOOD GLUCOSE      POCT Blood Glucose.: 111 mg/dL (18 Nov 2023 07:03)  POCT Blood Glucose.: 126 mg/dL (17 Nov 2023 22:13)  POCT Blood Glucose.: 118 mg/dL (17 Nov 2023 11:54)    LIVER FUNCTIONS - ( 18 Nov 2023 07:37 )  Alb: 2.9 g/dL / Pro: 6.3 g/dL / ALK PHOS: 57 U/L / ALT: 21 U/L / AST: 21 U/L / GGT: x             RADIOLOGY & ADDITIONAL STUDIES:

## 2023-11-18 NOTE — DISCHARGE NOTE PROVIDER - NSDCCPCAREPLAN_GEN_ALL_CORE_FT
PRINCIPAL DISCHARGE DIAGNOSIS  Diagnosis: Gallbladder attack  Assessment and Plan of Treatment:       SECONDARY DISCHARGE DIAGNOSES  Diagnosis: Hematuria  Assessment and Plan of Treatment:

## 2023-11-20 ENCOUNTER — APPOINTMENT (OUTPATIENT)
Dept: OPHTHALMOLOGY | Facility: CLINIC | Age: 88
End: 2023-11-20

## 2023-11-21 LAB
SURGICAL PATHOLOGY STUDY: SIGNIFICANT CHANGE UP
SURGICAL PATHOLOGY STUDY: SIGNIFICANT CHANGE UP

## 2023-11-28 ENCOUNTER — APPOINTMENT (OUTPATIENT)
Dept: OPHTHALMOLOGY | Facility: AMBULATORY SURGERY CENTER | Age: 88
End: 2023-11-28

## 2023-11-28 DIAGNOSIS — K81.0 ACUTE CHOLECYSTITIS: ICD-10-CM

## 2023-11-28 DIAGNOSIS — R33.9 RETENTION OF URINE, UNSPECIFIED: ICD-10-CM

## 2023-11-28 DIAGNOSIS — Z79.899 OTHER LONG TERM (CURRENT) DRUG THERAPY: ICD-10-CM

## 2023-11-28 DIAGNOSIS — I25.10 ATHEROSCLEROTIC HEART DISEASE OF NATIVE CORONARY ARTERY WITHOUT ANGINA PECTORIS: ICD-10-CM

## 2023-11-28 DIAGNOSIS — R31.9 HEMATURIA, UNSPECIFIED: ICD-10-CM

## 2023-11-28 DIAGNOSIS — Z79.02 LONG TERM (CURRENT) USE OF ANTITHROMBOTICS/ANTIPLATELETS: ICD-10-CM

## 2023-11-28 DIAGNOSIS — Z87.11 PERSONAL HISTORY OF PEPTIC ULCER DISEASE: ICD-10-CM

## 2023-11-28 DIAGNOSIS — E11.9 TYPE 2 DIABETES MELLITUS WITHOUT COMPLICATIONS: ICD-10-CM

## 2023-11-28 DIAGNOSIS — E03.9 HYPOTHYROIDISM, UNSPECIFIED: ICD-10-CM
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
## 2023-11-28 DIAGNOSIS — Z79.82 LONG TERM (CURRENT) USE OF ASPIRIN: ICD-10-CM

## 2023-11-28 DIAGNOSIS — E78.5 HYPERLIPIDEMIA, UNSPECIFIED: ICD-10-CM

## 2023-11-28 DIAGNOSIS — Z95.5 PRESENCE OF CORONARY ANGIOPLASTY IMPLANT AND GRAFT: ICD-10-CM

## 2023-11-28 DIAGNOSIS — I11.0 HYPERTENSIVE HEART DISEASE WITH HEART FAILURE: ICD-10-CM
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
## 2023-11-28 DIAGNOSIS — I50.9 HEART FAILURE, UNSPECIFIED: ICD-10-CM

## 2023-11-28 DIAGNOSIS — Z98.1 ARTHRODESIS STATUS: ICD-10-CM

## 2023-11-28 DIAGNOSIS — T83.83XA HEMORRHAGE DUE TO GENITOURINARY PROSTHETIC DEVICES, IMPLANTS AND GRAFTS, INITIAL ENCOUNTER: ICD-10-CM

## 2023-11-28 DIAGNOSIS — Z79.890 HORMONE REPLACEMENT THERAPY: ICD-10-CM

## 2023-11-28 DIAGNOSIS — Z79.84 LONG TERM (CURRENT) USE OF ORAL HYPOGLYCEMIC DRUGS: ICD-10-CM

## 2023-11-29 ENCOUNTER — APPOINTMENT (OUTPATIENT)
Dept: OPHTHALMOLOGY | Facility: CLINIC | Age: 88
End: 2023-11-29

## 2023-12-07 ENCOUNTER — APPOINTMENT (OUTPATIENT)
Dept: OPHTHALMOLOGY | Facility: CLINIC | Age: 88
End: 2023-12-07

## 2023-12-12 RX ORDER — LEVOTHYROXINE SODIUM 125 MCG
1 TABLET ORAL
Refills: 0 | DISCHARGE

## 2023-12-12 RX ORDER — UREA 15 G
0 POWDER IN PACKET (EA) ORAL
Refills: 0 | DISCHARGE

## 2023-12-12 RX ORDER — CYCLOBENZAPRINE HYDROCHLORIDE 10 MG/1
1 TABLET, FILM COATED ORAL
Refills: 0 | DISCHARGE

## 2023-12-12 RX ORDER — METOPROLOL TARTRATE 50 MG
0.5 TABLET ORAL
Refills: 0 | DISCHARGE

## 2023-12-12 RX ORDER — CLOPIDOGREL BISULFATE 75 MG/1
1 TABLET, FILM COATED ORAL
Refills: 0 | DISCHARGE

## 2023-12-12 RX ORDER — SITAGLIPTIN 50 MG/1
1 TABLET, FILM COATED ORAL
Refills: 0 | DISCHARGE

## 2023-12-12 RX ORDER — ASPIRIN/CALCIUM CARB/MAGNESIUM 324 MG
1 TABLET ORAL
Refills: 0 | DISCHARGE

## 2023-12-12 RX ORDER — ROSUVASTATIN CALCIUM 5 MG/1
1 TABLET ORAL
Refills: 0 | DISCHARGE

## 2023-12-12 RX ORDER — TAMSULOSIN HYDROCHLORIDE 0.4 MG/1
1 CAPSULE ORAL
Refills: 0 | DISCHARGE

## 2023-12-12 RX ORDER — TOBRAMYCIN AND DEXAMETHASONE 1; 3 MG/ML; MG/ML
1 SUSPENSION/ DROPS OPHTHALMIC
Refills: 0 | DISCHARGE

## 2023-12-12 RX ORDER — NITROGLYCERIN 6.5 MG
1 CAPSULE, EXTENDED RELEASE ORAL
Refills: 0 | DISCHARGE

## 2023-12-13 PROCEDURE — 86850 RBC ANTIBODY SCREEN: CPT

## 2023-12-13 PROCEDURE — 80076 HEPATIC FUNCTION PANEL: CPT

## 2023-12-13 PROCEDURE — 80053 COMPREHEN METABOLIC PANEL: CPT

## 2023-12-13 PROCEDURE — C1889: CPT

## 2023-12-13 PROCEDURE — S2900: CPT

## 2023-12-13 PROCEDURE — 36415 COLL VENOUS BLD VENIPUNCTURE: CPT

## 2023-12-13 PROCEDURE — 97161 PT EVAL LOW COMPLEX 20 MIN: CPT

## 2023-12-13 PROCEDURE — 84100 ASSAY OF PHOSPHORUS: CPT

## 2023-12-13 PROCEDURE — 85027 COMPLETE CBC AUTOMATED: CPT

## 2023-12-13 PROCEDURE — 83735 ASSAY OF MAGNESIUM: CPT

## 2023-12-13 PROCEDURE — 86901 BLOOD TYPING SEROLOGIC RH(D): CPT

## 2023-12-13 PROCEDURE — 86900 BLOOD TYPING SEROLOGIC ABO: CPT

## 2023-12-13 PROCEDURE — 88304 TISSUE EXAM BY PATHOLOGIST: CPT

## 2023-12-13 PROCEDURE — 80048 BASIC METABOLIC PNL TOTAL CA: CPT

## 2023-12-13 PROCEDURE — 82962 GLUCOSE BLOOD TEST: CPT

## 2023-12-13 PROCEDURE — 97116 GAIT TRAINING THERAPY: CPT

## 2024-01-09 PROBLEM — I25.10 ATHEROSCLEROTIC HEART DISEASE OF NATIVE CORONARY ARTERY WITHOUT ANGINA PECTORIS: Chronic | Status: ACTIVE | Noted: 2023-10-06

## 2024-01-09 PROBLEM — Z87.438 PERSONAL HISTORY OF OTHER DISEASES OF MALE GENITAL ORGANS: Chronic | Status: ACTIVE | Noted: 2023-05-31

## 2024-01-09 PROBLEM — E78.5 HYPERLIPIDEMIA, UNSPECIFIED: Chronic | Status: ACTIVE | Noted: 2023-05-31

## 2024-01-09 PROBLEM — I10 ESSENTIAL (PRIMARY) HYPERTENSION: Chronic | Status: ACTIVE | Noted: 2023-11-13

## 2024-01-09 PROBLEM — Z87.19 PERSONAL HISTORY OF OTHER DISEASES OF THE DIGESTIVE SYSTEM: Chronic | Status: ACTIVE | Noted: 2023-11-14

## 2024-01-09 PROBLEM — E03.9 HYPOTHYROIDISM, UNSPECIFIED: Chronic | Status: ACTIVE | Noted: 2023-05-31

## 2024-02-02 ENCOUNTER — APPOINTMENT (OUTPATIENT)
Dept: OPHTHALMOLOGY | Facility: CLINIC | Age: 89
End: 2024-02-02
Payer: MEDICARE

## 2024-02-02 ENCOUNTER — NON-APPOINTMENT (OUTPATIENT)
Age: 89
End: 2024-02-02

## 2024-02-02 PROCEDURE — 92012 INTRM OPH EXAM EST PATIENT: CPT

## 2024-03-25 NOTE — ASU PATIENT PROFILE, ADULT - NSSUBSTANCEUSE_GEN_ALL_CORE_SD
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    · 6/30/ 2016 unilateral digital diagnostic mammogram revealed a 2 cm left breast mass of moderate suspicion for malignancy.  Ultrasound of the left breast and axilla revealed a 2.3 cm lobulated mass with indistinct margin in the left breast at 11:00 anterior depth, 3 cm from the nipple.  · 7/8/2016 ultrasound-guided biopsy left breast with marking device insertion was performed.  Surgical pathology: Ductal carcinoma in situ, low-grade.  Estrogen receptor positive, 100%; progesterone receptor positive, 90%.  · 8/24/2016 she underwent left lumpectomy and left sentinel lymph node biopsy.  Surgical pathology was reported as left breast mass with ductal carcinoma in situ, low-grade.    ductal carcinoma in situ extended to 0.6 mm from the posterior margin.   · Completed postlumpectomy radiotherapy.  · Initiated adjuvant endocrine therapy with anastrozole October 2016.    Today Ms. Jeffers presents for follow up on  adjuvant endocrine  therapy with anastrozole.  She reports that she is taking anastrozole daily as prescribed.  She notes tolerable hot flashes and joint aches. She notes a day last week with vomiting and diarrhea which she relates to something she ate. Her dyspnea on exertion is stable. She states that she is taking her diabetes medication as prescribed by her primary care provider, but occasionally eats something she isn’t supposed to. Today she denies breast related complaints, including palpable breast mass, breast pain.  Denies lymphedema.  Her last mammogram was in December 2018. She notes increased hirsutism over the past several years, and feels that this preceded the use of anastrozole. She shaves daily. She had an appointment with endocrinology at [facility] yesterday, but missed it. She would like to transfer her care here. She is scheduled for a gynonc follow up in December, her last ultrasound was in July. Today she denies symptoms of pelvic pain and vaginal bleeding.  She was admitted in July for dizziness and chest discomfort. EKG, troponins, stress test, and head CT were all unremarkable.     REVIEW OF SYSTEMS: Completed 8 organ system review including constitutional, pulmonary, cardiovascular, gastrointestinal, gynecologic, endocrine, neurologic,  psychiatric. Positive for hotfalshes.  States tolerable.    Denies vision change, headache excessive fatigue, unintentional weight loss, cough, dyspnea, abdominal pain, satiety, depression.  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 Their current allergies include: No Known Drug Allergies.    CURRENT MEDICATIONS:    The patient reported no changes to their medications. Their current medications include: levothyroxine; nifedipine; enalapril maleate; pantoprazole; lovastatin; gabapentin; ergocalciferol (vitamin D2); Levemir [insulin detemir]; Aspir-81 [aspirin]; Calcium + Vitamin D [calcium carbonate-vitamin D2]; metformin; anastrozole; Lyrica  [pregabalin]; Humalog [insulin lispro].    FAMILY HISTORY:    No family of history of breast or ovarian cancer maternal uncle with lung cancer, tobacco user    SOCIAL HISTORY:                       Tobacco Use:  Never smoker   Marital Status: Single  Race:  -blk  Occupation:  Retired    PHYSICAL EXAMINATION:    Vital signs: Today’s vital signs were reviewed in the electronic medical record.  Constitutional: Alert, cooperative.    No apparent distress.  Neck: Well healed surgical scar midline lower neck.  No palpable thyroid masses.   Eyes:   Sclerae anicteric.    Lymphatic: No palpable cervical, axillary lymphadenopathy.  Breasts:  Well healed lumpectomy and axillary scars.  No palpable breast masses bilaterally.  Lungs:  Normal respiratory effort.  Lungs bilaterally clear to auscultation.    Cardiovascular:  Rate and rhythm of heart without murmurs, rubs, or gallops.  No pedal edema.  Abdomen:  Soft, nontender, nondistended without palpable masses.     Musculoskeletal: No upper extremity lymphedema.    Neurologic: Normal gait.  No sensory or motor deficits.  Psychiatric: mood and affect appropriate.    RECENT DIAGNOSTICS: Report(s) reviewed  EXAM: CT HEAD OR BRAIN WO CON 7/15/2019  IMPRESSION:  No CT evidence of acute intracranial process  Study was performed within the first 24 hours of patient admission in the hospital.  Noncontrast CT study is a screening tool. Conditions such as vascular malformations,  aneurysms, low grade tumors, meningitis, subtle subarachnoid hemorrhages, etc., may not be  demonstrated. Followup studies as clinically indicated may be necessary.  LYNDSAY JACOBO M.D., have reviewed the images and report and concur with these findings  interpreted by FELICITY MCNAIR MD.  UNILATERAL LEFT DIGITAL DIAGNOSTIC MAMMOGRAM 3D/2D WITH CAD: 12/14/2018  CLINICAL HISTORY:76 year old woman returns for a 6 month follow-up for a focal asymmetry in the left breast. She underwent a left  lumpectomy in 2016 . She also underwent radiation treatment. The patient has no family history of breast cancer. Short term interval follow up.  COMPARISON:   Comparison is made to exams dated:  4/17/2018 mammogram, 10/23/2017 mammogram, 4/14/2017 mammogram, 9/27/2016 mammogram, and 6/30/2016 mammogram - [facility].  FINDINGS:  The tissue of left breast is heterogeneously dense. This may lower the sensitivity of mammography.  Architectural changes and mild skin thickening are present in the left breast from previous lumpectomy and radiation.  No significant masses, calcifications, or other findings are seen in the breast.  Current study was also evaluated with a Computer Aided Detection (CAD) system. Digital Breast Tomosynthesis (DBT) images were obtained and used to assist in the interpretation of this examination.      IMPRESSION: BENIGN  Post lumpectomy changes of the left breast.  There is no mammographic evidence of malignancy. A 1 year screening mammogram is recommended.  MAMMOGRAPHY BI-RADS: 2 BENIGN    12/12/2018 Transabdominal and Transvaginal Ultrasound of the Pelvis  CLINICAL INDICATION: Intra-abdominal and pelvic swelling.  History of adnexal mass.  History of complete hysterectomy in 1979. LMP: History of hysterectomy.  Breast and thyroid neoplasm.  COMPARISON: Ultrasound pelvis 06/08/2018.  TECHNIQUE: Transabdominal ultrasound of the pelvis was performed utilizing the urinary bladder as a window.  Transvaginal ultrasound was then performed to better delineate pelvic structures.  Color Doppler and spectral duplex analysis of the ovaries was performed.  FINDINGS:  The uterus is not visualized, concordant with history of prior hysterectomy.  Unremarkable appearing vaginal cuff.  The ovaries are not visualized.  6.4 x 3.2 x 4.8 cm (volume 51 mL) roughly ovoid shaped, lesion containing scattered internal echoes and peripheral vascularity in the right adnexa previously measured 7.7 x 3.6  x 3.9 cm (volume 56 mL).  No free fluid.  IMPRESSION:  Relatively stable 6.4 cm fluid collection containing internal echoes.  This could represent a seroma, lymphocele with peritoneal inclusion cyst also consideration.  An epithelial ovarian neoplasm cannot be entirely excluded but is considered unlikely.  Recommend continued imaging follow-up.    8/29/20198 XR CHEST 2V  CLINICAL INDICATION: Shortness of breath.  COMPARISON: CXR from 06/12/2017.  FINDINGS AND IMPRESSION:  Stable, prominent cardiomediastinal silhouette.  Lungs are clear.  No pleural effusion or pneumothorax.  No acute osseous abnormality.    1/10/2017 bone density DEXA examination: Normal bone density noted.    ASSESSMENT AND PLAN:    1. Left breast ductal carcinoma in situ, stage 0, [id number],   low-grade, estrogen receptor +100%. She is status post left lumpectomy and left sentinel lymph node biopsy followed by postlumpectomy radiotherapy.  She started adjuvant endocrine therapy with anastrozole in October 2017.  She is tolerating anastrozole well.  Today she is without signs or symptoms suggestive of breast cancer recurrence. Recommend: Continue  anastrozole 1 mg daily for 5 year duration, through October 2021. Follow up bilateral screening mammogram due in December; ordered today.   Medical oncology follow up in 3 months.  2. Increased breast density.  Will order screening breast ultrasound to be completed at the time of her next screening mammography, ordered today.  3. Right adnexal lesion.  History of total abdominal hysterectomy with single oophorectomy 1969. She has been evaluated by gynecologic oncology for this issue.  Last pelvic ultrasound 7/2019.  Per Dr Storm’s notes she has decided to pursue a course of observation.  Recommended continues  follow-up with gynecologic oncology.  Follow up in December 2019.  4. Hirsutism.  Patient reports that this preceded anastrozole use and has progressively increased.  Elevated testosterone in April.  Patient was scheduled for repeat labs but never had them completed. At patients request will refer to endocrinology here. Referral placed today. Discussed with Gyn Onc team.   5. Fecal incontinence.  She has a sacral modulator in place.     6. Bone health.  Recommended calcium and vitamin D supplementation. Discussed weight bearing exercise.   1/10/2017 DEXA scan normal  bone density.  Repeat DEXA at the time of her next mammography, ordered today for December 2019..  7. Diabetes mellitus with episode of hypoglycemia this morning.  Encouraged patient to follow diabetic diet and routine follow up with PCP/endocrinology.  8. Personal history of thyroid cancer.  Unable to obtain results regarding this remote diagnosis. She has a neck CT scan with follow up at [facility] for this diagnosis.  She has no features of Cowden syndrome.    Risk level:  Moderate—adjuvant endocrine therapy.       Electronically reviewed and signed on 10/22/2019 3:21 PM  CC:  Dr. Brandon Loya; Dr. Francheska Smith; Dr. Brandon Velazquez; Dr. Sb Storm     caffeine

## 2024-03-25 NOTE — ASU PATIENT PROFILE, ADULT - NSICDXPASTSURGICALHX_GEN_ALL_CORE_FT
PAST SURGICAL HISTORY:  S/P laminectomy with spinal fusion cervical     PAST SURGICAL HISTORY:  Coronary stent patent     History of cholecystectomy     S/P laminectomy with spinal fusion cervical

## 2024-03-25 NOTE — ASU PATIENT PROFILE, ADULT - FALL HARM RISK - HARM RISK INTERVENTIONS

## 2024-03-25 NOTE — ASU PATIENT PROFILE, ADULT - ANESTHESIA, PREVIOUS REACTION, PROFILE
[facility]  Initial Interdisciplinary Treatment Plan NO      Original treatment plan Date & Time: 3/11/20 0930    Admission Type:  Admission Type: Voluntary    Reason for admission:   Reason for Admission: Patient had a lot of recent medication changes, states increase in anxiety, depression, and having suicidal thoughts. Was in to see Kenroy Oviedo NP as her outpatient provider who suggested inpatient hospitalization for stabilization.      Estimated Length of Stay:  5-7days  Estimated Discharge Date: to be determined by physician    PATIENT STRENGTHS:  Patient Strengths:Strengths: Employment, Positive Support, Medication Compliance, Social Skills  Patient Strengths and Limitations:Limitations: Difficulty problem solving/relies on others to help solve problems  Addictive Behavior: Addictive Behavior  In the past 3 months, have you felt or has someone told you that you have a problem with:  : None  Do you have a history of Chemical Use?: No  Do you have a history of Alcohol Use?: No  Do you have a history of Street Drug Abuse?: No  Histroy of Prescripton Drug Abuse?: No  Medical Problems:  Past Medical History:   Diagnosis Date    Anxiety     Depression     Gracie Carrillo infection     Hashimoto's disease     Mononucleosis     PTSD (post-traumatic stress disorder)      Status EXAM:Status and Exam  Normal: No  Facial Expression: Worried  Affect: Blunt  Level of Consciousness: Alert  Mood:Normal: No  Mood: Anxious, Depressed  Motor Activity:Normal: Yes  Interview Behavior: Cooperative  Preception: [place] to Person, [place] to Place, [place] to Situation, [place] to Time  Attention:Normal: Yes  Thought Content:Normal: No  Thought Content: Preoccupations  Hallucinations: None  Delusions: No  Memory:Normal: Yes  Insight and Judgment: No  Insight and Judgment: Poor Insight, Poor Judgment  Present Suicidal Ideation: No(denied)  Present Homicidal Ideation: No    EDUCATION:   Learner Progress Toward Treatment Goals: none

## 2024-03-25 NOTE — ASU PATIENT PROFILE, ADULT - NSICDXPASTMEDICALHX_GEN_ALL_CORE_FT
PAST MEDICAL HISTORY:  CAD (coronary artery disease) coronary Stent 6/2023    H/O cholecystitis     History of BPH     HLD (hyperlipidemia)     HTN (hypertension)     Hypothyroid      PAST MEDICAL HISTORY:  CAD (coronary artery disease) coronary Stent 6/2023    Diabetes mellitus     H/O cholecystitis     History of BPH     HLD (hyperlipidemia)     HTN (hypertension)     Hypothyroid

## 2024-03-26 ENCOUNTER — NON-APPOINTMENT (OUTPATIENT)
Age: 89
End: 2024-03-26

## 2024-03-26 ENCOUNTER — OUTPATIENT (OUTPATIENT)
Dept: OUTPATIENT SERVICES | Facility: HOSPITAL | Age: 89
LOS: 1 days | Discharge: HOME | End: 2024-03-26

## 2024-03-26 ENCOUNTER — APPOINTMENT (OUTPATIENT)
Dept: OPHTHALMOLOGY | Facility: AMBULATORY SURGERY CENTER | Age: 89
End: 2024-03-26
Payer: MEDICARE

## 2024-03-26 VITALS
TEMPERATURE: 97 F | SYSTOLIC BLOOD PRESSURE: 135 MMHG | WEIGHT: 145.28 LBS | RESPIRATION RATE: 16 BRPM | DIASTOLIC BLOOD PRESSURE: 81 MMHG | HEART RATE: 63 BPM | HEIGHT: 66 IN | OXYGEN SATURATION: 96 %

## 2024-03-26 VITALS
OXYGEN SATURATION: 95 % | TEMPERATURE: 97 F | DIASTOLIC BLOOD PRESSURE: 62 MMHG | SYSTOLIC BLOOD PRESSURE: 125 MMHG | RESPIRATION RATE: 16 BRPM | HEART RATE: 67 BPM

## 2024-03-26 DIAGNOSIS — Z90.49 ACQUIRED ABSENCE OF OTHER SPECIFIED PARTS OF DIGESTIVE TRACT: Chronic | ICD-10-CM

## 2024-03-26 DIAGNOSIS — Z98.1 ARTHRODESIS STATUS: Chronic | ICD-10-CM

## 2024-03-26 DIAGNOSIS — Z95.5 PRESENCE OF CORONARY ANGIOPLASTY IMPLANT AND GRAFT: Chronic | ICD-10-CM

## 2024-03-26 LAB — GLUCOSE BLDC GLUCOMTR-MCNC: 104 MG/DL — HIGH (ref 70–99)

## 2024-03-26 PROCEDURE — 6698F: CPT | Mod: RT

## 2024-03-26 PROCEDURE — V2787: CPT

## 2024-03-26 PROCEDURE — 66984 XCAPSL CTRC RMVL W/O ECP: CPT | Mod: RT

## 2024-03-26 DEVICE — IMPLANTABLE DEVICE
Type: IMPLANTABLE DEVICE | Site: RIGHT | Status: NON-FUNCTIONAL
Removed: 2024-03-26

## 2024-03-26 RX ORDER — ACETAMINOPHEN 500 MG
650 TABLET ORAL ONCE
Refills: 0 | Status: DISCONTINUED | OUTPATIENT
Start: 2024-03-26 | End: 2024-03-26

## 2024-03-26 RX ORDER — KETOROLAC TROMETHAMINE 0.5 %
1 DROPS OPHTHALMIC (EYE)
Refills: 0 | Status: COMPLETED | OUTPATIENT
Start: 2024-03-26 | End: 2024-03-26

## 2024-03-26 RX ORDER — TROPICAMIDE 1 %
1 DROPS OPHTHALMIC (EYE)
Refills: 0 | Status: COMPLETED | OUTPATIENT
Start: 2024-03-26 | End: 2024-03-26

## 2024-03-26 RX ORDER — PHENYLEPHRINE HCL 2.5 %
1 DROPS OPHTHALMIC (EYE)
Refills: 0 | Status: COMPLETED | OUTPATIENT
Start: 2024-03-26 | End: 2024-03-26

## 2024-03-26 RX ADMIN — Medication 1 DROP(S): at 09:38

## 2024-03-26 RX ADMIN — Medication 1 DROP(S): at 09:48

## 2024-03-26 RX ADMIN — Medication 1 DROP(S): at 09:57

## 2024-03-26 RX ADMIN — Medication 1 DROP(S): at 09:37

## 2024-03-26 NOTE — OPERATIVE REPORT - OPERATIVE RPOSRT DETAILS
SURGEON: Rosenda Mcpherson    ASSISTANT: none    PRE-OP DIAGNOSIS: cataract OD    POST-OP DIAGNOSIS: Same    ANESTHESIA: MAC, local    PROCEDURE: femto-assisted cataract extraction with intraocular lens placement, right eye    SPECIMEN/TISSUE REMOVED: None    ESTIMATED BLOOD LOSS: < 1mL    COMPLICATIONS: None    PROCEDURE:    The patient was seen in the preoperative area. The risks, benefits, and alternatives to surgery were discussed. All questions were answered.  The patient was given standard preoperative drops. The patient was then brought to the laser suite where the following laser treatment capsulorrhexis of 5.2mm on ScanCap mode with quadrant segmentation for dense lens along with intrastromal arcuate incisions at 9.0mm diameter of 015 degrees at the 175 degree axis.  The patient was then brought to the operating room and prepped and draped in the usual sterile fashion for ophthalmic surgery.    A lid speculum was used to expose the eye.  A paracentesis was created with an MVR blade at 10 o'clock hour from the temporal limbus in the clockwise direction.  Next, 1% Preservative-free Lidocaine was instilled into the anterior chamber followed by viscoat.  A clear corneal incision was created with the aid of a crescent blade and a 2.4 mm sharped tip keratome at the 8 o'clock position.  A cystotome and Utrata forceps was used to create a continuous curvilinear capsulorrhexis.  Balanced salt solution was used for hydro dissection.  The nucleus was then phacoemulsified and aspirated using a divide and conquer technique.  The remaining epinucleus and cortical material was aspirated from the eye.  The capsular bag was then reformed using provisc in anticipation of the introduction of an intraocular lens implant which is a [id number] +13.0D SN [id number] along the 175 degree axis.  The implant was injected into the capsular bag.  After centration, the remaining viscoelastic material was removed using the IA handpiece.    The temporal clear corneal and paracentesis incisions were hydrated with balanced salt solution to achieve adequate watertight closure. The wounds were checked for leaks and found to be negative.    An antibiotic was instilled into the eye, and the eye was shielded.    The patient tolerated the procedure well and was transferred to the recovery room in stable condition. They received standard postoperative instructions and an appointment to follow up the next day.

## 2024-03-27 ENCOUNTER — APPOINTMENT (OUTPATIENT)
Dept: OPHTHALMOLOGY | Facility: CLINIC | Age: 89
End: 2024-03-27
Payer: MEDICARE

## 2024-03-27 ENCOUNTER — NON-APPOINTMENT (OUTPATIENT)
Age: 89
End: 2024-03-27

## 2024-03-27 PROBLEM — E11.9 TYPE 2 DIABETES MELLITUS WITHOUT COMPLICATIONS: Chronic | Status: ACTIVE | Noted: 2024-03-26

## 2024-03-27 PROCEDURE — 99024 POSTOP FOLLOW-UP VISIT: CPT

## 2024-04-04 ENCOUNTER — APPOINTMENT (OUTPATIENT)
Dept: OPHTHALMOLOGY | Facility: CLINIC | Age: 89
End: 2024-04-04

## 2024-04-04 RX ORDER — SODIUM CHLORIDE 9 MG/ML
1000 INJECTION, SOLUTION INTRAVENOUS
Refills: 0 | Status: DISCONTINUED | OUTPATIENT
Start: 2024-04-09 | End: 2024-04-23

## 2024-04-05 ENCOUNTER — NON-APPOINTMENT (OUTPATIENT)
Age: 89
End: 2024-04-05

## 2024-04-05 ENCOUNTER — APPOINTMENT (OUTPATIENT)
Dept: OPHTHALMOLOGY | Facility: CLINIC | Age: 89
End: 2024-04-05
Payer: MEDICARE

## 2024-04-05 PROCEDURE — 99024 POSTOP FOLLOW-UP VISIT: CPT

## 2024-04-08 NOTE — ASU PATIENT PROFILE, ADULT - FALL HARM RISK - HARM RISK INTERVENTIONS

## 2024-04-08 NOTE — ASU PATIENT PROFILE, ADULT - NSICDXPASTSURGICALHX_GEN_ALL_CORE_FT
PAST SURGICAL HISTORY:  Coronary stent patent     History of cholecystectomy     S/P laminectomy with spinal fusion cervical

## 2024-04-08 NOTE — ASU PATIENT PROFILE, ADULT - NSICDXPASTMEDICALHX_GEN_ALL_CORE_FT
PAST MEDICAL HISTORY:  CAD (coronary artery disease) coronary Stent 6/2023    Diabetes mellitus     H/O cholecystitis     History of BPH     HLD (hyperlipidemia)     HTN (hypertension)     Hypothyroid

## 2024-04-09 ENCOUNTER — APPOINTMENT (OUTPATIENT)
Dept: OPHTHALMOLOGY | Facility: AMBULATORY SURGERY CENTER | Age: 89
End: 2024-04-09
Payer: MEDICARE

## 2024-04-09 ENCOUNTER — NON-APPOINTMENT (OUTPATIENT)
Age: 89
End: 2024-04-09

## 2024-04-09 ENCOUNTER — OUTPATIENT (OUTPATIENT)
Dept: OUTPATIENT SERVICES | Facility: HOSPITAL | Age: 89
LOS: 1 days | Discharge: HOME | End: 2024-04-09

## 2024-04-09 VITALS
WEIGHT: 148.37 LBS | SYSTOLIC BLOOD PRESSURE: 115 MMHG | DIASTOLIC BLOOD PRESSURE: 70 MMHG | TEMPERATURE: 98 F | HEIGHT: 65 IN | RESPIRATION RATE: 16 BRPM | OXYGEN SATURATION: 98 % | HEART RATE: 69 BPM

## 2024-04-09 VITALS
OXYGEN SATURATION: 98 % | HEART RATE: 70 BPM | RESPIRATION RATE: 16 BRPM | DIASTOLIC BLOOD PRESSURE: 60 MMHG | TEMPERATURE: 97 F | SYSTOLIC BLOOD PRESSURE: 105 MMHG

## 2024-04-09 DIAGNOSIS — Z98.1 ARTHRODESIS STATUS: Chronic | ICD-10-CM

## 2024-04-09 DIAGNOSIS — Z95.5 PRESENCE OF CORONARY ANGIOPLASTY IMPLANT AND GRAFT: Chronic | ICD-10-CM

## 2024-04-09 DIAGNOSIS — Z90.49 ACQUIRED ABSENCE OF OTHER SPECIFIED PARTS OF DIGESTIVE TRACT: Chronic | ICD-10-CM

## 2024-04-09 LAB — GLUCOSE BLDC GLUCOMTR-MCNC: 124 MG/DL — HIGH (ref 70–99)

## 2024-04-09 PROCEDURE — V2787: CPT

## 2024-04-09 PROCEDURE — 6698F: CPT | Mod: LT

## 2024-04-09 PROCEDURE — 66984 XCAPSL CTRC RMVL W/O ECP: CPT | Mod: 79,LT

## 2024-04-09 DEVICE — IMPLANTABLE DEVICE
Type: IMPLANTABLE DEVICE | Site: LEFT | Status: NON-FUNCTIONAL
Removed: 2024-04-09

## 2024-04-09 RX ORDER — PHENYLEPHRINE HCL 2.5 %
1 DROPS OPHTHALMIC (EYE)
Refills: 0 | Status: COMPLETED | OUTPATIENT
Start: 2024-04-09 | End: 2024-04-09

## 2024-04-09 RX ORDER — TROPICAMIDE 1 %
1 DROPS OPHTHALMIC (EYE)
Refills: 0 | Status: COMPLETED | OUTPATIENT
Start: 2024-04-09 | End: 2024-04-09

## 2024-04-09 RX ORDER — ACETAMINOPHEN 500 MG
650 TABLET ORAL ONCE
Refills: 0 | Status: DISCONTINUED | OUTPATIENT
Start: 2024-04-09 | End: 2024-04-23

## 2024-04-09 RX ORDER — KETOROLAC TROMETHAMINE 0.5 %
1 DROPS OPHTHALMIC (EYE)
Refills: 0 | Status: COMPLETED | OUTPATIENT
Start: 2024-04-09 | End: 2024-04-09

## 2024-04-09 RX ADMIN — Medication 1 DROP(S): at 11:35

## 2024-04-09 RX ADMIN — Medication 1 DROP(S): at 11:40

## 2024-04-09 RX ADMIN — Medication 1 DROP(S): at 11:45

## 2024-04-09 NOTE — OPERATIVE REPORT - OPERATIVE RPOSRT DETAILS
SURGEON: Rosenda Mcpherson    ASSISTANT: none    PRE-OP DIAGNOSIS: cataract OS    POST-OP DIAGNOSIS: Same    ANESTHESIA: MAC, local    PROCEDURE: laser assisted cataract extraction with intraocular lens placement, left eye    SPECIMEN/TISSUE REMOVED: None    ESTIMATED BLOOD LOSS: < 1mL    COMPLICATIONS: None    PROCEDURE:    The patient was seen in the preoperative area. The risks, benefits, and alternatives to surgery were discussed. All questions were answered.  The patient was given standard preoperative drops. The horizontal axis was marked along the corneal limbus while the patient was in a sitting, upright position. The patient was then brought to the laser suite where the following treatment was administered: femtosecond laser capsulotomy of 5.2mm on ScanCap with segmentation quadrant on dense.  The patient was then brought to the operating room and prepped and draped in the usual sterile fashion for ophthalmic surgery.    A lid speculum was used to expose the eye.  A paracentesis was created with an MVR blade at 4 o'clock hour from the temporal limbus in the clockwise direction.  Next, 1% Preservative-free Lidocaine was instilled into the anterior chamber followed by viscoat.  A clear corneal incision was created with the aid of a crescent blade and a 2.4 mm sharped tip keratome at the 2 o'clock position.  A femtosecond laser spatula along with Utrata forceps were used to remove the anterior portion of the capsulorrhexis.  Balanced salt solution was used for hydrodissection.  The nucleus was then phacoemulsified and aspirated using a divide and conquer technique.  The remaining epinucleus and cortical material was aspirated from the eye.  The capsular bag was then reformed using provisc in anticipation of the introduction of an intraocular lens implant.  The implant which is a ** was injected into the capsular bag.  After centration with the toric markers aligned at the 175 axis, the remaining viscoelastic material was removed using the IA handpiece.    The temporal clear corneal and paracentesis incisions were hydrated with balanced salt solution to achieve adequate watertight closure. The wounds were checked for leaks and found to be negative.    An antibiotic was instilled into the eye, and the eye was shielded.    The patient tolerated the procedure well and was transferred to the recovery room in stable condition. They received standard postoperative instructions and an appointment to follow up the next day.   SURGEON: Rosenda Mcpherson    ASSISTANT: none    PRE-OP DIAGNOSIS: cataract OS    POST-OP DIAGNOSIS: Same    ANESTHESIA: MAC, local    PROCEDURE: laser assisted cataract extraction with intraocular lens placement, left eye    SPECIMEN/TISSUE REMOVED: None    ESTIMATED BLOOD LOSS: < 1mL    COMPLICATIONS: None    PROCEDURE:    The patient was seen in the preoperative area. The risks, benefits, and alternatives to surgery were discussed. All questions were answered.  The patient was given standard preoperative drops. The horizontal axis was marked along the corneal limbus while the patient was in a sitting, upright position. The patient was then brought to the laser suite where the following treatment was administered: femtosecond laser capsulotomy of 5.2mm on ScanCap with segmentation quadrant on dense.  The patient was then brought to the operating room and prepped and draped in the usual sterile fashion for ophthalmic surgery.    A lid speculum was used to expose the eye.  A paracentesis was created with an MVR blade at 4 o'clock hour from the temporal limbus in the clockwise direction.  Next, 1% Preservative-free Lidocaine was instilled into the anterior chamber followed by viscoat.  A clear corneal incision was created with the aid of a crescent blade and a 2.4 mm sharped tip keratome at the 2 o'clock position.  A femtosecond laser spatula along with Utrata forceps were used to remove the anterior portion of the capsulorrhexis.  Balanced salt solution was used for hydrodissection.  The nucleus was then phacoemulsified and aspirated using a divide and conquer technique.  The remaining epinucleus and cortical material was aspirated from the eye.  The capsular bag was then reformed using provisc in anticipation of the introduction of an intraocular lens implant.  The implant which is a [id number] +14.5D [id number] was injected into the capsular bag.  After centration with the toric markers aligned at the 175 axis, the remaining viscoelastic material was removed using the IA handpiece.    The temporal clear corneal and paracentesis incisions were hydrated with balanced salt solution to achieve adequate watertight closure. The wounds were checked for leaks and found to be negative.    An antibiotic was instilled into the eye, and the eye was shielded.    The patient tolerated the procedure well and was transferred to the recovery room in stable condition. They received standard postoperative instructions and an appointment to follow up the next day.

## 2024-04-10 ENCOUNTER — APPOINTMENT (OUTPATIENT)
Dept: OPHTHALMOLOGY | Facility: CLINIC | Age: 89
End: 2024-04-10

## 2024-04-10 ENCOUNTER — INPATIENT (INPATIENT)
Facility: HOSPITAL | Age: 89
LOS: 2 days | Discharge: HOME | End: 2024-04-13
Attending: INTERNAL MEDICINE | Admitting: INTERNAL MEDICINE
Payer: MEDICARE

## 2024-04-10 VITALS
TEMPERATURE: 98 F | HEART RATE: 64 BPM | HEIGHT: 65 IN | SYSTOLIC BLOOD PRESSURE: 143 MMHG | OXYGEN SATURATION: 98 % | RESPIRATION RATE: 18 BRPM | DIASTOLIC BLOOD PRESSURE: 72 MMHG

## 2024-04-10 DIAGNOSIS — N17.9 ACUTE KIDNEY FAILURE, UNSPECIFIED: ICD-10-CM

## 2024-04-10 DIAGNOSIS — Z98.1 ARTHRODESIS STATUS: Chronic | ICD-10-CM

## 2024-04-10 DIAGNOSIS — Z98.49 CATARACT EXTRACTION STATUS, UNSPECIFIED EYE: ICD-10-CM

## 2024-04-10 DIAGNOSIS — Z90.49 ACQUIRED ABSENCE OF OTHER SPECIFIED PARTS OF DIGESTIVE TRACT: Chronic | ICD-10-CM

## 2024-04-10 DIAGNOSIS — E03.9 HYPOTHYROIDISM, UNSPECIFIED: ICD-10-CM

## 2024-04-10 DIAGNOSIS — E78.5 HYPERLIPIDEMIA, UNSPECIFIED: ICD-10-CM

## 2024-04-10 DIAGNOSIS — Z95.5 PRESENCE OF CORONARY ANGIOPLASTY IMPLANT AND GRAFT: Chronic | ICD-10-CM

## 2024-04-10 DIAGNOSIS — R55 SYNCOPE AND COLLAPSE: ICD-10-CM

## 2024-04-10 DIAGNOSIS — I25.10 ATHEROSCLEROTIC HEART DISEASE OF NATIVE CORONARY ARTERY WITHOUT ANGINA PECTORIS: ICD-10-CM

## 2024-04-10 DIAGNOSIS — E11.9 TYPE 2 DIABETES MELLITUS WITHOUT COMPLICATIONS: ICD-10-CM

## 2024-04-10 DIAGNOSIS — D72.829 ELEVATED WHITE BLOOD CELL COUNT, UNSPECIFIED: ICD-10-CM

## 2024-04-10 DIAGNOSIS — D64.9 ANEMIA, UNSPECIFIED: ICD-10-CM

## 2024-04-10 LAB
ANION GAP SERPL CALC-SCNC: 9 MMOL/L — SIGNIFICANT CHANGE UP (ref 5–17)
ANISOCYTOSIS BLD QL: SLIGHT — SIGNIFICANT CHANGE UP
APTT BLD: 31.5 SEC — SIGNIFICANT CHANGE UP (ref 24.5–35.6)
BASOPHILS # BLD AUTO: 0 K/UL — SIGNIFICANT CHANGE UP (ref 0–0.2)
BASOPHILS NFR BLD AUTO: 0 % — SIGNIFICANT CHANGE UP (ref 0–2)
BUN SERPL-MCNC: 17 MG/DL — SIGNIFICANT CHANGE UP (ref 7–23)
BURR CELLS BLD QL SMEAR: PRESENT — SIGNIFICANT CHANGE UP
CALCIUM SERPL-MCNC: 8.9 MG/DL — SIGNIFICANT CHANGE UP (ref 8.4–10.5)
CHLORIDE SERPL-SCNC: 104 MMOL/L — SIGNIFICANT CHANGE UP (ref 96–108)
CK MB CFR SERPL CALC: 2.8 NG/ML — SIGNIFICANT CHANGE UP (ref 0–6.7)
CK SERPL-CCNC: 154 U/L — SIGNIFICANT CHANGE UP (ref 30–200)
CO2 SERPL-SCNC: 23 MMOL/L — SIGNIFICANT CHANGE UP (ref 22–31)
CREAT SERPL-MCNC: 1.34 MG/DL — HIGH (ref 0.5–1.3)
EGFR: 52 ML/MIN/1.73M2 — LOW
EOSINOPHIL # BLD AUTO: 0.11 K/UL — SIGNIFICANT CHANGE UP (ref 0–0.5)
EOSINOPHIL NFR BLD AUTO: 0.9 % — SIGNIFICANT CHANGE UP (ref 0–6)
GLUCOSE BLDC GLUCOMTR-MCNC: 171 MG/DL — HIGH (ref 70–99)
GLUCOSE SERPL-MCNC: 124 MG/DL — HIGH (ref 70–99)
HCT VFR BLD CALC: 30.1 % — LOW (ref 39–50)
HGB BLD-MCNC: 9.2 G/DL — LOW (ref 13–17)
HYPOCHROMIA BLD QL: SIGNIFICANT CHANGE UP
INR BLD: 0.99 — SIGNIFICANT CHANGE UP (ref 0.85–1.18)
LYMPHOCYTES # BLD AUTO: 0.76 K/UL — LOW (ref 1–3.3)
LYMPHOCYTES # BLD AUTO: 6.2 % — LOW (ref 13–44)
MANUAL SMEAR VERIFICATION: SIGNIFICANT CHANGE UP
MCHC RBC-ENTMCNC: 20.8 PG — LOW (ref 27–34)
MCHC RBC-ENTMCNC: 30.6 GM/DL — LOW (ref 32–36)
MCV RBC AUTO: 67.9 FL — LOW (ref 80–100)
MICROCYTES BLD QL: SLIGHT — SIGNIFICANT CHANGE UP
MONOCYTES # BLD AUTO: 0.76 K/UL — SIGNIFICANT CHANGE UP (ref 0–0.9)
MONOCYTES NFR BLD AUTO: 6.2 % — SIGNIFICANT CHANGE UP (ref 2–14)
NEUTROPHILS # BLD AUTO: 10.61 K/UL — HIGH (ref 1.8–7.4)
NEUTROPHILS NFR BLD AUTO: 86.7 % — HIGH (ref 43–77)
OVALOCYTES BLD QL SMEAR: SLIGHT — SIGNIFICANT CHANGE UP
PLAT MORPH BLD: ABNORMAL
PLATELET # BLD AUTO: 330 K/UL — SIGNIFICANT CHANGE UP (ref 150–400)
POIKILOCYTOSIS BLD QL AUTO: SIGNIFICANT CHANGE UP
POLYCHROMASIA BLD QL SMEAR: SIGNIFICANT CHANGE UP
POTASSIUM SERPL-MCNC: 4.8 MMOL/L — SIGNIFICANT CHANGE UP (ref 3.5–5.3)
POTASSIUM SERPL-SCNC: 4.8 MMOL/L — SIGNIFICANT CHANGE UP (ref 3.5–5.3)
PROTHROM AB SERPL-ACNC: 11.3 SEC — SIGNIFICANT CHANGE UP (ref 9.5–13)
RBC # BLD: 4.43 M/UL — SIGNIFICANT CHANGE UP (ref 4.2–5.8)
RBC # FLD: 17.7 % — HIGH (ref 10.3–14.5)
RBC BLD AUTO: ABNORMAL
SCHISTOCYTES BLD QL AUTO: SLIGHT — SIGNIFICANT CHANGE UP
SODIUM SERPL-SCNC: 136 MMOL/L — SIGNIFICANT CHANGE UP (ref 135–145)
TARGETS BLD QL SMEAR: SLIGHT — SIGNIFICANT CHANGE UP
TROPONIN T, HIGH SENSITIVITY RESULT: 19 NG/L — SIGNIFICANT CHANGE UP (ref 0–51)
TROPONIN T, HIGH SENSITIVITY RESULT: 20 NG/L — SIGNIFICANT CHANGE UP (ref 0–51)
WBC # BLD: 12.24 K/UL — HIGH (ref 3.8–10.5)
WBC # FLD AUTO: 12.24 K/UL — HIGH (ref 3.8–10.5)

## 2024-04-10 PROCEDURE — 70498 CT ANGIOGRAPHY NECK: CPT | Mod: 26,MC

## 2024-04-10 PROCEDURE — 70450 CT HEAD/BRAIN W/O DYE: CPT | Mod: 26,59,MC

## 2024-04-10 PROCEDURE — 71045 X-RAY EXAM CHEST 1 VIEW: CPT | Mod: 26

## 2024-04-10 PROCEDURE — 0042T: CPT

## 2024-04-10 PROCEDURE — 93010 ELECTROCARDIOGRAM REPORT: CPT

## 2024-04-10 PROCEDURE — 99223 1ST HOSP IP/OBS HIGH 75: CPT

## 2024-04-10 PROCEDURE — 95718 EEG PHYS/QHP 2-12 HR W/VEEG: CPT

## 2024-04-10 PROCEDURE — 70496 CT ANGIOGRAPHY HEAD: CPT | Mod: 26,MC

## 2024-04-10 PROCEDURE — 99291 CRITICAL CARE FIRST HOUR: CPT

## 2024-04-10 RX ORDER — ISOSORBIDE MONONITRATE 60 MG/1
30 TABLET, EXTENDED RELEASE ORAL ONCE
Refills: 0 | Status: COMPLETED | OUTPATIENT
Start: 2024-04-10 | End: 2024-04-10

## 2024-04-10 RX ORDER — PREDNISOLONE SODIUM PHOSPHATE 1 %
1 DROPS OPHTHALMIC (EYE) DAILY
Refills: 0 | Status: DISCONTINUED | OUTPATIENT
Start: 2024-04-10 | End: 2024-04-10

## 2024-04-10 RX ORDER — DEXTROSE 50 % IN WATER 50 %
15 SYRINGE (ML) INTRAVENOUS ONCE
Refills: 0 | Status: DISCONTINUED | OUTPATIENT
Start: 2024-04-10 | End: 2024-04-13

## 2024-04-10 RX ORDER — ATORVASTATIN CALCIUM 80 MG/1
80 TABLET, FILM COATED ORAL AT BEDTIME
Refills: 0 | Status: DISCONTINUED | OUTPATIENT
Start: 2024-04-10 | End: 2024-04-13

## 2024-04-10 RX ORDER — PREDNISOLONE SODIUM PHOSPHATE 1 %
1 DROPS OPHTHALMIC (EYE)
Refills: 0 | Status: DISCONTINUED | OUTPATIENT
Start: 2024-04-10 | End: 2024-04-13

## 2024-04-10 RX ORDER — CLOPIDOGREL BISULFATE 75 MG/1
75 TABLET, FILM COATED ORAL DAILY
Refills: 0 | Status: DISCONTINUED | OUTPATIENT
Start: 2024-04-10 | End: 2024-04-11

## 2024-04-10 RX ORDER — DEXTROSE 50 % IN WATER 50 %
12.5 SYRINGE (ML) INTRAVENOUS ONCE
Refills: 0 | Status: DISCONTINUED | OUTPATIENT
Start: 2024-04-10 | End: 2024-04-13

## 2024-04-10 RX ORDER — POLYETHYLENE GLYCOL 3350 17 G/17G
17 POWDER, FOR SOLUTION ORAL DAILY
Refills: 0 | Status: DISCONTINUED | OUTPATIENT
Start: 2024-04-10 | End: 2024-04-13

## 2024-04-10 RX ORDER — SENNA PLUS 8.6 MG/1
2 TABLET ORAL AT BEDTIME
Refills: 0 | Status: DISCONTINUED | OUTPATIENT
Start: 2024-04-10 | End: 2024-04-13

## 2024-04-10 RX ORDER — OFLOXACIN 0.3 %
1 DROPS OPHTHALMIC (EYE)
Refills: 0 | Status: DISCONTINUED | OUTPATIENT
Start: 2024-04-10 | End: 2024-04-13

## 2024-04-10 RX ORDER — PREDNISOLONE SODIUM PHOSPHATE 1 %
1 DROPS OPHTHALMIC (EYE)
Refills: 0 | DISCHARGE

## 2024-04-10 RX ORDER — INSULIN LISPRO 100/ML
VIAL (ML) SUBCUTANEOUS
Refills: 0 | Status: DISCONTINUED | OUTPATIENT
Start: 2024-04-10 | End: 2024-04-13

## 2024-04-10 RX ORDER — DEXTROSE 10 % IN WATER 10 %
125 INTRAVENOUS SOLUTION INTRAVENOUS ONCE
Refills: 0 | Status: DISCONTINUED | OUTPATIENT
Start: 2024-04-10 | End: 2024-04-13

## 2024-04-10 RX ORDER — SODIUM CHLORIDE 9 MG/ML
1000 INJECTION, SOLUTION INTRAVENOUS
Refills: 0 | Status: DISCONTINUED | OUTPATIENT
Start: 2024-04-10 | End: 2024-04-13

## 2024-04-10 RX ORDER — TAMSULOSIN HYDROCHLORIDE 0.4 MG/1
0.4 CAPSULE ORAL AT BEDTIME
Refills: 0 | Status: DISCONTINUED | OUTPATIENT
Start: 2024-04-10 | End: 2024-04-13

## 2024-04-10 RX ORDER — KETOROLAC TROMETHAMINE 0.5 %
1 DROPS OPHTHALMIC (EYE)
Refills: 0 | DISCHARGE

## 2024-04-10 RX ORDER — DEXTROSE 50 % IN WATER 50 %
25 SYRINGE (ML) INTRAVENOUS ONCE
Refills: 0 | Status: DISCONTINUED | OUTPATIENT
Start: 2024-04-10 | End: 2024-04-13

## 2024-04-10 RX ORDER — KETOROLAC TROMETHAMINE 0.5 %
1 DROPS OPHTHALMIC (EYE) THREE TIMES A DAY
Refills: 0 | Status: DISCONTINUED | OUTPATIENT
Start: 2024-04-10 | End: 2024-04-13

## 2024-04-10 RX ORDER — ASPIRIN/CALCIUM CARB/MAGNESIUM 324 MG
81 TABLET ORAL DAILY
Refills: 0 | Status: DISCONTINUED | OUTPATIENT
Start: 2024-04-10 | End: 2024-04-13

## 2024-04-10 RX ORDER — GLUCAGON INJECTION, SOLUTION 0.5 MG/.1ML
1 INJECTION, SOLUTION SUBCUTANEOUS ONCE
Refills: 0 | Status: DISCONTINUED | OUTPATIENT
Start: 2024-04-10 | End: 2024-04-13

## 2024-04-10 RX ORDER — OFLOXACIN 0.3 %
1 DROPS OPHTHALMIC (EYE)
Refills: 0 | DISCHARGE

## 2024-04-10 RX ORDER — SODIUM CHLORIDE 9 MG/ML
1000 INJECTION INTRAMUSCULAR; INTRAVENOUS; SUBCUTANEOUS ONCE
Refills: 0 | Status: COMPLETED | OUTPATIENT
Start: 2024-04-10 | End: 2024-04-10

## 2024-04-10 RX ORDER — LEVOTHYROXINE SODIUM 125 MCG
75 TABLET ORAL DAILY
Refills: 0 | Status: DISCONTINUED | OUTPATIENT
Start: 2024-04-10 | End: 2024-04-13

## 2024-04-10 RX ORDER — INSULIN LISPRO 100/ML
VIAL (ML) SUBCUTANEOUS AT BEDTIME
Refills: 0 | Status: DISCONTINUED | OUTPATIENT
Start: 2024-04-10 | End: 2024-04-13

## 2024-04-10 RX ORDER — METOPROLOL TARTRATE 50 MG
12.5 TABLET ORAL DAILY
Refills: 0 | Status: DISCONTINUED | OUTPATIENT
Start: 2024-04-10 | End: 2024-04-13

## 2024-04-10 RX ORDER — LIDOCAINE 4 G/100G
10 CREAM TOPICAL ONCE
Refills: 0 | Status: COMPLETED | OUTPATIENT
Start: 2024-04-10 | End: 2024-04-10

## 2024-04-10 RX ORDER — ISOSORBIDE MONONITRATE 60 MG/1
30 TABLET, EXTENDED RELEASE ORAL EVERY 24 HOURS
Refills: 0 | Status: DISCONTINUED | OUTPATIENT
Start: 2024-04-11 | End: 2024-04-11

## 2024-04-10 RX ORDER — METOPROLOL TARTRATE 50 MG
25 TABLET ORAL DAILY
Refills: 0 | Status: DISCONTINUED | OUTPATIENT
Start: 2024-04-10 | End: 2024-04-10

## 2024-04-10 RX ADMIN — Medication 1 DROP(S): at 22:21

## 2024-04-10 RX ADMIN — Medication 1 DROP(S): at 22:40

## 2024-04-10 RX ADMIN — SENNA PLUS 2 TABLET(S): 8.6 TABLET ORAL at 22:22

## 2024-04-10 RX ADMIN — LIDOCAINE 10 MILLILITER(S): 4 CREAM TOPICAL at 22:20

## 2024-04-10 RX ADMIN — SODIUM CHLORIDE 1000 MILLILITER(S): 9 INJECTION INTRAMUSCULAR; INTRAVENOUS; SUBCUTANEOUS at 13:22

## 2024-04-10 RX ADMIN — Medication 81 MILLIGRAM(S): at 19:29

## 2024-04-10 RX ADMIN — ATORVASTATIN CALCIUM 80 MILLIGRAM(S): 80 TABLET, FILM COATED ORAL at 22:22

## 2024-04-10 RX ADMIN — CLOPIDOGREL BISULFATE 75 MILLIGRAM(S): 75 TABLET, FILM COATED ORAL at 19:29

## 2024-04-10 RX ADMIN — Medication 12.5 MILLIGRAM(S): at 22:24

## 2024-04-10 RX ADMIN — TAMSULOSIN HYDROCHLORIDE 0.4 MILLIGRAM(S): 0.4 CAPSULE ORAL at 22:22

## 2024-04-10 RX ADMIN — Medication 30 MILLILITER(S): at 19:29

## 2024-04-10 RX ADMIN — ISOSORBIDE MONONITRATE 30 MILLIGRAM(S): 60 TABLET, EXTENDED RELEASE ORAL at 19:55

## 2024-04-10 RX ADMIN — Medication 1 DROP(S): at 22:20

## 2024-04-10 NOTE — PATIENT PROFILE ADULT - FALL HARM RISK - HARM RISK INTERVENTIONS

## 2024-04-10 NOTE — H&P ADULT - PROBLEM SELECTOR PLAN 9
Follow up lipid panel in AM   - Continue Atorvastatin 80 mg QHS    DVT ppx:  F: none indicated  E: Keep K > 4, Mg > 2  N: DASH/TLC w/ CC     Code: Full  Dispo: pending clinical progression     Case discussed with Dr. Thomas. Follow up lipid panel in AM   - Continue Atorvastatin 80 mg QHS    DVT ppx: SCDs (holding i/s/o anemia)   F: none indicated  E: Keep K > 4, Mg > 2  N: DASH/TLC w/ CC     Code: Full  Dispo: pending clinical progression     Case discussed with Dr. Thomas.

## 2024-04-10 NOTE — CONSULT NOTE ADULT - SUBJECTIVE AND OBJECTIVE BOX
**STROKE CODE CONSULT NOTE**    Last known well time/Time of onset of symptoms: 0715AM    HPI: 88y Male with PMHx of HLD, HTN, DM, CAD s/p PCI June 2023, BPH, hypothyroidism presents to ED from [facility] where patient was about to get a post op cataract surgery check and developed transient right gaze deviation and became unresponsive with blank stare for 2 mins. Daughter reports same presentation 2 weeks ago happened and patient's cardiologist was concerned for a cardiac etiology and so patient had a holter monitor placed. Patient is now back to his baseline however daughter did note a left nasolabial fold flattening in the ED.     T(C): 36.5 (04-10-24 @ 07:58), Max: 36.5 (04-10-24 @ 07:58)  HR: 64 (04-10-24 @ 07:58) (64 - 64)  BP: 143/72 (04-10-24 @ 07:58) (143/72 - 143/72)  RR: 18 (04-10-24 @ 07:58) (18 - 18)  SpO2: 98% (04-10-24 @ 07:58) (98% - 98%)    PAST MEDICAL & SURGICAL HISTORY:  HLD (hyperlipidemia)      Hypothyroid      History of BPH      CAD (coronary artery disease)  coronary Stent 6/2023      HTN (hypertension)      H/O cholecystitis      Diabetes mellitus      S/P laminectomy with spinal fusion  cervical      History of cholecystectomy      Coronary stent patent          FAMILY HISTORY:      ROS:   see HPI    MEDICATIONS  (STANDING):    MEDICATIONS  (PRN):    Allergies    shellfish (Swelling; Hives)  No Known Drug Allergies    Intolerances      Vital Signs Last 24 Hrs  T(C): 36.5 (10 Apr 2024 07:58), Max: 36.9 (09 Apr 2024 11:11)  T(F): 97.7 (10 Apr 2024 07:58), Max: 98.4 (09 Apr 2024 11:11)  HR: 64 (10 Apr 2024 07:58) (64 - 70)  BP: 143/72 (10 Apr 2024 07:58) (105/60 - 143/72)  BP(mean): --  RR: 18 (10 Apr 2024 07:58) (16 - 18)  SpO2: 98% (10 Apr 2024 07:58) (97% - 98%)    Parameters below as of 10 Apr 2024 07:58  Patient On (Oxygen Delivery Method): room air      Neurologic:  -Mental status: Awake, alert, oriented to person, place, and time. Speech is fluent with intact naming, repetition, and comprehension, no dysarthria. Recent and remote memory intact. Follows commands. Attention/concentration intact.    -Cranial nerves:   II: Visual fields are full to confrontation.  III, IV, VI: Extraocular movements are intact without nystagmus. Pupils equally round and reactive to light  V:  Facial sensation V1-V3 equal and intact   VII: subtle L NLFF  VIII: Hearing is bilaterally intact to finger rub  IX, X: Uvula is midline and soft palate rises symmetrically  XI: Head turning and shoulder shrug are intact.  XII: Tongue protrudes midline  Motor: Normal bulk and tone. No pronator drift. Strength bilateral upper extremity 5/5, bilateral lower extremities 5/5.  Sensation: Intact to light touch bilaterally. No neglect or extinction on double simultaneous testing.  Coordination: No dysmetria on finger-to-nose bilaterally  Gait: Narrow gait and steady    NIHSS: 1    Fingerstick Blood Glucose: CAPILLARY BLOOD GLUCOSE  124 (10 Apr 2024 08:33)      POCT Blood Glucose.: 124 mg/dL (10 Apr 2024 07:53)    LABS:                        9.2    12.24 )-----------( 330      ( 10 Apr 2024 08:04 )             30.1     04-10    136  |  104  |  17  ----------------------------<  124<H>  4.8   |  23  |  1.34<H>    Ca    8.9      10 Apr 2024 08:04      PT/INR - ( 10 Apr 2024 08:04 )   PT: 11.3 sec;   INR: 0.99          PTT - ( 10 Apr 2024 08:04 )  PTT:31.5 sec      Urinalysis Basic - ( 10 Apr 2024 08:04 )    Color: x / Appearance: x / SG: x / pH: x  Gluc: 124 mg/dL / Ketone: x  / Bili: x / Urobili: x   Blood: x / Protein: x / Nitrite: x   Leuk Esterase: x / RBC: x / WBC x   Sq Epi: x / Non Sq Epi: x / Bacteria: x        RADIOLOGY & ADDITIONAL STUDIES:      -----------------------------------------------------------------------------------------------------------------  IV-tPA (Y/N):    ***                              Bolus time:    Alteplase Dose Verification w/ RN:  Reason IV-tPA not given: ***    **STROKE CODE CONSULT NOTE**    Last known well time/Time of onset of symptoms: 0715AM    HPI: 88y Male with PMHx of HLD, HTN, DM, CAD s/p PCI June 2023, BPH, hypothyroidism presents to ED from [facility] where patient was about to get a post op cataract surgery check and developed transient right gaze deviation and became unresponsive with blank stare for 2 mins. Daughter reports same presentation 2 weeks ago happened and patient's cardiologist was concerned for a cardiac etiology and so patient had a holter monitor placed. Patient is now back to his baseline however daughter did note a left nasolabial fold flattening in the ED.     T(C): 36.5 (04-10-24 @ 07:58), Max: 36.5 (04-10-24 @ 07:58)  HR: 64 (04-10-24 @ 07:58) (64 - 64)  BP: 143/72 (04-10-24 @ 07:58) (143/72 - 143/72)  RR: 18 (04-10-24 @ 07:58) (18 - 18)  SpO2: 98% (04-10-24 @ 07:58) (98% - 98%)    PAST MEDICAL & SURGICAL HISTORY:  HLD (hyperlipidemia)      Hypothyroid      History of BPH      CAD (coronary artery disease)  coronary Stent 6/2023      HTN (hypertension)      H/O cholecystitis      Diabetes mellitus      S/P laminectomy with spinal fusion  cervical      History of cholecystectomy      Coronary stent patent          FAMILY HISTORY:      ROS:   see HPI    MEDICATIONS  (STANDING):    MEDICATIONS  (PRN):    Allergies    shellfish (Swelling; Hives)  No Known Drug Allergies    Intolerances      Vital Signs Last 24 Hrs  T(C): 36.5 (10 Apr 2024 07:58), Max: 36.9 (09 Apr 2024 11:11)  T(F): 97.7 (10 Apr 2024 07:58), Max: 98.4 (09 Apr 2024 11:11)  HR: 64 (10 Apr 2024 07:58) (64 - 70)  BP: 143/72 (10 Apr 2024 07:58) (105/60 - 143/72)  BP(mean): --  RR: 18 (10 Apr 2024 07:58) (16 - 18)  SpO2: 98% (10 Apr 2024 07:58) (97% - 98%)    Parameters below as of 10 Apr 2024 07:58  Patient On (Oxygen Delivery Method): room air      Neurologic:  -Mental status: Awake, alert, oriented to person, place, and time. Speech is fluent with intact naming, repetition, and comprehension, no dysarthria. Recent and remote memory intact. Follows commands. Attention/concentration intact.    -Cranial nerves:   II: Visual fields are full to confrontation.  III, IV, VI: Extraocular movements are intact without nystagmus. Pupils equally round and reactive to light  V:  Facial sensation V1-V3 equal and intact   VII: subtle L NLFF  VIII: Hearing is bilaterally intact to finger rub  IX, X: Uvula is midline and soft palate rises symmetrically  XI: Head turning and shoulder shrug are intact.  XII: Tongue protrudes midline  Motor: Normal bulk and tone. No pronator drift. Strength bilateral upper extremity 5/5, bilateral lower extremities 5/5.  Sensation: Intact to light touch bilaterally. No neglect or extinction on double simultaneous testing.  Coordination: No dysmetria on finger-to-nose bilaterally  Gait: Narrow gait and steady    NIHSS: 1    Fingerstick Blood Glucose: CAPILLARY BLOOD GLUCOSE  124 (10 Apr 2024 08:33)      POCT Blood Glucose.: 124 mg/dL (10 Apr 2024 07:53)    LABS:                        9.2    12.24 )-----------( 330      ( 10 Apr 2024 08:04 )             30.1     04-10    136  |  104  |  17  ----------------------------<  124<H>  4.8   |  23  |  1.34<H>    Ca    8.9      10 Apr 2024 08:04      PT/INR - ( 10 Apr 2024 08:04 )   PT: 11.3 sec;   INR: 0.99          PTT - ( 10 Apr 2024 08:04 )  PTT:31.5 sec      Urinalysis Basic - ( 10 Apr 2024 08:04 )    Color: x / Appearance: x / SG: x / pH: x  Gluc: 124 mg/dL / Ketone: x  / Bili: x / Urobili: x   Blood: x / Protein: x / Nitrite: x   Leuk Esterase: x / RBC: x / WBC x   Sq Epi: x / Non Sq Epi: x / Bacteria: x        RADIOLOGY & ADDITIONAL STUDIES:  < from: CT Brain Stroke Protocol (04.10.24 @ 08:10) >  IMPRESSION:    CT Head: No acute hemorrhage or intracranial pathology.      The last image was acquired on 4/10/2024 at 8:08 AM and the findings were   discussed with JACKI Troy by Dr. Martin Kingsley on 4/10/2024 8:11 AM    CT Perfusion: Normal CT perfusion    Intracranial CTA: No large vessel occlusion. Mild stenosis of the right   PCA    Extracranial CTA: No steno-occlusive disease.    Status post ACDF C4-5 through C6-7. 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    < end of copied text >      -----------------------------------------------------------------------------------------------------------------  IV-tenecteplase:  no, no disabling deficits on exam

## 2024-04-10 NOTE — H&P ADULT - PROBLEM SELECTOR PLAN 3
- CAD s/p JOSÉ to mLAD (6/2023)  - Cardiac enzymes as above  - EKG as above   - Follow up TTE   - Follow up cardiac risk labs (TSH, lipid panel, A1c)   - Continue: ASA 81 mg QD, Plavix 75 mg QD, Atorvastatin 80 mg QHS  - Telemetry monitoring, pulse oximetry - CAD s/p JOSÉ to mLAD (6/2023)  - Cardiac enzymes as above  - EKG as above   - Follow up TTE   - Follow up cardiac risk labs (TSH, lipid panel, A1c)   - Continue: ASA 81 mg QD, Plavix 75 mg QD, Atorvastatin 80 mg QHS (TIC w/ home Crestor)   - Initiate: Imdur 30 mg QD  - Telemetry monitoring, pulse oximetry Hx of Cardiac cath 6/5/23: IVUS guided JOSÉ/scoreflex to mLAD 95%, OM2  unable to cross, pRCA 50%, pLCx 50%, dLCx ectatic w/ slow [place], EDP 5, access R radial.  - Patient initially w/ diffuse substernal "burning" sensation resolved after w/ GI cocktail.   - EKG as above   - Follow up TTE   - Follow up cardiac risk labs (TSH, lipid panel, A1c)   - Continue: ASA 81 mg QD, Plavix 75 mg QD, Atorvastatin 80 mg QHS (TIC w/ home Crestor)   - Initiate: Imdur 30 mg QD  - Telemetry monitoring, pulse oximetry    #GERD   o Patient with persistent burning sensation and had plan for endoscopy later this week.   - F/u H.Pylori in AM Hx of Cardiac cath 6/5/23: IVUS guided JOSÉ/scoreflex to mLAD 95%, OM2  unable to cross, pRCA 50%, pLCx 50%, dLCx ectatic w/ slow [place], EDP 5, access R radial.  - Patient initially w/ diffuse substernal "burning" sensation resolved after w/ GI cocktail however has persistent BELLO w/ ADLs.   - EKG as above   - Follow up TTE   - Follow up cardiac risk labs (TSH, lipid panel, A1c)   - Continue: ASA 81 mg QD, Plavix 75 mg QD, Atorvastatin 80 mg QHS (TIC w/ home Crestor)   - Initiate: Imdur 30 mg QD  - Telemetry monitoring, pulse oximetry    #GERD   o Patient with persistent burning sensation and had plan for endoscopy later this week.   - F/u H.Pylori in AM

## 2024-04-10 NOTE — ED ADULT TRIAGE NOTE - CHIEF COMPLAINT QUOTE
Pt, with hx of HTN, HLD, hypothyroidism, and CAD, presents to ER c/o sudden onset of one episode of aphasia with right side gaze and unable to ambulate lasting ~1 minute at ~0715 while waiting for procedure at [facility]. Per family this is 2nd episode in past 2 weeks. [id number]

## 2024-04-10 NOTE — CONSULT NOTE ADULT - NS ATTEND AMEND GEN_ALL_CORE FT
Patient w/ multiple episodes of ?decreased LOC and unresponsiveness. Exam non focal.     CTH neg  CTA h/n neg  cTp neg    imp: Transient unresponsiveness, unlikely ischemic. Should rule out syncope vs. seizure    Plan:   No further neurovascular workup   Appreciate gen neurology recs to workup for seizures  Appreciate cardiology recs given cardiac history

## 2024-04-10 NOTE — H&P ADULT - ASSESSMENT
87 y/o M with PMHx of HLD, HTN, T2DM, CAD s/p PCI June 2023, s/p cholecystectomy, BPH presents to ED admitted to cardiac telemetry for workup of syncope.  87 y/o M with PMHx of HLD, HTN, T2DM, CAD s/p PCI June 2023, s/p cholecystectomy, BPH, hypothyroidism, presents to ED admitted to cardiac telemetry for workup of syncope.  87 y/o M with PMHx of HLD, HTN, T2DM, CAD JOSÉ/scoreflex mLAD 95% [facility] June [facility], BPH, hypothyroidism presents to ED for syncope with initial concern for stroke/seizure with unremarkable work-up. Patient now admitted to cardiac telemetry for workup of syncope as likely thought to be cardiac in nature, EKG non ischemic Trop 19 - > 20.

## 2024-04-10 NOTE — ED ADULT NURSE NOTE - NSFALLHARMRISKINTERV_ED_ALL_ED

## 2024-04-10 NOTE — ED ADULT NURSE NOTE - OBJECTIVE STATEMENT
88 y.o. Male BIBEMS from [facility] c/o med eval after episode at 0715 lasting a few minutes where patient became unresponsive and had R eye gaze and small L nasolabial fold droop per daughter. After a few minutes, unresponsiveness and gaze drift resolved. per daughter a similar episode a few weeks ago was eval by cardiologist and holter monitor has been wearing a holter monitor. patient only c/o epigastric burning pain. A/Ox4, speaking in clear full sentences. Ambulating steadily independently. Denies CP, SOB, numbness/tingling, weakness, nausea, vomiting, dizziness, syncope. Pt was at [facility] for follow up appt after L eye cataract procedure (R eye cataract procedure was a few weeks ago). Received pt wearing L eye shield. Denies new vision changes to healed R eye. Safety maintained, all needs met. No drift to upper or lower extremities, no weakness on exam.

## 2024-04-10 NOTE — PATIENT PROFILE ADULT - FALL HARM RISK - CONCLUSION
Patient seen and examined at the bedside.    Remained critically ill on continuous ICU monitoring.    OBJECTIVE:  Vital Signs Last 24 Hrs  T(C): 35.3 (29 Jul 2022 08:00), Max: 36.9 (28 Jul 2022 16:00)  T(F): 95.6 (29 Jul 2022 08:00), Max: 98.4 (28 Jul 2022 16:00)  HR: 72 (29 Jul 2022 07:00) (61 - 124)  BP: 87/47 (29 Jul 2022 03:15) (87/47 - 106/53)  BP(mean): 58 (29 Jul 2022 03:15) (58 - 70)  RR: 16 (29 Jul 2022 07:00) (10 - 20)  SpO2: 100% (29 Jul 2022 07:00) (95% - 100%)    Parameters below as of 29 Jul 2022 08:00  Patient On (Oxygen Delivery Method): tracheostomy collar      Physical Exam:   General: trach, NAD  Neurology: nonfocal, interactive, responds to commands  Eyes: bilateral pupils equal and reactive   ENT/Neck: Neck supple, trachea midline, No JVD, +Trach with some secretions, pt able to cough most out  Respiratory: Lung sounds clear and equal bilaterally   CV: S1S2, no murmurs        [x] Afib  Abdominal: Soft, NT, ND +BS   Extremities: minimal pedal edema noted, + peripheral pulses, + RIJ, R groin wound vac   Skin: No Rashes, Hematoma, Ecchymosis        Assessment:  56M with no significant PMHx but has 42 pack year smoking history (1 PPD since age 14), admitted to [facility] with CP/SOB/NSTEMI, emergent cath with MVD s/p IABP placement on 5/3 for support and transferred to [facility]. MVD, MR s/p CABGx3, MV replacement on 5/9, emergent RTOR post op for mediastinal exploration, found to have epicardial bleeding and L hemothorax, subsequently placed on VA ECMO on 5/10. Failed ECMO wean on 5/12 - IABP removed and Impella 5.5 placed for additional support. Cardioverted x1 at 200J for aflutter/afib on 5/16 with brief return to NSR, though converted back to rate controlled aflutter thereafter, transferred to [facility] for further management.     Admitted with post pericardotomy cardiogenic shock on 5/16  Requiring mechanical support with VA ECMO and Impella, s/p ECMO decannulation on 5/30/2022 and Impella dc'ed on 6/8  Rapid AF with NSVT s/p DCCV on 5/28, cardioverted on 6/8  Hypovolemic Shock   Respiratory failure s/p trach 6/22   Acute blood loss anemia   Acute kidney injury/ATN, on iHD   Stress hyperglycemia   Vasoplegia     Plan:   ***Neuro***  Evaluation by neuro/S&S on 7/14 assessed tongue, no acute intervention anticipated at this time, will defer MRI for now   [x] Nonfocal   Buspirone and Mirtazapine for anxiety and sleep  Lyrica for pain   Cymbalta for anxiety  Mobilize as tolerated    ***Cardiovascular***  TTE on 7/12: 30-35%, mild LV enlargement, diffuse hypokinesis,   Invasive hemodynamic monitoring, assess perfusion indices   Afib / Hct 28.0% / Lactate 0.5  [x] Midodrine 20 mg   Reassessment of hemodynamics   Rate control with Digoxin, last dig level 2.2 on 7/26   [x] AC therapy with Argatroban for afib/MVR, PTT goal 50-60   [x] Statin   c/w Prednisone and Fludrocortisone for persistent hypotension  Droxidopa 400 TID as per recommendations by heart failure team to help alleviate neurogenic/orthostatic hypotension     ***Pulmonary***  CT chest on 7/11: Few scattered bilateral lower lobe GGO new from 6/14/2022, possibly infectious in etiology. Small partially loculated L pleural effusion, decreased from 6/14/2022.  Critical airway / S/p trach on 6/22   Post op vent management, continue TC as tolerated / vent rest 10p-4a  Titration of FiO2, follow SpO2, CXR, blood gasses   Bronch'd 7/23 -> BAL AF negative on 7/23   Requiring frequent suctioning q2-3hours    Mode: standby              ***GI***  Decreased caloric intake as per results of the Metabolic Cart done 7/26, repeat to be performed on 8/1   [x] Tolerating Vital 1.5 Erasmo, @  40cc/hr   [x] Protonix   Bowel regimen with Miralax  Aluminum hydroxide/magnesium hydroxide/simethicone given for Dyspepsia PRN     ***Renal***  [x] SUSIE/ATN / off CRRT since 7/24 AM, last HD on 7/27 / plan for HD today   Continue to monitor I/Os, BUN/Creatinine.   Replete lytes PRN  Bladder scan daily  Renal support with Nephro-vazquez     ***ID***  BCx on 7/24 NGTD, BAL on 7/9 +Serratia liquefaciens  ID input appreciated  Empiric coverage with Cefepime   Vanco on hold due to elevated trough post dialysis of 30.4 on 7/27     ***Endocrine***  [x] Stress Hyperglycemia: HbA1c 5.8%                - [x] ISS [x] NPH             - Need tight glycemic control to prevent wound infection.          Patient requires continuous monitoring with bedside rhythm monitoring, pulse oximetry monitoring, and continuous central venous and arterial pressure monitoring; and intermittent blood gas analysis. Care plan discussed with the ICU care team.   Patient remained critical, at risk for life threatening decompensation.    I have spent 30 minutes providing critical care management to this patient.    By signing my name below, I, Amanda Dougherty, attest that this documentation has been prepared under the direction and in the presence of YANELIS Aviles   Electronically signed: Rio Trujillo, 07-29-22 @ 07:44    I, Melo Mi, personally performed the services described in this documentation. all medical record entries made by the rio were at my direction and in my presence. I have reviewed the chart and agree that the record reflects my personal performance and is accurate and complete  Electronically signed: YANELIS Aviles  Patient seen and examined at the bedside.    Remained critically ill on continuous ICU monitoring.    OBJECTIVE:  Vital Signs Last 24 Hrs  T(C): 35.3 (29 Jul 2022 08:00), Max: 36.9 (28 Jul 2022 16:00)  T(F): 95.6 (29 Jul 2022 08:00), Max: 98.4 (28 Jul 2022 16:00)  HR: 72 (29 Jul 2022 07:00) (61 - 124)  BP: 87/47 (29 Jul 2022 03:15) (87/47 - 106/53)  BP(mean): 58 (29 Jul 2022 03:15) (58 - 70)  RR: 16 (29 Jul 2022 07:00) (10 - 20)  SpO2: 100% (29 Jul 2022 07:00) (95% - 100%)    Parameters below as of 29 Jul 2022 08:00  Patient On (Oxygen Delivery Method): tracheostomy collar      Physical Exam:   General: trach, NAD  Neurology: nonfocal, interactive, responds to commands  Eyes: bilateral pupils equal and reactive   ENT/Neck: Neck supple, trachea midline, No JVD, +Trach with some secretions, pt able to cough most out  Respiratory: Lung sounds clear and equal bilaterally   CV: S1S2, no murmurs        [x] Afib  Abdominal: Soft, NT, ND +BS   Extremities: minimal pedal edema noted, + peripheral pulses, + RIJ, R groin wound vac   Skin: No Rashes, Hematoma, Ecchymosis        Assessment:  56M with no significant PMHx but has 42 pack year smoking history (1 PPD since age 14), admitted to [facility] with CP/SOB/NSTEMI, emergent cath with MVD s/p IABP placement on 5/3 for support and transferred to [facility]. MVD, MR s/p CABGx3, MV replacement on 5/9, emergent RTOR post op for mediastinal exploration, found to have epicardial bleeding and L hemothorax, subsequently placed on VA ECMO on 5/10. Failed ECMO wean on 5/12 - IABP removed and Impella 5.5 placed for additional support. Cardioverted x1 at 200J for aflutter/afib on 5/16 with brief return to NSR, though converted back to rate controlled aflutter thereafter, transferred to [facility] for further management.     Admitted with post pericardotomy cardiogenic shock on 5/16  Requiring mechanical support with VA ECMO and Impella, s/p ECMO decannulation on 5/30/2022 and Impella dc'ed on 6/8  Rapid AF with NSVT s/p DCCV on 5/28, cardioverted on 6/8  Hypovolemic Shock   Respiratory failure s/p trach 6/22   Acute blood loss anemia   Acute kidney injury/ATN, on iHD   Stress hyperglycemia   Vasoplegia     Plan:   ***Neuro***  Evaluation by neuro/S&S on 7/14 assessed tongue, no acute intervention anticipated at this time, will defer MRI for now   [x] Nonfocal   Buspirone and Mirtazapine for anxiety and sleep  Lyrica for pain   Cymbalta for anxiety  Mobilize as tolerated    ***Cardiovascular***  TTE on 7/12: 30-35%, mild LV enlargement, diffuse hypokinesis,   Invasive hemodynamic monitoring, assess perfusion indices   Afib / Hct 28.0% / Lactate 0.5  [x] Midodrine 20 mg / No pressor requirement overnight   Reassessment of hemodynamics   Rate control with Digoxin, last dig level 2.2 on 7/26   [x] AC therapy with Argatroban for afib/MVR, PTT goal 50-60   [x] Statin   c/w Prednisone and Fludrocortisone for persistent hypotension  Droxidopa 400 TID as per recommendations by heart failure team to help alleviate neurogenic/orthostatic hypotension     ***Pulmonary***  CT chest on 7/11: Few scattered bilateral lower lobe GGO new from 6/14/2022, possibly infectious in etiology. Small partially loculated L pleural effusion, decreased from 6/14/2022.  Critical airway / S/p trach on 6/22   Post op vent management, tolerated -5am overnight, continue TC trials as tolerated   Titration of FiO2, follow SpO2, CXR, blood gasses   Bronch'd 7/23 -> BAL AF negative on 7/23   Requiring frequent suctioning q2-3hours    Mode: standby              ***GI***  Decreased caloric intake as per results of the Metabolic Cart done 7/26, repeat to be performed on 8/1   [x] Tolerating Vital 1.5 Erasmo, @  40cc/hr   [x] Protonix   Bowel regimen with Miralax  Aluminum hydroxide/magnesium hydroxide/simethicone given for Dyspepsia PRN     ***Renal***  [x] SUSIE/ATN / off CRRT since 7/24 AM, last HD on 7/27  -  Plan for HD today, after HD plan to remove HD cath, anticipate possible line holiday over the weekend   Continue to monitor I/Os, BUN/Creatinine.   Replete lytes PRN  Bladder scan daily  Renal support with Nephro-vazquez     ***ID***  BCx on 7/24 NGTD   Hypothermic with worsening leukocytosis, will pan culture today   Plan to start empiric coverage with inhaled Tobramycin and Caspofungin today / d/c Vancomycin   ID following, will f/u recommendation re: antibiotic regimen     ***Endocrine***  [x] Stress Hyperglycemia: HbA1c 5.8%                - [x] ISS [x] NPH             - Need tight glycemic control to prevent wound infection.          Patient requires continuous monitoring with bedside rhythm monitoring, pulse oximetry monitoring, and continuous central venous and arterial pressure monitoring; and intermittent blood gas analysis. Care plan discussed with the ICU care team.   Patient remained critical, at risk for life threatening decompensation.    I have spent 30 minutes providing critical care management to this patient.    By signing my name below, I, Amanda Dougherty, attest that this documentation has been prepared under the direction and in the presence of YANELIS Aviles   Electronically signed: Rio Trujillo, 07-29-22 @ 07:44    I, Melo Mi, personally performed the services described in this documentation. all medical record entries made by the rio were at my direction and in my presence. I have reviewed the chart and agree that the record reflects my personal performance and is accurate and complete  Electronically signed: YANELIS Aviles  Patient seen and examined at the bedside.    Remained critically ill on continuous ICU monitoring.  Hypothermia overnight by oral temp, temp per rectum was 99 F, Blood cultures and sputum sent, Caspofungin added as well as inhaled tobramycin.   Patient denies any pain, on Trach collar during the day, and pressure support rest overnight.     OBJECTIVE:  Vital Signs Last 24 Hrs  T(C): 35.3 (29 Jul 2022 08:00), Max: 36.9 (28 Jul 2022 16:00)  T(F): 95.6 (29 Jul 2022 08:00), Max: 98.4 (28 Jul 2022 16:00)  HR: 72 (29 Jul 2022 07:00) (61 - 124)  BP: 87/47 (29 Jul 2022 03:15) (87/47 - 106/53)  BP(mean): 58 (29 Jul 2022 03:15) (58 - 70)  RR: 16 (29 Jul 2022 07:00) (10 - 20)  SpO2: 100% (29 Jul 2022 07:00) (95% - 100%)    Parameters below as of 29 Jul 2022 08:00  Patient On (Oxygen Delivery Method): tracheostomy collar      Physical Exam:   General: trach, NAD  Neurology: nonfocal, interactive, responds to commands  Eyes: bilateral pupils equal and reactive   ENT/Neck: Neck supple, trachea midline, No JVD, +Trach with some secretions, pt able to cough most out  Respiratory: Lung sounds clear and equal bilaterally   CV: S1S2, no murmurs        [x] Afib  Abdominal: Soft, NT, ND +BS   Extremities: minimal pedal edema noted, + peripheral pulses, + RIJ, R groin wound vac   Skin: No Rashes, Hematoma, Ecchymosis        LABS:                        9.2    16.59 )-----------( 203      ( 29 Jul 2022 00:40 )             29.2     07-29    136  |  97  |  50<H>  ----------------------------<  109<H>  4.5   |  25  |  2.85<H>    Ca    9.1      29 Jul 2022 00:40  Phos  5.5     07-29  Mg     2.7     07-29    TPro  6.8  /  Alb  4.0  /  TBili  0.5  /  DBili  x   /  AST  13  /  ALT  16  /  AlkPhos  97  07-29    PT/INR - ( 29 Jul 2022 00:40 )   PT: 16.6 sec;   INR: 1.44 ratio         PTT - ( 29 Jul 2022 00:40 )  PTT:60.2 sec    CAPILLARY BLOOD GLUCOSE      POCT Blood Glucose.: 155 mg/dL (29 Jul 2022 12:15)        RADIOLOGY & ADDITIONAL TESTS: Reviewed.  Assessment:  56M with no significant PMHx but has 42 pack year smoking history (1 PPD since age 14), admitted to [facility] with CP/SOB/NSTEMI, emergent cath with MVD s/p IABP placement on 5/3 for support and transferred to [facility]. MVD, MR s/p CABGx3, MV replacement on 5/9, emergent RTOR post op for mediastinal exploration, found to have epicardial bleeding and L hemothorax, subsequently placed on VA ECMO on 5/10. Failed ECMO wean on 5/12 - IABP removed and Impella 5.5 placed for additional support. Cardioverted x1 at 200J for aflutter/afib on 5/16 with brief return to NSR, though converted back to rate controlled aflutter thereafter, transferred to [facility] for further management.     Admitted with post pericardotomy cardiogenic shock on 5/16  Requiring mechanical support with VA ECMO and Impella, s/p ECMO decannulation on 5/30/2022 and Impella dc'ed on 6/8  Rapid AF with NSVT s/p DCCV on 5/28, cardioverted on 6/8  Hypovolemic Shock   Respiratory failure s/p trach 6/22   Acute blood loss anemia   Acute kidney injury/ATN, on iHD   Stress hyperglycemia   Vasoplegia     Plan:   ***Neuro***  Evaluation by neuro/S&S on 7/14 assessed tongue, no acute intervention anticipated at this time, will defer MRI for now   [x] Nonfocal   Buspirone and Mirtazapine for anxiety and sleep  Lyrica for pain   Cymbalta for anxiety  Mobilize as tolerated    ***Cardiovascular***  TTE on 7/12: 30-35%, mild LV enlargement, diffuse hypokinesis,   Invasive hemodynamic monitoring, assess perfusion indices   Afib / Hct 28.0% / Lactate 0.5  [x] Midodrine 20 mg / No pressor requirement overnight   Reassessment of hemodynamics   Rate control with Digoxin, last dig level 2.2 on 7/26   [x] AC therapy with Argatroban for afib/MVR, PTT goal 50-60   [x] Statin   c/w Prednisone and Fludrocortisone for persistent hypotension  Droxidopa 400 TID as per recommendations by heart failure team to help alleviate neurogenic/orthostatic hypotension, can consider increasing to max dose of 600 mg TID if hypotension persists.    ***Pulmonary***  CT chest on 7/11: Few scattered bilateral lower lobe GGO new from 6/14/2022, possibly infectious in etiology. Small partially loculated L pleural effusion, decreased from 6/14/2022.  Critical airway / S/p trach on 6/22   Post op vent management, tolerated -5am overnight, continue TC trials as tolerated   Titration of FiO2, follow SpO2, CXR, blood gasses   Bronch'd 7/23 -> BAL AF negative on 7/23   Requiring frequent suctioning q2-3hours    Mode: standby              ***GI***  Decreased caloric intake as per results of the Metabolic Cart done 7/26, repeat to be performed on 8/1   [x] Tolerating Vital 1.5 Erasmo, @  40cc/hr titrating to goal rate of 60  [x] Protonix   Bowel regimen with Miralax  Aluminum hydroxide/magnesium hydroxide/simethicone given for Dyspepsia PRN     ***Renal***  [x] SUSIE/ATN / off CRRT since 7/24 AM, last HD on 7/27  -  Plan for HD today, after HD plan to remove HD cath, anticipate possible line holiday over the weekend   Continue to monitor I/Os, BUN/Creatinine.   Replete lytes PRN  Bladder scan daily  Renal support with Nephro-vazquez     ***ID***  BCx on 7/24 NGTD   Hypothermic with worsening leukocytosis, will pan culture today   Plan to start empiric coverage with inhaled Tobramycin and Caspofungin today / d/c Vancomycin   ID following, will f/u recommendation re: antibiotic regimen     ***Endocrine***  [x] Stress Hyperglycemia: HbA1c 5.8%                - [x] ISS [x] NPH             - Need tight glycemic control to prevent wound infection.          Patient requires continuous monitoring with bedside rhythm monitoring, pulse oximetry monitoring, and continuous central venous and arterial pressure monitoring; and intermittent blood gas analysis. Care plan discussed with the ICU care team.   Patient remained critical, at risk for life threatening decompensation.    I have spent 45 minutes providing critical care management to this patient.    By signing my name below, I, Amanda Dougherty, attest that this documentation has been prepared under the direction and in the presence of YANELIS Aviles   Electronically signed: Donny Trujillo, 07-29-22 @ 07:44    I, Melo Mi, personally performed the services described in this documentation. all medical record entries made by the zoibanna marie were at my direction and in my presence. I have reviewed the chart and agree that the record reflects my personal performance and is accurate and complete  Electronically signed: YANELIS Aviles  Fall with Harm Risk

## 2024-04-10 NOTE — H&P ADULT - PROBLEM SELECTOR PLAN 2
s/p R cataract surgery on ____ and s/p L cataract surgery  - s/p R cataract surgery on 3/26/24 and s/p L cataract surgery 4/9/24 with Dr. Mcpherson (ophthalmologist)   - Per collateral from Dr. Mcpherson ([facility]), opthalmic medications to continue:        o Left eye: Oflox 1 gtt QID; Ketorolac 1 gtt TID; prednisolone 1 gtt QID       o Right eye: Ketorolac 1 gtt TID; prednisolone 1 gtt BID x 1 week (4/9 - 4/15) then 1 gtt QD x 1 week then STOP

## 2024-04-10 NOTE — ED ADULT NURSE REASSESSMENT NOTE - NS ED NURSE REASSESS COMMENT FT1
Received report from night RN. Pt in CT scan on arrival on portable monitor, vss no acute distress. Pt a/ox4, +minimal L facial droop per daughter. Pt ambulated steadily. Denies CP, SOB, numbness/tingling, weakness, nausea, vomiting, dizziness, syncope. Pt endorses epigastric burning discomfort. Safety maintained, all needs met. A/ox4 Speaking in clear full sentences, unlabored even respirations. Pt brought back to 18 placed on monitor family updated on plan of care. No drift to upper or lower extremities, no weakness on exam. pt wearing holter monitor x4 days from cardiologist, was supposed to be removed tomorrow. Received report from night RN. Pt in CT scan on arrival on portable monitor, vss no acute distress. Pt a/ox4, +minimal L facial droop per daughter. Pt ambulated steadily. Denies CP, SOB, numbness/tingling, weakness, nausea, vomiting, dizziness, syncope. Pt endorses epigastric burning discomfort. Pt wearing L eye shield from cataract procedure yesterday. Denies new vision changes to R eye. Safety maintained, all needs met. A/ox4 Speaking in clear full sentences, unlabored even respirations. Pt brought back to 18 placed on monitor family updated on plan of care. No drift to upper or lower extremities, no weakness on exam. pt wearing holter monitor x4 days from cardiologist, was supposed to be removed tomorrow.

## 2024-04-10 NOTE — H&P ADULT - NSHPLABSRESULTS_GEN_ALL_CORE
LABS:                          9.2    12.24 )-----------( 330      ( 10 Apr 2024 08:04 )             30.1     04-10    136  |  104  |  17  ----------------------------<  124<H>  4.8   |  23  |  1.34<H>    Ca    8.9      10 Apr 2024 08:04        PT/INR - ( 10 Apr 2024 08:04 )   PT: 11.3 sec;   INR: 0.99          PTT - ( 10 Apr 2024 08:04 )  PTT:31.5 sec  Urinalysis Basic - ( 10 Apr 2024 08:04 )    Color: x / Appearance: x / SG: x / pH: x  Gluc: 124 mg/dL / Ketone: x  / Bili: x / Urobili: x   Blood: x / Protein: x / Nitrite: x   Leuk Esterase: x / RBC: x / WBC x   Sq Epi: x / Non Sq Epi: x / Bacteria: x

## 2024-04-10 NOTE — CONSULT NOTE ADULT - SUBJECTIVE AND OBJECTIVE BOX
Patient is a 88y old  Male who presents with a chief complaint of unresponsiveness     HPI:  Mr. Yanez is in the ED w/ daughter and spouse at bedside. Family provided h/o events.   Daughter stated that Pt was on his way for post-op day 1 OS cataract procedure f/u. Pt was is normal baseline MS and mRS 4 for needing assistance. Soon as they go to the check in counter Pt was observed w/ OU looking up, to the right as well as unresponsive to verbal and tactile stimulation. Daughter express heaviness of his weight and the need to drag hip to the chair. Episode lasted for < 1min followed by a few mins of confusion. According to Family this is the 3rd event. First event was 2023 in which the patient came home w/ laceration to the head in which he expressed was a fall at work in the bathroom however, did not know what really happened and episode was unwitnessed. second event was 2 weeks ago around 4:30am in which he was unresponsive in bed for ~3mins followed by few mins of confusion. He was seen by his cardiologist for suspicion for cardiogenic etiology, w/u ongoing w/ Holter monitoring.     Pt stated that he has been experiencing brief episodes of breathing difficulty worst after climbing the stairs. He also has been complaining of abdominal discomfort not improved w/ antidyspeptics.        PAST MEDICAL & SURGICAL HISTORY:  HLD (hyperlipidemia)  Hypothyroid  History of BPH  CAD (coronary artery disease)  coronary Stent 2023  HTN (hypertension)  H/O cholecystitis  Diabetes mellitus    S/P laminectomy with spinal fusion cervical  History of cholecystectomy    FAMILY HISTORY:  Sister w/ Pancreatic Cancer @ 61yo ()  No FMHx of Epilepsy     Social History: (-) x 3    Allergies    shellfish (Swelling; Hives)  No Known Drug Allergies    Intolerances        MEDICATIONS  (STANDING):  Januvia 100  Aspirin 81mg  Clopidogrel 75mg  Rosuvastatin 20mg  Synthroid 75mcg  Toprol Succinate   Tamsulosin 0.4mg     MEDICATIONS  (PRN):      Review of systems:    Constitutional: as per HPI  Eyes: No eye pain or discharge  ENMT:  No difficulty hearing; No sinus or throat pain  Neck: No pain or stiffness  Respiratory: No cough, wheezing, chills or hemoptysis  Cardiovascular: shortness of breath, dyspnea on exertion. No chest pain, palpitations,   Gastrointestinal: No abdominal pain, nausea, vomiting or hematemesis; No diarrhea or constipation.   Genitourinary: No dysuria, frequency, hematuria or incontinence  Neurological: As per HPI  Skin: No rashes or lesions   Musculoskeletal: No joint pain or swelling  Psychiatric: No depression, anxiety, mood swings  Heme/Lymph: No easy bruising or bleeding gums    Vital Signs Last 24 Hrs  T(C): 36.6 (10 Apr 2024 11:25), Max: 36.6 (10 Apr 2024 11:25)  T(F): 97.8 (10 Apr 2024 11:25), Max: 97.8 (10 Apr 2024 11:25)  HR: 70 (10 Apr 2024 12:29) (63 - 70)  BP: 135/66 (10 Apr 2024 12:29) (105/60 - 161/68)  RR: 16 (10 Apr 2024 12:29) (16 - 18)  SpO2: 99% (10 Apr 2024 12:29) (93% - 99%) ORA      Examination:  General:  Appearance is consistent with chronologic age.  No abnormal facies.  Gross skin survey within normal limits.    Cognitive/Language:  The patient is oriented to person, place, time and date.  Recent and remote memory intact.  Language with normal repetition, comprehension and naming. Attention and concentration intact. Calculation intact. Nondysarthric.    Eyes: VFF.  EOMI w/o nystagmus, or reported double vision.  PERRL.  No ptosis/weakness of eyelid closure.    Face:  Facial sensation normal V1 - 3, no facial asymmetry.    Ears/Nose/Throat:  Hearing grossly intact b/l.  Palate elevates midline.  Tongue and uvula midline.   Motor examination:   Normal tone, bulk and range of motion.  No tenderness, twitching, tremors or involuntary movements.  Formal Muscle Strength Testing: (MRC grade R/L) 5/5 UE; 5/5 LE.  No observable drift.  Reflexes:   2+ b/l biceps, triceps, brachioradialis, patella and Achilles.  Neg Ferda, clonus absent.  Sensory examination:   Intact to light touch in all extremities.  Cerebellum:   FTN intact w/o tremor or dysmetria    Gait Deferred       Labs:   CBC Full  -  ( 10 Apr 2024 08:04 )  WBC Count : 12.24 K/uL  RBC Count : 4.43 M/uL  Hemoglobin : 9.2 g/dL  Hematocrit : 30.1 %  Platelet Count - Automated : 330 K/uL  Mean Cell Volume : 67.9 fl  Mean Cell Hemoglobin : 20.8 pg  Mean Cell Hemoglobin Concentration : 30.6 gm/dL  Auto Neutrophil # : 10.61 K/uL  Auto Lymphocyte # : 0.76 K/uL  Auto Monocyte # : 0.76 K/uL  Auto Eosinophil # : 0.11 K/uL  Auto Basophil # : 0.00 K/uL  Auto Neutrophil % : 86.7 %  Auto Lymphocyte % : 6.2 %  Auto Monocyte % : 6.2 %  Auto Eosinophil % : 0.9 %  Auto Basophil % : 0.0 %    04-10    136  |  104  |  17  ----------------------------<  124<H>  4.8   |  23  |  1.34<H>    Ca    8.9      10 Apr 2024 08:04        PT/INR - ( 10 Apr 2024 08:04 )   PT: 11.3 sec;   INR: 0.99          PTT - ( 10 Apr 2024 08:04 )  PTT:31.5 sec        Neuroimaging:  CT Brain Stroke Protocol (04.10.24 @ 08:10) > CT Head: No acute hemorrhage or intracranial pathology.    CT Perfusion: Normal CT perfusion    Intracranial CTA: No large vessel occlusion. Mild stenosis of the right PCA    Extracranial CTA: No steno-occlusive disease.

## 2024-04-10 NOTE — H&P ADULT - PROBLEM SELECTOR PLAN 5
BUN/Cr 17/1.34   - Follow up urine lytes   - Avoid nephrotoxins, renally dose meds, monitor I&Os    #BPH  - Continue: Tamsulosin 0.4 mg QHS BUN/Cr 17/1.34, GFR 52  - Follow up urine lytes   - Avoid nephrotoxins, renally dose meds, monitor I&Os    #BPH  - Continue: Tamsulosin 0.4 mg QHS

## 2024-04-10 NOTE — H&P ADULT - PROBLEM SELECTOR PLAN 6
H/H 9.2/30.1, no signs/symptoms of bleeding   - Follow up iron studies in AM   - T&S (next 4/11/24) H/H 9.2/30.1, suspected baseline Hgb 12-15, no signs/symptoms of bleeding   - Follow up iron studies in AM   - T&S (next 4/11/24) H/H 9.2/30.1, suspected baseline Hgb 12-15, no signs/symptoms of bleeding   - Follow up iron studies in  and [facility]    - T&S (next 4/11/24)

## 2024-04-10 NOTE — H&P ADULT - PROBLEM SELECTOR PLAN 8
Follow up A1c in AM   - Hold: Januvia 100 mg QD (home med)   - Initiate ISS Follow up A1c in AM   - Hold: Januvia 100 mg QD (home med)   - Initiate ISS    #HTN  SBPs 110s-150s  - Continue: Toprol XL 12.5 mg QD (reduced home dose)  - Initiate: Imdur 30 mg QD

## 2024-04-10 NOTE — EEG REPORT - NS EEG TEXT BOX
[facility] Department of Neurology  Inpatient Continuous video-Electroencephalogram  [address], [address] [place] [id number], T: [phone]    Patient Name:	HON TYLER    :	1935  MRN:	[id number]    Study Start Date/Time:	4/10/2024, 2:16:55 PM  Study End Date/Time:  4/10/2024, 4:39 PM    Referred by:  Kerry Low MD    Brief Clinical History:  HON SCOTT is a 88 year old Male transient LOC; study performed to investigate for seizures or markers of epilepsy.     Diagnosis Code:  R56.9 convulsions/seizure    Pertinent Medication:  n/a    Acquisition Details:  Electroencephalography was acquired using a minimum of 21 channels on an [employer] Neu[employer] system v 9.3.1 with electrode placement according to the standard International 10-20 system following ACNS (American Clinical Neurophysiology Society) guidelines.  Anterior temporal T1 and T2 electrodes were utilized whenever possible.  The [employer] automated spike & seizure detections were all reviewed in detail, in addition to the entire raw EEG.    Findings:  Day 1:  4/10/2024, 2:16:55 PM to 4:39 PM  Background:  continuous, with predominantly alpha and beta frequencies.  Generalized Slowing:  None  Symmetry/Focality: No persistent asymmetries of voltage or frequency.        Voltage:  Normal (20+ uV)  Organization:  Appropriate anterior-posterior gradient  Posterior Dominant Rhythm:  9 Hz symmetric, well-organized, and well-modulated  Sleep:  Symmetric, synchronous spindles and K complexes.  Variability:   Yes		Reactivity:  Yes    Spontaneous Activity:  No epileptiform discharges     Events:  1)	No electrographic seizures or significant clinical events occurred during this study.  Provocations:  •	Hyperventilation: was not performed.  •	Photic stimulation: was not performed.      FINAL Impression:  Normal Continuous/long-term video-EEG  1)	No EEG abnormalities were identified during this recording.       Final Clinical Correlation:  1)	There were no findings of active epilepsy, however this alone does not rule out the diagnosis.      Kerry Low MD  Attending Neurologist, [facility] [place] [facility] Epilepsy Program

## 2024-04-10 NOTE — ED ADULT NURSE NOTE - CHIEF COMPLAINT QUOTE
Pt, with hx of HTN, HLD, hypothyroidism, and CAD, presents to ER c/o sudden onset of one episode of aphasia with right side gaze and unable to ambulate lasting ~1 minute at ~0715 while waiting for procedure at [facility]. Per family this is 2nd episode in past 2 weeks. [id number]

## 2024-04-10 NOTE — H&P ADULT - CONVERSATION DETAILS
Purpose of discussion: In the setting of advanced age and multiple comorbidities, discussion of patient's wishes was performed should there be any deterioration in health status.   Purpose of discussion: In the event of an emergency situation requiring life-rescuing therapy, it is best to proactively address patient's wishes.   Presentation: as above    Plan:  Code status: Patient is FULL CODE  HCP: Chi Gaytan Olga ([phone]) and Svitlana ([phone])

## 2024-04-10 NOTE — H&P ADULT - NS ATTEND AMEND GEN_ALL_CORE FT
Initial attending contact date 4.10.24 in ED. See attending addendum to HPI here for initial attending contact documentation.  88M with Anemia hbg b/l 12, HLD, Essential HTN with recent orthostatic hypotension for past 1 mo PTA, Type II DM, CAD s/p JOSÉ/scoreflex mLAD 95% @[facility] June 2023 with known  of OM2, BPH, hypothyroidism presents to ED with convulsive syncope. EKG with NSR with no acute ischemia, Troponin negative.  Patient c/o chronic constipation, dyspepsia, LH, dizziness, exertional dyspnea, exertional diaphoresis and chest pain. He is avoiding ADLs (no longer does house chores) secondary to symptoms. Patient now admitted to cardiac telemetry for workup of syncope.  Plan  Medical management of CAD per d/w Dr DANETTE Jarvis   Trial of Imdur pending BP trends  Repeat orthostatic vitals, administer NS IVFs prn  Continue to trend hemoglobin  PT consult  Suzanne Lai M.D.  Cardiology Attending  During non face-to-face time, I reviewed relevant portions of the patient’s medical record; including vitals, labs, medications, cardiac studies, remaining additional imaging and consultant recommendations. During face-to-face time, I took a relevant history and examined the patient. I also explained to the patient their diagnoses, and specific cardiopulmonary management plan, which required a high level of medical decision making.  I answered all questions related to the patient's medical conditions. I spent 80minutes on total encounter; more than 50% of the visit was spent counseling and/or coordinating care by the attending physician, with plan of care discussed with the patient, multiple family members at bedside, Dr Jarvis, and cardiac care team.

## 2024-04-10 NOTE — H&P ADULT - PROBLEM SELECTOR PLAN 4
- WBC 12.24, afebrile, no signs/symptoms of infection, likely reactive s/p cataract surgery   - CXR (4/10/24): Metallic device overlying the mediastinum. Bibasilar focal atelectasis, decreased elevation of the right hemidiaphragm. Cardiomegaly. Stable bony structures. Cervical spine hardware.  - Follow up UA in AM   - Monitor CBC

## 2024-04-10 NOTE — H&P ADULT - HISTORY OF PRESENT ILLNESS
**INCOMPLETE**      87 y/o m with PMH of HLD, HTN, DM, CAD s/p PCI June 2023, s/p cholecystectomy, BPH presents to ED with episode of unresponsiveness with right sided gaze, left facial droop at 7:15am while at [facility] waiting for appt for post op cataract. As per daughter who is also a nurse, pt had similar episode two weeks prior and was seen by PMD and cards with neg workup.  Pt wearing Holter Monitor.  Pt did not have neuro workup.  In ED most of symptoms resolved and pt states he feels back to normal.  CODE stroke called after my eval.    Labs                                       **INCOMPLETE**      87 y/o m with PMH of HLD, HTN, DM, CAD s/p PCI June 2023, s/p cholecystectomy, BPH presents to ED with episode of unresponsiveness with right sided gaze, left facial droop at 7:15am while at [facility] waiting for appt for post op cataract. As per daughter who is also a nurse, pt had similar episode two weeks prior and was seen by PMD and cards with neg workup.  Pt wearing Holter Monitor.  Pt did not have neuro workup.  In ED most of symptoms resolved and pt states he feels back to normal.      ER Course  Vitals:   Labs: WBC 12.24 H/H 9.2/30.1 Plt 330 PT/PTT 11.3/31.5 INR 0.99 Na 136 K 4.8 Cl 104 CO2 23 BUN/Cr 17/1.34 Trop T 19   Imaging/Results   o CT Head: No acute hemorrhage or intracranial pathology.   o CT Perfusion: Normal CT perfusion  o Intracranial CTA: No large vessel occlusion. Mild stenosis of the right   PCA  o Extracranial CTA: No steno-occlusive disease. Status post ACDF C4-5 through C6-7. 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  o vEEG no EEG abnormalities were identified during this record     ER Interventions: NaCL 1L bolus, patient evaluated by and neurology and epilepsy.     Now admitted to cardiac tele for further management of syncope.                          **INCOMPLETE**     89 y/o M with SHELLFISH ALLERGY and PMHx of HLD, HTN, T2DM, CAD s/p PCI June 2023, s/p cholecystectomy, BPH, PUD, hypothyroidism, cervical spondylosis s/p laminectomy, presents to ED with episode of unresponsiveness with right sided gaze, left facial droop at 7:15am while at [facility] waiting for post-operative cataract appointment. As per daughter who is also a nurse, pt had similar episode two weeks prior and was seen by PMD and cardiologist with negative workup.  Pt is currently wearing Holter Monitor.  Pt did not have neurological workup.  In ED most of symptoms resolved and pt states he feels back to normal. Patient denies CP, SOB, dizziness, palpitations, orthopnea/PND, leg swelling, LOC, bleeding, melena/hematochezia, fever, chills, URI symptoms, or recent illness.     ER Course  - Vitals: /72 mmHg, HR 64 bpm, RR 18 breaths/min, temp 97.7 F, spO2 98% on room air.   - Labs: WBC 12.24 H/H 9.2/30.1 Plt 330 PT/PTT 11.3/31.5 INR 0.99 Na 136 K 4.8 Cl 104 CO2 23 BUN/Cr 17/1.34 Trop T 19   - Imaging/Results:        o CT Head: No acute hemorrhage or intracranial pathology.        o CT Perfusion: Normal CT perfusion       o Intracranial CTA: No large vessel occlusion. Mild stenosis of the right   PCA       o Extracranial CTA: No steno-occlusive disease. Status post ACDF C4-5 through C6-7. 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       o vEEG no EEG abnormalities were identified during this record        o EKG (4/10/24): ______________        o CXR (4/10/24): Metallic device overlying the mediastinum. Bibasilar focal atelectasis, decreased elevation of the right hemidiaphragm. Cardiomegaly. Stable bony structures. Cervical spine hardware.  - ER Interventions: NaCL 1L bolus, patient evaluated by and neurology and epilepsy.                         **INCOMPLETE**     87 y/o M with SHELLFISH ALLERGY and PMHx of HLD, HTN, T2DM, CAD s/p PCI June 2023, s/p cholecystectomy, BPH, PUD, hypothyroidism, cervical spondylosis s/p laminectomy, presents to ED with episode of unresponsiveness with right sided gaze, left facial droop at 7:15am while at [facility] waiting for post-operative cataract appointment. As per daughter who is also a nurse, pt had similar episode two weeks prior and was seen by PMD and cardiologist with negative workup.  Pt is currently wearing Holter Monitor.  Pt did not have neurological workup.  In ED most of symptoms resolved and pt states he feels back to normal. Patient denies CP, SOB, dizziness, palpitations, orthopnea/PND, leg swelling, bleeding, melena/hematochezia, fever, chills, URI symptoms, or recent illness.     ER Course  - Vitals: /72 mmHg, HR 64 bpm, RR 18 breaths/min, temp 97.7 F, spO2 98% on room air.   - Labs: WBC 12.24 H/H 9.2/30.1 Plt 330 PT/PTT 11.3/31.5 INR 0.99 Na 136 K 4.8 Cl 104 CO2 23 BUN/Cr 17/1.34 Trop T 19   - Imaging/Results:        o CT Head: No acute hemorrhage or intracranial pathology.        o CT Perfusion: Normal CT perfusion       o Intracranial CTA: No large vessel occlusion. Mild stenosis of the right   PCA       o Extracranial CTA: No steno-occlusive disease. Status post ACDF C4-5 through C6-7. 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       o vEEG no EEG abnormalities were identified during this record        o EKG (4/10/24): NSR @71 bpm, QTc 456 ms        o CXR (4/10/24): Metallic device overlying the mediastinum. Bibasilar focal atelectasis, decreased elevation of the right hemidiaphragm. Cardiomegaly. Stable bony structures. Cervical spine hardware.  - ER Interventions: NaCL 1L bolus, patient evaluated by and neurology and epilepsy.                         87 y/o M with SHELLFISH ALLERGY and PMHx of HLD, HTN, T2DM, CAD s/p PCI June 2023, s/p cholecystectomy, BPH, PUD, hypothyroidism, cervical spondylosis s/p laminectomy, presents to ED with episode of unresponsiveness with right sided gaze, left facial droop at 7:15am while at [facility] waiting for post-operative cataract appointment, episode ~1 minute and back to baseline within 3-5 minutes. As per daughter who is also a nurse, pt had similar episode two weeks prior and was seen by PMD and cardiologist with negative workup.  Pt is currently wearing Holter Monitor.  Pt did not have neurological workup.  In ED most of symptoms resolved and pt states he feels back to normal. At this time endorsing "burning" diffuse, chest discomfort. Family also notes that patient becomes SOB and diaphoretic with minimal ambulation and has limited has activities of daily due to symptoms.     Patient denies  SOB, dizziness, palpitations, orthopnea/PND, leg swelling, bleeding, melena/hematochezia, fever, chills, URI symptoms, or recent illness.     ER Course  - Vitals: /72 mmHg, HR 64 bpm, RR 18 breaths/min, temp 97.7 F, spO2 98% on room air.   - Labs: WBC 12.24 H/H 9.2/30.1 Plt 330 PT/PTT 11.3/31.5 INR 0.99 Na 136 K 4.8 Cl 104 CO2 23 BUN/Cr 17/1.34 Trop T 19   - Imaging/Results:        o CT Head: No acute hemorrhage or intracranial pathology.        o CT Perfusion: Normal CT perfusion       o Intracranial CTA: No large vessel occlusion. Mild stenosis of the right   PCA       o Extracranial CTA: No steno-occlusive disease. Status post ACDF C4-5 through C6-7. 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       o vEEG no EEG abnormalities were identified during this record        o EKG (4/10/24): NSR @71 bpm, QTc 456 ms        o CXR (4/10/24): Metallic device overlying the mediastinum. Bibasilar focal atelectasis, decreased elevation of the right hemidiaphragm. Cardiomegaly. Stable bony structures. Cervical spine hardware.  - Prior Cardiac Studies    o  Cardiac cath 6/5/23: IVUS guided JOSÉ/scoreflex to mLAD 95%, OM2  unable to cross, pRCA 50%, pLCx 50%, dLCx ectatic w/ slow [place], EDP 5, access R radial.  - ER Interventions: NaCL 1L bolus, patient evaluated by and neurology and epilepsy.     Admit to cardiac tele for further management of syncope likely thought to be cardiac in nature.

## 2024-04-10 NOTE — H&P ADULT - VASCULAR DETAILS
WWP, pedal edema, 1+ pitting edema to shins (unable to assess remainder of LE due as patient wearing pants)

## 2024-04-10 NOTE — ED PROVIDER NOTE - CLINICAL SUMMARY MEDICAL DECISION MAKING FREE TEXT BOX
89 y/o m with PMH of HLD, HTN, DM, CAD s/p PCI June 2023, s/p cholecystectomy, BPH presents to ED with episode of unresponsiveness with right sided gaze, left facial droop at 7:15am while at [facility] waiting for appt for post op cataract. As per daughter who is also a nurse, pt had similar episode two weeks prior and was seen by PMD and cards with neg workup.  Pt wearing Holter Monitor.  Pt did not have neuro workup.  In ED most of symptoms resolved and pt states he feels back to normal.  CODE stroke called after my eval.    VSS  PE -  left facial droop?  CODE Stroke called 89 y/o m with PMH of HLD, HTN, DM, CAD s/p PCI June 2023, s/p cholecystectomy, BPH presents to ED with episode of unresponsiveness with right sided gaze, left facial droop at 7:15am while at [facility] waiting for appt for post op cataract. As per daughter who is also a nurse, pt had similar episode two weeks prior and was seen by PMD and cards with neg workup.  Pt wearing Holter Monitor.  Pt did not have neuro workup.  In ED most of symptoms resolved and pt states he feels back to normal.  CODE stroke called after my eval.    VSS  PE -  left facial droop?  CODE Stroke called  Imaging does not show stroke - as per team could be seizure  Epilepsy consulted and 2 hour EEG placed on pt in ED  No evidence of seizure  Will admit to cards for syncope workup as pt cards Dr. Jarvis who performed cath June 2023 and was concerned about other vessels.    Will admit for syncope workup r/o ACS  Discussed with Dr. Thomas

## 2024-04-10 NOTE — ED ADULT NURSE NOTE - HIV OFFER
Previously Declined (within the last year) A-T Advancement Flap Text: The defect edges were debeveled with a #15 scalpel blade.  Given the location of the defect, shape of the defect and the proximity to free margins an A-T advancement flap was deemed most appropriate.  Using a sterile surgical marker, an appropriate advancement flap was drawn incorporating the defect and placing the expected incisions within the relaxed skin tension lines where possible.    The area thus outlined was incised deep to adipose tissue with a #15 scalpel blade.  The skin margins were undermined to an appropriate distance in all directions utilizing iris scissors.

## 2024-04-10 NOTE — H&P ADULT - PROBLEM SELECTOR PLAN 1
- HsTrop 19>20, CK/CKMB WNL, see above CT studies/vEEG  - EKG (4/10/24): NSR @71 bpm, QTc 456 ms   - Follow up TTE  - Follow up orthostatics in AM  - Telemetry monitoring - HsTrop 19>20, CK/CKMB WNL, see above CT studies/vEEG  - EKG (4/10/24): NSR @71 bpm, QTc 456 ms   - Follow up TTE in AM  - Follow up orthostatics in AM  - NPO @MN, consult in EP in AM   - Telemetry monitoring    o Collateral obtain from [facility]- patient w/ hx of similar presentations in past. Had holter monitor in Aug 2023 which was unremarkable. TTE March 2024 unremarkable w/ normal EF and no significant valve dz - HsTrop 19>20, CK/CKMB WNL, see above CT studies/vEEG  - EKG (4/10/24): NSR @71 bpm, QTc 456 ms   - Follow up TTE in AM  - Follow up orthostatics in AM  - NPO @MN, consult in EP in AM   - Telemetry monitoring    [facility] patient w/ hx of similar presentations in past. Had holter monitor in Aug 2023 which was unremarkable. TTE March 2024 unremarkable w/ normal EF and no significant valve dz

## 2024-04-10 NOTE — ED PROVIDER NOTE - PHYSICAL EXAMINATION
VITAL SIGNS: I have reviewed nursing notes and confirm.  CONSTITUTIONAL: Well-developed; well-nourished; in no acute distress.  SKIN: Agree with RN documentation regarding decubitus evaluation. Remainder of skin exam is warm and dry, no acute rash.  HEAD: Normocephalic; atraumatic.  EYES: PERRL, EOM intact; conjunctiva and sclera clear.  ENT: No nasal discharge; airway clear.  NECK: Supple; non tender.  CARD: S1, S2 normal; no murmurs, gallops, or rubs. Regular rate and rhythm.  RESP: No wheezes, rales or rhonchi.  ABD: Normal bowel sounds; soft; non-distended; non-tender; no hepatosplenomegaly.  EXT: Normal ROM. No clubbing, cyanosis or edema.  LYMPH: No acute cervical adenopathy.  NEURO: Alert & Oriented x 3. CN II-XII intact. left facial droop. Clear speech. VALDIVIA w/ 5/5 strength x 4 ext. Normal sensation. No pronator drift. No dysdidokinesia nor dysmetria. Normal heel-to-shin.  PSYCH: Cooperative, appropriate.

## 2024-04-11 ENCOUNTER — RESULT REVIEW (OUTPATIENT)
Age: 89
End: 2024-04-11

## 2024-04-11 DIAGNOSIS — D64.9 ANEMIA, UNSPECIFIED: ICD-10-CM

## 2024-04-11 LAB
A1C WITH ESTIMATED AVERAGE GLUCOSE RESULT: 5.6 % — SIGNIFICANT CHANGE UP (ref 4–5.6)
ALBUMIN SERPL ELPH-MCNC: 3.3 G/DL — SIGNIFICANT CHANGE UP (ref 3.3–5)
ALP SERPL-CCNC: 61 U/L — SIGNIFICANT CHANGE UP (ref 40–120)
ALT FLD-CCNC: 18 U/L — SIGNIFICANT CHANGE UP (ref 10–45)
ANION GAP SERPL CALC-SCNC: 10 MMOL/L — SIGNIFICANT CHANGE UP (ref 5–17)
APPEARANCE UR: CLEAR — SIGNIFICANT CHANGE UP
AST SERPL-CCNC: 21 U/L — SIGNIFICANT CHANGE UP (ref 10–40)
BASOPHILS # BLD AUTO: 0.09 K/UL — SIGNIFICANT CHANGE UP (ref 0–0.2)
BASOPHILS NFR BLD AUTO: 0.9 % — SIGNIFICANT CHANGE UP (ref 0–2)
BILIRUB SERPL-MCNC: 0.4 MG/DL — SIGNIFICANT CHANGE UP (ref 0.2–1.2)
BILIRUB UR-MCNC: NEGATIVE — SIGNIFICANT CHANGE UP
BLD GP AB SCN SERPL QL: NEGATIVE — SIGNIFICANT CHANGE UP
BUN SERPL-MCNC: 14 MG/DL — SIGNIFICANT CHANGE UP (ref 7–23)
CALCIUM SERPL-MCNC: 8.2 MG/DL — LOW (ref 8.4–10.5)
CHLORIDE SERPL-SCNC: 104 MMOL/L — SIGNIFICANT CHANGE UP (ref 96–108)
CHOLEST SERPL-MCNC: 91 MG/DL — SIGNIFICANT CHANGE UP
CO2 SERPL-SCNC: 20 MMOL/L — LOW (ref 22–31)
COLOR SPEC: YELLOW — SIGNIFICANT CHANGE UP
CREAT ?TM UR-MCNC: 60 MG/DL — SIGNIFICANT CHANGE UP
CREAT SERPL-MCNC: 1.19 MG/DL — SIGNIFICANT CHANGE UP (ref 0.5–1.3)
DIFF PNL FLD: NEGATIVE — SIGNIFICANT CHANGE UP
EGFR: 59 ML/MIN/1.73M2 — LOW
EOSINOPHIL # BLD AUTO: 0.22 K/UL — SIGNIFICANT CHANGE UP (ref 0–0.5)
EOSINOPHIL NFR BLD AUTO: 2.2 % — SIGNIFICANT CHANGE UP (ref 0–6)
ESTIMATED AVERAGE GLUCOSE: 114 MG/DL — SIGNIFICANT CHANGE UP (ref 68–114)
FERRITIN SERPL-MCNC: 31 NG/ML — SIGNIFICANT CHANGE UP (ref 30–400)
FOLATE SERPL-MCNC: >20 NG/ML — SIGNIFICANT CHANGE UP
GLUCOSE BLDC GLUCOMTR-MCNC: 120 MG/DL — HIGH (ref 70–99)
GLUCOSE BLDC GLUCOMTR-MCNC: 121 MG/DL — HIGH (ref 70–99)
GLUCOSE BLDC GLUCOMTR-MCNC: 131 MG/DL — HIGH (ref 70–99)
GLUCOSE BLDC GLUCOMTR-MCNC: 146 MG/DL — HIGH (ref 70–99)
GLUCOSE SERPL-MCNC: 148 MG/DL — HIGH (ref 70–99)
GLUCOSE UR QL: NEGATIVE MG/DL — SIGNIFICANT CHANGE UP
HCT VFR BLD CALC: 28.3 % — LOW (ref 39–50)
HDLC SERPL-MCNC: 46 MG/DL — SIGNIFICANT CHANGE UP
HGB BLD-MCNC: 8.6 G/DL — LOW (ref 13–17)
IMM GRANULOCYTES NFR BLD AUTO: 0.7 % — SIGNIFICANT CHANGE UP (ref 0–0.9)
IRON SATN MFR SERPL: 11 % — LOW (ref 16–55)
IRON SATN MFR SERPL: 29 UG/DL — LOW (ref 45–165)
IRON SATN MFR SERPL: 29 UG/DL — LOW (ref 45–165)
IRON SATN MFR SERPL: 31 UG/DL — LOW (ref 45–165)
KETONES UR-MCNC: NEGATIVE MG/DL — SIGNIFICANT CHANGE UP
LEUKOCYTE ESTERASE UR-ACNC: NEGATIVE — SIGNIFICANT CHANGE UP
LIPID PNL WITH DIRECT LDL SERPL: 24 MG/DL — SIGNIFICANT CHANGE UP
LYMPHOCYTES # BLD AUTO: 1.26 K/UL — SIGNIFICANT CHANGE UP (ref 1–3.3)
LYMPHOCYTES # BLD AUTO: 12.7 % — LOW (ref 13–44)
MAGNESIUM SERPL-MCNC: 2.4 MG/DL — SIGNIFICANT CHANGE UP (ref 1.6–2.6)
MCHC RBC-ENTMCNC: 20.9 PG — LOW (ref 27–34)
MCHC RBC-ENTMCNC: 30.4 GM/DL — LOW (ref 32–36)
MCV RBC AUTO: 68.7 FL — LOW (ref 80–100)
MONOCYTES # BLD AUTO: 0.79 K/UL — SIGNIFICANT CHANGE UP (ref 0–0.9)
MONOCYTES NFR BLD AUTO: 7.9 % — SIGNIFICANT CHANGE UP (ref 2–14)
NEUTROPHILS # BLD AUTO: 7.51 K/UL — HIGH (ref 1.8–7.4)
NEUTROPHILS NFR BLD AUTO: 75.6 % — SIGNIFICANT CHANGE UP (ref 43–77)
NITRITE UR-MCNC: NEGATIVE — SIGNIFICANT CHANGE UP
NON HDL CHOLESTEROL: 45 MG/DL — SIGNIFICANT CHANGE UP
NRBC # BLD: 0 /100 WBCS — SIGNIFICANT CHANGE UP (ref 0–0)
NT-PROBNP SERPL-SCNC: 941 PG/ML — HIGH (ref 0–300)
OSMOLALITY UR: 472 MOSM/KG — SIGNIFICANT CHANGE UP (ref 300–900)
PH UR: 8 — SIGNIFICANT CHANGE UP (ref 5–8)
PLATELET # BLD AUTO: 292 K/UL — SIGNIFICANT CHANGE UP (ref 150–400)
POTASSIUM SERPL-MCNC: 4.1 MMOL/L — SIGNIFICANT CHANGE UP (ref 3.5–5.3)
POTASSIUM SERPL-SCNC: 4.1 MMOL/L — SIGNIFICANT CHANGE UP (ref 3.5–5.3)
POTASSIUM UR-SCNC: 20 MMOL/L — SIGNIFICANT CHANGE UP
PROT ?TM UR-MCNC: 6 MG/DL — SIGNIFICANT CHANGE UP (ref 0–12)
PROT SERPL-MCNC: 5.8 G/DL — LOW (ref 6–8.3)
PROT UR-MCNC: NEGATIVE MG/DL — SIGNIFICANT CHANGE UP
PROT/CREAT UR-RTO: 0.1 RATIO — SIGNIFICANT CHANGE UP (ref 0–0.2)
RBC # BLD: 4.12 M/UL — LOW (ref 4.2–5.8)
RBC # FLD: 17.3 % — HIGH (ref 10.3–14.5)
RH IG SCN BLD-IMP: POSITIVE — SIGNIFICANT CHANGE UP
SODIUM SERPL-SCNC: 134 MMOL/L — LOW (ref 135–145)
SODIUM UR-SCNC: 135 MMOL/L — SIGNIFICANT CHANGE UP
SP GR SPEC: 1.02 — SIGNIFICANT CHANGE UP (ref 1–1.03)
TIBC SERPL-MCNC: 261 UG/DL — SIGNIFICANT CHANGE UP (ref 220–430)
TIBC SERPL-MCNC: 262 UG/DL — SIGNIFICANT CHANGE UP (ref 220–430)
TIBC SERPL-MCNC: 272 UG/DL — SIGNIFICANT CHANGE UP (ref 220–430)
TRANSFERRIN SERPL-MCNC: 200 MG/DL — SIGNIFICANT CHANGE UP (ref 200–360)
TRIGL SERPL-MCNC: 116 MG/DL — SIGNIFICANT CHANGE UP
TSH SERPL-MCNC: 6.95 UIU/ML — HIGH (ref 0.27–4.2)
TSH SERPL-MCNC: 6.95 UIU/ML — HIGH (ref 0.27–4.2)
UIBC SERPL-MCNC: 232 UG/DL — SIGNIFICANT CHANGE UP (ref 110–370)
UIBC SERPL-MCNC: 233 UG/DL — SIGNIFICANT CHANGE UP (ref 110–370)
UIBC SERPL-MCNC: 241 UG/DL — SIGNIFICANT CHANGE UP (ref 110–370)
UROBILINOGEN FLD QL: 0.2 MG/DL — SIGNIFICANT CHANGE UP (ref 0.2–1)
UUN UR-MCNC: 396 MG/DL — SIGNIFICANT CHANGE UP
WBC # BLD: 9.94 K/UL — SIGNIFICANT CHANGE UP (ref 3.8–10.5)
WBC # FLD AUTO: 9.94 K/UL — SIGNIFICANT CHANGE UP (ref 3.8–10.5)

## 2024-04-11 PROCEDURE — 93306 TTE W/DOPPLER COMPLETE: CPT | Mod: 26

## 2024-04-11 PROCEDURE — 99222 1ST HOSP IP/OBS MODERATE 55: CPT

## 2024-04-11 PROCEDURE — 99233 SBSQ HOSP IP/OBS HIGH 50: CPT

## 2024-04-11 RX ORDER — SODIUM CHLORIDE 9 MG/ML
1000 INJECTION INTRAMUSCULAR; INTRAVENOUS; SUBCUTANEOUS
Refills: 0 | Status: DISCONTINUED | OUTPATIENT
Start: 2024-04-11 | End: 2024-04-12

## 2024-04-11 RX ORDER — IRON SUCROSE 20 MG/ML
500 INJECTION, SOLUTION INTRAVENOUS ONCE
Refills: 0 | Status: COMPLETED | OUTPATIENT
Start: 2024-04-12 | End: 2024-04-12

## 2024-04-11 RX ORDER — PANTOPRAZOLE SODIUM 20 MG/1
40 TABLET, DELAYED RELEASE ORAL
Refills: 0 | Status: DISCONTINUED | OUTPATIENT
Start: 2024-04-12 | End: 2024-04-11

## 2024-04-11 RX ORDER — PANTOPRAZOLE SODIUM 20 MG/1
40 TABLET, DELAYED RELEASE ORAL ONCE
Refills: 0 | Status: COMPLETED | OUTPATIENT
Start: 2024-04-11 | End: 2024-04-11

## 2024-04-11 RX ORDER — PANTOPRAZOLE SODIUM 20 MG/1
40 TABLET, DELAYED RELEASE ORAL EVERY 12 HOURS
Refills: 0 | Status: DISCONTINUED | OUTPATIENT
Start: 2024-04-11 | End: 2024-04-13

## 2024-04-11 RX ORDER — IRON SUCROSE 20 MG/ML
500 INJECTION, SOLUTION INTRAVENOUS ONCE
Refills: 0 | Status: COMPLETED | OUTPATIENT
Start: 2024-04-11 | End: 2024-04-11

## 2024-04-11 RX ORDER — SOD SULF/SODIUM/NAHCO3/KCL/PEG
4000 SOLUTION, RECONSTITUTED, ORAL ORAL ONCE
Refills: 0 | Status: DISCONTINUED | OUTPATIENT
Start: 2024-04-11 | End: 2024-04-13

## 2024-04-11 RX ADMIN — Medication 81 MILLIGRAM(S): at 12:09

## 2024-04-11 RX ADMIN — Medication 1 DROP(S): at 15:06

## 2024-04-11 RX ADMIN — PANTOPRAZOLE SODIUM 40 MILLIGRAM(S): 20 TABLET, DELAYED RELEASE ORAL at 06:44

## 2024-04-11 RX ADMIN — Medication 1 DROP(S): at 05:33

## 2024-04-11 RX ADMIN — Medication 1 DROP(S): at 12:09

## 2024-04-11 RX ADMIN — Medication 12.5 MILLIGRAM(S): at 06:44

## 2024-04-11 RX ADMIN — Medication 1 DROP(S): at 17:32

## 2024-04-11 RX ADMIN — ATORVASTATIN CALCIUM 80 MILLIGRAM(S): 80 TABLET, FILM COATED ORAL at 21:47

## 2024-04-11 RX ADMIN — PANTOPRAZOLE SODIUM 40 MILLIGRAM(S): 20 TABLET, DELAYED RELEASE ORAL at 17:31

## 2024-04-11 RX ADMIN — Medication 1 DROP(S): at 21:45

## 2024-04-11 RX ADMIN — Medication 75 MICROGRAM(S): at 05:32

## 2024-04-11 RX ADMIN — IRON SUCROSE 68.75 MILLIGRAM(S): 20 INJECTION, SOLUTION INTRAVENOUS at 16:53

## 2024-04-11 RX ADMIN — Medication 1 DROP(S): at 05:32

## 2024-04-11 RX ADMIN — CLOPIDOGREL BISULFATE 75 MILLIGRAM(S): 75 TABLET, FILM COATED ORAL at 12:09

## 2024-04-11 RX ADMIN — TAMSULOSIN HYDROCHLORIDE 0.4 MILLIGRAM(S): 0.4 CAPSULE ORAL at 21:46

## 2024-04-11 RX ADMIN — POLYETHYLENE GLYCOL 3350 17 GRAM(S): 17 POWDER, FOR SOLUTION ORAL at 12:09

## 2024-04-11 RX ADMIN — SODIUM CHLORIDE 150 MILLILITER(S): 9 INJECTION INTRAMUSCULAR; INTRAVENOUS; SUBCUTANEOUS at 12:10

## 2024-04-11 RX ADMIN — Medication 1 DROP(S): at 12:10

## 2024-04-11 NOTE — PROGRESS NOTE ADULT - PROBLEM SELECTOR PLAN 2
s/p R cataract surgery on 3/26/24 and s/p L cataract surgery 4/9/24 with Dr. Mcpherson (ophthalmologist)   - Per collateral from Dr. Mcpherson ([facility]), opthalmic medications to continue:        o Left eye: Oflox 1 gtt QID; Ketorolac 1 gtt TID; prednisolone 1 gtt QID       o Right eye: Ketorolac 1 gtt TID; prednisolone 1 gtt BID x 1 week (4/9 - 4/15) then 1 gtt QD x 1 week then STOP Labs notable for acute on chronic anemia. Hgb 9.2->8.6. Previous hospitalization Hgb 12.2.   -No melena, hematemesis, hematochezia or bleeding source. Pt has not had BM yet.   -Iron total 31, TIBC 272, 11% iron sat, ferritin 31, serum transferrin 200; start IV iron sucrose 500 mg x 1 today, 500 mg x 1 in am 4/12   -Keep active T&S (4/12/24)  -PPI IV BID  -GI Consulted; plan for EGD/colonoscopy 4/12. Start golytely prep. NPO after MN.   -Pt is at increased perioperative cardiovascular risk given advanced age, comorbidities, however given concern for active GI bleed benefits outweighs risks and pt is optimized for planned procedure in AM.

## 2024-04-11 NOTE — PHYSICAL THERAPY INITIAL EVALUATION ADULT - SITTING BALANCE: DYNAMIC
[facility] -   Hematology/Oncology  Progress Note    Patient Name: Kelly Mariano  Admission Date: 2/9/2018  Hospital Length of Stay: 9 days  Code Status: Full Code     Subjective:     HPI:  68-year-old female admitted to the hospital for cellulitis was found have a large mass involving her right breast also asked see the patient for further evaluation    Interval History: Today the patient reports that she feels well.  She is able to carry on normal conversation and is oriented to time and person.  She did not recall having had her breast mass biopsy done.  I have reviewed all of the patient's interval history.    Oncology Treatment Plan:   [No treatment plan]    Medications:  Continuous Infusions:  Scheduled Meds:   acetaZOLAMIDE (DIAMOX) IVPB  250 mg Intravenous Q12H    bumetanide  1 mg Intravenous BID    clindamycin  300 mg Oral Q8H    fluconazole  100 mg Oral Daily    folic acid-vit B6-vit B12 2.5-25-2 mg  1 tablet Oral Daily    heparin (porcine)  5,000 Units Subcutaneous Q8H    lisinopril  5 mg Oral Daily    metoprolol succinate  50 mg Oral Daily    miconazole NITRATE 2 %   Topical (Top) BID    multivitamin  1 tablet Oral Daily    thiamine  100 mg Oral Daily     PRN Meds:acetaminophen     Review of Systems   Constitutional: Positive for activity change, appetite change and fatigue. Negative for chills, diaphoresis, fever and unexpected weight change.   HENT: Negative for congestion, dental problem, ear discharge, ear pain, hearing loss, mouth sores, postnasal drip, sinus pressure, sore throat, tinnitus, trouble swallowing and voice change.    Eyes: Negative for photophobia, pain and visual disturbance.   Respiratory: Positive for shortness of breath. Negative for cough, chest tightness and wheezing.    Cardiovascular: Negative for chest pain, palpitations and leg swelling.   Gastrointestinal: Negative for abdominal distention, abdominal pain, blood in stool, constipation, diarrhea, nausea  and vomiting.   Endocrine: Negative for cold intolerance, heat intolerance, polydipsia, polyphagia and polyuria.   Genitourinary: Negative for difficulty urinating, dysuria, flank pain, frequency, hematuria, urgency, vaginal bleeding and vaginal discharge.   Musculoskeletal: Negative for arthralgias, back pain, gait problem, joint swelling and myalgias.   Skin: Negative for pallor and rash.   Allergic/Immunologic: Negative for immunocompromised state.   Neurological: Negative for dizziness, syncope, weakness, light-headedness, numbness and headaches.   Hematological: Negative for adenopathy. Does not bruise/bleed easily.   Psychiatric/Behavioral: Positive for confusion. Negative for agitation and dysphoric mood. The patient is not nervous/anxious.      Objective:     Vital Signs (Most Recent):  Temp: 98 °F (36.7 °C) (02/18/18 1221)  Pulse: 67 (02/18/18 1300)  Resp: 18 (02/18/18 1221)  BP: 116/60 (02/18/18 1221)  SpO2: 98 % (02/18/18 1221) Vital Signs (24h Range):  Temp:  [97.4 °F (36.3 °C)-98.6 °F (37 °C)] 98 °F (36.7 °C)  Pulse:  [67-90] 67  Resp:  [18-20] 18  SpO2:  [92 %-100 %] 98 %  BP: ()/(50-64) 116/60     Weight: 59.5 kg (131 lb 2.8 oz)  Body mass index is 22.52 kg/m².  Body surface area is 1.64 meters squared.      Intake/Output Summary (Last 24 hours) at 02/18/18 1408  Last data filed at 02/18/18 1400   Gross per 24 hour   Intake              360 ml   Output                0 ml   Net              360 ml       Physical Exam   Constitutional: She is oriented to person, place, and time. She appears well-developed and well-nourished. She is active and cooperative.   HENT:   Head: Normocephalic and atraumatic.   Nose: Nose normal.   Mouth/Throat: Oropharynx is clear and moist. No oropharyngeal exudate.   Eyes: Pupils are equal, round, and reactive to light. No scleral icterus.   Neck: Neck supple. No thyromegaly present.   Cardiovascular: Normal rate, regular rhythm, normal heart sounds and intact distal  pulses.    No murmur heard.  Pulmonary/Chest: Effort normal. No stridor. No respiratory distress. She has no wheezes. She has rales. Right breast exhibits mass.   Abdominal: Soft. Bowel sounds are normal. She exhibits no distension, no ascites and no mass. There is no hepatosplenomegaly. There is no tenderness. There is no rigidity, no rebound and no guarding.   Musculoskeletal: She exhibits no edema or tenderness.   Lymphadenopathy:     She has no cervical adenopathy.   Neurological: She is alert and oriented to person, place, and time. No cranial nerve deficit. She exhibits normal muscle tone. Coordination normal.   Skin: Skin is warm and dry. No rash noted. No pallor.   Psychiatric: She has a normal mood and affect. Her behavior is normal. Judgment and thought content normal. She exhibits abnormal recent memory.   Nursing note and vitals reviewed.      Significant Labs:   I have reviewed all of the patient's relevant lab work available in the medical record and have utilized this in my evaluation and management recommendations today    Diagnostic Results:  I have reviewed all of the patient's diagnostic/imaging results available in the medical record and have utilized this in my evaluation and management recommendations today.    Assessment/Plan:     Mass of breast, right    I had a detailed discussion with the patient today with regard to her current clinical situation.  We are currently awaiting pathology results for confirmation of breast malignancy.  The patient is not a candidate for any systemic chemotherapy based on her overall clinical situation and associated comorbidities.  She might be a candidate for endocrine therapy if her tumor is ER/GA positive.            Thank you for your consult.      Rodo Matson MD  Hematology/Oncology  [facility]     [facility]

## 2024-04-11 NOTE — CONSULT NOTE ADULT - SUBJECTIVE AND OBJECTIVE BOX
Initial GI Consult Note:     HPI:  89 y/o M with SHELLFISH ALLERGY and PMHx of HLD, HTN, T2DM, CAD s/p PCI June 2023, s/p cholecystectomy, BPH, PUD, hypothyroidism, cervical spondylosis s/p laminectomy, presents to ED with episode of unresponsiveness with right sided gaze, left facial droop at 7:15am while at [facility] waiting for post-operative cataract appointment, episode ~1 minute and back to baseline within 3-5 minutes. As per daughter who is also a nurse, pt had similar episode two weeks prior and was seen by PMD and cardiologist with negative workup.  Pt is currently wearing Holter Monitor.  Pt did not have neurological workup.  In ED most of symptoms resolved and pt states he feels back to normal. At this time endorsing "burning" diffuse, chest discomfort. Family also notes that patient becomes SOB and diaphoretic with minimal ambulation and has limited has activities of daily due to symptoms.     Patient denies  SOB, dizziness, palpitations, orthopnea/PND, leg swelling, bleeding, melena/hematochezia, fever, chills, URI symptoms, or recent illness.     ER Course  - Vitals: /72 mmHg, HR 64 bpm, RR 18 breaths/min, temp 97.7 F, spO2 98% on room air.   - Labs: WBC 12.24 H/H 9.2/30.1 Plt 330 PT/PTT 11.3/31.5 INR 0.99 Na 136 K 4.8 Cl 104 CO2 23 BUN/Cr 17/1.34 Trop T 19   - Imaging/Results:        o CT Head: No acute hemorrhage or intracranial pathology.        o CT Perfusion: Normal CT perfusion       o Intracranial CTA: No large vessel occlusion. Mild stenosis of the right   PCA       o Extracranial CTA: No steno-occlusive disease. Status post ACDF C4-5 through C6-7. 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       o vEEG no EEG abnormalities were identified during this record        o EKG (4/10/24): NSR @71 bpm, QTc 456 ms        o CXR (4/10/24): Metallic device overlying the mediastinum. Bibasilar focal atelectasis, decreased elevation of the right hemidiaphragm. Cardiomegaly. Stable bony structures. Cervical spine hardware.  - Prior Cardiac Studies    o  Cardiac cath 6/5/23: IVUS guided JOSÉ/scoreflex to mLAD 95%, OM2  unable to cross, pRCA 50%, pLCx 50%, dLCx ectatic w/ slow [place], EDP 5, access R radial.  - ER Interventions: NaCL 1L bolus, patient evaluated by and neurology and epilepsy.     Admitted to cardiac telemetry for syncope work-up, which thus far                         (10 Apr 2024 16:55)    Allergies    shellfish (Swelling; Hives)  No Known Drug Allergies    Intolerances      Home Medications:  Colace 100 mg oral capsule: 1 cap(s) orally once a day as needed for  constipation (10 Apr 2024 18:50)  Januvia 100 mg oral tablet: 1 tab(s) orally (10 Apr 2024 18:58)  ketorolac 0.5% ophthalmic solution: 1 drop(s) in the left eye 3 times a day (10 Apr 2024 18:53)  ketorolac 0.5% ophthalmic solution: 1 drop(s) in the right eye 3 times a day (10 Apr 2024 18:58)  ofloxacin 0.3% ophthalmic solution: 1 drop(s) in the left eye 4 times a day (10 Apr 2024 18:58)  Plavix 75 mg oral tablet: 1 tab(s) orally (10 Apr 2024 18:58)  Prednisol 1% ophthalmic solution: 1 drop(s) in the right eye once a day (10 Apr 2024 18:58)  Prednisol 1% ophthalmic solution: 1 drop(s) in the left eye 4 times a day (10 Apr 2024 19:11)  tamsulosin 0.4 mg oral capsule: 1 cap(s) orally (10 Apr 2024 18:58)    MEDICATIONS:  MEDICATIONS  (STANDING):  aspirin enteric coated 81 milliGRAM(s) Oral daily  atorvastatin 80 milliGRAM(s) Oral at bedtime  dextrose 10% Bolus 125 milliLiter(s) IV Bolus once  dextrose 5%. 1000 milliLiter(s) (50 mL/Hr) IV Continuous <Continuous>  dextrose 5%. 1000 milliLiter(s) (100 mL/Hr) IV Continuous <Continuous>  dextrose 50% Injectable 25 Gram(s) IV Push once  dextrose 50% Injectable 12.5 Gram(s) IV Push once  glucagon  Injectable 1 milliGRAM(s) IntraMuscular once  insulin lispro (ADMELOG) corrective regimen sliding scale   SubCutaneous three times a day before meals  insulin lispro (ADMELOG) corrective regimen sliding scale   SubCutaneous at bedtime  iron sucrose IVPB 500 milliGRAM(s) IV Intermittent once  ketorolac 0.5% Ophthalmic Solution 1 Drop(s) Both EYES three times a day  levothyroxine 75 MICROGram(s) Oral daily  metoprolol succinate ER 12.5 milliGRAM(s) Oral daily  ofloxacin 0.3% Solution 1 Drop(s) Left EYE four times a day  pantoprazole  Injectable 40 milliGRAM(s) IV Push every 12 hours  polyethylene glycol 3350 17 Gram(s) Oral daily  polyethylene glycol/electrolyte Solution. 4000 milliLiter(s) Oral once  prednisoLONE acetate 1% Suspension 1 Drop(s) Right EYE two times a day  prednisoLONE acetate 1% Suspension 1 Drop(s) Left EYE four times a day  senna 2 Tablet(s) Oral at bedtime  sodium chloride 0.9%. 1000 milliLiter(s) (150 mL/Hr) IV Continuous <Continuous>  tamsulosin 0.4 milliGRAM(s) Oral at bedtime    MEDICATIONS  (PRN):  dextrose Oral Gel 15 Gram(s) Oral once PRN Blood Glucose LESS THAN 70 milliGRAM(s)/deciliter    PAST MEDICAL & SURGICAL HISTORY:  HLD (hyperlipidemia)      Hypothyroid      History of BPH      CAD (coronary artery disease)  coronary Stent 6/2023      HTN (hypertension)      H/O cholecystitis      Diabetes mellitus      S/P laminectomy with spinal fusion  cervical      History of cholecystectomy      Coronary stent patent        FAMILY HISTORY:    SOCIAL HISTORY:  [place]: [ ] Current, [ ] Former, [ ] Never; Pack Years:  Alcohol:  Illicit Drugs:    REVIEW OF SYSTEMS:  CONSTITUTIONAL: No weakness, fevers or chills  HEENT: No visual changes; No vertigo or throat pain   NECK: No pain or stiffness  RESPIRATORY: No cough, wheezing, hemoptysis; No shortness of breath  CARDIOVASCULAR: No chest pain or palpitations  GASTROINTESTINAL: No abdominal or epigastric pain. No nausea, vomiting, or hematemesis; No diarrhea or constipation. No melena or hematochezia.  GENITOURINARY: No dysuria, frequency or hematuria  NEUROLOGICAL: No numbness or weakness  SKIN: No itching, burning, rashes, or lesions   All other 10 review of systems is negative unless indicated above.    Vital Signs Last 24 Hrs  T(C): 36.3 (11 Apr 2024 09:30), Max: 37.1 (10 Apr 2024 22:27)  T(F): 97.3 (11 Apr 2024 09:30), Max: 98.8 (11 Apr 2024 06:40)  HR: 70 (11 Apr 2024 14:00) (60 - 71)  BP: 99/52 (11 Apr 2024 14:00) (99/52 - 155/76)  BP(mean): 73 (11 Apr 2024 12:08) (72 - 109)  RR: 18 (11 Apr 2024 12:08) (16 - 18)  SpO2: 95% (11 Apr 2024 12:08) (91% - 98%)    Parameters below as of 11 Apr 2024 12:08  Patient On (Oxygen Delivery Method): room air        04-10 @ 07:01  -  04-11 @ 07:00  --------------------------------------------------------  IN: 0 mL / OUT: 450 mL / NET: -450 mL        PHYSICAL EXAM:    General: Well developed; well nourished; in no acute distress  Eyes: Anicteric sclerae, moist conjunctivae  HENT: Moist mucous membranes  Neck: Trachea midline, supple  Lungs: Normal respiratory effort, no intercostal retractions  Cardiovascular: RRR  Abdomen: Soft, non-tender non-distended; Normal bowel sounds; No rebound or guarding  Extremities: Normal range of motion, No clubbing, cyanosis or edema  Neurological: Alert and oriented x3  Skin: Warm and dry. No obvious rash    LABS:                        8.6    9.94  )-----------( 292      ( 11 Apr 2024 05:30 )             28.3     04-11    134<L>  |  104  |  14  ----------------------------<  148<H>  4.1   |  20<L>  |  1.19    Ca    8.2<L>      11 Apr 2024 05:30  Mg     2.4     04-11    TPro  5.8<L>  /  Alb  3.3  /  TBili  0.4  /  DBili  x   /  AST  21  /  ALT  18  /  AlkPhos  61  04-11        PT/INR - ( 10 Apr 2024 08:04 )   PT: 11.3 sec;   INR: 0.99          PTT - ( 10 Apr 2024 08:04 )  PTT:31.5 sec    RADIOLOGY & ADDITIONAL STUDIES:     Reviewed   Initial GI Consult Note:     HPI:  88M and PMH of HLD, HTN, T2DM, CAD (s/p PCI June 2023), s/p cholecystectomy, BPH, PUD, hypothyroidism, cervical spondylosis s/p laminectomy, who first presented to ED with episode of unresponsiveness with right sided gaze, left facial droop at 7:15am while at [facility] waiting for post-operative cataract appointment, episode ~1 minute and back to baseline within 3-5 minutes. As per daughter who is also a nurse, pt had similar episode two weeks prior and was seen by PMD and cardiologist with negative workup.  Pt is currently wearing Holter Monitor.  Pt did not have neurological workup.  In ED most of symptoms resolved and pt stated he felt back to normal. In the ED, also described "burning" diffuse, chest discomfort. Family also notes that patient becomes SOB and diaphoretic with minimal ambulation and has limited has activities of daily due to symptoms.     ER Course  - Vitals: /72 mmHg, HR 64 bpm, RR 18 breaths/min, temp 97.7 F, spO2 98% on room air.   - Labs: WBC 12.24 H/H 9.2/30.1 Plt 330 PT/PTT 11.3/31.5 INR 0.99 Na 136 K 4.8 Cl 104 CO2 23 BUN/Cr 17/1.34 Trop T 19   - Imaging/Results:        o CT Head: No acute hemorrhage or intracranial pathology.        o CT Perfusion: Normal CT perfusion       o Intracranial CTA: No large vessel occlusion. Mild stenosis of the right PCA       o Extracranial CTA: No steno-occlusive disease. Status post ACDF C4-5 through C6-7. 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       o vEEG no EEG abnormalities were identified during this record        o EKG (4/10/24): NSR @71 bpm, QTc 456 ms        o CXR (4/10/24): Metallic device overlying the mediastinum. Bibasilar focal atelectasis, decreased elevation of the right hemidiaphragm. Cardiomegaly. Stable bony structures. Cervical spine hardware.  - Prior Cardiac Studies    o  Cardiac cath 6/5/23: IVUS guided JOSÉ/scoreflex to mLAD 95%, OM2  unable to cross, pRCA 50%, pLCx 50%, dLCx ectatic w/ slow [place], EDP 5, access R radial.  - ER Interventions: NaCL 1L bolus, patient evaluated by and neurology and epilepsy.     Admitted to cardiac telemetry for syncope work-up, which thus far revealed no evidence of ischemia. GI consulted for melena and iron deficiency anemia.     Patient seen and examined at bedside. States that he has been having significant epigastric pain, SOB, and generalized fatigue, otherwise has no acute complaints at this time. Has been having a few days of dark black stools. Describes that he is generally constipated, which is not a new symptom for him. Understands that his blood counts have been gradually dropping since Oct 2023, when his Hb was 14.6 and is now 8.6, Iron studies suggestive of CANDELARIA with ferritin of 31 and iron sat of 11. Patient believes that he had a colonoscopy 10 years ago, which showed no abnormalities. Does not recall when he was told to have a follow up. No prior EGD. Denies any unintentional weight loss, dysphagia, fevers, BRBPR, or hematemesis. Currently on only aspirin 81 mg PO daily.       VITAL SIGNS:  Vital Signs Last 24 Hrs  T(C): 36.3 (11 Apr 2024 09:30), Max: 37.1 (10 Apr 2024 22:27)  T(F): 97.3 (11 Apr 2024 09:30), Max: 98.8 (11 Apr 2024 06:40)  HR: 70 (11 Apr 2024 14:00) (60 - 71)  BP: 99/52 (11 Apr 2024 14:00) (99/52 - 155/76)  BP(mean): 73 (11 Apr 2024 12:08) (72 - 109)  RR: 18 (11 Apr 2024 12:08) (16 - 18)  SpO2: 95% (11 Apr 2024 12:08) (91% - 95%)    Parameters below as of 11 Apr 2024 12:08  Patient On (Oxygen Delivery Method): room air        04-10-24 @ 07:01  -  04-11-24 @ 07:00  --------------------------------------------------------  IN: 0 mL / OUT: 450 mL / NET: -450 mL      MEDICATIONS:  MEDICATIONS  (STANDING):  aspirin enteric coated 81 milliGRAM(s) Oral daily  atorvastatin 80 milliGRAM(s) Oral at bedtime  dextrose 10% Bolus 125 milliLiter(s) IV Bolus once  dextrose 5%. 1000 milliLiter(s) (50 mL/Hr) IV Continuous <Continuous>  dextrose 5%. 1000 milliLiter(s) (100 mL/Hr) IV Continuous <Continuous>  dextrose 50% Injectable 25 Gram(s) IV Push once  dextrose 50% Injectable 12.5 Gram(s) IV Push once  glucagon  Injectable 1 milliGRAM(s) IntraMuscular once  insulin lispro (ADMELOG) corrective regimen sliding scale   SubCutaneous at bedtime  insulin lispro (ADMELOG) corrective regimen sliding scale   SubCutaneous three times a day before meals  iron sucrose IVPB 500 milliGRAM(s) IV Intermittent once  ketorolac 0.5% Ophthalmic Solution 1 Drop(s) Both EYES three times a day  levothyroxine 75 MICROGram(s) Oral daily  metoprolol succinate ER 12.5 milliGRAM(s) Oral daily  ofloxacin 0.3% Solution 1 Drop(s) Left EYE four times a day  pantoprazole  Injectable 40 milliGRAM(s) IV Push every 12 hours  polyethylene glycol 3350 17 Gram(s) Oral daily  polyethylene glycol/electrolyte Solution. 4000 milliLiter(s) Oral once  prednisoLONE acetate 1% Suspension 1 Drop(s) Right EYE two times a day  prednisoLONE acetate 1% Suspension 1 Drop(s) Left EYE four times a day  senna 2 Tablet(s) Oral at bedtime  sodium chloride 0.9%. 1000 milliLiter(s) (150 mL/Hr) IV Continuous <Continuous>  tamsulosin 0.4 milliGRAM(s) Oral at bedtime    MEDICATIONS  (PRN):  dextrose Oral Gel 15 Gram(s) Oral once PRN Blood Glucose LESS THAN 70 milliGRAM(s)/deciliter      ALLERGIES:  Allergies    shellfish (Swelling; Hives)  No Known Drug Allergies    Intolerances        LABS:                        8.6    9.94  )-----------( 292      ( 11 Apr 2024 05:30 )             28.3     04-11    134<L>  |  104  |  14  ----------------------------<  148<H>  4.1   |  20<L>  |  1.19    Ca    8.2<L>      11 Apr 2024 05:30  Mg     2.4     04-11    TPro  5.8<L>  /  Alb  3.3  /  TBili  0.4  /  DBili  x   /  AST  21  /  ALT  18  /  AlkPhos  61  04-11    PT/INR - ( 10 Apr 2024 08:04 )   PT: 11.3 sec;   INR: 0.99          PTT - ( 10 Apr 2024 08:04 )  PTT:31.5 sec  Urinalysis Basic - ( 11 Apr 2024 05:30 )    Color: x / Appearance: x / SG: x / pH: x  Gluc: 148 mg/dL / Ketone: x  / Bili: x / Urobili: x   Blood: x / Protein: x / Nitrite: x   Leuk Esterase: x / RBC: x / WBC x   Sq Epi: x / Non Sq Epi: x / Bacteria: x      CAPILLARY BLOOD GLUCOSE  124 (10 Apr 2024 08:33)      POCT Blood Glucose.: 131 mg/dL (11 Apr 2024 11:37)  RADIOLOGY & ADDITIONAL TESTS: Reviewed.      Allergies  shellfish (Swelling; Hives)  No Known Drug Allergies    Intolerances      Home Medications:  Colace 100 mg oral capsule: 1 cap(s) orally once a day as needed for  constipation (10 Apr 2024 18:50)  Januvia 100 mg oral tablet: 1 tab(s) orally (10 Apr 2024 18:58)  ketorolac 0.5% ophthalmic solution: 1 drop(s) in the left eye 3 times a day (10 Apr 2024 18:53)  ketorolac 0.5% ophthalmic solution: 1 drop(s) in the right eye 3 times a day (10 Apr 2024 18:58)  ofloxacin 0.3% ophthalmic solution: 1 drop(s) in the left eye 4 times a day (10 Apr 2024 18:58)  Plavix 75 mg oral tablet: 1 tab(s) orally (10 Apr 2024 18:58)  Prednisol 1% ophthalmic solution: 1 drop(s) in the right eye once a day (10 Apr 2024 18:58)  Prednisol 1% ophthalmic solution: 1 drop(s) in the left eye 4 times a day (10 Apr 2024 19:11)  tamsulosin 0.4 mg oral capsule: 1 cap(s) orally (10 Apr 2024 18:58)    MEDICATIONS:  MEDICATIONS  (STANDING):  aspirin enteric coated 81 milliGRAM(s) Oral daily  atorvastatin 80 milliGRAM(s) Oral at bedtime  dextrose 10% Bolus 125 milliLiter(s) IV Bolus once  dextrose 5%. 1000 milliLiter(s) (50 mL/Hr) IV Continuous <Continuous>  dextrose 5%. 1000 milliLiter(s) (100 mL/Hr) IV Continuous <Continuous>  dextrose 50% Injectable 25 Gram(s) IV Push once  dextrose 50% Injectable 12.5 Gram(s) IV Push once  glucagon  Injectable 1 milliGRAM(s) IntraMuscular once  insulin lispro (ADMELOG) corrective regimen sliding scale   SubCutaneous three times a day before meals  insulin lispro (ADMELOG) corrective regimen sliding scale   SubCutaneous at bedtime  iron sucrose IVPB 500 milliGRAM(s) IV Intermittent once  ketorolac 0.5% Ophthalmic Solution 1 Drop(s) Both EYES three times a day  levothyroxine 75 MICROGram(s) Oral daily  metoprolol succinate ER 12.5 milliGRAM(s) Oral daily  ofloxacin 0.3% Solution 1 Drop(s) Left EYE four times a day  pantoprazole  Injectable 40 milliGRAM(s) IV Push every 12 hours  polyethylene glycol 3350 17 Gram(s) Oral daily  polyethylene glycol/electrolyte Solution. 4000 milliLiter(s) Oral once  prednisoLONE acetate 1% Suspension 1 Drop(s) Right EYE two times a day  prednisoLONE acetate 1% Suspension 1 Drop(s) Left EYE four times a day  senna 2 Tablet(s) Oral at bedtime  sodium chloride 0.9%. 1000 milliLiter(s) (150 mL/Hr) IV Continuous <Continuous>  tamsulosin 0.4 milliGRAM(s) Oral at bedtime    MEDICATIONS  (PRN):  dextrose Oral Gel 15 Gram(s) Oral once PRN Blood Glucose LESS THAN 70 milliGRAM(s)/deciliter    PAST MEDICAL & SURGICAL HISTORY:  HLD (hyperlipidemia)      Hypothyroid      History of BPH      CAD (coronary artery disease)  coronary Stent 6/2023      HTN (hypertension)      H/O cholecystitis      Diabetes mellitus      S/P laminectomy with spinal fusion  cervical      History of cholecystectomy      Coronary stent patent        FAMILY HISTORY:    SOCIAL HISTORY:  [place]: [ ] Current, [ ] Former, [ ] Never; Pack Years:  Alcohol:  Illicit Drugs:      Vital Signs Last 24 Hrs  T(C): 36.3 (11 Apr 2024 09:30), Max: 37.1 (10 Apr 2024 22:27)  T(F): 97.3 (11 Apr 2024 09:30), Max: 98.8 (11 Apr 2024 06:40)  HR: 70 (11 Apr 2024 14:00) (60 - 71)  BP: 99/52 (11 Apr 2024 14:00) (99/52 - 155/76)  BP(mean): 73 (11 Apr 2024 12:08) (72 - 109)  RR: 18 (11 Apr 2024 12:08) (16 - 18)  SpO2: 95% (11 Apr 2024 12:08) (91% - 98%)    Parameters below as of 11 Apr 2024 12:08  Patient On (Oxygen Delivery Method): room air        04-10 @ 07:01  -  04-11 @ 07:00  --------------------------------------------------------  IN: 0 mL / OUT: 450 mL / NET: -450 mL      PHYSICAL EXAM:  General: No acute distress  Lungs: Normal respiratory effort and no intercostal retractions  Cardiovascular: RRR  Abdomen: Soft, non-tender, non-distended  Neurological: Alert and oriented x3  Skin: Warm and dry. No obvious rash      LABS:                        8.6    9.94  )-----------( 292      ( 11 Apr 2024 05:30 )             28.3     04-11    134<L>  |  104  |  14  ----------------------------<  148<H>  4.1   |  20<L>  |  1.19    Ca    8.2<L>      11 Apr 2024 05:30  Mg     2.4     04-11    TPro  5.8<L>  /  Alb  3.3  /  TBili  0.4  /  DBili  x   /  AST  21  /  ALT  18  /  AlkPhos  61  04-11        PT/INR - ( 10 Apr 2024 08:04 )   PT: 11.3 sec;   INR: 0.99          PTT - ( 10 Apr 2024 08:04 )  PTT:31.5 sec    RADIOLOGY & ADDITIONAL STUDIES:     Reviewed

## 2024-04-11 NOTE — CONSULT NOTE ADULT - ASSESSMENT
87yo RHM w/ MI, CAD s/p stent (2023) (currently w/ Holter monitor), HLD, Hypothyroidism, OU cataract s/p repair (2024), presenting to ED as a stroke code for witnessed brief episode of unresponsive w/ OU right gaze and confusion. NIHSS 1 NLF, mRS 4. Unremarkable Acute CT brain and vessels. Epilepsy consulted for concerns seizure as this is the 3rd episode w/in 5 months as well as non-contributing cardiac w/u thus far. Transient LOC w/ gaze abnormality and confusion can be seen in both syncope and seizures. However, considering his cardiac hx, current use of Holter and recent h/o BELLO presentation is more concerning for syncope w/ high suspicion for cardiogenic and less neurogenic. Pt is at baseline MS and non-focal exam.       Recommendation  Consult Cardio for evaluation as well as to review Holter monitor  Get Orthostatic vitals   Get rEEG 1-2hrs   Check UA/Utox, CXR,   
89 y/o M with SHELLFISH ALLERGY and PMHx of HLD, HTN, T2DM, CAD s/p PCI to LCx June 2023, s/p cholecystectomy, BPH, PUD, hypothyroidism, cervical spondylosis s/p laminectomy, recent syncope with holter monitor for 2 weeks, s/p cataract OS surgery at [facility] ( 4/9) and  presented to ED with episode of unresponsiveness with right sided gaze, left facial droop at 7:15am while at [facility] waiting for post-operative cataract appointment, episode ~1 minute and back to baseline within 3-5 minutes.  At ED, Stroke code called, CT Head, CTA negative, EEG negative. Pt was admitted for further evaluation of cardiac syncope. Pt found to have an acute blood loss anemia dropped Hgb 8.6 from baseline 12, and orthostatic likely 2/2 acute UGI bleed.    - telemetry  - active care per Cardiologist, Dr. Thomas   - Orthostatic : given IVF bolus  - Syncope: TTE reviewed    - CAD s/p PCI to LCx( 6/2023): d/w Interventional Cardiologist Dr. Josie Siegel, ok to stop Plavix ( 4/11) and c/w ASA monotherapy given concerns of acute UGI bleed   - UGI bleed: GI consult appreciated, Dr. Price. c/w PPI iv bid, plan for EGD tomorrow with GI Dr. Hernandez.   - Acute blood loss anemia: trend H/H baseline Hgb 12 in Nov 2023), Hgb 9.2 at ED( 4/10)-> 8.6( 4/11), keep active T&S  - CANDELARIA: ( Ferritin 30 and Tsat 11%), IV iron 500mg daily for 2 days ( 4/11-4/12)     POC as d/w pt, wife, daughter Olga Yanez RN, Cardiologist Dr. Josie Siegel, Dr. Jarvis and Dr. Thomas, GI fellow Dr. Price d/w Cardiology PA Angie 
88M and PMH of HLD, HTN, T2DM, CAD (s/p PCI June 2023), s/p cholecystectomy, BPH, PUD, hypothyroidism, cervical spondylosis s/p laminectomy, presented to ED with concern for CVA event, now admitted to cardiac telemetry for ischemic work-up, which has been negative. GI consulted for melena and CANDELARIA.     Recommendations:   - trend Hb and maintain active type and screen   - continue pantoprazole 40 mg IV BID   - okay to continue aspirin and hold Plavix per cardiology   - ensure patient has 2 large bore IVs  - plan for EGD tomorrow with Dr. Hernandez  - NPO except medications after midnight tonight   - agree with IV iron transfusions while inpatient   - please provide cardiac risk assessment / optimization for EGD tomorrow     Case discussed with Dr. Hernandez. GI Team will continue to follow.     Laura Price D.O.   Gastroenterology Fellow  Weekday 7am-5pm Pager: [phone]  Weeknights/Weekend/Holiday Coverage: Please call the  for contact info      
88y Male with PMHx of HLD, HTN, DM, CAD s/p PCI June 2023, BPH, hypothyroidism presents to ED from [facility] where patient was about to get a post op cataract surgery check and developed transient right gaze deviation and became unresponsive with blank stare for 2 mins. Daughter reports same presentation 2 weeks ago happened and patient's cardiologist was concerned for a cardiac etiology and so patient had a holter monitor placed. Patient is now back to his baseline. NIHSS 1 for subtle L NLFF. CT imaging unremarkable. Not a candidate for acute intervention.     Given multiple stereotypical events in the absence of intracranial stenosis, unlikely presentation is due to stroke. Would recommend epilepsy consult. No further stroke recs.     Discussed with Dr. Schmidt

## 2024-04-11 NOTE — PROGRESS NOTE ADULT - PROBLEM SELECTOR PLAN 8
Follow up A1c in AM   - Hold: Januvia 100 mg QD (home med)   - Initiate ISS    #HTN  SBPs 110s-150s  - Continue: Toprol XL 12.5 mg QD (reduced home dose)  - Initiate: Imdur 30 mg QD

## 2024-04-11 NOTE — PROGRESS NOTE ADULT - SUBJECTIVE AND OBJECTIVE BOX
Cardiology PA Adult Progress Note    SUBJECTIVE ASSESSMENT:  	  MEDICATIONS:  isosorbide   mononitrate ER Tablet (IMDUR) 30 milliGRAM(s) Oral every 24 hours  metoprolol succinate ER 12.5 milliGRAM(s) Oral daily  pantoprazole    Tablet 40 milliGRAM(s) Oral before breakfast  polyethylene glycol 3350 17 Gram(s) Oral daily  senna 2 Tablet(s) Oral at bedtime  atorvastatin 80 milliGRAM(s) Oral at bedtime  dextrose 50% Injectable 25 Gram(s) IV Push once  dextrose 50% Injectable 12.5 Gram(s) IV Push once  dextrose Oral Gel 15 Gram(s) Oral once PRN  glucagon  Injectable 1 milliGRAM(s) IntraMuscular once  insulin lispro (ADMELOG) corrective regimen sliding scale   SubCutaneous at bedtime  insulin lispro (ADMELOG) corrective regimen sliding scale   SubCutaneous three times a day before meals  levothyroxine 75 MICROGram(s) Oral daily  aspirin enteric coated 81 milliGRAM(s) Oral daily  clopidogrel Tablet 75 milliGRAM(s) Oral daily  dextrose 10% Bolus 125 milliLiter(s) IV Bolus once  dextrose 5%. 1000 milliLiter(s) IV Continuous <Continuous>  dextrose 5%. 1000 milliLiter(s) IV Continuous <Continuous>  ketorolac 0.5% Ophthalmic Solution 1 Drop(s) Both EYES three times a day  ofloxacin 0.3% Solution 1 Drop(s) Left EYE four times a day  prednisoLONE acetate 1% Suspension 1 Drop(s) Right EYE two times a day  prednisoLONE acetate 1% Suspension 1 Drop(s) Left EYE four times a day  sodium chloride 0.9%. 1000 milliLiter(s) IV Continuous <Continuous>  tamsulosin 0.4 milliGRAM(s) Oral at bedtime    	  VITAL SIGNS:  T(C): 36.3 (04-11-24 @ 09:30), Max: 37.1 (04-10-24 @ 22:27)  HR: 65 (04-11-24 @ 06:40) (63 - 77)  BP: 101/54 (04-11-24 @ 06:40) (101/54 - 155/76)  RR: 18 (04-11-24 @ 06:40) (16 - 18)  SpO2: 93% (04-11-24 @ 06:40) (91% - 99%)  Wt(kg): --    I&O's Summary    10 Apr 2024 07:01  -  11 Apr 2024 07:00  --------------------------------------------------------  IN: 0 mL / OUT: 450 mL / NET: -450 mL      Height (cm): 165.1 (04-10 @ 21:26)  Weight (kg): 62 (04-10 @ 21:26)  BMI (kg/m2): 22.7 (04-10 @ 21:26)  BSA (m2): 1.68 (04-10 @ 21:26)                                     PHYSICAL EXAM:  Appearance: Normal	  HEENT: Normal oral mucosa, PERRL, EOMI	  Neck: Supple, + JVD/ - JVD; ___ Carotid Bruit   Cardiovascular: Normal S1 S2, No murmurs  Respiratory: Lungs clear to auscultation/Decreased Breath Sounds/No Rales, Rhonchi, Wheezing	  Gastrointestinal:  Soft, Non-tender, + BS	  Skin: No rashes, No ecchymoses, No cyanosis  Extremities: Normal range of motion, No clubbing, cyanosis or edema  Vascular: Peripheral pulses palpable 2+ bilaterally  Neurologic: Non-focal  Psychiatry: A & O x 3, Mood & affect appropriate    LABS:	                         8.6    9.94  )-----------( 292      ( 11 Apr 2024 05:30 )             28.3     04-11    134<L>  |  104  |  14  ----------------------------<  148<H>  4.1   |  20<L>  |  1.19    Ca    8.2<L>      11 Apr 2024 05:30  Mg     2.4     04-11    TPro  5.8<L>  /  Alb  3.3  /  TBili  0.4  /  DBili  x   /  AST  21  /  ALT  18  /  AlkPhos  61  04-11  TSH: Thyroid Stimulating Hormone, Serum: 6.950 uIU/mL (04-11 @ 05:30)  Thyroid Stimulating Hormone, Serum: 6.950 uIU/mL (04-11 @ 05:30)    PT/INR - ( 10 Apr 2024 08:04 )   PT: 11.3 sec;   INR: 0.99          PTT - ( 10 Apr 2024 08:04 )  PTT:31.5 sec Cardiology PA Adult Progress Note    SUBJECTIVE ASSESSMENT: Seen and examined at bedside. Pt states at 6 am he felt burning chest discomfort but it was relieved after taking PPI. Pt has now been CP free. He has not had many bowel movements and reports being constipated. Miralax and senna did not help well , he thinks the lidocaine helped better. Denies SOB, palpitations.  	  MEDICATIONS:  isosorbide   mononitrate ER Tablet (IMDUR) 30 milliGRAM(s) Oral every 24 hours  metoprolol succinate ER 12.5 milliGRAM(s) Oral daily  pantoprazole  Tablet 40 milliGRAM(s) Oral before breakfast  polyethylene glycol 3350 17 Gram(s) Oral daily  senna 2 Tablet(s) Oral at bedtime  atorvastatin 80 milliGRAM(s) Oral at bedtime  dextrose 50% Injectable 25 Gram(s) IV Push once  dextrose 50% Injectable 12.5 Gram(s) IV Push once  dextrose Oral Gel 15 Gram(s) Oral once PRN  glucagon  Injectable 1 milliGRAM(s) IntraMuscular once  insulin lispro (ADMELOG) corrective regimen sliding scale   SubCutaneous at bedtime  insulin lispro (ADMELOG) corrective regimen sliding scale   SubCutaneous three times a day before meals  levothyroxine 75 MICROGram(s) Oral daily  aspirin enteric coated 81 milliGRAM(s) Oral daily  clopidogrel Tablet 75 milliGRAM(s) Oral daily  dextrose 10% Bolus 125 milliLiter(s) IV Bolus once  dextrose 5%. 1000 milliLiter(s) IV Continuous <Continuous>  dextrose 5%. 1000 milliLiter(s) IV Continuous <Continuous>  ketorolac 0.5% Ophthalmic Solution 1 Drop(s) Both EYES three times a day  ofloxacin 0.3% Solution 1 Drop(s) Left EYE four times a day  prednisoLONE acetate 1% Suspension 1 Drop(s) Right EYE two times a day  prednisoLONE acetate 1% Suspension 1 Drop(s) Left EYE four times a day  sodium chloride 0.9%. 1000 milliLiter(s) IV Continuous <Continuous>  tamsulosin 0.4 milliGRAM(s) Oral at bedtime    VITAL SIGNS:  T(C): 36.3 (04-11-24 @ 09:30), Max: 37.1 (04-10-24 @ 22:27)  HR: 65 (04-11-24 @ 06:40) (63 - 77)  BP: 101/54 (04-11-24 @ 06:40) (101/54 - 155/76)  RR: 18 (04-11-24 @ 06:40) (16 - 18)  SpO2: 93% (04-11-24 @ 06:40) (91% - 99%)  Wt(kg): --    I&O's Summary    10 Apr 2024 07:01  -  11 Apr 2024 07:00  --------------------------------------------------------  IN: 0 mL / OUT: 450 mL / NET: -450 mL    Height (cm): 165.1 (04-10 @ 21:26)  Weight (kg): 62 (04-10 @ 21:26)  BMI (kg/m2): 22.7 (04-10 @ 21:26)  BSA (m2): 1.68 (04-10 @ 21:26)                                     PHYSICAL EXAM:  Appearance: Normal	  HEENT: Normal oral mucosa, PERRL, EOMI	  Neck: Supple,  - JVD, No Carotid Bruit   Cardiovascular: Normal S1 S2, No murmurs  Respiratory: Lungs clear to auscultation	  Gastrointestinal:  Soft, Non-tender, + BS	  Skin: No rashes, No ecchymoses, No cyanosis  Extremities: Normal range of motion, No clubbing, cyanosis or edema  Vascular: Peripheral pulses palpable 2+ bilaterally  Neurologic: Non-focal  Psychiatry: A & O x 3, Mood & affect appropriate    LABS:	                         8.6    9.94  )-----------( 292      ( 11 Apr 2024 05:30 )             28.3     04-11    134<L>  |  104  |  14  ----------------------------<  148<H>  4.1   |  20<L>  |  1.19    Ca    8.2<L>      11 Apr 2024 05:30  Mg     2.4     04-11    TPro  5.8<L>  /  Alb  3.3  /  TBili  0.4  /  DBili  x   /  AST  21  /  ALT  18  /  AlkPhos  61  04-11  TSH: Thyroid Stimulating Hormone, Serum: 6.950 uIU/mL (04-11 @ 05:30)  Thyroid Stimulating Hormone, Serum: 6.950 uIU/mL (04-11 @ 05:30)    PT/INR - ( 10 Apr 2024 08:04 )   PT: 11.3 sec;   INR: 0.99          PTT - ( 10 Apr 2024 08:04 )  PTT:31.5 sec

## 2024-04-11 NOTE — PROGRESS NOTE ADULT - PROBLEM SELECTOR PLAN 5
BUN/Cr 17/1.34, GFR 52  - Follow up urine lytes   - Avoid nephrotoxins, renally dose meds, monitor I&Os    #BPH  - Continue: Tamsulosin 0.4 mg QHS - WBC 12.24, afebrile, no signs/symptoms of infection, likely reactive s/p cataract surgery   - CXR (4/10/24): Metallic device overlying the mediastinum. Bibasilar focal atelectasis, decreased elevation of the right hemidiaphragm. Cardiomegaly. Stable bony structures. Cervical spine hardware.  - Follow up UA in AM   - Monitor CBC

## 2024-04-11 NOTE — PROGRESS NOTE ADULT - PROBLEM SELECTOR PLAN 9
Follow up lipid panel in AM   - Continue Atorvastatin 80 mg QHS    DVT ppx: SCDs (holding i/s/o anemia)   F: none indicated  E: Keep K > 4, Mg > 2  N: DASH/TLC w/ CC     Code: Full  Dispo: pending clinical progression     Case discussed with Dr. Thomas. Follow up lipid panel in AM   - Continue Atorvastatin 80 mg QHS    #Orthostatics  -s/p IVF resuscitation  - apply LE ACE wraps and abdominal binder    DVT ppx: SCDs (holding i/s/o anemia)   F: none indicated  E: Keep K > 4, Mg > 2  N: DASH/TLC w/ CC     Code: Full  PT: consulted pending recs  Dispo: pending clinical progression     Case discussed with Dr. Thomas.

## 2024-04-11 NOTE — PHYSICAL THERAPY INITIAL EVALUATION ADULT - GAIT DEVIATIONS NOTED, PT EVAL
steady, no loss of balance, min BELLO, limited by fatigue, reported he exerted a lot of energy while trying to have a BM

## 2024-04-11 NOTE — CONSULT NOTE ADULT - SUBJECTIVE AND OBJECTIVE BOX
HPI:  87 y/o M with SHELLFISH ALLERGY and PMHx of HLD, HTN, T2DM, CAD s/p PCI to LCx June 2023, s/p cholecystectomy, BPH, PUD, hypothyroidism, cervical spondylosis s/p laminectomy, presents to ED with episode of unresponsiveness with right sided gaze, left facial droop at 7:15am while at [facility] waiting for post-operative cataract appointment, episode ~1 minute and back to baseline within 3-5 minutes. As per daughter who is also a nurse, pt had similar episode two weeks prior and was seen by PMD and cardiologist with negative workup.  Pt is currently wearing Holter Monitor.  Pt did not have neurological workup.  In ED most of symptoms resolved and pt states he feels back to normal. At this time endorsing "burning" diffuse, chest discomfort. Family also notes that patient becomes SOB and diaphoretic with minimal ambulation and has limited has activities of daily due to symptoms.     Patient denies  SOB, dizziness, palpitations, orthopnea/PND, leg swelling, bleeding, melena/hematochezia, fever, chills, URI symptoms, or recent illness.     ER Course  - Vitals: /72 mmHg, HR 64 bpm, RR 18 breaths/min, temp 97.7 F, spO2 98% on room air.   - Labs: WBC 12.24 H/H 9.2/30.1 Plt 330 PT/PTT 11.3/31.5 INR 0.99 Na 136 K 4.8 Cl 104 CO2 23 BUN/Cr 17/1.34 Trop T 19   - Imaging/Results:        o CT Head: No acute hemorrhage or intracranial pathology.        o CT Perfusion: Normal CT perfusion       o Intracranial CTA: No large vessel occlusion. Mild stenosis of the right   PCA       o Extracranial CTA: No steno-occlusive disease. Status post ACDF C4-5 through C6-7. 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       o vEEG no EEG abnormalities were identified during this record        o EKG (4/10/24): NSR @71 bpm, QTc 456 ms        o CXR (4/10/24): Metallic device overlying the mediastinum. Bibasilar focal atelectasis, decreased elevation of the right hemidiaphragm. Cardiomegaly. Stable bony structures. Cervical spine hardware.  - Prior Cardiac Studies    o  Cardiac cath 6/5/23: IVUS guided JOSÉ/scoreflex to mLAD 95%, OM2  unable to cross, pRCA 50%, pLCx 50%, dLCx ectatic w/ slow [place], EDP 5, access R radial.  - ER Interventions: NaCL 1L bolus, patient evaluated by and neurology and epilepsy.     Admit to cardiac tele for further management of syncope likely thought to be cardiac in nature.    Pt seen this am, reports CP and stomach pain and thinks he has stomach ulcer. later evidence pt has acute blood loss anemia with baseline hgb 12 in Nov 2023, and Hgb 9.2 on adm and 8.6 this am. CANDELARIA on blood test ( Ferritin 30 and Tsat 11%), positive orthostatic. Pt reports having epigastric pain for one week and black stool for 3 days. d/w Interventional Cardiologist Dr. Josie Siegel ok to stop Plavix and continue on monotherapy on ASA 81mg. added PPI iv bid and GI consulted for EGD tomorrow am. and to start IV iron 500mg daily for 2 days.Actuve T&S for blood transfusion for Hgb<7      (10 Apr 2024 16:55)      PAST MEDICAL & SURGICAL HISTORY:  HLD (hyperlipidemia)      Hypothyroid      History of BPH      CAD (coronary artery disease)  coronary Stent 6/2023      HTN (hypertension)      H/O cholecystitis      Diabetes mellitus      S/P laminectomy with spinal fusion  cervical      History of cholecystectomy      Coronary stent patent        Home Medications:  Colace 100 mg oral capsule: 1 cap(s) orally once a day as needed for  constipation (10 Apr 2024 18:50)  Januvia 100 mg oral tablet: 1 tab(s) orally (10 Apr 2024 18:58)  ketorolac 0.5% ophthalmic solution: 1 drop(s) in the left eye 3 times a day (10 Apr 2024 18:53)  ketorolac 0.5% ophthalmic solution: 1 drop(s) in the right eye 3 times a day (10 Apr 2024 18:58)  ofloxacin 0.3% ophthalmic solution: 1 drop(s) in the left eye 4 times a day (10 Apr 2024 18:58)  Plavix 75 mg oral tablet: 1 tab(s) orally (10 Apr 2024 18:58)  Prednisol 1% ophthalmic solution: 1 drop(s) in the right eye once a day (10 Apr 2024 18:58)  Prednisol 1% ophthalmic solution: 1 drop(s) in the left eye 4 times a day (10 Apr 2024 19:11)  tamsulosin 0.4 mg oral capsule: 1 cap(s) orally (10 Apr 2024 18:58)    Allergies    shellfish (Swelling; Hives)  No Known Drug Allergies    Intolerances      FAMILY HISTORY:    Social History:  denies tobacco use  denies alcohol use  denies illicit drug use (10 Apr 2024 16:55)      REVIEW OF SYSTEMS:  CONSTITUTIONAL: No fever, weight loss  EYES: No eye pain, or discharge  ENMT:  No tinnitus, vertigo  NECK: No pain or stiffness  RESPIRATORY: No cough, No dyspnea  CARDIOVASCULAR: No chest pain, or leg swelling  GASTROINTESTINAL: No abdominal pain. No diarrhea ;No melena or hematochezia.  GENITOURINARY: No dysuria, frequency, or hematuria  NEUROLOGICAL: No numbness, or tremors  SKIN: No itching, burning, rashes, or lesions   ENDOCRINE: No heat or cold intolerance;  MUSCULOSKELETAL: No joint pain or swelling;   PSYCHIATRIC: No mood swings, or difficulty sleeping  HEME/LYMPH: No easy bruising, or bleeding gums  ALLERGY AND IMMUNOLOGIC: No hives or eczema    Diet, NPO after Midnight:      NPO Start Date: 11-Apr-2024,   NPO Start Time: 23:59 (04-11-24 @ 14:21) [Active]  Diet, DASH/TLC:   Sodium & Cholesterol Restricted  Consistent Carbohydrate Evening Snack (CSTCHOSN)  No Shellfish (04-10-24 @ 18:24) [Active]      CURRENT MEDICATIONS:   aspirin enteric coated 81 milliGRAM(s) Oral daily  atorvastatin 80 milliGRAM(s) Oral at bedtime  dextrose 10% Bolus 125 milliLiter(s) IV Bolus once  dextrose 5%. 1000 milliLiter(s) IV Continuous <Continuous>  dextrose 5%. 1000 milliLiter(s) IV Continuous <Continuous>  dextrose 50% Injectable 25 Gram(s) IV Push once  dextrose 50% Injectable 12.5 Gram(s) IV Push once  dextrose Oral Gel 15 Gram(s) Oral once PRN  glucagon  Injectable 1 milliGRAM(s) IntraMuscular once  insulin lispro (ADMELOG) corrective regimen sliding scale   SubCutaneous three times a day before meals  insulin lispro (ADMELOG) corrective regimen sliding scale   SubCutaneous at bedtime  ketorolac 0.5% Ophthalmic Solution 1 Drop(s) Both EYES three times a day  levothyroxine 75 MICROGram(s) Oral daily  metoprolol succinate ER 12.5 milliGRAM(s) Oral daily  ofloxacin 0.3% Solution 1 Drop(s) Left EYE four times a day  pantoprazole  Injectable 40 milliGRAM(s) IV Push every 12 hours  polyethylene glycol 3350 17 Gram(s) Oral daily  polyethylene glycol/electrolyte Solution. 4000 milliLiter(s) Oral once  prednisoLONE acetate 1% Suspension 1 Drop(s) Right EYE two times a day  prednisoLONE acetate 1% Suspension 1 Drop(s) Left EYE four times a day  senna 2 Tablet(s) Oral at bedtime  sodium chloride 0.9%. 1000 milliLiter(s) IV Continuous <Continuous>  tamsulosin 0.4 milliGRAM(s) Oral at bedtime      VITAL SIGNS, INS/OUTS (last 24 hours):  Vital Signs Last 24 Hrs  T(C): 36.3 (11 Apr 2024 09:30), Max: 37.1 (10 Apr 2024 22:27)  T(F): 97.3 (11 Apr 2024 09:30), Max: 98.8 (11 Apr 2024 06:40)  HR: 70 (11 Apr 2024 14:00) (60 - 71)  BP: 99/52 (11 Apr 2024 14:00) (99/52 - 155/76)  BP(mean): 73 (11 Apr 2024 12:08) (72 - 109)  RR: 18 (11 Apr 2024 12:08) (16 - 18)  SpO2: 95% (11 Apr 2024 12:08) (91% - 95%)    Parameters below as of 11 Apr 2024 12:08  Patient On (Oxygen Delivery Method): room air      I&O's Summary    10 Apr 2024 07:01  -  11 Apr 2024 07:00  --------------------------------------------------------  IN: 0 mL / OUT: 450 mL / NET: -450 mL        PHYSICAL EXAM:  Gen: Reclining in bed at time of exam, appears stated age  HEENT: NCAT, MMM, clear OP  Neck: supple, trachea at midline  CV: RRR, +S1/S2  Pulm: adequate respiratory effort, no increase in work of breathing  Abd: soft, NTND  Skin: warm and dry,  Ext: WWP, no LE edema  Neuro: AOx3, no gross focal neurological deficits  Psych: affect and behavior appropriate, pleasant at time of interview    BASIC LABS:                        8.6    9.94  )-----------( 292      ( 11 Apr 2024 05:30 )             28.3     04-11    134<L>  |  104  |  14  ----------------------------<  148<H>  4.1   |  20<L>  |  1.19    Ca    8.2<L>      11 Apr 2024 05:30  Mg     2.4     04-11    TPro  5.8<L>  /  Alb  3.3  /  TBili  0.4  /  DBili  x   /  AST  21  /  ALT  18  /  AlkPhos  61  04-11    PT/INR - ( 10 Apr 2024 08:04 )   PT: 11.3 sec;   INR: 0.99          PTT - ( 10 Apr 2024 08:04 )  PTT:31.5 sec  Urinalysis Basic - ( 11 Apr 2024 05:30 )    Color: x / Appearance: x / SG: x / pH: x  Gluc: 148 mg/dL / Ketone: x  / Bili: x / Urobili: x   Blood: x / Protein: x / Nitrite: x   Leuk Esterase: x / RBC: x / WBC x   Sq Epi: x / Non Sq Epi: x / Bacteria: x      CAPILLARY BLOOD GLUCOSE      POCT Blood Glucose.: 120 mg/dL (11 Apr 2024 17:24)  POCT Blood Glucose.: 131 mg/dL (11 Apr 2024 11:37)  POCT Blood Glucose.: 146 mg/dL (11 Apr 2024 07:29)  POCT Blood Glucose.: 171 mg/dL (10 Apr 2024 21:49)      OTHER LABS:  CARDIAC MARKERS ( 10 Apr 2024 19:44 )  x     / x     / 154 U/L / x     / 2.8 ng/mL        MICRODATA:      IMAGING:    EKG:    #Diet - Diet, NPO after Midnight:      NPO Start Date: 11-Apr-2024,   NPO Start Time: 23:59 (04-11-24 @ 14:21) [Active]  Diet, DASH/TLC:   Sodium & Cholesterol Restricted  Consistent Carbohydrate Evening Snack (CSTCHOSN)  No Shellfish (04-10-24 @ 18:24) [Active]        #DVT PPx -

## 2024-04-11 NOTE — PROGRESS NOTE ADULT - PROBLEM SELECTOR PLAN 1
- HsTrop 19>20, CK/CKMB WNL, see above CT studies/vEEG  - EKG (4/10/24): NSR @71 bpm, QTc 456 ms   - Follow up TTE in AM  - Follow up orthostatics in AM  - NPO @MN, consult in EP in AM   - Telemetry monitoring    o Collateral obtained from [facility]- patient w/ hx of similar presentations in past. Had holter monitor in Aug 2023 which was unremarkable. TTE March 2024 unremarkable w/ normal EF and no significant valve dz - HsTrop 19>20, CK/CKMB WNL, CTH no acute pathology; EEG neg  - EKG (4/10/24): NSR @71 bpm, QTc 456 ms   - TTE 4/11/24: Normal LV size and systolic function, dilated RV, normal RVSF, aortic sclerosis w/o stenosis, trace AR, PASP 68 mm hg, trivial pericardial effusion  - Orthostatics +, s/p IVF   - Tentative plan for ILR 4/12    o Collateral obtained from [facility]- patient w/ hx of similar presentations in past. Had holter monitor in Aug 2023 which was unremarkable. TTE March 2024 unremarkable w/ normal EF and no significant valve dz

## 2024-04-11 NOTE — PROGRESS NOTE ADULT - ASSESSMENT
89 y/o M with PMHx of HLD, HTN, T2DM, CAD JOSÉ/scoreflex mLAD 95% [facility], BPH, hypothyroidism presents to ED for syncope with initial concern for stroke/seizure with unremarkable work-up. Patient now admitted to cardiac telemetry for workup of syncope as likely thought to be cardiac in nature, EKG non ischemic Trop 19 - > 20.  87 y/o M with PMHx of HLD, HTN, T2DM, CAD JOSÉ/scoreflex mLAD 95% @[facility] June 2023, BPH, hypothyroidism presents to ED for syncope with initial concern for stroke/seizure with unremarkable work-up, admitted to r/o cardiac syncope. Pt noted to have worsening anemia, GI consulted plan for EGD/colonoscopy 4/12.

## 2024-04-11 NOTE — PHYSICAL THERAPY INITIAL EVALUATION ADULT - ADDITIONAL COMMENTS
amb without a cane or walker at times however he owns both. Patient also owns a W/C. He has mostly been homebound. Wife assists with ADLs.

## 2024-04-11 NOTE — PHYSICAL THERAPY INITIAL EVALUATION ADULT - PERTINENT HX OF CURRENT PROBLEM, REHAB EVAL
88 year old male presented to ED for syncope with initial concern for stroke/seizure with unremarkable work-up. Patient now admitted to cardiac telemetry for workup of syncope as likely thought to be cardiac in nature, EKG non ischemic Trop 19 - > 20.

## 2024-04-11 NOTE — PROGRESS NOTE ADULT - PROBLEM SELECTOR PLAN 6
H/H 9.2/30.1, suspected baseline Hgb 12-15, no signs/symptoms of bleeding   - Follow up iron studies in  and [facility]    - T&S (next 4/11/24) BUN/Cr 17/1.34, GFR 52  - Follow up urine lytes   - Avoid nephrotoxins, renally dose meds, monitor I&Os    #BPH  - Continue: Tamsulosin 0.4 mg QHS

## 2024-04-11 NOTE — PROGRESS NOTE ADULT - PROBLEM SELECTOR PLAN 4
- WBC 12.24, afebrile, no signs/symptoms of infection, likely reactive s/p cataract surgery   - CXR (4/10/24): Metallic device overlying the mediastinum. Bibasilar focal atelectasis, decreased elevation of the right hemidiaphragm. Cardiomegaly. Stable bony structures. Cervical spine hardware.  - Follow up UA in AM   - Monitor CBC Hx of Cardiac cath 6/5/23: IVUS guided JOSÉ/scoreflex to mLAD 95%, OM2  unable to cross, pRCA 50%, pLCx 50%, dLCx ectatic w/ slow [place], EDP 5, access R radial.  - Patient initially w/ diffuse substernal "burning" sensation resolved after w/ GI cocktail however has persistent BELLO  - Continue: ASA 81 mg QD, Atorvastatin 80 mg QHS (TIC w/ home Crestor). D/c plavix at this time given anemia  - D/C Imdur 30 mg QD given persistent symptomatic orthostatic hypotension  - Telemetry monitoring, pulse oximetry    #GERD   o Patient with persistent burning sensation   - c/w PPI IV BID  - F/u H.Pylori in AM

## 2024-04-11 NOTE — PROGRESS NOTE ADULT - PROBLEM SELECTOR PLAN 3
Hx of Cardiac cath 6/5/23: IVUS guided JOSÉ/scoreflex to mLAD 95%, OM2  unable to cross, pRCA 50%, pLCx 50%, dLCx ectatic w/ slow [place], EDP 5, access R radial.  - Patient initially w/ diffuse substernal "burning" sensation resolved after w/ GI cocktail however has persistent BELLO w/ ADLs.   - EKG as above   - Follow up TTE   - Follow up cardiac risk labs (TSH, lipid panel, A1c)   - Continue: ASA 81 mg QD, Plavix 75 mg QD, Atorvastatin 80 mg QHS (TIC w/ home Crestor)   - Initiate: Imdur 30 mg QD  - Telemetry monitoring, pulse oximetry    #GERD   o Patient with persistent burning sensation and had plan for endoscopy later this week.   - F/u H.Pylori in AM s/p R cataract surgery on 3/26/24 and s/p L cataract surgery 4/9/24 with Dr. Mcpherson (ophthalmologist)   - Per collateral from Dr. Mcpherson ([facility]), opthalmic medications to continue:        o Left eye: Oflox 1 gtt QID; Ketorolac 1 gtt TID; prednisolone 1 gtt QID       o Right eye: Ketorolac 1 gtt TID; prednisolone 1 gtt BID x 1 week (4/9 - 4/15) then 1 gtt QD x 1 week then STOP

## 2024-04-11 NOTE — PHYSICAL THERAPY INITIAL EVALUATION ADULT - SOCIAL CONCERNS
patient has a stair lift, reporting not using the stairs in a few months due to dizziness when negotiating stairs

## 2024-04-12 ENCOUNTER — RESULT REVIEW (OUTPATIENT)
Age: 89
End: 2024-04-12

## 2024-04-12 ENCOUNTER — TRANSCRIPTION ENCOUNTER (OUTPATIENT)
Age: 89
End: 2024-04-12

## 2024-04-12 LAB
ANION GAP SERPL CALC-SCNC: 10 MMOL/L — SIGNIFICANT CHANGE UP (ref 5–17)
BASOPHILS # BLD AUTO: 0.09 K/UL — SIGNIFICANT CHANGE UP (ref 0–0.2)
BASOPHILS NFR BLD AUTO: 0.9 % — SIGNIFICANT CHANGE UP (ref 0–2)
BLD GP AB SCN SERPL QL: NEGATIVE — SIGNIFICANT CHANGE UP
BUN SERPL-MCNC: 13 MG/DL — SIGNIFICANT CHANGE UP (ref 7–23)
CALCIUM SERPL-MCNC: 8.5 MG/DL — SIGNIFICANT CHANGE UP (ref 8.4–10.5)
CHLORIDE SERPL-SCNC: 108 MMOL/L — SIGNIFICANT CHANGE UP (ref 96–108)
CO2 SERPL-SCNC: 22 MMOL/L — SIGNIFICANT CHANGE UP (ref 22–31)
CREAT SERPL-MCNC: 1.12 MG/DL — SIGNIFICANT CHANGE UP (ref 0.5–1.3)
EGFR: 64 ML/MIN/1.73M2 — SIGNIFICANT CHANGE UP
EOSINOPHIL # BLD AUTO: 0.38 K/UL — SIGNIFICANT CHANGE UP (ref 0–0.5)
EOSINOPHIL NFR BLD AUTO: 3.7 % — SIGNIFICANT CHANGE UP (ref 0–6)
GLUCOSE BLDC GLUCOMTR-MCNC: 115 MG/DL — HIGH (ref 70–99)
GLUCOSE BLDC GLUCOMTR-MCNC: 115 MG/DL — HIGH (ref 70–99)
GLUCOSE BLDC GLUCOMTR-MCNC: 120 MG/DL — HIGH (ref 70–99)
GLUCOSE BLDC GLUCOMTR-MCNC: 219 MG/DL — HIGH (ref 70–99)
GLUCOSE SERPL-MCNC: 125 MG/DL — HIGH (ref 70–99)
HCT VFR BLD CALC: 30.6 % — LOW (ref 39–50)
HGB BLD-MCNC: 9.3 G/DL — LOW (ref 13–17)
IMM GRANULOCYTES NFR BLD AUTO: 1 % — HIGH (ref 0–0.9)
LYMPHOCYTES # BLD AUTO: 1.01 K/UL — SIGNIFICANT CHANGE UP (ref 1–3.3)
LYMPHOCYTES # BLD AUTO: 9.8 % — LOW (ref 13–44)
MAGNESIUM SERPL-MCNC: 2.4 MG/DL — SIGNIFICANT CHANGE UP (ref 1.6–2.6)
MCHC RBC-ENTMCNC: 20.9 PG — LOW (ref 27–34)
MCHC RBC-ENTMCNC: 30.4 GM/DL — LOW (ref 32–36)
MCV RBC AUTO: 68.6 FL — LOW (ref 80–100)
MONOCYTES # BLD AUTO: 1.06 K/UL — HIGH (ref 0–0.9)
MONOCYTES NFR BLD AUTO: 10.3 % — SIGNIFICANT CHANGE UP (ref 2–14)
NEUTROPHILS # BLD AUTO: 7.69 K/UL — HIGH (ref 1.8–7.4)
NEUTROPHILS NFR BLD AUTO: 74.3 % — SIGNIFICANT CHANGE UP (ref 43–77)
NRBC # BLD: 0 /100 WBCS — SIGNIFICANT CHANGE UP (ref 0–0)
OB PNL STL: POSITIVE
PLATELET # BLD AUTO: 311 K/UL — SIGNIFICANT CHANGE UP (ref 150–400)
POTASSIUM SERPL-MCNC: 3.9 MMOL/L — SIGNIFICANT CHANGE UP (ref 3.5–5.3)
POTASSIUM SERPL-SCNC: 3.9 MMOL/L — SIGNIFICANT CHANGE UP (ref 3.5–5.3)
RBC # BLD: 4.46 M/UL — SIGNIFICANT CHANGE UP (ref 4.2–5.8)
RBC # FLD: 17.7 % — HIGH (ref 10.3–14.5)
RH IG SCN BLD-IMP: POSITIVE — SIGNIFICANT CHANGE UP
SODIUM SERPL-SCNC: 140 MMOL/L — SIGNIFICANT CHANGE UP (ref 135–145)
WBC # BLD: 10.33 K/UL — SIGNIFICANT CHANGE UP (ref 3.8–10.5)
WBC # FLD AUTO: 10.33 K/UL — SIGNIFICANT CHANGE UP (ref 3.8–10.5)

## 2024-04-12 PROCEDURE — 88305 TISSUE EXAM BY PATHOLOGIST: CPT | Mod: 26

## 2024-04-12 PROCEDURE — 99233 SBSQ HOSP IP/OBS HIGH 50: CPT

## 2024-04-12 RX ORDER — SODIUM CHLORIDE 9 MG/ML
1000 INJECTION INTRAMUSCULAR; INTRAVENOUS; SUBCUTANEOUS
Refills: 0 | Status: DISCONTINUED | OUTPATIENT
Start: 2024-04-12 | End: 2024-04-13

## 2024-04-12 RX ADMIN — Medication 1 DROP(S): at 06:11

## 2024-04-12 RX ADMIN — Medication 1 DROP(S): at 14:33

## 2024-04-12 RX ADMIN — IRON SUCROSE 68.75 MILLIGRAM(S): 20 INJECTION, SOLUTION INTRAVENOUS at 05:17

## 2024-04-12 RX ADMIN — Medication 1 DROP(S): at 22:16

## 2024-04-12 RX ADMIN — Medication 81 MILLIGRAM(S): at 14:00

## 2024-04-12 RX ADMIN — Medication 1 DROP(S): at 17:24

## 2024-04-12 RX ADMIN — Medication 12.5 MILLIGRAM(S): at 06:10

## 2024-04-12 RX ADMIN — Medication 1 DROP(S): at 06:10

## 2024-04-12 RX ADMIN — Medication 2: at 17:23

## 2024-04-12 RX ADMIN — POLYETHYLENE GLYCOL 3350 17 GRAM(S): 17 POWDER, FOR SOLUTION ORAL at 14:00

## 2024-04-12 RX ADMIN — Medication 1 DROP(S): at 00:52

## 2024-04-12 RX ADMIN — TAMSULOSIN HYDROCHLORIDE 0.4 MILLIGRAM(S): 0.4 CAPSULE ORAL at 22:15

## 2024-04-12 RX ADMIN — SENNA PLUS 2 TABLET(S): 8.6 TABLET ORAL at 22:15

## 2024-04-12 RX ADMIN — PANTOPRAZOLE SODIUM 40 MILLIGRAM(S): 20 TABLET, DELAYED RELEASE ORAL at 05:17

## 2024-04-12 RX ADMIN — SODIUM CHLORIDE 100 MILLILITER(S): 9 INJECTION INTRAMUSCULAR; INTRAVENOUS; SUBCUTANEOUS at 19:40

## 2024-04-12 RX ADMIN — Medication 75 MICROGRAM(S): at 05:17

## 2024-04-12 RX ADMIN — ATORVASTATIN CALCIUM 80 MILLIGRAM(S): 80 TABLET, FILM COATED ORAL at 22:15

## 2024-04-12 RX ADMIN — PANTOPRAZOLE SODIUM 40 MILLIGRAM(S): 20 TABLET, DELAYED RELEASE ORAL at 17:24

## 2024-04-12 NOTE — PROGRESS NOTE ADULT - SUBJECTIVE AND OBJECTIVE BOX
Cardiology PA Adult Progress Note    SUBJECTIVE ASSESSMENT:  	  MEDICATIONS:  metoprolol succinate ER 12.5 milliGRAM(s) Oral daily  pantoprazole  Injectable 40 milliGRAM(s) IV Push every 12 hours  polyethylene glycol 3350 17 Gram(s) Oral daily  polyethylene glycol/electrolyte Solution. 4000 milliLiter(s) Oral once  senna 2 Tablet(s) Oral at bedtime  atorvastatin 80 milliGRAM(s) Oral at bedtime  dextrose 50% Injectable 25 Gram(s) IV Push once  dextrose 50% Injectable 12.5 Gram(s) IV Push once  dextrose Oral Gel 15 Gram(s) Oral once PRN  glucagon  Injectable 1 milliGRAM(s) IntraMuscular once  insulin lispro (ADMELOG) corrective regimen sliding scale   SubCutaneous at bedtime  insulin lispro (ADMELOG) corrective regimen sliding scale   SubCutaneous three times a day before meals  levothyroxine 75 MICROGram(s) Oral daily  aspirin enteric coated 81 milliGRAM(s) Oral daily  dextrose 10% Bolus 125 milliLiter(s) IV Bolus once  dextrose 5%. 1000 milliLiter(s) IV Continuous <Continuous>  dextrose 5%. 1000 milliLiter(s) IV Continuous <Continuous>  ketorolac 0.5% Ophthalmic Solution 1 Drop(s) Both EYES three times a day  ofloxacin 0.3% Solution 1 Drop(s) Left EYE four times a day  prednisoLONE acetate 1% Suspension 1 Drop(s) Right EYE two times a day  prednisoLONE acetate 1% Suspension 1 Drop(s) Left EYE four times a day  sodium chloride 0.9%. 1000 milliLiter(s) IV Continuous <Continuous>  tamsulosin 0.4 milliGRAM(s) Oral at bedtime    	  VITAL SIGNS:  T(C): 36.4 (04-12-24 @ 10:41), Max: 36.4 (04-11-24 @ 21:28)  HR: 69 (04-12-24 @ 10:41) (66 - 70)  BP: 124/77 (04-12-24 @ 10:41) (99/52 - 167/89)  RR: 18 (04-12-24 @ 10:41) (18 - 18)  SpO2: 95% (04-12-24 @ 10:41) (92% - 96%)  Wt(kg): --    I&O's Summary    11 Apr 2024 07:01  -  12 Apr 2024 07:00  --------------------------------------------------------  IN: 0 mL / OUT: 1375 mL / NET: -1375 mL    12 Apr 2024 07:01  -  12 Apr 2024 12:08  --------------------------------------------------------  IN: 0 mL / OUT: 0 mL / NET: 0 mL      Height (cm): 165.1 (04-12 @ 10:41)  Weight (kg): 62 (04-12 @ 10:41)  BMI (kg/m2): 22.7 (04-12 @ 10:41)  BSA (m2): 1.68 (04-12 @ 10:41)                                     PHYSICAL EXAM:  Appearance: Normal	  HEENT: Normal oral mucosa, PERRL, EOMI	  Neck: Supple, + JVD/ - JVD; ___ Carotid Bruit   Cardiovascular: Normal S1 S2, No murmurs  Respiratory: Lungs clear to auscultation/Decreased Breath Sounds/No Rales, Rhonchi, Wheezing	  Gastrointestinal:  Soft, Non-tender, + BS	  Skin: No rashes, No ecchymoses, No cyanosis  Extremities: Normal range of motion, No clubbing, cyanosis or edema  Vascular: Peripheral pulses palpable 2+ bilaterally  Neurologic: Non-focal  Psychiatry: A & O x 3, Mood & affect appropriate    LABS:	 	                                  9.3    10.33 )-----------( 311      ( 12 Apr 2024 05:30 )             30.6     04-12    140  |  108  |  13  ----------------------------<  125<H>  3.9   |  22  |  1.12    Ca    8.5      12 Apr 2024 05:30  Mg     2.4     04-12    TPro  5.8<L>  /  Alb  3.3  /  TBili  0.4  /  DBili  x   /  AST  21  /  ALT  18  /  AlkPhos  61  04-11    proBNP:   Lipid Profile:   HgA1c:   TSH:    Cardiology PA Adult Progress Note    SUBJECTIVE ASSESSMENT: Seen and examined at bedside. Pt is in good spirits with no complaints. He said he drank the golytely and passed clear watery liquids. Denies CP, SOB,   	  MEDICATIONS:  metoprolol succinate ER 12.5 milliGRAM(s) Oral daily  pantoprazole  Injectable 40 milliGRAM(s) IV Push every 12 hours  polyethylene glycol 3350 17 Gram(s) Oral daily  polyethylene glycol/electrolyte Solution. 4000 milliLiter(s) Oral once  senna 2 Tablet(s) Oral at bedtime  atorvastatin 80 milliGRAM(s) Oral at bedtime  dextrose 50% Injectable 25 Gram(s) IV Push once  dextrose 50% Injectable 12.5 Gram(s) IV Push once  dextrose Oral Gel 15 Gram(s) Oral once PRN  glucagon  Injectable 1 milliGRAM(s) IntraMuscular once  insulin lispro (ADMELOG) corrective regimen sliding scale   SubCutaneous at bedtime  insulin lispro (ADMELOG) corrective regimen sliding scale   SubCutaneous three times a day before meals  levothyroxine 75 MICROGram(s) Oral daily  aspirin enteric coated 81 milliGRAM(s) Oral daily  dextrose 10% Bolus 125 milliLiter(s) IV Bolus once  dextrose 5%. 1000 milliLiter(s) IV Continuous <Continuous>  dextrose 5%. 1000 milliLiter(s) IV Continuous <Continuous>  ketorolac 0.5% Ophthalmic Solution 1 Drop(s) Both EYES three times a day  ofloxacin 0.3% Solution 1 Drop(s) Left EYE four times a day  prednisoLONE acetate 1% Suspension 1 Drop(s) Right EYE two times a day  prednisoLONE acetate 1% Suspension 1 Drop(s) Left EYE four times a day  sodium chloride 0.9%. 1000 milliLiter(s) IV Continuous <Continuous>  tamsulosin 0.4 milliGRAM(s) Oral at bedtime    	  VITAL SIGNS:  T(C): 36.4 (04-12-24 @ 10:41), Max: 36.4 (04-11-24 @ 21:28)  HR: 69 (04-12-24 @ 10:41) (66 - 70)  BP: 124/77 (04-12-24 @ 10:41) (99/52 - 167/89)  RR: 18 (04-12-24 @ 10:41) (18 - 18)  SpO2: 95% (04-12-24 @ 10:41) (92% - 96%)  Wt(kg): --    I&O's Summary    11 Apr 2024 07:01  -  12 Apr 2024 07:00  --------------------------------------------------------  IN: 0 mL / OUT: 1375 mL / NET: -1375 mL    12 Apr 2024 07:01  -  12 Apr 2024 12:08  --------------------------------------------------------  IN: 0 mL / OUT: 0 mL / NET: 0 mL      Height (cm): 165.1 (04-12 @ 10:41)  Weight (kg): 62 (04-12 @ 10:41)  BMI (kg/m2): 22.7 (04-12 @ 10:41)  BSA (m2): 1.68 (04-12 @ 10:41)                                     PHYSICAL EXAM:  Appearance: Normal	  HEENT: Normal oral mucosa, PERRL, EOMI	  Neck: Supple, + JVD/ - JVD; ___ Carotid Bruit   Cardiovascular: Normal S1 S2, No murmurs  Respiratory: Lungs clear to auscultation/Decreased Breath Sounds/No Rales, Rhonchi, Wheezing	  Gastrointestinal:  Soft, Non-tender, + BS	  Skin: No rashes, No ecchymoses, No cyanosis  Extremities: Normal range of motion, No clubbing, cyanosis or edema  Vascular: Peripheral pulses palpable 2+ bilaterally  Neurologic: Non-focal  Psychiatry: A & O x 3, Mood & affect appropriate    LABS:	 	                                  9.3    10.33 )-----------( 311      ( 12 Apr 2024 05:30 )             30.6     04-12    140  |  108  |  13  ----------------------------<  125<H>  3.9   |  22  |  1.12    Ca    8.5      12 Apr 2024 05:30  Mg     2.4     04-12    TPro  5.8<L>  /  Alb  3.3  /  TBili  0.4  /  DBili  x   /  AST  21  /  ALT  18  /  AlkPhos  61  04-11    proBNP:   Lipid Profile:   HgA1c:   TSH:    Cardiology PA Adult Progress Note    SUBJECTIVE ASSESSMENT: Seen and examined at bedside. Pt is in good spirits with no complaints. He went to EGD/colonoscopy revealing normal mucosa in esophagus, non erosive gastritis in stomach and a single cratered ulcer found in the duodenal bulb w/ no active bleeding, multiple bx taken.   	  MEDICATIONS:  metoprolol succinate ER 12.5 milliGRAM(s) Oral daily  pantoprazole  Injectable 40 milliGRAM(s) IV Push every 12 hours  polyethylene glycol 3350 17 Gram(s) Oral daily  polyethylene glycol/electrolyte Solution. 4000 milliLiter(s) Oral once  senna 2 Tablet(s) Oral at bedtime  atorvastatin 80 milliGRAM(s) Oral at bedtime  dextrose 50% Injectable 25 Gram(s) IV Push once  dextrose 50% Injectable 12.5 Gram(s) IV Push once  dextrose Oral Gel 15 Gram(s) Oral once PRN  glucagon  Injectable 1 milliGRAM(s) IntraMuscular once  insulin lispro (ADMELOG) corrective regimen sliding scale SubCutaneous at bedtime  insulin lispro (ADMELOG) corrective regimen sliding scale SubCutaneous three times a day before meals  levothyroxine 75 MICROGram(s) Oral daily  aspirin enteric coated 81 milliGRAM(s) Oral daily  dextrose 10% Bolus 125 milliLiter(s) IV Bolus once  dextrose 5%. 1000 milliLiter(s) IV Continuous <Continuous>  dextrose 5%. 1000 milliLiter(s) IV Continuous <Continuous>  ketorolac 0.5% Ophthalmic Solution 1 Drop(s) Both EYES three times a day  ofloxacin 0.3% Solution 1 Drop(s) Left EYE four times a day  prednisoLONE acetate 1% Suspension 1 Drop(s) Right EYE two times a day  prednisoLONE acetate 1% Suspension 1 Drop(s) Left EYE four times a day  sodium chloride 0.9%. 1000 milliLiter(s) IV Continuous <Continuous>  tamsulosin 0.4 milliGRAM(s) Oral at bedtime  	  VITAL SIGNS:  T(C): 36.4 (04-12-24 @ 10:41), Max: 36.4 (04-11-24 @ 21:28)  HR: 69 (04-12-24 @ 10:41) (66 - 70)  BP: 124/77 (04-12-24 @ 10:41) (99/52 - 167/89)  RR: 18 (04-12-24 @ 10:41) (18 - 18)  SpO2: 95% (04-12-24 @ 10:41) (92% - 96%)  Wt(kg): --    I&O's Summary    11 Apr 2024 07:01  -  12 Apr 2024 07:00  --------------------------------------------------------  IN: 0 mL / OUT: 1375 mL / NET: -1375 mL    12 Apr 2024 07:01  -  12 Apr 2024 12:08  --------------------------------------------------------  IN: 0 mL / OUT: 0 mL / NET: 0 mL      Height (cm): 165.1 (04-12 @ 10:41)  Weight (kg): 62 (04-12 @ 10:41)  BMI (kg/m2): 22.7 (04-12 @ 10:41)  BSA (m2): 1.68 (04-12 @ 10:41)                                     PHYSICAL EXAM:  Appearance: Normal	  HEENT: Normal oral mucosa, PERRL, EOMI	  Neck: Supple, - JVD; No Carotid Bruit   Cardiovascular: Normal S1 S2, No murmurs  Respiratory: Lungs clear to auscultation  Gastrointestinal:  Soft, Non-tender, + BS	  Skin: No rashes, No ecchymoses, No cyanosis  Extremities: Normal range of motion, No clubbing, cyanosis or edema  Vascular: Peripheral pulses palpable 2+ bilaterally  Neurologic: Non-focal  Psychiatry: A & O x 3, Mood & affect appropriate    LABS:	                         9.3    10.33 )-----------( 311      ( 12 Apr 2024 05:30 )             30.6     04-12    140  |  108  |  13  ----------------------------<  125<H>  3.9   |  22  |  1.12    Ca    8.5      12 Apr 2024 05:30  Mg     2.4     04-12    TPro  5.8<L>  /  Alb  3.3  /  TBili  0.4  /  DBili  x   /  AST  21  /  ALT  18  /  AlkPhos  61  04-11   Cardiology PA Adult Progress Note    SUBJECTIVE ASSESSMENT: Seen and examined at bedside. Pt is in good spirits with no complaints. He went to EGD revealing normal mucosa in esophagus, non erosive gastritis in stomach and a single cratered ulcer found in the duodenal bulb w/ no active bleeding, multiple bx taken.   	  MEDICATIONS:  metoprolol succinate ER 12.5 milliGRAM(s) Oral daily  pantoprazole  Injectable 40 milliGRAM(s) IV Push every 12 hours  polyethylene glycol 3350 17 Gram(s) Oral daily  polyethylene glycol/electrolyte Solution. 4000 milliLiter(s) Oral once  senna 2 Tablet(s) Oral at bedtime  atorvastatin 80 milliGRAM(s) Oral at bedtime  dextrose 50% Injectable 25 Gram(s) IV Push once  dextrose 50% Injectable 12.5 Gram(s) IV Push once  dextrose Oral Gel 15 Gram(s) Oral once PRN  glucagon  Injectable 1 milliGRAM(s) IntraMuscular once  insulin lispro (ADMELOG) corrective regimen sliding scale SubCutaneous at bedtime  insulin lispro (ADMELOG) corrective regimen sliding scale SubCutaneous three times a day before meals  levothyroxine 75 MICROGram(s) Oral daily  aspirin enteric coated 81 milliGRAM(s) Oral daily  dextrose 10% Bolus 125 milliLiter(s) IV Bolus once  dextrose 5%. 1000 milliLiter(s) IV Continuous <Continuous>  dextrose 5%. 1000 milliLiter(s) IV Continuous <Continuous>  ketorolac 0.5% Ophthalmic Solution 1 Drop(s) Both EYES three times a day  ofloxacin 0.3% Solution 1 Drop(s) Left EYE four times a day  prednisoLONE acetate 1% Suspension 1 Drop(s) Right EYE two times a day  prednisoLONE acetate 1% Suspension 1 Drop(s) Left EYE four times a day  sodium chloride 0.9%. 1000 milliLiter(s) IV Continuous <Continuous>  tamsulosin 0.4 milliGRAM(s) Oral at bedtime  	  VITAL SIGNS:  T(C): 36.4 (04-12-24 @ 10:41), Max: 36.4 (04-11-24 @ 21:28)  HR: 69 (04-12-24 @ 10:41) (66 - 70)  BP: 124/77 (04-12-24 @ 10:41) (99/52 - 167/89)  RR: 18 (04-12-24 @ 10:41) (18 - 18)  SpO2: 95% (04-12-24 @ 10:41) (92% - 96%)  Wt(kg): --    I&O's Summary    11 Apr 2024 07:01  -  12 Apr 2024 07:00  --------------------------------------------------------  IN: 0 mL / OUT: 1375 mL / NET: -1375 mL    12 Apr 2024 07:01  -  12 Apr 2024 12:08  --------------------------------------------------------  IN: 0 mL / OUT: 0 mL / NET: 0 mL      Height (cm): 165.1 (04-12 @ 10:41)  Weight (kg): 62 (04-12 @ 10:41)  BMI (kg/m2): 22.7 (04-12 @ 10:41)  BSA (m2): 1.68 (04-12 @ 10:41)                                     PHYSICAL EXAM:  Appearance: Normal	  HEENT: Normal oral mucosa, PERRL, EOMI	  Neck: Supple, - JVD; No Carotid Bruit   Cardiovascular: Normal S1 S2, No murmurs  Respiratory: Lungs clear to auscultation  Gastrointestinal:  Soft, Non-tender, + BS	  Skin: No rashes, No ecchymoses, No cyanosis  Extremities: Normal range of motion, No clubbing, cyanosis or edema  Vascular: Peripheral pulses palpable 2+ bilaterally  Neurologic: Non-focal  Psychiatry: A & O x 3, Mood & affect appropriate    LABS:	                         9.3    10.33 )-----------( 311      ( 12 Apr 2024 05:30 )             30.6     04-12    140  |  108  |  13  ----------------------------<  125<H>  3.9   |  22  |  1.12    Ca    8.5      12 Apr 2024 05:30  Mg     2.4     04-12    TPro  5.8<L>  /  Alb  3.3  /  TBili  0.4  /  DBili  x   /  AST  21  /  ALT  18  /  AlkPhos  61  04-11

## 2024-04-12 NOTE — PROGRESS NOTE ADULT - PROBLEM SELECTOR PLAN 4
Hx of Cardiac cath 6/5/23: IVUS guided JOSÉ/scoreflex to mLAD 95%, OM2  unable to cross, pRCA 50%, pLCx 50%, dLCx ectatic w/ slow [place], EDP 5, access R radial.  - Patient initially w/ diffuse substernal "burning" sensation resolved after w/ GI cocktail however has persistent BELLO  - Continue: ASA 81 mg QD, Atorvastatin 80 mg QHS (TIC w/ home Crestor). D/c plavix at this time given anemia  - D/C Imdur 30 mg QD given persistent symptomatic orthostatic hypotension  - Telemetry monitoring, pulse oximetry    #GERD   o Patient with persistent burning sensation   - c/w PPI IV BID  - F/u H.Pylori in AM Hx of Cardiac cath 6/5/23: IVUS guided JOSÉ/scoreflex to mLAD 95%, OM2  unable to cross, pRCA 50%, pLCx 50%, dLCx ectatic w/ slow [place], EDP 5, access R radial.  - Continue: ASA 81 mg QD, Atorvastatin 80 mg QHS (TIC w/ home Crestor). D/c plavix at this time  - No Imdur 30 mg QD given persistent symptomatic orthostatic hypotension  - D/w Dr. Buckley, continue medical management      #GERD   - c/w PPI as above

## 2024-04-12 NOTE — DISCHARGE NOTE PROVIDER - NSDCMRMEDTOKEN_GEN_ALL_CORE_FT
aspirin 81 mg oral tablet: 1 tab(s) orally once a day  Colace 100 mg oral capsule: 1 cap(s) orally once a day as needed for  constipation  Januvia 100 mg oral tablet: 1 tab(s) orally  ketorolac 0.5% ophthalmic solution: 1 drop(s) in the left eye 3 times a day  ketorolac 0.5% ophthalmic solution: 1 drop(s) in the right eye 3 times a day  levothyroxine 75 mcg (0.075 mg) oral capsule: 1 cap(s) orally once a day  metoprolol succinate 25 mg oral tablet, extended release: 0.5 tab(s) orally once a day  ofloxacin 0.3% ophthalmic solution: 1 drop(s) in the left eye 4 times a day  Plavix 75 mg oral tablet: 1 tab(s) orally  Prednisol 1% ophthalmic solution: 1 drop(s) in the right eye once a day  Prednisol 1% ophthalmic solution: 1 drop(s) in the left eye 4 times a day  rosuvastatin 20 mg oral capsule: 1 cap(s) orally once a day  tamsulosin 0.4 mg oral capsule: 1 cap(s) orally   aspirin 81 mg oral tablet: 1 tab(s) orally once a day  ferrous sulfate 325 mg (65 mg elemental iron) oral delayed release tablet: 1 tab(s) orally every other day  ferrous sulfate 325 mg (65 mg elemental iron) oral delayed release tablet: 1 tab(s) orally every other day  Januvia 100 mg oral tablet: 1 tab(s) orally once a day  ketorolac 0.5% ophthalmic solution: 1 drop(s) in the left eye 3 times a day  ketorolac 0.5% ophthalmic solution: 1 drop(s) in the right eye 3 times a day  levothyroxine 75 mcg (0.075 mg) oral capsule: 1 cap(s) orally once a day  metoprolol succinate 25 mg oral tablet, extended release: 0.5 tab(s) orally once a day  ofloxacin 0.3% ophthalmic solution: 1 drop(s) in the left eye 4 times a day  pantoprazole 40 mg oral delayed release tablet: 1 tab(s) orally 2 times a day Please take Pantoprazole 40mg twice a day for 2 weeks (last dose 04/26/24 PM) and then take Pantoprazole 40mg once a day for 8 weeks (last dose 06/27/24)  pantoprazole 40 mg oral delayed release tablet: 1 tab(s) orally once a day Please take Pantoprazole 40mg once a day from 04/27/24 AM for 8 weeks  Prednisol 1% ophthalmic solution: 1 drop(s) in the left eye 4 times a day  Prednisol 1% ophthalmic solution: 1 drop(s) in the right eye once a day  rosuvastatin 20 mg oral capsule: 1 cap(s) orally once a day  senna leaf extract oral tablet: 1 tab(s) orally once a day (at bedtime) as needed for  constipation  tamsulosin 0.4 mg oral capsule: 1 cap(s) orally once a day (at bedtime)   aspirin 81 mg oral tablet: 1 tab(s) orally once a day  ferrous sulfate 325 mg (65 mg elemental iron) oral delayed release tablet: 1 tab(s) orally every other day  ferrous sulfate 325 mg (65 mg elemental iron) oral delayed release tablet: 1 tab(s) orally every other day  Januvia 100 mg oral tablet: 1 tab(s) orally once a day  ketorolac 0.5% ophthalmic solution: 1 drop(s) in the left eye 3 times a day  ketorolac 0.5% ophthalmic solution: 1 drop(s) in the right eye 3 times a day  levothyroxine 75 mcg (0.075 mg) oral capsule: 1 cap(s) orally once a day  metoprolol succinate 25 mg oral tablet, extended release: 0.5 tab(s) orally once a day  ofloxacin 0.3% ophthalmic solution: 1 drop(s) in the left eye 4 times a day  pantoprazole 40 mg oral delayed release tablet: 1 tab(s) orally 2 times a day Please take Pantoprazole 40mg twice a day for 2 weeks (last dose 04/26/24 PM) and then take Pantoprazole 40mg once a day for 8 weeks (last dose 06/27/24)  pantoprazole 40 mg oral delayed release tablet: 1 tab(s) orally once a day Please take Pantoprazole 40mg once a day from 04/27/24 AM for 8 weeks  Prednisol 1% ophthalmic solution: 1 drop(s) in the left eye 4 times a day  Prednisol 1% ophthalmic solution: 1 drop(s) in the right eye once a day  rosuvastatin 20 mg oral capsule: 1 cap(s) orally once a day  senna leaf extract oral tablet: 2 tab(s) orally once a day (at bedtime) as needed for  constipation  tamsulosin 0.4 mg oral capsule: 1 cap(s) orally once a day (at bedtime)

## 2024-04-12 NOTE — PROGRESS NOTE ADULT - PROBLEM SELECTOR PROBLEM 1
Syncope
[FreeTextEntry8] : Comes in for follow-up medical visit after Covid infection.\par Still not back to baseline but reports improvement since December and since her last telehealth visit with me.  Feels that she is slowly improving daily.  Denies any edema or calf pain.  Reports normal urination and bowel movements.  Has a good appetite and eating well.  Denies any abdominal pain or nausea/vomiting.  No recurrent fevers or chills.  Denies any chest pain.  Denies any cough or hemoptysis.  Still gets winded on exertion and still oxygen dependent.  No active wheezing.  No other new complaints.
Syncope

## 2024-04-12 NOTE — DISCHARGE NOTE PROVIDER - CARE PROVIDER_API CALL
Cosmo YanesFelice  Cardiovascular Disease  [address] [facility],[address]  [place] [address]  Phone: [phone]  Fax: [phone]  Established Patient  Scheduled Appointment: 04/15/2024 11:30 AM    Goran Hernandez  Gastroenterology  [address], [address] [address]  Phone: [phone]  Fax: [phone]  Follow Up Time: 2 weeks

## 2024-04-12 NOTE — DISCHARGE NOTE PROVIDER - NSDCCPCAREPLAN_GEN_ALL_CORE_FT
PRINCIPAL DISCHARGE DIAGNOSIS  Diagnosis: Syncope  Assessment and Plan of Treatment: You were admitted for syncope. You were found to be orthostatic on arrival which means your blood pressure is low when standing up. This can cause dizziness, lightheadedness and syncope. We gave you fluids during your hospital stay to improve your blood pressures. You also were found to be anemia which also may have contributed to you passing out. Make sure you are drinking enough fluids and get up slowly from bed or after sitting for a long time. Please wear your compression stockings to help improve blood flow.  -Your ultrasound of your heart showed you had a normal heart function and no significant valvular disease.   -Your telemetry monitoring did not show any arrythmias.  -Please return your [employer] Holter Monitor and mail it back so it can be assessed.  - F/u scheduled appt with Dr. Cosmo Yanes at 11:30 am on 4/15/24      SECONDARY DISCHARGE DIAGNOSES  Diagnosis: S/P cataract surgery  Assessment and Plan of Treatment: You underwent cataract surgery. Please continue prednisolone gtt taper--> Right eye: started on 4/2         - Ketorolac 1 gtt 3x/day x 3 weeks        - Prednisolone acetate 1 drop 3x/day x 1 week THEN 1 drop 2x/day x 1 week THEN 1 drop QD x 1 week then STOP    Diagnosis: Anemia due to acute blood loss  Assessment and Plan of Treatment: Your hemoglobin levels were found to be low during this admission. This means you do not have enough red blood cells to carry oxygen to body's tissues. Your hemoglobin level was 8.6. You had an endoscopy procedure because you were anemic which revealed gastritis (inflammation of stomach) and an ulcer in your stomach.  -You need to follow up with Dr. Hernandez the GI Doctor for further evaluation of the biopsies they performed.  -You should start pantoprazole 40 mg twice daily for 2 weeks, followed by 40 mg PO daily x for 8 weeks.   -Start carafate Suspension 1g po four times daily x 2 weeks   -Start Ferrous sulfate 325 mg every other day    Diagnosis: Hypothyroidism  Assessment and Plan of Treatment: Please continue levothyroxine 75 mcg once daily.    Diagnosis: HLD (hyperlipidemia)  Assessment and Plan of Treatment: Please continue atorvastatin 80 mg daily at bedtime to keep your cholesterol low. High cholesterol contributes to heart disease.      Diagnosis: CAD (coronary artery disease)  Assessment and Plan of Treatment: You have prior known coronary artery disease with stents in the past. In light of anemia, we discontinued your plavix 75 mg once daily. Please CONTINUE aspirin 81 mg once daily given history of stents.     PRINCIPAL DISCHARGE DIAGNOSIS  Diagnosis: Syncope  Assessment and Plan of Treatment: You were admitted for syncope. You were found to be orthostatic on arrival which means your blood pressure is low when standing up. This can cause dizziness, lightheadedness and syncope. We gave you fluids during your hospital stay to improve your blood pressures. You also were found to be anemia which also may have contributed to you passing out. Make sure you are drinking enough fluids and get up slowly from bed or after sitting for a long time. Please wear your compression stockings to help improve blood flow.  -Your ultrasound of your heart showed you had a normal heart function and no significant valvular disease.   -Your telemetry monitoring did not show any arrythmias.  -Please return your Aqu[employer] Holter Monitor and mail it back so it can be assessed.  - F/u scheduled appt with Dr. Cosmo Yanes at 11:30 am on 4/15/24      SECONDARY DISCHARGE DIAGNOSES  Diagnosis: Anemia due to acute blood loss  Assessment and Plan of Treatment: Your hemoglobin levels were found to be low during this admission. This means you do not have enough red blood cells to carry oxygen to body's tissues. Your hemoglobin level was 8.6. You had an endoscopy procedure because you were anemic which revealed gastritis (inflammation of stomach) and an ulcer in your stomach.  -You need to follow up with Dr. Hernandez the GI Doctor for further evaluation of the biopsies they performed.  -You should start pantoprazole 40 mg twice daily for 2 weeks, followed by 40 mg PO daily x for 8 weeks.   -Start carafate Suspension 1g po four times daily x 2 weeks   -Start Ferrous sulfate 325 mg every other day    Diagnosis: S/P cataract surgery  Assessment and Plan of Treatment: You underwent cataract surgery. Please continue prednisolone gtt taper--> Right eye: started on 4/2         - Ketorolac 1 gtt 3x/day x 3 weeks        - Prednisolone acetate 1 drop 3x/day x 1 week THEN 1 drop 2x/day x 1 week THEN 1 drop QD x 1 week then STOP    Diagnosis: CAD (coronary artery disease)  Assessment and Plan of Treatment: You have prior known coronary artery disease with stents in the past. In light of anemia, we discontinued your plavix 75 mg once daily. Please CONTINUE aspirin 81 mg once daily given history of stents.    Diagnosis: Hypothyroidism  Assessment and Plan of Treatment: Please continue levothyroxine 75 mcg once daily.Please follow up with your outpatient primary care proivder for follow up    Diagnosis: HLD (hyperlipidemia)  Assessment and Plan of Treatment: Please Crestor 20mg daily at bedtime to keep your cholesterol low. High cholesterol contributes to heart disease.       PRINCIPAL DISCHARGE DIAGNOSIS  Diagnosis: Syncope  Assessment and Plan of Treatment: You were admitted for syncope. You were found to be orthostatic on arrival which means your blood pressure is low when standing up. This can cause dizziness, lightheadedness and syncope. We gave you fluids during your hospital stay to improve your blood pressures. You also were found to be anemia which also may have contributed to you passing out. Make sure you are drinking enough fluids and get up slowly from bed or after sitting for a long time. Please wear your compression stockings to help improve blood flow.  -Your ultrasound of your heart showed you had a normal heart function and no significant valvular disease.   -Your telemetry monitoring did not show any arrythmias.  -Please return your [employer] Holter Monitor and mail it back so it can be assessed.  - F/u scheduled appt with Dr. Cosmo Yanes at 11:30 am on 4/15/24      SECONDARY DISCHARGE DIAGNOSES  Diagnosis: Anemia due to acute blood loss  Assessment and Plan of Treatment: - Your hemoglobin levels were found to be low during this admission. This means you do not have enough red blood cells to carry oxygen to body's tissues. Your hemoglobin level was 8.6. You had an endoscopy procedure because you were anemic which revealed gastritis (inflammation of stomach) and an ulcer in your stomach.  -You need to follow up with Dr. Hernandez the GI Doctor for further evaluation of the biopsies they performed.  -You should start pantoprazole 40 mg twice daily for 2 weeks, followed by 40 mg PO daily x for 8 weeks.   -Start Ferrous sulfate 325 mg every other day  - Please start taking Miralax 17g and Senaa 2 capsules everyday as needed for constipation.    Diagnosis: S/P cataract surgery  Assessment and Plan of Treatment: You underwent cataract surgery. Please continue prednisolone gtt taper--> Right eye: started on 4/2         - Ketorolac 1 gtt 3x/day x 3 weeks        - Prednisolone acetate 1 drop 3x/day x 1 week THEN 1 drop 2x/day x 1 week THEN 1 drop QD x 1 week then STOP    Diagnosis: CAD (coronary artery disease)  Assessment and Plan of Treatment: You have prior known coronary artery disease with stents in the past. In light of anemia, we discontinued your plavix 75 mg once daily. Please CONTINUE aspirin 81 mg once daily given history of stents.    Diagnosis: Hypothyroidism  Assessment and Plan of Treatment: Please continue levothyroxine 75 mcg once daily.Please follow up with your outpatient primary care proivder for follow up    Diagnosis: HLD (hyperlipidemia)  Assessment and Plan of Treatment: Please Crestor 20mg daily at bedtime to keep your cholesterol low. High cholesterol contributes to heart disease.

## 2024-04-12 NOTE — PROGRESS NOTE ADULT - PROBLEM SELECTOR PLAN 3
s/p R cataract surgery on 3/26/24 and s/p L cataract surgery 4/9/24 with Dr. Mcpherson (ophthalmologist)   - Per collateral from Dr. Mcpherson ([facility]), opthalmic medications to continue:        o Left eye: Oflox 1 gtt QID; Ketorolac 1 gtt TID; prednisolone 1 gtt QID       o Right eye: Ketorolac 1 gtt TID; prednisolone 1 gtt BID x 1 week (4/9 - 4/15) then 1 gtt QD x 1 week then STOP

## 2024-04-12 NOTE — PROGRESS NOTE ADULT - ASSESSMENT
89 y/o M with SHELLFISH ALLERGY and PMHx of HLD, HTN, T2DM, CAD s/p PCI to LCx June 2023, s/p cholecystectomy, BPH, PUD, hypothyroidism, cervical spondylosis s/p laminectomy, recent syncope with holter monitor for 2 weeks, s/p cataract OS surgery at [facility] ( 4/9) and  presented to ED with episode of unresponsiveness with right sided gaze, left facial droop at 7:15am while at [facility] waiting for post-operative cataract appointment, episode ~1 minute and back to baseline within 3-5 minutes.  At ED, Stroke code called, CT Head, CTA negative, EEG negative. Pt was admitted for further evaluation of cardiac syncope. Pt found to have an acute blood loss anemia dropped Hgb 8.6 from baseline 12, and orthostatic likely 2/2 acute UGI bleed.    - telemetry  - active care per Cardiologist, Dr. Thomas   - Orthostatic : given IVF bolus, repeat orthostatic please   - Syncope: TTE reviewed    - CAD s/p PCI to LCx( 6/2023): d/w Interventional Cardiologist Dr. Josie Siegel, ok to stop Plavix ( 4/11) and c/w ASA monotherapy given concerns of acute UGI bleed   - UGI bleed: GI consult appreciated, Dr. Price. c/w PPI iv bid, plan for EGD today with GI Dr. Hernandez.   - Acute blood loss anemia: trend H/H baseline Hgb 12 in Nov 2023), Hgb 9.2 at ED( 4/10)-> 8.6( 4/11)- >9.3(4/12), keep active T&S  - CANDELARIA: ( Ferritin 30 and Tsat 11%), IV iron 500mg daily for 2 days ( 4/11-4/12)     POC as d/w pt, wife, daughter Olga Yanez RN, Cardiologist Dr. Josie Siegel, Dr. Jarvis and Dr. Thomas, GI fellow Dr. Price d/w Cardiology PA Betty

## 2024-04-12 NOTE — PROGRESS NOTE ADULT - PROBLEM SELECTOR PLAN 5
- WBC 12.24, afebrile, no signs/symptoms of infection, likely reactive s/p cataract surgery   - CXR (4/10/24): Metallic device overlying the mediastinum. Bibasilar focal atelectasis, decreased elevation of the right hemidiaphragm. Cardiomegaly. Stable bony structures. Cervical spine hardware.  - Follow up UA in AM   - Monitor CBC - WBC 12-> 10.33, afebrile, no signs/symptoms of infection, likely reactive s/p cataract surgery   - CXR (4/10/24): Metallic device overlying the mediastinum. Bibasilar focal atelectasis, decreased elevation of the right hemidiaphragm. Cardiomegaly. Stable bony structures. Cervical spine hardware.

## 2024-04-12 NOTE — PRE-ANESTHESIA EVALUATION ADULT - NSANTHPMHFT_GEN_ALL_CORE
89 y/o M with SHELLFISH ALLERGY and PMHx of HLD, HTN, T2DM, CAD s/p PCI June 2023, s/p cholecystectomy, BPH, PUD, hypothyroidism, cervical spondylosis s/p laminectomy, presents to ED with episode of unresponsiveness with right sided gaze, left facial droop at 7:15am while at [facility] waiting for post-operative cataract appointment, episode ~1 minute and back to baseline within 3-5 minutes. As per daughter who is also a nurse, pt had similar episode two weeks prior and was seen by PMD and cardiologist with negative workup.  Pt is currently wearing Holter Monitor.  Pt did not have neurological workup.  In ED most of symptoms resolved and pt states he feels back to normal. At this time endorsing "burning" diffuse, chest discomfort. Family also notes that patient becomes SOB and diaphoretic with minimal ambulation and has limited has activities of daily due to symptoms

## 2024-04-12 NOTE — PROGRESS NOTE ADULT - PROBLEM SELECTOR PLAN 9
Follow up lipid panel in AM   - Continue Atorvastatin 80 mg QHS    #Orthostatics  -s/p IVF resuscitation  - apply LE ACE wraps and abdominal binder    DVT ppx: SCDs (holding i/s/o anemia)   F: none indicated  E: Keep K > 4, Mg > 2  N: DASH/TLC w/ CC     Code: Full  PT: consulted pending recs  Dispo: pending clinical progression     Case discussed with Dr. Thomas. LDL 24   - C/w Atorvastatin 80 mg QHS    #Orthostatics  -s/p IVF resuscitation  - apply LE ACE wraps and abdominal binder    DVT ppx: SCDs (holding i/s/o anemia)   F: none indicated  E: Keep K > 4, Mg > 2  N: DASH/TLC w/ CC     Code: Full  PT: consulted pending recs  Dispo: pending clinical progression     Case discussed with Dr. Thomas.

## 2024-04-12 NOTE — PROGRESS NOTE ADULT - SUBJECTIVE AND OBJECTIVE BOX
Patient is a 88y old  Male who presents with a chief complaint of syncope (11 Apr 2024 17:41)    INTERVAL EVENTS: NAEON    SUBJECTIVE:  Patient was seen and examined at bedside.In good spirits, denies rectal bleed/melena, CP, epigastric pain, N/V, SOB, dizziness. Remains in NSR. NPO for EGD and colonoscopy today    Review of systems: No fever, chills, dizziness, HA, Changes in vision, CP, dyspnea, nausea or vomiting, dysuria, changes in bowel movements, LE edema. Rest of 12 point Review of systems negative unless otherwise documented elsewhere in note.     Diet, NPO after Midnight:      NPO Start Date: 11-Apr-2024,   NPO Start Time: 23:59 (04-11-24 @ 14:21) [Active]  Diet, DASH/TLC:   Sodium & Cholesterol Restricted  Consistent Carbohydrate Evening Snack (CSTCHOSN)  No Shellfish (04-10-24 @ 18:24) [Active]      MEDICATIONS:  MEDICATIONS  (STANDING):  aspirin enteric coated 81 milliGRAM(s) Oral daily  atorvastatin 80 milliGRAM(s) Oral at bedtime  dextrose 10% Bolus 125 milliLiter(s) IV Bolus once  dextrose 5%. 1000 milliLiter(s) (100 mL/Hr) IV Continuous <Continuous>  dextrose 5%. 1000 milliLiter(s) (50 mL/Hr) IV Continuous <Continuous>  dextrose 50% Injectable 25 Gram(s) IV Push once  dextrose 50% Injectable 12.5 Gram(s) IV Push once  glucagon  Injectable 1 milliGRAM(s) IntraMuscular once  insulin lispro (ADMELOG) corrective regimen sliding scale   SubCutaneous three times a day before meals  insulin lispro (ADMELOG) corrective regimen sliding scale   SubCutaneous at bedtime  ketorolac 0.5% Ophthalmic Solution 1 Drop(s) Both EYES three times a day  levothyroxine 75 MICROGram(s) Oral daily  metoprolol succinate ER 12.5 milliGRAM(s) Oral daily  ofloxacin 0.3% Solution 1 Drop(s) Left EYE four times a day  pantoprazole  Injectable 40 milliGRAM(s) IV Push every 12 hours  polyethylene glycol 3350 17 Gram(s) Oral daily  polyethylene glycol/electrolyte Solution. 4000 milliLiter(s) Oral once  prednisoLONE acetate 1% Suspension 1 Drop(s) Right EYE two times a day  prednisoLONE acetate 1% Suspension 1 Drop(s) Left EYE four times a day  senna 2 Tablet(s) Oral at bedtime  sodium chloride 0.9%. 1000 milliLiter(s) (150 mL/Hr) IV Continuous <Continuous>  tamsulosin 0.4 milliGRAM(s) Oral at bedtime    MEDICATIONS  (PRN):  dextrose Oral Gel 15 Gram(s) Oral once PRN Blood Glucose LESS THAN 70 milliGRAM(s)/deciliter      Allergies    shellfish (Swelling; Hives)  No Known Drug Allergies    Intolerances        OBJECTIVE:  Vital Signs Last 24 Hrs  T(C): 36.4 (11 Apr 2024 21:28), Max: 36.4 (11 Apr 2024 21:28)  T(F): 97.6 (11 Apr 2024 21:28), Max: 97.6 (11 Apr 2024 21:28)  HR: 69 (12 Apr 2024 05:12) (60 - 70)  BP: 119/67 (12 Apr 2024 05:12) (99/52 - 167/89)  BP(mean): 89 (12 Apr 2024 01:50) (73 - 115)  RR: 18 (12 Apr 2024 05:12) (18 - 18)  SpO2: 95% (12 Apr 2024 05:12) (92% - 96%)    Parameters below as of 12 Apr 2024 05:12  Patient On (Oxygen Delivery Method): room air      I&O's Summary    11 Apr 2024 07:01  -  12 Apr 2024 07:00  --------------------------------------------------------  IN: 0 mL / OUT: 1375 mL / NET: -1375 mL        PHYSICAL EXAM:  Gen: Reclining in bed at time of exam, appears stated age  HEENT: NCAT, MMM, clear OP  Neck: supple, trachea at midline  CV: RRR, +S1/S2  Pulm: adequate respiratory effort, no increase in work of breathing  Abd: soft, NTND, no epigastric tenderness   Skin: warm and dry,   Ext: WWP, no LE edema  Neuro: AOx3, no gross focal neurological deficits  Psych: affect and behavior appropriate, pleasant at time of interview  :     LABS:                        9.3    10.33 )-----------( 311      ( 12 Apr 2024 05:30 )             30.6     04-12    140  |  108  |  13  ----------------------------<  125<H>  3.9   |  22  |  1.12    Ca    8.5      12 Apr 2024 05:30  Mg     2.4     04-12    TPro  5.8<L>  /  Alb  3.3  /  TBili  0.4  /  DBili  x   /  AST  21  /  ALT  18  /  AlkPhos  61  04-11    LIVER FUNCTIONS - ( 11 Apr 2024 05:30 )  Alb: 3.3 g/dL / Pro: 5.8 g/dL / ALK PHOS: 61 U/L / ALT: 18 U/L / AST: 21 U/L / GGT: x             CAPILLARY BLOOD GLUCOSE      POCT Blood Glucose.: 115 mg/dL (12 Apr 2024 06:35)  POCT Blood Glucose.: 121 mg/dL (11 Apr 2024 21:27)  POCT Blood Glucose.: 120 mg/dL (11 Apr 2024 17:24)  POCT Blood Glucose.: 131 mg/dL (11 Apr 2024 11:37)    Urinalysis Basic - ( 12 Apr 2024 05:30 )    Color: x / Appearance: x / SG: x / pH: x  Gluc: 125 mg/dL / Ketone: x  / Bili: x / Urobili: x   Blood: x / Protein: x / Nitrite: x   Leuk Esterase: x / RBC: x / WBC x   Sq Epi: x / Non Sq Epi: x / Bacteria: x        MICRODATA:      RADIOLOGY/OTHER STUDIES:    PCP  Pharmacy:   Emergency contact:

## 2024-04-12 NOTE — PROGRESS NOTE ADULT - PROBLEM SELECTOR PLAN 7
Follow up TSH in AM   - Continue: levothyroxine 75 mcg QD TSH 6.95  - F/u T4 and T3 levels  - C/w levothyroxine 75 mcg QD

## 2024-04-12 NOTE — PROGRESS NOTE ADULT - PROBLEM SELECTOR PLAN 2
Labs notable for acute on chronic anemia. Hgb 9.2->8.6. Previous hospitalization Hgb 12.2.   -No melena, hematemesis, hematochezia or bleeding source. Pt has not had BM yet.   -Iron total 31, TIBC 272, 11% iron sat, ferritin 31, serum transferrin 200; start IV iron sucrose 500 mg x 1 today, 500 mg x 1 in am 4/12   -Keep active T&S (4/12/24)  -PPI IV BID  -GI Consulted; plan for EGD/colonoscopy 4/12. Start golytely prep. NPO after MN.   -Pt is at increased perioperative cardiovascular risk given advanced age, comorbidities, however given concern for active GI bleed benefits outweighs risks and pt is optimized for planned procedure in AM. Labs notable for acute on chronic anemia. Hgb 9.2->8.6->9.3. Previous hospitalization Hgb 12.2.   -Iron total 31, TIBC 272, 11% iron sat, ferritin 31, serum transferrin 200; s/p IV iron sucrose 500 mg x 2.   -S/p EGD 4/12/24 non-erosive gastritis and a single cratered ulcer in the duodenal bulb.   -Keep active T&S (4/12/24)  -On Discharge: ferrous sulfate 325 mg QOD, carafate suspension 1g po qid x 2 weeks, PPI BID x 2 weeks then daily x 8 weeks

## 2024-04-12 NOTE — PROGRESS NOTE ADULT - PROBLEM SELECTOR PLAN 8
Follow up A1c in AM   - Hold: Januvia 100 mg QD (home med)   - Initiate ISS    #HTN  SBPs 110s-150s  - Continue: Toprol XL 12.5 mg QD (reduced home dose)  - Initiate: Imdur 30 mg QD Follow up A1c in AM   - Hold: Januvia 100 mg QD (home med)   - c/w ISS    #HTN  SBPs 110s-150s  - Continue: Toprol XL 12.5 mg QD

## 2024-04-12 NOTE — DISCHARGE NOTE PROVIDER - NSDCFUADDINST_GEN_ALL_CORE_FT
Please continue to follow a heart healthy diet, low in sodium, cholesterol and fats. We recommend a Mediterranean diet:  -Incorporate 2 or more servings per day of vegetables, fruits, and whole grains  -Increase intake of fish and legumes/beans to 2 or more servings per week  -Increase intake of healthy fats, such as olive oil and avocados, and have a handful of nuts/seeds most days  -Reduce red/processed meat consumption to 2 or fewer times per week     Please continue to follow a heart healthy diet, low in sodium, cholesterol and fats. We recommend a Mediterranean diet:  -Incorporate 2 or more servings per day of vegetables, fruits, and whole grains  -Increase intake of fish and legumes/beans to 2 or more servings per week  -Increase intake of healthy fats, such as olive oil and avocados, and have a handful of nuts/seeds most days  -Reduce red/processed meat consumption to 2 or fewer times per week  - Aspirin  can put you at increased risk of bleeding; please avoid NSAIDS (such as Motrin, Advil, Ibuprofen, Naproxen, or Aleve, as these medications may further your risk of bleeding  - SEEK IMMEDIATE MEDICAL CARE IF YOU HAVE ANY OF THE FOLLOWING SYMPTOMS: worsening chest pain, racing heart beat, unexplained jaw/neck/back pain, severe abdominal pain, shortness of breath, dizziness or lightheadedness, fainting, sweaty or clammy skin, vomiting, or coughing up blood. These symptoms may represent a serious problem that is an emergency. Do not wait to see if the symptoms will go away. Get medical help right away. Call 911 and do not drive yourself to the hospital.

## 2024-04-12 NOTE — DISCHARGE NOTE PROVIDER - HOSPITAL COURSE
89 y/o M with SHELLFISH ALLERGY and PMHx of HLD, HTN, T2DM, CAD s/p PCI June 2023, s/p cholecystectomy, BPH, hypothyroidism, cervical spondylosis s/p laminectomy, presents to ED for syncope with initial concern for stroke/seizure with unremarkable workup, admitted to rule out cardiac syncope. Pt was found to be orthostatic, underwent IVF. Telemetry monitoring uneventful. TTE normal LVEF w/ no significant VHD.  Bardy holter monitor pt had placed on 4/4/24 by Dr. Alvarez.    During hospital course, pt found to have acute blood loss anemia. Hgb 12 in Nov 2023 trended down to 8.6 on 4/11. CANDELARIA noted on lab work in which IV iron 500 mg given for 2 days. GI consulted for further evaluation, started on IV PPI BID. Underwent EGD w/ Dr. Hernandez 4/12 revealing non-erosive gastritis, multiple biopsies taken for H. pylori testing and a single cratered ulcer found in duodenal bulb with no active bleeding. Pt has to follow up with Dr. Hernandez outpatient.     Discussed w/ Interventional Cardiologist purvi Silva to stop Plavix ( 4/11) and c/w ASA monotherapy given concerns of acute UGI bleed with prior PCI 2023.     EP consulted for ILR implant. Given syncope most likely related to hypovolemia 2/2 GIB no ILR indicated at this time. Pt encouraged to send Bardy holter monitor back to assess diagnostics.    Discharge meds:  Pantoprazole 40 mg BID for 2 weeks, followed by 40 mg PO daily x 8 weeks  Carafate Suspension 1g po qid x 2 weeks   Ferrous sulfate 325 mg QOD  Aspirin 81 mg QD  Atorvastatin 80 mg QD  Metoprolol succinate 12.5 mg QD  Miralax prn and Senna 2 g QD  Levothyroxine 75 mg QD    GLP-1 receptor agonist/SGLT2 inhibitor meds discussed w/ patients and encouraged to discuss further with PMD or Endocrinologist at next visit.      Pt discharge copies detail cardiovascular history, medications, testing/treatments, OR patient has created a patient portal account and instructed to provide their records at their 1st appointment.             89 y/o M with SHELLFISH ALLERGY and PMHx of HLD, HTN, T2DM, CAD s/p PCI June 2023, s/p cholecystectomy, BPH, hypothyroidism, cervical spondylosis s/p laminectomy, presents to ED for syncope with initial concern for stroke/seizure with unremarkable workup, admitted to rule out cardiac syncope. Pt was found to be orthostatic, underwent IVF. Telemetry monitoring uneventful. TTE normal LVEF w/ no significant VHD.  Bardy holter monitor pt had placed on 4/4/24 by Dr. Alvarez.    During hospital course, pt found to have acute blood loss anemia. Hgb 12 in Nov 2023 trended down to 8.6 on 4/11. CANDELARIA noted on lab work in which IV iron 500 mg given for 2 days. GI consulted for further evaluation, started on IV PPI BID. Underwent EGD w/ Dr. Hernandez 4/12 revealing non-erosive gastritis, multiple biopsies taken for H. pylori testing and a single cratered ulcer found in duodenal bulb with no active bleeding. Pt has to follow up with Dr. Hernandez outpatient.     Discussed w/ Interventional Cardiologist purvi Silva to stop Plavix ( 4/11) and c/w ASA monotherapy given concerns of acute UGI bleed with prior PCI 2023.     EP consulted for ILR implant. Given syncope most likely related to hypovolemia 2/2 GIB no ILR indicated at this time. Pt encouraged to send Bardy holter monitor back to assess diagnostics.    Discharge meds:  Pantoprazole 40 mg BID for 2 weeks, followed by 40 mg PO daily x 8 weeks  Ferrous sulfate 325 mg QOD  Aspirin 81 mg QD  Atorvastatin 80 mg QD  Metoprolol succinate 12.5 mg QD  Miralax 17g prn and Senna 1 capsule QHS   Levothyroxine 75 mcg QD    GLP-1 receptor agonist/SGLT2 inhibitor meds discussed w/ patients and encouraged to discuss further with PMD or Endocrinologist at next visit.      Pt discharge copies detail cardiovascular history, medications, testing/treatments, OR patient has created a patient portal account and instructed to provide their records at their 1st appointment.             87 y/o M with SHELLFISH ALLERGY and PMHx of HLD, HTN, T2DM, CAD s/p PCI June 2023, s/p cholecystectomy, BPH, hypothyroidism, cervical spondylosis s/p laminectomy, presents to ED for syncope with initial concern for stroke/seizure with unremarkable workup, admitted to rule out cardiac syncope. Pt was found to be orthostatic, underwent IVF. Telemetry monitoring uneventful. TTE normal LVEF w/ no significant VHD.  Bardy holter monitor pt had placed on 4/4/24 by Dr. Alvarez.    During hospital course, pt found to have acute blood loss anemia. Hgb 12 in Nov 2023 trended down to 8.6 on 4/11. CANDELARIA noted on lab work in which IV iron 500 mg given for 2 days. GI consulted for further evaluation, started on IV PPI BID. Underwent EGD w/ Dr. Hernandez 4/12 revealing non-erosive gastritis, multiple biopsies taken for H. pylori testing and a single cratered ulcer found in duodenal bulb with no active bleeding. Pt has to follow up with Dr. Hernandez outpatient.     Discussed w/ Interventional Cardiologist purvi Silva to stop Plavix ( 4/11) and c/w ASA monotherapy given concerns of acute UGI bleed with prior PCI 2023.   EP consulted for ILR implant. Given syncope most likely related to hypovolemia 2/2 GIB no ILR indicated at this time. Pt encouraged to send Bardy holter monitor back to assess diagnostics.    Discharge meds:  Pantoprazole 40 mg BID for 2 weeks, followed by 40 mg PO daily x 8 weeks  Ferrous sulfate 325 mg QOD  Aspirin 81 mg QD  Atorvastatin 80 mg QD  Metoprolol succinate 12.5 mg QD  Miralax 17g prn and Senna 1 capsule QHS   Levothyroxine 75 mcg QD    Patient to continue with incentive spirometer use at home. GLP-1 receptor agonist/SGLT2 inhibitor meds discussed w/ patients and encouraged to discuss further with PMD or Endocrinologist at next visit.  Pt discharge copies detail cardiovascular history, medications, testing/treatments, OR patient has created a patient portal account and instructed to provide their records at their 1st appointment.    No significant events on telemetry overnight. Repeat EKG without ischemic changes. Patient has been medically cleared for discharge as per  ________. Patient has been given appropriate discharge instructions including medication regimen, access site management and follow up. Medications that patient needs refills on have been e-prescribed to preferred pharmacy.     Gen: NAD, A&O x3  Cards: RRR, clear S1 and S2 without murmur  Pulm: CTA B/L without w/r/r  Abd: soft, NT  Ext: 1+ pitting edema B/L to shins covered w/ ACE wraps              87 y/o M with SHELLFISH ALLERGY and PMHx of HLD, HTN, T2DM, CAD s/p PCI June 2023, s/p cholecystectomy, BPH, hypothyroidism, cervical spondylosis s/p laminectomy, presents to ED for syncope with initial concern for stroke/seizure with unremarkable workup, admitted to rule out cardiac syncope. Pt was found to be orthostatic, underwent IVF. Telemetry monitoring uneventful. TTE normal LVEF w/ no significant VHD.  Bardy holter monitor pt had placed on 4/4/24 by Dr. Alvarez.    During hospital course, pt found to have acute blood loss anemia. Hgb 12 in Nov 2023 trended down to 8.6 on 4/11. CANDELARIA noted on lab work in which IV iron 500 mg given for 2 days. GI consulted for further evaluation, started on IV PPI BID. Underwent EGD w/ Dr. Hernandez 4/12 revealing non-erosive gastritis, multiple biopsies taken for H. pylori testing and a single cratered ulcer found in duodenal bulb with no active bleeding. Pt has to follow up with Dr. Hernandez outpatient.     Discussed w/ Interventional Cardiologist purvi Silva to stop Plavix ( 4/11) and c/w ASA monotherapy given concerns of acute UGI bleed with prior PCI 2023.   EP consulted for ILR implant. Given syncope most likely related to hypovolemia 2/2 GIB no ILR indicated at this time. Pt encouraged to send Bardy holter monitor back to assess diagnostics.    Discharge meds:  Pantoprazole 40 mg BID for 2 weeks, followed by 40 mg PO daily x 8 weeks  Ferrous sulfate 325 mg QOD  Aspirin 81 mg QD  Atorvastatin 80 mg QD  Metoprolol succinate 12.5 mg QD  Miralax 17g prn and Senna 2 capsule QHS PRN constiaption   Levothyroxine 75 mcg QD    Patient to continue with incentive spirometer use at home. GLP-1 receptor agonist/SGLT2 inhibitor meds discussed w/ patients and encouraged to discuss further with PMD or Endocrinologist at next visit.  Pt discharge copies detail cardiovascular history, medications, testing/treatments, OR patient has created a patient portal account and instructed to provide their records at their 1st appointment.    No significant events on telemetry overnight. Repeat EKG without ischemic changes. Patient has been medically cleared for discharge as per  ________. Patient has been given appropriate discharge instructions including medication regimen, access site management and follow up. Medications that patient needs refills on have been e-prescribed to preferred pharmacy.     Gen: NAD, A&O x3  Cards: RRR, clear S1 and S2 without murmur  Pulm: CTA B/L without w/r/r  Abd: soft, NT  Ext: 1+ pitting edema B/L to shins covered w/ ACE wraps

## 2024-04-12 NOTE — DISCHARGE NOTE PROVIDER - PROVIDER TOKENS
PROVIDER:[TOKEN:[[id number]],SCHEDULEDAPPT:[04/15/2024],SCHEDULEDAPPTTIME:[11:30 AM],ESTABLISHEDPATIENT:[T]],PROVIDER:[TOKEN:[[id number]],FOLLOWUP:[2 weeks]]

## 2024-04-12 NOTE — DISCHARGE NOTE PROVIDER - CARE PROVIDERS DIRECT ADDRESSES
,DirectAddress_Unknown,[email or web address]

## 2024-04-12 NOTE — PROGRESS NOTE ADULT - PROBLEM SELECTOR PLAN 6
BUN/Cr 17/1.34, GFR 52  - Follow up urine lytes   - Avoid nephrotoxins, renally dose meds, monitor I&Os    #BPH  - Continue: Tamsulosin 0.4 mg QHS BUN/Cr 17/1.34->1.12, GFR 52  - Improved s/p IVF  - Avoid nephrotoxins, renally dose meds, monitor I&Os    #BPH  - Continue: Tamsulosin 0.4 mg QHS

## 2024-04-12 NOTE — PROGRESS NOTE ADULT - ASSESSMENT
89 y/o M with PMHx of HLD, HTN, T2DM, CAD JOSÉ/scoreflex mLAD 95% @[facility] June 2023, BPH, hypothyroidism presents to ED for syncope with initial concern for stroke/seizure with unremarkable work-up, admitted to r/o cardiac syncope. Pt noted to have worsening anemia, GI consulted plan for EGD/colonoscopy 4/12. 89 y/o M with PMHx of HLD, HTN, T2DM, CAD JOSÉ/scoreflex mLAD 95% @[facility] June 2023, BPH, hypothyroidism presents to ED for syncope, initial concern for stroke/seizure with unremarkable work-up, admitted to r/o cardiac syncope. Pt noted to have worsening anemia, s/p EGD/colonoscopy 4/12 non-erosive gastritis and a single cratered ulcer in the duodenal bulb.  89 y/o M with PMHx of HLD, HTN, T2DM, CAD JOSÉ/scoreflex mLAD 95% @[facility] June 2023, BPH, hypothyroidism presents to ED for syncope, initial concern for stroke/seizure with unremarkable work-up, admitted to r/o cardiac syncope. Pt noted to have worsening anemia, s/p EGD 4/12 non-erosive gastritis and a single cratered ulcer in the duodenal bulb.

## 2024-04-12 NOTE — PROGRESS NOTE ADULT - PROBLEM SELECTOR PLAN 1
- HsTrop 19>20, CK/CKMB WNL, CTH no acute pathology; EEG neg  - EKG (4/10/24): NSR @71 bpm, QTc 456 ms   - TTE 4/11/24: Normal LV size and systolic function, dilated RV, normal RVSF, aortic sclerosis w/o stenosis, trace AR, PASP 68 mm hg, trivial pericardial effusion  - Orthostatics +, s/p IVF   - Tentative plan for ILR 4/12    o Collateral obtained from [facility]- patient w/ hx of similar presentations in past. Had holter monitor in Aug 2023 which was unremarkable. TTE March 2024 unremarkable w/ normal EF and no significant valve dz - Most likely i/s/o orthostasis and hypovolemia secondary to GIB  - CTH: no acute pathology; EEG neg  - TTE 4/11/24: Normal LVSF & RVSF, dilated RV, aortic sclerosis w/o stenosis, trace AR, PASP 68 mm hg, trivial pericardial effusion  - Orthostatic : s/p IVF bolus, repeat orthostatics  - Per EP, [employer] Heart Monitor should be sent back for evaluation. No ILR indicated at this time. - Most likely i/s/o orthostasis and hypovolemia secondary to GIB  - CTH: no acute pathology; EEG neg  - TTE 4/11/24: Normal LVSF & RVSF, dilated RV, aortic sclerosis w/o stenosis, trace AR, PASP 68 mm hg, trivial pericardial effusion  - Orthostatic : s/p IVF bolus, repeat orthostatics  - Per EP, [employer] Heart Monitor pt is wearing should be sent back for evaluation. No ILR indicated at this time.

## 2024-04-12 NOTE — PROGRESS NOTE ADULT - NS ATTEND AMEND GEN_ALL_CORE FT
88M with Anemia hbg b/l 12, HLD, Essential HTN with recent orthostatic hypotension for past 1 mo PTA, Type II DM, CAD s/p JOSÉ/scoreflex mLAD 95% @[facility] June 2023 with known  of OM2, BPH, hypothyroidism presents to ED with convulsive syncope. EKG with NSR with no acute ischemia, Troponin negative.  Patient c/o chronic constipation, dyspepsia, LH, dizziness, exertional dyspnea, exertional diaphoresis and chest pain. He is avoiding ADLs (no longer does house chores) secondary to symptoms. Patient admitted to cardiac telemetry for workup of syncope.  Labs notable for acute on chronic anemia. No bleeding from any orifice. Patient has not yet had BM. Repeat orthostatics are persistently positive. Patient continues to complain of dyspepsia partially relieved with viscous lidocaine  Plan  Discontinue Imdur given persistent severe orthostatic hypotension  Per Dr ALEJANDRO Siegel ([facility]) review of case, patient able to dc Plavix to reduce risk of bleeding i/s/o acute on chronic anemia and c/f potential GIB  GI consult for evaluation of potential GIB given orthostasis, acute on chronic anemia and dyspepsia  Transition ppi to IV PPI BID for GI protection, Ensure T&S is active  Administer NS IVFs today, apply LE ACE wraps and abdominal binder  Bowel regimen per medicine direction  Medicine consult for co mgmt of GI and hematology issues appreciated  EP consult for consideration of ILR for long term rhythm management  PT consult  Suzanne Lai M.D.  Cardiology Attending  During non face-to-face time, I reviewed relevant portions of the patient’s medical record; including vitals, labs, medications, cardiac studies, remaining additional imaging and consultant recommendations. During face-to-face time, I took a relevant history and examined the patient. I also explained to the patient their diagnoses, and specific cardiopulmonary management plan, which required a high level of medical decision making.  I answered all questions related to the patient's medical conditions. I spent 55minon total encounter; more than 50% of the visit was spent counseling and/or coordinating care by the attending physician, with plan of care discussed with the patient, multiple family members at bedside, Dr Zuñiga, and cardiac care team.
88M with Anemia hbg b/l 12, HLD, Essential HTN with recent orthostatic hypotension for past 1 mo PTA, Type II DM, CAD s/p JOSÉ/scoreflex mLAD 95% @[facility] June 2023 with known  of OM2, BPH, hypothyroidism presents to ED with convulsive syncope. EKG with NSR with no acute ischemia, Troponin negative.  Patient c/o chronic constipation, dyspepsia, LH, dizziness, exertional dyspnea, exertional diaphoresis and chest pain. He is avoiding ADLs (no longer does house chores) secondary to symptoms. Patient admitted to cardiac telemetry for workup of syncope.  Labs notable for acute on chronic anemia. Orthostatic vitals persistently positive. Patient referred to GI for evaluation of potential occult GIB acute blood loss anemia. Now s/p EGD today notable for non-erosive gastritis, duodenal ulcer with no active bleeding, multiple bx taken for H pylori testing.     Plan  To remain off Imdur given persistent severe orthostatic hypotension  To remain off Plavix given remote PCI and acute blood loss anemia 2/2 duodenal ulcer  Pantoprazole 40 mg daily for 2 weeks, followed by 40 mg PO daily x weeks, Carafate Suspension 1g po qid x 2 weeks   IV iron while in house for CANDELARIA, continue po ferrous sulfate qod for maintenance therapy  Await pathology results from EGD  Bowel regimen per medicine recs  ACE wraps to legs for improved venous return  EP consult for consideration of ILR for long term rhythm management -->no ILR indicated at this time, rec f/u Bardy heart monitor results as outpt  PT consult: no skilled needs  Discharge to home anticipated 4/13 AM  Suzanne Lai M.D.  Cardiology Attending  During non face-to-face time, I reviewed relevant portions of the patient’s medical record; including vitals, labs, medications, cardiac studies, remaining additional imaging and consultant recommendations. During face-to-face time, I took a relevant history and examined the patient. I also explained to the patient their diagnoses, and specific cardiopulmonary management plan, which required a high level of medical decision making.  I answered all questions related to the patient's medical conditions. I spent 65 minutes on the total encounter; more than 50% of the visit was spent counseling and/or coordinating care by the attending physician, with plan of care discussed with the patient, multiple family members including daughter Olga, wife at bedside, sister in law and niece at bedside, Dr Zuñiga, and cardiac care team.

## 2024-04-13 ENCOUNTER — TRANSCRIPTION ENCOUNTER (OUTPATIENT)
Age: 89
End: 2024-04-13

## 2024-04-13 VITALS — RESPIRATION RATE: 18 BRPM | HEART RATE: 70 BPM | OXYGEN SATURATION: 95 %

## 2024-04-13 LAB
ALBUMIN SERPL ELPH-MCNC: 3.3 G/DL — SIGNIFICANT CHANGE UP (ref 3.3–5)
ALP SERPL-CCNC: 64 U/L — SIGNIFICANT CHANGE UP (ref 40–120)
ALT FLD-CCNC: 16 U/L — SIGNIFICANT CHANGE UP (ref 10–45)
ANION GAP SERPL CALC-SCNC: 11 MMOL/L — SIGNIFICANT CHANGE UP (ref 5–17)
AST SERPL-CCNC: 18 U/L — SIGNIFICANT CHANGE UP (ref 10–40)
BILIRUB SERPL-MCNC: 0.6 MG/DL — SIGNIFICANT CHANGE UP (ref 0.2–1.2)
BUN SERPL-MCNC: 11 MG/DL — SIGNIFICANT CHANGE UP (ref 7–23)
CALCIUM SERPL-MCNC: 8.8 MG/DL — SIGNIFICANT CHANGE UP (ref 8.4–10.5)
CHLORIDE SERPL-SCNC: 106 MMOL/L — SIGNIFICANT CHANGE UP (ref 96–108)
CO2 SERPL-SCNC: 19 MMOL/L — LOW (ref 22–31)
CREAT SERPL-MCNC: 1.16 MG/DL — SIGNIFICANT CHANGE UP (ref 0.5–1.3)
EGFR: 61 ML/MIN/1.73M2 — SIGNIFICANT CHANGE UP
GLUCOSE BLDC GLUCOMTR-MCNC: 117 MG/DL — HIGH (ref 70–99)
GLUCOSE BLDC GLUCOMTR-MCNC: 131 MG/DL — HIGH (ref 70–99)
GLUCOSE SERPL-MCNC: 113 MG/DL — HIGH (ref 70–99)
HCT VFR BLD CALC: 33.1 % — LOW (ref 39–50)
HGB BLD-MCNC: 9.4 G/DL — LOW (ref 13–17)
MAGNESIUM SERPL-MCNC: 2.4 MG/DL — SIGNIFICANT CHANGE UP (ref 1.6–2.6)
MCHC RBC-ENTMCNC: 20.8 PG — LOW (ref 27–34)
MCHC RBC-ENTMCNC: 28.4 GM/DL — LOW (ref 32–36)
MCV RBC AUTO: 73.1 FL — LOW (ref 80–100)
NRBC # BLD: 0 /100 WBCS — SIGNIFICANT CHANGE UP (ref 0–0)
PLATELET # BLD AUTO: 316 K/UL — SIGNIFICANT CHANGE UP (ref 150–400)
POTASSIUM SERPL-MCNC: 3.9 MMOL/L — SIGNIFICANT CHANGE UP (ref 3.5–5.3)
POTASSIUM SERPL-SCNC: 3.9 MMOL/L — SIGNIFICANT CHANGE UP (ref 3.5–5.3)
PROT SERPL-MCNC: 5.7 G/DL — LOW (ref 6–8.3)
RBC # BLD: 4.53 M/UL — SIGNIFICANT CHANGE UP (ref 4.2–5.8)
RBC # FLD: 19.5 % — HIGH (ref 10.3–14.5)
SODIUM SERPL-SCNC: 136 MMOL/L — SIGNIFICANT CHANGE UP (ref 135–145)
T3 SERPL-MCNC: 84 NG/DL — SIGNIFICANT CHANGE UP (ref 80–200)
T4 AB SER-ACNC: 7.58 UG/DL — SIGNIFICANT CHANGE UP (ref 4.5–11.7)
WBC # BLD: 9.66 K/UL — SIGNIFICANT CHANGE UP (ref 3.8–10.5)
WBC # FLD AUTO: 9.66 K/UL — SIGNIFICANT CHANGE UP (ref 3.8–10.5)

## 2024-04-13 PROCEDURE — 80048 BASIC METABOLIC PNL TOTAL CA: CPT

## 2024-04-13 PROCEDURE — 80061 LIPID PANEL: CPT

## 2024-04-13 PROCEDURE — 86901 BLOOD TYPING SEROLOGIC RH(D): CPT

## 2024-04-13 PROCEDURE — 84133 ASSAY OF URINE POTASSIUM: CPT

## 2024-04-13 PROCEDURE — 83880 ASSAY OF NATRIURETIC PEPTIDE: CPT

## 2024-04-13 PROCEDURE — 84466 ASSAY OF TRANSFERRIN: CPT

## 2024-04-13 PROCEDURE — 99285 EMERGENCY DEPT VISIT HI MDM: CPT

## 2024-04-13 PROCEDURE — 86850 RBC ANTIBODY SCREEN: CPT

## 2024-04-13 PROCEDURE — 82553 CREATINE MB FRACTION: CPT

## 2024-04-13 PROCEDURE — 80053 COMPREHEN METABOLIC PANEL: CPT

## 2024-04-13 PROCEDURE — 85025 COMPLETE CBC W/AUTO DIFF WBC: CPT

## 2024-04-13 PROCEDURE — 83935 ASSAY OF URINE OSMOLALITY: CPT

## 2024-04-13 PROCEDURE — 84300 ASSAY OF URINE SODIUM: CPT

## 2024-04-13 PROCEDURE — 93005 ELECTROCARDIOGRAM TRACING: CPT

## 2024-04-13 PROCEDURE — V2787: CPT

## 2024-04-13 PROCEDURE — 97161 PT EVAL LOW COMPLEX 20 MIN: CPT

## 2024-04-13 PROCEDURE — 82570 ASSAY OF URINE CREATININE: CPT

## 2024-04-13 PROCEDURE — 0042T: CPT

## 2024-04-13 PROCEDURE — 82550 ASSAY OF CK (CPK): CPT

## 2024-04-13 PROCEDURE — 83550 IRON BINDING TEST: CPT

## 2024-04-13 PROCEDURE — 70498 CT ANGIOGRAPHY NECK: CPT | Mod: MC

## 2024-04-13 PROCEDURE — 82962 GLUCOSE BLOOD TEST: CPT

## 2024-04-13 PROCEDURE — 71045 X-RAY EXAM CHEST 1 VIEW: CPT

## 2024-04-13 PROCEDURE — 81003 URINALYSIS AUTO W/O SCOPE: CPT

## 2024-04-13 PROCEDURE — 84540 ASSAY OF URINE/UREA-N: CPT

## 2024-04-13 PROCEDURE — 70450 CT HEAD/BRAIN W/O DYE: CPT | Mod: MC

## 2024-04-13 PROCEDURE — 85610 PROTHROMBIN TIME: CPT

## 2024-04-13 PROCEDURE — 82746 ASSAY OF FOLIC ACID SERUM: CPT

## 2024-04-13 PROCEDURE — 84484 ASSAY OF TROPONIN QUANT: CPT

## 2024-04-13 PROCEDURE — 84480 ASSAY TRIIODOTHYRONINE (T3): CPT

## 2024-04-13 PROCEDURE — 99291 CRITICAL CARE FIRST HOUR: CPT

## 2024-04-13 PROCEDURE — 86900 BLOOD TYPING SEROLOGIC ABO: CPT

## 2024-04-13 PROCEDURE — 84156 ASSAY OF PROTEIN URINE: CPT

## 2024-04-13 PROCEDURE — 82728 ASSAY OF FERRITIN: CPT

## 2024-04-13 PROCEDURE — 85027 COMPLETE CBC AUTOMATED: CPT

## 2024-04-13 PROCEDURE — 36415 COLL VENOUS BLD VENIPUNCTURE: CPT

## 2024-04-13 PROCEDURE — 70496 CT ANGIOGRAPHY HEAD: CPT | Mod: MC

## 2024-04-13 PROCEDURE — 84443 ASSAY THYROID STIM HORMONE: CPT

## 2024-04-13 PROCEDURE — 83735 ASSAY OF MAGNESIUM: CPT

## 2024-04-13 PROCEDURE — 85730 THROMBOPLASTIN TIME PARTIAL: CPT

## 2024-04-13 PROCEDURE — 66999 UNLISTED PX ANT SEGMENT EYE: CPT

## 2024-04-13 PROCEDURE — 83036 HEMOGLOBIN GLYCOSYLATED A1C: CPT

## 2024-04-13 PROCEDURE — 83540 ASSAY OF IRON: CPT

## 2024-04-13 PROCEDURE — C8929: CPT

## 2024-04-13 PROCEDURE — 99233 SBSQ HOSP IP/OBS HIGH 50: CPT

## 2024-04-13 PROCEDURE — 88305 TISSUE EXAM BY PATHOLOGIST: CPT

## 2024-04-13 PROCEDURE — 84436 ASSAY OF TOTAL THYROXINE: CPT

## 2024-04-13 PROCEDURE — 82272 OCCULT BLD FECES 1-3 TESTS: CPT

## 2024-04-13 RX ORDER — POLYETHYLENE GLYCOL 3350 17 G/17G
17 POWDER, FOR SOLUTION ORAL
Refills: 0
Start: 2024-04-13

## 2024-04-13 RX ORDER — FERROUS SULFATE 325(65) MG
1 TABLET ORAL
Qty: 15 | Refills: 0
Start: 2024-04-13 | End: 2024-05-12

## 2024-04-13 RX ORDER — TAMSULOSIN HYDROCHLORIDE 0.4 MG/1
1 CAPSULE ORAL
Qty: 0 | Refills: 0 | DISCHARGE

## 2024-04-13 RX ORDER — FERROUS SULFATE 325(65) MG
1 TABLET ORAL
Qty: 15 | Refills: 2
Start: 2024-04-13 | End: 2024-07-11

## 2024-04-13 RX ORDER — POTASSIUM CHLORIDE 20 MEQ
10 PACKET (EA) ORAL ONCE
Refills: 0 | Status: COMPLETED | OUTPATIENT
Start: 2024-04-13 | End: 2024-04-13

## 2024-04-13 RX ORDER — SENNA PLUS 8.6 MG/1
1 TABLET ORAL
Qty: 30 | Refills: 3
Start: 2024-04-13 | End: 2024-08-10

## 2024-04-13 RX ORDER — SENNA PLUS 8.6 MG/1
2 TABLET ORAL
Qty: 60 | Refills: 3
Start: 2024-04-13 | End: 2024-08-10

## 2024-04-13 RX ORDER — CLOPIDOGREL BISULFATE 75 MG/1
1 TABLET, FILM COATED ORAL
Refills: 0 | DISCHARGE

## 2024-04-13 RX ORDER — PANTOPRAZOLE SODIUM 20 MG/1
1 TABLET, DELAYED RELEASE ORAL
Qty: 28 | Refills: 0
Start: 2024-04-13 | End: 2024-04-26

## 2024-04-13 RX ORDER — SITAGLIPTIN 50 MG/1
1 TABLET, FILM COATED ORAL
Qty: 0 | Refills: 0 | DISCHARGE

## 2024-04-13 RX ORDER — HYDROCORTISONE 1 %
1 OINTMENT (GRAM) TOPICAL DAILY
Refills: 0 | Status: DISCONTINUED | OUTPATIENT
Start: 2024-04-13 | End: 2024-04-13

## 2024-04-13 RX ORDER — PANTOPRAZOLE SODIUM 20 MG/1
1 TABLET, DELAYED RELEASE ORAL
Qty: 30 | Refills: 1
Start: 2024-04-13 | End: 2024-06-11

## 2024-04-13 RX ADMIN — Medication 10 MILLIEQUIVALENT(S): at 10:21

## 2024-04-13 RX ADMIN — Medication 81 MILLIGRAM(S): at 11:20

## 2024-04-13 RX ADMIN — Medication 12.5 MILLIGRAM(S): at 06:22

## 2024-04-13 RX ADMIN — Medication 1 DROP(S): at 05:31

## 2024-04-13 RX ADMIN — Medication 1 DROP(S): at 05:30

## 2024-04-13 RX ADMIN — Medication 1 DROP(S): at 11:26

## 2024-04-13 RX ADMIN — Medication 1 DROP(S): at 14:15

## 2024-04-13 RX ADMIN — Medication 1 DROP(S): at 11:25

## 2024-04-13 RX ADMIN — Medication 1 APPLICATION(S): at 11:21

## 2024-04-13 RX ADMIN — PANTOPRAZOLE SODIUM 40 MILLIGRAM(S): 20 TABLET, DELAYED RELEASE ORAL at 05:30

## 2024-04-13 RX ADMIN — Medication 75 MICROGRAM(S): at 05:29

## 2024-04-13 RX ADMIN — POLYETHYLENE GLYCOL 3350 17 GRAM(S): 17 POWDER, FOR SOLUTION ORAL at 11:21

## 2024-04-13 RX ADMIN — Medication 1 DROP(S): at 00:50

## 2024-04-13 RX ADMIN — Medication 1 DROP(S): at 00:49

## 2024-04-13 NOTE — PROGRESS NOTE ADULT - ASSESSMENT
87 y/o M with SHELLFISH ALLERGY and PMHx of HLD, HTN, T2DM, CAD s/p PCI to LCx June 2023, s/p cholecystectomy, BPH, PUD, hypothyroidism, cervical spondylosis s/p laminectomy, recent syncope with holter monitor for 2 weeks, s/p cataract OS surgery at [facility] ( 4/9) and  presented to ED with episode of unresponsiveness with right sided gaze, left facial droop at 7:15am while at [facility] waiting for post-operative cataract appointment, episode ~1 minute and back to baseline within 3-5 minutes.  At ED, Stroke code called, CT Head, CTA negative, EEG negative. Pt was admitted for further evaluation of cardiac syncope. Pt found to have an acute blood loss anemia dropped Hgb 8.6 from baseline 12, and orthostatic likely 2/2 acute UGI bleed.    - telemetry  - active care per Cardiologist, Dr. Thomas   - Orthostatic : given IVF bolus, no longer othostatic   - Syncope: TTE reviewed    - CAD s/p PCI to LCx( 6/2023): d/w Interventional Cardiologist Dr. Josie Siegel, ok to stop Plavix ( 4/11) and c/w ASA monotherapy given concerns of acute UGI bleed   - UGI bleed: GI consult appreciated, Dr. Price. c/w PPI iv bid, EGD showed clean based duodenal ulcer, c/w PPI bid for 2 weeks then daily for 8 weeks, will not continue carafate given concerns for impeding absorption of ASA. F/u GI Dr. Goran Hernandez for biopsy result to rule out H.pylori, and will need repeat EGD in 8 weeks to f/u resolution of the DU  - Acute blood loss anemia: trend H/H baseline Hgb 12 in Nov 2023), Hgb 9.2 at ED( 4/10)-> 8.6( 4/11)- >9.3(4/12)-> 9.4( 4/13)  - CANDELARIA: ( Ferritin 30 and Tsat 11%), IV iron 500mg daily for 2 days ( 4/11-4/12), d/c home on Iron sulfate 325mg po every other day with bowel regimen with senna 2 tabs qHS and Miralax  17g po daily prn   - f/u [facility] in 1 week    POC as d/w daughter Olga Yanez RN, Cardiologist Dr. Josie Siegel, Dr. Jarvis and Dr. Thomas, and Cardiology JACKI Carmona

## 2024-04-13 NOTE — PROVIDER CONTACT NOTE (OTHER) - ASSESSMENT
Ambulated in hallway with patient. While ambulating O2 sat decreased to 87% on room air.  Pt withoiut complaints of SOB. After stopping and rest, O2 sat returned to 92-93%  Notified JACKI Jaramillo

## 2024-04-13 NOTE — DISCHARGE NOTE NURSING/CASE MANAGEMENT/SOCIAL WORK - NSDCPEFALRISK_GEN_ALL_CORE
For information on Fall & Injury Prevention, visit: [email or web address] OR  [email or web address] OR  https://www.cdc.gov/steadi/patient.html

## 2024-04-13 NOTE — PROGRESS NOTE ADULT - SUBJECTIVE AND OBJECTIVE BOX
Pt seen and examined   no complaints  all dressed and ready to go    REVIEW OF SYSTEMS:  Constitutional: No fever, weight loss or fatigue  Cardiovascular: No chest pain, palpitations, dizziness or leg swelling  Gastrointestinal: No abdominal or epigastric pain. No nausea, vomiting or hematemesis; No diarrhea or constipation. No melena or hematochezia.  Skin: No itching, burning, rashes or lesions       MEDICATIONS:  MEDICATIONS  (STANDING):  aspirin enteric coated 81 milliGRAM(s) Oral daily  atorvastatin 80 milliGRAM(s) Oral at bedtime  dextrose 10% Bolus 125 milliLiter(s) IV Bolus once  dextrose 5%. 1000 milliLiter(s) (50 mL/Hr) IV Continuous <Continuous>  dextrose 5%. 1000 milliLiter(s) (100 mL/Hr) IV Continuous <Continuous>  dextrose 50% Injectable 25 Gram(s) IV Push once  dextrose 50% Injectable 12.5 Gram(s) IV Push once  glucagon  Injectable 1 milliGRAM(s) IntraMuscular once  hydrocortisone 1% Cream 1 Application(s) Topical daily  insulin lispro (ADMELOG) corrective regimen sliding scale   SubCutaneous three times a day before meals  insulin lispro (ADMELOG) corrective regimen sliding scale   SubCutaneous at bedtime  ketorolac 0.5% Ophthalmic Solution 1 Drop(s) Both EYES three times a day  levothyroxine 75 MICROGram(s) Oral daily  metoprolol succinate ER 12.5 milliGRAM(s) Oral daily  ofloxacin 0.3% Solution 1 Drop(s) Left EYE four times a day  pantoprazole  Injectable 40 milliGRAM(s) IV Push every 12 hours  polyethylene glycol 3350 17 Gram(s) Oral daily  polyethylene glycol/electrolyte Solution. 4000 milliLiter(s) Oral once  prednisoLONE acetate 1% Suspension 1 Drop(s) Left EYE four times a day  prednisoLONE acetate 1% Suspension 1 Drop(s) Right EYE two times a day  senna 2 Tablet(s) Oral at bedtime  sodium chloride 0.9%. 1000 milliLiter(s) (100 mL/Hr) IV Continuous <Continuous>  tamsulosin 0.4 milliGRAM(s) Oral at bedtime    MEDICATIONS  (PRN):  dextrose Oral Gel 15 Gram(s) Oral once PRN Blood Glucose LESS THAN 70 milliGRAM(s)/deciliter      Allergies    No Known Allergies    Intolerances        Vital Signs Last 24 Hrs  T(C): 36.5 (13 Apr 2024 09:05), Max: 36.7 (12 Apr 2024 20:18)  T(F): 97.7 (13 Apr 2024 09:05), Max: 98.1 (12 Apr 2024 20:18)  HR: 70 (13 Apr 2024 12:10) (64 - 77)  BP: 109/61 (13 Apr 2024 09:05) (102/62 - 114/59)  BP(mean): 79 (13 Apr 2024 05:33) (78 - 80)  RR: 18 (13 Apr 2024 12:10) (18 - 20)  SpO2: 95% (13 Apr 2024 12:10) (90% - 95%)    Parameters below as of 13 Apr 2024 12:10  Patient On (Oxygen Delivery Method): room air        04-12 @ 07:01 - 04-13 @ 07:00  --------------------------------------------------------  IN: 1390 mL / OUT: 660 mL / NET: 730 mL    04-13 @ 07:01 - 04-13 @ 14:49  --------------------------------------------------------  IN: 240 mL / OUT: 300 mL / NET: -60 mL        PHYSICAL EXAM:    General:   in no acute distress  HEENT: MMM, conjunctiva and sclera clear  Gastrointestinal: Soft non-tender non-distended; Normal bowel sounds;    Skin: Warm and dry. No obvious rash    LABS:      CBC Full  -  ( 13 Apr 2024 05:30 )  WBC Count : 9.66 K/uL  RBC Count : 4.53 M/uL  Hemoglobin : 9.4 g/dL  Hematocrit : 33.1 %  Platelet Count - Automated : 316 K/uL  Mean Cell Volume : 73.1 fl  Mean Cell Hemoglobin : 20.8 pg  Mean Cell Hemoglobin Concentration : 28.4 gm/dL  Auto Neutrophil # : x  Auto Lymphocyte # : x  Auto Monocyte # : x  Auto Eosinophil # : x  Auto Basophil # : x  Auto Neutrophil % : x  Auto Lymphocyte % : x  Auto Monocyte % : x  Auto Eosinophil % : x  Auto Basophil % : x    04-13    136  |  106  |  11  ----------------------------<  113<H>  3.9   |  19<L>  |  1.16    Ca    8.8      13 Apr 2024 05:30  Mg     2.4     04-13    TPro  5.7<L>  /  Alb  3.3  /  TBili  0.6  /  DBili  x   /  AST  18  /  ALT  16  /  AlkPhos  64  04-13          Urinalysis Basic - ( 13 Apr 2024 05:30 )    Color: x / Appearance: x / SG: x / pH: x  Gluc: 113 mg/dL / Ketone: x  / Bili: x / Urobili: x   Blood: x / Protein: x / Nitrite: x   Leuk Esterase: x / RBC: x / WBC x   Sq Epi: x / Non Sq Epi: x / Bacteria: x                RADIOLOGY & ADDITIONAL STUDIES (The following images were personally reviewed):

## 2024-04-13 NOTE — PROGRESS NOTE ADULT - SUBJECTIVE AND OBJECTIVE BOX
Patient is a 88y old  Male who presents with a chief complaint of syncope (12 Apr 2024 17:35)    INTERVAL EVENTS:NAEON    SUBJECTIVE:  Patient was seen and examined at bedside. Denies dizziness, CP,epigastric pain, SOB, melena,etc.     Review of systems: No fever, chills, dizziness, HA, Changes in vision, CP, dyspnea, nausea or vomiting, dysuria, changes in bowel movements, LE edema. Rest of 12 point Review of systems negative unless otherwise documented elsewhere in note.     Diet, Regular (04-12-24 @ 18:49) [Active]      MEDICATIONS:  MEDICATIONS  (STANDING):  aspirin enteric coated 81 milliGRAM(s) Oral daily  atorvastatin 80 milliGRAM(s) Oral at bedtime  dextrose 10% Bolus 125 milliLiter(s) IV Bolus once  dextrose 5%. 1000 milliLiter(s) (50 mL/Hr) IV Continuous <Continuous>  dextrose 5%. 1000 milliLiter(s) (100 mL/Hr) IV Continuous <Continuous>  dextrose 50% Injectable 25 Gram(s) IV Push once  dextrose 50% Injectable 12.5 Gram(s) IV Push once  glucagon  Injectable 1 milliGRAM(s) IntraMuscular once  hydrocortisone 1% Cream 1 Application(s) Topical daily  insulin lispro (ADMELOG) corrective regimen sliding scale   SubCutaneous three times a day before meals  insulin lispro (ADMELOG) corrective regimen sliding scale   SubCutaneous at bedtime  ketorolac 0.5% Ophthalmic Solution 1 Drop(s) Both EYES three times a day  levothyroxine 75 MICROGram(s) Oral daily  metoprolol succinate ER 12.5 milliGRAM(s) Oral daily  ofloxacin 0.3% Solution 1 Drop(s) Left EYE four times a day  pantoprazole  Injectable 40 milliGRAM(s) IV Push every 12 hours  polyethylene glycol 3350 17 Gram(s) Oral daily  polyethylene glycol/electrolyte Solution. 4000 milliLiter(s) Oral once  prednisoLONE acetate 1% Suspension 1 Drop(s) Left EYE four times a day  prednisoLONE acetate 1% Suspension 1 Drop(s) Right EYE two times a day  senna 2 Tablet(s) Oral at bedtime  sodium chloride 0.9%. 1000 milliLiter(s) (100 mL/Hr) IV Continuous <Continuous>  tamsulosin 0.4 milliGRAM(s) Oral at bedtime    MEDICATIONS  (PRN):  dextrose Oral Gel 15 Gram(s) Oral once PRN Blood Glucose LESS THAN 70 milliGRAM(s)/deciliter      Allergies    No Known Allergies    Intolerances        OBJECTIVE:  Vital Signs Last 24 Hrs  T(C): 36.5 (13 Apr 2024 09:05), Max: 36.7 (12 Apr 2024 20:18)  T(F): 97.7 (13 Apr 2024 09:05), Max: 98.1 (12 Apr 2024 20:18)  HR: 77 (13 Apr 2024 09:30) (64 - 77)  BP: 109/61 (13 Apr 2024 09:05) (102/62 - 130/75)  BP(mean): 79 (13 Apr 2024 05:33) (78 - 98)  RR: 20 (13 Apr 2024 09:30) (18 - 20)  SpO2: 93% (13 Apr 2024 09:30) (90% - 93%)    Parameters below as of 13 Apr 2024 09:30  Patient On (Oxygen Delivery Method): room air      I&O's Summary    12 Apr 2024 07:01  -  13 Apr 2024 07:00  --------------------------------------------------------  IN: 1390 mL / OUT: 660 mL / NET: 730 mL    13 Apr 2024 07:01  -  13 Apr 2024 13:28  --------------------------------------------------------  IN: 240 mL / OUT: 300 mL / NET: -60 mL        PHYSICAL EXAM:  Gen: Reclining in bed at time of exam, appears stated age  HEENT: NCAT, MMM, clear OP  Neck: supple, trachea at midline  CV: RRR, +S1/S2  Pulm: adequate respiratory effort, no increase in work of breathing  Abd: soft, NTND  Skin: warm and dry,   Ext: WWP, no LE edema  Neuro: AOx3, no gross focal neurological deficits  Psych: affect and behavior appropriate, pleasant at time of interview  :     LABS:                        9.4    9.66  )-----------( 316      ( 13 Apr 2024 05:30 )             33.1     04-13    136  |  106  |  11  ----------------------------<  113<H>  3.9   |  19<L>  |  1.16    Ca    8.8      13 Apr 2024 05:30  Mg     2.4     04-13    TPro  5.7<L>  /  Alb  3.3  /  TBili  0.6  /  DBili  x   /  AST  18  /  ALT  16  /  AlkPhos  64  04-13    LIVER FUNCTIONS - ( 13 Apr 2024 05:30 )  Alb: 3.3 g/dL / Pro: 5.7 g/dL / ALK PHOS: 64 U/L / ALT: 16 U/L / AST: 18 U/L / GGT: x             CAPILLARY BLOOD GLUCOSE      POCT Blood Glucose.: 131 mg/dL (13 Apr 2024 12:16)  POCT Blood Glucose.: 117 mg/dL (13 Apr 2024 07:52)  POCT Blood Glucose.: 120 mg/dL (12 Apr 2024 21:58)  POCT Blood Glucose.: 219 mg/dL (12 Apr 2024 16:50)    Urinalysis Basic - ( 13 Apr 2024 05:30 )    Color: x / Appearance: x / SG: x / pH: x  Gluc: 113 mg/dL / Ketone: x  / Bili: x / Urobili: x   Blood: x / Protein: x / Nitrite: x   Leuk Esterase: x / RBC: x / WBC x   Sq Epi: x / Non Sq Epi: x / Bacteria: x        MICRODATA:      RADIOLOGY/OTHER STUDIES:

## 2024-04-13 NOTE — PROVIDER CONTACT NOTE (OTHER) - SITUATION
pt seen by moises marsh in am and [id number] 90% at rest  Incentive spirometer teaching done and performed by patient. O2 sat 93% at rest post I/S  PA ordered [id number] check with ambulation

## 2024-04-13 NOTE — DISCHARGE NOTE NURSING/CASE MANAGEMENT/SOCIAL WORK - PATIENT PORTAL LINK FT
You can access the FollowMyHealth Patient Portal offered by [facility] by registering at the following website: [email or web address]. By joining [employer]’s FollowMyHealth portal, you will also be able to view your health information using other applications (apps) compatible with our system.

## 2024-04-16 DIAGNOSIS — Z79.82 LONG TERM (CURRENT) USE OF ASPIRIN: ICD-10-CM

## 2024-04-16 DIAGNOSIS — Z91.013 ALLERGY TO SEAFOOD: ICD-10-CM

## 2024-04-16 DIAGNOSIS — E03.9 HYPOTHYROIDISM, UNSPECIFIED: ICD-10-CM

## 2024-04-16 DIAGNOSIS — E11.36 TYPE 2 DIABETES MELLITUS WITH DIABETIC CATARACT: ICD-10-CM

## 2024-04-16 DIAGNOSIS — I10 ESSENTIAL (PRIMARY) HYPERTENSION: ICD-10-CM

## 2024-04-16 DIAGNOSIS — H25.12 AGE-RELATED NUCLEAR CATARACT, LEFT EYE: ICD-10-CM

## 2024-04-16 DIAGNOSIS — Z95.5 PRESENCE OF CORONARY ANGIOPLASTY IMPLANT AND GRAFT: ICD-10-CM

## 2024-04-16 DIAGNOSIS — E78.5 HYPERLIPIDEMIA, UNSPECIFIED: ICD-10-CM

## 2024-04-16 DIAGNOSIS — I25.10 ATHEROSCLEROTIC HEART DISEASE OF NATIVE CORONARY ARTERY WITHOUT ANGINA PECTORIS: ICD-10-CM

## 2024-04-16 DIAGNOSIS — Z79.84 LONG TERM (CURRENT) USE OF ORAL HYPOGLYCEMIC DRUGS: ICD-10-CM

## 2024-04-16 LAB — SURGICAL PATHOLOGY STUDY: SIGNIFICANT CHANGE UP

## 2024-04-18 DIAGNOSIS — Z79.84 LONG TERM (CURRENT) USE OF ORAL HYPOGLYCEMIC DRUGS: ICD-10-CM

## 2024-04-18 DIAGNOSIS — N40.0 BENIGN PROSTATIC HYPERPLASIA WITHOUT LOWER URINARY TRACT SYMPTOMS: ICD-10-CM

## 2024-04-18 DIAGNOSIS — E03.9 HYPOTHYROIDISM, UNSPECIFIED: ICD-10-CM

## 2024-04-18 DIAGNOSIS — E11.9 TYPE 2 DIABETES MELLITUS WITHOUT COMPLICATIONS: ICD-10-CM

## 2024-04-18 DIAGNOSIS — I10 ESSENTIAL (PRIMARY) HYPERTENSION: ICD-10-CM

## 2024-04-18 DIAGNOSIS — I95.1 ORTHOSTATIC HYPOTENSION: ICD-10-CM

## 2024-04-18 DIAGNOSIS — K21.9 GASTRO-ESOPHAGEAL REFLUX DISEASE WITHOUT ESOPHAGITIS: ICD-10-CM

## 2024-04-18 DIAGNOSIS — D50.9 IRON DEFICIENCY ANEMIA, UNSPECIFIED: ICD-10-CM

## 2024-04-18 DIAGNOSIS — D62 ACUTE POSTHEMORRHAGIC ANEMIA: ICD-10-CM

## 2024-04-18 DIAGNOSIS — Z98.42 CATARACT EXTRACTION STATUS, LEFT EYE: ICD-10-CM

## 2024-04-18 DIAGNOSIS — N17.9 ACUTE KIDNEY FAILURE, UNSPECIFIED: ICD-10-CM

## 2024-04-18 DIAGNOSIS — Z91.013 ALLERGY TO SEAFOOD: ICD-10-CM

## 2024-04-18 DIAGNOSIS — Z79.82 LONG TERM (CURRENT) USE OF ASPIRIN: ICD-10-CM

## 2024-04-18 DIAGNOSIS — D72.829 ELEVATED WHITE BLOOD CELL COUNT, UNSPECIFIED: ICD-10-CM

## 2024-04-18 DIAGNOSIS — E78.5 HYPERLIPIDEMIA, UNSPECIFIED: ICD-10-CM

## 2024-04-18 DIAGNOSIS — K25.4 CHRONIC OR UNSPECIFIED GASTRIC ULCER WITH HEMORRHAGE: ICD-10-CM

## 2024-04-18 DIAGNOSIS — I35.1 NONRHEUMATIC AORTIC (VALVE) INSUFFICIENCY: ICD-10-CM

## 2024-04-18 DIAGNOSIS — Z90.49 ACQUIRED ABSENCE OF OTHER SPECIFIED PARTS OF DIGESTIVE TRACT: ICD-10-CM

## 2024-04-18 DIAGNOSIS — Z80.0 FAMILY HISTORY OF MALIGNANT NEOPLASM OF DIGESTIVE ORGANS: ICD-10-CM

## 2024-04-18 DIAGNOSIS — Z95.5 PRESENCE OF CORONARY ANGIOPLASTY IMPLANT AND GRAFT: ICD-10-CM

## 2024-04-18 DIAGNOSIS — Z98.41 CATARACT EXTRACTION STATUS, RIGHT EYE: ICD-10-CM

## 2024-04-18 DIAGNOSIS — R60.0 LOCALIZED EDEMA: ICD-10-CM

## 2024-04-18 DIAGNOSIS — E86.1 HYPOVOLEMIA: ICD-10-CM

## 2024-04-18 DIAGNOSIS — I25.10 ATHEROSCLEROTIC HEART DISEASE OF NATIVE CORONARY ARTERY WITHOUT ANGINA PECTORIS: ICD-10-CM

## 2024-04-18 DIAGNOSIS — K59.09 OTHER CONSTIPATION: ICD-10-CM

## 2024-04-18 DIAGNOSIS — Z87.11 PERSONAL HISTORY OF PEPTIC ULCER DISEASE: ICD-10-CM

## 2024-04-18 DIAGNOSIS — K29.60 OTHER GASTRITIS WITHOUT BLEEDING: ICD-10-CM

## 2024-04-18 DIAGNOSIS — Z98.1 ARTHRODESIS STATUS: ICD-10-CM

## 2024-04-18 DIAGNOSIS — R29.810 FACIAL WEAKNESS: ICD-10-CM

## 2024-04-19 ENCOUNTER — NON-APPOINTMENT (OUTPATIENT)
Age: 89
End: 2024-04-19

## 2024-04-19 ENCOUNTER — APPOINTMENT (OUTPATIENT)
Dept: OPHTHALMOLOGY | Facility: CLINIC | Age: 89
End: 2024-04-19
Payer: MEDICARE

## 2024-04-19 PROCEDURE — 99024 POSTOP FOLLOW-UP VISIT: CPT

## 2024-04-22 NOTE — CHART NOTE - NSCHARTNOTEFT_GEN_A_CORE
Electrophysiology Brief note:    EPS was asked to evaluate for ILR implant because of syncope.   Patient's chart review shows pertinent information: pt has orthostasis and now recent new melena / anemia with EGD today revealing non-erosive gastritis and a single cratered ulcer in the duodenal bulb. In addition, he was wearing the [employer] Heart Monitor while he had this latest syncope. I recommend he should get this monitor result back first (this can be done outpatient). Suspect his syncope is related to his hypovolemia secondary to GIB.  No ILR indicated at this time.   D/W Dr. Thomas.
From a stroke perspective, no contraindication for endoscopy as unlikely patient's initial presentation was due to an acute stroke  Discussed with Dr. Sepulveda
Patient is s/p EGD in endoscopy unit for melena and acute blood loss anemia.     EGD findings:   - Normal mucosa was noted in the whole esophagus.  - Diffuse erythema of the mucosa was noted in the stomach. These findings are compatible with non-erosive gastritis. Multiple cold forceps biopsies were performed for H. pylori in the antrum, incisura, stomach body and antrum, incisura, and stomach body.  - A single cratered ulcer was found in the duodenal bulb. Additional findings include Duodenal bulb ulcer was a clean-based (Hugo Class III) ulcer with contact bleeding but no active bleeding or visible vessel.    Recommendations:  - Advance diet as tolerated   - Return to floor for further management   - Pantoprazole 40 mg daily for 2 weeks, followed by 40 mg PO daily x weeks  - Carafate Suspension 1g po qid x 2 weeks   - Await pathology results  - Out-patient GI follow-up with Dr. Hernandez    Case discussed with Dr. Hernandez. GI Team will continue to follow.     Laura Price D.O.   Gastroenterology Fellow  Weekday 7am-5pm Pager: [phone]  Weeknights/Weekend/Holiday Coverage: Please call the  for contact info
Called patient's daughter and HCP, Olga Yanez to discuss EGD biopsy results.     Final Diagnosis  Stomach, random; biopsy:  - Gastric mucosa showing mild chronic inflammation and focal dilatation of fundic glands.  - No intestinal metaplasia or dysplasia identified.  - No Helicobacter pylori-like microorganisms identified on H/E slides.    Explained that biopsies in the stomach were negative for H. pylori and showed no evidence of malignancy.     Patient's daughter states that he has an appointment to follow-up with Dr. Hernandez to repeat Hb and determine if any further work-up is warranted.     Laura Price D.O.   Gastroenterology Fellow  Weekday 7am-5pm Pager: [phone]  Weeknights/Weekend/Holiday Coverage: Please call the  for contact info

## 2024-05-10 ENCOUNTER — NON-APPOINTMENT (OUTPATIENT)
Age: 89
End: 2024-05-10

## 2024-05-10 ENCOUNTER — APPOINTMENT (OUTPATIENT)
Dept: OPHTHALMOLOGY | Facility: CLINIC | Age: 89
End: 2024-05-10
Payer: MEDICARE

## 2024-05-10 PROCEDURE — 99024 POSTOP FOLLOW-UP VISIT: CPT

## 2024-06-27 NOTE — PHYSICAL THERAPY INITIAL EVALUATION ADULT - BRACE/ORTHOTICS
on O2

## 2024-07-11 NOTE — PATIENT PROFILE ADULT - FALL HARM RISK - PATIENT NEEDS ASSISTANCE
The patient's goals for the shift include      The clinical goals for the shift include Patient's blood pressure will improve along with nausea    Problem: Pain - Adult  Goal: Verbalizes/displays adequate comfort level or baseline comfort level  Outcome: Progressing     Problem: Safety - Adult  Goal: Free from fall injury  Outcome: Progressing     Problem: Discharge Planning  Goal: Discharge to home or other facility with appropriate resources  Outcome: Progressing     Problem: Chronic Conditions and Co-morbidities  Goal: Patient's chronic conditions and co-morbidity symptoms are monitored and maintained or improved  Outcome: Progressing     Problem: Pain  Goal: Takes deep breaths with improved pain control throughout the shift  Outcome: Progressing  Goal: Turns in bed with improved pain control throughout the shift  Outcome: Progressing  Goal: Walks with improved pain control throughout the shift  Outcome: Progressing  Goal: Performs ADL's with improved pain control throughout shift  Outcome: Progressing  Goal: Participates in PT with improved pain control throughout the shift  Outcome: Progressing  Goal: Free from opioid side effects throughout the shift  Outcome: Progressing  Goal: Free from acute confusion related to pain meds throughout the shift  Outcome: Progressing     Problem: Diabetes  Goal: Achieve decreasing blood glucose levels by end of shift  Outcome: Progressing  Goal: Increase stability of blood glucose readings by end of shift  Outcome: Progressing  Goal: Decrease in ketones present in urine by end of shift  Outcome: Progressing  Goal: Maintain electrolyte levels within acceptable range throughout shift  Outcome: Progressing  Goal: Maintain glucose levels >70mg/dl to <250mg/dl throughout shift  Outcome: Progressing  Goal: No changes in neurological exam by end of shift  Outcome: Progressing  Goal: Learn about and adhere to nutrition recommendations by end of shift  Outcome: Progressing  Goal: Vital  signs within normal range for age by end of shift  Outcome: Progressing  Goal: Increase self care and/or family involovement by end of shift  Outcome: Progressing  Goal: Receive DSME education by end of shift  Outcome: Progressing      Walking/Moving from bed to chair

## 2024-08-22 ENCOUNTER — TRANSCRIPTION ENCOUNTER (OUTPATIENT)
Age: 89
End: 2024-08-22

## 2024-08-30 ENCOUNTER — APPOINTMENT (OUTPATIENT)
Dept: OPHTHALMOLOGY | Facility: CLINIC | Age: 89
End: 2024-08-30
Payer: MEDICARE

## 2024-08-30 ENCOUNTER — NON-APPOINTMENT (OUTPATIENT)
Age: 89
End: 2024-08-30

## 2024-08-30 PROCEDURE — 92014 COMPRE OPH EXAM EST PT 1/>: CPT

## 2024-10-04 NOTE — PRE-OP CHECKLIST - BMI (KG/M2)
Quality 226: Preventive Care And Screening: Tobacco Use: Screening And Cessation Intervention: Patient screened for tobacco use and is an ex/non-smoker Quality 47: Advance Care Plan: Advance Care Planning discussed and documented; advance care plan or surrogate decision maker documented in the medical record. Detail Level: Detailed 24.1

## 2024-10-31 ENCOUNTER — APPOINTMENT (OUTPATIENT)
Dept: PODIATRY | Facility: CLINIC | Age: 89
End: 2024-10-31

## 2024-10-31 DIAGNOSIS — M77.9 ENTHESOPATHY, UNSPECIFIED: ICD-10-CM

## 2024-10-31 DIAGNOSIS — E11.49 TYPE 2 DIABETES MELLITUS WITH OTHER DIABETIC NEUROLOGICAL COMPLICATION: ICD-10-CM

## 2024-10-31 DIAGNOSIS — M79.673 PAIN IN UNSPECIFIED FOOT: ICD-10-CM

## 2024-10-31 DIAGNOSIS — B35.1 TINEA UNGUIUM: ICD-10-CM

## 2024-10-31 DIAGNOSIS — S93.609A UNSPECIFIED SPRAIN OF UNSPECIFIED FOOT, INITIAL ENCOUNTER: ICD-10-CM

## 2024-10-31 PROCEDURE — 73610 X-RAY EXAM OF ANKLE: CPT | Mod: RT

## 2024-10-31 PROCEDURE — 99213 OFFICE O/P EST LOW 20 MIN: CPT | Mod: 25

## 2024-10-31 RX ORDER — CICLOPIROX 71.3 MG/ML
8 SOLUTION TOPICAL
Qty: 1 | Refills: 3 | Status: ACTIVE | COMMUNITY
Start: 2024-10-31 | End: 1900-01-01

## 2024-11-01 RX ORDER — CLOPIDOGREL 75 MG/1
TABLET, FILM COATED ORAL
Refills: 0 | Status: ACTIVE | COMMUNITY

## 2024-11-01 RX ORDER — SITAGLIPTIN 100 MG/1
TABLET, FILM COATED ORAL
Refills: 0 | Status: ACTIVE | COMMUNITY

## 2024-11-01 RX ORDER — TAMSULOSIN HYDROCHLORIDE 0.4 MG/1
CAPSULE ORAL
Refills: 0 | Status: ACTIVE | COMMUNITY

## 2024-11-01 RX ORDER — EMPAGLIFLOZIN 25 MG/1
TABLET, FILM COATED ORAL
Refills: 0 | Status: ACTIVE | COMMUNITY

## 2024-11-01 RX ORDER — ATORVASTATIN CALCIUM 80 MG/1
TABLET, FILM COATED ORAL
Refills: 0 | Status: ACTIVE | COMMUNITY

## 2024-11-01 RX ORDER — FUROSEMIDE 80 MG/1
TABLET ORAL
Refills: 0 | Status: ACTIVE | COMMUNITY

## 2024-11-04 PROBLEM — E11.49 DM (DIABETES MELLITUS), TYPE 2 WITH NEUROLOGICAL COMPLICATIONS: Status: ACTIVE | Noted: 2024-11-01

## 2024-11-04 PROBLEM — M77.9 ENTHESOPATHY: Status: ACTIVE | Noted: 2024-11-01

## 2024-11-04 PROBLEM — S93.609A FOOT SPRAIN: Status: ACTIVE | Noted: 2024-11-01

## 2024-11-04 PROBLEM — M79.673 PAIN, FOOT: Status: ACTIVE | Noted: 2024-11-01

## 2025-01-30 ENCOUNTER — APPOINTMENT (OUTPATIENT)
Dept: PODIATRY | Facility: CLINIC | Age: 89
End: 2025-01-30

## 2025-03-28 ENCOUNTER — NON-APPOINTMENT (OUTPATIENT)
Age: 89
End: 2025-03-28

## 2025-03-28 ENCOUNTER — APPOINTMENT (OUTPATIENT)
Dept: OPHTHALMOLOGY | Facility: CLINIC | Age: 89
End: 2025-03-28

## 2025-03-28 PROCEDURE — 92012 INTRM OPH EXAM EST PATIENT: CPT

## 2025-04-02 ENCOUNTER — APPOINTMENT (OUTPATIENT)
Dept: PODIATRY | Facility: CLINIC | Age: 89
End: 2025-04-02

## 2025-04-02 DIAGNOSIS — E11.49 TYPE 2 DIABETES MELLITUS WITH OTHER DIABETIC NEUROLOGICAL COMPLICATION: ICD-10-CM

## 2025-04-02 DIAGNOSIS — M25.579 PAIN IN UNSPECIFIED ANKLE AND JOINTS OF UNSPECIFIED FOOT: ICD-10-CM

## 2025-04-02 PROCEDURE — 99212 OFFICE O/P EST SF 10 MIN: CPT

## 2025-04-09 PROBLEM — M25.579 PAIN, ANKLE: Status: ACTIVE | Noted: 2025-04-08

## (undated) DEVICE — XI TIP COVER

## (undated) DEVICE — XI DRAPE ARM

## (undated) DEVICE — CLIPPER BLADE GENERAL USE

## (undated) DEVICE — Device

## (undated) DEVICE — TRANSFORMER INTREPID I/A 0.3MM

## (undated) DEVICE — PACK CENTURION 2.4MM

## (undated) DEVICE — XI DRAPE COLUMN

## (undated) DEVICE — DRSG TAPE MICROPORE 1"

## (undated) DEVICE — DRAPE MICROSCOPE KNOB COVER SMALL (2 PCS)

## (undated) DEVICE — KNIFE ALCON MVR V-LANCE 20G (WHITE)

## (undated) DEVICE — VENODYNE/SCD SLEEVE CALF MEDIUM

## (undated) DEVICE — GLV 6.5 PROTEXIS (WHITE)

## (undated) DEVICE — SUT NYLON 10-0 12" CU-5

## (undated) DEVICE — SUT VICRYL 0 27" UR-6

## (undated) DEVICE — XI ENDOWRIST SUCTION IRRIGATOR 8MM

## (undated) DEVICE — XI VESSEL SEALER

## (undated) DEVICE — INSUFFLATION NDL COVIDIEN SURGINEEDLE VERESS 120MM

## (undated) DEVICE — XI OBTURATOR OPTICAL BLADELESS 8MM

## (undated) DEVICE — FORCEP RADIAL JAW 4 W NDL 2.2MM 2.8MM 240CM ORANGE DISP

## (undated) DEVICE — APPLICATOR COTTON TIP 3" STERILE

## (undated) DEVICE — DRAPE TOP SHEET 53" X 101"

## (undated) DEVICE — KNIFE ALCON SLIT INTREPID CLEAR-CUT SAFETY 2.4MM

## (undated) DEVICE — SUT MAXON 0 30" GS-11

## (undated) DEVICE — PACK GENERAL LAPAROSCOPY

## (undated) DEVICE — PREP CHLORAPREP HI-LITE ORANGE 26ML

## (undated) DEVICE — NUCLEUS HYDRODISSECTOR PEARCE ANGLED 25G X 22MM

## (undated) DEVICE — DRSG GAUZE PACKTNER ROLL

## (undated) DEVICE — TROCAR APPLIED MEDICAL KII BALLOON BLUNT TIP 12MM X 100MM

## (undated) DEVICE — SOL IRR BAG BSS 500ML

## (undated) DEVICE — BAG ETHICON SPECIMEN RETRIEVAL 4 X 6"

## (undated) DEVICE — SUT MAXON 0 30" HGU-46

## (undated) DEVICE — TROCAR COVIDIEN VERSAPORT BLADELESS OPTICAL 5MM STANDARD

## (undated) DEVICE — ENDOCATCH 10MM SPECIMEN POUCH

## (undated) DEVICE — PACK ANTERIOR SEGMENT

## (undated) DEVICE — SUT MONOCRYL 4-0 27" PS-2 UNDYED

## (undated) DEVICE — TUBING STRYKER PNEUMOCLEAR HIGH FLOW

## (undated) DEVICE — D HELP - CLEARVIEW CLEARIFY SYSTEM

## (undated) DEVICE — FOLEY TRAY 16FR 5CC LF UMETER CLOSED